# Patient Record
Sex: FEMALE | Race: WHITE | NOT HISPANIC OR LATINO | Employment: UNEMPLOYED | URBAN - METROPOLITAN AREA
[De-identification: names, ages, dates, MRNs, and addresses within clinical notes are randomized per-mention and may not be internally consistent; named-entity substitution may affect disease eponyms.]

---

## 2018-09-07 ENCOUNTER — OFFICE VISIT (OUTPATIENT)
Dept: INTERNAL MEDICINE | Facility: CLINIC | Age: 53
End: 2018-09-07

## 2018-09-07 VITALS
HEIGHT: 64 IN | BODY MASS INDEX: 29.19 KG/M2 | WEIGHT: 171 LBS | SYSTOLIC BLOOD PRESSURE: 122 MMHG | OXYGEN SATURATION: 98 % | DIASTOLIC BLOOD PRESSURE: 74 MMHG | HEART RATE: 82 BPM

## 2018-09-07 DIAGNOSIS — F33.1 MODERATE EPISODE OF RECURRENT MAJOR DEPRESSIVE DISORDER (HCC): Primary | ICD-10-CM

## 2018-09-07 DIAGNOSIS — M62.838 MUSCLE SPASM: ICD-10-CM

## 2018-09-07 DIAGNOSIS — G89.4 CHRONIC PAIN SYNDROME: ICD-10-CM

## 2018-09-07 DIAGNOSIS — R73.9 HYPERGLYCEMIA: ICD-10-CM

## 2018-09-07 PROCEDURE — 99203 OFFICE O/P NEW LOW 30 MIN: CPT | Performed by: NURSE PRACTITIONER

## 2018-09-07 RX ORDER — DULOXETIN HYDROCHLORIDE 60 MG/1
CAPSULE, DELAYED RELEASE ORAL
COMMUNITY
End: 2018-09-07 | Stop reason: SDUPTHER

## 2018-09-07 RX ORDER — CYCLOBENZAPRINE HCL 10 MG
10 TABLET ORAL NIGHTLY PRN
Qty: 30 TABLET | Refills: 1 | Status: SHIPPED | OUTPATIENT
Start: 2018-09-07

## 2018-09-07 RX ORDER — ELETRIPTAN HYDROBROMIDE 40 MG/1
TABLET, FILM COATED ORAL
Refills: 11 | COMMUNITY
Start: 2018-08-27

## 2018-09-07 RX ORDER — PRASTERONE (DHEA) 50 MG
1 CAPSULE ORAL DAILY
COMMUNITY
End: 2021-05-13

## 2018-09-07 RX ORDER — ONDANSETRON 4 MG/1
TABLET, FILM COATED ORAL
COMMUNITY
Start: 2016-10-13 | End: 2020-02-28

## 2018-09-07 RX ORDER — DULOXETIN HYDROCHLORIDE 30 MG/1
CAPSULE, DELAYED RELEASE ORAL
COMMUNITY
End: 2018-09-07 | Stop reason: SDUPTHER

## 2018-09-07 RX ORDER — NORTRIPTYLINE HYDROCHLORIDE 10 MG/1
CAPSULE ORAL
COMMUNITY

## 2018-09-07 RX ORDER — DULOXETIN HYDROCHLORIDE 30 MG/1
30 CAPSULE, DELAYED RELEASE ORAL DAILY
Qty: 30 CAPSULE | Refills: 1 | Status: SHIPPED | OUTPATIENT
Start: 2018-09-07 | End: 2018-11-07 | Stop reason: SDUPTHER

## 2018-09-07 RX ORDER — MAGNESIUM OXIDE 400 MG/1
400 TABLET ORAL DAILY
COMMUNITY
End: 2019-11-06 | Stop reason: SINTOL

## 2018-09-07 RX ORDER — NAPROXEN 500 MG/1
500 TABLET ORAL 2 TIMES DAILY WITH MEALS
Qty: 60 TABLET | Refills: 1 | Status: SHIPPED | OUTPATIENT
Start: 2018-09-07 | End: 2019-02-14

## 2018-09-07 RX ORDER — MULTIVIT-MIN/IRON/FOLIC ACID/K 18-600-40
2000 CAPSULE ORAL DAILY
COMMUNITY

## 2018-09-07 RX ORDER — OMEPRAZOLE 40 MG/1
CAPSULE, DELAYED RELEASE ORAL
COMMUNITY
End: 2020-02-28 | Stop reason: SDUPTHER

## 2018-09-07 RX ORDER — TOPIRAMATE 200 MG/1
1 CAPSULE, EXTENDED RELEASE ORAL DAILY
Refills: 1 | COMMUNITY
Start: 2018-07-05 | End: 2022-11-21

## 2018-09-07 RX ORDER — DULOXETIN HYDROCHLORIDE 60 MG/1
60 CAPSULE, DELAYED RELEASE ORAL DAILY
Qty: 30 CAPSULE | Refills: 1 | Status: SHIPPED | OUTPATIENT
Start: 2018-09-07 | End: 2018-11-07 | Stop reason: SDUPTHER

## 2018-09-07 RX ORDER — NEBIVOLOL 10 MG/1
TABLET ORAL
COMMUNITY
End: 2020-02-28 | Stop reason: SDUPTHER

## 2018-09-07 RX ORDER — CYCLOBENZAPRINE HCL 10 MG
TABLET ORAL
COMMUNITY
End: 2018-09-07 | Stop reason: SDUPTHER

## 2018-09-07 NOTE — PROGRESS NOTES
Shy Morris is a 52 y.o. female.   Chief Complaint   Patient presents with   • Establish Care     A1C was 6.5 at GYN.    • Referral     would like to be referred to Dr. Saxena pain managment.    • Hyperglycemia   • Pain      History of Present Illness patient is a former patient of Dr. Johnny Voss in [facility].  She is switching providers related to insurance.  Patient reports she has chronic depression.  She experienced suicidal ideations approximately 2 months ago that is now resolved.  She reports she does have a therapist.  She also reports Dr. Voss recently increased her Cymbalta and response to suicidal ideations.  She denied having a plan.  Her therapist is Dr. Ly Molina at [facility].  She reports she has chronic pain from fibromyalgia.  She also experiences pain in her back.  She takes the involves a and Tylenol with some relief.  She also does stretching.  Patient denies fever chills, headache (Has HO migranes and sees Dr Hillary Chong) , ear pain, sore throat, shortness of air, cough, chest pain, abdominal pain, nausea vomiting diarrhea, dysuria, blood in stool or urine. Mood is depressed.  Denies further suicidal ideations.  Eating and drinking as usual.  She was recently diagnosed with hyperglycemia as she had a hemoglobin A1c of 5.6.      Past medical history is significant for hiatal hernia/acid reflux.  Controlled with Prilosec.  Colon Polyps.  They were removed by Dr. Calle.  She will need repeat colonoscopy 5 years.  Fibromyalgia.  Hypertension.  Hyperlipidemia.  Migraine headaches.  Ovarian cyst.  Chronic back and neck pain.  Vitamin D.  Endometriosis.  Operations: Tonsil and adenoidectomy.  Discectomy and fusion L4-5, L5-S1.  Bladder tack and myomectomy.  Social: She is .  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  She does not use alcohol or tobacco.  She does not have a  living will.  Family history unknown she is adopted.  Specialist: Dr. Mcguire for GYN.  Dr. Armida Chong for neurology.  Dr. Dahiana Molina for mental health.      The following portions of the patient's history were reviewed and updated as appropriate: allergies, current medications, past family history, past medical history, past social history, past surgical history and problem list.    Current Outpatient Prescriptions:   •  Cholecalciferol (VITAMIN D) 2000 units capsule, Take  by mouth., Disp: , Rfl:   •  cyclobenzaprine (FLEXERIL) 10 MG tablet, Take 1 tablet by mouth At Night As Needed for Muscle Spasms., Disp: 30 tablet, Rfl: 1  •  DHEA 50 MG capsule, Take  by mouth., Disp: , Rfl:   •  DULoxetine (CYMBALTA) 30 MG capsule, Take 1 capsule by mouth Daily. Take with 1- 60mg capsule to = 90 mg, Disp: 30 capsule, Rfl: 1  •  DULoxetine (CYMBALTA) 60 MG capsule, Take 1 capsule by mouth Daily., Disp: 30 capsule, Rfl: 1  •  EC-RX TESTOSTERONE TD, Place  on the skin as directed by provider., Disp: , Rfl:   •  eletriptan (RELPAX) 40 MG tablet, TAKE 1 TABLET BY MOUTH AT THE ONSET OF MIGRAINE. MAY REPEAT IN 2 HRS.MAX 2 DOSE/24HR,3 DAYS/WEEK, Disp: , Rfl: 11  •  magnesium oxide (MAG-OX) 400 MG tablet, Take 400 mg by mouth Daily., Disp: , Rfl:   •  nebivolol (BYSTOLIC) 10 MG tablet, Bystolic 10 mg tablet  1 tab every days, Disp: , Rfl:   •  nortriptyline (PAMELOR) 10 MG capsule, nortriptyline 10 mg capsule  TAKE 3 CAPSULES AT BEDTIME, Disp: , Rfl:   •  omeprazole (priLOSEC) 40 MG capsule, omeprazole 40 mg capsule,delayed release  TAKE 1 CAPSULE AT BEDTIME, Disp: , Rfl:   •  OnabotulinumtoxinA (BOTOX IJ), Inject  as directed Every 3 (Three) Months., Disp: , Rfl:   •  ondansetron (ZOFRAN) 4 MG tablet, Zofran 4 mg tablet  Every four to six hours, Disp: , Rfl:   •  Probiotic Product (DIGESTIVE ADVANTAGE PO), Take  by mouth., Disp: , Rfl:   •  TROKENDI  MG capsule sustained-release 24 hr, Take 1 capsule by mouth Daily., Disp: ,  "Rfl: 1  •  naproxen (NAPROSYN) 500 MG tablet, Take 1 tablet by mouth 2 (Two) Times a Day With Meals., Disp: 60 tablet, Rfl: 1    Review of Systems Consitutional, HEENT, Respiratory, CV, GI, , Skin, Musculoskeletal, Neuro-mental, Endocrinological, Hematological were reviewed.  Positives were discussed in the HPI, otherwise ROS was negative   /74   Pulse 82   Ht 162.6 cm (64\")   Wt 77.6 kg (171 lb)   SpO2 98%   BMI 29.35 kg/m²     Objective   Allergies   Allergen Reactions   • Dhea [Nutritional Supplements] GI Intolerance   • Methylergonovine GI Intolerance     And sob     • Telithromycin GI Intolerance   • Amoxicillin Rash       Physical Exam   Constitutional: She is oriented to person, place, and time. She appears well-developed and well-nourished. No distress.   HENT:   Head: Normocephalic.   Right Ear: External ear normal.   Left Ear: External ear normal.   Nose: Nose normal.   Mouth/Throat: Oropharynx is clear and moist.   Eyes: Pupils are equal, round, and reactive to light. Right eye exhibits no discharge. Left eye exhibits no discharge. No scleral icterus.   Neck: Neck supple. No thyromegaly present.   No carotid bruit   Cardiovascular: Normal rate, regular rhythm, normal heart sounds and intact distal pulses.  Exam reveals no gallop and no friction rub.    No murmur heard.  Pulmonary/Chest: Effort normal and breath sounds normal. No respiratory distress.   Abdominal: Soft. Bowel sounds are normal. She exhibits no mass. There is no tenderness.   Musculoskeletal:   CAO well    Lymphadenopathy:     She has no cervical adenopathy.   Neurological: She is alert and oriented to person, place, and time.   Skin: Skin is warm and dry.   Is pink, no rash    Psychiatric: She has a normal mood and affect. Her behavior is normal. Judgment and thought content normal.   Nursing note and vitals reviewed.      Procedures    Assessment/Plan   Yelena was seen today for establish care, referral, hyperglycemia and " pain.    Diagnoses and all orders for this visit:    Moderate episode of recurrent major depressive disorder (CMS/HCC)  -     DULoxetine (CYMBALTA) 30 MG capsule; Take 1 capsule by mouth Daily. Take with 1- 60mg capsule to = 90 mg  -     DULoxetine (CYMBALTA) 60 MG capsule; Take 1 capsule by mouth Daily.    Muscle spasm  -     cyclobenzaprine (FLEXERIL) 10 MG tablet; Take 1 tablet by mouth At Night As Needed for Muscle Spasms.    Chronic pain syndrome  -     naproxen (NAPROSYN) 500 MG tablet; Take 1 tablet by mouth 2 (Two) Times a Day With Meals.  -     Ambulatory Referral to Pain Management    Hyperglycemia        Patient Instructions   We will get lab reports from Dr Mcguire.  Advised to adhere to a low-fat low-cholesterol low-carb diet.  We will repeat hemoglobin A1c in 3 months.  Continue same medications otherwise.  If you  feel suicidal go to the closest emergency department.  Patient is requesting a referral to pain management we will initiate.  Close follow-up with your mental health therapist in your neurologist.        ONESIMO Linn

## 2018-09-07 NOTE — PATIENT INSTRUCTIONS
We will get lab reports from Dr Mcguire.  Advised to adhere to a low-fat low-cholesterol low-carb diet.  We will repeat hemoglobin A1c in 3 months.  Continue same medications otherwise.  If you  feel suicidal go to the closest emergency department.  Patient is requesting a referral to pain management we will initiate.  Close follow-up with your mental health therapist in your neurologist.

## 2018-10-11 ENCOUNTER — OFFICE VISIT (OUTPATIENT)
Dept: INTERNAL MEDICINE | Facility: CLINIC | Age: 53
End: 2018-10-11

## 2018-10-11 VITALS
HEIGHT: 64 IN | BODY MASS INDEX: 29.74 KG/M2 | SYSTOLIC BLOOD PRESSURE: 130 MMHG | OXYGEN SATURATION: 98 % | HEART RATE: 81 BPM | DIASTOLIC BLOOD PRESSURE: 86 MMHG | WEIGHT: 174.2 LBS

## 2018-10-11 DIAGNOSIS — R73.9 HYPERGLYCEMIA: Primary | ICD-10-CM

## 2018-10-11 DIAGNOSIS — E66.3 OVERWEIGHT: ICD-10-CM

## 2018-10-11 DIAGNOSIS — H65.93 BILATERAL OTITIS MEDIA WITH EFFUSION: ICD-10-CM

## 2018-10-11 DIAGNOSIS — Z23 FLU VACCINE NEED: ICD-10-CM

## 2018-10-11 PROCEDURE — 99396 PREV VISIT EST AGE 40-64: CPT | Performed by: NURSE PRACTITIONER

## 2018-10-11 PROCEDURE — 90471 IMMUNIZATION ADMIN: CPT | Performed by: NURSE PRACTITIONER

## 2018-10-11 PROCEDURE — 90674 CCIIV4 VAC NO PRSV 0.5 ML IM: CPT | Performed by: NURSE PRACTITIONER

## 2018-10-11 PROCEDURE — 90715 TDAP VACCINE 7 YRS/> IM: CPT | Performed by: NURSE PRACTITIONER

## 2018-10-11 PROCEDURE — 90472 IMMUNIZATION ADMIN EACH ADD: CPT | Performed by: NURSE PRACTITIONER

## 2018-10-11 NOTE — PROGRESS NOTES
Shy Morris is a 53 y.o. female.   Chief Complaint   Patient presents with   • Depression     1 mo f/u   • Annual Exam      History of Present Illness Has not seen Dr Clemons but reports pain to back and neck (has chronic pain RT [facility])  is better.  She is seeing physical therapy with some relief.  Since last visit, she has fallen once while getting out of child's cave.  Patient denies fever chills, headache (Has HO chronic HA but are under control-sees Dr Hillary Chong).  Is having left ear pain.  Onset approx 1 week ago.  Labile pain.  Started using Flonase 2 days.  No sore throat, shortness of air, cough, chest pain, abdominal pain, nausea, vomiting, diarrhea, dysuria, blood in stool or urine.  Mood is better.  No thoughts of self harm.  Cont with counseling (Dr Ly Molina at [facility]).    Eating and drinking as usual.  Would like to see a dietitian to help plan a diet for weight loss and glycemic control  No unexplained weight loss or gain but reports she is overweight.  Reports she has dry mouth but believes it is side effect from her meds.       Past medical history is significant for hiatal hernia/acid reflux.  Controlled with Prilosec.  Colon Polyps.  They were removed by Dr. Calle.  She will need repeat colonoscopy 5 years.  Fibromyalgia.  Hypertension.  Hyperlipidemia.  Migraine headaches.  Ovarian cyst.  Chronic back and neck pain.  Vitamin D.  Endometriosis.  Operations: Tonsil and adenoidectomy.  Discectomy and fusion L4-5, L5-S1.  Bladder tack and myomectomy.  Social: She is .  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  She does not use alcohol or tobacco.  She does not have a living will.  Family history unknown she is adopted.  Specialist: Dr. Mcguire for GYN.  Dr. Armida Chong for neurology.  Dr. Dahiana Molina for mental health    The following portions of the patient's history were reviewed and updated as  appropriate: allergies, current medications, past family history, past medical history, past social history, past surgical history and problem list.    Current Outpatient Prescriptions:   •  Cholecalciferol (VITAMIN D) 2000 units capsule, Take  by mouth., Disp: , Rfl:   •  cyclobenzaprine (FLEXERIL) 10 MG tablet, Take 1 tablet by mouth At Night As Needed for Muscle Spasms., Disp: 30 tablet, Rfl: 1  •  DHEA 50 MG capsule, Take  by mouth., Disp: , Rfl:   •  DULoxetine (CYMBALTA) 30 MG capsule, Take 1 capsule by mouth Daily. Take with 1- 60mg capsule to = 90 mg, Disp: 30 capsule, Rfl: 1  •  DULoxetine (CYMBALTA) 60 MG capsule, Take 1 capsule by mouth Daily., Disp: 30 capsule, Rfl: 1  •  EC-RX TESTOSTERONE TD, Place  on the skin as directed by provider., Disp: , Rfl:   •  eletriptan (RELPAX) 40 MG tablet, TAKE 1 TABLET BY MOUTH AT THE ONSET OF MIGRAINE. MAY REPEAT IN 2 HRS.MAX 2 DOSE/24HR,3 DAYS/WEEK, Disp: , Rfl: 11  •  magnesium oxide (MAG-OX) 400 MG tablet, Take 400 mg by mouth Daily., Disp: , Rfl:   •  naproxen (NAPROSYN) 500 MG tablet, Take 1 tablet by mouth 2 (Two) Times a Day With Meals., Disp: 60 tablet, Rfl: 1  •  nebivolol (BYSTOLIC) 10 MG tablet, Bystolic 10 mg tablet  1 tab every days, Disp: , Rfl:   •  nortriptyline (PAMELOR) 10 MG capsule, nortriptyline 10 mg capsule  TAKE 4 CAPSULES AT BEDTIME, Disp: , Rfl:   •  omeprazole (priLOSEC) 40 MG capsule, omeprazole 40 mg capsule,delayed release  TAKE 1 CAPSULE AT BEDTIME, Disp: , Rfl:   •  OnabotulinumtoxinA (BOTOX IJ), Inject  as directed Every 3 (Three) Months., Disp: , Rfl:   •  ondansetron (ZOFRAN) 4 MG tablet, Zofran 4 mg tablet  Every four to six hours, Disp: , Rfl:   •  Probiotic Product (DIGESTIVE ADVANTAGE PO), Take  by mouth., Disp: , Rfl:   •  TROKENDI  MG capsule sustained-release 24 hr, Take 1 capsule by mouth Daily., Disp: , Rfl: 1    Review of Systems Consitutional, HEENT, Respiratory, CV, GI, , Skin, Musculoskeletal, Neuro-mental,  "Endocrinological, Hematological were reviewed.  Positives were discussed in the HPI, otherwise ROS was negative   /86   Pulse 81   Ht 162.6 cm (64.02\")   Wt 79 kg (174 lb 3.2 oz)   SpO2 98%   BMI 29.89 kg/m²     Objective   Allergies   Allergen Reactions   • Dhea [Nutritional Supplements] GI Intolerance   • Methylergonovine GI Intolerance     And sob     • Telithromycin GI Intolerance   • Amoxicillin Rash       Physical Exam   Constitutional: She is oriented to person, place, and time. She appears well-developed and well-nourished. No distress.   HENT:   Head: Normocephalic.   Right Ear: External ear normal.   Left Ear: External ear normal.   Nose: Nose normal.   Mouth/Throat: Oropharynx is clear and moist.   TM dull bilat   Eyes: Right eye exhibits no discharge. Left eye exhibits no discharge. No scleral icterus.   Neck: Neck supple.   Cardiovascular: Normal rate, regular rhythm, normal heart sounds and intact distal pulses.  Exam reveals no gallop and no friction rub.    No murmur heard.  Pulmonary/Chest: Effort normal and breath sounds normal. No respiratory distress. She has no wheezes. She has no rales.   Abdominal: Soft. There is no tenderness.   Lymphadenopathy:     She has no cervical adenopathy.   Neurological: She is alert and oriented to person, place, and time.   Skin: Skin is warm and dry. Capillary refill takes less than 2 seconds.   Psychiatric: She has a normal mood and affect. Her behavior is normal. Judgment and thought content normal.   Nursing note and vitals reviewed.      Procedures    LABS  No results found for this or any previous visit.    Assessment/Plan   Yelena was seen today for depression and annual exam.    Diagnoses and all orders for this visit:    Hyperglycemia  -     Ambulatory Referral to Nutrition Services    Flu vaccine need  -     Flucelvax Quad=>4Years (PFS)    Overweight  -     Ambulatory Referral to Nutrition Services    Bilateral otitis media with " effusion      Patient Instructions   I have reviewed records from  and Dr. Mcguire.  At her next visit she will need to have a hemoglobin A1c fasting CMP and lipid panel area referral to dietitian.  Continue close follow-up with your mental health specialist and also your neurologist.  Sudafed PE for ear pain.  Pt verbalizes understanding and agreement with plan of care.       EMR Dragon/transcription disclaimer:  Please note that portions of this note were completed with a voice recognition program.  Electronic transcription of the voice recognition program may permit erroneous words or phrases to be inadvertently transcribed.  Although I have reviewed the note for such errors, some may still exist in this documentation     Sunshine Calvin, APRN

## 2018-10-11 NOTE — PATIENT INSTRUCTIONS
I have reviewed records from  and Dr. Mcguire.  At her next visit she will need to have a hemoglobin A1c fasting CMP and lipid panel area referral to dietitian.  Continue close follow-up with your mental health specialist and also your neurologist.  Sudafed PE for ear pain.  Pt verbalizes understanding and agreement with plan of care.

## 2018-10-16 ENCOUNTER — TELEPHONE (OUTPATIENT)
Dept: INTERNAL MEDICINE | Facility: CLINIC | Age: 53
End: 2018-10-16

## 2018-10-16 ENCOUNTER — HOSPITAL ENCOUNTER (OUTPATIENT)
Dept: NUTRITION | Facility: HOSPITAL | Age: 53
Setting detail: RECURRING SERIES
Discharge: HOME | End: 2018-10-16

## 2018-10-16 VITALS — HEIGHT: 64 IN | WEIGHT: 172 LBS | BODY MASS INDEX: 29.37 KG/M2

## 2018-10-16 PROCEDURE — 97802 MEDICAL NUTRITION INDIV IN: CPT | Performed by: DIETITIAN, REGISTERED

## 2018-10-16 NOTE — TELEPHONE ENCOUNTER
Patient informed of instructions talked with her at check in informed her to follow Dr Mancia instructions for 24-48 hours and if SX are not better please give us a call and we will work her in. Patient voiced understanding and appreciation

## 2018-10-16 NOTE — TELEPHONE ENCOUNTER
Looks like its an injection site reaction.  He should ice it and keep it elevated , but if painful will need to be seen.    thanks

## 2018-10-16 NOTE — TELEPHONE ENCOUNTER
Pt got a tetanus shot last week, her arm is swollen, red, hard around the injection site, and warm to touch. She is calling to see if there is anything she should do or be concerned about. Her call back number is [phone]

## 2018-11-07 ENCOUNTER — OFFICE VISIT (OUTPATIENT)
Dept: INTERNAL MEDICINE | Facility: CLINIC | Age: 53
End: 2018-11-07

## 2018-11-07 VITALS
BODY MASS INDEX: 27.45 KG/M2 | TEMPERATURE: 98.4 F | HEIGHT: 64 IN | DIASTOLIC BLOOD PRESSURE: 74 MMHG | RESPIRATION RATE: 16 BRPM | SYSTOLIC BLOOD PRESSURE: 122 MMHG | WEIGHT: 160.8 LBS | HEART RATE: 101 BPM | OXYGEN SATURATION: 95 %

## 2018-11-07 DIAGNOSIS — R10.11 RUQ PAIN: Primary | ICD-10-CM

## 2018-11-07 DIAGNOSIS — F33.1 MODERATE EPISODE OF RECURRENT MAJOR DEPRESSIVE DISORDER (HCC): ICD-10-CM

## 2018-11-07 PROCEDURE — 99213 OFFICE O/P EST LOW 20 MIN: CPT | Performed by: NURSE PRACTITIONER

## 2018-11-07 RX ORDER — DULOXETIN HYDROCHLORIDE 60 MG/1
60 CAPSULE, DELAYED RELEASE ORAL DAILY
Qty: 30 CAPSULE | Refills: 1 | Status: SHIPPED | OUTPATIENT
Start: 2018-11-07 | End: 2019-04-05

## 2018-11-07 RX ORDER — DULOXETIN HYDROCHLORIDE 30 MG/1
30 CAPSULE, DELAYED RELEASE ORAL DAILY
Qty: 30 CAPSULE | Refills: 1 | Status: SHIPPED | OUTPATIENT
Start: 2018-11-07 | End: 2019-04-05 | Stop reason: SDUPTHER

## 2018-11-07 NOTE — PROGRESS NOTES
Subjective   Yelena Morris is a 53 y.o. female.   Chief Complaint   Patient presents with   • Abdominal Pain   • Diarrhea      History of Present Illness upper abd pain and loose stool.  Some nausea.  Gets better with eating.  Labile in nature.  Onset 2 weeks ago.  Takes Prilosec nightly.  No food pattering.  Stopped Naprosyn  (thought it could be causing her abdominal pain) about 2 days ago without relief.  She denies fever or chills.  No blood in stool.  She is using Zofran for the nausea with some relief.  She also reports she has been under a lot of stress.  She is having problems with her marriage, self worth, and self-esteem.  She is seeing a counselor.  She denies thoughts of self-harm.    The following portions of the patient's history were reviewed and updated as appropriate: allergies, current medications, past family history, past medical history, past social history, past surgical history and problem list.    Current Outpatient Prescriptions:   •  Cholecalciferol (VITAMIN D) 2000 units capsule, Take  by mouth., Disp: , Rfl:   •  cyclobenzaprine (FLEXERIL) 10 MG tablet, Take 1 tablet by mouth At Night As Needed for Muscle Spasms., Disp: 30 tablet, Rfl: 1  •  DHEA 50 MG capsule, Take  by mouth., Disp: , Rfl:   •  DULoxetine (CYMBALTA) 30 MG capsule, Take 1 capsule by mouth Daily. Take with 1- 60mg capsule to = 90 mg, Disp: 30 capsule, Rfl: 1  •  DULoxetine (CYMBALTA) 60 MG capsule, Take 1 capsule by mouth Daily., Disp: 30 capsule, Rfl: 1  •  EC-RX TESTOSTERONE TD, Place  on the skin as directed by provider., Disp: , Rfl:   •  eletriptan (RELPAX) 40 MG tablet, TAKE 1 TABLET BY MOUTH AT THE ONSET OF MIGRAINE. MAY REPEAT IN 2 HRS.MAX 2 DOSE/24HR,3 DAYS/WEEK, Disp: , Rfl: 11  •  magnesium oxide (MAG-OX) 400 MG tablet, Take 400 mg by mouth Daily., Disp: , Rfl:   •  naproxen (NAPROSYN) 500 MG tablet, Take 1 tablet by mouth 2 (Two) Times a Day With Meals., Disp: 60 tablet, Rfl: 1  •  nebivolol (BYSTOLIC) 10 MG  "tablet, Bystolic 10 mg tablet  1 tab every days, Disp: , Rfl:   •  nortriptyline (PAMELOR) 10 MG capsule, nortriptyline 10 mg capsule  TAKE 4 CAPSULES AT BEDTIME, Disp: , Rfl:   •  omeprazole (priLOSEC) 40 MG capsule, omeprazole 40 mg capsule,delayed release  TAKE 1 CAPSULE AT BEDTIME, Disp: , Rfl:   •  OnabotulinumtoxinA (BOTOX IJ), Inject  as directed Every 3 (Three) Months., Disp: , Rfl:   •  ondansetron (ZOFRAN) 4 MG tablet, Zofran 4 mg tablet  Every four to six hours, Disp: , Rfl:   •  Probiotic Product (DIGESTIVE ADVANTAGE PO), Take  by mouth., Disp: , Rfl:   •  TROKENDI  MG capsule sustained-release 24 hr, Take 1 capsule by mouth Daily., Disp: , Rfl: 1    Review of Systems  Consitutional, HEENT, Respiratory, CV, GI, , Skin, Musculoskeletal, Neuro-mental, Endocrinological, Hematological were reviewed.  Positives were discussed in the HPI, otherwise ROS was negative   /74   Pulse 101   Temp 98.4 °F (36.9 °C) (Oral)   Resp 16   Ht 162.6 cm (64\")   Wt 72.9 kg (160 lb 12.8 oz)   SpO2 95%   BMI 27.60 kg/m²     Objective   Allergies   Allergen Reactions   • Dhea [Nutritional Supplements] GI Intolerance   • Methylergonovine GI Intolerance     And sob     • Telithromycin GI Intolerance   • Amoxicillin Rash       Physical Exam   Constitutional: She is oriented to person, place, and time. She appears well-developed and well-nourished. No distress.   Eyes: Right eye exhibits no discharge. Left eye exhibits no discharge. No scleral icterus.   Neck: Neck supple.   Cardiovascular: Normal rate, regular rhythm, normal heart sounds and intact distal pulses.  Exam reveals no gallop and no friction rub.    No murmur heard.  Pulmonary/Chest: Effort normal and breath sounds normal. No respiratory distress.   Abdominal: Soft. Bowel sounds are normal. There is tenderness.   Upper quadrant, no mass.   Lymphadenopathy:     She has no cervical adenopathy.   Neurological: She is alert and oriented to person, place, " and time.   Skin: Skin is warm and dry.   Is pink, no rash    Psychiatric: Her behavior is normal. Judgment and thought content normal.   Became tearful while discussing the stressors in her life.   Nursing note and vitals reviewed.      Procedures    LABS  No results found for this or any previous visit.    Assessment/Plan   Yelena was seen today for abdominal pain and diarrhea.    Diagnoses and all orders for this visit:    RUQ pain  -     US abdomen limited; Future    Moderate episode of recurrent major depressive disorder (CMS/HCC)  -     DULoxetine (CYMBALTA) 30 MG capsule; Take 1 capsule by mouth Daily. Take with 1- 60mg capsule to = 90 mg  -     DULoxetine (CYMBALTA) 60 MG capsule; Take 1 capsule by mouth Daily.          Patient Instructions   Recommend continuation of counseling.  May need referral to psychiatry for medical management.  Continue the Pamelor and Cymbalta.  We will obtain right upper quadrant ultrasound.  Avoid fried greasy foods, dairy products and gluten products.  Continue the Prilosec.  If your symptoms worsen in anyway go to the emergency department.  Pt verbalizes understanding and agreement with plan of care.     EMR Dragon/transcription disclaimer:  Please note that portions of this note were completed with a voice recognition program.  Electronic transcription of the voice recognition program may permit erroneous words or phrases to be inadvertently transcribed.  Although I have reviewed the note for such errors, some may still exist in this documentation     ONESIMO Lnin

## 2018-11-08 ENCOUNTER — APPOINTMENT (OUTPATIENT)
Dept: NUTRITION | Facility: HOSPITAL | Age: 53
End: 2018-11-08

## 2018-11-08 ENCOUNTER — TELEPHONE (OUTPATIENT)
Dept: NUTRITION | Facility: HOSPITAL | Age: 53
End: 2018-11-08

## 2018-11-08 NOTE — PROGRESS NOTES
Adult Outpatient Nutrition  Assessment    Patient Name:  Yelena Morris  YOB: 1965  MRN: [id number]    Assessment Date:  11/8/2018    Comments:  Spoke with patient on the phone for follow up. Patient was seen initially on 10/16/18 and cancelled follow up visit scheduled for 11/8/18. Spoke with patient on the phone, patient reports current weight of 74 kg (163 lbs), weight loss of 4 kg. Patient has been watching food portions. She uses plate method to plan meals, and eating more non-starchy vegetables while reducing starch intake. She eats three meals a day on most days of the week. She's walking at least 5 minutes a day and also doing physical therapy exercises at home. Reschedule follow up visit for 11/26/18 at 2:30 pm.       Electronically signed by:  Liz Ramírez RD  11/08/18 1:26 PM

## 2018-11-08 NOTE — PATIENT INSTRUCTIONS
Recommend continuation of counseling.  May need referral to psychiatry for medical management.  Continue the Pamelor and Cymbalta.  We will obtain right upper quadrant ultrasound.  Avoid fried greasy foods, dairy products and gluten products.  Continue the Prilosec.  If your symptoms worsen in anyway go to the emergency department.  Pt verbalizes understanding and agreement with plan of care.

## 2018-11-12 ENCOUNTER — HOSPITAL ENCOUNTER (OUTPATIENT)
Dept: ULTRASOUND IMAGING | Facility: HOSPITAL | Age: 53
Discharge: HOME | End: 2018-11-12
Admitting: NURSE PRACTITIONER

## 2018-11-12 DIAGNOSIS — R10.11 RUQ PAIN: ICD-10-CM

## 2018-11-12 PROCEDURE — 76705 ECHO EXAM OF ABDOMEN: CPT

## 2018-11-15 ENCOUNTER — OFFICE VISIT (OUTPATIENT)
Dept: INTERNAL MEDICINE | Facility: CLINIC | Age: 53
End: 2018-11-15

## 2018-11-15 VITALS
DIASTOLIC BLOOD PRESSURE: 80 MMHG | BODY MASS INDEX: 28 KG/M2 | HEIGHT: 64 IN | WEIGHT: 164 LBS | OXYGEN SATURATION: 97 % | SYSTOLIC BLOOD PRESSURE: 118 MMHG | HEART RATE: 72 BPM

## 2018-11-15 DIAGNOSIS — R10.11 RIGHT UPPER QUADRANT ABDOMINAL PAIN: ICD-10-CM

## 2018-11-15 DIAGNOSIS — F32.0 CURRENT MILD EPISODE OF MAJOR DEPRESSIVE DISORDER, UNSPECIFIED WHETHER RECURRENT (HCC): ICD-10-CM

## 2018-11-15 DIAGNOSIS — E11.65 TYPE 2 DIABETES MELLITUS WITH HYPERGLYCEMIA, WITHOUT LONG-TERM CURRENT USE OF INSULIN (HCC): Primary | ICD-10-CM

## 2018-11-15 DIAGNOSIS — F41.9 ANXIETY: ICD-10-CM

## 2018-11-15 PROCEDURE — 99213 OFFICE O/P EST LOW 20 MIN: CPT | Performed by: NURSE PRACTITIONER

## 2018-11-15 RX ORDER — ONABOTULINUMTOXINA 200 [USP'U]/1
INJECTION, POWDER, LYOPHILIZED, FOR SOLUTION INTRADERMAL; INTRAMUSCULAR
Refills: 3 | COMMUNITY
Start: 2018-10-25

## 2018-11-15 RX ORDER — BACLOFEN 10 MG/1
TABLET ORAL
Refills: 11 | COMMUNITY
Start: 2018-11-06 | End: 2019-07-30

## 2018-11-15 NOTE — PROGRESS NOTES
Subjective   Yelena Morris is a 53 y.o. female.   Chief Complaint   Patient presents with   • Follow-up     stomach pain      History of Present Illness she states both her abdominal pain and mood are much better today.  She states after her last visit she started her period the next day.  She says her mood dramatically improved after that.  She is eating and drinking okay.  No dysuria, change in bowel or bladder habits, blood in stool or urine..  No nausea vomiting. Denies headache, shortness of breath, chest pain.  She does not monitor her glucoses.  She has seen a dietitian and has made dietary changes.  She denies sores or wounds that will not heal.    The following portions of the patient's history were reviewed and updated as appropriate: allergies, current medications, past family history, past medical history, past social history, past surgical history and problem list.    Current Outpatient Medications:   •  baclofen (LIORESAL) 10 MG tablet, TAKE 1 TABLET EVERY MORNING AND AT NOON FOR MUSCLE SPASMS, Disp: , Rfl: 11  •  BOTOX 200 units reconstituted solution, INJECT 200 UNITS INTO THE HEAD OR NECK AREA BY MD EVERY 90 DAYS, Disp: , Rfl: 3  •  Cholecalciferol (VITAMIN D) 2000 units capsule, Take  by mouth., Disp: , Rfl:   •  cyclobenzaprine (FLEXERIL) 10 MG tablet, Take 1 tablet by mouth At Night As Needed for Muscle Spasms., Disp: 30 tablet, Rfl: 1  •  DHEA 50 MG capsule, Take  by mouth., Disp: , Rfl:   •  DULoxetine (CYMBALTA) 30 MG capsule, Take 1 capsule by mouth Daily. Take with 1- 60mg capsule to = 90 mg, Disp: 30 capsule, Rfl: 1  •  DULoxetine (CYMBALTA) 60 MG capsule, Take 1 capsule by mouth Daily., Disp: 30 capsule, Rfl: 1  •  EC-RX TESTOSTERONE TD, Place  on the skin as directed by provider., Disp: , Rfl:   •  eletriptan (RELPAX) 40 MG tablet, TAKE 1 TABLET BY MOUTH AT THE ONSET OF MIGRAINE. MAY REPEAT IN 2 HRS.MAX 2 DOSE/24HR,3 DAYS/WEEK, Disp: , Rfl: 11  •  magnesium oxide (MAG-OX) 400 MG tablet,  "Take 400 mg by mouth Daily., Disp: , Rfl:   •  naproxen (NAPROSYN) 500 MG tablet, Take 1 tablet by mouth 2 (Two) Times a Day With Meals., Disp: 60 tablet, Rfl: 1  •  nebivolol (BYSTOLIC) 10 MG tablet, Bystolic 10 mg tablet  1 tab every days, Disp: , Rfl:   •  nortriptyline (PAMELOR) 10 MG capsule, nortriptyline 10 mg capsule  TAKE 4 CAPSULES AT BEDTIME, Disp: , Rfl:   •  omeprazole (priLOSEC) 40 MG capsule, omeprazole 40 mg capsule,delayed release  TAKE 1 CAPSULE AT BEDTIME, Disp: , Rfl:   •  OnabotulinumtoxinA (BOTOX IJ), Inject  as directed Every 3 (Three) Months., Disp: , Rfl:   •  ondansetron (ZOFRAN) 4 MG tablet, Zofran 4 mg tablet  Every four to six hours, Disp: , Rfl:   •  Probiotic Product (DIGESTIVE ADVANTAGE PO), Take  by mouth., Disp: , Rfl:   •  TROKENDI  MG capsule sustained-release 24 hr, Take 1 capsule by mouth Daily., Disp: , Rfl: 1    Review of Systems Consitutional, HEENT, Respiratory, CV, GI, , Skin, Musculoskeletal, Neuro-mental, Endocrinological, Hematological were reviewed.  Positives were discussed in the HPI, otherwise ROS was negative   /80   Pulse 72   Ht 162.6 cm (64\")   Wt 74.4 kg (164 lb)   SpO2 97%   BMI 28.15 kg/m²     Objective   Allergies   Allergen Reactions   • Dhea [Nutritional Supplements] GI Intolerance   • Methylergonovine GI Intolerance     And sob     • Telithromycin GI Intolerance   • Amoxicillin Rash       Physical Exam   Constitutional: She appears well-developed and well-nourished. No distress.   HENT:   Head: Normocephalic.   Cardiovascular: Normal rate, regular rhythm and normal heart sounds.   Pulmonary/Chest: Effort normal and breath sounds normal. No respiratory distress.   Abdominal: Soft. She exhibits no mass. There is no tenderness.   Musculoskeletal:   Feet with intact sensation.  No areas of breakdown noted toes pink and warm   Skin: Skin is warm and dry. Capillary refill takes less than 2 seconds.   Is pink, no rash    Psychiatric: She has a " normal mood and affect. Her behavior is normal. Judgment and thought content normal.   Nursing note and vitals reviewed.      Procedures    LABS  No results found for this or any previous visit.    Assessment/Plan   Yelena was seen today for follow-up.    Diagnoses and all orders for this visit:    Type 2 diabetes mellitus with hyperglycemia, without long-term current use of insulin (CMS/[facility])  -     Comprehensive Metabolic Panel  -     Hemoglobin A1c    Right upper quadrant abdominal pain    Anxiety    Current mild episode of major depressive disorder, unspecified whether recurrent (CMS/[facility])          Patient Instructions   Continue close follow-up with your mental health providers.  Monitor for new or worsening of abdominal pain if it occurs please let me know.  Avoid fried greasy foods stick with a low-fat diet.  Return to the clinic as needed  Pt verbalizes understanding and agreement with plan of care.       EMR Dragon/transcription disclaimer:  Please note that portions of this note were completed with a voice recognition program.  Electronic transcription of the voice recognition program may permit erroneous words or phrases to be inadvertently transcribed.  Although I have reviewed the note for such errors, some may still exist in this documentation   ONESIMO Linn

## 2018-11-21 ENCOUNTER — TELEPHONE (OUTPATIENT)
Dept: INTERNAL MEDICINE | Facility: CLINIC | Age: 53
End: 2018-11-21

## 2018-11-21 DIAGNOSIS — R73.9 HYPERGLYCEMIA: Primary | ICD-10-CM

## 2018-11-21 NOTE — TELEPHONE ENCOUNTER
Please call patient random blood sugar was 144.  Your kidney and liver function were normal.  Your hemoglobin A1c was 6.7.  I would like to start him on metformin.  Please let me know your thoughts

## 2018-11-21 NOTE — TELEPHONE ENCOUNTER
Please call patient the main side effect is GI distress.  I.e. loose stool.  If you have any problems please let me know

## 2018-11-26 ENCOUNTER — APPOINTMENT (OUTPATIENT)
Dept: NUTRITION | Facility: HOSPITAL | Age: 53
End: 2018-11-26

## 2018-11-27 ENCOUNTER — TELEPHONE (OUTPATIENT)
Dept: INTERNAL MEDICINE | Facility: CLINIC | Age: 53
End: 2018-11-27

## 2018-11-27 NOTE — TELEPHONE ENCOUNTER
PATIENT IS RETURNING YOU CALL TO LET YOU KNOW SHE HAS NOT RECEIVED A REFERRAL FOR PAIN MANAGEMENT.  PT CAN BE REACHED [phone]

## 2019-01-21 DIAGNOSIS — F33.1 MODERATE EPISODE OF RECURRENT MAJOR DEPRESSIVE DISORDER (HCC): ICD-10-CM

## 2019-01-22 RX ORDER — DULOXETIN HYDROCHLORIDE 30 MG/1
CAPSULE, DELAYED RELEASE ORAL
Qty: 30 CAPSULE | Refills: 0 | OUTPATIENT
Start: 2019-01-22

## 2019-01-29 DIAGNOSIS — F33.1 MODERATE EPISODE OF RECURRENT MAJOR DEPRESSIVE DISORDER (HCC): ICD-10-CM

## 2019-01-29 RX ORDER — DULOXETIN HYDROCHLORIDE 30 MG/1
CAPSULE, DELAYED RELEASE ORAL
Qty: 30 CAPSULE | Refills: 0 | OUTPATIENT
Start: 2019-01-29

## 2019-02-14 ENCOUNTER — OFFICE VISIT (OUTPATIENT)
Dept: INTERNAL MEDICINE | Facility: CLINIC | Age: 54
End: 2019-02-14

## 2019-02-14 VITALS
SYSTOLIC BLOOD PRESSURE: 118 MMHG | BODY MASS INDEX: 28.85 KG/M2 | HEART RATE: 75 BPM | WEIGHT: 169 LBS | DIASTOLIC BLOOD PRESSURE: 80 MMHG | OXYGEN SATURATION: 99 % | HEIGHT: 64 IN

## 2019-02-14 DIAGNOSIS — L60.0 INGROWN TOENAIL: Primary | ICD-10-CM

## 2019-02-14 PROBLEM — M79.7 FIBROMYALGIA: Status: ACTIVE | Noted: 2019-02-14

## 2019-02-14 PROBLEM — R79.89 LOW VITAMIN D LEVEL: Status: ACTIVE | Noted: 2019-02-14

## 2019-02-14 PROBLEM — G43.909 MIGRAINE WITHOUT STATUS MIGRAINOSUS, NOT INTRACTABLE: Status: ACTIVE | Noted: 2019-02-14

## 2019-02-14 PROBLEM — R73.03 PREDIABETES: Status: ACTIVE | Noted: 2019-02-14

## 2019-02-14 PROBLEM — E78.5 HYPERLIPIDEMIA: Status: ACTIVE | Noted: 2019-02-14

## 2019-02-14 PROBLEM — I10 ESSENTIAL HYPERTENSION: Status: ACTIVE | Noted: 2019-02-14

## 2019-02-14 PROBLEM — N80.9 ENDOMETRIOSIS: Status: ACTIVE | Noted: 2019-02-14

## 2019-02-14 PROBLEM — F33.9 RECURRENT MAJOR DEPRESSIVE DISORDER: Status: ACTIVE | Noted: 2019-02-14

## 2019-02-14 PROCEDURE — 99212 OFFICE O/P EST SF 10 MIN: CPT | Performed by: NURSE PRACTITIONER

## 2019-02-14 RX ORDER — DICLOFENAC POTASSIUM 50 MG/1
POWDER, FOR SOLUTION ORAL
Refills: 6 | COMMUNITY
Start: 2018-12-21 | End: 2019-11-06

## 2019-02-14 RX ORDER — PREGABALIN 50 MG/1
75 CAPSULE ORAL EVERY 8 HOURS SCHEDULED
COMMUNITY
End: 2020-02-28

## 2019-02-14 RX ORDER — HYDROCODONE BITARTRATE AND ACETAMINOPHEN 7.5; 325 MG/1; MG/1
TABLET ORAL EVERY 8 HOURS SCHEDULED
COMMUNITY
End: 2019-08-12

## 2019-02-14 NOTE — PROGRESS NOTES
Shy Morris is a 53 y.o. female.   Chief Complaint   Patient presents with   • Ingrown Toenail     Left big toe      History of Present Illness Has bilat ingrown toe nails with the left being worse.  Onset many years ago.   usually is able to keep the ingrown toenails under control however the lab to become painful and inflamed.  No discharge.  Patient is wanting to see a podiatrist.  Denies other complaints today    The following portions of the patient's history were reviewed and updated as appropriate: allergies, current medications, past family history, past medical history, past social history, past surgical history and problem list.    Current Outpatient Medications:   •  baclofen (LIORESAL) 10 MG tablet, TAKE 1 TABLET EVERY MORNING AND AT NOON FOR MUSCLE SPASMS, Disp: , Rfl: 11  •  BOTOX 200 units reconstituted solution, INJECT 200 UNITS INTO THE HEAD OR NECK AREA BY MD EVERY 90 DAYS, Disp: , Rfl: 3  •  CAMBIA 50 MG pack, TAKE 1 PACKET WITH 2TBSP OF COLD WATER WHEN MIGRAINE OCCURS, MAY REPEAT EVERY 6 HOURS IF NEEDED (X1), Disp: , Rfl: 6  •  Cholecalciferol (VITAMIN D) 2000 units capsule, Take  by mouth., Disp: , Rfl:   •  cyclobenzaprine (FLEXERIL) 10 MG tablet, Take 1 tablet by mouth At Night As Needed for Muscle Spasms., Disp: 30 tablet, Rfl: 1  •  DHEA 50 MG capsule, Take  by mouth., Disp: , Rfl:   •  DULoxetine (CYMBALTA) 30 MG capsule, Take 1 capsule by mouth Daily. Take with 1- 60mg capsule to = 90 mg, Disp: 30 capsule, Rfl: 1  •  DULoxetine (CYMBALTA) 60 MG capsule, Take 1 capsule by mouth Daily., Disp: 30 capsule, Rfl: 1  •  EC-RX TESTOSTERONE TD, Place  on the skin as directed by provider., Disp: , Rfl:   •  eletriptan (RELPAX) 40 MG tablet, TAKE 1 TABLET BY MOUTH AT THE ONSET OF MIGRAINE. MAY REPEAT IN 2 HRS.MAX 2 DOSE/24HR,3 DAYS/WEEK, Disp: , Rfl: 11  •  HYDROcodone-acetaminophen (NORCO) 7.5-325 MG per tablet, Every 8 (Eight) Hours., Disp: , Rfl:   •  magnesium oxide  "(MAG-OX) 400 MG tablet, Take 400 mg by mouth Daily., Disp: , Rfl:   •  metFORMIN (GLUCOPHAGE) 500 MG tablet, Take 1 tablet by mouth Daily With Breakfast., Disp: 30 tablet, Rfl: 2  •  nebivolol (BYSTOLIC) 10 MG tablet, Bystolic 10 mg tablet  1 tab every days, Disp: , Rfl:   •  nortriptyline (PAMELOR) 10 MG capsule, nortriptyline 10 mg capsule  TAKE 4 CAPSULES AT BEDTIME, Disp: , Rfl:   •  omeprazole (priLOSEC) 40 MG capsule, omeprazole 40 mg capsule,delayed release  TAKE 1 CAPSULE AT BEDTIME, Disp: , Rfl:   •  OnabotulinumtoxinA (BOTOX IJ), Inject  as directed Every 3 (Three) Months., Disp: , Rfl:   •  ondansetron (ZOFRAN) 4 MG tablet, Zofran 4 mg tablet  Every four to six hours, Disp: , Rfl:   •  pregabalin (LYRICA) 50 MG capsule, Every 8 (Eight) Hours., Disp: , Rfl:   •  Probiotic Product (DIGESTIVE ADVANTAGE PO), Take  by mouth., Disp: , Rfl:   •  TROKENDI  MG capsule sustained-release 24 hr, Take 1 capsule by mouth Daily., Disp: , Rfl: 1    Review of Systems Consitutional, HEENT, Respiratory, CV, GI, , Skin, Musculoskeletal, Neuro-mental, Endocrinological, Hematological were reviewed.  Positives were discussed in the HPI, otherwise ROS was negative   /80   Pulse 75   Ht 162.6 cm (64\")   Wt 76.7 kg (169 lb)   SpO2 99%   BMI 29.01 kg/m²     Objective   Allergies   Allergen Reactions   • Dhea [Nutritional Supplements] GI Intolerance   • Methylergonovine GI Intolerance     And sob     • Telithromycin GI Intolerance   • Amoxicillin Rash       Physical Exam   Constitutional: She is oriented to person, place, and time. She appears well-developed.   Musculoskeletal:   Lateral great toes have evidence of ingrown toenails however the left is reddened on the medial aspect.  There is no drainage.   Neurological: She is alert and oriented to person, place, and time.   Skin: Skin is warm and dry. Capillary refill takes less than 2 seconds.   Nursing note and vitals reviewed.      Procedures    LABS  No " results found for this or any previous visit.    Assessment/Plan   Yelena was seen today for ingrown toenail.    Diagnoses and all orders for this visit:    Ingrown toenail  -     Ambulatory Referral to Podiatry        Patient Instructions   Soak feet in warm soapy water 2 or 3 times daily.  Podiatry  Referral.  Pt verbalizes understanding and agreement with plan of care.     EMR Dragon/transcription disclaimer:  Please note that portions of this note were completed with a voice recognition program.  Electronic transcription of the voice recognition program may permit erroneous words or phrases to be inadvertently transcribed.  Although I have reviewed the note for such errors, some may still exist in this documentation     Sunshine Calvin, APRN

## 2019-02-14 NOTE — PATIENT INSTRUCTIONS
Soak feet in warm soapy water 2 or 3 times daily.  Podiatry  Referral.  Pt verbalizes understanding and agreement with plan of care.

## 2019-02-21 DIAGNOSIS — R73.9 HYPERGLYCEMIA: ICD-10-CM

## 2019-02-22 DIAGNOSIS — R73.9 HYPERGLYCEMIA: ICD-10-CM

## 2019-03-25 DIAGNOSIS — F33.1 MODERATE EPISODE OF RECURRENT MAJOR DEPRESSIVE DISORDER (HCC): ICD-10-CM

## 2019-03-26 RX ORDER — DULOXETIN HYDROCHLORIDE 60 MG/1
CAPSULE, DELAYED RELEASE ORAL
Qty: 30 CAPSULE | Refills: 0 | Status: SHIPPED | OUTPATIENT
Start: 2019-03-26 | End: 2019-04-05 | Stop reason: SDUPTHER

## 2019-04-05 ENCOUNTER — OFFICE VISIT (OUTPATIENT)
Dept: INTERNAL MEDICINE | Facility: CLINIC | Age: 54
End: 2019-04-05

## 2019-04-05 VITALS
SYSTOLIC BLOOD PRESSURE: 112 MMHG | RESPIRATION RATE: 20 BRPM | HEART RATE: 82 BPM | OXYGEN SATURATION: 98 % | BODY MASS INDEX: 28.84 KG/M2 | DIASTOLIC BLOOD PRESSURE: 66 MMHG | WEIGHT: 168 LBS | TEMPERATURE: 98 F

## 2019-04-05 DIAGNOSIS — F33.1 MODERATE EPISODE OF RECURRENT MAJOR DEPRESSIVE DISORDER (HCC): ICD-10-CM

## 2019-04-05 DIAGNOSIS — E11.9 TYPE 2 DIABETES MELLITUS WITHOUT COMPLICATION, UNSPECIFIED WHETHER LONG TERM INSULIN USE (HCC): Primary | ICD-10-CM

## 2019-04-05 LAB — HBA1C MFR BLD: 7.1 %

## 2019-04-05 PROCEDURE — 83036 HEMOGLOBIN GLYCOSYLATED A1C: CPT | Performed by: NURSE PRACTITIONER

## 2019-04-05 PROCEDURE — 99213 OFFICE O/P EST LOW 20 MIN: CPT | Performed by: NURSE PRACTITIONER

## 2019-04-05 RX ORDER — DULOXETIN HYDROCHLORIDE 30 MG/1
30 CAPSULE, DELAYED RELEASE ORAL DAILY
Qty: 30 CAPSULE | Refills: 2 | Status: SHIPPED | OUTPATIENT
Start: 2019-04-05 | End: 2019-07-09 | Stop reason: SDUPTHER

## 2019-04-05 RX ORDER — DULOXETIN HYDROCHLORIDE 60 MG/1
60 CAPSULE, DELAYED RELEASE ORAL DAILY
Qty: 30 CAPSULE | Refills: 2 | Status: SHIPPED | OUTPATIENT
Start: 2019-04-05 | End: 2019-08-05 | Stop reason: SDUPTHER

## 2019-04-05 NOTE — PROGRESS NOTES
Subjective   Yelena Morris is a 53 y.o. female.   Chief Complaint   Patient presents with   • Med Refill      History of Present Illness Wants to try Cymbalta 90mg  She is currently on the 60mg and feels down.   No thought of self harm.  Cont in couselling.  Stressors include her son and his wife having a miscarriage and another daughter moving in with her.  Regarding her diabetes, she is not checking her blood sugars regularly.  No sores or wounds not healing. No dietary modifications.   Continues to have headaches but sees her pain specialist for this.  No ear pain, sore throat, shortness of air, chest pain, abdominal pain, nausea vomiting diarrhea.  She said her blood pressure stays under good control at home.    The following portions of the patient's history were reviewed and updated as appropriate: allergies, current medications, past family history, past medical history, past social history, past surgical history and problem list.    Current Outpatient Medications:   •  baclofen (LIORESAL) 10 MG tablet, TAKE 1 TABLET EVERY MORNING AND AT NOON FOR MUSCLE SPASMS, Disp: , Rfl: 11  •  BOTOX 200 units reconstituted solution, INJECT 200 UNITS INTO THE HEAD OR NECK AREA BY MD EVERY 90 DAYS, Disp: , Rfl: 3  •  CAMBIA 50 MG pack, TAKE 1 PACKET WITH 2TBSP OF COLD WATER WHEN MIGRAINE OCCURS, MAY REPEAT EVERY 6 HOURS IF NEEDED (X1), Disp: , Rfl: 6  •  Cholecalciferol (VITAMIN D) 2000 units capsule, Take  by mouth., Disp: , Rfl:   •  cyclobenzaprine (FLEXERIL) 10 MG tablet, Take 1 tablet by mouth At Night As Needed for Muscle Spasms., Disp: 30 tablet, Rfl: 1  •  DHEA 50 MG capsule, Take  by mouth., Disp: , Rfl:   •  DULoxetine (CYMBALTA) 30 MG capsule, Take 1 capsule by mouth Daily. Take with 1- 60mg capsule to = 90 mg, Disp: 30 capsule, Rfl: 2  •  DULoxetine (CYMBALTA) 60 MG capsule, Take 1 capsule by mouth Daily., Disp: 30 capsule, Rfl: 2  •  EC-RX TESTOSTERONE TD, Place  on the skin as directed by provider., Disp: ,  Rfl:   •  eletriptan (RELPAX) 40 MG tablet, TAKE 1 TABLET BY MOUTH AT THE ONSET OF MIGRAINE. MAY REPEAT IN 2 HRS.MAX 2 DOSE/24HR,3 DAYS/WEEK, Disp: , Rfl: 11  •  HYDROcodone-acetaminophen (NORCO) 7.5-325 MG per tablet, Every 8 (Eight) Hours., Disp: , Rfl:   •  magnesium oxide (MAG-OX) 400 MG tablet, Take 400 mg by mouth Daily., Disp: , Rfl:   •  metFORMIN (GLUCOPHAGE) 500 MG tablet, Take 1 tablet by mouth 2 (Two) Times a Day With Meals., Disp: 60 tablet, Rfl: 2  •  nebivolol (BYSTOLIC) 10 MG tablet, Bystolic 10 mg tablet  1 tab every days, Disp: , Rfl:   •  nortriptyline (PAMELOR) 10 MG capsule, nortriptyline 10 mg capsule  TAKE 4 CAPSULES AT BEDTIME, Disp: , Rfl:   •  omeprazole (priLOSEC) 40 MG capsule, omeprazole 40 mg capsule,delayed release  TAKE 1 CAPSULE AT BEDTIME, Disp: , Rfl:   •  OnabotulinumtoxinA (BOTOX IJ), Inject  as directed Every 3 (Three) Months., Disp: , Rfl:   •  ondansetron (ZOFRAN) 4 MG tablet, Zofran 4 mg tablet  Every four to six hours, Disp: , Rfl:   •  pregabalin (LYRICA) 50 MG capsule, Every 8 (Eight) Hours., Disp: , Rfl:   •  Probiotic Product (DIGESTIVE ADVANTAGE PO), Take  by mouth., Disp: , Rfl:   •  TROKENDI  MG capsule sustained-release 24 hr, Take 1 capsule by mouth Daily., Disp: , Rfl: 1    Review of Systems Consitutional, HEENT, Respiratory, CV, GI, , Skin, Musculoskeletal, Neuro-mental, Endocrinological, Hematological were reviewed.  Positives were discussed in the HPI, otherwise ROS was negative   /66 (BP Location: Right arm, Patient Position: Sitting, Cuff Size: Adult)   Pulse 82   Temp 98 °F (36.7 °C) (Oral)   Resp 20   Wt 76.2 kg (168 lb)   SpO2 98%   BMI 28.84 kg/m²     Objective   Allergies   Allergen Reactions   • Dhea [Nutritional Supplements] GI Intolerance   • Methylergonovine GI Intolerance     And sob     • Telithromycin GI Intolerance   • Amoxicillin Rash       Physical Exam   Constitutional: She is oriented to person, place, and time. She appears  well-developed and well-nourished. No distress.   HENT:   Head: Normocephalic.   Eyes: Right eye exhibits no discharge. Left eye exhibits no discharge.   Neck: Neck supple.   Cardiovascular: Normal rate, regular rhythm, normal heart sounds and intact distal pulses. Exam reveals no gallop and no friction rub.   No murmur heard.  Pulmonary/Chest: Effort normal and breath sounds normal. No stridor. No respiratory distress. She has no wheezes. She has no rales.   Abdominal: Soft. There is no tenderness.   Neurological: She is alert and oriented to person, place, and time.   Skin: Skin is warm and dry. Capillary refill takes less than 2 seconds.   Is pink, no rash    Psychiatric: She has a normal mood and affect. Her behavior is normal. Judgment and thought content normal.   Nursing note and vitals reviewed.      Procedures    LABS  Results for orders placed or performed in visit on 04/05/19   POC Glycosylated Hemoglobin (Hb A1C)   Result Value Ref Range    Hemoglobin A1C 7.1 %       Assessment/Plan   Yelena was seen today for med refill.    Diagnoses and all orders for this visit:    Type 2 diabetes mellitus without complication, unspecified whether long term insulin use (CMS/[facility])  -     POC Glycosylated Hemoglobin (Hb A1C)  -     metFORMIN (GLUCOPHAGE) 500 MG tablet; Take 1 tablet by mouth 2 (Two) Times a Day With Meals.  -     Microalbumin / Creatinine Urine Ratio - Urine, Clean Catch; Future    Moderate episode of recurrent major depressive disorder (CMS/[facility])  -     DULoxetine (CYMBALTA) 60 MG capsule; Take 1 capsule by mouth Daily.  -     DULoxetine (CYMBALTA) 30 MG capsule; Take 1 capsule by mouth Daily. Take with 1- 60mg capsule to = 90 mg        Patient Instructions   Labs as discussed.  We are increasing metformin to twice daily RT HGA1C of 7.1 (was 6.5 in 09/18) .  Today's hemoglobin A1c indicates worsening of glycemic control.  Urine microalbumin.  She is not fasting today.  Reminded of need for diabetic eye  exam.  Low-carb low-fat low-cholesterol diet.  We are increasing Cymbalta to 90 mg daily.  Will have her return to the clinic in 1 month to assess response.  Recommend continuation of counseling.  Pt verbalizes understanding and agreement with plan of care.     EMR Dragon/transcription disclaimer:  Please note that portions of this note were completed with a voice recognition program.  Electronic transcription of the voice recognition program may permit erroneous words or phrases to be inadvertently transcribed.  Although I have reviewed the note for such errors, some may still exist in this documentation     Sunshine Calvin, APRN

## 2019-04-08 NOTE — PATIENT INSTRUCTIONS
Labs as discussed.  We are increasing metformin to twice daily RT HGA1C of 7.1 (was 6.5 in 09/18) .  Today's hemoglobin A1c indicates worsening of glycemic control.  Urine microalbumin.  She is not fasting today.  Reminded of need for diabetic eye exam.  Low-carb low-fat low-cholesterol diet.  We are increasing Cymbalta to 90 mg daily.  Will have her return to the clinic in 1 month to assess response.  Recommend continuation of counseling.  Pt verbalizes understanding and agreement with plan of care.

## 2019-04-10 ENCOUNTER — TELEPHONE (OUTPATIENT)
Dept: INTERNAL MEDICINE | Facility: CLINIC | Age: 54
End: 2019-04-10

## 2019-04-10 NOTE — TELEPHONE ENCOUNTER
----- Message from ONESIMO Mireles sent at 4/10/2019  1:40 PM EDT -----  Please call patient your urine test was normal

## 2019-04-12 PROBLEM — E11.9 TYPE 2 DIABETES MELLITUS: Status: ACTIVE | Noted: 2019-04-12

## 2019-05-06 ENCOUNTER — OFFICE VISIT (OUTPATIENT)
Dept: INTERNAL MEDICINE | Facility: CLINIC | Age: 54
End: 2019-05-06

## 2019-05-06 VITALS
WEIGHT: 171 LBS | OXYGEN SATURATION: 99 % | HEART RATE: 78 BPM | HEIGHT: 64 IN | TEMPERATURE: 98.1 F | BODY MASS INDEX: 29.19 KG/M2 | DIASTOLIC BLOOD PRESSURE: 62 MMHG | SYSTOLIC BLOOD PRESSURE: 118 MMHG

## 2019-05-06 DIAGNOSIS — E11.8 TYPE 2 DIABETES MELLITUS WITH COMPLICATION, WITHOUT LONG-TERM CURRENT USE OF INSULIN (HCC): Primary | ICD-10-CM

## 2019-05-06 PROCEDURE — 99213 OFFICE O/P EST LOW 20 MIN: CPT | Performed by: NURSE PRACTITIONER

## 2019-05-06 RX ORDER — LANCETS 23 GAUGE
1 EACH MISCELLANEOUS DAILY
Qty: 100 EACH | Refills: 2 | Status: SHIPPED | OUTPATIENT
Start: 2019-05-06 | End: 2021-06-24 | Stop reason: SDUPTHER

## 2019-05-06 RX ORDER — BLOOD-GLUCOSE METER
1 KIT MISCELLANEOUS DAILY
Qty: 1 EACH | Refills: 1 | Status: SHIPPED | OUTPATIENT
Start: 2019-05-06

## 2019-05-06 NOTE — PROGRESS NOTES
Shy Morris is a 53 y.o. female.   Chief Complaint   Patient presents with   • Diabetes      History of Present Illness Doesn't check BG.  Trying to remember to take Glucophage bid.  Doing well mood wise and body aches are better oi the Cymbalta 90 mg.   Patient denies fever chills, headache, ear pain, sore throat, shortness of air, cough, wheezing, chest pain, abdominal pain, nausea, vomiting, diarrhea, dysuria, blood in stool or urine.  Eating and drinking as usual.  No unexplained weight loss or gain.      The following portions of the patient's history were reviewed and updated as appropriate: allergies, current medications, past family history, past medical history, past social history, past surgical history and problem list.    Current Outpatient Medications:   •  baclofen (LIORESAL) 10 MG tablet, TAKE 1 TABLET EVERY MORNING AND AT NOON FOR MUSCLE SPASMS, Disp: , Rfl: 11  •  BOTOX 200 units reconstituted solution, INJECT 200 UNITS INTO THE HEAD OR NECK AREA BY MD EVERY 90 DAYS, Disp: , Rfl: 3  •  CAMBIA 50 MG pack, TAKE 1 PACKET WITH 2TBSP OF COLD WATER WHEN MIGRAINE OCCURS, MAY REPEAT EVERY 6 HOURS IF NEEDED (X1), Disp: , Rfl: 6  •  Cholecalciferol (VITAMIN D) 2000 units capsule, Take  by mouth., Disp: , Rfl:   •  cyclobenzaprine (FLEXERIL) 10 MG tablet, Take 1 tablet by mouth At Night As Needed for Muscle Spasms., Disp: 30 tablet, Rfl: 1  •  DHEA 50 MG capsule, Take  by mouth., Disp: , Rfl:   •  DULoxetine (CYMBALTA) 30 MG capsule, Take 1 capsule by mouth Daily. Take with 1- 60mg capsule to = 90 mg, Disp: 30 capsule, Rfl: 2  •  DULoxetine (CYMBALTA) 60 MG capsule, Take 1 capsule by mouth Daily., Disp: 30 capsule, Rfl: 2  •  EC-RX TESTOSTERONE TD, Place  on the skin as directed by provider., Disp: , Rfl:   •  eletriptan (RELPAX) 40 MG tablet, TAKE 1 TABLET BY MOUTH AT THE ONSET OF MIGRAINE. MAY REPEAT IN 2 HRS.MAX 2 DOSE/24HR,3 DAYS/WEEK, Disp: , Rfl: 11  •  HYDROcodone-acetaminophen (NORCO)  "7.5-325 MG per tablet, Every 8 (Eight) Hours., Disp: , Rfl:   •  magnesium oxide (MAG-OX) 400 MG tablet, Take 400 mg by mouth Daily., Disp: , Rfl:   •  metFORMIN (GLUCOPHAGE) 500 MG tablet, Take 1 tablet by mouth 2 (Two) Times a Day With Meals., Disp: 60 tablet, Rfl: 2  •  nebivolol (BYSTOLIC) 10 MG tablet, Bystolic 10 mg tablet  1 tab every days, Disp: , Rfl:   •  nortriptyline (PAMELOR) 10 MG capsule, nortriptyline 10 mg capsule  TAKE 4 CAPSULES AT BEDTIME, Disp: , Rfl:   •  omeprazole (priLOSEC) 40 MG capsule, omeprazole 40 mg capsule,delayed release  TAKE 1 CAPSULE AT BEDTIME, Disp: , Rfl:   •  OnabotulinumtoxinA (BOTOX IJ), Inject  as directed Every 3 (Three) Months., Disp: , Rfl:   •  ondansetron (ZOFRAN) 4 MG tablet, Zofran 4 mg tablet  Every four to six hours, Disp: , Rfl:   •  pregabalin (LYRICA) 50 MG capsule, Every 8 (Eight) Hours., Disp: , Rfl:   •  Probiotic Product (DIGESTIVE ADVANTAGE PO), Take  by mouth., Disp: , Rfl:   •  TROKENDI  MG capsule sustained-release 24 hr, Take 1 capsule by mouth Daily., Disp: , Rfl: 1  •  glucose blood test strip, 1 each by Other route Daily. Use as instructed, Disp: 100 each, Rfl: 2  •  glucose monitor monitoring kit, 1 each Daily., Disp: 1 each, Rfl: 1  •  Lancets 28G misc, 1 application Daily., Disp: 100 each, Rfl: 2    Review of Systems Consitutional, HEENT, Respiratory, CV, GI, , Skin, Musculoskeletal, Neuro-mental, Endocrinological, Hematological were reviewed.  Positives were discussed in the HPI, otherwise ROS was negative   /62   Pulse 78   Temp 98.1 °F (36.7 °C)   Ht 162.6 cm (64\")   Wt 77.6 kg (171 lb)   SpO2 99%   BMI 29.35 kg/m²     Objective   Allergies   Allergen Reactions   • Dhea [Nutritional Supplements] GI Intolerance   • Methylergonovine GI Intolerance     And sob     • Telithromycin GI Intolerance   • Amoxicillin Rash       Physical Exam   Constitutional: She is oriented to person, place, and time. She appears well-developed and " well-nourished. No distress.   HENT:   Head: Normocephalic.   Cardiovascular: Normal rate, regular rhythm, normal heart sounds and intact distal pulses. Exam reveals no gallop and no friction rub.   No murmur heard.  Pulmonary/Chest: Effort normal and breath sounds normal. No stridor. No respiratory distress. She has no wheezes. She has no rales.   Musculoskeletal:   CAO well.  Gait upright and steady    Neurological: She is alert and oriented to person, place, and time.   Skin: Skin is warm and dry. Capillary refill takes less than 2 seconds.   Is pink, no rash    Psychiatric: She has a normal mood and affect. Her behavior is normal. Judgment and thought content normal.   Nursing note and vitals reviewed.      Procedures    LABS  Results for orders placed or performed in visit on 04/05/19   POC Glycosylated Hemoglobin (Hb A1C)   Result Value Ref Range    Hemoglobin A1C 7.1 %       Assessment/Plan   Yelena was seen today for diabetes.    Diagnoses and all orders for this visit:    Type 2 diabetes mellitus with complication, without long-term current use of insulin (CMS/[facility])  -     glucose monitor monitoring kit; 1 each Daily.  -     glucose blood test strip; 1 each by Other route Daily. Use as instructed  -     Lancets 28G misc; 1 application Daily.      Patient Instructions   Check blood glucose twice weekly.  Heart healthy low-carb diet.  I am glad you are feeling better.  Return to the clinic in 3 months for recheck sooner if needed.  Pt verbalizes understanding and agreement with plan of care.             ONESIMO Linn

## 2019-05-08 NOTE — PATIENT INSTRUCTIONS
Check blood glucose twice weekly.  Heart healthy low-carb diet.  I am glad you are feeling better.  Return to the clinic in 3 months for recheck sooner if needed.  Pt verbalizes understanding and agreement with plan of care.

## 2019-06-27 ENCOUNTER — OFFICE VISIT (OUTPATIENT)
Dept: INTERNAL MEDICINE | Facility: CLINIC | Age: 54
End: 2019-06-27

## 2019-06-27 VITALS
HEART RATE: 82 BPM | HEIGHT: 64 IN | DIASTOLIC BLOOD PRESSURE: 82 MMHG | RESPIRATION RATE: 18 BRPM | SYSTOLIC BLOOD PRESSURE: 124 MMHG | OXYGEN SATURATION: 98 % | WEIGHT: 171 LBS | BODY MASS INDEX: 29.19 KG/M2

## 2019-06-27 DIAGNOSIS — R60.9 EDEMA, UNSPECIFIED TYPE: Primary | ICD-10-CM

## 2019-06-27 PROBLEM — M47.812 CERVICAL SPONDYLOSIS WITHOUT MYELOPATHY: Status: ACTIVE | Noted: 2019-02-08

## 2019-06-27 PROBLEM — G56.00 CARPAL TUNNEL SYNDROME: Status: ACTIVE | Noted: 2019-04-09

## 2019-06-27 PROBLEM — M96.1 LUMBAR POST-LAMINECTOMY SYNDROME: Status: ACTIVE | Noted: 2019-02-08

## 2019-06-27 PROCEDURE — 99213 OFFICE O/P EST LOW 20 MIN: CPT | Performed by: NURSE PRACTITIONER

## 2019-06-27 RX ORDER — TRIAMTERENE AND HYDROCHLOROTHIAZIDE 37.5; 25 MG/1; MG/1
1 CAPSULE ORAL EVERY MORNING
Qty: 30 CAPSULE | Refills: 2 | Status: SHIPPED | OUTPATIENT
Start: 2019-06-27 | End: 2019-07-30

## 2019-06-27 RX ORDER — HYDROCODONE BITARTRATE 30 MG/1
1 TABLET, EXTENDED RELEASE ORAL DAILY
COMMUNITY
End: 2020-04-03

## 2019-06-27 NOTE — PROGRESS NOTES
Shy Morris is a 53 y.o. female.   Chief Complaint   Patient presents with   • Edema     Feet and ankles x 4 days   • Hand Pain     In knuckles when fist is made      History of Present Illness As above.  She notes her feet and ankles have been swollen for at least 4 days.  It is worse as the day progresses.  She is not on a diuretic.  She does not use support hose.  Her legs are uncomfortable when the swelling occurs.  She also notes her hands have been swollen, also.  Sore in hands and knees.  Doing more this week around the house to include painting..  Denies headache, ear pain, sore throat, shortness of breath, chest pain, abdominal pain, nausea vomiting or diarrhea.  Since her last visit, she has seen her pain specialist who has changed her pain management for her back pain.    The following portions of the patient's history were reviewed and updated as appropriate: allergies, current medications, past family history, past medical history, past social history, past surgical history and problem list.    Current Outpatient Medications:   •  baclofen (LIORESAL) 10 MG tablet, TAKE 1 TABLET EVERY MORNING AND AT NOON FOR MUSCLE SPASMS, Disp: , Rfl: 11  •  BOTOX 200 units reconstituted solution, INJECT 200 UNITS INTO THE HEAD OR NECK AREA BY MD EVERY 90 DAYS, Disp: , Rfl: 3  •  CAMBIA 50 MG pack, TAKE 1 PACKET WITH 2TBSP OF COLD WATER WHEN MIGRAINE OCCURS, MAY REPEAT EVERY 6 HOURS IF NEEDED (X1), Disp: , Rfl: 6  •  Cholecalciferol (VITAMIN D) 2000 units capsule, Take  by mouth., Disp: , Rfl:   •  cyclobenzaprine (FLEXERIL) 10 MG tablet, Take 1 tablet by mouth At Night As Needed for Muscle Spasms., Disp: 30 tablet, Rfl: 1  •  DHEA 50 MG capsule, Take  by mouth., Disp: , Rfl:   •  DULoxetine (CYMBALTA) 30 MG capsule, Take 1 capsule by mouth Daily. Take with 1- 60mg capsule to = 90 mg, Disp: 30 capsule, Rfl: 2  •  DULoxetine (CYMBALTA) 60 MG capsule, Take 1 capsule by mouth Daily., Disp: 30 capsule, Rfl:  2  •  EC-RX TESTOSTERONE TD, Place  on the skin as directed by provider., Disp: , Rfl:   •  eletriptan (RELPAX) 40 MG tablet, TAKE 1 TABLET BY MOUTH AT THE ONSET OF MIGRAINE. MAY REPEAT IN 2 HRS.MAX 2 DOSE/24HR,3 DAYS/WEEK, Disp: , Rfl: 11  •  glucose blood test strip, 1 each by Other route Daily. Use as instructed, Disp: 100 each, Rfl: 2  •  glucose monitor monitoring kit, 1 each Daily., Disp: 1 each, Rfl: 1  •  HYDROcodone Bitartrate ER 30 MG tablet extended-release 24 hour , Take  by mouth., Disp: , Rfl:   •  HYDROcodone-acetaminophen (NORCO) 7.5-325 MG per tablet, Every 8 (Eight) Hours., Disp: , Rfl:   •  Lancets 28G misc, 1 application Daily., Disp: 100 each, Rfl: 2  •  magnesium oxide (MAG-OX) 400 MG tablet, Take 400 mg by mouth Daily., Disp: , Rfl:   •  metFORMIN (GLUCOPHAGE) 500 MG tablet, Take 1 tablet by mouth 2 (Two) Times a Day With Meals., Disp: 60 tablet, Rfl: 2  •  nebivolol (BYSTOLIC) 10 MG tablet, Bystolic 10 mg tablet  1 tab every days, Disp: , Rfl:   •  nortriptyline (PAMELOR) 10 MG capsule, nortriptyline 10 mg capsule  TAKE 4 CAPSULES AT BEDTIME, Disp: , Rfl:   •  omeprazole (priLOSEC) 40 MG capsule, omeprazole 40 mg capsule,delayed release  TAKE 1 CAPSULE AT BEDTIME, Disp: , Rfl:   •  OnabotulinumtoxinA (BOTOX IJ), Inject  as directed Every 3 (Three) Months., Disp: , Rfl:   •  ondansetron (ZOFRAN) 4 MG tablet, Zofran 4 mg tablet  Every four to six hours, Disp: , Rfl:   •  pregabalin (LYRICA) 50 MG capsule, 75 mg Every 8 (Eight) Hours., Disp: , Rfl:   •  Probiotic Product (DIGESTIVE ADVANTAGE PO), Take  by mouth., Disp: , Rfl:   •  TROKENDI  MG capsule sustained-release 24 hr, Take 1 capsule by mouth Daily., Disp: , Rfl: 1  •  triamterene-hydrochlorothiazide (DYAZIDE) 37.5-25 MG per capsule, Take 1 capsule by mouth Every Morning., Disp: 30 capsule, Rfl: 2    Review of Systems Consitutional, HEENT, Respiratory, CV, GI, , Skin, Musculoskeletal, Neuro-mental, Endocrinological, Hematological  "were reviewed.  Positives were discussed in the HPI, otherwise ROS was negative   /82   Pulse 82   Resp 18   Ht 162.6 cm (64\")   Wt 77.6 kg (171 lb)   SpO2 98%   Breastfeeding? No   BMI 29.35 kg/m²     Objective   Allergies   Allergen Reactions   • Amoxicillin Rash   • Dhea [Nutritional Supplements] GI Intolerance   • Methylergonovine GI Intolerance     And sob     • Telithromycin GI Intolerance       Physical Exam   Constitutional: She is oriented to person, place, and time. She appears well-developed.   HENT:   Head: Normocephalic.   Cardiovascular: Normal rate, regular rhythm, normal heart sounds and intact distal pulses. Exam reveals no gallop and no friction rub.   No murmur heard.  Trace pedal edema   Pulmonary/Chest: Effort normal and breath sounds normal. No respiratory distress. She has no wheezes. She has no rales. She exhibits no tenderness.   Abdominal: Soft.   Neurological: She is alert and oriented to person, place, and time.   Skin: Skin is warm and dry. Capillary refill takes less than 2 seconds.   Nursing note and vitals reviewed.      Procedures    LABS  Results for orders placed or performed in visit on 04/05/19   POC Glycosylated Hemoglobin (Hb A1C)   Result Value Ref Range    Hemoglobin A1C 7.1 %       Assessment/Plan   Yelena was seen today for edema and hand pain.    Diagnoses and all orders for this visit:    Edema, unspecified type  -     triamterene-hydrochlorothiazide (DYAZIDE) 37.5-25 MG per capsule; Take 1 capsule by mouth Every Morning.          Patient Instructions   Increase the potassium containing foods in your diet.  Triamterene hydrochlorothiazide as discussed.  If your swelling does not resolve let me know and we will try Lasix. Pt verbalizes understanding and agreement with plan of care.     EMR Dragon/transcription disclaimer:  Please note that portions of this note were completed with a voice recognition program.  Electronic transcription of the voice recognition " program may permit erroneous words or phrases to be inadvertently transcribed.  Although I have reviewed the note for such errors, some may still exist in this documentation   Sunshine Calvin, APRN

## 2019-06-28 NOTE — PATIENT INSTRUCTIONS
Increase the potassium containing foods in your diet.  Triamterene hydrochlorothiazide as discussed.  If your swelling does not resolve let me know and we will try Lasix. Pt verbalizes understanding and agreement with plan of care.

## 2019-07-09 DIAGNOSIS — F33.1 MODERATE EPISODE OF RECURRENT MAJOR DEPRESSIVE DISORDER (HCC): ICD-10-CM

## 2019-07-09 RX ORDER — DULOXETIN HYDROCHLORIDE 30 MG/1
CAPSULE, DELAYED RELEASE ORAL
Qty: 30 CAPSULE | Refills: 1 | Status: SHIPPED | OUTPATIENT
Start: 2019-07-09 | End: 2019-09-06 | Stop reason: SDUPTHER

## 2019-07-12 ENCOUNTER — TELEPHONE (OUTPATIENT)
Dept: INTERNAL MEDICINE | Facility: CLINIC | Age: 54
End: 2019-07-12

## 2019-07-12 RX ORDER — FUROSEMIDE 20 MG/1
TABLET ORAL
Qty: 30 TABLET | Refills: 1 | Status: SHIPPED | OUTPATIENT
Start: 2019-07-12 | End: 2019-09-09 | Stop reason: SDUPTHER

## 2019-07-12 NOTE — TELEPHONE ENCOUNTER
PT. STATED THAT HER DIURETIC ISN'T WORKING AND SHE WANTED TO SEE IF [facility] WOULD SEND IN A PRESCRIPTION FOR LASIK; PLEASE SEND TO [facility] [place]

## 2019-07-16 ENCOUNTER — OFFICE VISIT (OUTPATIENT)
Dept: NEUROSURGERY | Facility: CLINIC | Age: 54
End: 2019-07-16

## 2019-07-16 VITALS — BODY MASS INDEX: 28.51 KG/M2 | TEMPERATURE: 98.5 F | HEIGHT: 64 IN | WEIGHT: 167 LBS

## 2019-07-16 DIAGNOSIS — Z98.890 HISTORY OF LUMBAR DISCECTOMY: ICD-10-CM

## 2019-07-16 DIAGNOSIS — M54.9 MID BACK PAIN: Primary | ICD-10-CM

## 2019-07-16 DIAGNOSIS — M54.41 ACUTE MIDLINE LOW BACK PAIN WITH RIGHT-SIDED SCIATICA: ICD-10-CM

## 2019-07-16 PROCEDURE — 99243 OFF/OP CNSLTJ NEW/EST LOW 30: CPT | Performed by: NEUROLOGICAL SURGERY

## 2019-07-16 NOTE — PROGRESS NOTES
Patient: Yelena Morris  : 1965    Primary Care Provider: Sunshine Calvin APRN    Requesting Provider: As above        History    Chief Complaint: Low back and right leg pain.    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  This has been a decade or more.  Remotely she had lumbar surgery by an orthopedic surgeon and this helped for a while but her symptoms have gradually returned.  Pain extends from her back into the back of her right thigh.  She has no left lower extremity symptoms.  She has been treated with chiropractic, physical therapy, acupuncture, pain medicines, and injections.  Recently a trial of a Medtronic epidural spinal cord stimulator provided substantial improvement in her symptoms.  She has no bowel difficulties but does have some occasional urinary hesitancy.  Her symptoms are worse with protracted standing or walking.  Sometimes heat or ice are helpful.  She has some mid back pain.  She complains of neck pain and discomfort in her left shoulder.    Review of Systems   Constitutional: Negative for activity change, appetite change, chills, diaphoresis, fatigue, fever and unexpected weight change.   HENT: Positive for ear pain. Negative for congestion, dental problem, drooling, ear discharge, facial swelling, hearing loss, mouth sores, nosebleeds, postnasal drip, rhinorrhea, sinus pressure, sneezing, sore throat, tinnitus, trouble swallowing and voice change.    Eyes: Negative for photophobia, pain, discharge, redness, itching and visual disturbance.   Respiratory: Negative for apnea, cough, choking, chest tightness, shortness of breath, wheezing and stridor.    Cardiovascular: Negative for chest pain, palpitations and leg swelling.   Gastrointestinal: Negative for abdominal distention, abdominal pain, anal bleeding, blood in stool, constipation, diarrhea, nausea, rectal pain and vomiting.   Endocrine: Negative for cold intolerance,  "heat intolerance, polydipsia, polyphagia and polyuria.   Genitourinary: Negative for decreased urine volume, difficulty urinating, dysuria, enuresis, flank pain, frequency, genital sores, hematuria and urgency.   Musculoskeletal: Positive for arthralgias, back pain, myalgias, neck pain and neck stiffness. Negative for gait problem and joint swelling.   Skin: Negative for color change, pallor, rash and wound.   Allergic/Immunologic: Negative for environmental allergies, food allergies and immunocompromised state.   Neurological: Positive for headaches. Negative for dizziness, tremors, seizures, syncope, facial asymmetry, speech difficulty, weakness, light-headedness and numbness.   Hematological: Negative for adenopathy. Does not bruise/bleed easily.   Psychiatric/Behavioral: Positive for dysphoric mood and sleep disturbance. Negative for agitation, behavioral problems, confusion, decreased concentration, hallucinations, self-injury and suicidal ideas. The patient is not nervous/anxious and is not hyperactive.        The patient's past medical history, past surgical history, family history, and social history have been reviewed at length in the electronic medical record.    Physical Exam:   Temp 98.5 °F (36.9 °C) (Temporal)   Ht 162.6 cm (64\")   Wt 75.8 kg (167 lb)   BMI 28.67 kg/m²   CONSTITUTIONAL: Patient is well-nourished, pleasant and appears stated age.  CV: Heart regular rate and rhythm without murmur, rub, or gallop.  PULMONARY: Lungs are clear to ascultation.  MUSCULOSKELETAL:  Straight leg raising is negative.  Alexx's Sign is negative.  ROM in back slightly limited in all directions.  Tenderness in the back to palpation is not observed.  Bilateral small parasagittal lumbosacral incisions are noted.  Ranging the left shoulder induces left shoulder pain.  NEUROLOGICAL:  Orientation, memory, attention span, language function, and cognition have been examined and are intact.  Strength is intact in the " lower extremities to direct testing.  Muscle tone is normal throughout.  Station and gait are normal.  Sensation is intact to light touch testing throughout.  Deep tendon reflexes are 1+ and symmetrical.  Coordination is intact.      Medical Decision Making    Diagnosis:   Lumbar postlaminectomy syndrome.    Treatment Options:   For completeness I am going to check an MRI of the lumbar spine with and without gadolinium as well as a thoracic MRI.  She will follow-up thereafter.  Her pain doctor has requested that the lead be placed to the mid T7 level.       Diagnosis Plan   1. Mid back pain  MRI Thoracic Spine Without Contrast   2. Acute midline low back pain with right-sided sciatica  MRI Lumbar Spine With & Without Contrast   3. History of lumbar discectomy         Scribed for Ethan Peters MD by Kym Cherry CMA on 07/16/2019 at 1:17 PM      I, Dr. Peters, personally performed the services described in the documentation, as scribed in my presence, and it is both accurate and complete.

## 2019-07-30 ENCOUNTER — HOSPITAL ENCOUNTER (OUTPATIENT)
Dept: MRI IMAGING | Facility: HOSPITAL | Age: 54
Discharge: HOME | End: 2019-07-30

## 2019-07-30 ENCOUNTER — HOSPITAL ENCOUNTER (OUTPATIENT)
Dept: MRI IMAGING | Facility: HOSPITAL | Age: 54
Discharge: HOME | End: 2019-07-30
Admitting: NEUROLOGICAL SURGERY

## 2019-07-30 ENCOUNTER — OFFICE VISIT (OUTPATIENT)
Dept: NEUROSURGERY | Facility: CLINIC | Age: 54
End: 2019-07-30

## 2019-07-30 ENCOUNTER — PREP FOR SURGERY (OUTPATIENT)
Dept: OTHER | Facility: HOSPITAL | Age: 54
End: 2019-07-30

## 2019-07-30 VITALS — TEMPERATURE: 97.8 F | RESPIRATION RATE: 17 BRPM | BODY MASS INDEX: 28.65 KG/M2 | HEIGHT: 64 IN | WEIGHT: 167.8 LBS

## 2019-07-30 DIAGNOSIS — M96.1 LUMBAR POSTLAMINECTOMY SYNDROME: Primary | ICD-10-CM

## 2019-07-30 DIAGNOSIS — M54.9 MID BACK PAIN: ICD-10-CM

## 2019-07-30 DIAGNOSIS — M54.41 ACUTE MIDLINE LOW BACK PAIN WITH RIGHT-SIDED SCIATICA: ICD-10-CM

## 2019-07-30 DIAGNOSIS — M96.1 LUMBAR POST-LAMINECTOMY SYNDROME: Primary | ICD-10-CM

## 2019-07-30 LAB — CREAT BLDA-MCNC: 1 MG/DL (ref 0.6–1.3)

## 2019-07-30 PROCEDURE — 72158 MRI LUMBAR SPINE W/O & W/DYE: CPT

## 2019-07-30 PROCEDURE — 72146 MRI CHEST SPINE W/O DYE: CPT

## 2019-07-30 PROCEDURE — 0 GADOBENATE DIMEGLUMINE 529 MG/ML SOLUTION: Performed by: NEUROLOGICAL SURGERY

## 2019-07-30 PROCEDURE — 82565 ASSAY OF CREATININE: CPT

## 2019-07-30 PROCEDURE — A9577 INJ MULTIHANCE: HCPCS | Performed by: NEUROLOGICAL SURGERY

## 2019-07-30 PROCEDURE — 99213 OFFICE O/P EST LOW 20 MIN: CPT | Performed by: NEUROLOGICAL SURGERY

## 2019-07-30 RX ADMIN — GADOBENATE DIMEGLUMINE 15 ML: 529 INJECTION, SOLUTION INTRAVENOUS at 11:46

## 2019-07-30 NOTE — H&P
Patient: Yelena Morris  : 1965     Primary Care Provider: Sunshine Calvin APRN     Requesting Provider: As above           History     Chief Complaint: Back and bilateral lower extremity pain.     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  This has been a decade or more.  Remotely she had lumbar surgery by an orthopedic surgeon and this helped for a while but her symptoms have gradually returned.  Pain extends from her back into the back of her right thigh.  She now describes some modest left lower extremity symptoms.  She has been treated with chiropractic, physical therapy, acupuncture, pain medicines, and injections.  Recently a trial of a Medtronic epidural spinal cord stimulator provided substantial improvement in her symptoms.  She has no bowel difficulties but does have some occasional urinary hesitancy.  Her symptoms are worse with protracted standing or walking.  Sometimes heat or ice are helpful.  She has some mid back pain.  She complains of neck pain and discomfort in her left shoulder.     Review of Systems   Constitutional: Negative for activity change, appetite change, chills, diaphoresis, fatigue, fever and unexpected weight change.   HENT: Positive for ear pain. Negative for congestion, dental problem, drooling, ear discharge, facial swelling, hearing loss, mouth sores, nosebleeds, postnasal drip, rhinorrhea, sinus pressure, sneezing, sore throat, tinnitus, trouble swallowing and voice change.    Eyes: Negative for photophobia, pain, discharge, redness, itching and visual disturbance.   Respiratory: Negative for apnea, cough, choking, chest tightness, shortness of breath, wheezing and stridor.    Cardiovascular: Negative for chest pain, palpitations and leg swelling.   Gastrointestinal: Negative for abdominal distention, abdominal pain, anal bleeding, blood in stool, constipation, diarrhea, nausea, rectal pain and vomiting.    Endocrine: Negative for cold intolerance, heat intolerance, polydipsia, polyphagia and polyuria.   Genitourinary: Negative for decreased urine volume, difficulty urinating, dysuria, enuresis, flank pain, frequency, genital sores, hematuria and urgency.   Musculoskeletal: Positive for arthralgias, back pain, joint swelling, myalgias, neck pain and neck stiffness. Negative for gait problem.   Skin: Negative for color change, pallor, rash and wound.   Allergic/Immunologic: Negative for environmental allergies, food allergies and immunocompromised state.   Neurological: Positive for headaches. Negative for dizziness, tremors, seizures, syncope, facial asymmetry, speech difficulty, weakness, light-headedness and numbness.   Hematological: Negative for adenopathy. Does not bruise/bleed easily.   Psychiatric/Behavioral: Positive for dysphoric mood. Negative for agitation, behavioral problems, confusion, decreased concentration, hallucinations, self-injury, sleep disturbance and suicidal ideas. The patient is not nervous/anxious and is not hyperactive.    All other systems reviewed and are negative.        The patient's past medical history, past surgical history, family history, and social history have been reviewed at length in the electronic medical record.     Past Medical History:   Diagnosis Date   • Colon polyp    • Depression    • Endometriosis    • Fibromyalgia, primary    • GERD (gastroesophageal reflux disease)    • Headache    • Hyperlipidemia    • Hypertension    • Low vitamin D level    • Ovarian cyst      Past Surgical History:   Procedure Laterality Date   • BLADDER SURGERY      Bladder tuck   • LUMBAR DISCECTOMY  2010    L4-S1 Rt- discectomy Dr. Arellano   • TONSILLECTOMY AND ADENOIDECTOMY       Family History   Adopted: Yes     Social History     Socioeconomic History   • Marital status:      Spouse name: Not on file   • Number of children: Not on file   • Years of education: Not on file   •  "Highest education level: Not on file   Occupational History   • Occupation: Unemployed     Comment: former RN   Tobacco Use   • Smoking status: Never Smoker   • Smokeless tobacco: Never Used   Substance and Sexual Activity   • Alcohol use: No   • Drug use: No   • Sexual activity: Defer     Allergies   Allergen Reactions   • Amoxicillin Rash   • Methylergonovine GI Intolerance     And sob     • Telithromycin GI Intolerance       Physical Exam:   Temp 97.8 °F (36.6 °C) (Temporal)   Resp 17   Ht 162 cm (63.78\")   Wt 76.1 kg (167 lb 12.8 oz)   LMP 07/22/2019   BMI 29.00 kg/m²   MUSCULOSKELETAL:  Straight leg raising is negative.  Alexx's Sign is negative.  Tenderness in the back to palpation is not observed.  NEUROLOGICAL:  Strength is intact in the lower extremities to direct testing.  Muscle tone is normal throughout.  Station and gait are normal.  Sensation is intact to light touch testing throughout.        Medical Decision Making     Data Review:   MRI of the lumbar spine demonstrates changes related to her prior L4-S1 fusion.  There is some very mild canal narrowing at L3-4.  MRI of the thoracic spine demonstrates a capacious canal.     Diagnosis:   Lumbar postlaminectomy syndrome.     Treatment Options:   Her pain physician has recommended spinal cord stimulator placement with the lead projecting to the mid T7 level.  The nature of the procedure as well as the potential risks, complications, limitations, and alternatives to the procedure were discussed at length with the patient and the patient has agreed to proceed with surgery.          Diagnosis Plan   1. Lumbar post-laminectomy syndrome               "

## 2019-07-30 NOTE — PROGRESS NOTES
Patient: Yelena Morris  : 1965    Primary Care Provider: Sunshine Calvin APRN    Requesting Provider: As above        History    Chief Complaint: Back and bilateral lower extremity pain.    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  This has been a decade or more.  Remotely she had lumbar surgery by an orthopedic surgeon and this helped for a while but her symptoms have gradually returned.  Pain extends from her back into the back of her right thigh.  She now describes some modest left lower extremity symptoms.  She has been treated with chiropractic, physical therapy, acupuncture, pain medicines, and injections.  Recently a trial of a Medtronic epidural spinal cord stimulator provided substantial improvement in her symptoms.  She has no bowel difficulties but does have some occasional urinary hesitancy.  Her symptoms are worse with protracted standing or walking.  Sometimes heat or ice are helpful.  She has some mid back pain.  She complains of neck pain and discomfort in her left shoulder.    Review of Systems   Constitutional: Negative for activity change, appetite change, chills, diaphoresis, fatigue, fever and unexpected weight change.   HENT: Positive for ear pain. Negative for congestion, dental problem, drooling, ear discharge, facial swelling, hearing loss, mouth sores, nosebleeds, postnasal drip, rhinorrhea, sinus pressure, sneezing, sore throat, tinnitus, trouble swallowing and voice change.    Eyes: Negative for photophobia, pain, discharge, redness, itching and visual disturbance.   Respiratory: Negative for apnea, cough, choking, chest tightness, shortness of breath, wheezing and stridor.    Cardiovascular: Negative for chest pain, palpitations and leg swelling.   Gastrointestinal: Negative for abdominal distention, abdominal pain, anal bleeding, blood in stool, constipation, diarrhea, nausea, rectal pain and vomiting.   Endocrine:  "Negative for cold intolerance, heat intolerance, polydipsia, polyphagia and polyuria.   Genitourinary: Negative for decreased urine volume, difficulty urinating, dysuria, enuresis, flank pain, frequency, genital sores, hematuria and urgency.   Musculoskeletal: Positive for arthralgias, back pain, joint swelling, myalgias, neck pain and neck stiffness. Negative for gait problem.   Skin: Negative for color change, pallor, rash and wound.   Allergic/Immunologic: Negative for environmental allergies, food allergies and immunocompromised state.   Neurological: Positive for headaches. Negative for dizziness, tremors, seizures, syncope, facial asymmetry, speech difficulty, weakness, light-headedness and numbness.   Hematological: Negative for adenopathy. Does not bruise/bleed easily.   Psychiatric/Behavioral: Positive for dysphoric mood. Negative for agitation, behavioral problems, confusion, decreased concentration, hallucinations, self-injury, sleep disturbance and suicidal ideas. The patient is not nervous/anxious and is not hyperactive.    All other systems reviewed and are negative.      The patient's past medical history, past surgical history, family history, and social history have been reviewed at length in the electronic medical record.    Physical Exam:   Temp 97.8 °F (36.6 °C) (Temporal)   Resp 17   Ht 162 cm (63.78\")   Wt 76.1 kg (167 lb 12.8 oz)   LMP 07/22/2019   BMI 29.00 kg/m²   MUSCULOSKELETAL:  Straight leg raising is negative.  Alexx's Sign is negative.  Tenderness in the back to palpation is not observed.  NEUROLOGICAL:  Strength is intact in the lower extremities to direct testing.  Muscle tone is normal throughout.  Station and gait are normal.  Sensation is intact to light touch testing throughout.      Medical Decision Making    Data Review:   MRI of the lumbar spine demonstrates changes related to her prior L4-S1 fusion.  There is some very mild canal narrowing at L3-4.  MRI of the thoracic " spine demonstrates a capacious canal.    Diagnosis:   Lumbar postlaminectomy syndrome.    Treatment Options:   Her pain physician has recommended spinal cord stimulator placement with the lead projecting to the mid T7 level.  The nature of the procedure as well as the potential risks, complications, limitations, and alternatives to the procedure were discussed at length with the patient and the patient has agreed to proceed with surgery.       Diagnosis Plan   1. Lumbar post-laminectomy syndrome         Scribed for Ethan Peters MD by Carmelita Craig CMA on 7/30/2019 2:08 PM       I, Dr. Peters, personally performed the services described in the documentation, as scribed in my presence, and it is both accurate and complete.

## 2019-07-31 PROBLEM — M96.1 LUMBAR POSTLAMINECTOMY SYNDROME: Status: ACTIVE | Noted: 2019-07-31

## 2019-08-05 ENCOUNTER — OFFICE VISIT (OUTPATIENT)
Dept: INTERNAL MEDICINE | Facility: CLINIC | Age: 54
End: 2019-08-05

## 2019-08-05 VITALS
SYSTOLIC BLOOD PRESSURE: 126 MMHG | HEART RATE: 82 BPM | WEIGHT: 164 LBS | BODY MASS INDEX: 28 KG/M2 | RESPIRATION RATE: 16 BRPM | DIASTOLIC BLOOD PRESSURE: 82 MMHG | OXYGEN SATURATION: 97 % | HEIGHT: 64 IN

## 2019-08-05 DIAGNOSIS — F33.1 MODERATE EPISODE OF RECURRENT MAJOR DEPRESSIVE DISORDER (HCC): Primary | ICD-10-CM

## 2019-08-05 DIAGNOSIS — E11.9 TYPE 2 DIABETES MELLITUS WITHOUT COMPLICATION, UNSPECIFIED WHETHER LONG TERM INSULIN USE (HCC): ICD-10-CM

## 2019-08-05 LAB — HBA1C MFR BLD: 7 %

## 2019-08-05 PROCEDURE — 99213 OFFICE O/P EST LOW 20 MIN: CPT | Performed by: NURSE PRACTITIONER

## 2019-08-05 PROCEDURE — 83036 HEMOGLOBIN GLYCOSYLATED A1C: CPT | Performed by: NURSE PRACTITIONER

## 2019-08-05 RX ORDER — DULOXETIN HYDROCHLORIDE 60 MG/1
60 CAPSULE, DELAYED RELEASE ORAL DAILY
Qty: 30 CAPSULE | Refills: 2 | Status: SHIPPED | OUTPATIENT
Start: 2019-08-05 | End: 2019-11-12 | Stop reason: SDUPTHER

## 2019-08-05 NOTE — PROGRESS NOTES
Subjective   Yelena Morris is a 53 y.o. female.   Chief Complaint   Patient presents with   • Diabetes   • Depression      History of Present Illness  Depression is worse near period.  Has appt wit Dr Mcguire's office in AM for pap and plans to discuss.  No thoughts of self-harm.  Has appt for Spinal cord stimulator with Dr Peters.  Having left shoulder pain-pain management handling.  Rarely taking the Lasix for edema.  She denies headache, shortness of air, chest pain, abdominal pain, nausea vomiting or diarrhea    The following portions of the patient's history were reviewed and updated as appropriate: allergies, current medications, past family history, past medical history, past social history, past surgical history and problem list.    Current Outpatient Medications:   •  BOTOX 200 units reconstituted solution, INJECT 200 UNITS INTO THE HEAD OR NECK AREA BY MD EVERY 90 DAYS, Disp: , Rfl: 3  •  CAMBIA 50 MG pack, TAKE 1 PACKET WITH 2TBSP OF COLD WATER WHEN MIGRAINE OCCURS, MAY REPEAT EVERY 6 HOURS IF NEEDED (X1), Disp: , Rfl: 6  •  Cholecalciferol (VITAMIN D) 2000 units capsule, Take  by mouth., Disp: , Rfl:   •  cyclobenzaprine (FLEXERIL) 10 MG tablet, Take 1 tablet by mouth At Night As Needed for Muscle Spasms., Disp: 30 tablet, Rfl: 1  •  DHEA 50 MG capsule, Take  by mouth., Disp: , Rfl:   •  DULoxetine (CYMBALTA) 30 MG capsule, TAKE ONE CAPSULE BY MOUTH DAILY, TAKING WITH 60 MG CAPSULE TO EQUAL 90 MG TOTAL., Disp: 30 capsule, Rfl: 1  •  DULoxetine (CYMBALTA) 60 MG capsule, Take 1 capsule by mouth Daily., Disp: 30 capsule, Rfl: 2  •  EC-RX TESTOSTERONE TD, Place  on the skin as directed by provider., Disp: , Rfl:   •  eletriptan (RELPAX) 40 MG tablet, TAKE 1 TABLET BY MOUTH AT THE ONSET OF MIGRAINE. MAY REPEAT IN 2 HRS.MAX 2 DOSE/24HR,3 DAYS/WEEK, Disp: , Rfl: 11  •  furosemide (LASIX) 20 MG tablet, Take 1/2 to 1 tablet prn edema, Disp: 30 tablet, Rfl: 1  •  glucose blood test strip, 1 each by Other route  "Daily. Use as instructed, Disp: 100 each, Rfl: 2  •  glucose monitor monitoring kit, 1 each Daily., Disp: 1 each, Rfl: 1  •  HYDROcodone Bitartrate ER 30 MG tablet extended-release 24 hour , Take  by mouth., Disp: , Rfl:   •  HYDROcodone-acetaminophen (NORCO) 7.5-325 MG per tablet, Every 8 (Eight) Hours., Disp: , Rfl:   •  Lancets 28G misc, 1 application Daily., Disp: 100 each, Rfl: 2  •  magnesium oxide (MAG-OX) 400 MG tablet, Take 400 mg by mouth Daily., Disp: , Rfl:   •  metFORMIN (GLUCOPHAGE) 500 MG tablet, Take 2 tablets by mouth 2 (Two) Times a Day With Meals., Disp: 120 tablet, Rfl: 2  •  nebivolol (BYSTOLIC) 10 MG tablet, Bystolic 10 mg tablet  1 tab every days, Disp: , Rfl:   •  nortriptyline (PAMELOR) 10 MG capsule, nortriptyline 10 mg capsule  TAKE 4 CAPSULES AT BEDTIME, Disp: , Rfl:   •  omeprazole (priLOSEC) 40 MG capsule, omeprazole 40 mg capsule,delayed release  TAKE 1 CAPSULE AT BEDTIME, Disp: , Rfl:   •  OnabotulinumtoxinA (BOTOX IJ), Inject  as directed Every 3 (Three) Months., Disp: , Rfl:   •  ondansetron (ZOFRAN) 4 MG tablet, Zofran 4 mg tablet  Every four to six hours, Disp: , Rfl:   •  pregabalin (LYRICA) 50 MG capsule, 75 mg Every 8 (Eight) Hours., Disp: , Rfl:   •  Probiotic Product (DIGESTIVE ADVANTAGE PO), Take  by mouth., Disp: , Rfl:   •  TROKENDI  MG capsule sustained-release 24 hr, Take 1 capsule by mouth Daily., Disp: , Rfl: 1    Review of Systems  Consitutional, HEENT, Respiratory, CV, GI, , Skin, Musculoskeletal, Neuro-mental, Endocrinological, Hematological were reviewed.  Positives were discussed in the HPI, otherwise ROS was negative   /82   Pulse 82   Resp 16   Ht 162.6 cm (64\")   Wt 74.4 kg (164 lb)   LMP 07/22/2019   SpO2 97%   Breastfeeding? No   BMI 28.15 kg/m²     Objective   Allergies   Allergen Reactions   • Amoxicillin Rash   • Methylergonovine GI Intolerance     And sob     • Telithromycin GI Intolerance       Physical Exam   Constitutional: She " appears well-developed and well-nourished. No distress.   HENT:   Head: Normocephalic.   Neck: Neck supple.   Cardiovascular: Normal rate, regular rhythm, normal heart sounds and intact distal pulses. Exam reveals no gallop and no friction rub.   No murmur heard.  No pedal edema   Pulmonary/Chest: Effort normal and breath sounds normal. No stridor. No respiratory distress. She has no wheezes. She has no rales.   Skin: Skin is warm and dry. Capillary refill takes less than 2 seconds.   Psychiatric: She has a normal mood and affect. Her behavior is normal. Judgment and thought content normal.   Nursing note and vitals reviewed.      Procedures    LABS  Results for orders placed or performed in visit on 08/05/19   POC Glycosylated Hemoglobin (Hb A1C)   Result Value Ref Range    Hemoglobin A1C 7.0 %       Assessment/Plan   Yelena was seen today for diabetes and depression.    Diagnoses and all orders for this visit:    Moderate episode of recurrent major depressive disorder (CMS/[facility])  -     DULoxetine (CYMBALTA) 60 MG capsule; Take 1 capsule by mouth Daily.    Type 2 diabetes mellitus without complication, unspecified whether long term insulin use (CMS/[facility])  -     POC Glycosylated Hemoglobin (Hb A1C)  -     metFORMIN (GLUCOPHAGE) 500 MG tablet; Take 2 tablets by mouth 2 (Two) Times a Day With Meals.      Patient Instructions   We have increased metformin in response to hemoglobin A1c of 7.  Keep your follow-up appointment with  and also with your mental health counselor.Pt verbalizes understanding and agreement with plan of care.     EMR Dragon/transcription disclaimer:  Please note that portions of this note were completed with a voice recognition program.  Electronic transcription of the voice recognition program may permit erroneous words or phrases to be inadvertently transcribed.  Although I have reviewed the note for such errors, some may still exist in this documentation     Sunshine Calvin, ONESIMO

## 2019-08-08 NOTE — PATIENT INSTRUCTIONS
We have increased metformin in response to hemoglobin A1c of 7.  Keep your follow-up appointment with  and also with your mental health counselor.Pt verbalizes understanding and agreement with plan of care.

## 2019-08-12 ENCOUNTER — APPOINTMENT (OUTPATIENT)
Dept: PREADMISSION TESTING | Facility: HOSPITAL | Age: 54
End: 2019-08-12

## 2019-08-12 VITALS — BODY MASS INDEX: 28.34 KG/M2 | WEIGHT: 166 LBS | HEIGHT: 64 IN

## 2019-08-12 LAB
DEPRECATED RDW RBC AUTO: 42.2 FL (ref 37–54)
ERYTHROCYTE [DISTWIDTH] IN BLOOD BY AUTOMATED COUNT: 12.9 % (ref 12.3–15.4)
HCT VFR BLD AUTO: 42.5 % (ref 34–46.6)
HGB BLD-MCNC: 13.4 G/DL (ref 12–15.9)
MCH RBC QN AUTO: 28 PG (ref 26.6–33)
MCHC RBC AUTO-ENTMCNC: 31.5 G/DL (ref 31.5–35.7)
MCV RBC AUTO: 88.7 FL (ref 79–97)
PLATELET # BLD AUTO: 257 10*3/MM3 (ref 140–450)
PMV BLD AUTO: 9.7 FL (ref 6–12)
RBC # BLD AUTO: 4.79 10*6/MM3 (ref 3.77–5.28)
WBC NRBC COR # BLD: 7.33 10*3/MM3 (ref 3.4–10.8)

## 2019-08-12 PROCEDURE — 93005 ELECTROCARDIOGRAM TRACING: CPT

## 2019-08-12 PROCEDURE — 36415 COLL VENOUS BLD VENIPUNCTURE: CPT

## 2019-08-12 PROCEDURE — 87081 CULTURE SCREEN ONLY: CPT | Performed by: ANESTHESIOLOGY

## 2019-08-12 PROCEDURE — 85027 COMPLETE CBC AUTOMATED: CPT | Performed by: ANESTHESIOLOGY

## 2019-08-12 PROCEDURE — 93010 ELECTROCARDIOGRAM REPORT: CPT | Performed by: INTERNAL MEDICINE

## 2019-08-12 RX ORDER — HYDROCODONE BITARTRATE AND ACETAMINOPHEN 10; 325 MG/1; MG/1
1 TABLET ORAL EVERY 6 HOURS PRN
COMMUNITY
End: 2020-05-07

## 2019-08-12 RX ORDER — FLUOXETINE HYDROCHLORIDE 40 MG/1
40 CAPSULE ORAL DAILY
COMMUNITY
End: 2020-11-12 | Stop reason: SDUPTHER

## 2019-08-12 RX ORDER — CELECOXIB 200 MG/1
200 CAPSULE ORAL DAILY
COMMUNITY
End: 2020-12-16

## 2019-08-12 NOTE — PAT
Patient to apply Chlorhexadine wipes  to surgical area (as instructed) the night before procedure and the AM of procedure. Wipes provided.    It was noted during Pre Admission Testing that patient was wearing some form of fingernail polish (gel/regular) and/or acrylic/artificial nails.  Patient was told that polish and/or artificial nails must be removed for surgery.  If a patient had recent manicure, and would rather not remove polish or artificial nails. Then the minimum requirement is that the polish/artificial nails must be removed from the middle finger on each hand.  Patient verbalized understanding.    If patient was having surgery on an upper extremity, then the patient was instructed that fingernail polish/artificial fingernails must be removed for surgery.  NO EXCEPTIONS.  Patient verbalized understanding.    If patient was having surgery on a lower extremity, then the patient was instructed that toenail polish on both extremities must be removed for surgery.  NO EXCEPTIONS. Patient verbalized understanding.    Bactroban and Chlorhexidine Prescription given during PAT visit, as well as written and verbal instructions given to patient during PAT visit.  Patient/family also instructed to complete skin prep checklist and return the checklist on the day of surgery to preoperative staff.  Patient/family verbalized understanding.

## 2019-08-13 LAB — MRSA SPEC QL CULT: NORMAL

## 2019-08-18 ENCOUNTER — ANESTHESIA EVENT (OUTPATIENT)
Dept: PERIOP | Facility: HOSPITAL | Age: 54
End: 2019-08-18

## 2019-08-19 ENCOUNTER — HOSPITAL ENCOUNTER (OUTPATIENT)
Facility: HOSPITAL | Age: 54
Discharge: HOME | End: 2019-08-19
Attending: NEUROLOGICAL SURGERY | Admitting: NEUROLOGICAL SURGERY

## 2019-08-19 ENCOUNTER — ANESTHESIA (OUTPATIENT)
Dept: PERIOP | Facility: HOSPITAL | Age: 54
End: 2019-08-19

## 2019-08-19 ENCOUNTER — APPOINTMENT (OUTPATIENT)
Dept: GENERAL RADIOLOGY | Facility: HOSPITAL | Age: 54
End: 2019-08-19

## 2019-08-19 VITALS
OXYGEN SATURATION: 98 % | DIASTOLIC BLOOD PRESSURE: 79 MMHG | HEART RATE: 75 BPM | RESPIRATION RATE: 16 BRPM | SYSTOLIC BLOOD PRESSURE: 130 MMHG | TEMPERATURE: 97.8 F

## 2019-08-19 DIAGNOSIS — M96.1 LUMBAR POSTLAMINECTOMY SYNDROME: ICD-10-CM

## 2019-08-19 LAB
B-HCG UR QL: NEGATIVE
GLUCOSE BLDC GLUCOMTR-MCNC: 146 MG/DL (ref 70–130)
GLUCOSE BLDC GLUCOMTR-MCNC: 94 MG/DL (ref 70–130)
INTERNAL NEGATIVE CONTROL: NEGATIVE
INTERNAL POSITIVE CONTROL: POSITIVE
Lab: NORMAL
POTASSIUM BLD-SCNC: 3.8 MMOL/L (ref 3.5–5.2)

## 2019-08-19 PROCEDURE — 76000 FLUOROSCOPY <1 HR PHYS/QHP: CPT

## 2019-08-19 PROCEDURE — 81025 URINE PREGNANCY TEST: CPT | Performed by: NEUROLOGICAL SURGERY

## 2019-08-19 PROCEDURE — 25010000002 NEOSTIGMINE 10 MG/10ML SOLUTION: Performed by: NURSE ANESTHETIST, CERTIFIED REGISTERED

## 2019-08-19 PROCEDURE — L8689 EXTERNAL RECHARG SYS INTERN: HCPCS | Performed by: NEUROLOGICAL SURGERY

## 2019-08-19 PROCEDURE — 63685 INS/RPLC SPI NPG/RCVR POCKET: CPT | Performed by: NEUROLOGICAL SURGERY

## 2019-08-19 PROCEDURE — 25010000002 VANCOMYCIN 1 G RECONSTITUTED SOLUTION: Performed by: NEUROLOGICAL SURGERY

## 2019-08-19 PROCEDURE — 25010000002 FENTANYL CITRATE (PF) 100 MCG/2ML SOLUTION: Performed by: NURSE ANESTHETIST, CERTIFIED REGISTERED

## 2019-08-19 PROCEDURE — C1820 GENERATOR NEURO RECHG BAT SY: HCPCS | Performed by: NEUROLOGICAL SURGERY

## 2019-08-19 PROCEDURE — 25010000002 HYDROMORPHONE PER 4 MG: Performed by: NURSE ANESTHETIST, CERTIFIED REGISTERED

## 2019-08-19 PROCEDURE — 25010000002 PROPOFOL 10 MG/ML EMULSION: Performed by: NURSE ANESTHETIST, CERTIFIED REGISTERED

## 2019-08-19 PROCEDURE — 25010000002 VANCOMYCIN 10 G RECONSTITUTED SOLUTION: Performed by: NEUROLOGICAL SURGERY

## 2019-08-19 PROCEDURE — 63655 IMPLANT NEUROELECTRODES: CPT | Performed by: NEUROLOGICAL SURGERY

## 2019-08-19 PROCEDURE — 25010000002 DEXAMETHASONE PER 1 MG: Performed by: NURSE ANESTHETIST, CERTIFIED REGISTERED

## 2019-08-19 PROCEDURE — 84132 ASSAY OF SERUM POTASSIUM: CPT | Performed by: ANESTHESIOLOGY

## 2019-08-19 PROCEDURE — C1778 LEAD, NEUROSTIMULATOR: HCPCS | Performed by: NEUROLOGICAL SURGERY

## 2019-08-19 PROCEDURE — C1787 PATIENT PROGR, NEUROSTIM: HCPCS | Performed by: NEUROLOGICAL SURGERY

## 2019-08-19 PROCEDURE — 25010000002 ONDANSETRON PER 1 MG: Performed by: NURSE ANESTHETIST, CERTIFIED REGISTERED

## 2019-08-19 PROCEDURE — 82962 GLUCOSE BLOOD TEST: CPT

## 2019-08-19 DEVICE — LD STIM SURESCAN SPECIFY MRI 2X8 65CM: Type: IMPLANTABLE DEVICE | Site: SPINE THORACIC | Status: FUNCTIONAL

## 2019-08-19 DEVICE — NEUROSTM INTELLIS SURESCAN MRI W/ADAPTIVE STIM RECHG: Type: IMPLANTABLE DEVICE | Site: BACK | Status: FUNCTIONAL

## 2019-08-19 RX ORDER — ONDANSETRON 2 MG/ML
4 INJECTION INTRAMUSCULAR; INTRAVENOUS ONCE AS NEEDED
Status: DISCONTINUED | OUTPATIENT
Start: 2019-08-19 | End: 2019-08-19 | Stop reason: HOSPADM

## 2019-08-19 RX ORDER — FAMOTIDINE 20 MG/1
20 TABLET, FILM COATED ORAL ONCE
Status: COMPLETED | OUTPATIENT
Start: 2019-08-19 | End: 2019-08-19

## 2019-08-19 RX ORDER — HYDROCODONE BITARTRATE AND ACETAMINOPHEN 10; 325 MG/1; MG/1
1 TABLET ORAL EVERY 6 HOURS PRN
Status: DISCONTINUED | OUTPATIENT
Start: 2019-08-19 | End: 2019-08-19 | Stop reason: HOSPADM

## 2019-08-19 RX ORDER — LIDOCAINE HYDROCHLORIDE 10 MG/ML
0.5 INJECTION, SOLUTION EPIDURAL; INFILTRATION; INTRACAUDAL; PERINEURAL ONCE AS NEEDED
Status: COMPLETED | OUTPATIENT
Start: 2019-08-19 | End: 2019-08-19

## 2019-08-19 RX ORDER — FENTANYL CITRATE 50 UG/ML
INJECTION, SOLUTION INTRAMUSCULAR; INTRAVENOUS AS NEEDED
Status: DISCONTINUED | OUTPATIENT
Start: 2019-08-19 | End: 2019-08-19 | Stop reason: SURG

## 2019-08-19 RX ORDER — FAMOTIDINE 10 MG/ML
20 INJECTION, SOLUTION INTRAVENOUS ONCE
Status: DISCONTINUED | OUTPATIENT
Start: 2019-08-19 | End: 2019-08-19 | Stop reason: HOSPADM

## 2019-08-19 RX ORDER — VANCOMYCIN HYDROCHLORIDE 1 G/20ML
INJECTION, POWDER, LYOPHILIZED, FOR SOLUTION INTRAVENOUS AS NEEDED
Status: DISCONTINUED | OUTPATIENT
Start: 2019-08-19 | End: 2019-08-19 | Stop reason: HOSPADM

## 2019-08-19 RX ORDER — ROCURONIUM BROMIDE 10 MG/ML
INJECTION, SOLUTION INTRAVENOUS AS NEEDED
Status: DISCONTINUED | OUTPATIENT
Start: 2019-08-19 | End: 2019-08-19 | Stop reason: SURG

## 2019-08-19 RX ORDER — SODIUM CHLORIDE 9 MG/ML
INJECTION, SOLUTION INTRAVENOUS AS NEEDED
Status: DISCONTINUED | OUTPATIENT
Start: 2019-08-19 | End: 2019-08-19 | Stop reason: HOSPADM

## 2019-08-19 RX ORDER — DEXAMETHASONE SODIUM PHOSPHATE 4 MG/ML
INJECTION, SOLUTION INTRA-ARTICULAR; INTRALESIONAL; INTRAMUSCULAR; INTRAVENOUS; SOFT TISSUE AS NEEDED
Status: DISCONTINUED | OUTPATIENT
Start: 2019-08-19 | End: 2019-08-19 | Stop reason: SURG

## 2019-08-19 RX ORDER — LIDOCAINE HYDROCHLORIDE 10 MG/ML
INJECTION, SOLUTION EPIDURAL; INFILTRATION; INTRACAUDAL; PERINEURAL AS NEEDED
Status: DISCONTINUED | OUTPATIENT
Start: 2019-08-19 | End: 2019-08-19 | Stop reason: SURG

## 2019-08-19 RX ORDER — GLYCOPYRROLATE 0.2 MG/ML
INJECTION INTRAMUSCULAR; INTRAVENOUS AS NEEDED
Status: DISCONTINUED | OUTPATIENT
Start: 2019-08-19 | End: 2019-08-19 | Stop reason: SURG

## 2019-08-19 RX ORDER — FENTANYL CITRATE 50 UG/ML
50 INJECTION, SOLUTION INTRAMUSCULAR; INTRAVENOUS
Status: DISCONTINUED | OUTPATIENT
Start: 2019-08-19 | End: 2019-08-19 | Stop reason: HOSPADM

## 2019-08-19 RX ORDER — PROPOFOL 10 MG/ML
VIAL (ML) INTRAVENOUS AS NEEDED
Status: DISCONTINUED | OUTPATIENT
Start: 2019-08-19 | End: 2019-08-19 | Stop reason: SURG

## 2019-08-19 RX ORDER — ONDANSETRON 2 MG/ML
INJECTION INTRAMUSCULAR; INTRAVENOUS AS NEEDED
Status: DISCONTINUED | OUTPATIENT
Start: 2019-08-19 | End: 2019-08-19 | Stop reason: SURG

## 2019-08-19 RX ORDER — SODIUM CHLORIDE 0.9 % (FLUSH) 0.9 %
3 SYRINGE (ML) INJECTION EVERY 12 HOURS SCHEDULED
Status: DISCONTINUED | OUTPATIENT
Start: 2019-08-19 | End: 2019-08-19 | Stop reason: HOSPADM

## 2019-08-19 RX ORDER — HYDROMORPHONE HYDROCHLORIDE 1 MG/ML
0.5 INJECTION, SOLUTION INTRAMUSCULAR; INTRAVENOUS; SUBCUTANEOUS
Status: DISCONTINUED | OUTPATIENT
Start: 2019-08-19 | End: 2019-08-19 | Stop reason: HOSPADM

## 2019-08-19 RX ORDER — MAGNESIUM HYDROXIDE 1200 MG/15ML
LIQUID ORAL AS NEEDED
Status: DISCONTINUED | OUTPATIENT
Start: 2019-08-19 | End: 2019-08-19 | Stop reason: HOSPADM

## 2019-08-19 RX ORDER — NEOSTIGMINE METHYLSULFATE 1 MG/ML
INJECTION, SOLUTION INTRAVENOUS AS NEEDED
Status: DISCONTINUED | OUTPATIENT
Start: 2019-08-19 | End: 2019-08-19 | Stop reason: SURG

## 2019-08-19 RX ORDER — SODIUM CHLORIDE 0.9 % (FLUSH) 0.9 %
3-10 SYRINGE (ML) INJECTION AS NEEDED
Status: DISCONTINUED | OUTPATIENT
Start: 2019-08-19 | End: 2019-08-19 | Stop reason: HOSPADM

## 2019-08-19 RX ORDER — SODIUM CHLORIDE, SODIUM LACTATE, POTASSIUM CHLORIDE, CALCIUM CHLORIDE 600; 310; 30; 20 MG/100ML; MG/100ML; MG/100ML; MG/100ML
9 INJECTION, SOLUTION INTRAVENOUS CONTINUOUS
Status: DISCONTINUED | OUTPATIENT
Start: 2019-08-19 | End: 2019-08-19 | Stop reason: HOSPADM

## 2019-08-19 RX ADMIN — HYDROCODONE BITARTRATE AND ACETAMINOPHEN 1 TABLET: 10; 325 TABLET ORAL at 10:57

## 2019-08-19 RX ADMIN — FENTANYL CITRATE 50 MCG: 50 INJECTION, SOLUTION INTRAMUSCULAR; INTRAVENOUS at 07:22

## 2019-08-19 RX ADMIN — HYDROMORPHONE HYDROCHLORIDE 0.5 MG: 1 INJECTION, SOLUTION INTRAMUSCULAR; INTRAVENOUS; SUBCUTANEOUS at 09:31

## 2019-08-19 RX ADMIN — FAMOTIDINE 20 MG: 20 TABLET, FILM COATED ORAL at 06:15

## 2019-08-19 RX ADMIN — FENTANYL CITRATE 50 MCG: 0.05 INJECTION, SOLUTION INTRAMUSCULAR; INTRAVENOUS at 09:06

## 2019-08-19 RX ADMIN — HYDROMORPHONE HYDROCHLORIDE 0.5 MG: 1 INJECTION, SOLUTION INTRAMUSCULAR; INTRAVENOUS; SUBCUTANEOUS at 09:25

## 2019-08-19 RX ADMIN — LIDOCAINE HYDROCHLORIDE 50 MG: 10 INJECTION, SOLUTION EPIDURAL; INFILTRATION; INTRACAUDAL; PERINEURAL at 07:02

## 2019-08-19 RX ADMIN — PROPOFOL 200 MG: 10 INJECTION, EMULSION INTRAVENOUS at 07:02

## 2019-08-19 RX ADMIN — VANCOMYCIN HYDROCHLORIDE 1250 MG: 10 INJECTION, POWDER, LYOPHILIZED, FOR SOLUTION INTRAVENOUS at 06:28

## 2019-08-19 RX ADMIN — ROCURONIUM BROMIDE 30 MG: 10 INJECTION INTRAVENOUS at 07:02

## 2019-08-19 RX ADMIN — GLYCOPYRROLATE 0.4 MG: 0.2 INJECTION, SOLUTION INTRAMUSCULAR; INTRAVENOUS at 08:26

## 2019-08-19 RX ADMIN — GLYCOPYRROLATE 0.1 MG: 0.2 INJECTION, SOLUTION INTRAMUSCULAR; INTRAVENOUS at 07:17

## 2019-08-19 RX ADMIN — NEOSTIGMINE METHYLSULFATE 3.5 MG: 1 INJECTION, SOLUTION INTRAVENOUS at 08:26

## 2019-08-19 RX ADMIN — DEXAMETHASONE SODIUM PHOSPHATE 8 MG: 4 INJECTION, SOLUTION INTRAMUSCULAR; INTRAVENOUS at 07:06

## 2019-08-19 RX ADMIN — FENTANYL CITRATE 50 MCG: 0.05 INJECTION, SOLUTION INTRAMUSCULAR; INTRAVENOUS at 09:18

## 2019-08-19 RX ADMIN — ONDANSETRON 4 MG: 2 INJECTION INTRAMUSCULAR; INTRAVENOUS at 08:20

## 2019-08-19 RX ADMIN — FENTANYL CITRATE 50 MCG: 50 INJECTION, SOLUTION INTRAMUSCULAR; INTRAVENOUS at 07:02

## 2019-08-19 RX ADMIN — SODIUM CHLORIDE, POTASSIUM CHLORIDE, SODIUM LACTATE AND CALCIUM CHLORIDE 9 ML/HR: 600; 310; 30; 20 INJECTION, SOLUTION INTRAVENOUS at 06:15

## 2019-08-19 RX ADMIN — LIDOCAINE HYDROCHLORIDE 0.5 ML: 10 INJECTION, SOLUTION EPIDURAL; INFILTRATION; INTRACAUDAL; PERINEURAL at 06:16

## 2019-08-19 RX ADMIN — GLYCOPYRROLATE 0.1 MG: 0.2 INJECTION, SOLUTION INTRAMUSCULAR; INTRAVENOUS at 07:38

## 2019-08-19 NOTE — ANESTHESIA POSTPROCEDURE EVALUATION
Patient: Yelena Morris    Procedure Summary     Date:  08/19/19 Room / Location:   [facility]    Anesthesia Start:  0659 Anesthesia Stop:  0846    Procedure:  SPINAL CORD STIMULATOR INSERTION (N/A Back) Diagnosis:       Lumbar postlaminectomy syndrome      (Lumbar postlaminectomy syndrome [M96.1])    Surgeon:  Ethan Peters MD Provider:  Scott Bolden MD    Anesthesia Type:  general ASA Status:  2          Anesthesia Type: general  Last vitals  BP   99/67 (08/19/19 0846)   Temp   98 °F (36.7 °C) (08/19/19 0846)   Pulse   72 (08/19/19 0846)   Resp   16 (08/19/19 0846)     SpO2   100 % (08/19/19 0846)     Post Anesthesia Care and Evaluation    Patient location during evaluation: PACU  Patient participation: complete - patient participated  Level of consciousness: awake and alert  Pain score: 0  Pain management: adequate  Airway patency: patent  Anesthetic complications: No anesthetic complications  PONV Status: none  Cardiovascular status: hemodynamically stable and acceptable  Respiratory status: nonlabored ventilation, acceptable and nasal cannula  Hydration status: acceptable

## 2019-08-19 NOTE — ANESTHESIA PROCEDURE NOTES
Airway  Urgency: elective    Airway not difficult    General Information and Staff    Patient location during procedure: OR  CRNA: Rc Bethea CRNA    Indications and Patient Condition  Indications for airway management: airway protection    Preoxygenated: yes  MILS not maintained throughout  Mask difficulty assessment: 2 - vent by mask + OA or adjuvant +/- NMBA    Final Airway Details  Final airway type: endotracheal airway      Successful airway: ETT  Cuffed: yes   Successful intubation technique: direct laryngoscopy  Facilitating devices/methods: intubating stylet  Endotracheal tube insertion site: oral  Blade: Meera  Blade size: 3  ETT size (mm): 7.0  Cormack-Lehane Classification: grade I - full view of glottis  Placement verified by: chest auscultation and capnometry   Measured from: lips  ETT to lips (cm): 20  Number of attempts at approach: 1    Additional Comments  Negative epigastric sounds, Breath sound equal bilaterally with symmetric chest rise and fall            
No

## 2019-08-19 NOTE — INTERVAL H&P NOTE
Pre-Op H&P (See Recent Office Note Attached for Full H&P)    Chief complaint: Back and BLE pain, R>L    Review of Systems:  General ROS:  no fever, chills, rashes, No change since last office visit  Cardiovascular ROS: no chest pain or dyspnea on exertion  Respiratory ROS: no cough, shortness of breath, or wheezing    Meds:    No current facility-administered medications on file prior to encounter.      Current Outpatient Medications on File Prior to Encounter   Medication Sig Dispense Refill   • Cholecalciferol (VITAMIN D) 2000 units capsule Take 2,000 Units by mouth Daily.     • cyclobenzaprine (FLEXERIL) 10 MG tablet Take 1 tablet by mouth At Night As Needed for Muscle Spasms. 30 tablet 1   • DHEA 50 MG capsule Take 1 tablet by mouth Daily.     • DULoxetine (CYMBALTA) 30 MG capsule TAKE ONE CAPSULE BY MOUTH DAILY, TAKING WITH 60 MG CAPSULE TO EQUAL 90 MG TOTAL. 30 capsule 1   • EC-RX TESTOSTERONE TD Place 1 patch on the skin as directed by provider Daily.     • eletriptan (RELPAX) 40 MG tablet TAKE 1 TABLET BY MOUTH AT THE ONSET OF MIGRAINE. MAY REPEAT IN 2 HRS.MAX 2 DOSE/24HR,3 DAYS/WEEK  11   • furosemide (LASIX) 20 MG tablet Take 1/2 to 1 tablet prn edema (Patient taking differently: Take 20 mg by mouth Daily. Take 1/2 to 1 tablet prn edema) 30 tablet 1   • HYDROcodone Bitartrate ER 30 MG tablet extended-release 24 hour  Take 1 tablet by mouth Daily.     • magnesium oxide (MAG-OX) 400 MG tablet Take 400 mg by mouth Daily.     • nebivolol (BYSTOLIC) 10 MG tablet Bystolic 10 mg tablet   1 tab every days     • nortriptyline (PAMELOR) 10 MG capsule nortriptyline 10 mg capsule   TAKE 4 CAPSULES AT BEDTIME     • omeprazole (priLOSEC) 40 MG capsule omeprazole 40 mg capsule,delayed release   TAKE 1 CAPSULE AT BEDTIME     • pregabalin (LYRICA) 50 MG capsule 75 mg Every 8 (Eight) Hours.     • TROKENDI  MG capsule sustained-release 24 hr Take 1 capsule by mouth Daily.  1   • BOTOX 200 units reconstituted solution  INJECT 200 UNITS INTO THE HEAD OR NECK AREA BY MD EVERY 90 DAYS  3   • CAMBIA 50 MG pack TAKE 1 PACKET WITH 2TBSP OF COLD WATER WHEN MIGRAINE OCCURS, MAY REPEAT EVERY 6 HOURS IF NEEDED (X1)  6   • glucose blood test strip 1 each by Other route Daily. Use as instructed 100 each 2   • glucose monitor monitoring kit 1 each Daily. 1 each 1   • Lancets 28G misc 1 application Daily. 100 each 2   • OnabotulinumtoxinA (BOTOX IJ) Inject  as directed Every 3 (Three) Months.     • ondansetron (ZOFRAN) 4 MG tablet Zofran 4 mg tablet   Every four to six hours         Vital Signs:  Afebrile HR 75 O2 96% Bp 135/83  Physical Exam:    CV:  S1S2 regular rate and rhythm, no murmur               Resp:  Clear to auscultation; respirations regular, even and unlabored    Results Review:     Lab Results   Component Value Date    WBC 7.33 08/12/2019    HGB 13.4 08/12/2019    HCT 42.5 08/12/2019    MCV 88.7 08/12/2019     08/12/2019    CREATININE 1.00 07/30/2019        I reviewed the patient's new clinical results.    Cancer Staging (if applicable)  Cancer Patient: __ yes _x_no __unknown; If yes, clinical stage T:__ N:__M:__, stage group or __N/A    Assessment/Plan:    Diagnosis:   Lumbar postlaminectomy syndrome.     Treatment Options:   Her pain physician has recommended spinal cord stimulator placement with the lead projecting to the mid T7 level.  The nature of the procedure as well as the potential risks, complications, limitations, and alternatives to the procedure were discussed at length with the patient and the patient has agreed to proceed with surgery.    Funmi Montanez, APRN  8/19/2019   6:24 AM

## 2019-08-19 NOTE — ANESTHESIA PREPROCEDURE EVALUATION
Anesthesia Evaluation     Patient summary reviewed and Nursing notes reviewed   NPO Solid Status: > 8 hours  NPO Liquid Status: > 2 hours           Airway   Mallampati: II  TM distance: >3 FB  Neck ROM: full  Dental          Pulmonary     breath sounds clear to auscultation  Cardiovascular     ECG reviewed  Rate: normal    (+) hypertension, hyperlipidemia,       Neuro/Psych  GI/Hepatic/Renal/Endo    (+)  GERD,  diabetes mellitus type 2,     Musculoskeletal     (+) myalgias,   Abdominal    Substance History      OB/GYN          Other   (+) arthritis     ROS/Med Hx Other: RBBB on EKG                  Anesthesia Plan    ASA 2     general     intravenous induction     Plan discussed with CRNA.

## 2019-08-19 NOTE — OP NOTE
NEUROSURGICAL OPERATIVE NOTE        PREOPERATIVE DIAGNOSIS:    Lumbar postlaminectomy syndrome      POSTOPERATIVE DIAGNOSIS:  Same      PROCEDURE:  T9 laminotomy for placement of Medtronic epidural spinal cord stimulator      SURGEON:  Ethan Peters M.D.      ASSISTANT: Mariam Daigle PA-C      ANESTHESIA:  General      ESTIMATED BLOOD LOSS: Minimal      SPECIMEN: None      DRAINS: None      COMPLICATIONS:  None      CLINICAL NOTE:  The patient is a 53-year-old woman who has undergone previous lumbar surgery.  She has ongoing back and lower extremity pain predominantly on the right.  Nonoperative measures have been ineffective.  A recent trial of a spinal cord stimulator has provided significant improvement in her symptoms and as such she presents at this time for epidural spinal cord stimulator placement.  The nature of the procedure as well as the potential risks, complications, limitations, and alternatives to the procedure were discussed at length with the patient and the patient has agreed to proceed with surgery.      TECHNICAL NOTE:  The patient was brought to the operating room and while on her cart general endotracheal anesthesia was achieved.  She was then turned prone onto blanket rolls which were maintained longitudinally under her chest and abdomen. Special care was ensured to protect pressure points. Her mid and lower back as well as her buttocks were prepped and draped in usual fashion. A localizing radiograph was obtained using AP fluoroscopy and counting up from the 12th rib. Midline incision was fashioned overlying the T9-T10 level. Underlying tissues were divided with cautery. Another radiograph confirmed the operative level. Midline laminotomy was fashioned using Leksell rongeur, Midas Florencio drill and Kerrison punches. Bleeding points were controlled with bone wax and Floseal. The dorsal epidural space was defined in a cephalad fashion using a Penfield 3 dissector. In the midline, I  encountered some adhesion. I then extended the laminotomy upward a little bit. Penfield 3 and a malleable dissector were utilized to define the dorsal epidural space more cephalad. The Medtronic Specify 2.8 paddle lead was then advanced. I did this in several passes. The lead tended to veer to the right or the left and with extensive careful manipulation, I could not get the lead into the midline. Ultimately, I settled for the lead resting to the midline to just leftward. This extended to the upper T7 level. Lead tethers were utilized to secure the lead to soft tissue. A transverse incision was then made on the left upper buttock and a subcutaneous pocket was fashioned. Lead passer was utilized to pass the lead wires from the thoracic incision to the subcutaneous buttock pocket. The leads were affixed to the [employer] IPG. Impedences were appropriate. Vancomycin powder was sprinkled in the buttock pocket and the IPG was placed in the pocket and secured in place with 3-0 silk sutures. Subcutaneous tissues were closed in a single layer in interrupted fashion with 2-0 Vicryl. The thoracic incision was washed out. It was quite dry. Vancomycin powder was sprinkled in the depths and then more superficially as the wound was closed. Paraspinous muscle and fascia were reapproximated in interrupted fashion with 0 Vicryl suture. Subcutaneous tissues were closed in layers with 3-0 Vicryl suture. The skin at each site was closed in a running subcuticular fashion with 3-0 Vicryl suture. A skin sealant was applied as well as sterile dressings. Completion fluoroscopy confirmed good positioning of the lead, as noted above. There were no overt intraoperative complications. She did receive preoperative antibiotics.            Ethan Peters M.D.

## 2019-09-03 ENCOUNTER — OFFICE VISIT (OUTPATIENT)
Dept: NEUROSURGERY | Facility: CLINIC | Age: 54
End: 2019-09-03

## 2019-09-03 VITALS — WEIGHT: 167.4 LBS | HEIGHT: 64 IN | TEMPERATURE: 98.1 F | BODY MASS INDEX: 28.58 KG/M2 | RESPIRATION RATE: 16 BRPM

## 2019-09-03 DIAGNOSIS — M54.9 MID BACK PAIN: ICD-10-CM

## 2019-09-03 DIAGNOSIS — M96.1 LUMBAR POST-LAMINECTOMY SYNDROME: ICD-10-CM

## 2019-09-03 DIAGNOSIS — M54.41 ACUTE MIDLINE LOW BACK PAIN WITH RIGHT-SIDED SCIATICA: ICD-10-CM

## 2019-09-03 DIAGNOSIS — Z96.89 S/P INSERTION OF SPINAL CORD STIMULATOR: Primary | ICD-10-CM

## 2019-09-03 PROCEDURE — 99024 POSTOP FOLLOW-UP VISIT: CPT | Performed by: PHYSICIAN ASSISTANT

## 2019-09-03 NOTE — PROGRESS NOTES
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  Historically she had undergone a lumbar procedure by an orthopedic surgeon with minimal improvement.  Recently, she presented with increased low back and right thigh pain.  A trial of a spinal cord stimulator proved highly effective in mitigating symptom severity.  As such, patient presented for a T9 laminotomy for placement of a Medtronic epidural spinal cord stimulator on 2019.    Presently, Ms. Morris is 2 weeks postop.  She is very pleased with her current postoperative progress.  She denies incisional pain, or worsening abdominal discomfort.  She has successfully weaned off of her postoperative pain medication.  Patient is planning to meet with the Medtronic reps this afternoon.  She has no other complaints at this time.    Review of Systems   Constitutional: Negative for activity change, appetite change, chills, diaphoresis, fatigue, fever and unexpected weight change.   HENT: Positive for ear pain. Negative for congestion, dental problem, drooling, ear discharge, facial swelling, hearing loss, mouth sores, nosebleeds, postnasal drip, rhinorrhea, sinus pressure, sneezing, sore throat, tinnitus, trouble swallowing and voice change.    Eyes: Negative for photophobia, pain, discharge, redness, itching and visual disturbance.   Respiratory: Negative for apnea, cough, choking, chest tightness, shortness of breath, wheezing and stridor.    Cardiovascular: Negative for chest pain, palpitations and leg swelling.   Gastrointestinal: Positive for abdominal pain. Negative for abdominal distention, anal bleeding, blood in stool, constipation, diarrhea, nausea, rectal pain and vomiting.   Endocrine: Negative  "for cold intolerance, heat intolerance, polydipsia, polyphagia and polyuria.   Genitourinary: Negative for decreased urine volume, difficulty urinating, dysuria, enuresis, flank pain, frequency, genital sores, hematuria and urgency.   Musculoskeletal: Positive for back pain, myalgias, neck pain and neck stiffness. Negative for arthralgias, gait problem and joint swelling.   Skin: Negative for color change, pallor, rash and wound.   Allergic/Immunologic: Negative for environmental allergies, food allergies and immunocompromised state.   Neurological: Positive for numbness and headaches. Negative for dizziness, tremors, seizures, syncope, facial asymmetry, speech difficulty, weakness and light-headedness.   Hematological: Negative for adenopathy. Does not bruise/bleed easily.   Psychiatric/Behavioral: Negative for agitation, behavioral problems, confusion, decreased concentration, dysphoric mood, hallucinations, self-injury, sleep disturbance and suicidal ideas. The patient is not nervous/anxious and is not hyperactive.    All other systems reviewed and are negative.      The patient's past medical history, past surgical history, family history, and social history have been reviewed at length in the electronic medical record.    Physical Exam:   Temp 98.1 °F (36.7 °C) (Temporal)   Resp 16   Ht 162.6 cm (64\")   Wt 75.9 kg (167 lb 6.4 oz)   BMI 28.73 kg/m²   Consitutional: A&Ox3, pleasant, no acute distress  Skin:   - Well healed surgical incision   - No evidence of wound dehiscence  - NSOI   - Non-TTP    Medical Decision Making      Diagnosis/Treatment Options:  1. S/P insertion of spinal cord stimulator  2. Lumbar post-laminectomy syndrome  3. Mid back pain  4. Acute midline low back pain with right-sided sciatica       Follow up:  Ms. Morris is seen today in follow-up 2 weeks after undergoing an uncomplicated T9 laminotomy for placement of a Medtronic epidural spinal cord stimulator on 8/19/2019.  Patient is very " pleased with her current postoperative progress.  We went over some general do's/don'ts for the next 4 weeks.  Patient verbalized understanding.  She will continue to observe and will be seen back in our office on as-needed basis or when her battery reaches end-of-life.    Mariam Daigle PA-C   9/3/2019   12:18 PM

## 2019-09-06 DIAGNOSIS — F33.1 MODERATE EPISODE OF RECURRENT MAJOR DEPRESSIVE DISORDER (HCC): ICD-10-CM

## 2019-09-06 RX ORDER — DULOXETIN HYDROCHLORIDE 30 MG/1
CAPSULE, DELAYED RELEASE ORAL
Qty: 30 CAPSULE | Refills: 1 | Status: SHIPPED | OUTPATIENT
Start: 2019-09-06 | End: 2019-11-12 | Stop reason: SDUPTHER

## 2019-09-09 RX ORDER — FUROSEMIDE 20 MG/1
TABLET ORAL
Qty: 30 TABLET | Refills: 0 | Status: SHIPPED | OUTPATIENT
Start: 2019-09-09 | End: 2019-10-13 | Stop reason: SDUPTHER

## 2019-09-24 DIAGNOSIS — R60.9 EDEMA, UNSPECIFIED TYPE: ICD-10-CM

## 2019-09-24 RX ORDER — TRIAMTERENE AND HYDROCHLOROTHIAZIDE 37.5; 25 MG/1; MG/1
CAPSULE ORAL
Qty: 30 CAPSULE | Refills: 1 | OUTPATIENT
Start: 2019-09-24

## 2019-10-13 RX ORDER — FUROSEMIDE 20 MG/1
TABLET ORAL
Qty: 30 TABLET | Refills: 0 | Status: SHIPPED | OUTPATIENT
Start: 2019-10-13 | End: 2019-11-06

## 2019-11-04 DIAGNOSIS — F33.1 MODERATE EPISODE OF RECURRENT MAJOR DEPRESSIVE DISORDER (HCC): ICD-10-CM

## 2019-11-04 RX ORDER — DULOXETIN HYDROCHLORIDE 30 MG/1
CAPSULE, DELAYED RELEASE ORAL
Qty: 30 CAPSULE | Refills: 0 | OUTPATIENT
Start: 2019-11-04

## 2019-11-06 ENCOUNTER — OFFICE VISIT (OUTPATIENT)
Dept: INTERNAL MEDICINE | Facility: CLINIC | Age: 54
End: 2019-11-06

## 2019-11-06 VITALS
DIASTOLIC BLOOD PRESSURE: 84 MMHG | HEIGHT: 64 IN | OXYGEN SATURATION: 95 % | WEIGHT: 169.6 LBS | SYSTOLIC BLOOD PRESSURE: 142 MMHG | HEART RATE: 82 BPM | BODY MASS INDEX: 28.95 KG/M2 | RESPIRATION RATE: 16 BRPM

## 2019-11-06 DIAGNOSIS — E55.9 VITAMIN D DEFICIENCY: ICD-10-CM

## 2019-11-06 DIAGNOSIS — G43.109 MIGRAINE WITH AURA AND WITHOUT STATUS MIGRAINOSUS, NOT INTRACTABLE: ICD-10-CM

## 2019-11-06 DIAGNOSIS — E11.9 TYPE 2 DIABETES MELLITUS WITHOUT COMPLICATION, UNSPECIFIED WHETHER LONG TERM INSULIN USE (HCC): ICD-10-CM

## 2019-11-06 DIAGNOSIS — M96.1 LUMBAR POSTLAMINECTOMY SYNDROME: ICD-10-CM

## 2019-11-06 DIAGNOSIS — H91.92 HEARING LOSS OF LEFT EAR, UNSPECIFIED HEARING LOSS TYPE: ICD-10-CM

## 2019-11-06 DIAGNOSIS — Z13.29 SCREENING FOR THYROID DISORDER: ICD-10-CM

## 2019-11-06 DIAGNOSIS — F33.2 SEVERE EPISODE OF RECURRENT MAJOR DEPRESSIVE DISORDER, WITHOUT PSYCHOTIC FEATURES (HCC): ICD-10-CM

## 2019-11-06 DIAGNOSIS — R13.10 DYSPHAGIA, UNSPECIFIED TYPE: ICD-10-CM

## 2019-11-06 DIAGNOSIS — K21.9 GASTROESOPHAGEAL REFLUX DISEASE, ESOPHAGITIS PRESENCE NOT SPECIFIED: ICD-10-CM

## 2019-11-06 DIAGNOSIS — Z13.220 SCREENING, LIPID: ICD-10-CM

## 2019-11-06 DIAGNOSIS — Z23 NEED FOR VACCINATION: ICD-10-CM

## 2019-11-06 DIAGNOSIS — I10 ESSENTIAL HYPERTENSION: ICD-10-CM

## 2019-11-06 DIAGNOSIS — R07.89 CHEST PAIN, NON-CARDIAC: Primary | ICD-10-CM

## 2019-11-06 PROBLEM — R73.03 PREDIABETES: Status: RESOLVED | Noted: 2019-02-14 | Resolved: 2019-11-06

## 2019-11-06 LAB — HBA1C MFR BLD: 6.8 %

## 2019-11-06 PROCEDURE — 90732 PPSV23 VACC 2 YRS+ SUBQ/IM: CPT | Performed by: INTERNAL MEDICINE

## 2019-11-06 PROCEDURE — 90471 IMMUNIZATION ADMIN: CPT | Performed by: INTERNAL MEDICINE

## 2019-11-06 PROCEDURE — 99204 OFFICE O/P NEW MOD 45 MIN: CPT | Performed by: INTERNAL MEDICINE

## 2019-11-06 PROCEDURE — 83036 HEMOGLOBIN GLYCOSYLATED A1C: CPT | Performed by: INTERNAL MEDICINE

## 2019-11-06 RX ORDER — MAGNESIUM CHLORIDE 64 MG
2 TABLET, DELAYED RELEASE (ENTERIC COATED) ORAL DAILY
COMMUNITY
End: 2022-06-15

## 2019-11-06 RX ORDER — TRIAMTERENE AND HYDROCHLOROTHIAZIDE 37.5; 25 MG/1; MG/1
1 CAPSULE ORAL DAILY PRN
COMMUNITY
End: 2020-02-28

## 2019-11-06 NOTE — PROGRESS NOTES
"Internal Medicine New Patient  Yelena Morris is a 54 y.o. female who presents today to establish care and with concerns as outlined below.    Chief Complaint  Chief Complaint   Patient presents with   • Establish Care     [facility]   • Hearing Problem     LFt ear feeling fullness, occassional pain,         HPI  Ms. Morris is here today to establish care and discuss an ear issue.    She notes several years of left sided hearing loss, ear fullness, sensation of \"cricket sounds\" in that ear at night. No definitive tinnitus. Feels that the hearing loss lately has become worse. Has to use her phone in her right ear, has issues hearing the TV. Previously had tympanostomy tubes.    T2DM - She was instructed to increase the metformin to 1000mg BID due to A1c of 7%. She has not been taking it this way. She notes that she had been noncompliant with 500mg BID dosing and had planned to try dieting. She thought that increasing to 2 tabs would be too much with the dieting so she has not done this. She needs an eye exam and will schedule.    Depression - She notes more stress at home with  undergoing treatment for colon cancer. He has had surgery, has loop ileostomy, and is undergoing chemo and radiation currently. She is caring for him. She notes he is doing well and about to complete treatment. She also notes issues with her relationship. Contemplating divorce, may see a counselor which she has done before. Prior SI a few months ago, none currently. She takes cymbalta 90mg daily and has been started on fluoxetine for worsened mood, irritability, anger, hopelessness, anxiety, depression around the time of her period. If she feels this way she will take the medication for 10 days and it helps her significantly.    Migraine - She sees Dr. Chong. Has visual aura with flashing lights and loss of peripheral vision. May also get numbness on right side of her face. Worsened at age of 42. Takes nortriptyline 40mg qhs, " "trokendi, slow mag, and botox every 3 months. She has a migraine typically with her period. She takes eletriptan PRN and zofran PRN which she reports works really well. Reports that in general her migraines are much less painful and severe than previously.    HTN - She takes nebivolol 10mg daily. Does not check BP at home. Has cuff. Feels elevation is due to stress.    Chronic low back pain - Had previous discectomy and fusion for \"bone on bone\" DDD. It helped but she had residual pain that has progressed. She has been seeing pain management at [facility]. She takes lyrica 75mg TID, celebrex daily, flexeril qhs, hydrocodone ER 30mg daily and hydrocodone-APAP 10mg TID PRN. She had spinal cord stimulator placed 8/19/19 that has helped her tremendously. She denies issues with oversedation, falls. She does have opiate induced constipation. She has been started on movantik daily that she takes PRN.    She takes testosterone and DHEA prescribed by her GYN, Dr. Mcguire. Notes DHEA was low on blood work.    She notes previously having chest pain radiating to her back. Previous primary care ordered a stress test that she was told was fine. Also had gallbladder ultrasound and barium swallow all of which were fine. She still has the pain every once in a while and thinks this is esophageal spasm. Takes hyoscyamine SL PRN that helps at times but not always. She does take omeprazole for GERD. Had EGD years ago with hiatal hernia. Notes dysphagia to solids and liquids. Feels food getting stuck in her upper chest and has choked twice on liquid.    She takes vitamin D 2000 IU daily for previously low vitamin D.    She takes HCTZ-triamterene PRN for edema. Not taking regularly.         Review of Systems  Review of Systems   Constitutional: Negative.    HENT: Positive for ear pain (fullness), hearing loss, swollen glands (chronic, salivary gland stone) and trouble swallowing (solids and liquids). Negative for tinnitus.  "   Eyes: Positive for visual disturbance (with migraine).   Respiratory: Positive for choking. Negative for cough and shortness of breath.    Cardiovascular: Positive for chest pain and leg swelling (occasional).   Gastrointestinal: Positive for constipation and GERD. Negative for abdominal pain, blood in stool, diarrhea, nausea and vomiting.   Genitourinary: Negative for difficulty urinating, dysuria, frequency and hematuria.   Musculoskeletal: Positive for arthralgias and back pain.   Neurological: Positive for numbness (with migraine) and headache.   Psychiatric/Behavioral: Positive for depressed mood and stress. Negative for self-injury and suicidal ideas.        Past Medical History  Past Medical History:   Diagnosis Date   • Colon polyp    • Depression    • Diabetes mellitus (CMS/HCC)    • Endometriosis    • Fibromyalgia, primary    • GERD (gastroesophageal reflux disease)    • Headache    • Hyperlipidemia    • Hypertension    • Low vitamin D level    • Ovarian cyst    • Wears glasses         Surgical History  Past Surgical History:   Procedure Laterality Date   • BLADDER SURGERY      Bladder tuck   • COLONOSCOPY      every 5 years   • LUMBAR DISCECTOMY  2010    L4-S1 Rt- discectomy Dr. Arellano   • SPINAL CORD STIMULATOR IMPLANT N/A 8/19/2019    Procedure: SPINAL CORD STIMULATOR INSERTION;  Surgeon: Ethan Peters MD;  Location: [facility];  Service: Neurosurgery   • TONSILLECTOMY AND ADENOIDECTOMY          Family History  Family History   Adopted: Yes        Social History  Social History     Socioeconomic History   • Marital status:      Spouse name: Not on file   • Number of children: Not on file   • Years of education: Not on file   • Highest education level: Not on file   Occupational History   • Occupation: Unemployed     Comment: former RN   Tobacco Use   • Smoking status: Never Smoker   • Smokeless tobacco: Never Used   Substance and Sexual Activity   • Alcohol use: No   • Drug use: No   •  Sexual activity: Defer        Current Medications  Current Outpatient Medications on File Prior to Visit   Medication Sig Dispense Refill   • BOTOX 200 units reconstituted solution INJECT 200 UNITS INTO THE HEAD OR NECK AREA BY MD EVERY 90 DAYS  3   • celecoxib (CeleBREX) 200 MG capsule Take 200 mg by mouth Daily.     • Cholecalciferol (VITAMIN D) 2000 units capsule Take 2,000 Units by mouth Daily.     • cyclobenzaprine (FLEXERIL) 10 MG tablet Take 1 tablet by mouth At Night As Needed for Muscle Spasms. 30 tablet 1   • DHEA 50 MG capsule Take 1 tablet by mouth Daily.     • DULoxetine (CYMBALTA) 30 MG capsule TAKE ONE CAPSULE BY MOUTH DAILY **TAKEN WITH 60 MG TO MAKE 90 MG DOSE** 30 capsule 1   • DULoxetine (CYMBALTA) 60 MG capsule Take 1 capsule by mouth Daily. 30 capsule 2   • EC-RX TESTOSTERONE TD Place 1 patch on the skin as directed by provider Daily.     • eletriptan (RELPAX) 40 MG tablet TAKE 1 TABLET BY MOUTH AT THE ONSET OF MIGRAINE. MAY REPEAT IN 2 HRS.MAX 2 DOSE/24HR,3 DAYS/WEEK  11   • FLUoxetine (PROZAC) 40 MG capsule Take 40 mg by mouth Daily. Daily prn with PMS     • glucose blood test strip 1 each by Other route Daily. Use as instructed 100 each 2   • glucose monitor monitoring kit 1 each Daily. 1 each 1   • HYDROcodone Bitartrate ER 30 MG tablet extended-release 24 hour  Take 1 tablet by mouth Daily.     • HYDROcodone-acetaminophen (NORCO)  MG per tablet Take 1 tablet by mouth Every 6 (Six) Hours As Needed for Moderate Pain .     • Lancets 28G misc 1 application Daily. 100 each 2   • magnesium chloride ER 64 MG DR tablet Take 2 tablets by mouth Daily.     • metFORMIN (GLUCOPHAGE) 500 MG tablet Take 2 tablets by mouth 2 (Two) Times a Day With Meals. 120 tablet 2   • nebivolol (BYSTOLIC) 10 MG tablet Bystolic 10 mg tablet   1 tab every days     • nortriptyline (PAMELOR) 10 MG capsule nortriptyline 10 mg capsule   TAKE 4 CAPSULES AT BEDTIME     • omeprazole (priLOSEC) 40 MG capsule omeprazole 40  "mg capsule,delayed release   TAKE 1 CAPSULE AT BEDTIME     • ondansetron (ZOFRAN) 4 MG tablet Zofran 4 mg tablet   Every four to six hours     • pregabalin (LYRICA) 50 MG capsule 75 mg Every 8 (Eight) Hours.     • triamterene-hydrochlorothiazide (DYAZIDE) 37.5-25 MG per capsule Take 1 capsule by mouth Daily As Needed for Edema.     • TROKENDI  MG capsule sustained-release 24 hr Take 1 capsule by mouth Daily.  1   • [DISCONTINUED] furosemide (LASIX) 20 MG tablet TAKE ONE-HALF TO ONE TABLET BY MOUTH DAILY AS NEEDED FOR EDEMA **STOP DYAZIDE** 30 tablet 0   • [DISCONTINUED] magnesium oxide (MAG-OX) 400 MG tablet Take 400 mg by mouth Daily.     • [DISCONTINUED] CAMBIA 50 MG pack TAKE 1 PACKET WITH 2TBSP OF COLD WATER WHEN MIGRAINE OCCURS, MAY REPEAT EVERY 6 HOURS IF NEEDED (X1)  6     No current facility-administered medications on file prior to visit.        Allergies  Allergies   Allergen Reactions   • Methergine [Methylergonovine Maleate] Nausea And Vomiting   • Dihydroergotamine GI Intolerance     VOMITING   • Amoxicillin Rash   • Methylergonovine GI Intolerance     And sob          Objective  Visit Vitals  /84   Pulse 82   Resp 16   Ht 162.6 cm (64.02\")   Wt 76.9 kg (169 lb 9.6 oz)   SpO2 95%   BMI 29.10 kg/m²        Physical Exam  Physical Exam   Constitutional: She is oriented to person, place, and time. She appears well-developed and well-nourished. No distress.   HENT:   Head: Normocephalic and atraumatic.   Right Ear: External ear and ear canal normal. No swelling or tenderness. Tympanic membrane is scarred (peripherally). Tympanic membrane is not injected. No middle ear effusion. cerumen impaction is not present.  Left Ear: External ear and ear canal normal. No swelling or tenderness. Tympanic membrane is scarred (peripherally). Tympanic membrane is not injected.  No middle ear effusion. An impacted cerumen is not present.  Nose: Nose normal.   Mouth/Throat: Oropharynx is clear and moist. No " oropharyngeal exudate.   Eyes: Conjunctivae and EOM are normal. Pupils are equal, round, and reactive to light. No scleral icterus.   Neck: Neck supple. No thyroid mass and no thyromegaly present.   Cardiovascular: Normal rate, regular rhythm, normal heart sounds and intact distal pulses.   No murmur heard.  Pulmonary/Chest: Effort normal and breath sounds normal. No respiratory distress.   Abdominal: Soft. Bowel sounds are normal. She exhibits no distension. There is no tenderness.   Musculoskeletal: She exhibits no edema or deformity.   Lymphadenopathy:     She has no cervical adenopathy.   Neurological: She is alert and oriented to person, place, and time.   Skin: Skin is warm and dry. No rash noted. She is not diaphoretic.   Psychiatric: She has a normal mood and affect. Her behavior is normal. Judgment and thought content normal.   Nursing note and vitals reviewed.       Results  Results for orders placed or performed during the hospital encounter of 08/19/19   Potassium   Result Value Ref Range    Potassium 3.8 3.5 - 5.2 mmol/L   POC Pregnancy, Urine   Result Value Ref Range    HCG, Urine, QL Negative Negative    Lot Number [id number]  7/20     Internal Positive Control Positive     Internal Negative Control Negative    POC Glucose Once   Result Value Ref Range    Glucose 94 70 - 130 mg/dL   POC Glucose Once   Result Value Ref Range    Glucose 146 (H) 70 - 130 mg/dL        Assessment and Plan  Yelena was seen today for establish care and hearing problem.    Diagnoses and all orders for this visit:    Chest pain, non-cardiac and Dysphagia  - Endorses substernal sharp pain radiating to her back, lasting 15 minutes and not associated with activity or relieved with rest  - No clear relief with hyoscyamine SL  - Reports negative stress test 2y ago, negative gallbladder ultrasound and barium swallow  - Continues to have pain intermittently associated with dysphagia to solids and liquids  - Differential includes  stricture due to GERD, esophageal spasm  - Will send to GI for EGD, possible esophageal manometry    Gastroesophageal reflux disease, esophagitis presence not specified  - On omeprazole with chest pain and dysphagia as above  - Plan to send to GI    Screening, lipid  - Lipid profile ordered    Screening for thyroid disorder  - TSH ordered    Vitamin D deficiency  - On daily supplementation with 2000 units  - Will check vitamin D level to verify appropriate supplementation    Essential hypertension  - BP today not at goal on nebivolol 10mg daily  - Discussed goal of 130/80 or less today  - She reports stress and feels that this is what is raising her blood pressure  - She will check BP at home daily and bring a log to her next appt  - CMP today    Type 2 diabetes mellitus without complication, unspecified whether long term insulin use (CMS/[facility])  - Diabetes is controlled with A1c of 6.8, previously 7  - Current medications include metformin 500mg BID.  - she had previously been instructed to increase metformin to 1000mg BID but advised today to continue current dose and work toward weight loss and diet.  - Discussed that metformin in itself will not cause hypoglycemia unless she is not eating.  - urine microalbumin negative 4/2019. Discussed that she needs to schedule an eye exam. Declined referral today. Needs foot exam next visit.  - Continue with routine blood sugar monitoring and bring log to next visit  - Continue with good foot care and protection    Hearing loss of left ear, unspecified hearing loss type  - Ear exam today with scarring of TM, no infection or effusion  - Hearing in office adequate for conversation but needs formal hearing evaluation  - will refer to ENT    Lumbar postlaminectomy syndrome  - Follows with [facility]  - Has recently had spinal cord stimulator implanted with significant relief  - Also takes hydrocodone ER 30mg daily, hydrocodone-APAP 10mg TID PRN, flexeril qhs,  celebrex daily, cymbalta.  - Declined oversedation, falls but does have opiate induced constipation on movatik.     Migraine with aura and without status migrainosus, not intractable  - Follows with Dr. Chong  - On nortriptyline, botox injections, trokendia, magnesium, and triptan PRN.  - Reports overall good control of migraines on this regimen    Severe episode of recurrent major depressive disorder, without psychotic features (CMS/HCC)  - Currently experiencing worsened mood due to husbands cancer diagnosis as well as longstanding ongoing relationship issues. Also notes worsened mood around the time of menstruation.  - Denies current SI but has experienced this in the past  - She is currently on cymbalta 90mg daily and has been started on fluoxetine by Dr. Mcguire to be taken PRN around the time of menstruation.  - We discussed risk of serotonin syndrome with this combination and she is aware.  -  Will obtain records from Dr. Mcguire  - Discussed counseling as individuals or as a couple and provided handout of local practices today. Also provided crisis hotline should SI recur.    Health Maintenance   Topic Date Due   • ZOSTER VACCINE (1 of 2) 09/19/2017   • LIPID PANEL  09/07/2018   • DIABETIC EYE EXAM  09/07/2018   • ANNUAL PHYSICAL  10/12/2019   • PNEUMOCOCCAL VACCINE (19-64 MEDIUM RISK) (1 of 1 - PPSV23) 09/14/2020 (Originally 9/19/1984)   • DIABETIC FOOT EXAM  11/15/2019   • HEMOGLOBIN A1C  02/05/2020   • URINE MICROALBUMIN  04/19/2020   • MAMMOGRAM  08/28/2020   • PAP SMEAR  07/30/2021   • COLONOSCOPY  01/03/2028   • TDAP/TD VACCINES (2 - Td) 10/11/2028   • INFLUENZA VACCINE  Completed     Health Maintenance  - Pap smear: Recently with Dr. Mcguire, will obtain records.  - Mammogram: Discuss at next visit  - Colonoscopy: discuss at next visit.  - Immunizations: Flu and tdap UTD. Pneumovax today. Discussed shingrix and she will call her insurance to determine coverage.  - Depression screening: As above.    Return  in about 8 weeks (around 1/1/2020) for Follow up chest pain, depression, T2DM, HTN.

## 2019-11-12 DIAGNOSIS — F33.1 MODERATE EPISODE OF RECURRENT MAJOR DEPRESSIVE DISORDER (HCC): ICD-10-CM

## 2019-11-12 RX ORDER — DULOXETIN HYDROCHLORIDE 30 MG/1
CAPSULE, DELAYED RELEASE ORAL
Qty: 30 CAPSULE | Refills: 2 | Status: SHIPPED | OUTPATIENT
Start: 2019-11-12 | End: 2020-02-28 | Stop reason: SDUPTHER

## 2019-11-12 RX ORDER — DULOXETIN HYDROCHLORIDE 60 MG/1
CAPSULE, DELAYED RELEASE ORAL
Qty: 30 CAPSULE | Refills: 2 | Status: SHIPPED | OUTPATIENT
Start: 2019-11-12 | End: 2020-02-28

## 2019-11-12 NOTE — TELEPHONE ENCOUNTER
Patient called in regards to her referral to a gastroenterologist. Patient requests the one she saw previously not a new one if possible. Please return call and advise.     Would prefer to see Alva if possible.

## 2019-12-10 ENCOUNTER — LAB REQUISITION (OUTPATIENT)
Dept: LAB | Facility: HOSPITAL | Age: 54
End: 2019-12-10

## 2019-12-10 ENCOUNTER — OUTSIDE FACILITY SERVICE (OUTPATIENT)
Dept: GASTROENTEROLOGY | Facility: CLINIC | Age: 54
End: 2019-12-10

## 2019-12-10 DIAGNOSIS — R07.89 OTHER CHEST PAIN: ICD-10-CM

## 2019-12-10 DIAGNOSIS — K21.9 GASTRO-ESOPHAGEAL REFLUX DISEASE WITHOUT ESOPHAGITIS: ICD-10-CM

## 2019-12-10 PROCEDURE — 43239 EGD BIOPSY SINGLE/MULTIPLE: CPT | Performed by: INTERNAL MEDICINE

## 2019-12-10 PROCEDURE — 88305 TISSUE EXAM BY PATHOLOGIST: CPT | Performed by: INTERNAL MEDICINE

## 2019-12-11 LAB
CYTO UR: NORMAL
LAB AP CASE REPORT: NORMAL
LAB AP CLINICAL INFORMATION: NORMAL
PATH REPORT.FINAL DX SPEC: NORMAL
PATH REPORT.GROSS SPEC: NORMAL

## 2019-12-30 ENCOUNTER — OFFICE VISIT (OUTPATIENT)
Dept: GASTROENTEROLOGY | Facility: CLINIC | Age: 54
End: 2019-12-30

## 2019-12-30 VITALS
BODY MASS INDEX: 28.65 KG/M2 | DIASTOLIC BLOOD PRESSURE: 88 MMHG | WEIGHT: 167 LBS | SYSTOLIC BLOOD PRESSURE: 138 MMHG | HEART RATE: 83 BPM

## 2019-12-30 DIAGNOSIS — R07.9 CHEST PAIN, UNSPECIFIED TYPE: ICD-10-CM

## 2019-12-30 DIAGNOSIS — K21.00 GASTRO-ESOPHAGEAL REFLUX DISEASE WITH ESOPHAGITIS: Primary | ICD-10-CM

## 2019-12-30 PROCEDURE — 99213 OFFICE O/P EST LOW 20 MIN: CPT | Performed by: INTERNAL MEDICINE

## 2019-12-30 NOTE — PROGRESS NOTES
PCP:  Fiona Corona MD     No referring provider defined for this encounter.    Chief Complaint   Patient presents with   • Follow-up     follow up EGD        HPI   Patient is here for follow-up.  She is under a lot of stress as her  is in the hospital with acute renal failure following chemotherapy for his rectal cancer.  He seems to be improving.  He did have one lymph node that was positive.  She has had some episodic chest pain which is described as a stabbing pain in the center of her chest that can radiate to her back.  It does not appear to be related to eating or drinking.  It is not related to fatty meals.  It last about 30 minutes.  She can note no relieving factors.  She is on antiacid therapy.  She had an upper endoscopy which failed to reveal an etiology.  Biopsy showed no evidence of eosinophilic esophagitis.  She has had a gallbladder ultrasound which was unremarkable.  There was some fatty liver noted.  The pain is sharp and not related to exertion.  She has had a stress test which was unremarkable.    Allergies   Allergen Reactions   • Methergine [Methylergonovine Maleate] Nausea And Vomiting   • Dihydroergotamine GI Intolerance     VOMITING   • Amoxicillin Rash   • Methylergonovine GI Intolerance     And sob            Current Outpatient Medications:   •  BOTOX 200 units reconstituted solution, INJECT 200 UNITS INTO THE HEAD OR NECK AREA BY MD EVERY 90 DAYS, Disp: , Rfl: 3  •  celecoxib (CeleBREX) 200 MG capsule, Take 200 mg by mouth Daily., Disp: , Rfl:   •  Cholecalciferol (VITAMIN D) 2000 units capsule, Take 2,000 Units by mouth Daily., Disp: , Rfl:   •  cyclobenzaprine (FLEXERIL) 10 MG tablet, Take 1 tablet by mouth At Night As Needed for Muscle Spasms., Disp: 30 tablet, Rfl: 1  •  DHEA 50 MG capsule, Take 1 tablet by mouth Daily., Disp: , Rfl:   •  DULoxetine (CYMBALTA) 30 MG capsule, TAKE ONE CAPSULE BY MOUTH DAILY **TAKE WITH 60MG TO MAKE 90MG DOSE**, Disp: 30 capsule, Rfl:  2  •  DULoxetine (CYMBALTA) 60 MG capsule, Take one capsule by mouth daily**Take with 30mg capsule to make 90mg dose**, Disp: 30 capsule, Rfl: 2  •  EC-RX TESTOSTERONE TD, Place 1 patch on the skin as directed by provider Daily., Disp: , Rfl:   •  eletriptan (RELPAX) 40 MG tablet, TAKE 1 TABLET BY MOUTH AT THE ONSET OF MIGRAINE. MAY REPEAT IN 2 HRS.MAX 2 DOSE/24HR,3 DAYS/WEEK, Disp: , Rfl: 11  •  FLUoxetine (PROZAC) 40 MG capsule, Take 40 mg by mouth Daily. Daily prn with PMS, Disp: , Rfl:   •  glucose blood test strip, 1 each by Other route Daily. Use as instructed, Disp: 100 each, Rfl: 2  •  glucose monitor monitoring kit, 1 each Daily., Disp: 1 each, Rfl: 1  •  HYDROcodone Bitartrate ER 30 MG tablet extended-release 24 hour , Take 1 tablet by mouth Daily., Disp: , Rfl:   •  HYDROcodone-acetaminophen (NORCO)  MG per tablet, Take 1 tablet by mouth Every 6 (Six) Hours As Needed for Moderate Pain ., Disp: , Rfl:   •  Lancets 28G misc, 1 application Daily., Disp: 100 each, Rfl: 2  •  magnesium chloride ER 64 MG DR tablet, Take 2 tablets by mouth Daily., Disp: , Rfl:   •  metFORMIN (GLUCOPHAGE) 500 MG tablet, Take 1 tablet by mouth 2 (Two) Times a Day With Meals., Disp: 60 tablet, Rfl: 2  •  nebivolol (BYSTOLIC) 10 MG tablet, Bystolic 10 mg tablet  1 tab every days, Disp: , Rfl:   •  nortriptyline (PAMELOR) 10 MG capsule, nortriptyline 10 mg capsule  TAKE 4 CAPSULES AT BEDTIME, Disp: , Rfl:   •  omeprazole (priLOSEC) 40 MG capsule, omeprazole 40 mg capsule,delayed release  TAKE 1 CAPSULE AT BEDTIME, Disp: , Rfl:   •  ondansetron (ZOFRAN) 4 MG tablet, Zofran 4 mg tablet  Every four to six hours, Disp: , Rfl:   •  pregabalin (LYRICA) 50 MG capsule, 75 mg Every 8 (Eight) Hours., Disp: , Rfl:   •  triamterene-hydrochlorothiazide (DYAZIDE) 37.5-25 MG per capsule, Take 1 capsule by mouth Daily As Needed for Edema., Disp: , Rfl:   •  TROKENDI  MG capsule sustained-release 24 hr, Take 1 capsule by mouth Daily.,  Disp: , Rfl: 1     Past Medical History:   Diagnosis Date   • Colon polyp    • Depression    • Diabetes mellitus (CMS/HCC)    • Endometriosis    • Fibromyalgia, primary    • GERD (gastroesophageal reflux disease)    • Headache    • Hyperlipidemia    • Hypertension    • Low vitamin D level    • Ovarian cyst    • Wears glasses        Past Surgical History:   Procedure Laterality Date   • BLADDER SURGERY      Bladder tuck   • COLONOSCOPY      every 5 years   • LUMBAR DISCECTOMY  2010    L4-S1 Rt- discectomy Dr. Arellano   • SPINAL CORD STIMULATOR IMPLANT N/A 8/19/2019    Procedure: SPINAL CORD STIMULATOR INSERTION;  Surgeon: Ethan Peters MD;  Location: [facility];  Service: Neurosurgery   • TONSILLECTOMY AND ADENOIDECTOMY          Social History     Socioeconomic History   • Marital status:      Spouse name: Not on file   • Number of children: Not on file   • Years of education: Not on file   • Highest education level: Not on file   Occupational History   • Occupation: Unemployed     Comment: former RN   Tobacco Use   • Smoking status: Never Smoker   • Smokeless tobacco: Never Used   Substance and Sexual Activity   • Alcohol use: No   • Drug use: No   • Sexual activity: Defer        Family History   Adopted: Yes        Review of Systems   Constitutional: Negative.    HENT: Positive for voice change. Negative for trouble swallowing.    Gastrointestinal: Positive for GERD.        Vitals:    12/30/19 1550   BP: 138/88   Pulse: 83        Physical Exam   General Appearance: Alert, in no acute distress   Head: Normocephalic, without obvious abnormality, atraumatic   Eyes: Lids and lashes normal, conjunctivae and sclerae normal, no icterus, no pallor, corneas clear, PERRLA   Ears: Ears appear intact with no abnormalities noted   Extremities: Moves all extremities well, no edema, no cyanosis, no redness   Skin: No bleeding, bruising or rash   Neurologic: Cranial nerves 2 - 12 grossly intact, no focal deficits      Review of systems was reviewed and positives are noted. All of the remaining review of systems in that system are negative.    Yelena was seen today for follow-up.    Diagnoses and all orders for this visit:    Gastro-esophageal reflux disease with esophagitis    Chest pain, unspecified type    Impressions and plan #1 atypical chest pain: Does not appear to be eosinophilic esophagitis.  Certainly reflux could cause chest pain.  I am to have her use some Mylanta and if she needs to take a second Protonix.  I would like to see if this does not improve her situation.  She had an ultrasound which is unremarkable.  Gallbladder disease can cause chest pain.  It is typically not described as sharp.  It also does not historically seem to be related to fatty meals.  It is possible stress factors into this and we discussed that as well.  She certainly under a lot of stress at this point.  Her pain does not sound cardiac but that is out of my area of expertise.  If her pain changes or worsens we will evaluate further.    Darin Calle MD

## 2020-02-28 ENCOUNTER — OFFICE VISIT (OUTPATIENT)
Dept: INTERNAL MEDICINE | Facility: CLINIC | Age: 55
End: 2020-02-28

## 2020-02-28 VITALS
OXYGEN SATURATION: 97 % | HEART RATE: 76 BPM | TEMPERATURE: 98.4 F | DIASTOLIC BLOOD PRESSURE: 74 MMHG | BODY MASS INDEX: 28.79 KG/M2 | SYSTOLIC BLOOD PRESSURE: 120 MMHG | WEIGHT: 168.6 LBS | RESPIRATION RATE: 16 BRPM | HEIGHT: 64 IN

## 2020-02-28 DIAGNOSIS — Z12.31 ENCOUNTER FOR SCREENING MAMMOGRAM FOR MALIGNANT NEOPLASM OF BREAST: ICD-10-CM

## 2020-02-28 DIAGNOSIS — R07.89 OTHER CHEST PAIN: ICD-10-CM

## 2020-02-28 DIAGNOSIS — M25.512 CHRONIC LEFT SHOULDER PAIN: ICD-10-CM

## 2020-02-28 DIAGNOSIS — G89.29 CHRONIC LEFT SHOULDER PAIN: ICD-10-CM

## 2020-02-28 DIAGNOSIS — K21.00 GASTRO-ESOPHAGEAL REFLUX DISEASE WITH ESOPHAGITIS: Primary | ICD-10-CM

## 2020-02-28 DIAGNOSIS — I10 ESSENTIAL HYPERTENSION: ICD-10-CM

## 2020-02-28 DIAGNOSIS — E11.9 TYPE 2 DIABETES MELLITUS WITHOUT COMPLICATION, UNSPECIFIED WHETHER LONG TERM INSULIN USE (HCC): ICD-10-CM

## 2020-02-28 DIAGNOSIS — E78.5 HYPERLIPIDEMIA, UNSPECIFIED HYPERLIPIDEMIA TYPE: ICD-10-CM

## 2020-02-28 DIAGNOSIS — E55.9 VITAMIN D DEFICIENCY: ICD-10-CM

## 2020-02-28 DIAGNOSIS — F33.1 MODERATE EPISODE OF RECURRENT MAJOR DEPRESSIVE DISORDER (HCC): ICD-10-CM

## 2020-02-28 PROBLEM — R13.10 DYSPHAGIA: Status: RESOLVED | Noted: 2019-11-06 | Resolved: 2020-02-28

## 2020-02-28 LAB — HBA1C MFR BLD: 7.1 %

## 2020-02-28 PROCEDURE — 99214 OFFICE O/P EST MOD 30 MIN: CPT | Performed by: INTERNAL MEDICINE

## 2020-02-28 PROCEDURE — 83036 HEMOGLOBIN GLYCOSYLATED A1C: CPT | Performed by: INTERNAL MEDICINE

## 2020-02-28 RX ORDER — OMEPRAZOLE 40 MG/1
40 CAPSULE, DELAYED RELEASE ORAL DAILY
Qty: 30 CAPSULE | Refills: 5 | Status: SHIPPED | OUTPATIENT
Start: 2020-02-28 | End: 2020-08-25

## 2020-02-28 RX ORDER — NEBIVOLOL 10 MG/1
10 TABLET ORAL DAILY
Qty: 30 TABLET | Refills: 2 | Status: SHIPPED | OUTPATIENT
Start: 2020-02-28 | End: 2021-05-13 | Stop reason: SDUPTHER

## 2020-02-28 RX ORDER — HYDROCHLOROTHIAZIDE 12.5 MG/1
12.5 TABLET ORAL DAILY
Qty: 30 TABLET | Refills: 2 | Status: SHIPPED | OUTPATIENT
Start: 2020-02-28 | End: 2020-05-27

## 2020-02-28 RX ORDER — DULOXETIN HYDROCHLORIDE 30 MG/1
90 CAPSULE, DELAYED RELEASE ORAL DAILY
Qty: 90 CAPSULE | Refills: 2 | Status: SHIPPED | OUTPATIENT
Start: 2020-02-28 | End: 2020-06-01

## 2020-02-28 NOTE — PROGRESS NOTES
Internal Medicine Follow Up    Chief Complaint  Yelena Morris is a 54 y.o. female who presents today for follow up of chronic medical conditions outlined below.    Chief Complaint   Patient presents with   • Chest Pain     Follow up        HPI  Ms. Morris come in today for follow up. She notes a lot has been going on with her family since her last visit. Her  was admitted over [place] with acute renal failure requiring dialysis due to dehydration from high ostomy output and chemotherapy. He has recovered and is back home. Her uncle passed away during that time and she was unfortunately not able to attend his . She is still coping with this as they are a tight family. Her granddaughter will be born in March in [place]. She has a diaphragmatic hernia and was given 90% chance of survival but may need ECMO, intubation at birth. Her youngest son is going into the police academy in the near future. She feels that she is being pulled in all directions. Despite this she feels that her mood is okay. She cries a lot. She is happy with her current cymbalta 90mg daily and prozac 40mg with menstruation.    She went to see her pain management physician yesterday and has left shoulder pain. This has been an issue in the past and she previously was treated with nerve block which helped. He told her that she had hyperreflexia on the left and he was concerned she had MS so he wants an MRI of her brain and neck. She notes he is concerned with her issues with balance, blurred vision, left sided ear pain, hearing loss. She has not yet scheduled the MRI. She also had injection scheduled in April.    She notes significant brief head pain that comes on randomly while seated. She has discussed this with her neurologist, Dr. Chong, who felt this was part of her migraine syndrome. She continues to get botox q12 weeks, trokendi 200mg daily, nortriptyline 40mg daily however she has run out of this for about a month and  has been unable to get this refilled. She will see Dr. Chong for botox this coming week and request refill at that time.    She feels that she is forgetful. She describes this as feeling as if she would need to write down instructions if they were given to her because she would forget them shortly after. Her  feels that she forgets things he tells her. She does not get lost driving in familiar places but if she is not driving she does not remember how to get somewhere. She attributes this to her topamax.    She notes no further episodes of chest pain. Had EGD with reflux esophagitis. She remains on omeprazole and had to take OTC for awhile due to running out. Needs refill today.    She has been taking nebivolol 10mg daily for HTN. Brings in log of 5 readings from January showing 4/5 at goal for systolic but none at diastolic goal with average 120s/90.    Got her labs yesterday at [facility].       Review of Systems  Review of Systems   Constitutional: Positive for fatigue. Negative for fever, unexpected weight gain and unexpected weight loss.   HENT: Positive for ear pain (left, chronic) and hearing loss (left ear, chronic).    Eyes: Positive for blurred vision and visual disturbance.   Respiratory: Negative for shortness of breath.    Cardiovascular: Negative for chest pain.   Musculoskeletal: Positive for arthralgias, gait problem and neck pain. Negative for joint swelling.   Neurological: Positive for headache and memory problem.   Psychiatric/Behavioral: Positive for dysphoric mood and stress. Negative for self-injury, suicidal ideas and depressed mood.        Current Medications  Current Outpatient Medications on File Prior to Visit   Medication Sig Dispense Refill   • BOTOX 200 units reconstituted solution INJECT 200 UNITS INTO THE HEAD OR NECK AREA BY MD EVERY 90 DAYS  3   • celecoxib (CeleBREX) 200 MG capsule Take 200 mg by mouth Daily.     • cyclobenzaprine (FLEXERIL) 10 MG tablet Take 1 tablet by mouth  At Night As Needed for Muscle Spasms. 30 tablet 1   • DHEA 50 MG capsule Take 1 tablet by mouth Daily.     • DULoxetine (CYMBALTA) 30 MG capsule TAKE ONE CAPSULE BY MOUTH DAILY **TAKE WITH 60MG TO MAKE 90MG DOSE** 30 capsule 2   • DULoxetine (CYMBALTA) 60 MG capsule Take one capsule by mouth daily**Take with 30mg capsule to make 90mg dose** 30 capsule 2   • EC-RX TESTOSTERONE TD Place 1 patch on the skin as directed by provider Daily.     • eletriptan (RELPAX) 40 MG tablet TAKE 1 TABLET BY MOUTH AT THE ONSET OF MIGRAINE. MAY REPEAT IN 2 HRS.MAX 2 DOSE/24HR,3 DAYS/WEEK  11   • FLUoxetine (PROZAC) 40 MG capsule Take 40 mg by mouth Daily. Daily prn with PMS     • glucose blood test strip 1 each by Other route Daily. Use as instructed 100 each 2   • glucose monitor monitoring kit 1 each Daily. 1 each 1   • HYDROcodone Bitartrate ER 30 MG tablet extended-release 24 hour  Take 1 tablet by mouth Daily.     • HYDROcodone-acetaminophen (NORCO)  MG per tablet Take 1 tablet by mouth Every 6 (Six) Hours As Needed for Moderate Pain .     • Lancets 28G misc 1 application Daily. 100 each 2   • magnesium chloride ER 64 MG DR tablet Take 2 tablets by mouth Daily.     • metFORMIN (GLUCOPHAGE) 500 MG tablet Take 1 tablet by mouth 2 (Two) Times a Day With Meals. 60 tablet 2   • nebivolol (BYSTOLIC) 10 MG tablet Bystolic 10 mg tablet   1 tab every days     • omeprazole (priLOSEC) 40 MG capsule omeprazole 40 mg capsule,delayed release   TAKE 1 CAPSULE AT BEDTIME     • triamterene-hydrochlorothiazide (DYAZIDE) 37.5-25 MG per capsule Take 1 capsule by mouth Daily As Needed for Edema.     • TROKENDI  MG capsule sustained-release 24 hr Take 1 capsule by mouth Daily.  1   • Cholecalciferol (VITAMIN D) 2000 units capsule Take 2,000 Units by mouth Daily.     • nortriptyline (PAMELOR) 10 MG capsule nortriptyline 10 mg capsule   TAKE 4 CAPSULES AT BEDTIME     • [DISCONTINUED] ondansetron (ZOFRAN) 4 MG tablet Zofran 4 mg tablet   Every  "four to six hours     • [DISCONTINUED] pregabalin (LYRICA) 50 MG capsule 75 mg Every 8 (Eight) Hours.       No current facility-administered medications on file prior to visit.        Allergies  Allergies   Allergen Reactions   • Dihydroergotamine GI Intolerance     VOMITING   • Methergine [Methylergonovine Maleate] Nausea And Vomiting   • Amoxicillin Rash   • Methylergonovine GI Intolerance     And sob         Objective  Visit Vitals  /74   Pulse 76   Temp 98.4 °F (36.9 °C)   Resp 16   Ht 162.6 cm (64.02\")   Wt 76.5 kg (168 lb 9.6 oz)   SpO2 97%   BMI 28.93 kg/m²        Physical Exam  Physical Exam   Constitutional: She is oriented to person, place, and time. She appears well-developed and well-nourished. No distress.   HENT:   Head: Normocephalic and atraumatic.   Right Ear: External ear normal.   Left Ear: External ear normal.   Mouth/Throat: Oropharynx is clear and moist.   Normal hearing in conversation.   Eyes: Conjunctivae and EOM are normal. Right pupil is round. Left pupil is round. Pupils are equal.   Wears glasses   Neck: Neck supple.   Cardiovascular: Normal rate, regular rhythm and normal heart sounds.   Pulmonary/Chest: Effort normal and breath sounds normal. No respiratory distress.   Musculoskeletal: She exhibits tenderness (left shoulder tender anteriorly, worse with abduction above 90 degrees but ROM not reduced only limited by pain). She exhibits no edema or deformity.   Neurological: She is alert and oriented to person, place, and time. She displays no atrophy and no tremor. She exhibits normal muscle tone. Coordination and gait normal.   Strength grossly intact.   Skin: Skin is warm and dry.   Psychiatric: She has a normal mood and affect. Her behavior is normal. Judgment and thought content normal.   Nursing note and vitals reviewed.      Results  Results for orders placed or performed in visit on 12/10/19   Tissue Pathology Exam   Result Value Ref Range    Case Report       Surgical " Pathology Report                         Case: [id number]                                  Authorizing Provider:  Darin Calle MD   Collected:           12/10/2019 01:25 PM          Ordering Location:     [facility]   Received:            12/10/2019 01:25 PM                                 LABORATORY                                                                   Pathologist:           Alexx Seay MD                                                        Specimen:    Esophagus                                                                                  Clinical Information       The working history is solid and liquid dysphagia, small intermittent hiatal hernia, mild gastritis and some reaming of the esophagus biopsied.        Final Diagnosis        ESOPHAGUS BIOPSY:  Squamous mucosa with mild basal layer hyperplasia, vascular ectasia and slight increase in intraepithelial leukocytes without eosinophils.  Negative for intestinal metaplasia, dysplasia and glandular mucosa.    Histologic changes present are suggestive but not diagnostic of reflux esophagitis.    PCC/dlb      Gross Description       Received in formalin labeled as esophagus biopsy is a 1.0 x 0.3 x 0.1 cm aggregate of tan/red tissue fragments submitted in block 1A.  LED/dlb        Microscopic Description       The slides are reviewed and demonstrate histopathologic features supporting the above rendered diagnosis.              Assessment and Plan  Yelena was seen today for chest pain.    Diagnoses and all orders for this visit:    Gastro-esophageal reflux disease with esophagitis  - Had EGD in December due to chest pain with negative stress test, dysphagia. Pathology consistent with reflux esophagitis.  - Chest pain and dysphagia have since resolved  - Refilled omeprazole 40mg daily today    Type 2 diabetes mellitus without complication, unspecified whether long term insulin use (CMS/[facility])  - Diabetes is controlled  with stable A1c of 7.1%  - Current medications include metformin 500mg daily.  - urine microalbumin negative 4/2019.   - Previously recommended she schedule eye exam and need to confirm that this has been done at next visit.  - Continue routine blood glucose monitoring  - Continue with good foot care and protection. Needs foot exam next visit.    Moderate episode of recurrent major depressive disorder (CMS/HCC)  - Since last visit her stress has increased with decline in her 's health, passing of her uncle, granddaughter with health concerns, son entering police academy.  - Despite this she feels that she is coping well, mood is to be expected. No SI.  - Refilled cymbalta 90mg daily and she will also continue prozac 40mg daily with menstrual cycles as prescribed by Dr. Mcguire.     Essential hypertension  - BP at goal in office today but on home readings her diastolic tends to run higher. She reports that these are often taken later in the day and wonders if this has anything to do with the difference in readings.  - Will refill her nebivolol and add HCTZ 12.5mg daily to improve BP control. Advised her to discontinue as needed triamterene-HCTZ for edema.  - CMP obtained yesterday at [employer], results pending.  - Will see her back in 3 months.    Hyperlipidemia, unspecified hyperlipidemia type  - Not on statin, lipid panel obtained yesterday at [employer]. Results pending.    Vitamin D deficiency  - On daily supplementation with 2000 units  - Vitamin D level obtained at [employer] yesterday, results pending.    Other chest pain  - Prior negative stress test, negative gallbladder US and barium swallow. EGD 12/2019 with reflux esophagitis.  - Treating GERD with esophagitis with PPI as above. Pain has resolved.    Chronic left shoulder pain  - Has a history of fibromyalgia, prior left shoulder pain treated adequately with injection. Pain recently has returned and she has seen her pain management physician for repeat injection  which is scheduled for April however he is concerned for MS due to hyperreflexia on exam, left sided hearing loss, imbalance, blurred vision.  - Exam today with tenderness to anterior left shoulder and upper arm, pain on abduction above 90 degrees but full ROM. No gross neurologic deficits. Able to hold conversation.  - Had hearing evaluated by Dr. Perkins. Will request these records.  - She has MRI of the head and C spine to be scheduled. I have requested records faxed to me.    Encounter for screening mammogram for malignant neoplasm of breast  - Mammogram ordered    Health Maintenance  - Pap smear: Recently with Dr. Mcguire, unable to get records thus far.  - Mammogram: Mammogram ordered.  - Colonoscopy: 1/2018 sigmoid tubular adenoma. Repeat 5y.  - Immunizations: Flu UTD. Tdap 2019. Pneumovax UTD. Discussed shingrix and she will call her insurance to determine coverage.  - Depression screening: As above.       Return in about 3 months (around 5/28/2020) for Follow up hypertension.

## 2020-03-03 ENCOUNTER — APPOINTMENT (OUTPATIENT)
Dept: MAMMOGRAPHY | Facility: HOSPITAL | Age: 55
End: 2020-03-03

## 2020-03-09 DIAGNOSIS — E78.2 MIXED HYPERLIPIDEMIA: Primary | ICD-10-CM

## 2020-03-09 RX ORDER — ATORVASTATIN CALCIUM 40 MG/1
40 TABLET, FILM COATED ORAL NIGHTLY
Qty: 30 TABLET | Refills: 5 | Status: SHIPPED | OUTPATIENT
Start: 2020-03-09 | End: 2020-09-16

## 2020-04-03 ENCOUNTER — TELEMEDICINE (OUTPATIENT)
Dept: FAMILY MEDICINE CLINIC | Facility: TELEHEALTH | Age: 55
End: 2020-04-03

## 2020-04-03 DIAGNOSIS — J01.10 ACUTE FRONTAL SINUSITIS, RECURRENCE NOT SPECIFIED: Primary | ICD-10-CM

## 2020-04-03 PROCEDURE — 99213 OFFICE O/P EST LOW 20 MIN: CPT | Performed by: NURSE PRACTITIONER

## 2020-04-03 RX ORDER — DOXYCYCLINE HYCLATE 100 MG/1
100 CAPSULE ORAL 2 TIMES DAILY
Qty: 20 CAPSULE | Refills: 0 | Status: SHIPPED | OUTPATIENT
Start: 2020-04-03 | End: 2020-04-13

## 2020-04-03 RX ORDER — OXYCODONE HYDROCHLORIDE 15 MG/1
1 TABLET ORAL EVERY 6 HOURS PRN
COMMUNITY
End: 2021-01-19 | Stop reason: SDUPTHER

## 2020-04-03 RX ORDER — LORATADINE 10 MG/1
10 TABLET ORAL DAILY
Qty: 10 TABLET | Refills: 0 | Status: SHIPPED | OUTPATIENT
Start: 2020-04-03 | End: 2020-04-13

## 2020-04-03 NOTE — PROGRESS NOTES
Sinusitis      Subjective   Yelena Morris is a 54 y.o. female.     Sinusitis   This is a new problem. Episode onset: 2 weeks. The problem is unchanged. There has been no fever. The pain is moderate. Associated symptoms include congestion (nasal), headaches, sinus pressure and a sore throat. Pertinent negatives include no chills, coughing, diaphoresis, ear pain, hoarse voice, neck pain, shortness of breath, sneezing or swollen glands. Past treatments include nothing.        Patient Active Problem List   Diagnosis   • Recurrent major depressive disorder (CMS/HCC)   • Migraine without status migrainosus, not intractable   • Essential hypertension   • Fibromyalgia   • Hyperlipidemia   • Endometriosis   • Type 2 diabetes mellitus (CMS/HCC)   • Carpal tunnel syndrome   • Cervical spondylosis without myelopathy   • Gastro-esophageal reflux disease with esophagitis   • Lumbar post-laminectomy syndrome   • Lumbar postlaminectomy syndrome   • Vitamin D deficiency   • Gastroesophageal reflux disease with esophagitis   • Chest pain   • Hearing loss of left ear   • Chronic left shoulder pain       Allergies   Allergen Reactions   • Dihydroergotamine GI Intolerance     VOMITING   • Methergine [Methylergonovine Maleate] Nausea And Vomiting   • Amoxicillin Rash   • Methylergonovine GI Intolerance     And sob          Current Outpatient Medications on File Prior to Visit   Medication Sig Dispense Refill   • atorvastatin (LIPITOR) 40 MG tablet Take 1 tablet by mouth Every Night. 30 tablet 5   • BOTOX 200 units reconstituted solution INJECT 200 UNITS INTO THE HEAD OR NECK AREA BY MD EVERY 90 DAYS  3   • celecoxib (CeleBREX) 200 MG capsule Take 200 mg by mouth Daily.     • Cholecalciferol (VITAMIN D) 2000 units capsule Take 2,000 Units by mouth Daily.     • cyclobenzaprine (FLEXERIL) 10 MG tablet Take 1 tablet by mouth At Night As Needed for Muscle Spasms. 30 tablet 1   • DHEA 50 MG capsule Take 1 tablet by mouth Daily.     •  DULoxetine (CYMBALTA) 30 MG capsule Take 3 capsules by mouth Daily. 90 capsule 2   • EC-RX TESTOSTERONE TD Place 1 patch on the skin as directed by provider Daily.     • eletriptan (RELPAX) 40 MG tablet TAKE 1 TABLET BY MOUTH AT THE ONSET OF MIGRAINE. MAY REPEAT IN 2 HRS.MAX 2 DOSE/24HR,3 DAYS/WEEK  11   • FLUoxetine (PROZAC) 40 MG capsule Take 40 mg by mouth Daily. Daily prn with PMS     • hydroCHLOROthiazide (HYDRODIURIL) 12.5 MG tablet Take 1 tablet by mouth Daily. 30 tablet 2   • HYDROcodone-acetaminophen (NORCO)  MG per tablet Take 1 tablet by mouth Every 6 (Six) Hours As Needed for Moderate Pain .     • magnesium chloride ER 64 MG DR tablet Take 2 tablets by mouth Daily.     • metFORMIN (GLUCOPHAGE) 500 MG tablet Take 1 tablet by mouth Daily With Breakfast. 60 tablet 2   • nebivolol (BYSTOLIC) 10 MG tablet Take 1 tablet by mouth Daily. 30 tablet 2   • nortriptyline (PAMELOR) 10 MG capsule nortriptyline 10 mg capsule   TAKE 4 CAPSULES AT BEDTIME     • omeprazole (priLOSEC) 40 MG capsule Take 1 capsule by mouth Daily. 30 capsule 5   • oxyCODONE (ROXICODONE) 15 MG immediate release tablet Every 6 (Six) Hours.     • TROKENDI  MG capsule sustained-release 24 hr Take 1 capsule by mouth Daily.  1   • glucose blood test strip 1 each by Other route Daily. Use as instructed 100 each 2   • glucose monitor monitoring kit 1 each Daily. 1 each 1   • Lancets 28G misc 1 application Daily. 100 each 2   • [DISCONTINUED] HYDROcodone Bitartrate ER 30 MG tablet extended-release 24 hour  Take 1 tablet by mouth Daily.       No current facility-administered medications on file prior to visit.        Past Medical History:   Diagnosis Date   • Colon polyp    • Depression    • Diabetes mellitus (CMS/HCC)    • Dysphagia 11/6/2019   • Endometriosis    • Fibromyalgia, primary    • GERD (gastroesophageal reflux disease)    • Headache    • HL (hearing loss)    • Hyperlipidemia    • Hypertension    • Low vitamin D level    •  Ovarian cyst    • Wears glasses        Past Surgical History:   Procedure Laterality Date   • BLADDER SURGERY      Bladder tuck   • COLONOSCOPY      every 5 years   • LUMBAR DISCECTOMY      L4-S1 Rt- discectomy Dr. Arellano   • SPINAL CORD STIMULATOR IMPLANT N/A 2019    Procedure: SPINAL CORD STIMULATOR INSERTION;  Surgeon: Ethan Peters MD;  Location: [facility];  Service: Neurosurgery   • TONSILLECTOMY AND ADENOIDECTOMY         Family History   Adopted: Yes       Social History     Socioeconomic History   • Marital status:      Spouse name: Not on file   • Number of children: Not on file   • Years of education: Not on file   • Highest education level: Not on file   Occupational History   • Occupation: Unemployed     Comment: former RN   Tobacco Use   • Smoking status: Never Smoker   • Smokeless tobacco: Never Used   Substance and Sexual Activity   • Alcohol use: No   • Drug use: No   • Sexual activity: Defer       Travel:  No recent travel within the last 21 days outside the [place] Denies recent travel to one of the following [place]  Countries:  [place], [place], [place], or [place].  Denies contact with anyone who has traveled to one of the following [place]  Countries: [place], [place], [place], or [place] within the last 21 days and is known or suspected to have Ebola.  Denies having had any contact with the human remains, blood or any bodily fluids of someone who is known or suspected to have Ebola within the last 21 days.     OB History        4    Para   3    Term   3            AB   1    Living   3       SAB   1    TAB        Ectopic        Molar        Multiple        Live Births                    Review of Systems   Constitutional: Negative for chills, diaphoresis, fatigue and fever.   HENT: Positive for congestion (nasal), postnasal drip, sinus pressure, sinus pain and sore throat. Negative for ear discharge, ear pain, hoarse voice, sneezing, tinnitus, trouble  swallowing and voice change.    Respiratory: Negative for cough and shortness of breath.    Musculoskeletal: Negative for neck pain.   Neurological: Positive for headaches.   All other systems reviewed and are negative.      Breastfeeding No     Objective   Physical Exam  Physical Exam not performed secondary to Video Visit.    Assessment/Plan   Yelena was seen today for sinusitis.    Diagnoses and all orders for this visit:    Acute frontal sinusitis, recurrence not specified  -     doxycycline (VIBRAMYCIN) 100 MG capsule; Take 1 capsule by mouth 2 (Two) Times a Day for 10 days.  -     loratadine (CLARITIN) 10 MG tablet; Take 1 tablet by mouth Daily for 10 days.    Rest, increase water intake; alternate Tylenol and ibuprofen for pain; warm salt water gargles for throat discomfort; take antibiotics to completion; use Flonase daily for duration of antibiotic (patient has at home); follow up with your primary care provider for no improvement; go to ER/Urgent Care for new or worsening symptoms. PLEASE DO NOT TAKE DOXYCYCLINE WITH YOUR MAGNESIUM AS IT CAN DECREASE THE EFFECTIVENESS.    *Video Visit time spent on patient approximately 20 minutes.    Return if symptoms worsen or fail to improve.

## 2020-04-03 NOTE — PATIENT INSTRUCTIONS
Rest, increase water intake; alternate Tylenol and ibuprofen for pain; warm salt water gargles for throat discomfort; take antibiotics to completion; use Flonase daily for duration of antibiotic; follow up with your primary care provider for no improvement; go to ER/Urgent Care for new or worsening symptoms. PLEASE DO NOT TAKE DOXYCYCLINE WITH YOUR MAGNESIUM AS IT CAN DECREASE THE EFFECTIVENESS.    Sinusitis, Adult  Sinusitis is soreness and swelling (inflammation) of your sinuses. Sinuses are hollow spaces in the bones around your face. They are located:  · Around your eyes.  · In the middle of your forehead.  · Behind your nose.  · In your cheekbones.  Your sinuses and nasal passages are lined with a fluid called mucus. Mucus drains out of your sinuses. Swelling can trap mucus in your sinuses. This lets germs (bacteria, virus, or fungus) grow, which leads to infection. Most of the time, this condition is caused by a virus.  What are the causes?  This condition is caused by:  · Allergies.  · Asthma.  · Germs.  · Things that block your nose or sinuses.  · Growths in the nose (nasal polyps).  · Chemicals or irritants in the air.  · Fungus (rare).  What increases the risk?  You are more likely to develop this condition if:  · You have a weak body defense system (immune system).  · You do a lot of swimming or diving.  · You use nasal sprays too much.  · You smoke.  What are the signs or symptoms?  The main symptoms of this condition are pain and a feeling of pressure around the sinuses. Other symptoms include:  · Stuffy nose (congestion).  · Runny nose (drainage).  · Swelling and warmth in the sinuses.  · Headache.  · Toothache.  · A cough that may get worse at night.  · Mucus that collects in the throat or the back of the nose (postnasal drip).  · Being unable to smell and taste.  · Being very tired (fatigue).  · A fever.  · Sore throat.  · Bad breath.  How is this diagnosed?  This condition is diagnosed based  on:  · Your symptoms.  · Your medical history.  · A physical exam.  · Tests to find out if your condition is short-term (acute) or long-term (chronic). Your doctor may:  ? Check your nose for growths (polyps).  ? Check your sinuses using a tool that has a light (endoscope).  ? Check for allergies or germs.  ? Do imaging tests, such as an MRI or CT scan.  How is this treated?  Treatment for this condition depends on the cause and whether it is short-term or long-term.  · If caused by a virus, your symptoms should go away on their own within 10 days. You may be given medicines to relieve symptoms. They include:  ? Medicines that shrink swollen tissue in the nose.  ? Medicines that treat allergies (antihistamines).  ? A spray that treats swelling of the nostrils.   ? Rinses that help get rid of thick mucus in your nose (nasal saline washes).  · If caused by bacteria, your doctor may wait to see if you will get better without treatment. You may be given antibiotic medicine if you have:  ? A very bad infection.  ? A weak body defense system.  · If caused by growths in the nose, you may need to have surgery.  Follow these instructions at home:  Medicines  · Take, use, or apply over-the-counter and prescription medicines only as told by your doctor. These may include nasal sprays.  · If you were prescribed an antibiotic medicine, take it as told by your doctor. Do not stop taking the antibiotic even if you start to feel better.  Hydrate and humidify    · Drink enough water to keep your pee (urine) pale yellow.  · Use a cool mist humidifier to keep the humidity level in your home above 50%.  · Breathe in steam for 10-15 minutes, 3-4 times a day, or as told by your doctor. You can do this in the bathroom while a hot shower is running.  · Try not to spend time in cool or dry air.  Rest  · Rest as much as you can.  · Sleep with your head raised (elevated).  · Make sure you get enough sleep each night.  General  instructions    · Put a warm, moist washcloth on your face 3-4 times a day, or as often as told by your doctor. This will help with discomfort.  · Wash your hands often with soap and water. If there is no soap and water, use hand .  · Do not smoke. Avoid being around people who are smoking (secondhand smoke).  · Keep all follow-up visits as told by your doctor. This is important.  Contact a doctor if:  · You have a fever.  · Your symptoms get worse.  · Your symptoms do not get better within 10 days.  Get help right away if:  · You have a very bad headache.  · You cannot stop throwing up (vomiting).  · You have very bad pain or swelling around your face or eyes.  · You have trouble seeing.  · You feel confused.  · Your neck is stiff.  · You have trouble breathing.  Summary  · Sinusitis is swelling of your sinuses. Sinuses are hollow spaces in the bones around your face.  · This condition is caused by tissues in your nose that become inflamed or swollen. This traps germs. These can lead to infection.  · If you were prescribed an antibiotic medicine, take it as told by your doctor. Do not stop taking it even if you start to feel better.  · Keep all follow-up visits as told by your doctor. This is important.  This information is not intended to replace advice given to you by your health care provider. Make sure you discuss any questions you have with your health care provider.  Document Released: 06/05/2009 Document Revised: 05/20/2019 Document Reviewed: 05/20/2019  [employer] Patient Education © 2020 [employer]

## 2020-05-07 ENCOUNTER — TELEMEDICINE (OUTPATIENT)
Dept: INTERNAL MEDICINE | Facility: CLINIC | Age: 55
End: 2020-05-07

## 2020-05-07 DIAGNOSIS — E11.9 TYPE 2 DIABETES MELLITUS WITHOUT COMPLICATION, WITHOUT LONG-TERM CURRENT USE OF INSULIN (HCC): ICD-10-CM

## 2020-05-07 DIAGNOSIS — E04.1 THYROID NODULE: Primary | ICD-10-CM

## 2020-05-07 DIAGNOSIS — K21.00 GASTROESOPHAGEAL REFLUX DISEASE WITH ESOPHAGITIS: ICD-10-CM

## 2020-05-07 DIAGNOSIS — M25.512 CHRONIC LEFT SHOULDER PAIN: ICD-10-CM

## 2020-05-07 DIAGNOSIS — E55.9 VITAMIN D DEFICIENCY: ICD-10-CM

## 2020-05-07 DIAGNOSIS — R50.9 ELEVATED TEMPERATURE: ICD-10-CM

## 2020-05-07 DIAGNOSIS — G89.29 CHRONIC LEFT SHOULDER PAIN: ICD-10-CM

## 2020-05-07 DIAGNOSIS — M79.7 FIBROMYALGIA: ICD-10-CM

## 2020-05-07 DIAGNOSIS — E78.2 MIXED HYPERLIPIDEMIA: ICD-10-CM

## 2020-05-07 DIAGNOSIS — I10 ESSENTIAL HYPERTENSION: ICD-10-CM

## 2020-05-07 DIAGNOSIS — R90.89 ABNORMAL FINDING ON MRI OF BRAIN: ICD-10-CM

## 2020-05-07 DIAGNOSIS — F33.2 SEVERE EPISODE OF RECURRENT MAJOR DEPRESSIVE DISORDER, WITHOUT PSYCHOTIC FEATURES (HCC): ICD-10-CM

## 2020-05-07 DIAGNOSIS — G43.109 MIGRAINE WITH AURA AND WITHOUT STATUS MIGRAINOSUS, NOT INTRACTABLE: ICD-10-CM

## 2020-05-07 PROBLEM — R07.9 CHEST PAIN: Status: RESOLVED | Noted: 2019-11-06 | Resolved: 2020-05-07

## 2020-05-07 PROCEDURE — 99442 PR PHYS/QHP TELEPHONE EVALUATION 11-20 MIN: CPT | Performed by: INTERNAL MEDICINE

## 2020-05-07 NOTE — PROGRESS NOTES
"Internal Medicine Follow Up    Chief Complaint  Yelena Morris is a 54 y.o. female who presents today for follow up of chronic medical conditions outlined below.    Chief Complaint   Patient presents with   • Follow-up     MRI results, HTN, depression, diabetes, HLD        HPI  Ms. Morris was evaluated today via telehealth today for follow up. This was initially a video visit however due to technical difficulties was converted to an audio only encounter.     She notes ongoing stress due to life events. Her granddaughter was born and as expected had diaphragmatic hernia and required ECMO. This was repaired and she has come off of ECMO but is still on MV. She has had complications including pericardial effusion requiring pericardiocentesis, a stroke and seizures. She is on sedation for this and hooked to EEG. She has made positive strides as well and is unhooked from other tubes and wires. She is in [facility] and it is hard for her not to be able to visit. Her 's health is also not the best. His tumor markers are elevated and he will have a PET scan today. Her daughter is pregnant with her second child. Her significant other does not help with their first child and is now in drug rehab. Due to this stress she has been depressed but does not want to increase her cymbalta 90mg daily or prozac 40mg with menstruation. She feels it is situational and will hopefully improve. She has been to counseling and would like to look into this again. No SI.    She had her MRI of her head and neck ordered by her pain management provider, Dr. Godfrey. The MRI neck showed a quarter inch benign appearing thyroid nodule. She was told to follow up with PCP for this. She was told MRI head showed \"T2 blockages\" and advised to discuss with her neurologist, Dr. Chong. She has an appointment with her in June for botox injections to treat chronic migraines.    She is now taking nebivolol 10mg daily and HCTZ " 12.5mg daily which was added at last visit for BP. She notes BP is well controlled at approx 130/70.    She has not been checking blood sugar, has testing supplies. She continues to take metformin 500mg daily.    She was started on lipitor 40mg daily for HLD and has been taking it nightly without concerns.    She notes that she went to her daughter's house on Monday and woke that night with chills which she thought was due to the air conditioning being on. The next day she had temp of 100.2 and felt hot. She also has had diffuse arthralgias more so than usual. Temperature has since been around 99. No weight loss or night sweats. No URI or UTI symptoms. No diarrhea or abdominal pain. No joint swelling or redness.       Review of Systems  Review of Systems   Constitutional: Positive for chills and fever (elevated temp 100.2). Negative for activity change, appetite change, diaphoresis, fatigue and unexpected weight loss.   HENT: Negative.    Respiratory: Negative.    Cardiovascular: Negative.    Gastrointestinal: Negative.    Genitourinary: Negative for decreased urine volume, difficulty urinating, dysuria, flank pain, frequency, hematuria and urgency.   Musculoskeletal: Positive for arthralgias. Negative for joint swelling.   Neurological: Positive for headache.   Psychiatric/Behavioral: Positive for depressed mood and stress. Negative for self-injury and suicidal ideas.        Current Medications  Current Outpatient Medications on File Prior to Visit   Medication Sig Dispense Refill   • atorvastatin (LIPITOR) 40 MG tablet Take 1 tablet by mouth Every Night. 30 tablet 5   • BOTOX 200 units reconstituted solution INJECT 200 UNITS INTO THE HEAD OR NECK AREA BY MD EVERY 90 DAYS  3   • celecoxib (CeleBREX) 200 MG capsule Take 200 mg by mouth Daily.     • Cholecalciferol (VITAMIN D) 2000 units capsule Take 2,000 Units by mouth Daily.     • cyclobenzaprine (FLEXERIL) 10 MG tablet Take 1 tablet by mouth At Night As Needed  for Muscle Spasms. 30 tablet 1   • DHEA 50 MG capsule Take 1 tablet by mouth Daily.     • DULoxetine (CYMBALTA) 30 MG capsule Take 3 capsules by mouth Daily. 90 capsule 2   • EC-RX TESTOSTERONE TD Place 1 patch on the skin as directed by provider Daily.     • eletriptan (RELPAX) 40 MG tablet TAKE 1 TABLET BY MOUTH AT THE ONSET OF MIGRAINE. MAY REPEAT IN 2 HRS.MAX 2 DOSE/24HR,3 DAYS/WEEK  11   • FLUoxetine (PROZAC) 40 MG capsule Take 40 mg by mouth Daily. Daily prn with PMS     • glucose blood test strip 1 each by Other route Daily. Use as instructed 100 each 2   • glucose monitor monitoring kit 1 each Daily. 1 each 1   • hydroCHLOROthiazide (HYDRODIURIL) 12.5 MG tablet Take 1 tablet by mouth Daily. 30 tablet 2   • Lancets 28G misc 1 application Daily. 100 each 2   • magnesium chloride ER 64 MG DR tablet Take 2 tablets by mouth Daily.     • metFORMIN (GLUCOPHAGE) 500 MG tablet Take 1 tablet by mouth Daily With Breakfast. 60 tablet 2   • nebivolol (BYSTOLIC) 10 MG tablet Take 1 tablet by mouth Daily. 30 tablet 2   • nortriptyline (PAMELOR) 10 MG capsule nortriptyline 10 mg capsule   TAKE 4 CAPSULES AT BEDTIME     • omeprazole (priLOSEC) 40 MG capsule Take 1 capsule by mouth Daily. 30 capsule 5   • oxyCODONE (ROXICODONE) 15 MG immediate release tablet Take 1 tablet by mouth Every 6 (Six) Hours As Needed.     • TROKENDI  MG capsule sustained-release 24 hr Take 1 capsule by mouth Daily.  1   • [DISCONTINUED] HYDROcodone-acetaminophen (NORCO)  MG per tablet Take 1 tablet by mouth Every 6 (Six) Hours As Needed for Moderate Pain .       No current facility-administered medications on file prior to visit.        Allergies  Allergies   Allergen Reactions   • Dihydroergotamine GI Intolerance     VOMITING   • Methergine [Methylergonovine Maleate] Nausea And Vomiting   • Amoxicillin Rash   • Methylergonovine GI Intolerance     And sob       Objective  There were no vitals taken for this visit.     Physical  Exam  Physical Exam   Constitutional: She is oriented to person, place, and time. She appears well-developed and well-nourished. No distress.   HENT:   Head: Normocephalic and atraumatic.   Right Ear: External ear normal.   Left Ear: External ear normal.   Nose: Nose normal.   Eyes: Conjunctivae are normal.   Wearing glasses   Pulmonary/Chest: Effort normal. No respiratory distress.   Neurological: She is alert and oriented to person, place, and time.   Psychiatric: She has a normal mood and affect. Her behavior is normal.       Results  Results for orders placed or performed in visit on 02/28/20   POC Glycosylated Hemoglobin (Hb A1C)   Result Value Ref Range    Hemoglobin A1C 7.1 %        Assessment and Plan  Yelena was seen today for follow-up.    Diagnoses and all orders for this visit:    Thyroid nodule  - Reports small benign appearing thyroid nodule seen on recent MRI c spine obtained by pain management provider, images and report unavailable for personal review.  - No symptoms of thyroid dysfunction. TSH normal in February.  - Will obtain formal thyroid ultrasound.    Abnormal finding on MRI of brain  - Reports T2 changes on MRI brain obtained by pain management provider with concern for MS on her exam. She was instructed to have report or images sent to her neurologist for review and to set up a visit with her to discuss.   - Findings could represent changes seen in chronic migraine however report is unavailable for my personal review.    Elevated temperature  - Reports isolated temperature of 100.2 with arthralgias, no weight loss or localized signs of infection. Advised her to continue to monitor and notify me if fever develops and is ongoing.    Migraine with aura and without status migrainosus, not intractable  - Follows with Dr. Chong  - On nortriptyline, botox injections, trokendia, magnesium, and triptan PRN.  - Reports overall good control of migraines on this regimen    Essential hypertension  -  BP well controlled based upon home readings on nebivolol 10mg daily and HCTZ 12.5mg daily  - Repeat metabolic panel next visit as thiazide added at last visit    Mixed hyperlipidemia  - Lipids 2/2020 with elevated triglycerides, LDL so lipitor 40mg daily initiated. Tolerating well.    Vitamin D deficiency  - Level low in February, on daily supplement  - Repeat vitamin D level at next visit    Gastroesophageal reflux disease with esophagitis  - EGD 2/2020 with reflux esophagitis, symptoms have improved on omeprazole    Type 2 diabetes mellitus without complication, without long-term current use of insulin (CMS/LTAC, located within [facility])  - Diabetes is controlled with stable A1c of 7.1% 3 months ago, unable to repeat today. Does not routinely monitor FSBG.  - Current medications include metformin 500mg daily.  - urine microalbumin negative 4/2019.   - Needs repeat urine microalbumin, foot exam, and discuss eye exam next visit.    Fibromyalgia and Chronic left shoulder pain  - Has a history of fibromyalgia, left shoulder pain treated previously with injection.   - Currently following with pain management and on celebrex 200mg daily, oxycodone 15mg q6h PRN, flexeril 10mg qhs PRN.    Severe episode of recurrent major depressive disorder, without psychotic features (CMS/LTAC, located within [facility])  - Stress has been worse lately due to concern for recurrence of 's CRC, granddaughter's health, COVID19 restrictions, daughter's pregnancy, and uncertain MRI findings. She has been more depressed due to this but does not want to make changes to her medications.  - She takes cymbalta 90mg daily and fluoxetine 40mg daily PRN menstruation started by Dr. Mcguire.  - Advised her to return to see her counselor. She was agreeable.    Health Maintenance  - Pap smear: Recently with Dr. Mcguire, unable to get records thus far.  - Mammogram: Mammogram ordered, needs scheduled.  - Colonoscopy: 1/2018 sigmoid tubular adenoma. Repeat 5y.  - HCV: Screen with next labs.  -  Immunizations: Flu UTD. Tdap 2019. Pneumovax UTD. Discussed shingrix and she will call her insurance to determine coverage.  - Depression screening: As above.     Return in about 4 months (around 9/7/2020) for Follow up.     This visit was scheduled as a video visit but due to technical difficulties has been rescheduled as a phone visit to comply with patient safety concerns in accordance with CDC recommendations. Total time of discussion was 21 minutes.

## 2020-05-19 ENCOUNTER — HOSPITAL ENCOUNTER (OUTPATIENT)
Dept: ULTRASOUND IMAGING | Facility: HOSPITAL | Age: 55
Discharge: HOME | End: 2020-05-19
Admitting: INTERNAL MEDICINE

## 2020-05-19 DIAGNOSIS — E04.1 THYROID NODULE: ICD-10-CM

## 2020-05-19 PROCEDURE — 76536 US EXAM OF HEAD AND NECK: CPT

## 2020-05-20 PROBLEM — E04.2 MULTINODULAR GOITER: Status: ACTIVE | Noted: 2020-05-20

## 2020-05-27 DIAGNOSIS — I10 ESSENTIAL HYPERTENSION: ICD-10-CM

## 2020-05-27 RX ORDER — HYDROCHLOROTHIAZIDE 12.5 MG/1
TABLET ORAL
Qty: 90 TABLET | Refills: 1 | Status: SHIPPED | OUTPATIENT
Start: 2020-05-27 | End: 2020-11-19

## 2020-05-29 ENCOUNTER — TELEPHONE (OUTPATIENT)
Dept: INTERNAL MEDICINE | Facility: CLINIC | Age: 55
End: 2020-05-29

## 2020-05-31 DIAGNOSIS — F33.1 MODERATE EPISODE OF RECURRENT MAJOR DEPRESSIVE DISORDER (HCC): ICD-10-CM

## 2020-06-01 RX ORDER — DULOXETIN HYDROCHLORIDE 30 MG/1
CAPSULE, DELAYED RELEASE ORAL
Qty: 270 CAPSULE | Refills: 1 | Status: SHIPPED | OUTPATIENT
Start: 2020-06-01 | End: 2020-11-19

## 2020-08-04 ENCOUNTER — OFFICE VISIT (OUTPATIENT)
Dept: NEUROSURGERY | Facility: CLINIC | Age: 55
End: 2020-08-04

## 2020-08-04 VITALS — HEIGHT: 64 IN | TEMPERATURE: 97.7 F | BODY MASS INDEX: 27.83 KG/M2 | RESPIRATION RATE: 14 BRPM | WEIGHT: 163 LBS

## 2020-08-04 DIAGNOSIS — M47.22 CERVICAL SPONDYLOSIS WITH RADICULOPATHY: Primary | ICD-10-CM

## 2020-08-04 DIAGNOSIS — M54.2 NECK PAIN: ICD-10-CM

## 2020-08-04 DIAGNOSIS — M50.30 DDD (DEGENERATIVE DISC DISEASE), CERVICAL: ICD-10-CM

## 2020-08-04 DIAGNOSIS — M25.512 PAIN IN JOINT OF LEFT SHOULDER: ICD-10-CM

## 2020-08-04 PROBLEM — R51.9 GENERALIZED HEADACHE: Status: ACTIVE | Noted: 2020-02-27

## 2020-08-04 PROBLEM — Z79.891 LONG-TERM CURRENT USE OF OPIATE ANALGESIC: Status: ACTIVE | Noted: 2019-09-30

## 2020-08-04 PROCEDURE — 99213 OFFICE O/P EST LOW 20 MIN: CPT | Performed by: NEUROLOGICAL SURGERY

## 2020-08-04 NOTE — PROGRESS NOTES
Patient: Yelena Morris  : 1965    Primary Care Provider: Fiona Corona MD    Requesting Provider: As above        History    Chief Complaint: Neck and left shoulder pain.    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  On 2019 I placed a [employer] epidural spinal cord stimulator to treat her back and bilateral lower extremity pain.  She now describes a several month history of neck pain that involves the left shoulder.  Occasionally it will extend to the elbow or even into the thumb.  She has some numbness in her right hand.  She had an epidural injection which did not help very much.  An injection in her shoulder was quite helpful.  She is due to undergo a left shoulder MRI next week.  She has been referred to physical therapy.    Review of Systems   Constitutional: Negative for activity change, appetite change, chills, diaphoresis, fatigue, fever and unexpected weight change.   HENT: Negative for congestion, dental problem, drooling, ear discharge, ear pain, facial swelling, hearing loss, mouth sores, nosebleeds, postnasal drip, rhinorrhea, sinus pressure, sneezing, sore throat, tinnitus, trouble swallowing and voice change.    Eyes: Negative for photophobia, pain, discharge, redness, itching and visual disturbance.   Respiratory: Negative for apnea, cough, choking, chest tightness, shortness of breath, wheezing and stridor.    Cardiovascular: Negative for chest pain, palpitations and leg swelling.   Gastrointestinal: Negative for abdominal distention, abdominal pain, anal bleeding, blood in stool, constipation, diarrhea, nausea, rectal pain and vomiting.   Endocrine: Negative for cold intolerance, heat intolerance, polydipsia, polyphagia and polyuria.   Genitourinary: Negative for decreased urine volume, difficulty urinating, dysuria, enuresis, flank pain, frequency, genital sores, hematuria and urgency.   Musculoskeletal: Positive for neck  "pain. Negative for arthralgias, back pain, gait problem, joint swelling, myalgias and neck stiffness.   Skin: Negative for color change, pallor, rash and wound.   Allergic/Immunologic: Negative for environmental allergies, food allergies and immunocompromised state.   Neurological: Negative for dizziness, tremors, seizures, syncope, facial asymmetry, speech difficulty, weakness, light-headedness, numbness and headaches.   Hematological: Negative for adenopathy. Does not bruise/bleed easily.   Psychiatric/Behavioral: Negative for agitation, behavioral problems, confusion, decreased concentration, dysphoric mood, hallucinations, self-injury, sleep disturbance and suicidal ideas. The patient is not nervous/anxious and is not hyperactive.    All other systems reviewed and are negative.      The patient's past medical history, past surgical history, family history, and social history have been reviewed at length in the electronic medical record.    Physical Exam:   Temp 97.7 °F (36.5 °C) (Infrared)   Resp 14   Ht 162.6 cm (64.02\")   Wt 73.9 kg (163 lb)   BMI 27.96 kg/m²   MUSCULOSKELETAL:  Neck tenderness to palpation is not observed.   ROM in neck is normal.  Rotating the left shoulder posteriorly accentuates her left shoulder pain.  There is no tenderness to palpation of the left shoulder joint.  NEUROLOGICAL:  Strength is intact in the upper and lower extremities to direct testing.  Muscle tone is normal throughout.  Station and gait are normal.  Sensation is intact to light touch testing throughout.  Deep tendon reflexes are 2+ and symmetrical.  Tal's Sign is positive bilaterally.         Medical Decision Making    Data Review:   MRI the cervical spine dated 3/17/2020 demonstrates diffuse spondylosis with some broad-based spurring at C4-5, C5-6, and C6-7.  While there is some canal narrowing I do not see actual cord compromise.  There is no signal change within the cord.    Diagnosis:   1.  Cervical " spondylosis with potential radiculopathy.  2.  Left shoulder dysfunction.    Treatment Options:   The patient will pursue PT on her neck and shoulder.  She will follow-up in several weeks and bring the report for her shoulder MRI.  If there are significant findings in her shoulder then I would refer her on to orthopedics to address those issues.       Diagnosis Plan   1. Cervical spondylosis with radiculopathy  Ambulatory Referral to Physical Therapy Evaluate and treat; Stretching, ROM, Strengthening   2. DDD (degenerative disc disease), cervical     3. Mechanical neck pain     4. Pain in joint of left shoulder         Scribed for Ethan Peters MD by Carmelita Craig CMA on 8/4/2020 16:34       I, Dr. Peters, personally performed the services described in the documentation, as scribed in my presence, and it is both accurate and complete.

## 2020-08-25 DIAGNOSIS — K21.00 GASTRO-ESOPHAGEAL REFLUX DISEASE WITH ESOPHAGITIS: ICD-10-CM

## 2020-08-25 RX ORDER — OMEPRAZOLE 40 MG/1
CAPSULE, DELAYED RELEASE ORAL
Qty: 90 CAPSULE | Refills: 0 | Status: SHIPPED | OUTPATIENT
Start: 2020-08-25 | End: 2020-12-08

## 2020-09-09 ENCOUNTER — TELEPHONE (OUTPATIENT)
Dept: INTERNAL MEDICINE | Facility: CLINIC | Age: 55
End: 2020-09-09

## 2020-09-09 ENCOUNTER — OFFICE VISIT (OUTPATIENT)
Dept: INTERNAL MEDICINE | Facility: CLINIC | Age: 55
End: 2020-09-09

## 2020-09-09 VITALS
RESPIRATION RATE: 16 BRPM | OXYGEN SATURATION: 96 % | DIASTOLIC BLOOD PRESSURE: 72 MMHG | WEIGHT: 162 LBS | HEIGHT: 64 IN | HEART RATE: 78 BPM | SYSTOLIC BLOOD PRESSURE: 120 MMHG | BODY MASS INDEX: 27.66 KG/M2 | TEMPERATURE: 97.6 F

## 2020-09-09 DIAGNOSIS — E55.9 VITAMIN D DEFICIENCY: ICD-10-CM

## 2020-09-09 DIAGNOSIS — Z11.59 ENCOUNTER FOR HEPATITIS C SCREENING TEST FOR LOW RISK PATIENT: ICD-10-CM

## 2020-09-09 DIAGNOSIS — Z23 NEED FOR VACCINATION: ICD-10-CM

## 2020-09-09 DIAGNOSIS — G89.29 CHRONIC LEFT SHOULDER PAIN: ICD-10-CM

## 2020-09-09 DIAGNOSIS — E11.9 TYPE 2 DIABETES MELLITUS WITHOUT COMPLICATION, WITHOUT LONG-TERM CURRENT USE OF INSULIN (HCC): Primary | ICD-10-CM

## 2020-09-09 DIAGNOSIS — M25.512 CHRONIC LEFT SHOULDER PAIN: ICD-10-CM

## 2020-09-09 DIAGNOSIS — F33.41 RECURRENT MAJOR DEPRESSIVE DISORDER, IN PARTIAL REMISSION (HCC): ICD-10-CM

## 2020-09-09 LAB — HBA1C MFR BLD: 8.2 %

## 2020-09-09 PROCEDURE — 83036 HEMOGLOBIN GLYCOSYLATED A1C: CPT | Performed by: INTERNAL MEDICINE

## 2020-09-09 PROCEDURE — 90686 IIV4 VACC NO PRSV 0.5 ML IM: CPT | Performed by: INTERNAL MEDICINE

## 2020-09-09 PROCEDURE — 99214 OFFICE O/P EST MOD 30 MIN: CPT | Performed by: INTERNAL MEDICINE

## 2020-09-09 PROCEDURE — 90471 IMMUNIZATION ADMIN: CPT | Performed by: INTERNAL MEDICINE

## 2020-09-09 NOTE — TELEPHONE ENCOUNTER
Please call the pharmacy and let them know that she is going to start out on 1 tab twice daily and work up to 2 tabs twice daily over the next few weeks.

## 2020-09-09 NOTE — PROGRESS NOTES
Internal Medicine Follow Up    Chief Complaint  Yelena Morris is a 54 y.o. female who presents today for follow up of chronic medical conditions outlined below.    Chief Complaint   Patient presents with   • Heartburn     4 month follow up        HPI  Ms. Morris comes in today for follow up. She saw Dr. Peters for her shoulder and neck pain. She started physical therapy and has had 2 sessions which seem to be helping her shoulder pain. She will have MRI tomorrow with possible orthopedics referral pending results. She recently went to [place] to visit her granddaughter who is now out of the hospital and enjoyed her visit. Her  is unfortunately not doing well on chemotherapy with gum pain, coccyx pain, foot pain. Cancer had spread to his liver. Has PET scan upcoming to see response. Her son decided to return home from the [employer] but will not talk to her about why which has her worried. Overall mood has been better. Still taking cymbalta and prozac with menstruation. She did not see a counselor and worked things out with her own coping mechanisms. She had her MRI reviewed by her neurologist and was reassured that nothing was wrong.        Review of Systems  Review of Systems   Constitutional: Negative.    Respiratory: Negative.    Cardiovascular: Negative.    Gastrointestinal: Negative.    Musculoskeletal: Positive for arthralgias (shoulder, improving).   Psychiatric/Behavioral: Positive for stress (better). Negative for sleep disturbance and depressed mood.        Current Medications  Current Outpatient Medications on File Prior to Visit   Medication Sig Dispense Refill   • atorvastatin (LIPITOR) 40 MG tablet Take 1 tablet by mouth Every Night. 30 tablet 5   • BOTOX 200 units reconstituted solution INJECT 200 UNITS INTO THE HEAD OR NECK AREA BY MD EVERY 90 DAYS  3   • celecoxib (CeleBREX) 200 MG capsule Take 200 mg by mouth Daily.     • Cholecalciferol (VITAMIN D) 2000 units capsule Take 2,000  "Units by mouth Daily.     • cyclobenzaprine (FLEXERIL) 10 MG tablet Take 1 tablet by mouth At Night As Needed for Muscle Spasms. 30 tablet 1   • DHEA 50 MG capsule Take 1 tablet by mouth Daily.     • DULoxetine (CYMBALTA) 30 MG capsule TAKE THREE CAPSULES BY MOUTH DAILY 270 capsule 1   • EC-RX TESTOSTERONE TD Place 1 patch on the skin as directed by provider Daily.     • eletriptan (RELPAX) 40 MG tablet TAKE 1 TABLET BY MOUTH AT THE ONSET OF MIGRAINE. MAY REPEAT IN 2 HRS.MAX 2 DOSE/24HR,3 DAYS/WEEK  11   • FLUoxetine (PROZAC) 40 MG capsule Take 40 mg by mouth Daily. Daily prn with PMS     • glucose blood test strip 1 each by Other route Daily. Use as instructed 100 each 2   • glucose monitor monitoring kit 1 each Daily. 1 each 1   • hydroCHLOROthiazide (HYDRODIURIL) 12.5 MG tablet TAKE ONE TABLET BY MOUTH DAILY 90 tablet 1   • Lancets 28G misc 1 application Daily. 100 each 2   • magnesium chloride ER 64 MG DR tablet Take 2 tablets by mouth Daily.     • metFORMIN (GLUCOPHAGE) 500 MG tablet Take 1 tablet by mouth Daily With Breakfast. 60 tablet 2   • nebivolol (BYSTOLIC) 10 MG tablet Take 1 tablet by mouth Daily. 30 tablet 2   • nortriptyline (PAMELOR) 10 MG capsule nortriptyline 10 mg capsule   TAKE 4 CAPSULES AT BEDTIME     • omeprazole (priLOSEC) 40 MG capsule TAKE ONE CAPSULE BY MOUTH DAILY 90 capsule 0   • oxyCODONE (ROXICODONE) 15 MG immediate release tablet Take 1 tablet by mouth Every 6 (Six) Hours As Needed.     • TROKENDI  MG capsule sustained-release 24 hr Take 1 capsule by mouth Daily.  1     No current facility-administered medications on file prior to visit.        Allergies  Allergies   Allergen Reactions   • Dihydroergotamine GI Intolerance     VOMITING   • Methergine [Methylergonovine Maleate] Nausea And Vomiting   • Amoxicillin Rash   • Methylergonovine GI Intolerance     And sob         Objective  Visit Vitals  /72   Pulse 78   Temp 97.6 °F (36.4 °C)   Resp 16   Ht 162.6 cm (64.02\")   Wt " 73.5 kg (162 lb)   SpO2 96%   BMI 27.79 kg/m²        Physical Exam  Physical Exam   Constitutional: She is oriented to person, place, and time. She appears well-developed and well-nourished. No distress.   HENT:   Head: Normocephalic and atraumatic.   Right Ear: External ear normal.   Left Ear: External ear normal.   Eyes: Conjunctivae are normal.   Cardiovascular: Normal rate, regular rhythm and normal heart sounds.   Pulmonary/Chest: Effort normal and breath sounds normal. No respiratory distress.   Musculoskeletal: She exhibits no edema.   Neurological: She is alert and oriented to person, place, and time.   Skin: Skin is warm and dry.   Psychiatric: She has a normal mood and affect. Her behavior is normal. Judgment and thought content normal.   Nursing note and vitals reviewed.      Results  Results for orders placed or performed in visit on 09/09/20   POC Glycosylated Hemoglobin (Hb A1C)   Result Value Ref Range    Hemoglobin A1C 8.2 %        Assessment and Plan  Yelena was seen today for heartburn.    Diagnoses and all orders for this visit:    Type 2 diabetes mellitus without complication, without long-term current use of insulin (CMS/HCC)  - A1c up to 8.2%. Will increase metformin over the next several weeks to max of 2 tabs twice daily.  - Urine microalbumin ordered  - Needs eye and foot exam    Vitamin D deficiency  - On vitamin D supplementation daily, will check level today    Chronic left shoulder pain  -  Improving with PT, has MRI tomorrow and pending results may be referred to orthopedics by Dr. Peters.    Recurrent major depressive disorder, in partial remission (CMS/HCC)  - Coping well with current stress and mood stable on cymbalta 90mg daily and prozac 40mg daily PRN menstruation. Continue current regimen.    Encounter for hepatitis C screening test for low risk patient  - HCV ab ordered    Need for vaccination  - Flu shot today    Health Maintenance  - Pap smear: GYN Dr. Mcguire, unable to get  records despite repeated attempts.  - Mammogram: Mammogram needs scheduled, discussed today..  - Colonoscopy: 1/2018 sigmoid tubular adenoma. Repeat 5y.  - HCV: Ordered  - Immunizations:Flu today. Tdap 2019. Pneumovax UTD. Discussed shingrix.  - Depression screening:negative 2/2020.      Return in about 4 months (around 1/9/2021) for Follow up diabetes.

## 2020-09-09 NOTE — TELEPHONE ENCOUNTER
Pharmacy Name:  [facility]    Pharmacy representative name:  Russ     Pharmacy representative phone number: [phone]    What medication are you calling in regards to: metFORMIN (GLUCOPHAGE) 500 MG tablet    What question does the pharmacy have: What amount should the patient be taking?    Who is the provider that prescribed the medication: Fiona Corona     Additional notes: Russ sates the prescription sent over for the patients metFORMIN (GLUCOPHAGE) 500 MG tablet states to take 2 tablets twice a day and increase to 4 tablets a day the next week. Russ states that if the patient is taking 2 tablets twice a day the patient will be already taking 4 tablets a day. The pharmacy is requesting clarification on the medication.

## 2020-09-10 LAB
25(OH)D3+25(OH)D2 SERPL-MCNC: 25.2 NG/ML (ref 30–100)
ALBUMIN/CREAT UR: 4 MG/G CREAT (ref 0–29)
CREAT UR-MCNC: 124.6 MG/DL
HCV AB S/CO SERPL IA: <0.1 S/CO RATIO (ref 0–0.9)
LABORATORY COMMENT REPORT: NORMAL
MICROALBUMIN UR-MCNC: 4.8 UG/ML

## 2020-09-11 ENCOUNTER — TELEPHONE (OUTPATIENT)
Dept: NEUROSURGERY | Facility: CLINIC | Age: 55
End: 2020-09-11

## 2020-09-11 NOTE — TELEPHONE ENCOUNTER
PT CALLED AND CANCELLED APPT FOR TODAY 9.11.20 DUE TO ISSUES WITH MRI. PT WILL CALL BACK TO RESCHEDULE APPT.    THANK YOU

## 2020-09-16 DIAGNOSIS — E78.2 MIXED HYPERLIPIDEMIA: ICD-10-CM

## 2020-09-16 RX ORDER — ATORVASTATIN CALCIUM 40 MG/1
TABLET, FILM COATED ORAL
Qty: 30 TABLET | Refills: 4 | Status: SHIPPED | OUTPATIENT
Start: 2020-09-16 | End: 2021-02-17

## 2020-09-23 ENCOUNTER — OFFICE VISIT (OUTPATIENT)
Dept: NEUROSURGERY | Facility: CLINIC | Age: 55
End: 2020-09-23

## 2020-09-23 VITALS — WEIGHT: 159 LBS | BODY MASS INDEX: 27.14 KG/M2 | HEIGHT: 64 IN | TEMPERATURE: 97.3 F

## 2020-09-23 DIAGNOSIS — M50.30 DDD (DEGENERATIVE DISC DISEASE), CERVICAL: ICD-10-CM

## 2020-09-23 DIAGNOSIS — M47.22 CERVICAL SPONDYLOSIS WITH RADICULOPATHY: ICD-10-CM

## 2020-09-23 DIAGNOSIS — M25.9 SHOULDER JOINT DYSFUNCTION: Primary | ICD-10-CM

## 2020-09-23 PROCEDURE — 99213 OFFICE O/P EST LOW 20 MIN: CPT | Performed by: NEUROLOGICAL SURGERY

## 2020-09-23 NOTE — PROGRESS NOTES
Patient: Yelena Morris  : 1965    Primary Care Provider: Fiona Corona MD    Requesting Provider: As above        History    Chief Complaint: Neck and left shoulder pain.    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  On 2019 I placed a [employer] epidural spinal cord stimulator to treat her back and bilateral lower extremity pain.  She now describes a several month history of neck pain that involves the left shoulder.  Occasionally it will extend to the elbow or even into the thumb.  She has some numbness in her right hand.  She had an epidural injection which did not help very much.  An injection in her shoulder was quite helpful.  She has done some therapy which very transiently helped with her shoulder difficulties.  The pain really extends to the very proximal upper arm on the left.  She is not having any symptoms at this point that extend down the arm or into the thumb.    Review of Systems   Constitutional: Negative for activity change, appetite change, chills, diaphoresis, fatigue, fever and unexpected weight change.   HENT: Negative for congestion, dental problem, drooling, ear discharge, ear pain, facial swelling, hearing loss, mouth sores, nosebleeds, postnasal drip, rhinorrhea, sinus pressure, sneezing, sore throat, tinnitus, trouble swallowing and voice change.    Eyes: Negative for photophobia, pain, discharge, redness, itching and visual disturbance.   Respiratory: Negative for apnea, cough, choking, chest tightness, shortness of breath, wheezing and stridor.    Cardiovascular: Negative for chest pain, palpitations and leg swelling.   Gastrointestinal: Negative for abdominal distention, abdominal pain, anal bleeding, blood in stool, constipation, diarrhea, nausea, rectal pain and vomiting.   Endocrine: Negative for cold intolerance, heat intolerance, polydipsia, polyphagia and polyuria.   Genitourinary: Negative for decreased urine  "volume, difficulty urinating, dysuria, enuresis, flank pain, frequency, genital sores, hematuria and urgency.   Musculoskeletal: Positive for neck pain. Negative for arthralgias, back pain, gait problem, joint swelling, myalgias and neck stiffness.   Skin: Negative for color change, pallor, rash and wound.   Allergic/Immunologic: Negative for environmental allergies, food allergies and immunocompromised state.   Neurological: Negative for dizziness, tremors, seizures, syncope, facial asymmetry, speech difficulty, weakness, light-headedness, numbness and headaches.   Hematological: Negative for adenopathy. Does not bruise/bleed easily.   Psychiatric/Behavioral: Negative for agitation, behavioral problems, confusion, decreased concentration, dysphoric mood, hallucinations, self-injury, sleep disturbance and suicidal ideas. The patient is not nervous/anxious and is not hyperactive.    All other systems reviewed and are negative.      The patient's past medical history, past surgical history, family history, and social history have been reviewed at length in the electronic medical record.    Physical Exam:   Temp 97.3 °F (36.3 °C)   Ht 162.6 cm (64\")   Wt 72.1 kg (159 lb)   BMI 27.29 kg/m²   MUSCULOSKELETAL:  Neck tenderness to palpation is not observed.   ROM in neck is normal.  Ranging the left shoulder is fairly well-tolerated.  NEUROLOGICAL:  Strength is intact in the upper and lower extremities to direct testing.  Muscle tone is normal throughout.  Station and gait are normal.  Sensation is intact to light touch testing throughout.    Medical Decision Making    Data Review:   MRI the cervical spine dated 3/17/2020 demonstrates diffuse spondylosis with some broad-based spurring at C4-5, C5-6, and C6-7.  While there is some canal narrowing I do not see actual cord compromise.  There is no signal change within the cord.    The MRI left shoulder report suggests downsloping of the acromium with small fibrocystic bone " lesions in the humeral head at the insertion of the infraspinatus tendon.  There is chronic tendinosis in the supraspinatus and infraspinatus tendons.  A 9 mm interstitial tear in the distal posterior supraspinatus tendon was noted.  Capsulolsynovial thickening was present in the glenohumeral joint.    Diagnosis:   1.  Primary left shoulder dysfunction.  2.  Cervical spondylosis without overt radiculopathy.    Treatment Options:   I am going to refer the patient to orthopedics for shoulder evaluation.  If difficulties persist I will be happy to reassess her neck.       Diagnosis Plan   1. Left shoulder joint dysfunction  Ambulatory Referral to Orthopedic Surgery   2. Cervical spondylosis with radiculopathy     3. DDD (degenerative disc disease), cervical         Scribed for Ethan Peters MD by Carmelita Craig CMA on 9/23/2020 17:01 EDT       I, Dr. Peters, personally performed the services described in the documentation, as scribed in my presence, and it is both accurate and complete.

## 2020-11-12 ENCOUNTER — OFFICE VISIT (OUTPATIENT)
Dept: INTERNAL MEDICINE | Facility: CLINIC | Age: 55
End: 2020-11-12

## 2020-11-12 ENCOUNTER — LAB (OUTPATIENT)
Dept: LAB | Facility: HOSPITAL | Age: 55
End: 2020-11-12

## 2020-11-12 VITALS
BODY MASS INDEX: 28.1 KG/M2 | TEMPERATURE: 96.8 F | OXYGEN SATURATION: 99 % | DIASTOLIC BLOOD PRESSURE: 80 MMHG | WEIGHT: 164.6 LBS | RESPIRATION RATE: 16 BRPM | HEIGHT: 64 IN | SYSTOLIC BLOOD PRESSURE: 120 MMHG | HEART RATE: 83 BPM

## 2020-11-12 DIAGNOSIS — F33.41 RECURRENT MAJOR DEPRESSIVE DISORDER, IN PARTIAL REMISSION (HCC): ICD-10-CM

## 2020-11-12 DIAGNOSIS — M79.89 PAIN AND SWELLING OF TOE, RIGHT: Primary | ICD-10-CM

## 2020-11-12 DIAGNOSIS — M79.674 PAIN AND SWELLING OF TOE, RIGHT: Primary | ICD-10-CM

## 2020-11-12 DIAGNOSIS — Z11.59 ENCOUNTER FOR HEPATITIS C SCREENING TEST FOR LOW RISK PATIENT: ICD-10-CM

## 2020-11-12 DIAGNOSIS — M96.1 LUMBAR POSTLAMINECTOMY SYNDROME: ICD-10-CM

## 2020-11-12 DIAGNOSIS — M79.89 PAIN AND SWELLING OF TOE, RIGHT: ICD-10-CM

## 2020-11-12 DIAGNOSIS — M79.674 PAIN AND SWELLING OF TOE, RIGHT: ICD-10-CM

## 2020-11-12 DIAGNOSIS — M79.7 FIBROMYALGIA: ICD-10-CM

## 2020-11-12 PROCEDURE — 99214 OFFICE O/P EST MOD 30 MIN: CPT | Performed by: INTERNAL MEDICINE

## 2020-11-12 PROCEDURE — 84550 ASSAY OF BLOOD/URIC ACID: CPT

## 2020-11-12 PROCEDURE — 86803 HEPATITIS C AB TEST: CPT

## 2020-11-12 RX ORDER — PREDNISONE 20 MG/1
40 TABLET ORAL DAILY
Qty: 10 TABLET | Refills: 0 | Status: SHIPPED | OUTPATIENT
Start: 2020-11-12 | End: 2020-11-17

## 2020-11-12 RX ORDER — FLUOXETINE HYDROCHLORIDE 40 MG/1
40 CAPSULE ORAL DAILY PRN
Qty: 30 CAPSULE | Refills: 0 | Status: SHIPPED | OUTPATIENT
Start: 2020-11-12 | End: 2020-12-17 | Stop reason: SDUPTHER

## 2020-11-12 RX ORDER — DICLOFENAC POTASSIUM 50 MG/1
50 POWDER, FOR SOLUTION ORAL AS NEEDED
COMMUNITY
End: 2021-04-03

## 2020-11-12 NOTE — PROGRESS NOTES
Internal Medicine Acute Visit    Chief Complaint   Patient presents with   • Lower Extremity Issue     x2 months,rt great toe        HPI  Ms. Morris comes in with intermittent R great toe swelling, redness, pain. She notes this has been chronic and she has forgotten to mention it. She notes that it spontaneously resolves in a week or so. Currently no swelling or redness but has residual soreness. No diagnosis of gout. Questionably menopausal with no menstruation x10y following ablation but had resumed light bleeding. Notes increase in depressed mood due to husbands health, recent ostomy reversal with difficulty controlling his bowels, repeat cancer surgery. She also notes that her daughter had baby early and baby remains in the hospital. She remains on duloxetine 90mg daily and prozac with menstruation. She requests prozac refill. She also needs referral to new pain management. Her current provider is leaving the practice.       Review of Systems  Review of Systems   Constitutional: Negative.    Respiratory: Negative.    Cardiovascular: Negative.    Musculoskeletal: Positive for arthralgias.   Skin: Positive for color change.   Psychiatric/Behavioral: Positive for depressed mood and stress.        Medications  Current Outpatient Medications on File Prior to Visit   Medication Sig Dispense Refill   • atorvastatin (LIPITOR) 40 MG tablet TAKE ONE TABLET BY MOUTH EVERY NIGHT 30 tablet 4   • BOTOX 200 units reconstituted solution INJECT 200 UNITS INTO THE HEAD OR NECK AREA BY MD EVERY 90 DAYS  3   • celecoxib (CeleBREX) 200 MG capsule Take 200 mg by mouth Daily.     • Cholecalciferol (VITAMIN D) 2000 units capsule Take 2,000 Units by mouth Daily.     • cyclobenzaprine (FLEXERIL) 10 MG tablet Take 1 tablet by mouth At Night As Needed for Muscle Spasms. 30 tablet 1   • DHEA 50 MG capsule Take 1 tablet by mouth Daily.     • DULoxetine (CYMBALTA) 30 MG capsule TAKE THREE CAPSULES BY MOUTH DAILY 270 capsule 1   • EC-RX  TESTOSTERONE TD Place 1 patch on the skin as directed by provider Daily.     • eletriptan (RELPAX) 40 MG tablet TAKE 1 TABLET BY MOUTH AT THE ONSET OF MIGRAINE. MAY REPEAT IN 2 HRS.MAX 2 DOSE/24HR,3 DAYS/WEEK  11   • FLUoxetine (PROZAC) 40 MG capsule Take 40 mg by mouth Daily. Daily prn with PMS     • glucose blood test strip 1 each by Other route Daily. Use as instructed 100 each 2   • glucose monitor monitoring kit 1 each Daily. 1 each 1   • hydroCHLOROthiazide (HYDRODIURIL) 12.5 MG tablet TAKE ONE TABLET BY MOUTH DAILY 90 tablet 1   • Lancets 28G misc 1 application Daily. 100 each 2   • magnesium chloride ER 64 MG DR tablet Take 2 tablets by mouth Daily.     • metFORMIN (GLUCOPHAGE) 500 MG tablet Take 2 tablets by mouth 2 (Two) Times a Day With Meals. Increase by 1 tablet each week to max of 4 tabs daily as tolerated. 120 tablet 2   • nebivolol (BYSTOLIC) 10 MG tablet Take 1 tablet by mouth Daily. 30 tablet 2   • nortriptyline (PAMELOR) 10 MG capsule nortriptyline 10 mg capsule   TAKE 4 CAPSULES AT BEDTIME     • omeprazole (priLOSEC) 40 MG capsule TAKE ONE CAPSULE BY MOUTH DAILY 90 capsule 0   • oxyCODONE (ROXICODONE) 15 MG immediate release tablet Take 1 tablet by mouth Every 6 (Six) Hours As Needed.     • TROKENDI  MG capsule sustained-release 24 hr Take 1 capsule by mouth Daily.  1   • Diclofenac Potassium,Migraine, ([place]) 50 MG pack Take 50 mg by mouth As Needed.       No current facility-administered medications on file prior to visit.         Allergies  Allergies   Allergen Reactions   • Dihydroergotamine GI Intolerance     VOMITING   • Methergine [Methylergonovine Maleate] Nausea And Vomiting   • Amoxicillin Rash   • Methylergonovine GI Intolerance     And sob         PMH  Past Medical History:   Diagnosis Date   • Chest pain 11/6/2019   • Colon polyp    • Depression    • Diabetes mellitus (CMS/HCC)    • Dysphagia 11/6/2019   • Endometriosis    • Fibromyalgia, primary    • GERD (gastroesophageal  "reflux disease)    • Headache    • HL (hearing loss)    • Hyperlipidemia    • Hypertension    • Low vitamin D level    • Ovarian cyst    • Wears glasses        Objective  Visit Vitals  /80   Pulse 83   Temp 96.8 °F (36 °C)   Resp 16   Ht 162.6 cm (64.02\")   Wt 74.7 kg (164 lb 9.6 oz)   SpO2 99%   BMI 28.24 kg/m²        Physical Exam  Physical Exam  Vitals signs and nursing note reviewed.   Constitutional:       General: She is not in acute distress.     Appearance: Normal appearance. She is well-developed.   HENT:      Head: Normocephalic and atraumatic.      Right Ear: External ear normal.      Left Ear: External ear normal.   Eyes:      Conjunctiva/sclera: Conjunctivae normal.   Pulmonary:      Effort: Pulmonary effort is normal. No respiratory distress.   Feet:      Comments: R great toe tender to touch at base and with flexion, extension, no swelling or erythema present.  Skin:     General: Skin is warm and dry.   Neurological:      General: No focal deficit present.      Mental Status: She is alert and oriented to person, place, and time. Mental status is at baseline.   Psychiatric:         Mood and Affect: Mood normal.         Behavior: Behavior normal.         Results  Results for orders placed or performed in visit on 09/09/20   Microalbumin / Creatinine Urine Ratio -   Result Value Ref Range    Creatinine, Urine 124.6 Not Estab. mg/dL    Microalbumin, Urine 4.8 Not Estab. ug/mL    Microalbumin/Creatinine Ratio 4 0 - 29 mg/g creat   Vitamin D 25 Hydroxy   Result Value Ref Range    25 Hydroxy, Vitamin D 25.2 (L) 30.0 - 100.0 ng/mL   HCV Antibody With / Rflx To Verification   Result Value Ref Range    Hepatitis C Ab <0.1 0.0 - 0.9 s/co ratio   Comment:   Result Value Ref Range    Comment Comment         Assessment and Plan  Diagnoses and all orders for this visit:    Pain and swelling of toe, right  - Intermittent pain, swelling, redness or R great toe. Could be gout or CPPD. Will obtain uric acid level " and xray.  - No erythema or swelling but is sore, will give 5 days of prednisone 40mg daily.    Fibromyalgia and Lumbar postlaminectomy syndrome  - Currently following with pain management and on celebrex 200mg daily, oxycodone 15mg q6h PRN, flexeril 10mg qhs PRN.  - Needs referral to new pain management provider, requests Dr. Claudio Humphreys. Referral placed.    Recurrent major depressive disorder, in partial remission (CMS/HCC)  - Increased stress with husbands health and premature grandson in the hospital resulting in depressed mood.  - On duloxetine 90mg daily and prozac 40mg PRN menstruation which has been prescribed by Dr. Mcguire. Ran out of prozac and requests refill.  - Will refill prozac today however uncomfortable making any other medication changes today. Discussed that she is on several medications that work on serotonin levels increasing risk for serotonin syndrome. Recommend psychiatry referral for medication management given ongoing uncontrolled depression.    Health Maintenance  - Pap smear: GYN Dr. Mcguire, unable to get records thus far.  - Mammogram: Mammogram ordered, needs scheduled.  - Colonoscopy: 1/2018 sigmoid tubular adenoma. Repeat 5y.  - HCV: Screen with next labs.  - Immunizations: Flu UTD. Tdap 2019. Pneumovax UTD. Discussed shingrix and she will call her insurance to determine coverage.  - Depression screening: negative 2/2020     Return in about 2 months (around 1/11/2021) for Follow up as scheduled, Labs today.

## 2020-11-13 LAB
HCV AB SER DONR QL: NORMAL
URATE SERPL-MCNC: 3.8 MG/DL (ref 2.4–5.7)

## 2020-11-19 ENCOUNTER — TELEMEDICINE (OUTPATIENT)
Dept: PSYCHIATRY | Facility: CLINIC | Age: 55
End: 2020-11-19

## 2020-11-19 DIAGNOSIS — F32.81 PMDD (PREMENSTRUAL DYSPHORIC DISORDER): ICD-10-CM

## 2020-11-19 DIAGNOSIS — F33.1 MAJOR DEPRESSIVE DISORDER, RECURRENT EPISODE, MODERATE (HCC): Primary | ICD-10-CM

## 2020-11-19 DIAGNOSIS — I10 ESSENTIAL HYPERTENSION: ICD-10-CM

## 2020-11-19 DIAGNOSIS — F43.22 ADJUSTMENT DISORDER WITH ANXIOUS MOOD: ICD-10-CM

## 2020-11-19 PROCEDURE — 90792 PSYCH DIAG EVAL W/MED SRVCS: CPT | Performed by: NURSE PRACTITIONER

## 2020-11-19 RX ORDER — HYDROCHLOROTHIAZIDE 12.5 MG/1
TABLET ORAL
Qty: 30 TABLET | Refills: 11 | Status: SHIPPED | OUTPATIENT
Start: 2020-11-19 | End: 2021-05-13 | Stop reason: SDUPTHER

## 2020-11-19 RX ORDER — BUPROPION HYDROCHLORIDE 150 MG/1
150 TABLET, EXTENDED RELEASE ORAL 2 TIMES DAILY
Qty: 60 TABLET | Refills: 0 | Status: SHIPPED | OUTPATIENT
Start: 2020-11-19 | End: 2020-12-17

## 2020-11-19 NOTE — TELEPHONE ENCOUNTER
Last Office Visit: 11/12/20  Next Office Visit:1/11/21    Labs completed in past 6 months? yes  Labs completed in past year? yes    Last Refill Date:5/27/20  Quantity:90  Refills:1    Pharmacy:

## 2020-11-19 NOTE — PROGRESS NOTES
Subjective   Yelena Morris is a 55 y.o. female who is here today for initial appointment.     This provider is located at [facility], [address] [place] [address], [place] [address].The Patient is seen remotely at home, [address] [place] [address] [place] [address] [facility]. Patient is being seen via telehealth and confirm that they are in a secure environment for this session. The patient's condition being diagnosed/treated is appropriate for telemedicine. The provider identified himself/herself: herself as well as her credentials.   The patient gave consent to be seen remotely, and when consent is given they understand that the consent allows for patient identifiable information to be sent to a third party as needed.   They may refuse to be seen remotely at any time. The electronic data is encrypted and password protected, and the patient has been advised of the potential risks to privacy not withstanding such measures.    You have chosen to receive care through a telehealth visit.  Do you consent to use a video/audio connection for your medical care today? Yes      Chief Complaint:  Depression and anxiety     HPI:  History of Present Illness  Patient presents today after being referred by her PCP for medication management as she did not feel comfortable prescribing multiple medications were serotonin was involved due to her depression and anxiety worsening as well as her history of migraines.  Patient states that she has dealt with depression and anxiety since 37 and has been well under controlled until the past few months things have been getting more difficult.  She states that her  has been battling colon cancer for the last 2 months and he is currently getting ready to receive his last chemo but he has been more irritable and agitated towards her.  She also notes that she has a grandson daughter that is currently facing multiple heart issues and another states that she has not got to  "see her as often as she would like.  She also notes that she currently has a grandson that is in NICU at the [facility] but hopefully coming home before Thanksgiving.  Patient states as well as her chronic pain things have been more difficult for her lately and with COVID she has not been able to get out and see her family which is made it difficult as well.  She also reports that she has had occasional suicidal ideation of thoughts of not wanting to live but states she would not do that to her family but that she had those roughly a month ago but denies any current SI or plan or intent. The patient reports depressive symptoms including depressed mood, crying spells, feelings of guilt, feelings of hopelessness, feelings of helplessness, low energy, difficulty concentrating and suicidal ideation, and have caused impairment in important areas of functioning.  Depression rated 5/10 with 10 being the worst.  Patient states that she has been worried about her family and seeing them.  Patient reports that she is sleeping well as it is based on her pain.  Patient states her appetite has been good.  Patient denies any hypomanic or manic episodes.  Patient denies any OCD symptoms.  Patient reports however though that she has been extremely forgetful and trouble remembering things and has had to start making several list as she believes this is related to the Topamax and wants to discuss this with her neurologist.  Patient states that she had no issues with focus and attention or remembering things at work but since she has been home the past 2 years things have been getting worse.  Patient states that it is if \"I cannot get my thoughts together\".  Patient denies any current SI/HI/AH/VH.      Past Psych History: Patient reports that she started noticing her depression and anxiety roughly around the age of 37 when she worked in critical care as a nurse.  Patient reports that her PCP placed her on fluoxetine which " was helpful and then eventually citalopram which was helpful but wore off.  Patient states that she is currently now only taking Prozac for PMDD when symptoms become severe which is helpful.  Patient states that she has been on Cymbalta 90 mg for depression and anxiety for a couple of years now.  Patient also reports that her neurologist is giving her nortriptyline 40 mg at night as well as Topamax to help with her migraines.  Patient denies any hospitalizations.  She reports that a couple years ago she did have suicidal ideation with thought of a plan but no intent and 1 month ago she had suicidal ideation as well but no plan or intent.  Denies any self-harm or past suicidal attempts.    Substance Abuse: Denies.  Ash reviewed which does show use of opioids which is prescribed for chronic pain.    Past Social History: Patient was born and raised and [place] [facility] with her mother and father.  She reports that she was adopted as an infant and does not know much family history.  Patient states that she had a great childhood and good grades and went to nursing school at [employer].  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  Patient states that she has been  to her  for 32 years.  She reports that they currently have 3 children ages 30, 28 and 26 and 3 grandchildren ages 7, 7 months and a few weeks old.  Patient states that she enjoys spending time with her family but it has been difficult due to COVID.  Patient also states that her chronic pain makes it difficult for her but she is currently not working and has not in the past 2 years but has not got disability as she states her 's income has been enough at this time.  Patient denies any  history.  Patient denies any abuse.    Family History:  family history is not on file. She was adopted.    Medical/Surgical  History:  Past Medical History:   Diagnosis Date   • Anxiety    • Chest pain 11/6/2019   • Colon polyp    • Depression    • Diabetes mellitus (CMS/HCC)    • Dysphagia 11/6/2019   • Endometriosis    • Fibromyalgia, primary    • GERD (gastroesophageal reflux disease)    • Headache    • HL (hearing loss)    • Hyperlipidemia    • Hypertension    • Low vitamin D level    • Migraines    • Ovarian cyst    • PMDD (premenstrual dysphoric disorder)    • Wears glasses      Past Surgical History:   Procedure Laterality Date   • BLADDER SURGERY      Bladder tuck   • COLONOSCOPY      every 5 years   • LUMBAR DISCECTOMY  2010    L4-S1 Rt- discectomy Dr. Arellano   • SPINAL CORD STIMULATOR IMPLANT N/A 8/19/2019    Procedure: SPINAL CORD STIMULATOR INSERTION;  Surgeon: Ethan Peters MD;  Location: [facility];  Service: Neurosurgery   • TONSILLECTOMY AND ADENOIDECTOMY         Allergies   Allergen Reactions   • Dihydroergotamine GI Intolerance     VOMITING   • Methergine [Methylergonovine Maleate] Nausea And Vomiting   • Amoxicillin Rash   • Methylergonovine GI Intolerance     And sob         Current Medications:   Current Outpatient Medications   Medication Sig Dispense Refill   • atorvastatin (LIPITOR) 40 MG tablet TAKE ONE TABLET BY MOUTH EVERY NIGHT 30 tablet 4   • BOTOX 200 units reconstituted solution INJECT 200 UNITS INTO THE HEAD OR NECK AREA BY MD EVERY 90 DAYS  3   • celecoxib (CeleBREX) 200 MG capsule Take 200 mg by mouth Daily.     • Cholecalciferol (VITAMIN D) 2000 units capsule Take 2,000 Units by mouth Daily.     • cyclobenzaprine (FLEXERIL) 10 MG tablet Take 1 tablet by mouth At Night As Needed for Muscle Spasms. 30 tablet 1   • DHEA 50 MG capsule Take 1 tablet by mouth Daily.     • Diclofenac Potassium,Migraine, (Cambia) 50 MG pack Take 50 mg by mouth As Needed.     • EC-RX TESTOSTERONE TD Place 1 patch on the skin as directed by provider Daily.     • eletriptan (RELPAX) 40 MG tablet TAKE 1 TABLET BY MOUTH AT THE  ONSET OF MIGRAINE. MAY REPEAT IN 2 HRS.MAX 2 DOSE/24HR,3 DAYS/WEEK  11   • FLUoxetine (PROzac) 40 MG capsule Take 1 capsule by mouth Daily As Needed (menstruation). Daily prn with PMS 30 capsule 0   • glucose blood test strip 1 each by Other route Daily. Use as instructed 100 each 2   • glucose monitor monitoring kit 1 each Daily. 1 each 1   • hydroCHLOROthiazide (HYDRODIURIL) 12.5 MG tablet TAKE ONE TABLET BY MOUTH DAILY 90 tablet 1   • Lancets 28G misc 1 application Daily. 100 each 2   • magnesium chloride ER 64 MG DR tablet Take 2 tablets by mouth Daily.     • metFORMIN (GLUCOPHAGE) 500 MG tablet Take 2 tablets by mouth 2 (Two) Times a Day With Meals. Increase by 1 tablet each week to max of 4 tabs daily as tolerated. 120 tablet 2   • nebivolol (BYSTOLIC) 10 MG tablet Take 1 tablet by mouth Daily. 30 tablet 2   • nortriptyline (PAMELOR) 10 MG capsule nortriptyline 10 mg capsule   TAKE 4 CAPSULES AT BEDTIME     • omeprazole (priLOSEC) 40 MG capsule TAKE ONE CAPSULE BY MOUTH DAILY 90 capsule 0   • oxyCODONE (ROXICODONE) 15 MG immediate release tablet Take 1 tablet by mouth Every 6 (Six) Hours As Needed.     • TROKENDI  MG capsule sustained-release 24 hr Take 1 capsule by mouth Daily.  1   • buPROPion SR (Wellbutrin SR) 150 MG 12 hr tablet Take 1 tablet by mouth 2 (Two) Times a Day. 60 tablet 0     No current facility-administered medications for this visit.        Review of Systems   Constitutional: Positive for fatigue.   Psychiatric/Behavioral: Positive for decreased concentration and dysphoric mood. The patient is nervous/anxious.    All other systems reviewed and are negative.      Review of Systems - General ROS: negative for - chills, fever or malaise  Ophthalmic ROS: negative for - loss of vision  ENT ROS: negative for - hearing change  Allergy and Immunology ROS: negative for - hives  Hematological and Lymphatic ROS: negative for - bleeding problems  Endocrine ROS: negative for - skin  changes  Respiratory ROS: no cough, shortness of breath, or wheezing  Cardiovascular ROS: no chest pain or dyspnea on exertion  Gastrointestinal ROS: no abdominal pain, change in bowel habits, or black or bloody stools  Genito-Urinary ROS: no dysuria, trouble voiding, or hematuria  Musculoskeletal ROS: negative for - gait disturbance  Neurological ROS: no TIA or stroke symptoms  Dermatological ROS: negative for rash    Objective   Physical Exam  Nursing note reviewed.   Constitutional:       Appearance: Normal appearance.   Neurological:      Mental Status: She is alert.   Psychiatric:         Attention and Perception: Attention and perception normal.         Mood and Affect: Mood is anxious and depressed.         Speech: Speech normal.         Behavior: Behavior normal. Behavior is cooperative.         Thought Content: Thought content normal.         Cognition and Memory: Cognition and memory normal.       not currently breastfeeding. Due to extenuating circumstances and possible current health risks associated with the patient being present in a clinical setting (with current health restrictions in place in regards to possible COVID 19 transmission/exposure), the patient was seen remotely today via a MyChart Video Visit through [facility].  Unable to obtain vital signs due to nature of remote visit.  Height stated at 64 inches.  Weight stated at 164 pounds.      Mental Status Exam:   Hygiene:   good  Cooperation:  Cooperative  Eye Contact:  Good  Psychomotor Behavior:  Appropriate  Affect:  Appropriate  Hopelessness: 3  Speech:  Normal  Thought Process:  Goal directed and Linear  Thought Content:  Normal  Suicidal:  None  Homicidal:  None  Hallucinations:  None  Delusion:  None  Memory:  Intact  Orientation:  Person, Place, Time and Situation  Reliability:  good  Insight:  Fair  Judgement:  Fair  Impulse Control:  Fair  Physical/Medical Issues:  Yes chronic back pain, fibromyalgia, DM, HTN, and migraines    See  questionnaires for geetha 7 and PHQ 9 and PROMIS scale    Assessment/Plan  I spent a total of  45 minutes in direct patient care, greater than 25 minutes (greater than 50%) were spent in coordination of care, and counseling the patient regarding depression and anxiety as well as life stressors. Answered any questions patient had with medication and plan.     -Continue Prozac 40 mg daily during her menstrual cycle for PMDD as she was prescribed previously.  -Taper duloxetine to 60 mg for 7 to 10 days depending on symptoms then 30 mg for 7 to 10 days then discontinue.  -Begin bupropion  mg twice daily for depression once on 30 mg of duloxetine.  -Nortriptyline is per her neurologist at [facility] which is at 40 mg at night.  Patient is also on Topamax for migraines but believes it is not helpful encouraged her to discuss this with her neurologist.    Discussed the side effects as well as risk and benefits in great detail with the patient as she verbalized understanding.  Patient denied any side effects or symptoms to the current medications.  Highly encouraged patient that if she did have any worsening symptoms of depression or any SI/HI to immediately contact the behavioral [facility] care clinic and go to the ER for evaluation patient verbalized understanding.  Encouraged the patient to discuss her medications with her neurologist as well.    Diagnoses and all orders for this visit:    1. Major depressive disorder, recurrent episode, moderate (CMS/HCC) (Primary)  -     buPROPion SR (Wellbutrin SR) 150 MG 12 hr tablet; Take 1 tablet by mouth 2 (Two) Times a Day.  Dispense: 60 tablet; Refill: 0    2. Adjustment disorder with anxious mood  -     buPROPion SR (Wellbutrin SR) 150 MG 12 hr tablet; Take 1 tablet by mouth 2 (Two) Times a Day.  Dispense: 60 tablet; Refill: 0    3. PMDD (premenstrual dysphoric disorder)  -     buPROPion SR (Wellbutrin SR) 150 MG 12 hr tablet; Take 1 tablet by mouth 2 (Two) Times a Day.   Dispense: 60 tablet; Refill: 0          Prognosis: Guarded dependent on medication/follow up and treatment plan compliance.  Functionality: pt having significant impairment in important areas of daily functioning.  We discussed risks, benefits, and side effects of the above medication and the patient was agreeable with the plan.     Return in about 4 weeks (around 12/17/2020), or if symptoms worsen or fail to improve, for Recheck.       Problem List: depression, motivation, and worry     Short Term Goals: Patient will be compliant with medication regimen with improvement in symptoms over the next 4 weeks.    Long Term Goals: Patient will continue medication management without impairment in daily functioning over the next 6 months.    Errors in dictation may reflect use of voice recognition software and not all errors in transcription may have been detected prior to signing.

## 2020-12-08 DIAGNOSIS — K21.00 GASTRO-ESOPHAGEAL REFLUX DISEASE WITH ESOPHAGITIS: ICD-10-CM

## 2020-12-08 RX ORDER — OMEPRAZOLE 40 MG/1
CAPSULE, DELAYED RELEASE ORAL
Qty: 90 CAPSULE | Refills: 3 | Status: SHIPPED | OUTPATIENT
Start: 2020-12-08 | End: 2021-05-13 | Stop reason: SDUPTHER

## 2020-12-12 DIAGNOSIS — F33.1 MODERATE EPISODE OF RECURRENT MAJOR DEPRESSIVE DISORDER (HCC): ICD-10-CM

## 2020-12-12 DIAGNOSIS — F33.41 RECURRENT MAJOR DEPRESSIVE DISORDER, IN PARTIAL REMISSION (HCC): ICD-10-CM

## 2020-12-12 RX ORDER — DULOXETIN HYDROCHLORIDE 30 MG/1
CAPSULE, DELAYED RELEASE ORAL
Qty: 90 CAPSULE | Refills: 0 | OUTPATIENT
Start: 2020-12-12

## 2020-12-12 NOTE — TELEPHONE ENCOUNTER
Last Office Visit: 11/12/20  Next Office Visit:01/22/21    Labs completed in past 6 months? yes  Labs completed in past year? yes    fluoxetine  Last Refill Date:11/12/20  Quantity:30  Refills:0    Duloxetine DC on 06/01/20      Pharmacy:

## 2020-12-14 ENCOUNTER — HOSPITAL ENCOUNTER (OUTPATIENT)
Dept: GENERAL RADIOLOGY | Facility: HOSPITAL | Age: 55
Discharge: HOME | End: 2020-12-14
Admitting: INTERNAL MEDICINE

## 2020-12-14 DIAGNOSIS — M79.674 PAIN AND SWELLING OF TOE, RIGHT: ICD-10-CM

## 2020-12-14 DIAGNOSIS — M79.89 PAIN AND SWELLING OF TOE, RIGHT: ICD-10-CM

## 2020-12-14 PROCEDURE — 73630 X-RAY EXAM OF FOOT: CPT

## 2020-12-14 RX ORDER — FLUOXETINE HYDROCHLORIDE 40 MG/1
CAPSULE ORAL
Qty: 90 CAPSULE | Refills: 0 | OUTPATIENT
Start: 2020-12-14

## 2020-12-16 ENCOUNTER — TELEMEDICINE (OUTPATIENT)
Dept: INTERNAL MEDICINE | Facility: CLINIC | Age: 55
End: 2020-12-16

## 2020-12-16 DIAGNOSIS — M79.674 GREAT TOE PAIN, RIGHT: ICD-10-CM

## 2020-12-16 DIAGNOSIS — J02.9 SORE THROAT: ICD-10-CM

## 2020-12-16 DIAGNOSIS — M79.10 MYALGIA: Primary | ICD-10-CM

## 2020-12-16 DIAGNOSIS — H92.02 LEFT EAR PAIN: ICD-10-CM

## 2020-12-16 PROBLEM — M19.071 PRIMARY OSTEOARTHRITIS OF RIGHT FOOT: Status: ACTIVE | Noted: 2020-12-16

## 2020-12-16 LAB
EXPIRATION DATE: NORMAL
FLUAV AG NPH QL: NEGATIVE
FLUBV AG NPH QL: NEGATIVE
INTERNAL CONTROL: NORMAL
Lab: NORMAL

## 2020-12-16 PROCEDURE — 87804 INFLUENZA ASSAY W/OPTIC: CPT | Performed by: INTERNAL MEDICINE

## 2020-12-16 PROCEDURE — 99213 OFFICE O/P EST LOW 20 MIN: CPT | Performed by: INTERNAL MEDICINE

## 2020-12-16 PROCEDURE — U0004 COV-19 TEST NON-CDC HGH THRU: HCPCS | Performed by: INTERNAL MEDICINE

## 2020-12-16 NOTE — PROGRESS NOTES
Internal Medicine Acute Visit    Chief Complaint   Patient presents with   • Earache   • Foot Pain        HPI  Ms. Morris was seen via video visit for left ear pressure, sore throat, mylagia, and headache. No fever, cough, SOA, or sick contacts. She has had these symptoms for the last week with no change in symptoms. She had an episode of nausea with vomiting last night. She has been using mucinex D. She has not tried any antihistamines or flonase. She did use afrin a couple times with some relief of congestion. Foot pain persists, pain seems to be worsening. Pain is present even at rest. Using chronic oxycodone for pain as well as ice.    Earache   There is pain in the left ear. This is a recurrent problem. The current episode started in the past 7 days. There has been no fever. The pain is at a severity of 5/10. Associated symptoms include abdominal pain, headaches, hearing loss, a sore throat and vomiting. Pertinent negatives include no diarrhea.        Review of Systems  Review of Systems   Constitutional: Positive for fatigue. Negative for fever.   HENT: Positive for ear pain, hearing loss and sore throat.    Respiratory: Negative.    Gastrointestinal: Positive for abdominal pain, nausea and vomiting. Negative for anal bleeding, blood in stool, constipation and diarrhea.   Musculoskeletal: Positive for arthralgias and myalgias. Negative for joint swelling.        Medications  Current Outpatient Medications on File Prior to Visit   Medication Sig Dispense Refill   • atorvastatin (LIPITOR) 40 MG tablet TAKE ONE TABLET BY MOUTH EVERY NIGHT 30 tablet 4   • BOTOX 200 units reconstituted solution INJECT 200 UNITS INTO THE HEAD OR NECK AREA BY MD EVERY 90 DAYS  3   • buPROPion SR (Wellbutrin SR) 150 MG 12 hr tablet Take 1 tablet by mouth 2 (Two) Times a Day. 60 tablet 0   • celecoxib (CeleBREX) 200 MG capsule Take 200 mg by mouth Daily.     • Cholecalciferol (VITAMIN D) 2000 units capsule Take 2,000 Units by mouth  Daily.     • cyclobenzaprine (FLEXERIL) 10 MG tablet Take 1 tablet by mouth At Night As Needed for Muscle Spasms. 30 tablet 1   • DHEA 50 MG capsule Take 1 tablet by mouth Daily.     • Diclofenac Potassium,Migraine, (Cambia) 50 MG pack Take 50 mg by mouth As Needed.     • EC-RX TESTOSTERONE TD Place 1 patch on the skin as directed by provider Daily.     • eletriptan (RELPAX) 40 MG tablet TAKE 1 TABLET BY MOUTH AT THE ONSET OF MIGRAINE. MAY REPEAT IN 2 HRS.MAX 2 DOSE/24HR,3 DAYS/WEEK  11   • FLUoxetine (PROzac) 40 MG capsule Take 1 capsule by mouth Daily As Needed (menstruation). Daily prn with PMS 30 capsule 0   • glucose blood test strip 1 each by Other route Daily. Use as instructed 100 each 2   • glucose monitor monitoring kit 1 each Daily. 1 each 1   • hydroCHLOROthiazide (HYDRODIURIL) 12.5 MG tablet TAKE ONE TABLET BY MOUTH DAILY 30 tablet 11   • Lancets 28G misc 1 application Daily. 100 each 2   • magnesium chloride ER 64 MG DR tablet Take 2 tablets by mouth Daily.     • metFORMIN (GLUCOPHAGE) 500 MG tablet Take 2 tablets by mouth 2 (Two) Times a Day With Meals. Increase by 1 tablet each week to max of 4 tabs daily as tolerated. 120 tablet 2   • nebivolol (BYSTOLIC) 10 MG tablet Take 1 tablet by mouth Daily. 30 tablet 2   • nortriptyline (PAMELOR) 10 MG capsule nortriptyline 10 mg capsule   TAKE 4 CAPSULES AT BEDTIME     • omeprazole (priLOSEC) 40 MG capsule TAKE ONE CAPSULE BY MOUTH DAILY 90 capsule 3   • oxyCODONE (ROXICODONE) 15 MG immediate release tablet Take 1 tablet by mouth Every 6 (Six) Hours As Needed.     • TROKENDI  MG capsule sustained-release 24 hr Take 1 capsule by mouth Daily.  1     No current facility-administered medications on file prior to visit.         Allergies  Allergies   Allergen Reactions   • Dihydroergotamine GI Intolerance     VOMITING   • Methergine [Methylergonovine Maleate] Nausea And Vomiting   • Amoxicillin Rash   • Methylergonovine GI Intolerance     And sob          [facility]  Past Medical History:   Diagnosis Date   • Anxiety    • Chest pain 11/6/2019   • Colon polyp    • Depression    • Diabetes mellitus (CMS/HCC)    • Dysphagia 11/6/2019   • Endometriosis    • Fibromyalgia, primary    • GERD (gastroesophageal reflux disease)    • Headache    • HL (hearing loss)    • Hyperlipidemia    • Hypertension    • Low vitamin D level    • Migraines    • Ovarian cyst    • PMDD (premenstrual dysphoric disorder)    • Wears glasses        Objective  There were no vitals taken for this visit.     Physical Exam  Physical Exam  Constitutional:       Appearance: Normal appearance. She is not toxic-appearing.   HENT:      Head: Normocephalic and atraumatic.   Eyes:      Conjunctiva/sclera: Conjunctivae normal.   Pulmonary:      Effort: Pulmonary effort is normal. No respiratory distress.   Neurological:      Mental Status: She is alert and oriented to person, place, and time.      Comments: Speech clear and fluent   Psychiatric:         Mood and Affect: Mood normal.         Behavior: Behavior normal.         Results  Results for orders placed or performed in visit on 12/16/20   POC Influenza A / B    Specimen: Swab   Result Value Ref Range    Rapid Influenza A Ag Negative Negative    Rapid Influenza B Ag Negative Negative    Internal Control Passed Passed    Lot Number [id number]     Expiration Date 5/7/22         Assessment and Plan  Diagnoses and all orders for this visit:    Myalgia, sore throat, and left ear pain  - Suspect viral illness, will swab for COVID19. Flu negative.  - Recommend supportive care in interim with flonase and zyrtec. If testing negative and ear pain persists will send antibiotic for otitis media.    Great toe pain, right  - Awaiting xray results. Avoid NSAIDs for now given nausea, stomach pain.      Return in about 26 days (around 1/11/2021) for as scheduled.     This patient has consented to a telehealth visit via video. The visit was scheduled to comply with patient  safety concerns in accordance with CDC recommendations.  I spent 13 minutes in total including but not limited to the 13 minutes spent in direct conversation with this patient.

## 2020-12-17 ENCOUNTER — TELEMEDICINE (OUTPATIENT)
Dept: PSYCHIATRY | Facility: CLINIC | Age: 55
End: 2020-12-17

## 2020-12-17 DIAGNOSIS — F33.1 MAJOR DEPRESSIVE DISORDER, RECURRENT EPISODE, MODERATE (HCC): Primary | ICD-10-CM

## 2020-12-17 DIAGNOSIS — F32.81 PMDD (PREMENSTRUAL DYSPHORIC DISORDER): ICD-10-CM

## 2020-12-17 DIAGNOSIS — F43.22 ADJUSTMENT DISORDER WITH ANXIOUS MOOD: ICD-10-CM

## 2020-12-17 LAB — SARS-COV-2 RNA RESP QL NAA+PROBE: NOT DETECTED

## 2020-12-17 PROCEDURE — 99214 OFFICE O/P EST MOD 30 MIN: CPT | Performed by: NURSE PRACTITIONER

## 2020-12-17 RX ORDER — FLUOXETINE HYDROCHLORIDE 40 MG/1
CAPSULE ORAL
Qty: 30 CAPSULE | Refills: 1 | Status: SHIPPED | OUTPATIENT
Start: 2020-12-17 | End: 2021-02-01 | Stop reason: SDUPTHER

## 2020-12-17 RX ORDER — ARIPIPRAZOLE 2 MG/1
2 TABLET ORAL DAILY
Qty: 30 TABLET | Refills: 0 | Status: SHIPPED | OUTPATIENT
Start: 2020-12-17 | End: 2021-01-18

## 2020-12-17 NOTE — PROGRESS NOTES
[facility] provider is located at [facility], [address] [place] [address].The Patient is seen remotely at home, [address] [facility] [place] [address] via [email or web address].  Patient is being seen via telehealth and confirm that they are in a secure environment for this session. The patient's condition being diagnosed/treated is appropriate for telemedicine. The provider identified himself/herself: herself as well as her credentials.   The patient gave consent to be seen remotely, and when consent is given they understand that the consent allows for patient identifiable information to be sent to a third party as needed.   They may refuse to be seen remotely at any time. The electronic data is encrypted and password protected, and the patient has been advised of the potential risks to privacy not withstanding such measures.    You have chosen to receive care through a telehealth visit.  Do you consent to use a video/audio connection for your medical care today? Yes      Subjective   Yelena Morris is a 55 y.o. female who is here today for medication management follow up.    Chief Complaint:  Worsening depression     History of Present Illness  Patient is seen late today via MyChart.  She states that since coming off the Cymbalta onto the Wellbutrin she has been crying all the time.  Patient states that she is sad and anxious.  She also notes that with being off the Cymbalta and the Wellbutrin that she has had that goal hopeless helpless feeling.  Patient states that she has not had any suicidal ideation but reports that it is close to her.  She also notes stomach pain and vomiting even after 4 weeks of roughly being on the Wellbutrin.  Patient states that she also did not realize how much it was helpful for her fibromyalgia as her pain has been more intense overall. The patient reports depressive symptoms including depressed mood, crying spells, decreased appetite, anhedonia, feelings of  hopelessness, feelings of helplessness, feelings of worthlessness, low energy and difficulty concentrating, and have caused impairment in important areas of functioning.  Depression rated 7-8/10 with 10 being the worst. The patient reports the following symptoms of anxiety: constant anxiety/worry, restlessness/on edge, difficulty concentrating and irritability and have caused impairment in important areas of functioning.  Patient states that her sleep is been fine but her appetite has been decreased significantly due to the Wellbutrin that she does not want to eat.  Patient states that she is also been having migraines and goes for Botox tomorrow so that has contributed to her mood.  Patient states that mostly she has had a gloom and doom feeling since being on the Wellbutrin.  Patient states that the Prozac is still helpful for her PMDD as she takes 10 days during her period and once after.      The following portions of the patient's history were reviewed and updated as appropriate: allergies, current medications, past family history, past medical history, past social history, past surgical history and problem list.    Review of Systems   Neurological: Positive for headaches.   Psychiatric/Behavioral: Positive for dysphoric mood. The patient is nervous/anxious.        Objective   Physical Exam  Nursing note reviewed.   Constitutional:       Appearance: Normal appearance.   Neurological:      Mental Status: She is alert.   Psychiatric:         Attention and Perception: Attention and perception normal.         Mood and Affect: Mood is anxious and depressed.         Speech: Speech normal.         Behavior: Behavior normal. Behavior is cooperative.         Cognition and Memory: Cognition and memory normal.       not currently breastfeeding. Due to extenuating circumstances and possible current health risks associated with the patient being present in a clinical setting (with current health restrictions in place in  regards to possible COVID 19 transmission/exposure), the patient was seen remotely today via a MyChart Video Visit through [facility].  Unable to obtain vital signs due to nature of remote visit.  Height stated at 64 inches.  Weight stated at 164 pounds.      Allergies   Allergen Reactions   • Dihydroergotamine GI Intolerance     VOMITING   • Methergine [Methylergonovine Maleate] Nausea And Vomiting   • Amoxicillin Rash   • Methylergonovine GI Intolerance     And sob         Recent Results (from the past 2016 hour(s))   Hepatitis C Antibody    Collection Time: 11/12/20  2:22 PM    Specimen: Blood   Result Value Ref Range    Hepatitis C Ab Non-Reactive Non-Reactive   Uric acid    Collection Time: 11/12/20  2:22 PM    Specimen: Blood   Result Value Ref Range    Uric Acid 3.8 2.4 - 5.7 mg/dL   POC Influenza A / B    Collection Time: 12/16/20  5:44 PM    Specimen: Swab   Result Value Ref Range    Rapid Influenza A Ag Negative Negative    Rapid Influenza B Ag Negative Negative    Internal Control Passed Passed    Lot Number [id number]     Expiration Date 5/7/22        Current Medications:   Current Outpatient Medications   Medication Sig Dispense Refill   • ARIPiprazole (Abilify) 2 MG tablet Take 1 tablet by mouth Daily. 30 tablet 0   • atorvastatin (LIPITOR) 40 MG tablet TAKE ONE TABLET BY MOUTH EVERY NIGHT 30 tablet 4   • BOTOX 200 units reconstituted solution INJECT 200 UNITS INTO THE HEAD OR NECK AREA BY MD EVERY 90 DAYS  3   • Cholecalciferol (VITAMIN D) 2000 units capsule Take 2,000 Units by mouth Daily.     • cyclobenzaprine (FLEXERIL) 10 MG tablet Take 1 tablet by mouth At Night As Needed for Muscle Spasms. 30 tablet 1   • DHEA 50 MG capsule Take 1 tablet by mouth Daily.     • Diclofenac Potassium,Migraine, (Cambia) 50 MG pack Take 50 mg by mouth As Needed.     • EC-RX TESTOSTERONE TD Place 1 patch on the skin as directed by provider Daily.     • eletriptan (RELPAX) 40 MG tablet TAKE 1 TABLET BY MOUTH AT THE ONSET OF  MIGRAINE. MAY REPEAT IN 2 HRS.MAX 2 DOSE/24HR,3 DAYS/WEEK  11   • FLUoxetine (PROzac) 40 MG capsule Daily prn with PMDD for 10 days for cycle. 30 capsule 1   • glucose blood test strip 1 each by Other route Daily. Use as instructed 100 each 2   • glucose monitor monitoring kit 1 each Daily. 1 each 1   • hydroCHLOROthiazide (HYDRODIURIL) 12.5 MG tablet TAKE ONE TABLET BY MOUTH DAILY 30 tablet 11   • Lancets 28G misc 1 application Daily. 100 each 2   • magnesium chloride ER 64 MG DR tablet Take 2 tablets by mouth Daily.     • metFORMIN (GLUCOPHAGE) 500 MG tablet Take 2 tablets by mouth 2 (Two) Times a Day With Meals. Increase by 1 tablet each week to max of 4 tabs daily as tolerated. 120 tablet 2   • nebivolol (BYSTOLIC) 10 MG tablet Take 1 tablet by mouth Daily. 30 tablet 2   • nortriptyline (PAMELOR) 10 MG capsule nortriptyline 10 mg capsule   TAKE 4 CAPSULES AT BEDTIME     • omeprazole (priLOSEC) 40 MG capsule TAKE ONE CAPSULE BY MOUTH DAILY 90 capsule 3   • oxyCODONE (ROXICODONE) 15 MG immediate release tablet Take 1 tablet by mouth Every 6 (Six) Hours As Needed.     • TROKENDI  MG capsule sustained-release 24 hr Take 1 capsule by mouth Daily.  1     No current facility-administered medications for this visit.        Mental Status Exam:   Hygiene:   good  Cooperation:  Cooperative  Eye Contact:  Good  Psychomotor Behavior:  Appropriate  Affect:  Appropriate  Hopelessness: 6  Speech:  Normal  Thought Process:  Goal directed and Linear  Thought Content:  Normal  Suicidal:  None  Homicidal:  None  Hallucinations:  None  Delusion:  None  Memory:  Intact  Orientation:  Person, Place, Time and Situation  Reliability:  good  Insight:  Good  Judgement:  Fair  Impulse Control:  Fair  Physical/Medical Issues:  Yes DM, fibromaialgia, HTN, thyroid nodule    See questionnaire for PHQ 9 and geetha 7 which does not match what the patient is reporting.      Assessment/Plan   Diagnoses and all orders for this visit:    1. Major  depressive disorder, recurrent episode, moderate (CMS/HCC) (Primary)  -     ARIPiprazole (Abilify) 2 MG tablet; Take 1 tablet by mouth Daily.  Dispense: 30 tablet; Refill: 0    2. Adjustment disorder with anxious mood    3. PMDD (premenstrual dysphoric disorder)  -     FLUoxetine (PROzac) 40 MG capsule; Daily prn with PMDD for 10 days for cycle.  Dispense: 30 capsule; Refill: 1        I spent a total of 25 minutes in direct patient care, greater than 15 minutes (greater than 50%) were spent in coordination of care, and counseling the patient regarding switching off Cymbalta to Wellbutrin and her worsening symptoms as well as side effects to the medications. Answered any questions patient had with medication and plan.     -Discontinue Wellbutrin since the patient has had worsening symptoms and side effects.  -Restart Cymbalta since effective for patient's fibromyalgia as well as her mood, encouraged patient to start 30 mg for 1 week and then back to 60 mg daily.  -Continue Prozac 40 mg as needed for PMDD.  -Begin aripiprazole 2 mg as adjunct for depression with hopelessness and helplessness as patient has failed and tried fluoxetine on a regular basis as well as citalopram and bupropion and is currently on duloxetine and to avoid risk of serotonin syndrome patient is on nortriptyline per her neurologist as well as Topamax for migraines.    Discussed the risks, benefits, and side effects of the medications; patient ackowledged and verbally consented.  Patient is aware to call the [facility] with any worsening of symptoms.  Patient is agreeable to call 911 or go to the nearest ER should he/she begin having SI/HI.    Prognosis: Guarded dependent on medication/follow up and treatment plan compliance.  Functionality: pt having significant impairment in important areas of daily functioning.  Short-Term Goals: Patient will be compliant with medication management and note improvement in symptoms over  the next 4 weeks.  Long-Term Goals: Patient will continue psychotherapy as well as medication regimen without impairment in daily functioning over the next 6 months.      Patient will follow-up in 2 to 3 weeks, highly encouraged patient that if she did have any worsening symptoms or concerns to contact the clinic for sooner appointment patient verbalized understanding.    Errors in dictation may reflect use of voice recognition software and not all errors in transcription may have been detected prior to signing.

## 2020-12-19 DIAGNOSIS — F33.1 MAJOR DEPRESSIVE DISORDER, RECURRENT EPISODE, MODERATE (HCC): ICD-10-CM

## 2020-12-19 DIAGNOSIS — F43.22 ADJUSTMENT DISORDER WITH ANXIOUS MOOD: ICD-10-CM

## 2020-12-19 DIAGNOSIS — F32.81 PMDD (PREMENSTRUAL DYSPHORIC DISORDER): ICD-10-CM

## 2020-12-19 RX ORDER — BUPROPION HYDROCHLORIDE 150 MG/1
TABLET, EXTENDED RELEASE ORAL
Qty: 60 TABLET | Refills: 0 | OUTPATIENT
Start: 2020-12-19

## 2020-12-21 DIAGNOSIS — E11.9 TYPE 2 DIABETES MELLITUS WITHOUT COMPLICATION, WITHOUT LONG-TERM CURRENT USE OF INSULIN (HCC): ICD-10-CM

## 2020-12-21 NOTE — TELEPHONE ENCOUNTER
Last Office Visit: 11/12/20  Next Office Visit: 1/11/21    Labs completed in past 6 months? yes  Labs completed in past year? yes    Last Refill Date: 9/9/20  Quantity: 120  Refills: 2    Pharmacy: on file

## 2020-12-30 ENCOUNTER — TELEMEDICINE (OUTPATIENT)
Dept: FAMILY MEDICINE CLINIC | Facility: TELEHEALTH | Age: 55
End: 2020-12-30

## 2020-12-30 DIAGNOSIS — R39.89 SUSPECTED UTI: Primary | ICD-10-CM

## 2020-12-30 PROCEDURE — 99213 OFFICE O/P EST LOW 20 MIN: CPT | Performed by: NURSE PRACTITIONER

## 2020-12-30 RX ORDER — PHENAZOPYRIDINE HYDROCHLORIDE 200 MG/1
200 TABLET, FILM COATED ORAL 3 TIMES DAILY PRN
Qty: 6 TABLET | Refills: 0 | Status: SHIPPED | OUTPATIENT
Start: 2020-12-30 | End: 2021-01-01

## 2020-12-30 RX ORDER — SULFAMETHOXAZOLE AND TRIMETHOPRIM 800; 160 MG/1; MG/1
1 TABLET ORAL 2 TIMES DAILY
Qty: 10 TABLET | Refills: 0 | Status: SHIPPED | OUTPATIENT
Start: 2020-12-30 | End: 2021-01-04

## 2020-12-31 NOTE — PATIENT INSTRUCTIONS
Wipe front to back, stay hydrated with water to flush the kidneys, avoid caffeinated beverages.  If symptoms worsen or do not improve follow up with your PCP or visit your nearest Urgent Care Center or ER.      Urinary Tract Infection, Adult  A urinary tract infection (UTI) is an infection of any part of the urinary tract. The urinary tract includes:  · The kidneys.  · The ureters.  · The bladder.  · The urethra.  These organs make, store, and get rid of pee (urine) in the body.  What are the causes?  This is caused by germs (bacteria) in your genital area. These germs grow and cause swelling (inflammation) of your urinary tract.  What increases the risk?  You are more likely to develop this condition if:  · You have a small, thin tube (catheter) to drain pee.  · You cannot control when you pee or poop (incontinence).  · You are female, and:  ? You use these methods to prevent pregnancy:  § A medicine that kills sperm (spermicide).  § A device that blocks sperm (diaphragm).  ? You have low levels of a female hormone (estrogen).  ? You are pregnant.  · You have genes that add to your risk.  · You are sexually active.  · You take antibiotic medicines.  · You have trouble peeing because of:  ? A prostate that is bigger than normal, if you are male.  ? A blockage in the part of your body that drains pee from the bladder (urethra).  ? A kidney stone.  ? A nerve condition that affects your bladder (neurogenic bladder).  ? Not getting enough to drink.  ? Not peeing often enough.  · You have other conditions, such as:  ? Diabetes.  ? A weak disease-fighting system (immune system).  ? Sickle cell disease.  ? Gout.  ? Injury of the spine.  What are the signs or symptoms?  Symptoms of this condition include:  · Needing to pee right away (urgently).  · Peeing often.  · Peeing small amounts often.  · Pain or burning when peeing.  · Blood in the pee.  · Pee that smells bad or not like normal.  · Trouble peeing.  · Pee that is  cloudy.  · Fluid coming from the vagina, if you are female.  · Pain in the belly or lower back.  Other symptoms include:  · Throwing up (vomiting).  · No urge to eat.  · Feeling mixed up (confused).  · Being tired and grouchy (irritable).  · A fever.  · Watery poop (diarrhea).  How is this treated?  This condition may be treated with:  · Antibiotic medicine.  · Other medicines.  · Drinking enough water.  Follow these instructions at home:    Medicines  · Take over-the-counter and prescription medicines only as told by your doctor.  · If you were prescribed an antibiotic medicine, take it as told by your doctor. Do not stop taking it even if you start to feel better.  General instructions  · Make sure you:  ? Pee until your bladder is empty.  ? Do not hold pee for a long time.  ? Empty your bladder after sex.  ? Wipe from front to back after pooping if you are a female. Use each tissue one time when you wipe.  · Drink enough fluid to keep your pee pale yellow.  · Keep all follow-up visits as told by your doctor. This is important.  Contact a doctor if:  · You do not get better after 1-2 days.  · Your symptoms go away and then come back.  Get help right away if:  · You have very bad back pain.  · You have very bad pain in your lower belly.  · You have a fever.  · You are sick to your stomach (nauseous).  · You are throwing up.  Summary  · A urinary tract infection (UTI) is an infection of any part of the urinary tract.  · This condition is caused by germs in your genital area.  · There are many risk factors for a UTI. These include having a small, thin tube to drain pee and not being able to control when you pee or poop.  · Treatment includes antibiotic medicines for germs.  · Drink enough fluid to keep your pee pale yellow.  This information is not intended to replace advice given to you by your health care provider. Make sure you discuss any questions you have with your health care provider.  Document Revised:  12/05/2019 Document Reviewed: 06/27/2019  Elsevier Patient Education © 2020 [employer]

## 2020-12-31 NOTE — PROGRESS NOTES
Subjective   Chief Complaint   Patient presents with   • Urinary Tract Infection     This was a video visit, I spent a total of 20 minutes reviewing this chart.     Yelena Morris is a 55 y.o. female.     Pt reports dysuria, urgency, frequency, feeling of incomplete bladder emptying, cloudy urine x 3 days. She has had UTI's in the past but they are infrequent.     Urinary Tract Infection   This is a new problem. Episode onset: 3 days. The problem has been gradually worsening. The quality of the pain is described as burning. The pain is mild. There has been no fever. There is no history of pyelonephritis. Associated symptoms include frequency and urgency. Pertinent negatives include no chills, discharge, flank pain, hematuria, hesitancy, nausea, possible pregnancy, sweats or vomiting. She has tried nothing for the symptoms. There is no history of kidney stones or recurrent UTIs.        Allergies   Allergen Reactions   • Dihydroergotamine GI Intolerance     VOMITING   • Methergine [Methylergonovine Maleate] Nausea And Vomiting   • Amoxicillin Rash   • Methylergonovine GI Intolerance     And sob         Past Medical History:   Diagnosis Date   • Anxiety    • Chest pain 11/6/2019   • Colon polyp    • Depression    • Diabetes mellitus (CMS/HCC)    • Dysphagia 11/6/2019   • Endometriosis    • Fibromyalgia, primary    • GERD (gastroesophageal reflux disease)    • Headache    • HL (hearing loss)    • Hyperlipidemia    • Hypertension    • Low vitamin D level    • Migraines    • Ovarian cyst    • PMDD (premenstrual dysphoric disorder)    • Wears glasses        Past Surgical History:   Procedure Laterality Date   • BLADDER SURGERY      Bladder tuck   • COLONOSCOPY      every 5 years   • LUMBAR DISCECTOMY  2010    L4-S1 Rt- discectomy Dr. Arellano   • SPINAL CORD STIMULATOR IMPLANT N/A 8/19/2019    Procedure: SPINAL CORD STIMULATOR INSERTION;  Surgeon: Ethan Peters MD;  Location: [facility];  Service: Neurosurgery   •  TONSILLECTOMY AND ADENOIDECTOMY         Social History     Socioeconomic History   • Marital status:      Spouse name: Not on file   • Number of children: Not on file   • Years of education: Not on file   • Highest education level: Not on file   Occupational History   • Occupation: Unemployed     Comment: former RN   Tobacco Use   • Smoking status: Never Smoker   • Smokeless tobacco: Never Used   Substance and Sexual Activity   • Alcohol use: No   • Drug use: Defer   • Sexual activity: Defer       Family History   Adopted: Yes         Current Outpatient Medications:   •  ARIPiprazole (Abilify) 2 MG tablet, Take 1 tablet by mouth Daily., Disp: 30 tablet, Rfl: 0  •  atorvastatin (LIPITOR) 40 MG tablet, TAKE ONE TABLET BY MOUTH EVERY NIGHT, Disp: 30 tablet, Rfl: 4  •  BOTOX 200 units reconstituted solution, INJECT 200 UNITS INTO THE HEAD OR NECK AREA BY MD EVERY 90 DAYS, Disp: , Rfl: 3  •  Cholecalciferol (VITAMIN D) 2000 units capsule, Take 2,000 Units by mouth Daily., Disp: , Rfl:   •  cyclobenzaprine (FLEXERIL) 10 MG tablet, Take 1 tablet by mouth At Night As Needed for Muscle Spasms., Disp: 30 tablet, Rfl: 1  •  DHEA 50 MG capsule, Take 1 tablet by mouth Daily., Disp: , Rfl:   •  Diclofenac Potassium,Migraine, (Cambia) 50 MG pack, Take 50 mg by mouth As Needed., Disp: , Rfl:   •  EC-RX TESTOSTERONE TD, Place 1 patch on the skin as directed by provider Daily., Disp: , Rfl:   •  eletriptan (RELPAX) 40 MG tablet, TAKE 1 TABLET BY MOUTH AT THE ONSET OF MIGRAINE. MAY REPEAT IN 2 HRS.MAX 2 DOSE/24HR,3 DAYS/WEEK, Disp: , Rfl: 11  •  FLUoxetine (PROzac) 40 MG capsule, Daily prn with PMDD for 10 days for cycle., Disp: 30 capsule, Rfl: 1  •  glucose blood test strip, 1 each by Other route Daily. Use as instructed, Disp: 100 each, Rfl: 2  •  glucose monitor monitoring kit, 1 each Daily., Disp: 1 each, Rfl: 1  •  hydroCHLOROthiazide (HYDRODIURIL) 12.5 MG tablet, TAKE ONE TABLET BY MOUTH DAILY, Disp: 30 tablet, Rfl:  11  •  Lancets 28G misc, 1 application Daily., Disp: 100 each, Rfl: 2  •  magnesium chloride ER 64 MG DR tablet, Take 2 tablets by mouth Daily., Disp: , Rfl:   •  metFORMIN (GLUCOPHAGE) 1000 MG tablet, Take 1 tablet by mouth 2 (Two) Times a Day With Meals., Disp: 60 tablet, Rfl: 11  •  nebivolol (BYSTOLIC) 10 MG tablet, Take 1 tablet by mouth Daily., Disp: 30 tablet, Rfl: 2  •  nortriptyline (PAMELOR) 10 MG capsule, nortriptyline 10 mg capsule  TAKE 4 CAPSULES AT BEDTIME, Disp: , Rfl:   •  omeprazole (priLOSEC) 40 MG capsule, TAKE ONE CAPSULE BY MOUTH DAILY, Disp: 90 capsule, Rfl: 3  •  oxyCODONE (ROXICODONE) 15 MG immediate release tablet, Take 1 tablet by mouth Every 6 (Six) Hours As Needed., Disp: , Rfl:   •  TROKENDI  MG capsule sustained-release 24 hr, Take 1 capsule by mouth Daily., Disp: , Rfl: 1  •  phenazopyridine (Pyridium) 200 MG tablet, Take 1 tablet by mouth 3 (Three) Times a Day As Needed for Bladder Spasms for up to 2 days., Disp: 6 tablet, Rfl: 0  •  sulfamethoxazole-trimethoprim (Bactrim DS) 800-160 MG per tablet, Take 1 tablet by mouth 2 (Two) Times a Day for 5 days., Disp: 10 tablet, Rfl: 0      Review of Systems   Constitutional: Negative for chills, diaphoresis, fatigue and fever.   Gastrointestinal: Negative for abdominal distention, abdominal pain, nausea and vomiting.   Genitourinary: Positive for dysuria, frequency and urgency. Negative for decreased urine volume, flank pain, genital sores, hematuria, hesitancy, menstrual problem, pelvic pain, pelvic pressure, urinary incontinence, vaginal bleeding, vaginal discharge and vaginal pain.   Musculoskeletal: Positive for back pain (low back).        There were no vitals filed for this visit.    Objective   Physical Exam  Constitutional:       General: She is not in acute distress.     Appearance: Normal appearance. She is not ill-appearing, toxic-appearing or diaphoretic.   Pulmonary:      Effort: Pulmonary effort is normal.   Abdominal:       General: Bowel sounds are normal.      Palpations: Abdomen is soft.      Tenderness: There is no abdominal tenderness.      Comments: Per pt    Neurological:      Mental Status: She is alert and oriented to person, place, and time.   Psychiatric:         Mood and Affect: Mood normal.         Speech: Speech normal.         Behavior: Behavior normal.          Procedures     Assessment/Plan   Diagnoses and all orders for this visit:    1. Suspected UTI (Primary)  -     sulfamethoxazole-trimethoprim (Bactrim DS) 800-160 MG per tablet; Take 1 tablet by mouth 2 (Two) Times a Day for 5 days.  Dispense: 10 tablet; Refill: 0  -     phenazopyridine (Pyridium) 200 MG tablet; Take 1 tablet by mouth 3 (Three) Times a Day As Needed for Bladder Spasms for up to 2 days.  Dispense: 6 tablet; Refill: 0      Wipe front to back, stay hydrated with water to flush the kidneys, avoid caffeinated beverages.  If symptoms worsen or do not improve follow up with your PCP or visit your nearest Urgent Care Center or ER.        PLAN: Discussed dosing, side effects, recommended other symptomatic care.  Patient should follow up with primary care provider if symptoms worsen, fail to resolve or other symptoms need attention. Patient/family agree to the above.     Nori Jacobson, APRN

## 2021-01-04 ENCOUNTER — OFFICE VISIT (OUTPATIENT)
Dept: INTERNAL MEDICINE | Facility: CLINIC | Age: 56
End: 2021-01-04

## 2021-01-04 ENCOUNTER — PATIENT MESSAGE (OUTPATIENT)
Dept: INTERNAL MEDICINE | Facility: CLINIC | Age: 56
End: 2021-01-04

## 2021-01-04 ENCOUNTER — TELEMEDICINE (OUTPATIENT)
Dept: PSYCHIATRY | Facility: CLINIC | Age: 56
End: 2021-01-04

## 2021-01-04 VITALS
OXYGEN SATURATION: 96 % | BODY MASS INDEX: 28.1 KG/M2 | WEIGHT: 164.6 LBS | SYSTOLIC BLOOD PRESSURE: 120 MMHG | DIASTOLIC BLOOD PRESSURE: 72 MMHG | HEART RATE: 89 BPM | RESPIRATION RATE: 16 BRPM | TEMPERATURE: 96.9 F | HEIGHT: 64 IN

## 2021-01-04 DIAGNOSIS — F33.1 MAJOR DEPRESSIVE DISORDER, RECURRENT EPISODE, MODERATE (HCC): Primary | ICD-10-CM

## 2021-01-04 DIAGNOSIS — M79.7 FIBROMYALGIA: Primary | ICD-10-CM

## 2021-01-04 DIAGNOSIS — M96.1 LUMBAR POST-LAMINECTOMY SYNDROME: ICD-10-CM

## 2021-01-04 DIAGNOSIS — M19.071 ARTHRITIS OF FIRST METATARSOPHALANGEAL (MTP) JOINT OF RIGHT FOOT: Primary | ICD-10-CM

## 2021-01-04 DIAGNOSIS — F43.22 ADJUSTMENT DISORDER WITH ANXIOUS MOOD: ICD-10-CM

## 2021-01-04 DIAGNOSIS — J02.9 SORE THROAT: ICD-10-CM

## 2021-01-04 DIAGNOSIS — F32.81 PMDD (PREMENSTRUAL DYSPHORIC DISORDER): ICD-10-CM

## 2021-01-04 DIAGNOSIS — M79.7 FIBROMYALGIA: ICD-10-CM

## 2021-01-04 PROCEDURE — 99213 OFFICE O/P EST LOW 20 MIN: CPT | Performed by: NURSE PRACTITIONER

## 2021-01-04 PROCEDURE — 99213 OFFICE O/P EST LOW 20 MIN: CPT | Performed by: INTERNAL MEDICINE

## 2021-01-04 RX ORDER — DULOXETIN HYDROCHLORIDE 60 MG/1
60 CAPSULE, DELAYED RELEASE ORAL DAILY
Qty: 30 CAPSULE | Refills: 0 | Status: SHIPPED | OUTPATIENT
Start: 2021-01-04 | End: 2021-02-01 | Stop reason: SDUPTHER

## 2021-01-04 RX ORDER — DULOXETIN HYDROCHLORIDE 60 MG/1
60 CAPSULE, DELAYED RELEASE ORAL DAILY
COMMUNITY
End: 2021-01-04 | Stop reason: SDUPTHER

## 2021-01-04 NOTE — PROGRESS NOTES
Internal Medicine Follow Up    Chief Complaint  Yelena Morris is a 55 y.o. female who presents today for follow up of chronic medical conditions outlined below.    Chief Complaint   Patient presents with   • Pain     Pain management referral        HPI  Ms. Morris comes in today to get a new pain management referral. She was given referral to [facility] in November but prefers another location. She forgot the list of preferred locations today. She will send me a message following her visit. She additionally notes slight sore throat starting last night and improved but still present today. She has not had fever, cough, other URI symptoms. She is currently on bactrim for UTI. She also fell getting into her bathtub last night, denies any injury or hitting her head. She had a video appointment with her psychiatrist immediately following this visit.       Review of Systems  Review of Systems   Constitutional: Negative.    HENT: Positive for sore throat. Negative for congestion, ear pain, mouth sores, nosebleeds, postnasal drip, rhinorrhea, sneezing and trouble swallowing.    Respiratory: Negative.    Cardiovascular: Negative.    Musculoskeletal: Positive for arthralgias and back pain.        Current Medications  Current Outpatient Medications on File Prior to Visit   Medication Sig Dispense Refill   • ARIPiprazole (Abilify) 2 MG tablet Take 1 tablet by mouth Daily. 30 tablet 0   • atorvastatin (LIPITOR) 40 MG tablet TAKE ONE TABLET BY MOUTH EVERY NIGHT 30 tablet 4   • BOTOX 200 units reconstituted solution INJECT 200 UNITS INTO THE HEAD OR NECK AREA BY MD EVERY 90 DAYS  3   • Cholecalciferol (VITAMIN D) 2000 units capsule Take 2,000 Units by mouth Daily.     • cyclobenzaprine (FLEXERIL) 10 MG tablet Take 1 tablet by mouth At Night As Needed for Muscle Spasms. 30 tablet 1   • Diclofenac Potassium,Migraine, (Cambia) 50 MG pack Take 50 mg by mouth As Needed.     • DULoxetine (CYMBALTA) 60 MG  "capsule Take 60 mg by mouth Daily.     • EC-RX TESTOSTERONE TD Place 1 patch on the skin as directed by provider Daily.     • eletriptan (RELPAX) 40 MG tablet TAKE 1 TABLET BY MOUTH AT THE ONSET OF MIGRAINE. MAY REPEAT IN 2 HRS.MAX 2 DOSE/24HR,3 DAYS/WEEK  11   • FLUoxetine (PROzac) 40 MG capsule Daily prn with PMDD for 10 days for cycle. 30 capsule 1   • glucose blood test strip 1 each by Other route Daily. Use as instructed 100 each 2   • glucose monitor monitoring kit 1 each Daily. 1 each 1   • hydroCHLOROthiazide (HYDRODIURIL) 12.5 MG tablet TAKE ONE TABLET BY MOUTH DAILY 30 tablet 11   • Lancets 28G misc 1 application Daily. 100 each 2   • magnesium chloride ER 64 MG DR tablet Take 2 tablets by mouth Daily.     • metFORMIN (GLUCOPHAGE) 1000 MG tablet Take 1 tablet by mouth 2 (Two) Times a Day With Meals. 60 tablet 11   • nebivolol (BYSTOLIC) 10 MG tablet Take 1 tablet by mouth Daily. 30 tablet 2   • nortriptyline (PAMELOR) 10 MG capsule nortriptyline 10 mg capsule   TAKE 4 CAPSULES AT BEDTIME     • omeprazole (priLOSEC) 40 MG capsule TAKE ONE CAPSULE BY MOUTH DAILY 90 capsule 3   • oxyCODONE (ROXICODONE) 15 MG immediate release tablet Take 1 tablet by mouth Every 6 (Six) Hours As Needed.     • sulfamethoxazole-trimethoprim (Bactrim DS) 800-160 MG per tablet Take 1 tablet by mouth 2 (Two) Times a Day for 5 days. 10 tablet 0   • TROKENDI  MG capsule sustained-release 24 hr Take 1 capsule by mouth Daily.  1   • DHEA 50 MG capsule Take 1 tablet by mouth Daily.       No current facility-administered medications on file prior to visit.        Allergies  Allergies   Allergen Reactions   • Dihydroergotamine GI Intolerance     VOMITING   • Methergine [Methylergonovine Maleate] Nausea And Vomiting   • Amoxicillin Rash   • Methylergonovine GI Intolerance     And sob         Objective  Visit Vitals  /72   Pulse 89   Temp 96.9 °F (36.1 °C)   Resp 16   Ht 162.6 cm (64.02\")   Wt 74.7 kg (164 lb 9.6 oz)   SpO2 96% "   BMI 28.24 kg/m²        Physical Exam  Physical Exam  Vitals signs and nursing note reviewed.   Constitutional:       General: She is not in acute distress.     Appearance: Normal appearance. She is well-developed. She is obese. She is not ill-appearing or toxic-appearing.   HENT:      Head: Normocephalic and atraumatic.      Right Ear: External ear normal.      Left Ear: External ear normal.      Nose: Nose normal.      Mouth/Throat:      Mouth: Mucous membranes are moist.      Pharynx: Posterior oropharyngeal erythema (oropharynx, palate with small papules) present. No oropharyngeal exudate.   Eyes:      Conjunctiva/sclera: Conjunctivae normal.   Pulmonary:      Effort: Pulmonary effort is normal. No respiratory distress.   Musculoskeletal:         General: No deformity or signs of injury.   Skin:     General: Skin is warm and dry.      Findings: No rash.   Neurological:      General: No focal deficit present.      Mental Status: She is alert and oriented to person, place, and time. Mental status is at baseline.      Gait: Gait normal.   Psychiatric:         Mood and Affect: Mood normal.         Results  Results for orders placed or performed in visit on 12/16/20   COVID-19 PCR, [facility], NP SWAB IN [facility] VIRAL TRANSPORT MEDIA 24-30 HR TAT - Swab, Nasopharynx    Specimen: Nasopharynx; Swab   Result Value Ref Range    SARS-CoV-2 GARTH Not Detected Not Detected   POC Influenza A / B    Specimen: Swab   Result Value Ref Range    Rapid Influenza A Ag Negative Negative    Rapid Influenza B Ag Negative Negative    Internal Control Passed Passed    Lot Number [id number]     Expiration Date 5/7/22         Assessment and Plan  Diagnoses and all orders for this visit:    Fibromyalgia and Lumbar post-laminectomy syndrome  - Currently following with pain management and on celebrex 200mg daily, oxycodone 15mg q6h PRN, flexeril 10mg qhs PRN.  - Needs referral to new pain management provider as current provider is leaving practice.  In November she had requested Dr. Claudio Humphreys and referral was placed however she is now requesting alternate provider and will notify me of her preference via patient message.    Sore throat  - Likely viral in etiology given appearance with erythema and small papules. Does not appear consistent with strep throat. Clinically improving and she declines testing including COVID and strep today. Advised to continue supportive care and return for telephone visit and swab if symptoms worsen.     Health Maintenance  - Pap smear: GYN Dr. Mcguire, unable to get records thus far.  - Mammogram: Mammogram ordered, needs scheduled.  - Colonoscopy: 1/2018 sigmoid tubular adenoma. Repeat 5y.  - HCV: negative  - Immunizations: Flu UTD. Tdap 2019. Pneumovax UTD. Discussed shingrix and she will call her insurance to determine coverage.  - Depression screening: negative 2/2020     Return in about 1 week (around 1/11/2021).     Answers for HPI/ROS submitted by the patient on 12/29/2020   What is the primary reason for your visit?: Other  Please describe your symptoms.: Back pain in lower back, right hip pain. I need a pain management referral.  Have you had these symptoms before?: Yes  How long have you been having these symptoms?: Greater than 2 weeks

## 2021-01-04 NOTE — PROGRESS NOTES
"This provider is located at [facility], [address] [place] [address].The Patient is seen remotely at home, [address] [place] [address] [place] [address] via [employer].  Patient is being seen via telehealth and confirm that they are in a secure environment for this session. The patient's condition being diagnosed/treated is appropriate for telemedicine. The provider identified himself/herself: herself as well as her credentials.   The patient gave consent to be seen remotely, and when consent is given they understand that the consent allows for patient identifiable information to be sent to a third party as needed.   They may refuse to be seen remotely at any time. The electronic data is encrypted and password protected, and the patient has been advised of the potential risks to privacy not withstanding such measures.    You have chosen to receive care through a telehealth visit.  Do you consent to use a video/audio connection for your medical care today? Yes      Subjective   Yelena Morris is a 55 y.o. female who is here today for medication management follow up.    Chief Complaint:  Follow-up for depression     History of Present Illness   Patient is seen today via [employer] reporting that she is doing so much better.  Patient states that she is currently on the Cymbalta 60 mg as it helps much better with her fibromyalgia as well as with her depression and anxiety.  Patient states that with the addition of the aripiprazole she has not had any depressive or anxiety symptoms and states that \"I'm so much better\".  Patient states that she is \"sleeping great\".  She reports her appetite is improved.  Patient states that she is having some dry mouth but denies any other side effects to the current medications which is common with the combination of medication she is on for her migraines as well as depression and anxiety.  Patient mentioned that she does notice memory issues and feeling a fog " which started when she was on Topamax but her regimen helped so much with her migraines that she does not want to come off of it encouraged her to talk with her neurologist.  Patient denies any other side effects or concerns.  Patient denies any SI/HI/AH/VH.      The following portions of the patient's history were reviewed and updated as appropriate: allergies, current medications, past family history, past medical history, past social history, past surgical history and problem list.    Review of Systems   Neurological: Positive for headaches.   Psychiatric/Behavioral: Negative.  Negative for dysphoric mood. The patient is not nervous/anxious.        Objective   Physical Exam  Nursing note reviewed.   Constitutional:       Appearance: Normal appearance.   Neurological:      Mental Status: She is alert.   Psychiatric:         Attention and Perception: Attention and perception normal.         Mood and Affect: Mood and affect normal. Mood is not anxious or depressed.         Speech: Speech normal.         Behavior: Behavior normal. Behavior is cooperative.         Thought Content: Thought content normal.         Cognition and Memory: Cognition and memory normal.         Judgment: Judgment normal.       not currently breastfeeding. Due to extenuating circumstances and possible current health risks associated with the patient being present in a clinical setting (with current health restrictions in place in regards to possible COVID 19 transmission/exposure), the patient was seen remotely today via a MyChart Video Visit through [employer].  Unable to obtain vital signs due to nature of remote visit.  Height stated at 64 inches.  Weight stated at 164 pounds.      Allergies   Allergen Reactions   • Dihydroergotamine GI Intolerance     VOMITING   • Methergine [Methylergonovine Maleate] Nausea And Vomiting   • Amoxicillin Rash   • Methylergonovine GI Intolerance     And sob         Recent Results (from the past 2016 hour(s))    Hepatitis C Antibody    Collection Time: 11/12/20  2:22 PM    Specimen: Blood   Result Value Ref Range    Hepatitis C Ab Non-Reactive Non-Reactive   Uric acid    Collection Time: 11/12/20  2:22 PM    Specimen: Blood   Result Value Ref Range    Uric Acid 3.8 2.4 - 5.7 mg/dL   COVID-19 PCR, [facility], NP SWAB IN [facility] VIRAL TRANSPORT MEDIA 24-30 HR TAT - Swab, Nasopharynx    Collection Time: 12/16/20  5:41 PM    Specimen: Nasopharynx; Swab   Result Value Ref Range    SARS-CoV-2 GARTH Not Detected Not Detected   POC Influenza A / B    Collection Time: 12/16/20  5:44 PM    Specimen: Swab   Result Value Ref Range    Rapid Influenza A Ag Negative Negative    Rapid Influenza B Ag Negative Negative    Internal Control Passed Passed    Lot Number [id number]     Expiration Date 5/7/22        Current Medications:   Current Outpatient Medications   Medication Sig Dispense Refill   • ARIPiprazole (Abilify) 2 MG tablet Take 1 tablet by mouth Daily. 30 tablet 0   • atorvastatin (LIPITOR) 40 MG tablet TAKE ONE TABLET BY MOUTH EVERY NIGHT 30 tablet 4   • BOTOX 200 units reconstituted solution INJECT 200 UNITS INTO THE HEAD OR NECK AREA BY MD EVERY 90 DAYS  3   • Cholecalciferol (VITAMIN D) 2000 units capsule Take 2,000 Units by mouth Daily.     • cyclobenzaprine (FLEXERIL) 10 MG tablet Take 1 tablet by mouth At Night As Needed for Muscle Spasms. 30 tablet 1   • DHEA 50 MG capsule Take 1 tablet by mouth Daily.     • Diclofenac Potassium,Migraine, (Cambia) 50 MG pack Take 50 mg by mouth As Needed.     • DULoxetine (CYMBALTA) 60 MG capsule Take 1 capsule by mouth Daily. 30 capsule 0   • EC-RX TESTOSTERONE TD Place 1 patch on the skin as directed by provider Daily.     • eletriptan (RELPAX) 40 MG tablet TAKE 1 TABLET BY MOUTH AT THE ONSET OF MIGRAINE. MAY REPEAT IN 2 HRS.MAX 2 DOSE/24HR,3 DAYS/WEEK  11   • FLUoxetine (PROzac) 40 MG capsule Daily prn with PMDD for 10 days for cycle. 30 capsule 1   • glucose blood test strip 1 each by Other  route Daily. Use as instructed 100 each 2   • glucose monitor monitoring kit 1 each Daily. 1 each 1   • hydroCHLOROthiazide (HYDRODIURIL) 12.5 MG tablet TAKE ONE TABLET BY MOUTH DAILY 30 tablet 11   • Lancets 28G misc 1 application Daily. 100 each 2   • magnesium chloride ER 64 MG DR tablet Take 2 tablets by mouth Daily.     • metFORMIN (GLUCOPHAGE) 1000 MG tablet Take 1 tablet by mouth 2 (Two) Times a Day With Meals. 60 tablet 11   • nebivolol (BYSTOLIC) 10 MG tablet Take 1 tablet by mouth Daily. 30 tablet 2   • nortriptyline (PAMELOR) 10 MG capsule nortriptyline 10 mg capsule   TAKE 4 CAPSULES AT BEDTIME     • omeprazole (priLOSEC) 40 MG capsule TAKE ONE CAPSULE BY MOUTH DAILY 90 capsule 3   • oxyCODONE (ROXICODONE) 15 MG immediate release tablet Take 1 tablet by mouth Every 6 (Six) Hours As Needed.     • sulfamethoxazole-trimethoprim (Bactrim DS) 800-160 MG per tablet Take 1 tablet by mouth 2 (Two) Times a Day for 5 days. 10 tablet 0   • TROKENDI  MG capsule sustained-release 24 hr Take 1 capsule by mouth Daily.  1     No current facility-administered medications for this visit.        Mental Status Exam:   Hygiene:   good  Cooperation:  Cooperative  Eye Contact:  Good  Psychomotor Behavior:  Appropriate  Affect:  Appropriate  Hopelessness: Denies  Speech:  Normal  Thought Process:  Goal directed and Linear  Thought Content:  Normal  Suicidal:  None  Homicidal:  None  Hallucinations:  None  Delusion:  None  Memory:  Intact  Orientation:  Person, Place, Time and Situation  Reliability:  good  Insight:  Good  Judgement:  Fair  Impulse Control:  Fair  Physical/Medical Issues:  Yes DM, fibromaialgia, HTN, thyroid nodule    See questionnaire for PHQ 9 and geetha 7       Assessment/Plan   Diagnoses and all orders for this visit:    1. Major depressive disorder, recurrent episode, moderate (CMS/HCC) (Primary)  -     DULoxetine (CYMBALTA) 60 MG capsule; Take 1 capsule by mouth Daily.  Dispense: 30 capsule; Refill:  0    2. Adjustment disorder with anxious mood  -     DULoxetine (CYMBALTA) 60 MG capsule; Take 1 capsule by mouth Daily.  Dispense: 30 capsule; Refill: 0    3. PMDD (premenstrual dysphoric disorder)        Discussed patient's current symptoms as well as any side effects or concerns or changes in medications.  Answered any questions patient had with medication and plan.     -Continue Cymbalta 60 mg daily for depression and anxiety.  -Continue Prozac 40 mg as needed for PMDD.  -Continue aripiprazole 2 mg as adjunct for depression as it has helped the patient significantly with hopelessness and helplessness.    Discussed the risks, benefits, and side effects of the medications; patient ackowledged and verbally consented.  Patient is aware to call the [facility] with any worsening of symptoms.  Patient is agreeable to call 911 or go to the nearest ER should he/she begin having SI/HI.    Prognosis: Guarded dependent on medication/follow up and treatment plan compliance.  Functionality: pt showing improvements in important areas of daily functioning.  Short-Term Goals: Patient will be compliant with medication management and note improvement in symptoms over the next 4 weeks.  Long-Term Goals: Patient will continue psychotherapy as well as medication regimen without impairment in daily functioning over the next 6 months.      Patient will follow-up in 4 weeks, highly encouraged patient that if she did have any worsening symptoms or concerns to contact the clinic for sooner appointment patient verbalized understanding.    Errors in dictation may reflect use of voice recognition software and not all errors in transcription may have been detected prior to signing.

## 2021-01-05 NOTE — TELEPHONE ENCOUNTER
From: Yelena Morris  To: Fiona Corona MD  Sent: 1/4/2021 11:18 AM EST  Subject: Referral Request    Will you please refer me to someone in orthopedics for my right foot arthritis?    Thank you

## 2021-01-11 ENCOUNTER — OFFICE VISIT (OUTPATIENT)
Dept: INTERNAL MEDICINE | Facility: CLINIC | Age: 56
End: 2021-01-11

## 2021-01-11 VITALS
SYSTOLIC BLOOD PRESSURE: 118 MMHG | OXYGEN SATURATION: 98 % | BODY MASS INDEX: 28.45 KG/M2 | TEMPERATURE: 97.1 F | WEIGHT: 165.8 LBS | DIASTOLIC BLOOD PRESSURE: 72 MMHG | HEART RATE: 82 BPM

## 2021-01-11 DIAGNOSIS — F33.41 RECURRENT MAJOR DEPRESSIVE DISORDER, IN PARTIAL REMISSION (HCC): ICD-10-CM

## 2021-01-11 DIAGNOSIS — M96.1 LUMBAR POST-LAMINECTOMY SYNDROME: ICD-10-CM

## 2021-01-11 DIAGNOSIS — M19.071 PRIMARY OSTEOARTHRITIS OF RIGHT FOOT: ICD-10-CM

## 2021-01-11 DIAGNOSIS — M79.7 FIBROMYALGIA: ICD-10-CM

## 2021-01-11 DIAGNOSIS — E11.9 TYPE 2 DIABETES MELLITUS WITHOUT COMPLICATION, WITHOUT LONG-TERM CURRENT USE OF INSULIN (HCC): Primary | ICD-10-CM

## 2021-01-11 LAB — HBA1C MFR BLD: 7.2 %

## 2021-01-11 PROCEDURE — 99214 OFFICE O/P EST MOD 30 MIN: CPT | Performed by: INTERNAL MEDICINE

## 2021-01-11 PROCEDURE — 83036 HEMOGLOBIN GLYCOSYLATED A1C: CPT | Performed by: INTERNAL MEDICINE

## 2021-01-11 NOTE — PROGRESS NOTES
Internal Medicine Follow Up    Chief Complaint  Yelena Morris is a 55 y.o. female who presents today for follow up of chronic medical conditions outlined below.    Chief Complaint   Patient presents with   • Follow-up     Fibromyalgia   • Diabetes        HPI  Ms. Morris comes in today for follow up. She is on metformin 1000mg BID now, had transient stomach upset which has resolved. She has had trouble with vision when watching TV, no eye pain. Needs eye exam. Notes diet could have been better over the holidays and also that exercise has been limited by joint pains and foot pain. Will see Dr. Hills Wednesday. Also has new pain management appointment on 1/29 but notes that her oxycodone will run out a week before the appointment. Her prior pain management provider is no longer able to write for her pain medication as he has left the office. She reports mood is great after addition of abilify 2mg daily.    Diabetes  She has type 2 diabetes mellitus. No MedicAlert identification noted. The initial diagnosis of diabetes was made 2 years ago. Hypoglycemia symptoms include headaches. Pertinent negatives for hypoglycemia include no confusion, dizziness, hunger, mood changes, nervousness/anxiousness, pallor, seizures, sleepiness, speech difficulty, sweats or tremors. Associated symptoms include blurred vision, fatigue and visual change. Pertinent negatives for diabetes include no chest pain, no foot paresthesias, no foot ulcerations, no polydipsia, no polyphagia, no polyuria, no weakness and no weight loss. Pertinent negatives for hypoglycemia complications include no blackouts, no hospitalization, no nocturnal hypoglycemia, no required assistance and no required glucagon injection. Symptoms are stable. Pertinent negatives for diabetic complications include no CVA, heart disease, impotence, nephropathy, peripheral neuropathy, PVD or retinopathy. Risk factors for coronary artery disease include hypertension, sedentary  lifestyle and stress. Current diabetic treatment includes oral agent (monotherapy). She is compliant with treatment most of the time. Her weight is stable. She is following a generally healthy diet. Meal planning includes avoidance of concentrated sweets. She has had a previous visit with a dietitian. She rarely participates in exercise. She monitors blood glucose at home 1-2 x per week. She monitors urine at home <1 x per month. Blood glucose monitoring compliance is fair. Her home blood glucose trend is decreasing steadily. Her breakfast blood glucose is taken between 7-8 am. Her breakfast blood glucose range is generally  mg/dl. She does not see a podiatrist.Eye exam is not current.        Review of Systems  Review of Systems   Constitutional: Positive for fatigue. Negative for unexpected weight loss.   Eyes: Positive for blurred vision. Negative for pain.   Respiratory: Negative.    Cardiovascular: Negative for chest pain.   Endocrine: Negative for polydipsia, polyphagia and polyuria.   Genitourinary: Negative for impotence.   Musculoskeletal: Positive for arthralgias.   Skin: Negative for pallor.   Neurological: Negative for dizziness, tremors, seizures, speech difficulty, weakness and confusion.   Psychiatric/Behavioral: Negative for depressed mood. The patient is not nervous/anxious.         Current Medications  Current Outpatient Medications on File Prior to Visit   Medication Sig Dispense Refill   • ARIPiprazole (Abilify) 2 MG tablet Take 1 tablet by mouth Daily. 30 tablet 0   • atorvastatin (LIPITOR) 40 MG tablet TAKE ONE TABLET BY MOUTH EVERY NIGHT 30 tablet 4   • BOTOX 200 units reconstituted solution INJECT 200 UNITS INTO THE HEAD OR NECK AREA BY MD EVERY 90 DAYS  3   • Cholecalciferol (VITAMIN D) 2000 units capsule Take 2,000 Units by mouth Daily.     • cyclobenzaprine (FLEXERIL) 10 MG tablet Take 1 tablet by mouth At Night As Needed for Muscle Spasms. 30 tablet 1   • DHEA 50 MG capsule Take 1  tablet by mouth Daily.     • Diclofenac Potassium,Migraine, (Cambia) 50 MG pack Take 50 mg by mouth As Needed.     • DULoxetine (CYMBALTA) 60 MG capsule Take 1 capsule by mouth Daily. 30 capsule 0   • EC-RX TESTOSTERONE TD Place 1 patch on the skin as directed by provider Daily.     • eletriptan (RELPAX) 40 MG tablet TAKE 1 TABLET BY MOUTH AT THE ONSET OF MIGRAINE. MAY REPEAT IN 2 HRS.MAX 2 DOSE/24HR,3 DAYS/WEEK  11   • FLUoxetine (PROzac) 40 MG capsule Daily prn with PMDD for 10 days for cycle. 30 capsule 1   • glucose blood test strip 1 each by Other route Daily. Use as instructed 100 each 2   • glucose monitor monitoring kit 1 each Daily. 1 each 1   • hydroCHLOROthiazide (HYDRODIURIL) 12.5 MG tablet TAKE ONE TABLET BY MOUTH DAILY 30 tablet 11   • Lancets 28G misc 1 application Daily. 100 each 2   • magnesium chloride ER 64 MG DR tablet Take 2 tablets by mouth Daily.     • metFORMIN (GLUCOPHAGE) 1000 MG tablet Take 1 tablet by mouth 2 (Two) Times a Day With Meals. 60 tablet 11   • nebivolol (BYSTOLIC) 10 MG tablet Take 1 tablet by mouth Daily. 30 tablet 2   • nortriptyline (PAMELOR) 10 MG capsule nortriptyline 10 mg capsule   TAKE 4 CAPSULES AT BEDTIME     • omeprazole (priLOSEC) 40 MG capsule TAKE ONE CAPSULE BY MOUTH DAILY 90 capsule 3   • oxyCODONE (ROXICODONE) 15 MG immediate release tablet Take 1 tablet by mouth Every 6 (Six) Hours As Needed.     • TROKENDI  MG capsule sustained-release 24 hr Take 1 capsule by mouth Daily.  1     No current facility-administered medications on file prior to visit.        Allergies  Allergies   Allergen Reactions   • Dihydroergotamine GI Intolerance     VOMITING   • Methergine [Methylergonovine Maleate] Nausea And Vomiting   • Amoxicillin Rash   • Methylergonovine GI Intolerance     And sob         Objective  Visit Vitals  /72   Pulse 82   Temp 97.1 °F (36.2 °C)   Wt 75.2 kg (165 lb 12.8 oz)   SpO2 98%   Breastfeeding No   BMI 28.45 kg/m²        Physical  Exam  Physical Exam  Vitals signs and nursing note reviewed.   Constitutional:       General: She is not in acute distress.     Appearance: Normal appearance. She is well-developed.   HENT:      Head: Normocephalic and atraumatic.   Eyes:      Extraocular Movements: Extraocular movements intact.      Conjunctiva/sclera: Conjunctivae normal.   Pulmonary:      Effort: Pulmonary effort is normal. No respiratory distress.   Skin:     General: Skin is warm and dry.   Neurological:      Mental Status: She is alert and oriented to person, place, and time. Mental status is at baseline.      Gait: Gait normal.   Psychiatric:         Mood and Affect: Mood normal.         Behavior: Behavior normal.         Results  Results for orders placed or performed in visit on 01/11/21   POC Glycosylated Hemoglobin (Hb A1C)    Specimen: Blood   Result Value Ref Range    Hemoglobin A1C 7.2 %        Assessment and Plan  Diagnoses and all orders for this visit:    Type 2 diabetes mellitus without complication, without long-term current use of insulin (CMS/[facility])  - A1c has improved to 7.2 on metformin 1000mg BID. Tolerating this well.  - Urine microalbumin nl 9/2020  - She will schedule eye exam    Fibromyalgia and Lumbar post-laminectomy syndrome  - Currently following with pain management and on celebrex 200mg daily, oxycodone 15mg q6h PRN, flexeril 10mg qhs PRN.  - Will establish with new pain management provider 1/29. Prior provider has left. DANNY reviewed and verified that she will need refill of oxycodone IR 15mg q6h PRN 1/22. Advised that I could provide 1 week supply to bridge her to her appointment. She will call the office to request refill closer to 1/20.    Primary osteoarthritis of right foot  - R foot pain at first MTP joint with intermittent redness and swelling. Uric acid level normal. Xray shows degenerative changes.  - Pain persists despite use of OTC pain relievers, topical analgesics. Referred to orthopedics.    Recurrent  major depressive disorder, in partial remission (CMS/HCC)  - mood improving after addition of abilify 2mg daily to cymbalta 60mg daily and prozac 40mg PRN PMDD. Following with [facility].     Health Maintenance  - Pap smear: GYN Dr. Mcguire, unable to get records thus far.  - Mammogram: Mammogram ordered, needs scheduled.  - Colonoscopy: 1/2018 sigmoid tubular adenoma. Repeat 5y.  - HCV: negative  - Immunizations: Flu UTD. Tdap 2019. Pneumovax UTD. Discussed shingrix and she will call her insurance to determine coverage.  - Depression screening: negative 2/2020     Return in about 4 months (around 5/11/2021) for Annual 45 minutes.  Answers for HPI/ROS submitted by the patient on 1/11/2021   Diabetes problem  What is the primary reason for your visit?: Diabetes

## 2021-01-15 ENCOUNTER — OFFICE VISIT (OUTPATIENT)
Dept: ORTHOPEDIC SURGERY | Facility: CLINIC | Age: 56
End: 2021-01-15

## 2021-01-15 VITALS — OXYGEN SATURATION: 97 % | HEIGHT: 64 IN | WEIGHT: 165.79 LBS | HEART RATE: 102 BPM | BODY MASS INDEX: 28.3 KG/M2

## 2021-01-15 DIAGNOSIS — M19.071 ARTHRITIS OF FIRST METATARSOPHALANGEAL (MTP) JOINT OF RIGHT FOOT: Primary | ICD-10-CM

## 2021-01-15 PROCEDURE — 99203 OFFICE O/P NEW LOW 30 MIN: CPT | Performed by: ORTHOPAEDIC SURGERY

## 2021-01-15 NOTE — PROGRESS NOTES
[facility] Note    Subjective     Chief Complaint   Patient presents with   • Right Foot - Pain        HPI      Yelena Morris is a 55 y.o. female who presents with new problem of: right foot pain.  Onset: atraumatic and gradual in nature. The issue has been ongoing for 2 year(s). Pain is a 3/10 on the pain scale. Pain is described as burning and throbbing. Associated symptoms include pain and swelling. The pain is worse with walking, sitting, climbing stairs, working and leisure; ice improve the pain. Previous treatments have included: voltaren gel.    I have reviewed the following portions of the patient's history:History of Present Illness and review of systems.          Past Medical History:   Diagnosis Date   • Anxiety    • Chest pain 11/6/2019   • Colon polyp    • Depression    • Diabetes mellitus (CMS/HCC)    • Dysphagia 11/6/2019   • Endometriosis    • Fibromyalgia, primary    • GERD (gastroesophageal reflux disease)    • Headache    • HL (hearing loss)    • Hyperlipidemia    • Hypertension    • Low vitamin D level    • Migraines    • Ovarian cyst    • PMDD (premenstrual dysphoric disorder)    • Wears glasses       Past Surgical History:   Procedure Laterality Date   • BLADDER SURGERY      Bladder tuck   • COLONOSCOPY      every 5 years   • LUMBAR DISCECTOMY  2010    L4-S1 Rt- discectomy Dr. Arellano   • SPINAL CORD STIMULATOR IMPLANT N/A 8/19/2019    Procedure: SPINAL CORD STIMULATOR INSERTION;  Surgeon: Ethan Peters MD;  Location: [facility];  Service: Neurosurgery   • TONSILLECTOMY AND ADENOIDECTOMY        Family History   Adopted: Yes     Social History     Socioeconomic History   • Marital status:      Spouse name: Not on file   • Number of children: Not on file   • Years of education: Not on file   • Highest education level: Not on file   Occupational History   • Occupation: Unemployed     Comment: former RN   Tobacco Use   • Smoking status: Never Smoker   •  Smokeless tobacco: Never Used   Substance and Sexual Activity   • Alcohol use: No   • Drug use: Defer   • Sexual activity: Defer      Current Outpatient Medications on File Prior to Visit   Medication Sig Dispense Refill   • ARIPiprazole (Abilify) 2 MG tablet Take 1 tablet by mouth Daily. 30 tablet 0   • atorvastatin (LIPITOR) 40 MG tablet TAKE ONE TABLET BY MOUTH EVERY NIGHT 30 tablet 4   • BOTOX 200 units reconstituted solution INJECT 200 UNITS INTO THE HEAD OR NECK AREA BY MD EVERY 90 DAYS  3   • Cholecalciferol (VITAMIN D) 2000 units capsule Take 2,000 Units by mouth Daily.     • cyclobenzaprine (FLEXERIL) 10 MG tablet Take 1 tablet by mouth At Night As Needed for Muscle Spasms. 30 tablet 1   • DHEA 50 MG capsule Take 1 tablet by mouth Daily.     • Diclofenac Potassium,Migraine, (Cambia) 50 MG pack Take 50 mg by mouth As Needed.     • DULoxetine (CYMBALTA) 60 MG capsule Take 1 capsule by mouth Daily. 30 capsule 0   • EC-RX TESTOSTERONE TD Place 1 patch on the skin as directed by provider Daily.     • eletriptan (RELPAX) 40 MG tablet TAKE 1 TABLET BY MOUTH AT THE ONSET OF MIGRAINE. MAY REPEAT IN 2 HRS.MAX 2 DOSE/24HR,3 DAYS/WEEK  11   • FLUoxetine (PROzac) 40 MG capsule Daily prn with PMDD for 10 days for cycle. 30 capsule 1   • glucose blood test strip 1 each by Other route Daily. Use as instructed 100 each 2   • glucose monitor monitoring kit 1 each Daily. 1 each 1   • hydroCHLOROthiazide (HYDRODIURIL) 12.5 MG tablet TAKE ONE TABLET BY MOUTH DAILY 30 tablet 11   • Lancets 28G misc 1 application Daily. 100 each 2   • magnesium chloride ER 64 MG DR tablet Take 2 tablets by mouth Daily.     • metFORMIN (GLUCOPHAGE) 1000 MG tablet Take 1 tablet by mouth 2 (Two) Times a Day With Meals. 60 tablet 11   • nebivolol (BYSTOLIC) 10 MG tablet Take 1 tablet by mouth Daily. 30 tablet 2   • nortriptyline (PAMELOR) 10 MG capsule nortriptyline 10 mg capsule   TAKE 4 CAPSULES AT BEDTIME     • omeprazole (priLOSEC) 40 MG capsule  "TAKE ONE CAPSULE BY MOUTH DAILY 90 capsule 3   • oxyCODONE (ROXICODONE) 15 MG immediate release tablet Take 1 tablet by mouth Every 6 (Six) Hours As Needed.     • TROKENDI  MG capsule sustained-release 24 hr Take 1 capsule by mouth Daily.  1     No current facility-administered medications on file prior to visit.       Allergies   Allergen Reactions   • Dihydroergotamine GI Intolerance     VOMITING   • Methergine [Methylergonovine Maleate] Nausea And Vomiting   • Amoxicillin Rash   • Methylergonovine GI Intolerance     And sob          The following portions of the patient's history were reviewed and updated as appropriate: allergies, current medications, past family history, past medical history, past social history, past surgical history and problem list.    Review of Systems   Constitutional: Negative.         Night sweats   HENT: Positive for hearing loss and tinnitus.    Eyes: Negative.    Respiratory: Negative.    Cardiovascular: Negative.    Gastrointestinal: Negative.    Endocrine: Negative.    Genitourinary: Negative.    Musculoskeletal: Positive for arthralgias, back pain, joint swelling, myalgias, neck pain and neck stiffness.        Fibromyalgia   Skin: Negative.    Allergic/Immunologic: Positive for environmental allergies.   Neurological: Negative.    Hematological: Negative.    Psychiatric/Behavioral: Positive for dysphoric mood (PDD).        Objective      Physical Exam  Pulse 102   Ht 162.6 cm (64.02\")   Wt 75.2 kg (165 lb 12.6 oz)   SpO2 97%   BMI 28.44 kg/m²     Body mass index is 28.44 kg/m².    GENERAL APPEARANCE: awake, alert & oriented x 3, in no acute distress and well developed, well nourished  PSYCH: normal mood and affect  Tenderness to the first MTP joint.  No redness.  Motor sensor intact.  Imaging/Studies  Imaging Results (Last 7 Days)     ** No results found for the last 168 hours. **        I Viewed x-rays from December 14 which show first MTP joint arthritis with joint " space narrowing and osteophytes  Assessment/Plan        ICD-10-CM ICD-9-CM   1. Arthritis of first metatarsophalangeal (MTP) joint of right foot  M19.071 716.97     I recommend Voltaren gel.  Stiff soled shoe.  She will go to [employer] to see if they have a shoe recommendation.  She will follow-up as needed.  Medical Decision Making  Management Options : over-the-counter medicine  Data/Risk: radiology tests and independent visualization of imaging, lab tests, or EMG/NCV    Conrad Hills MD  01/15/21  09:45 EST         EMR Dragon/Transcription disclaimer:  Much of this encounter note is an electronic transcription of spoken language to printed text. Electronic transcription of spoken language may permit erroneous, or at times, nonsensical words or phrases to be inadvertently transcribed. Although I have reviewed the note for such errors, some may still exist.

## 2021-01-17 DIAGNOSIS — F33.1 MAJOR DEPRESSIVE DISORDER, RECURRENT EPISODE, MODERATE (HCC): ICD-10-CM

## 2021-01-18 RX ORDER — ARIPIPRAZOLE 2 MG/1
TABLET ORAL
Qty: 30 TABLET | Refills: 0 | Status: SHIPPED | OUTPATIENT
Start: 2021-01-18 | End: 2021-02-01 | Stop reason: SDUPTHER

## 2021-01-19 DIAGNOSIS — M96.1 LUMBAR POST-LAMINECTOMY SYNDROME: ICD-10-CM

## 2021-01-19 DIAGNOSIS — M79.7 FIBROMYALGIA: Primary | ICD-10-CM

## 2021-01-19 NOTE — TELEPHONE ENCOUNTER
Caller: Yelena Morris    Relationship: Self    Best call back number: [phone]    Medication needed:   Requested Prescriptions     Pending Prescriptions Disp Refills   • oxyCODONE (ROXICODONE) 15 MG immediate release tablet        Sig: Take 1 tablet by mouth Every 6 (Six) Hours As Needed.       When do you need the refill by: 3 DAYS    What details did the patient provide when requesting the medication: PT STATED THAT SHE WILL SHE HER PAIN MANAGEMENT DR CUTLER ON 01-29-21 AND WILL NEED HER REFILL ON 01-21-21 AND STATED THAT DR WEBB STATED THAT [facility] SEND IN A PRESCRIPTION TO [facility] THE DATES    Does the patient have less than a 3 day supply:  [] Yes  [x] No    What is the patient's [place] [facility]  [facility] [place] [address] [facility] [phone] [facility] [phone]   [phone]

## 2021-01-19 NOTE — TELEPHONE ENCOUNTER
Last appointment:  1/11/21  Next appointment:  5/13/21  Ash: 1/19/21  UDS: None  CSA: None    Last Refill: 12/22/20  Quantity:  120  Refills: 0

## 2021-01-20 RX ORDER — OXYCODONE HYDROCHLORIDE 15 MG/1
15 TABLET ORAL EVERY 6 HOURS PRN
Qty: 28 TABLET | Refills: 0 | Status: SHIPPED | OUTPATIENT
Start: 2021-01-20 | End: 2021-01-29 | Stop reason: SDUPTHER

## 2021-01-29 DIAGNOSIS — M79.7 FIBROMYALGIA: ICD-10-CM

## 2021-01-29 DIAGNOSIS — M96.1 LUMBAR POST-LAMINECTOMY SYNDROME: ICD-10-CM

## 2021-01-29 RX ORDER — OXYCODONE HYDROCHLORIDE 15 MG/1
15 TABLET ORAL EVERY 6 HOURS PRN
Qty: 56 TABLET | Refills: 0 | Status: SHIPPED | OUTPATIENT
Start: 2021-01-29 | End: 2022-04-28

## 2021-01-29 NOTE — TELEPHONE ENCOUNTER
Called PT and informed of refill. Repeated that this is last refill until pain management takes over prescribing. PT voiced understanding.

## 2021-01-29 NOTE — TELEPHONE ENCOUNTER
PATIENT IS CALLING FOR A REFILL ON HER OXYCODONE, SHE SAYS SHE HAS ALREADY TAKEN THE ONES YOU PRESCRIBED ON 1/20/21. PATIENT IS AT THE PAIN MGMT OFFICE RIGHT NOW, AND THE DOCTOR IS SAYING THAT HE DOES NOT PRESCRIBE MEDICATIONS ON THE FIRST VISIT, AND THAT SHE NEEDS TO CALL HER PRIMARY FOR IT UNTIL HER NEXT VISIT, WHICH IS IN 2 WEEKS. PATIENT IS REQUESTING A RETURN CALL. [phone]. [facility] PHARMACY IS [facility] [place].

## 2021-02-01 ENCOUNTER — TELEMEDICINE (OUTPATIENT)
Dept: PSYCHIATRY | Facility: CLINIC | Age: 56
End: 2021-02-01

## 2021-02-01 DIAGNOSIS — F43.22 ADJUSTMENT DISORDER WITH ANXIOUS MOOD: ICD-10-CM

## 2021-02-01 DIAGNOSIS — F33.1 MAJOR DEPRESSIVE DISORDER, RECURRENT EPISODE, MODERATE (HCC): ICD-10-CM

## 2021-02-01 DIAGNOSIS — F32.81 PMDD (PREMENSTRUAL DYSPHORIC DISORDER): ICD-10-CM

## 2021-02-01 PROCEDURE — 99214 OFFICE O/P EST MOD 30 MIN: CPT | Performed by: NURSE PRACTITIONER

## 2021-02-01 RX ORDER — FLUOXETINE HYDROCHLORIDE 40 MG/1
CAPSULE ORAL
Qty: 30 CAPSULE | Refills: 1 | Status: SHIPPED | OUTPATIENT
Start: 2021-02-01 | End: 2021-07-15

## 2021-02-01 RX ORDER — DULOXETIN HYDROCHLORIDE 60 MG/1
60 CAPSULE, DELAYED RELEASE ORAL DAILY
Qty: 90 CAPSULE | Refills: 0 | Status: SHIPPED | OUTPATIENT
Start: 2021-02-01 | End: 2021-04-12 | Stop reason: SDUPTHER

## 2021-02-01 RX ORDER — ARIPIPRAZOLE 2 MG/1
2 TABLET ORAL DAILY
Qty: 90 TABLET | Refills: 0 | Status: SHIPPED | OUTPATIENT
Start: 2021-02-01 | End: 2021-04-12 | Stop reason: SDUPTHER

## 2021-02-01 NOTE — PROGRESS NOTES
This provider is located at [facility], [address] [place] [address].The Patient is seen remotely at home, [address] [facility] [place] [address] via [employer]. Patient is being seen via telehealth and confirm that they are in a secure environment for this session. The patient's condition being diagnosed/treated is appropriate for telemedicine. The provider identified himself/herself: herself as well as her credentials.   The patient gave consent to be seen remotely, and when consent is given they understand that the consent allows for patient identifiable information to be sent to a third party as needed.   They may refuse to be seen remotely at any time. The electronic data is encrypted and password protected, and the patient has been advised of the potential risks to privacy not withstanding such measures.    You have chosen to receive care through a telehealth visit.  Do you consent to use a video/audio connection for your medical care today? Yes      Chief Complaint  Follow-up for depression and anxiety     Subjective          Yelena Radha Morris presents to [facility] for medication management.   History of Present Illness  Patient presents today via [employer] reporting that she is babysitting her grandchildren at this time and has been doing extremely well.  Patient denies any depressive or anxiety symptoms.  Patient denies any side effects from the medications except for dry mouth which she is drinking a lot of fluids but otherwise doing well.  Patient states that the fluoxetine for 10 days during her menstrual cycle was extremely helpful as she stated her symptoms were barely noticeable during that time.  Patient reports that she has a new pain management provider and they are doing new x-rays and going to explore slowly tapering off pain medications which the patient wants to do but states she is nervous about as she knows she is dependent  and is worried about withdrawals.  Patient reports that she believes this is causing issues with her sleep as she states when she gets to sleep she sleeps well but has trouble falling asleep.  Patient denies any new medicine or medicine cold changes.  Patient denies any SI/HI/AH/VH.      Objective   Vital Signs:   There were no vitals taken for this visit.  Due to the remote nature of this encounter (virtual encounter), vitals were unable to be obtained.  Height stated at 64 inches.  Weight stated at 165 pounds.    PHQ-9 Depression Screening  Little interest or pleasure in doing things? 0   Feeling down, depressed, or hopeless? 0   Trouble falling or staying asleep, or sleeping too much? 1   Feeling tired or having little energy? 0   Poor appetite or overeating? 0   Feeling bad about yourself - or that you are a failure or have let yourself or your family down? 0   Trouble concentrating on things, such as reading the newspaper or watching television? 0   Moving or speaking so slowly that other people could have noticed? Or the opposite - being so fidgety or restless that you have been moving around a lot more than usual? 0   Thoughts that you would be better off dead, or of hurting yourself in some way? 0   PHQ-9 Total Score 1   If you checked off any problems, how difficult have these problems made it for you to do your work, take care of things at home, or get along with other people? Not difficult at all     PHQ-9 Total Score: 1        Feeling nervous, anxious or on edge: Not at all  Not being able to stop or control worrying: Not at all  Worrying too much about different things: Not at all  Trouble Relaxing: Not at all  Being so restless that it is hard to sit still: Not at all  Feeling afraid as if something awful might happen: Not at all  Becoming easily annoyed or irritable: Not at all  LUCERO 7 Total Score: 0  If you checked any problems, how difficult have these problems made it for you to do your work, take  care of things at home, or get along with other people: Not difficult at all      PROMIS scale screening tool that patient filled out virtually reviewed by this APRN at today's encounter.      PHQ-9 Score:   PHQ-9 Total Score: 1     Mental Status Exam:   Hygiene:   good  Cooperation:  Cooperative  Eye Contact:  Good  Psychomotor Behavior:  Appropriate  Affect:  Appropriate  Mood: normal  Speech:  Normal  Thought Process:  Goal directed and Linear  Thought Content:  Normal  Suicidal:  None  Homicidal:  None  Hallucinations:  None  Delusion:  None  Memory:  Intact  Orientation:  Person, Place, Time and Situation  Reliability:  good  Insight:  Good  Judgement:  Good  Impulse Control:  Good  Physical/Medical Issues:  Yes HTN, DM, fibromyalgia, carpal tunnel syndrome     Current Medications:   Current Outpatient Medications   Medication Sig Dispense Refill   • ARIPiprazole (ABILIFY) 2 MG tablet Take 1 tablet by mouth Daily. 90 tablet 0   • atorvastatin (LIPITOR) 40 MG tablet TAKE ONE TABLET BY MOUTH EVERY NIGHT 30 tablet 4   • BOTOX 200 units reconstituted solution INJECT 200 UNITS INTO THE HEAD OR NECK AREA BY MD EVERY 90 DAYS  3   • Cholecalciferol (VITAMIN D) 2000 units capsule Take 2,000 Units by mouth Daily.     • cyclobenzaprine (FLEXERIL) 10 MG tablet Take 1 tablet by mouth At Night As Needed for Muscle Spasms. 30 tablet 1   • DHEA 50 MG capsule Take 1 tablet by mouth Daily.     • DULoxetine (CYMBALTA) 60 MG capsule Take 1 capsule by mouth Daily. 90 capsule 0   • EC-RX TESTOSTERONE TD Place 1 patch on the skin as directed by provider Daily.     • eletriptan (RELPAX) 40 MG tablet TAKE 1 TABLET BY MOUTH AT THE ONSET OF MIGRAINE. MAY REPEAT IN 2 HRS.MAX 2 DOSE/24HR,3 DAYS/WEEK  11   • FLUoxetine (PROzac) 40 MG capsule Daily prn with PMDD for 10 days for cycle. 30 capsule 1   • glucose blood test strip 1 each by Other route Daily. Use as instructed 100 each 2   • glucose monitor monitoring kit 1 each Daily. 1 each 1    • hydroCHLOROthiazide (HYDRODIURIL) 12.5 MG tablet TAKE ONE TABLET BY MOUTH DAILY 30 tablet 11   • Lancets 28G misc 1 application Daily. 100 each 2   • magnesium chloride ER 64 MG DR tablet Take 2 tablets by mouth Daily.     • metFORMIN (GLUCOPHAGE) 1000 MG tablet Take 1 tablet by mouth 2 (Two) Times a Day With Meals. 60 tablet 11   • nebivolol (BYSTOLIC) 10 MG tablet Take 1 tablet by mouth Daily. 30 tablet 2   • nortriptyline (PAMELOR) 10 MG capsule nortriptyline 10 mg capsule   TAKE 4 CAPSULES AT BEDTIME     • omeprazole (priLOSEC) 40 MG capsule TAKE ONE CAPSULE BY MOUTH DAILY 90 capsule 3   • oxyCODONE (ROXICODONE) 15 MG immediate release tablet Take 1 tablet by mouth Every 6 (Six) Hours As Needed for Moderate Pain . 56 tablet 0   • TROKENDI  MG capsule sustained-release 24 hr Take 1 capsule by mouth Daily.  1   • Diclofenac Potassium,Migraine, (Cambia) 50 MG pack Take 50 mg by mouth As Needed.       No current facility-administered medications for this visit.        Physical Exam  Nursing note reviewed.   Constitutional:       Appearance: Normal appearance.   Neurological:      Mental Status: She is alert.   Psychiatric:         Attention and Perception: Attention and perception normal.         Mood and Affect: Mood and affect normal.         Speech: Speech normal.         Behavior: Behavior normal. Behavior is cooperative.         Thought Content: Thought content normal.         Cognition and Memory: Cognition and memory normal.         Judgment: Judgment normal.        Result Review :     The following data was reviewed by: ONESIMO Alexander on 02/01/2021:  Common labs    Common Labsle 9/9/20 9/9/20 11/12/20 1/11/21    1049 1214     Hemoglobin A1C 8.2   7.2   Microalbumin, Urine  4.8     Uric Acid   3.8            Data reviewed: PCP notes        Assessment and Plan    Problem List Items Addressed This Visit     None      Visit Diagnoses     Major depressive disorder, recurrent episode, moderate  (CMS/[facility])        Relevant Medications    ARIPiprazole (ABILIFY) 2 MG tablet    FLUoxetine (PROzac) 40 MG capsule    DULoxetine (CYMBALTA) 60 MG capsule    PMDD (premenstrual dysphoric disorder)        Relevant Medications    ARIPiprazole (ABILIFY) 2 MG tablet    FLUoxetine (PROzac) 40 MG capsule    DULoxetine (CYMBALTA) 60 MG capsule    Adjustment disorder with anxious mood        Relevant Medications    ARIPiprazole (ABILIFY) 2 MG tablet    FLUoxetine (PROzac) 40 MG capsule    DULoxetine (CYMBALTA) 60 MG capsule            TREATMENT PLAN/GOALS: Continue supportive psychotherapy efforts and medications as indicated. Treatment and medication options discussed during today's visit. Patient ackowledged and verbally consented to continue with current treatment plan and was educated on the importance of compliance with treatment and follow-up appointments.    MEDICATION ISSUES:  We discussed risks, benefits, and side effects of the above medications and the patient was agreeable with the plan. Patient was educated on the importance of compliance with treatment and follow-up appointments.  Patient is agreeable to call the office with any worsening of symptoms or onset of side effects. Patient is agreeable to call 911 or go to the nearest ER should he/she begin having SI/HI.  Advised patient that she could try CBT to help reframing as she is coming off of opioids as well as doing yoga and water aerobics.  Also encouraged a low-dose of melatonin as well as adult coloring to help sleep at night patient was receptive of these recommendations and felt hopeful.      -Continue Cymbalta 60 mg daily for depression and anxiety as patient has benefited greatly as well for her pain.  -Continue fluoxetine 40 mg daily for PMDD for 10 days during the luteal phase.  -Continue aripiprazole 2 mg as adjunct for depression as patient has failed numerous SSRIs and SNRIs and has benefited the patient tremendously and is medically necessary for  her depression to avoid hospitalization.     Counseled patient regarding multimodal approach with healthy nutrition, healthy sleep, regular physical activity, social activities, counseling, and medications.      Coping skills reviewed and encouraged positive framing of thoughts     Assisted patient in processing above session content; acknowledged and normalized patient’s thoughts, feelings, and concerns.  Applied  positive coping skills and behavior management in session.  Allowed patient to freely discuss issues without interruption or judgment. Provided safe, confidential environment to facilitate the development of positive therapeutic relationship and encourage open, honest communication. Assisted patient in identifying risk factors which would indicate the need for higher level of care including thoughts to harm self or others and/or self-harming behavior and encouraged patient to contact this office, call 911, or present to the nearest emergency room should any of these events occur. Discussed crisis intervention services and means to access.     MEDS ORDERED DURING VISIT:  New Medications Ordered This Visit   Medications   • ARIPiprazole (ABILIFY) 2 MG tablet     Sig: Take 1 tablet by mouth Daily.     Dispense:  90 tablet     Refill:  0   • FLUoxetine (PROzac) 40 MG capsule     Sig: Daily prn with PMDD for 10 days for cycle.     Dispense:  30 capsule     Refill:  1   • DULoxetine (CYMBALTA) 60 MG capsule     Sig: Take 1 capsule by mouth Daily.     Dispense:  90 capsule     Refill:  0           Follow Up   Return in about 4 weeks (around 3/1/2021), or if symptoms worsen or fail to improve.    Patient was given instructions and counseling regarding her condition or for health maintenance advice. Please see specific information pulled into the AVS if appropriate.     This document has been electronically signed by ONESIMO Alexander  February 1, 2021 11:59 EST    Part of this note may be an electronic  transcription/translation of spoken language to printed text using the Dragon Dictation System.

## 2021-02-16 DIAGNOSIS — E78.2 MIXED HYPERLIPIDEMIA: ICD-10-CM

## 2021-02-17 RX ORDER — ATORVASTATIN CALCIUM 40 MG/1
TABLET, FILM COATED ORAL
Qty: 30 TABLET | Refills: 3 | Status: SHIPPED | OUTPATIENT
Start: 2021-02-17 | End: 2021-05-13 | Stop reason: SDUPTHER

## 2021-03-01 ENCOUNTER — TELEMEDICINE (OUTPATIENT)
Dept: PSYCHIATRY | Facility: CLINIC | Age: 56
End: 2021-03-01

## 2021-03-01 DIAGNOSIS — F32.81 PMDD (PREMENSTRUAL DYSPHORIC DISORDER): Chronic | ICD-10-CM

## 2021-03-01 DIAGNOSIS — F43.22 ADJUSTMENT DISORDER WITH ANXIOUS MOOD: ICD-10-CM

## 2021-03-01 DIAGNOSIS — F33.1 MAJOR DEPRESSIVE DISORDER, RECURRENT EPISODE, MODERATE (HCC): Chronic | ICD-10-CM

## 2021-03-01 PROCEDURE — 99214 OFFICE O/P EST MOD 30 MIN: CPT | Performed by: NURSE PRACTITIONER

## 2021-03-01 NOTE — PROGRESS NOTES
This provider is located at [facility], [address] [place] [address].The Patient is seen remotely at home, [address] [facility] [place] [address] via [employer]. Patient is being seen via telehealth and confirm that they are in a secure environment for this session. The patient's condition being diagnosed/treated is appropriate for telemedicine. The provider identified himself/herself: herself as well as her credentials.   The patient gave consent to be seen remotely, and when consent is given they understand that the consent allows for patient identifiable information to be sent to a third party as needed.   They may refuse to be seen remotely at any time. The electronic data is encrypted and password protected, and the patient has been advised of the potential risks to privacy not withstanding such measures.    You have chosen to receive care through a telehealth visit.  Do you consent to use a video/audio connection for your medical care today? Yes      Chief Complaint  Follow-up for depression and anxiety     Subjective    Yelena Radha Morris presents to [facility] for medication management.       History of Present Illness   Patient presents today via [employer] reporting that things have been going well.  She states she has not had any trouble with her medications except for dry mouth.  Patient states that she has felt the medication has helped her tremendously with her depression and anxiety as she is not crying all the time and she has been active with her grandchildren as well as painting and being active around the home.  Patient reports that she is sleeping well.  Patient reports dry mouth but states it is manageable.  Patient states her pain management provider has decreased her pain medication and she starts injections on her neck on Friday and has noticed a slight tremor when she picks something up but it goes away immediately.   Encourage patient this could be from tapering off the medication.  Patient states that she is afraid regarding tapering that she does not want to be addicted but she knows when she is pain-free she is able to help with her family's needs so she has concerns.  Encouraged the patient to talk with her pain management doctor and it may take time for her brain to catch up with her overall physical symptoms since she has been on pain medication for several years.  Patient states overall though she is doing well as she recently traveled to [place] and seeing her grandchild which was a great visit.  Patient denied any other major issues or concerns.  Patient denies any SI/HI/AH/VH.      Objective   Vital Signs:   There were no vitals taken for this visit.  Due to the remote nature of this encounter (virtual encounter), vitals were unable to be obtained.  Height stated at 64 inches.  Weight stated at 165 pounds.      PHQ-9 Score:   PHQ-9 Total Score:   0    Mental Status Exam:   Hygiene:   good  Cooperation:  Cooperative  Eye Contact:  Good  Psychomotor Behavior:  Appropriate  Affect:  Appropriate  Mood: normal  Speech:  Normal  Thought Process:  Goal directed and Linear  Thought Content:  Normal  Suicidal:  None  Homicidal:  None  Hallucinations:  None  Delusion:  None  Memory:  Intact  Orientation:  Person, Place, Time and Situation  Reliability:  good  Insight:  Good  Judgement:  Good  Impulse Control:  Good  Physical/Medical Issues:  Yes HTN, DM, fibromyalgia, carpal tunnel syndrome     Current Medications:   Current Outpatient Medications   Medication Sig Dispense Refill   • ARIPiprazole (ABILIFY) 2 MG tablet Take 1 tablet by mouth Daily. 90 tablet 0   • atorvastatin (LIPITOR) 40 MG tablet TAKE ONE TABLET BY MOUTH ONCE NIGHTLY 30 tablet 3   • BOTOX 200 units reconstituted solution INJECT 200 UNITS INTO THE HEAD OR NECK AREA BY MD EVERY 90 DAYS  3   • Cholecalciferol (VITAMIN D) 2000 units capsule Take 2,000 Units by  mouth Daily.     • cyclobenzaprine (FLEXERIL) 10 MG tablet Take 1 tablet by mouth At Night As Needed for Muscle Spasms. 30 tablet 1   • DHEA 50 MG capsule Take 1 tablet by mouth Daily.     • Diclofenac Potassium,Migraine, (Cambia) 50 MG pack Take 50 mg by mouth As Needed.     • DULoxetine (CYMBALTA) 60 MG capsule Take 1 capsule by mouth Daily. 90 capsule 0   • EC-RX TESTOSTERONE TD Place 1 patch on the skin as directed by provider Daily.     • eletriptan (RELPAX) 40 MG tablet TAKE 1 TABLET BY MOUTH AT THE ONSET OF MIGRAINE. MAY REPEAT IN 2 HRS.MAX 2 DOSE/24HR,3 DAYS/WEEK  11   • FLUoxetine (PROzac) 40 MG capsule Daily prn with PMDD for 10 days for cycle. 30 capsule 1   • glucose blood test strip 1 each by Other route Daily. Use as instructed 100 each 2   • glucose monitor monitoring kit 1 each Daily. 1 each 1   • hydroCHLOROthiazide (HYDRODIURIL) 12.5 MG tablet TAKE ONE TABLET BY MOUTH DAILY 30 tablet 11   • Lancets 28G misc 1 application Daily. 100 each 2   • magnesium chloride ER 64 MG DR tablet Take 2 tablets by mouth Daily.     • metFORMIN (GLUCOPHAGE) 1000 MG tablet Take 1 tablet by mouth 2 (Two) Times a Day With Meals. 60 tablet 11   • nebivolol (BYSTOLIC) 10 MG tablet Take 1 tablet by mouth Daily. 30 tablet 2   • nortriptyline (PAMELOR) 10 MG capsule nortriptyline 10 mg capsule   TAKE 4 CAPSULES AT BEDTIME     • omeprazole (priLOSEC) 40 MG capsule TAKE ONE CAPSULE BY MOUTH DAILY 90 capsule 3   • oxyCODONE (ROXICODONE) 15 MG immediate release tablet Take 1 tablet by mouth Every 6 (Six) Hours As Needed for Moderate Pain . 56 tablet 0   • TROKENDI  MG capsule sustained-release 24 hr Take 1 capsule by mouth Daily.  1     No current facility-administered medications for this visit.        Physical Exam  Nursing note reviewed.   Constitutional:       Appearance: Normal appearance.   Neurological:      Mental Status: She is alert.   Psychiatric:         Attention and Perception: Attention and perception  normal.         Mood and Affect: Mood and affect normal.         Speech: Speech normal.         Behavior: Behavior normal. Behavior is cooperative.         Thought Content: Thought content normal.         Cognition and Memory: Cognition and memory normal.         Judgment: Judgment normal.        Result Review :     The following data was reviewed by: ONESIMO Alexander on 02/01/2021:  Common labs    Common Labsle 9/9/20 9/9/20 11/12/20 1/11/21    1049 1214     Hemoglobin A1C 8.2   7.2   Microalbumin, Urine  4.8     Uric Acid   3.8            Data reviewed: PCP notes        Assessment and Plan    Problem List Items Addressed This Visit     None      Visit Diagnoses     Major depressive disorder, recurrent episode, moderate (CMS/HCC)  (Chronic)       PMDD (premenstrual dysphoric disorder)  (Chronic)       Adjustment disorder with anxious mood                TREATMENT PLAN/GOALS: Continue supportive psychotherapy efforts and medications as indicated. Treatment and medication options discussed during today's visit. Patient ackowledged and verbally consented to continue with current treatment plan and was educated on the importance of compliance with treatment and follow-up appointments.    MEDICATION ISSUES:  We discussed risks, benefits, and side effects of the above medications and the patient was agreeable with the plan. Patient was educated on the importance of compliance with treatment and follow-up appointments.  Patient is agreeable to call the office with any worsening of symptoms or onset of side effects. Patient is agreeable to call 911 or go to the nearest ER should he/she begin having SI/HI.  Advised patient that she could try CBT to help reframing as she is coming off of opioids as well as doing yoga and water aerobics.  Also encouraged a low-dose of melatonin as well as adult coloring to help sleep at night patient was receptive of these recommendations and felt hopeful.      -Continue Cymbalta 60 mg  daily for depression and anxiety as patient has benefited greatly as well for her pain.  -Continue fluoxetine 40 mg daily for PMDD for 10 days during the luteal phase.  -Continue aripiprazole 2 mg as adjunct for depression as patient has failed numerous SSRIs and SNRIs and has benefited the patient tremendously and is medically necessary for her depression to avoid hospitalization.  (Patient has 90-day supply does not need refills at this time)     Counseled patient regarding multimodal approach with healthy nutrition, healthy sleep, regular physical activity, social activities, counseling, and medications.      Coping skills reviewed and encouraged positive framing of thoughts     Assisted patient in processing above session content; acknowledged and normalized patient’s thoughts, feelings, and concerns.  Applied  positive coping skills and behavior management in session.  Allowed patient to freely discuss issues without interruption or judgment. Provided safe, confidential environment to facilitate the development of positive therapeutic relationship and encourage open, honest communication. Assisted patient in identifying risk factors which would indicate the need for higher level of care including thoughts to harm self or others and/or self-harming behavior and encouraged patient to contact this office, call 911, or present to the nearest emergency room should any of these events occur. Discussed crisis intervention services and means to access.     MEDS ORDERED DURING VISIT:  No orders of the defined types were placed in this encounter.          Follow Up   Return in about 6 weeks (around 4/12/2021), or if symptoms worsen or fail to improve, for Recheck.    Patient was given instructions and counseling regarding her condition or for health maintenance advice. Please see specific information pulled into the AVS if appropriate.     This document has been electronically signed by ONESIMO Alexander  March 1,  2021 10:22 EST    Part of this note may be an electronic transcription/translation of spoken language to printed text using the Dragon Dictation System.

## 2021-03-07 DIAGNOSIS — E78.2 MIXED HYPERLIPIDEMIA: ICD-10-CM

## 2021-03-08 RX ORDER — ATORVASTATIN CALCIUM 40 MG/1
40 TABLET, FILM COATED ORAL NIGHTLY
Qty: 30 TABLET | Refills: 3 | OUTPATIENT
Start: 2021-03-08

## 2021-03-08 NOTE — TELEPHONE ENCOUNTER
Last Office Visit: 1/11/21  Next Office Visit: 5/13/21    Labs completed in past 6 months? yes  Labs completed in past year? yes    Last Refill Date: 2/17/21  Quantity: 30  Refills: 3    Pharmacy: [id number]  [place] [address] [facility] [phone]  - [phone] FX

## 2021-04-03 ENCOUNTER — TELEMEDICINE (OUTPATIENT)
Dept: FAMILY MEDICINE CLINIC | Facility: TELEHEALTH | Age: 56
End: 2021-04-03

## 2021-04-03 DIAGNOSIS — R39.89 SUSPECTED UTI: Primary | ICD-10-CM

## 2021-04-03 PROCEDURE — 99213 OFFICE O/P EST LOW 20 MIN: CPT | Performed by: NURSE PRACTITIONER

## 2021-04-03 RX ORDER — NITROFURANTOIN 25; 75 MG/1; MG/1
100 CAPSULE ORAL 2 TIMES DAILY
Qty: 10 CAPSULE | Refills: 0 | Status: SHIPPED | OUTPATIENT
Start: 2021-04-03 | End: 2021-04-08

## 2021-04-03 NOTE — PROGRESS NOTES
Subjective   Yelena Morris is a 55 y.o. female.     Her symptoms started yesterday with pain with urination. She is also having low back pain. Frequency, incomplete bladder emptying. Denies change in urine color or odor, fever, belly pain. She has had UTIs in the past and symptoms feel similar. Denies hx of kidney or bladder problems. She has a history of bladder tuck surgery a couple years, but has not had any issues since then.       The following portions of the patient's history were reviewed and updated as appropriate: allergies, current medications, past family history, past medical history, past social history, past surgical history and problem list.    Review of Systems   Constitutional: Negative for fatigue and fever.   Gastrointestinal: Negative for nausea and vomiting.   Genitourinary: Positive for dysuria, frequency, pelvic pain and urgency. Negative for flank pain, hematuria and vaginal discharge.   Musculoskeletal: Positive for back pain.       Objective   Physical Exam  Constitutional:       General: She is not in acute distress.     Appearance: She is well-developed. She is not diaphoretic.   Pulmonary:      Effort: Pulmonary effort is normal.   Neurological:      Mental Status: She is alert and oriented to person, place, and time.   Psychiatric:         Behavior: Behavior normal.           Assessment/Plan   Diagnoses and all orders for this visit:    1. Suspected UTI (Primary)  -     nitrofurantoin, macrocrystal-monohydrate, (Macrobid) 100 MG capsule; Take 1 capsule by mouth 2 (Two) Times a Day for 5 days.  Dispense: 10 capsule; Refill: 0      I spent 10 minutes in the patient's chart for this video visit.

## 2021-04-03 NOTE — PATIENT INSTRUCTIONS
Increase fluid intake  Avoid caffeine and carbonation  If you develop fever or mid-back pain, go to ER  If symptoms worsen or do not improve in 48 hours, go to urgent care       Urinary Tract Infection, Adult  A urinary tract infection (UTI) is an infection of any part of the urinary tract. The urinary tract includes:  · The kidneys.  · The ureters.  · The bladder.  · The urethra.  These organs make, store, and get rid of pee (urine) in the body.  What are the causes?  This is caused by germs (bacteria) in your genital area. These germs grow and cause swelling (inflammation) of your urinary tract.  What increases the risk?  You are more likely to develop this condition if:  · You have a small, thin tube (catheter) to drain pee.  · You cannot control when you pee or poop (incontinence).  · You are female, and:  ? You use these methods to prevent pregnancy:  § A medicine that kills sperm (spermicide).  § A device that blocks sperm (diaphragm).  ? You have low levels of a female hormone (estrogen).  ? You are pregnant.  · You have genes that add to your risk.  · You are sexually active.  · You take antibiotic medicines.  · You have trouble peeing because of:  ? A prostate that is bigger than normal, if you are male.  ? A blockage in the part of your body that drains pee from the bladder (urethra).  ? A kidney stone.  ? A nerve condition that affects your bladder (neurogenic bladder).  ? Not getting enough to drink.  ? Not peeing often enough.  · You have other conditions, such as:  ? Diabetes.  ? A weak disease-fighting system (immune system).  ? Sickle cell disease.  ? Gout.  ? Injury of the spine.  What are the signs or symptoms?  Symptoms of this condition include:  · Needing to pee right away (urgently).  · Peeing often.  · Peeing small amounts often.  · Pain or burning when peeing.  · Blood in the pee.  · Pee that smells bad or not like normal.  · Trouble peeing.  · Pee that is cloudy.  · Fluid coming from the  vagina, if you are female.  · Pain in the belly or lower back.  Other symptoms include:  · Throwing up (vomiting).  · No urge to eat.  · Feeling mixed up (confused).  · Being tired and grouchy (irritable).  · A fever.  · Watery poop (diarrhea).  How is this treated?  This condition may be treated with:  · Antibiotic medicine.  · Other medicines.  · Drinking enough water.  Follow these instructions at home:    Medicines  · Take over-the-counter and prescription medicines only as told by your doctor.  · If you were prescribed an antibiotic medicine, take it as told by your doctor. Do not stop taking it even if you start to feel better.  General instructions  · Make sure you:  ? Pee until your bladder is empty.  ? Do not hold pee for a long time.  ? Empty your bladder after sex.  ? Wipe from front to back after pooping if you are a female. Use each tissue one time when you wipe.  · Drink enough fluid to keep your pee pale yellow.  · Keep all follow-up visits as told by your doctor. This is important.  Contact a doctor if:  · You do not get better after 1-2 days.  · Your symptoms go away and then come back.  Get help right away if:  · You have very bad back pain.  · You have very bad pain in your lower belly.  · You have a fever.  · You are sick to your stomach (nauseous).  · You are throwing up.  Summary  · A urinary tract infection (UTI) is an infection of any part of the urinary tract.  · This condition is caused by germs in your genital area.  · There are many risk factors for a UTI. These include having a small, thin tube to drain pee and not being able to control when you pee or poop.  · Treatment includes antibiotic medicines for germs.  · Drink enough fluid to keep your pee pale yellow.  This information is not intended to replace advice given to you by your health care provider. Make sure you discuss any questions you have with your health care provider.  Document Revised: 12/05/2019 Document Reviewed:  06/27/2019  [employer] Patient Education © 2021 [employer]  Nitrofurantoin tablets or capsules  What is this medicine?  NITROFURANTOIN (carolann dougherty) is an antibiotic. It is used to treat urinary tract infections.  This medicine may be used for other purposes; ask your health care provider or pharmacist if you have questions.  COMMON BRAND NAME(S): Macrobid, Macrodantin, Urotoin  What should I tell my health care provider before I take this medicine?  They need to know if you have any of these conditions:  · anemia  · diabetes  · glucose-6-phosphate dehydrogenase deficiency  · kidney disease  · liver disease  · lung disease  · other chronic illness  · an unusual or allergic reaction to nitrofurantoin, other antibiotics, other medicines, foods, dyes or preservatives  · pregnant or trying to get pregnant  · breast-feeding  How should I use this medicine?  Take this medicine by mouth with a glass of water. Follow the directions on the prescription label. Take this medicine with food or milk. Take your doses at regular intervals. Do not take your medicine more often than directed. Do not stop taking except on your doctor's advice.  Talk to your pediatrician regarding the use of this medicine in children. While this drug may be prescribed for selected conditions, precautions do apply.  Overdosage: If you think you have taken too much of this medicine contact a poison control center or emergency room at once.  NOTE: This medicine is only for you. Do not share this medicine with others.  What if I miss a dose?  If you miss a dose, take it as soon as you can. If it is almost time for your next dose, take only that dose. Do not take double or extra doses.  What may interact with this medicine?  · antacids containing magnesium trisilicate  · probenecid  · quinolone antibiotics like ciprofloxacin, lomefloxacin, norfloxacin and ofloxacin  · sulfinpyrazone  This list may not describe all possible interactions. Give  your health care provider a list of all the medicines, herbs, non-prescription drugs, or dietary supplements you use. Also tell them if you smoke, drink alcohol, or use illegal drugs. Some items may interact with your medicine.  What should I watch for while using this medicine?  Tell your doctor or health care professional if your symptoms do not improve or if you get new symptoms. Drink several glasses of water a day. If you are taking this medicine for a long time, visit your doctor for regular checks on your progress.  If you are diabetic, you may get a false positive result for sugar in your urine with certain brands of urine tests. Check with your doctor.  What side effects may I notice from receiving this medicine?  Side effects that you should report to your doctor or health care professional as soon as possible:  · allergic reactions like skin rash or hives, swelling of the face, lips, or tongue  · chest pain  · cough  · difficulty breathing  · dizziness, drowsiness  · fever or infection  · joint aches or pains  · pale or blue-tinted skin  · redness, blistering, peeling or loosening of the skin, including inside the mouth  · tingling, burning, pain, or numbness in hands or feet  · unusual bleeding or bruising  · unusually weak or tired  · yellowing of eyes or skin  Side effects that usually do not require medical attention (report to your doctor or health care professional if they continue or are bothersome):  · dark urine  · diarrhea  · headache  · loss of appetite  · nausea or vomiting  · temporary hair loss  This list may not describe all possible side effects. Call your doctor for medical advice about side effects. You may report side effects to FDA at 6-357-LYP-4165.  Where should I keep my medicine?  Keep out of the reach of children.  Store at room temperature between 15 and 30 degrees C (59 and 86 degrees F). Protect from light. Throw away any unused medicine after the expiration date.  NOTE: This  sheet is a summary. It may not cover all possible information. If you have questions about this medicine, talk to your doctor, pharmacist, or health care provider.  © 2021 [employer] (2009-07-08 15:56:47)

## 2021-04-12 ENCOUNTER — TELEMEDICINE (OUTPATIENT)
Dept: PSYCHIATRY | Facility: CLINIC | Age: 56
End: 2021-04-12

## 2021-04-12 DIAGNOSIS — F43.22 ADJUSTMENT DISORDER WITH ANXIOUS MOOD: ICD-10-CM

## 2021-04-12 DIAGNOSIS — F32.81 PMDD (PREMENSTRUAL DYSPHORIC DISORDER): Chronic | ICD-10-CM

## 2021-04-12 DIAGNOSIS — F33.1 MAJOR DEPRESSIVE DISORDER, RECURRENT EPISODE, MODERATE (HCC): Primary | Chronic | ICD-10-CM

## 2021-04-12 PROCEDURE — 99214 OFFICE O/P EST MOD 30 MIN: CPT | Performed by: NURSE PRACTITIONER

## 2021-04-12 RX ORDER — ARIPIPRAZOLE 2 MG/1
2 TABLET ORAL DAILY
Qty: 90 TABLET | Refills: 0 | Status: SHIPPED | OUTPATIENT
Start: 2021-04-12 | End: 2021-07-21 | Stop reason: SDUPTHER

## 2021-04-12 RX ORDER — DULOXETIN HYDROCHLORIDE 60 MG/1
60 CAPSULE, DELAYED RELEASE ORAL DAILY
Qty: 90 CAPSULE | Refills: 0 | Status: SHIPPED | OUTPATIENT
Start: 2021-04-12 | End: 2021-07-21 | Stop reason: SDUPTHER

## 2021-04-12 NOTE — PROGRESS NOTES
This provider is located [facility], [address] [place] [address].The Patient is seen remotely at home, [address] [place] [address] via [employer]. Patient is being seen via telehealth and confirm that they are in a secure environment for this session. The patient's condition being diagnosed/treated is appropriate for telemedicine. The provider identified himself/herself: herself as well as her credentials.   The patient gave consent to be seen remotely, and when consent is given they understand that the consent allows for patient identifiable information to be sent to a third party as needed.   They may refuse to be seen remotely at any time. The electronic data is encrypted and password protected, and the patient has been advised of the potential risks to privacy not withstanding such measures.    You have chosen to receive care through a telehealth visit.  Do you consent to use a video/audio connection for your medical care today? Yes      Chief Complaint  Follow-up for depression and anxiety     Subjective    Yelena Radha Morris presents to [facility] for medication management.       History of Present Illness   Patient presents today reporting that things have been going well.  She states that she has started the Prozac at this time as she is on her period and reports it has been helpful if she is doing well and notices her mood has been improved.  Patient states that she has had a headache during this time of month but overall she is tolerating well.  Patient denied any major depressive symptoms that she denies any hopelessness or helplessness states she has been painting chairs and feels motivated.  Patient reports that her anxiety has been well managed.  Patient states that her sleep and appetite have been good.  She states she is weaning off pain medicines now that she is getting the injections which is difficult but she is  managing.  Patient denies any side effects except for dry mouth and reports she is tolerating.  Patient denies any SI/HI/AH/VH.          Objective   Vital Signs:   There were no vitals taken for this visit.  Due to the remote nature of this encounter (virtual encounter), vitals were unable to be obtained.  Height stated at 64 inches.  Weight stated at 165 pounds.      PHQ-9 Score:   PHQ-9 Total Score:   0    Mental Status Exam:   Hygiene:   good  Cooperation:  Cooperative  Eye Contact:  Good  Psychomotor Behavior:  Appropriate  Affect:  Appropriate  Mood: normal  Speech:  Normal  Thought Process:  Goal directed and Linear  Thought Content:  Normal  Suicidal:  None  Homicidal:  None  Hallucinations:  None  Delusion:  None  Memory:  Intact  Orientation:  Person, Place, Time and Situation  Reliability:  good  Insight:  Good  Judgement:  Good  Impulse Control:  Good  Physical/Medical Issues:  Yes HTN, DM, fibromyalgia, carpal tunnel syndrome     Current Medications:   Current Outpatient Medications   Medication Sig Dispense Refill   • ARIPiprazole (ABILIFY) 2 MG tablet Take 1 tablet by mouth Daily. 90 tablet 0   • atorvastatin (LIPITOR) 40 MG tablet TAKE ONE TABLET BY MOUTH ONCE NIGHTLY 30 tablet 3   • BOTOX 200 units reconstituted solution INJECT 200 UNITS INTO THE HEAD OR NECK AREA BY MD EVERY 90 DAYS  3   • Cholecalciferol (VITAMIN D) 2000 units capsule Take 2,000 Units by mouth Daily.     • cyclobenzaprine (FLEXERIL) 10 MG tablet Take 1 tablet by mouth At Night As Needed for Muscle Spasms. 30 tablet 1   • DHEA 50 MG capsule Take 1 tablet by mouth Daily.     • DULoxetine (CYMBALTA) 60 MG capsule Take 1 capsule by mouth Daily. 90 capsule 0   • EC-RX TESTOSTERONE TD Place 1 patch on the skin as directed by provider Daily.     • eletriptan (RELPAX) 40 MG tablet TAKE 1 TABLET BY MOUTH AT THE ONSET OF MIGRAINE. MAY REPEAT IN 2 HRS.MAX 2 DOSE/24HR,3 DAYS/WEEK  11   • FLUoxetine (PROzac) 40 MG capsule Daily prn with PMDD  for 10 days for cycle. 30 capsule 1   • glucose blood test strip 1 each by Other route Daily. Use as instructed 100 each 2   • glucose monitor monitoring kit 1 each Daily. 1 each 1   • hydroCHLOROthiazide (HYDRODIURIL) 12.5 MG tablet TAKE ONE TABLET BY MOUTH DAILY 30 tablet 11   • Lancets 28G misc 1 application Daily. 100 each 2   • magnesium chloride ER 64 MG DR tablet Take 2 tablets by mouth Daily.     • metFORMIN (GLUCOPHAGE) 1000 MG tablet Take 1 tablet by mouth 2 (Two) Times a Day With Meals. 60 tablet 11   • nebivolol (BYSTOLIC) 10 MG tablet Take 1 tablet by mouth Daily. 30 tablet 2   • nortriptyline (PAMELOR) 10 MG capsule nortriptyline 10 mg capsule   TAKE 4 CAPSULES AT BEDTIME     • omeprazole (priLOSEC) 40 MG capsule TAKE ONE CAPSULE BY MOUTH DAILY 90 capsule 3   • oxyCODONE (ROXICODONE) 15 MG immediate release tablet Take 1 tablet by mouth Every 6 (Six) Hours As Needed for Moderate Pain . 56 tablet 0   • TROKENDI  MG capsule sustained-release 24 hr Take 1 capsule by mouth Daily.  1     No current facility-administered medications for this visit.       Physical Exam  Nursing note reviewed.   Constitutional:       Appearance: Normal appearance.   Neurological:      Mental Status: She is alert.   Psychiatric:         Attention and Perception: Attention and perception normal.         Mood and Affect: Mood and affect normal.         Speech: Speech normal.         Behavior: Behavior normal. Behavior is cooperative.         Thought Content: Thought content normal.         Cognition and Memory: Cognition and memory normal.         Judgment: Judgment normal.        Result Review :     The following data was reviewed by: ONESIMO Alexander on 02/01/2021:  Common labs    Common Labsle 9/9/20 9/9/20 11/12/20 1/11/21    [id number]     Hemoglobin A1C 8.2   7.2   Microalbumin, Urine  4.8     Uric Acid   3.8            Data reviewed: PCP notes        Assessment and Plan    Problem List Items Addressed This Visit      None      Visit Diagnoses     Major depressive disorder, recurrent episode, moderate (CMS/HCC)    -  Primary    Relevant Medications    ARIPiprazole (ABILIFY) 2 MG tablet    DULoxetine (CYMBALTA) 60 MG capsule    PMDD (premenstrual dysphoric disorder)        Relevant Medications    ARIPiprazole (ABILIFY) 2 MG tablet    DULoxetine (CYMBALTA) 60 MG capsule    Adjustment disorder with anxious mood        Relevant Medications    ARIPiprazole (ABILIFY) 2 MG tablet    DULoxetine (CYMBALTA) 60 MG capsule            TREATMENT PLAN/GOALS: Continue supportive psychotherapy efforts and medications as indicated. Treatment and medication options discussed during today's visit. Patient ackowledged and verbally consented to continue with current treatment plan and was educated on the importance of compliance with treatment and follow-up appointments.    MEDICATION ISSUES:  We discussed risks, benefits, and side effects of the above medications and the patient was agreeable with the plan. Patient was educated on the importance of compliance with treatment and follow-up appointments.  Patient is agreeable to call the office with any worsening of symptoms or onset of side effects. Patient is agreeable to call 911 or go to the nearest ER should he/she begin having SI/HI.  Advised patient that she could try CBT to help reframing as she is coming off of opioids as well as doing yoga and water aerobics.  Also encouraged a low-dose of melatonin as well as adult coloring to help sleep at night patient was receptive of these recommendations and felt hopeful.      -Continue Cymbalta 60 mg daily for depression and anxiety as patient has benefited greatly as well for her pain.  -Continue fluoxetine 40 mg daily for PMDD for 10 days during the luteal phase.  -Continue aripiprazole 2 mg as adjunct for depression as patient has failed numerous SSRIs and SNRIs and has benefited the patient tremendously and is medically necessary for her  depression to avoid hospitalization.      Counseled patient regarding multimodal approach with healthy nutrition, healthy sleep, regular physical activity, social activities, counseling, and medications.      Coping skills reviewed and encouraged positive framing of thoughts     Assisted patient in processing above session content; acknowledged and normalized patient’s thoughts, feelings, and concerns.  Applied  positive coping skills and behavior management in session.  Allowed patient to freely discuss issues without interruption or judgment. Provided safe, confidential environment to facilitate the development of positive therapeutic relationship and encourage open, honest communication. Assisted patient in identifying risk factors which would indicate the need for higher level of care including thoughts to harm self or others and/or self-harming behavior and encouraged patient to contact this office, call 911, or present to the nearest emergency room should any of these events occur. Discussed crisis intervention services and means to access.     MEDS ORDERED DURING VISIT:  New Medications Ordered This Visit   Medications   • ARIPiprazole (ABILIFY) 2 MG tablet     Sig: Take 1 tablet by mouth Daily.     Dispense:  90 tablet     Refill:  0   • DULoxetine (CYMBALTA) 60 MG capsule     Sig: Take 1 capsule by mouth Daily.     Dispense:  90 capsule     Refill:  0           Follow Up   Return in about 6 weeks (around 5/24/2021), or if symptoms worsen or fail to improve, for Recheck.    Patient was given instructions and counseling regarding her condition or for health maintenance advice. Please see specific information pulled into the AVS if appropriate.     This document has been electronically signed by ONESIMO Alexander  April 12, 2021 09:47 EDT    Part of this note may be an electronic transcription/translation of spoken language to printed text using the Dragon Dictation System.

## 2021-05-13 ENCOUNTER — OFFICE VISIT (OUTPATIENT)
Dept: INTERNAL MEDICINE | Facility: CLINIC | Age: 56
End: 2021-05-13

## 2021-05-13 VITALS
SYSTOLIC BLOOD PRESSURE: 122 MMHG | OXYGEN SATURATION: 96 % | WEIGHT: 161.8 LBS | HEART RATE: 88 BPM | HEIGHT: 64 IN | TEMPERATURE: 97.5 F | DIASTOLIC BLOOD PRESSURE: 70 MMHG | RESPIRATION RATE: 16 BRPM | BODY MASS INDEX: 27.62 KG/M2

## 2021-05-13 DIAGNOSIS — M19.071 PRIMARY OSTEOARTHRITIS OF RIGHT FOOT: ICD-10-CM

## 2021-05-13 DIAGNOSIS — F33.41 RECURRENT MAJOR DEPRESSIVE DISORDER, IN PARTIAL REMISSION (HCC): ICD-10-CM

## 2021-05-13 DIAGNOSIS — M79.7 FIBROMYALGIA: ICD-10-CM

## 2021-05-13 DIAGNOSIS — Z00.00 ANNUAL PHYSICAL EXAM: Primary | ICD-10-CM

## 2021-05-13 DIAGNOSIS — Z12.31 ENCOUNTER FOR SCREENING MAMMOGRAM FOR MALIGNANT NEOPLASM OF BREAST: ICD-10-CM

## 2021-05-13 DIAGNOSIS — E11.9 TYPE 2 DIABETES MELLITUS WITHOUT COMPLICATION, WITHOUT LONG-TERM CURRENT USE OF INSULIN (HCC): ICD-10-CM

## 2021-05-13 DIAGNOSIS — M96.1 LUMBAR POST-LAMINECTOMY SYNDROME: ICD-10-CM

## 2021-05-13 DIAGNOSIS — K21.00 GASTRO-ESOPHAGEAL REFLUX DISEASE WITH ESOPHAGITIS: ICD-10-CM

## 2021-05-13 DIAGNOSIS — E04.2 MULTINODULAR THYROID: ICD-10-CM

## 2021-05-13 DIAGNOSIS — I10 ESSENTIAL HYPERTENSION: ICD-10-CM

## 2021-05-13 DIAGNOSIS — K21.00 GASTROESOPHAGEAL REFLUX DISEASE WITH ESOPHAGITIS WITHOUT HEMORRHAGE: ICD-10-CM

## 2021-05-13 DIAGNOSIS — G43.109 MIGRAINE WITH AURA AND WITHOUT STATUS MIGRAINOSUS, NOT INTRACTABLE: ICD-10-CM

## 2021-05-13 DIAGNOSIS — E78.2 MIXED HYPERLIPIDEMIA: ICD-10-CM

## 2021-05-13 PROCEDURE — 99396 PREV VISIT EST AGE 40-64: CPT | Performed by: INTERNAL MEDICINE

## 2021-05-13 RX ORDER — HYDROCHLOROTHIAZIDE 12.5 MG/1
12.5 TABLET ORAL DAILY
Qty: 90 TABLET | Refills: 3 | Status: SHIPPED | OUTPATIENT
Start: 2021-05-13 | End: 2022-05-16

## 2021-05-13 RX ORDER — NEBIVOLOL 10 MG/1
10 TABLET ORAL DAILY
Qty: 90 TABLET | Refills: 3 | Status: SHIPPED | OUTPATIENT
Start: 2021-05-13

## 2021-05-13 RX ORDER — ATORVASTATIN CALCIUM 40 MG/1
40 TABLET, FILM COATED ORAL NIGHTLY
Qty: 90 TABLET | Refills: 3 | Status: SHIPPED | OUTPATIENT
Start: 2021-05-13 | End: 2022-06-01

## 2021-05-13 RX ORDER — OMEPRAZOLE 40 MG/1
40 CAPSULE, DELAYED RELEASE ORAL DAILY
Qty: 90 CAPSULE | Refills: 3 | Status: SHIPPED | OUTPATIENT
Start: 2021-05-13 | End: 2022-07-11

## 2021-05-13 RX ORDER — METFORMIN HYDROCHLORIDE 500 MG/1
2000 TABLET, EXTENDED RELEASE ORAL
Qty: 360 TABLET | Refills: 3 | Status: SHIPPED | OUTPATIENT
Start: 2021-05-13 | End: 2022-05-16

## 2021-05-13 NOTE — PROGRESS NOTES
Internal Medicine Annual Exam  Yelena Morris is a 55 y.o. female who presents today for an annual exam and with concerns as outlined below.    Chief Complaint  Chief Complaint   Patient presents with   • Annual Exam        HPI  Ms. Morris comes in today for her annual physical. She is doing well overall but notes that her  has some remaining liver lesions after completing chemotherapy. He has started an infusion that is causing diffuse acne like rash so his next infusion has been delayed. He is still working. Her mood has been more depressed but she thinks her mood is improving as she has time to adjust. She remains on abilify 2mg daily, cymbalta 60mg daily, and prozac 40mg daily. She is now seeing her new pain management provider and had an interligament spinous injection which has given her relief. She could not have epidural injection due to SCS leads being in the way. She has had cervical facet injections. She is tapering her oxycodone from 15mg TID to now 10mg TID. She did have a flare of her fibromyalgia. She still has pain in her R foot. She saw orthopedics who recommend voltaren gel and stiff soled shoes. She has tried both without relief. She will see her pain management provider next Friday and hopes to get the foot injected. She notes that she forgets to take her metformin and did not take it for a few weeks before resuming a couple days ago. She does not forget her BP medications or lipitor. She remains on trokendi, nortriptyline, eletriptan PRN, and botox injections. She reports headaches are generally well controlled. She is UTD with her vision exam and dental exam. She does need her mammogram and may be due for her annual visit with Dr. Mcguire. She has had both COVID shots.       Review of Systems  Review of Systems   Constitutional: Negative.    HENT: Positive for ear pain (pressure) and trouble swallowing (occasional).    Eyes: Negative.    Respiratory: Negative.    Cardiovascular:  Negative.    Gastrointestinal: Negative.    Genitourinary: Negative.    Musculoskeletal: Positive for arthralgias, back pain and myalgias.   Skin: Negative.    Neurological: Negative.    Psychiatric/Behavioral: Positive for depressed mood and stress.        Past Medical History  Past Medical History:   Diagnosis Date   • Anxiety    • Chest pain 11/6/2019   • Colon polyp    • Depression    • Diabetes mellitus (CMS/HCC)    • Dysphagia 11/6/2019   • Endometriosis    • Fibromyalgia, primary    • GERD (gastroesophageal reflux disease)    • Headache    • HL (hearing loss)    • Hyperlipidemia    • Hypertension    • Low vitamin D level    • Migraines    • Ovarian cyst    • PMDD (premenstrual dysphoric disorder)    • Wears glasses         Surgical History  Past Surgical History:   Procedure Laterality Date   • BLADDER SURGERY      Bladder tuck   • COLONOSCOPY      every 5 years   • LUMBAR DISCECTOMY  2010    L4-S1 Rt- discectomy Dr. Arellano   • SPINAL CORD STIMULATOR IMPLANT N/A 8/19/2019    Procedure: SPINAL CORD STIMULATOR INSERTION;  Surgeon: Ethan Peters MD;  Location: [facility];  Service: Neurosurgery   • TONSILLECTOMY AND ADENOIDECTOMY          Family History  Family History   Adopted: Yes        Social History  Social History     Socioeconomic History   • Marital status:      Spouse name: Not on file   • Number of children: Not on file   • Years of education: Not on file   • Highest education level: Not on file   Tobacco Use   • Smoking status: Never Smoker   • Smokeless tobacco: Never Used   Vaping Use   • Vaping Use: Never used   Substance and Sexual Activity   • Alcohol use: No   • Drug use: Defer   • Sexual activity: Defer        Current Medications  Current Outpatient Medications on File Prior to Visit   Medication Sig Dispense Refill   • ARIPiprazole (ABILIFY) 2 MG tablet Take 1 tablet by mouth Daily. 90 tablet 0   • atorvastatin (LIPITOR) 40 MG tablet TAKE ONE TABLET BY MOUTH ONCE NIGHTLY 30  tablet 3   • BOTOX 200 units reconstituted solution INJECT 200 UNITS INTO THE HEAD OR NECK AREA BY MD EVERY 90 DAYS  3   • Cholecalciferol (VITAMIN D) 2000 units capsule Take 2,000 Units by mouth Daily.     • cyclobenzaprine (FLEXERIL) 10 MG tablet Take 1 tablet by mouth At Night As Needed for Muscle Spasms. 30 tablet 1   • DULoxetine (CYMBALTA) 60 MG capsule Take 1 capsule by mouth Daily. 90 capsule 0   • eletriptan (RELPAX) 40 MG tablet TAKE 1 TABLET BY MOUTH AT THE ONSET OF MIGRAINE. MAY REPEAT IN 2 HRS.MAX 2 DOSE/24HR,3 DAYS/WEEK  11   • FLUoxetine (PROzac) 40 MG capsule Daily prn with PMDD for 10 days for cycle. 30 capsule 1   • glucose blood test strip 1 each by Other route Daily. Use as instructed 100 each 2   • glucose monitor monitoring kit 1 each Daily. 1 each 1   • hydroCHLOROthiazide (HYDRODIURIL) 12.5 MG tablet TAKE ONE TABLET BY MOUTH DAILY 30 tablet 11   • Lancets 28G misc 1 application Daily. 100 each 2   • magnesium chloride ER 64 MG DR tablet Take 2 tablets by mouth Daily.     • metFORMIN (GLUCOPHAGE) 1000 MG tablet Take 1 tablet by mouth 2 (Two) Times a Day With Meals. 60 tablet 11   • nebivolol (BYSTOLIC) 10 MG tablet Take 1 tablet by mouth Daily. 30 tablet 2   • nortriptyline (PAMELOR) 10 MG capsule nortriptyline 10 mg capsule   TAKE 4 CAPSULES AT BEDTIME     • omeprazole (priLOSEC) 40 MG capsule TAKE ONE CAPSULE BY MOUTH DAILY 90 capsule 3   • oxyCODONE (ROXICODONE) 15 MG immediate release tablet Take 1 tablet by mouth Every 6 (Six) Hours As Needed for Moderate Pain . 56 tablet 0   • TROKENDI  MG capsule sustained-release 24 hr Take 1 capsule by mouth Daily.  1   • DHEA 50 MG capsule Take 1 tablet by mouth Daily.     • EC-RX TESTOSTERONE TD Place 1 patch on the skin as directed by provider Daily.       No current facility-administered medications on file prior to visit.       Allergies  Allergies   Allergen Reactions   • Dihydroergotamine GI Intolerance     VOMITING   • Methergine  "[Methylergonovine Maleate] Nausea And Vomiting   • Amoxicillin Rash   • Methylergonovine GI Intolerance     And sob          Objective  Visit Vitals  /70   Pulse 88   Temp 97.5 °F (36.4 °C)   Resp 16   Ht 162.6 cm (64.02\")   Wt 73.4 kg (161 lb 12.8 oz)   SpO2 96%   BMI 27.76 kg/m²        Physical Exam  Physical Exam  Vitals and nursing note reviewed.   Constitutional:       General: She is not in acute distress.     Appearance: She is well-developed. She is not ill-appearing or diaphoretic.   HENT:      Head: Normocephalic and atraumatic.      Right Ear: Tympanic membrane, ear canal and external ear normal.      Left Ear: Ear canal and external ear normal. Tympanic membrane is retracted.      Nose: Nose normal.      Mouth/Throat:      Pharynx: No oropharyngeal exudate.   Eyes:      General: No scleral icterus.     Conjunctiva/sclera: Conjunctivae normal.      Pupils: Pupils are equal, round, and reactive to light.   Cardiovascular:      Rate and Rhythm: Normal rate and regular rhythm.      Heart sounds: Normal heart sounds. No murmur heard.     Pulmonary:      Effort: Pulmonary effort is normal. No respiratory distress.      Breath sounds: Normal breath sounds.   Abdominal:      General: Bowel sounds are normal. There is no distension.      Palpations: Abdomen is soft. There is no mass.      Tenderness: There is no abdominal tenderness.   Musculoskeletal:         General: No deformity.      Cervical back: Neck supple.      Right lower leg: No edema.      Left lower leg: No edema.   Lymphadenopathy:      Cervical: No cervical adenopathy.   Skin:     General: Skin is warm and dry.      Findings: No rash.   Neurological:      General: No focal deficit present.      Mental Status: She is alert and oriented to person, place, and time. Mental status is at baseline.      Gait: Gait normal.   Psychiatric:         Mood and Affect: Mood normal.         Behavior: Behavior normal.         Thought Content: Thought content " normal.         Judgment: Judgment normal.          Results  Results for orders placed or performed in visit on 01/11/21   POC Glycosylated Hemoglobin (Hb A1C)    Specimen: Blood   Result Value Ref Range    Hemoglobin A1C 7.2 %        Assessment and Plan  Diagnoses and all orders for this visit:    Annual physical exam  - Counseling was given to patient for the following topics:  appropriate exercise, healthy eating habits, disease prevention and importance of self breast exam and breast health. Also discussed the importance of regular dental and vision care, as well recommendation for a yearly screening skin exam after age 40.  Written information provided to patient on these topics and other health maintenance issues.    Type 2 diabetes mellitus without complication, without long-term current use of insulin (CMS/[facility])  - A1c 7.2 1/2021 on metformin 1000mg BID.   - Has difficulty remembering her medication and was off of metformin for several weeks, just recently resumed. Defers A1c due to this.  - Urine microalbumin nl 9/2020  - Eye exam 4/2021 no retinopathy  - Change to metformin XR 2000mg daily to increase compliance    Fibromyalgia and Lumbar post-laminectomy syndrome  - Currently following with pain management and is working on weaning chronic opiates. Now on oxycodone 10mg TID. Also uses OTC pain relievers PRN and flexeril 10mg qhs PRN.    Recurrent major depressive disorder, in partial remission (CMS/[facility])  - On abilify 2mg daily to cymbalta 60mg daily and prozac 40mg PRN PMDD. Following with [facility].  - Did have short period of increased depression after finding out that her 's cancer did not fully respond to chemo but she reports this is improving.    Primary osteoarthritis of right foot  - R foot pain at first MTP joint with intermittent redness and swelling. Uric acid level normal. Xray shows degenerative changes.  - Saw orthopedics who recommended stiff soled shoes and voltaren gel  which she tried with no relief. She will be getting a corticosteroid injection by pain management.    Mixed hyperlipidemia  - On lipitor 40mg daily  - Lipid panel ordered    Essential hypertension  - BP well controlled on nebivolol 10mg daily and HCTZ 12.5mg daily, refilled  - Labs today    Gastroesophageal reflux disease with esophagitis without hemorrhage    Migraine with aura and without status migrainosus, not intractable  - Follows with Dr. Chong  - On nortriptyline, botox injections, trokendi, magnesium, and triptan PRN.  - Reports overall good control of migraines on this regimen    Multinodular thyroid  - Thyroid US 5/2020 with multinodular thyroid. Thyroid studies historically normal.  - Will repeat US and thyroid studies today to monitor for stability    Gastro-esophageal reflux disease with esophagitis  - Controlled on omeprazole 40mg daily, refilled.    Encounter for screening mammogram for malignant neoplasm of breast  - Mammogram ordered    Health Maintenance  - Pap smear: GYN Dr. Mcguire, unable to get records thus far.  - Mammogram: Mammogram ordered, needs scheduled.  - Colonoscopy: 1/2018 sigmoid tubular adenoma. Repeat 5y.  - HCV: negative  - Immunizations: Flu UTD. Tdap 2019. Pneumovax UTD. Discussed shingrix and she will call her insurance to determine coverage.  - Depression screening: negative 2/2020        Health Maintenance   Topic Date Due   • Hepatitis B (1 of 3 - Risk 3-dose series) Never done   • ZOSTER VACCINE (1 of 2) Never done   • LIPID PANEL  Never done   • ANNUAL PHYSICAL  10/12/2019   • DIABETIC FOOT EXAM  11/15/2019   • MAMMOGRAM  08/28/2020   • COVID-19 Vaccine (2 - Pfizer 2-dose series) 03/30/2021   • HEMOGLOBIN A1C  07/11/2021   • PAP SMEAR  07/30/2021   • INFLUENZA VACCINE  08/01/2021   • URINE MICROALBUMIN  09/09/2021   • DIABETIC EYE EXAM  04/14/2022   • COLORECTAL CANCER SCREENING  01/03/2028   • TDAP/TD VACCINES (3 - Td) 10/30/2029   • HEPATITIS C SCREENING  Completed   •  Pneumococcal Vaccine 0-64  Completed       Return in about 4 months (around 9/13/2021) for Follow up diabetes with A1c, 1 year for annual, Labs today.           Answers for HPI/ROS submitted by the patient on 5/13/2021  What is the primary reason for your visit?: Physical

## 2021-05-24 ENCOUNTER — TELEMEDICINE (OUTPATIENT)
Dept: PSYCHIATRY | Facility: CLINIC | Age: 56
End: 2021-05-24

## 2021-05-24 DIAGNOSIS — F43.22 ADJUSTMENT DISORDER WITH ANXIOUS MOOD: ICD-10-CM

## 2021-05-24 DIAGNOSIS — F33.1 MAJOR DEPRESSIVE DISORDER, RECURRENT EPISODE, MODERATE (HCC): Primary | ICD-10-CM

## 2021-05-24 DIAGNOSIS — F32.81 PMDD (PREMENSTRUAL DYSPHORIC DISORDER): ICD-10-CM

## 2021-05-24 PROCEDURE — 99214 OFFICE O/P EST MOD 30 MIN: CPT | Performed by: NURSE PRACTITIONER

## 2021-05-24 NOTE — PROGRESS NOTES
"This provider is located at [facility], [address] [place] [address].The Patient is seen remotely at home, [address] [place] [address] via [employer]. Patient is being seen via telehealth and confirm that they are in a secure environment for this session. The patient's condition being diagnosed/treated is appropriate for telemedicine. The provider identified himself/herself: herself as well as her credentials.   The patient gave consent to be seen remotely, and when consent is given they understand that the consent allows for patient identifiable information to be sent to a third party as needed.   They may refuse to be seen remotely at any time. The electronic data is encrypted and password protected, and the patient has been advised of the potential risks to privacy not withstanding such measures.    You have chosen to receive care through a telehealth visit.  Do you consent to use a video/audio connection for your medical care today? Yes      Chief Complaint  Follow-up for depression and anxiety     Subjective    Yelena Radha Morris presents to [facility] for medication management.       History of Present Illness   Patient presents today reporting that things have been going well and she is doing better. She reports having an episode of feeling down a couple of weeks ago when she found out that her 's colon cancer is still in his liver and he is needing more treatment at this time. She also had a recent flare up of her fibromyalgia, which \"got her down\". Patient states that she believes her depressive symptoms have improved since those incidences and she is feeling better now. She denies having feelings of hopelessness or helplessness. Her anxiety is well managed. Her sleep has \"been fair\" with around 6-7 hrs on average a night. Her appetite has been good. Patient is continuing to take her Prozac before her menstrual cycles " for treatment of her PMDD and is tolerating this well. She denies any side effects from her Prozac or Abilify, except for some dry mouth. Patient denies SI/HI/AH/VH.        Objective   Vital Signs:   There were no vitals taken for this visit.  Due to the remote nature of this encounter (virtual encounter), vitals were unable to be obtained.  Height stated at 64 inches.  Weight stated at 165 pounds.      PHQ-9 Score:   PHQ-9 Total Score:   0    Mental Status Exam:   Hygiene:   good  Cooperation:  Cooperative  Eye Contact:  Good  Psychomotor Behavior:  Appropriate  Affect:  Appropriate  Mood: normal  Speech:  Normal  Thought Process:  Goal directed and Linear  Thought Content:  Normal  Suicidal:  None  Homicidal:  None  Hallucinations:  None  Delusion:  None  Memory:  Intact  Orientation:  Person, Place, Time and Situation  Reliability:  good  Insight:  Good  Judgement:  Good  Impulse Control:  Good  Physical/Medical Issues:  Yes HTN, DM, fibromyalgia, carpal tunnel syndrome     Current Medications:   Current Outpatient Medications   Medication Sig Dispense Refill   • ARIPiprazole (ABILIFY) 2 MG tablet Take 1 tablet by mouth Daily. 90 tablet 0   • atorvastatin (LIPITOR) 40 MG tablet Take 1 tablet by mouth Every Night. 90 tablet 3   • BOTOX 200 units reconstituted solution INJECT 200 UNITS INTO THE HEAD OR NECK AREA BY MD EVERY 90 DAYS  3   • Cholecalciferol (VITAMIN D) 2000 units capsule Take 2,000 Units by mouth Daily.     • cyclobenzaprine (FLEXERIL) 10 MG tablet Take 1 tablet by mouth At Night As Needed for Muscle Spasms. 30 tablet 1   • DULoxetine (CYMBALTA) 60 MG capsule Take 1 capsule by mouth Daily. 90 capsule 0   • eletriptan (RELPAX) 40 MG tablet TAKE 1 TABLET BY MOUTH AT THE ONSET OF MIGRAINE. MAY REPEAT IN 2 HRS.MAX 2 DOSE/24HR,3 DAYS/WEEK  11   • FLUoxetine (PROzac) 40 MG capsule Daily prn with PMDD for 10 days for cycle. 30 capsule 1   • glucose blood test strip 1 each by Other route Daily. Use as  instructed 100 each 2   • glucose monitor monitoring kit 1 each Daily. 1 each 1   • hydroCHLOROthiazide (HYDRODIURIL) 12.5 MG tablet Take 1 tablet by mouth Daily. 90 tablet 3   • Lancets 28G misc 1 application Daily. 100 each 2   • magnesium chloride ER 64 MG DR tablet Take 2 tablets by mouth Daily.     • metFORMIN ER (GLUCOPHAGE-XR) 500 MG 24 hr tablet Take 4 tablets by mouth Daily With Dinner. 360 tablet 3   • nebivolol (Bystolic) 10 MG tablet Take 1 tablet by mouth Daily. 90 tablet 3   • nortriptyline (PAMELOR) 10 MG capsule nortriptyline 10 mg capsule   TAKE 4 CAPSULES AT BEDTIME     • omeprazole (priLOSEC) 40 MG capsule Take 1 capsule by mouth Daily. 90 capsule 3   • oxyCODONE (ROXICODONE) 15 MG immediate release tablet Take 1 tablet by mouth Every 6 (Six) Hours As Needed for Moderate Pain . 56 tablet 0   • TROKENDI  MG capsule sustained-release 24 hr Take 1 capsule by mouth Daily.  1     No current facility-administered medications for this visit.       Physical Exam  Nursing note reviewed.   Constitutional:       Appearance: Normal appearance.   Neurological:      Mental Status: She is alert.   Psychiatric:         Attention and Perception: Attention and perception normal.         Mood and Affect: Mood and affect normal.         Speech: Speech normal.         Behavior: Behavior normal. Behavior is cooperative.         Thought Content: Thought content normal.         Cognition and Memory: Cognition and memory normal.         Judgment: Judgment normal.        Result Review :     The following data was reviewed by: ONESIMO Alexander on 02/01/2021:  Common labs    Common Labsle 9/9/20 9/9/20 11/12/20 1/11/21    [id number]     Hemoglobin A1C 8.2   7.2   Microalbumin, Urine  4.8     Uric Acid   3.8            Data reviewed: PCP notes        Assessment and Plan    Problem List Items Addressed This Visit     None      Visit Diagnoses     Major depressive disorder, recurrent episode, moderate (CMS/HCC)    -   Primary    PMDD (premenstrual dysphoric disorder)        Adjustment disorder with anxious mood                TREATMENT PLAN/GOALS: Continue supportive psychotherapy efforts and medications as indicated. Treatment and medication options discussed during today's visit. Patient ackowledged and verbally consented to continue with current treatment plan and was educated on the importance of compliance with treatment and follow-up appointments.    MEDICATION ISSUES:  We discussed risks, benefits, and side effects of the above medications and the patient was agreeable with the plan. Patient was educated on the importance of compliance with treatment and follow-up appointments.  Patient is agreeable to call the office with any worsening of symptoms or onset of side effects. Patient is agreeable to call 911 or go to the nearest ER should he/she begin having SI/HI.  Advised patient that she could try CBT to help reframing as she is coming off of opioids as well as doing yoga and water aerobics.  Also encouraged a low-dose of melatonin as well as adult coloring to help sleep at night patient was receptive of these recommendations and felt hopeful.      -Continue Cymbalta 60 mg daily for depression and anxiety as patient has benefited greatly as well for her pain.  -Continue fluoxetine 40 mg daily for PMDD for 10 days during the luteal phase.  -Continue aripiprazole 2 mg as adjunct for depression as patient has failed numerous SSRIs and SNRIs and has benefited the patient tremendously and is medically necessary for her depression to avoid hospitalization.  (No refills needed at this time)      Counseled patient regarding multimodal approach with healthy nutrition, healthy sleep, regular physical activity, social activities, counseling, and medications.      Coping skills reviewed and encouraged positive framing of thoughts     Assisted patient in processing above session content; acknowledged and normalized patient’s thoughts,  feelings, and concerns.  Applied  positive coping skills and behavior management in session.  Allowed patient to freely discuss issues without interruption or judgment. Provided safe, confidential environment to facilitate the development of positive therapeutic relationship and encourage open, honest communication. Assisted patient in identifying risk factors which would indicate the need for higher level of care including thoughts to harm self or others and/or self-harming behavior and encouraged patient to contact this office, call 911, or present to the nearest emergency room should any of these events occur. Discussed crisis intervention services and means to access.     MEDS ORDERED DURING VISIT:  No orders of the defined types were placed in this encounter.          Follow Up   Return in about 8 weeks (around 7/19/2021), or if symptoms worsen or fail to improve, for Recheck.    Patient was given instructions and counseling regarding her condition or for health maintenance advice. Please see specific information pulled into the AVS if appropriate.     This document has been electronically signed by ONESIMO Alexander  May 24, 2021 09:49 EDT    Part of this note may be an electronic transcription/translation of spoken language to printed text using the Dragon Dictation System.

## 2021-06-02 ENCOUNTER — HOSPITAL ENCOUNTER (OUTPATIENT)
Dept: ULTRASOUND IMAGING | Facility: HOSPITAL | Age: 56
End: 2021-06-02

## 2021-06-09 ENCOUNTER — OFFICE VISIT (OUTPATIENT)
Dept: INTERNAL MEDICINE | Facility: CLINIC | Age: 56
End: 2021-06-09

## 2021-06-09 VITALS
RESPIRATION RATE: 16 BRPM | DIASTOLIC BLOOD PRESSURE: 72 MMHG | HEART RATE: 80 BPM | BODY MASS INDEX: 27.52 KG/M2 | SYSTOLIC BLOOD PRESSURE: 120 MMHG | HEIGHT: 64 IN | TEMPERATURE: 97.1 F | WEIGHT: 161.2 LBS | OXYGEN SATURATION: 96 %

## 2021-06-09 DIAGNOSIS — E11.9 TYPE 2 DIABETES MELLITUS WITHOUT COMPLICATION, WITHOUT LONG-TERM CURRENT USE OF INSULIN (HCC): Primary | ICD-10-CM

## 2021-06-09 DIAGNOSIS — B37.31 CANDIDAL VAGINITIS: ICD-10-CM

## 2021-06-09 DIAGNOSIS — R81 GLUCOSURIA: ICD-10-CM

## 2021-06-09 LAB
BILIRUB BLD-MCNC: NEGATIVE MG/DL
CLARITY, POC: ABNORMAL
COLOR UR: ABNORMAL
GLUCOSE BLDC GLUCOMTR-MCNC: 318 MG/DL (ref 70–130)
GLUCOSE UR STRIP-MCNC: ABNORMAL MG/DL
HBA1C MFR BLD: 9.3 %
KETONES UR QL: NEGATIVE
LEUKOCYTE EST, POC: NEGATIVE
NITRITE UR-MCNC: NEGATIVE MG/ML
PH UR: 6.5 [PH] (ref 5–8)
PROT UR STRIP-MCNC: NEGATIVE MG/DL
RBC # UR STRIP: NEGATIVE /UL
SP GR UR: 1.02 (ref 1–1.03)
UROBILINOGEN UR QL: NORMAL

## 2021-06-09 PROCEDURE — 82962 GLUCOSE BLOOD TEST: CPT | Performed by: INTERNAL MEDICINE

## 2021-06-09 PROCEDURE — 99214 OFFICE O/P EST MOD 30 MIN: CPT | Performed by: INTERNAL MEDICINE

## 2021-06-09 PROCEDURE — 81003 URINALYSIS AUTO W/O SCOPE: CPT | Performed by: INTERNAL MEDICINE

## 2021-06-09 PROCEDURE — 83036 HEMOGLOBIN GLYCOSYLATED A1C: CPT | Performed by: INTERNAL MEDICINE

## 2021-06-09 RX ORDER — FLUCONAZOLE 150 MG/1
150 TABLET ORAL ONCE
Qty: 2 TABLET | Refills: 0 | Status: SHIPPED | OUTPATIENT
Start: 2021-06-09 | End: 2021-06-09

## 2021-06-09 RX ORDER — EMPAGLIFLOZIN 10 MG/1
10 TABLET, FILM COATED ORAL DAILY
Qty: 30 TABLET | Refills: 0 | Status: SHIPPED | OUTPATIENT
Start: 2021-06-09 | End: 2021-07-06 | Stop reason: DRUGHIGH

## 2021-06-09 NOTE — PROGRESS NOTES
"Internal Medicine Acute Visit    Chief Complaint   Patient presents with   • Urinary Tract Infection     x1 week,burning and frequencey,flank pain        HPI  Ms. Morris notes about 1 week of dysuria and vaginal irritation. She thought she may have a UTI and yeast infection. She had made an appointment last week but car broke down. She has not had any treatment for UTI but did use OTC monistat for yeast infection and generally feels that this is improving. She did have return of dysuria today so made appointment. She has not had fever. She does feel fatigued, \"draggy,\" and mood has been more down/depressed a few days. She just finished menstruating and had initially felt well so did not take fluoxetine as usual. She notes compliance with other medications including metformin.       Review of Systems  Review of Systems   Constitutional: Positive for activity change and fatigue.   Genitourinary: Positive for dysuria, frequency and vaginal pain (irritation).   Psychiatric/Behavioral: Positive for depressed mood. Negative for self-injury, sleep disturbance and suicidal ideas.        Medications  Current Outpatient Medications on File Prior to Visit   Medication Sig Dispense Refill   • ARIPiprazole (ABILIFY) 2 MG tablet Take 1 tablet by mouth Daily. 90 tablet 0   • atorvastatin (LIPITOR) 40 MG tablet Take 1 tablet by mouth Every Night. 90 tablet 3   • BOTOX 200 units reconstituted solution INJECT 200 UNITS INTO THE HEAD OR NECK AREA BY MD EVERY 90 DAYS  3   • Cholecalciferol (VITAMIN D) 2000 units capsule Take 2,000 Units by mouth Daily.     • cyclobenzaprine (FLEXERIL) 10 MG tablet Take 1 tablet by mouth At Night As Needed for Muscle Spasms. 30 tablet 1   • DULoxetine (CYMBALTA) 60 MG capsule Take 1 capsule by mouth Daily. 90 capsule 0   • eletriptan (RELPAX) 40 MG tablet TAKE 1 TABLET BY MOUTH AT THE ONSET OF MIGRAINE. MAY REPEAT IN 2 HRS.MAX 2 DOSE/24HR,3 DAYS/WEEK  11   • FLUoxetine (PROzac) 40 MG capsule Daily prn " "with PMDD for 10 days for cycle. 30 capsule 1   • glucose blood test strip 1 each by Other route Daily. Use as instructed 100 each 2   • glucose monitor monitoring kit 1 each Daily. 1 each 1   • hydroCHLOROthiazide (HYDRODIURIL) 12.5 MG tablet Take 1 tablet by mouth Daily. 90 tablet 3   • Lancets 28G misc 1 application Daily. 100 each 2   • magnesium chloride ER 64 MG DR tablet Take 2 tablets by mouth Daily.     • metFORMIN ER (GLUCOPHAGE-XR) 500 MG 24 hr tablet Take 4 tablets by mouth Daily With Dinner. 360 tablet 3   • nebivolol (Bystolic) 10 MG tablet Take 1 tablet by mouth Daily. 90 tablet 3   • nortriptyline (PAMELOR) 10 MG capsule nortriptyline 10 mg capsule   TAKE 4 CAPSULES AT BEDTIME     • omeprazole (priLOSEC) 40 MG capsule Take 1 capsule by mouth Daily. 90 capsule 3   • oxyCODONE (ROXICODONE) 15 MG immediate release tablet Take 1 tablet by mouth Every 6 (Six) Hours As Needed for Moderate Pain . 56 tablet 0   • TROKENDI  MG capsule sustained-release 24 hr Take 1 capsule by mouth Daily.  1     No current facility-administered medications on file prior to visit.        Allergies  Allergies   Allergen Reactions   • Dihydroergotamine GI Intolerance     VOMITING   • Methergine [Methylergonovine Maleate] Nausea And Vomiting   • Amoxicillin Rash   • Methylergonovine GI Intolerance     And sob         PMH  Past Medical History:   Diagnosis Date   • Anxiety    • Chest pain 11/6/2019   • Colon polyp    • Depression    • Diabetes mellitus (CMS/HCC)    • Dysphagia 11/6/2019   • Endometriosis    • Fibromyalgia, primary    • GERD (gastroesophageal reflux disease)    • Headache    • HL (hearing loss)    • Hyperlipidemia    • Hypertension    • Low vitamin D level    • Migraines    • Ovarian cyst    • PMDD (premenstrual dysphoric disorder)    • Wears glasses        Objective  Visit Vitals  /72   Pulse 80   Temp 97.1 °F (36.2 °C)   Resp 16   Ht 162.6 cm (64.02\")   Wt 73.1 kg (161 lb 3.2 oz)   SpO2 96%   BMI " 27.66 kg/m²        Physical Exam  Physical Exam  Vitals and nursing note reviewed.   Constitutional:       General: She is not in acute distress.     Appearance: She is well-developed. She is not ill-appearing.   HENT:      Head: Normocephalic and atraumatic.   Eyes:      Conjunctiva/sclera: Conjunctivae normal.   Pulmonary:      Effort: Pulmonary effort is normal. No respiratory distress.   Skin:     General: Skin is warm and dry.   Neurological:      Mental Status: She is alert and oriented to person, place, and time. Mental status is at baseline.   Psychiatric:         Mood and Affect: Mood is depressed.         Results  Results for orders placed or performed in visit on 06/09/21   POC Glycosylated Hemoglobin (Hb A1C)    Specimen: Blood   Result Value Ref Range    Hemoglobin A1C 9.3 %   POC Glucose    Specimen: Blood   Result Value Ref Range    Glucose 318 (A) 70 - 130 mg/dL   POCT urinalysis dipstick, automated    Specimen: Urine   Result Value Ref Range    Color Dark Yellow Yellow, Straw, Dark Yellow, Jazz    Clarity, UA Cloudy (A) Clear    Specific Gravity  1.020 1.005 - 1.030    pH, Urine 6.5 5.0 - 8.0    Leukocytes Negative Negative    Nitrite, UA Negative Negative    Protein, POC Negative Negative mg/dL    Glucose, UA 3+ (A) Negative, 1000 mg/dL (3+) mg/dL    Ketones, UA Negative Negative    Urobilinogen, UA Normal Normal    Bilirubin Negative Negative    Blood, UA Negative Negative        Assessment and Plan  Diagnoses and all orders for this visit:    Type 2 diabetes mellitus without complication, without long-term current use of insulin (CMS/[facility])  - A1c up to 9.3 from 7.2 1/2021 on metformin XR 2000mg daily. No issues with compliance   - Urine microalbumin nl 9/2020  - Eye exam 4/2021 no retinopathy  - Continue metformin XR and add SGLT2, jardiance 10mg sent to pharmacy. After 1 month plan to increase to 25mg daily.    Glucosuria  - UA with 3+ glucose, otherwise normal. Checked A1c and glucose which  were both elevated. Management as above.    Candidal vaginitis  - No evidence of UTI. Likely has candidal vaginitis due to hyperglycemia. Partially treated with OTC monistat.   - Will treat with diflucan. Advised that SGLT2 can increase frequency of UTI, vaginitis. Plan to wait until diflucan complete before starting jardiance.    Health Maintenance  - Pap smear: GYN Dr. Mcguire, unable to get records thus far.  - Mammogram: scheduled 7/14  - Colonoscopy: 1/2018 sigmoid tubular adenoma. Repeat 5y.  - HCV: negative  - Immunizations: COVID complete. Tdap 2019. Pneumovax UTD. Discussed shingrix and she will call her insurance to determine coverage.  - Depression screening: negative 2/2020          Return in about 14 weeks (around 9/15/2021) for as scheduled.

## 2021-06-24 ENCOUNTER — TELEPHONE (OUTPATIENT)
Dept: INTERNAL MEDICINE | Facility: CLINIC | Age: 56
End: 2021-06-24

## 2021-06-24 DIAGNOSIS — E11.8 TYPE 2 DIABETES MELLITUS WITH COMPLICATION, WITHOUT LONG-TERM CURRENT USE OF INSULIN (HCC): ICD-10-CM

## 2021-06-24 RX ORDER — BLOOD-GLUCOSE METER
1 KIT MISCELLANEOUS ONCE
Qty: 1 EACH | Refills: 0 | Status: SHIPPED | OUTPATIENT
Start: 2021-06-24 | End: 2021-06-24

## 2021-06-24 RX ORDER — LANCETS 23 GAUGE
1 EACH MISCELLANEOUS DAILY
Qty: 100 EACH | Refills: 3 | Status: SHIPPED | OUTPATIENT
Start: 2021-06-24 | End: 2022-06-15 | Stop reason: SDUPTHER

## 2021-06-24 NOTE — TELEPHONE ENCOUNTER
Caller: Yelena Morris    Relationship: Self    Best call back number: [phone]      PATIENT IS REQUEST A MONITOR TO CHECK HER BLOOD SUGAR   THE INSURANCE SAID IF THE DOCTOR  PUTS  AN ORDER IN FOR THIS  THEY WILL PAY FOR IT        Additional notes:    KEO RODNEY

## 2021-07-06 DIAGNOSIS — E11.9 TYPE 2 DIABETES MELLITUS WITHOUT COMPLICATION, WITHOUT LONG-TERM CURRENT USE OF INSULIN (HCC): ICD-10-CM

## 2021-07-06 RX ORDER — EMPAGLIFLOZIN 10 MG/1
TABLET, FILM COATED ORAL
Qty: 30 TABLET | Refills: 0 | OUTPATIENT
Start: 2021-07-06

## 2021-07-06 NOTE — TELEPHONE ENCOUNTER
Please call patient to see how she is doing on jardiance 10mg daily. What is her average blood sugar? If she is doing well without side effects of hypoglycemia I had planned to increase dose to 25mg daily after a month if needed.

## 2021-07-06 NOTE — TELEPHONE ENCOUNTER
Last Office Visit: 6/9/2021   Next Office Visit: 9/15/2021    Labs completed in past 6 months? Yes   Labs completed in past year? Yes     Last Refill Date: 6/9/2021  Quantity: 30  Refills: 0     Pharmacy:    Please review pended refill request for any changes needed on refills or quantities. Thank you!

## 2021-07-09 ENCOUNTER — TELEPHONE (OUTPATIENT)
Dept: INTERNAL MEDICINE | Facility: CLINIC | Age: 56
End: 2021-07-09

## 2021-07-09 DIAGNOSIS — B37.31 VAGINAL CANDIDIASIS: Primary | ICD-10-CM

## 2021-07-09 NOTE — TELEPHONE ENCOUNTER
Caller: Yelena Morris    Relationship: Self    Best call back number: [phone]    What medication are you requesting: DIFLUCAN    What are your current symptoms: YEAST INFECTION    Have you had these symptoms before:    [x] Yes  [] No    Have you been treated for these symptoms before:   [x] Yes  [] No    If a prescription is needed, what is your preferred pharmacy and phone number:  [phone]  Additional notes:  PATIENT HAS A YEAST INFECTION AND NOT SURE IF IT IS BECAUSE OF THE INCREASED DOSAGE OF JARDIANCE.

## 2021-07-11 RX ORDER — FLUCONAZOLE 150 MG/1
150 TABLET ORAL ONCE
Qty: 2 TABLET | Refills: 0 | Status: SHIPPED | OUTPATIENT
Start: 2021-07-11 | End: 2021-07-11

## 2021-07-11 NOTE — TELEPHONE ENCOUNTER
Please let patient know that [facility] was [facility]. If yeast infection recurs will need to consider stopping jardiance.

## 2021-07-12 NOTE — TELEPHONE ENCOUNTER
Spoke to Pt and informed her that diflucan was sent to pharm and if yeast infection recurs that Jardiance may need to be stopped. Pt voiced understanding.

## 2021-07-14 ENCOUNTER — APPOINTMENT (OUTPATIENT)
Dept: OTHER | Facility: HOSPITAL | Age: 56
End: 2021-07-14

## 2021-07-14 ENCOUNTER — HOSPITAL ENCOUNTER (OUTPATIENT)
Dept: MAMMOGRAPHY | Facility: HOSPITAL | Age: 56
Discharge: HOME | End: 2021-07-14
Admitting: INTERNAL MEDICINE

## 2021-07-14 DIAGNOSIS — Z12.31 ENCOUNTER FOR SCREENING MAMMOGRAM FOR MALIGNANT NEOPLASM OF BREAST: ICD-10-CM

## 2021-07-14 PROCEDURE — 77063 BREAST TOMOSYNTHESIS BI: CPT

## 2021-07-14 PROCEDURE — 77067 SCR MAMMO BI INCL CAD: CPT

## 2021-07-14 PROCEDURE — 77063 BREAST TOMOSYNTHESIS BI: CPT | Performed by: RADIOLOGY

## 2021-07-14 PROCEDURE — 77067 SCR MAMMO BI INCL CAD: CPT | Performed by: RADIOLOGY

## 2021-07-15 DIAGNOSIS — F32.81 PMDD (PREMENSTRUAL DYSPHORIC DISORDER): ICD-10-CM

## 2021-07-15 RX ORDER — FLUOXETINE HYDROCHLORIDE 40 MG/1
CAPSULE ORAL
Qty: 30 CAPSULE | Refills: 0 | Status: SHIPPED | OUTPATIENT
Start: 2021-07-15 | End: 2021-09-22 | Stop reason: SDUPTHER

## 2021-07-21 ENCOUNTER — TELEMEDICINE (OUTPATIENT)
Dept: PSYCHIATRY | Facility: CLINIC | Age: 56
End: 2021-07-21

## 2021-07-21 DIAGNOSIS — F32.81 PMDD (PREMENSTRUAL DYSPHORIC DISORDER): ICD-10-CM

## 2021-07-21 DIAGNOSIS — F43.22 ADJUSTMENT DISORDER WITH ANXIOUS MOOD: ICD-10-CM

## 2021-07-21 DIAGNOSIS — F33.1 MAJOR DEPRESSIVE DISORDER, RECURRENT EPISODE, MODERATE (HCC): Primary | Chronic | ICD-10-CM

## 2021-07-21 PROCEDURE — 99214 OFFICE O/P EST MOD 30 MIN: CPT | Performed by: NURSE PRACTITIONER

## 2021-07-21 RX ORDER — ARIPIPRAZOLE 5 MG/1
5 TABLET ORAL DAILY
Qty: 90 TABLET | Refills: 0 | Status: SHIPPED | OUTPATIENT
Start: 2021-07-21 | End: 2021-10-20 | Stop reason: SDUPTHER

## 2021-07-21 RX ORDER — DULOXETIN HYDROCHLORIDE 60 MG/1
60 CAPSULE, DELAYED RELEASE ORAL DAILY
Qty: 90 CAPSULE | Refills: 0 | Status: SHIPPED | OUTPATIENT
Start: 2021-07-21 | End: 2021-10-20 | Stop reason: SDUPTHER

## 2021-07-21 NOTE — PROGRESS NOTES
This provider is located at [facility], [address] [place] [address].The Patient is seen remotely at home, [address] [place] [address] via [employer]. Patient is being seen via telehealth and confirm that they are in a secure environment for this session. The patient's condition being diagnosed/treated is appropriate for telemedicine. The provider identified himself/herself: herself as well as her credentials.   The patient gave consent to be seen remotely, and when consent is given they understand that the consent allows for patient identifiable information to be sent to a third party as needed.   They may refuse to be seen remotely at any time. The electronic data is encrypted and password protected, and the patient has been advised of the potential risks to privacy not withstanding such measures.    You have chosen to receive care through a telehealth visit.  Do you consent to use a video/audio connection for your medical care today? Yes      Chief Complaint  Follow-up for depression and anxiety     Subjective    Yelena Radha Morris presents to [facility] for medication management.       History of Present Illness   Patient presents today reporting she is doing okay.  Patient states that roughly 1 to 2 days out of the week week she states she gets down and depressed feelings but can recover pretty quickly but it still occurring.  Patient states that her 's cancer is still present and he may require surgery so that has increased her anxiety.  Patient states that she has to make decisions regarding her pain and plan of care.  Patient notes that the fluoxetine is been helpful around her menstrual cycle as though symptoms have subsided.  Patient states the family is going on vacation tomorrow so she is anxious.  Patient reports she is sleeping okay and appetite is been okay.  Patient denies any side effects to the medications.   Denies any SI/HI/AH/VH.        Objective   Vital Signs:   There were no vitals taken for this visit.  Due to the remote nature of this encounter (virtual encounter), vitals were unable to be obtained.  Height stated at 64 inches.  Weight stated at 165 pounds.      PHQ-9 Score:   PHQ-9 Total Score:   0    Mental Status Exam:   Hygiene:   good  Cooperation:  Cooperative  Eye Contact:  Good  Psychomotor Behavior:  Appropriate  Affect:  Appropriate  Mood: normal, depressed and anxious  Speech:  Normal  Thought Process:  Goal directed and Linear  Thought Content:  Normal  Suicidal:  None  Homicidal:  None  Hallucinations:  None  Delusion:  None  Memory:  Intact  Orientation:  Person, Place, Time and Situation  Reliability:  good  Insight:  Good  Judgement:  Good  Impulse Control:  Good  Physical/Medical Issues:  Yes HTN, DM, fibromyalgia, carpal tunnel syndrome     Current Medications:   Current Outpatient Medications   Medication Sig Dispense Refill   • ARIPiprazole (ABILIFY) 5 MG tablet Take 1 tablet by mouth Daily. 90 tablet 0   • atorvastatin (LIPITOR) 40 MG tablet Take 1 tablet by mouth Every Night. 90 tablet 3   • BOTOX 200 units reconstituted solution INJECT 200 UNITS INTO THE HEAD OR NECK AREA BY MD EVERY 90 DAYS  3   • Cholecalciferol (VITAMIN D) 2000 units capsule Take 2,000 Units by mouth Daily.     • cyclobenzaprine (FLEXERIL) 10 MG tablet Take 1 tablet by mouth At Night As Needed for Muscle Spasms. 30 tablet 1   • DULoxetine (CYMBALTA) 60 MG capsule Take 1 capsule by mouth Daily. 90 capsule 0   • eletriptan (RELPAX) 40 MG tablet TAKE 1 TABLET BY MOUTH AT THE ONSET OF MIGRAINE. MAY REPEAT IN 2 HRS.MAX 2 DOSE/24HR,3 DAYS/WEEK  11   • empagliflozin (Jardiance) 25 MG tablet tablet Take 1 tablet by mouth Daily. 90 tablet 1   • FLUoxetine (PROzac) 40 MG capsule TAKE ONE CAPSULE BY MOUTH DAILY AS NEEDED WITH PMDD FOR 10 DAYS FOR CYCLE 30 capsule 0   • glucose blood test strip 1 each by Other route Daily. Use as  instructed 100 each 2   • glucose monitor monitoring kit 1 each Daily. 1 each 1   • hydroCHLOROthiazide (HYDRODIURIL) 12.5 MG tablet Take 1 tablet by mouth Daily. 90 tablet 3   • Lancets 28G misc 1 application Daily. 100 each 3   • magnesium chloride ER 64 MG DR tablet Take 2 tablets by mouth Daily.     • metFORMIN ER (GLUCOPHAGE-XR) 500 MG 24 hr tablet Take 4 tablets by mouth Daily With Dinner. 360 tablet 3   • nebivolol (Bystolic) 10 MG tablet Take 1 tablet by mouth Daily. 90 tablet 3   • nortriptyline (PAMELOR) 10 MG capsule nortriptyline 10 mg capsule   TAKE 4 CAPSULES AT BEDTIME     • omeprazole (priLOSEC) 40 MG capsule Take 1 capsule by mouth Daily. 90 capsule 3   • oxyCODONE (ROXICODONE) 15 MG immediate release tablet Take 1 tablet by mouth Every 6 (Six) Hours As Needed for Moderate Pain . 56 tablet 0   • TROKENDI  MG capsule sustained-release 24 hr Take 1 capsule by mouth Daily.  1     No current facility-administered medications for this visit.       Physical Exam  Nursing note reviewed.   Constitutional:       Appearance: Normal appearance.   Neurological:      Mental Status: She is alert.   Psychiatric:         Attention and Perception: Attention and perception normal.         Mood and Affect: Affect normal. Mood is anxious and depressed.         Speech: Speech normal.         Behavior: Behavior normal. Behavior is cooperative.         Thought Content: Thought content normal.         Cognition and Memory: Cognition and memory normal.         Judgment: Judgment normal.        Result Review :     The following data was reviewed by: ONESIMO Alexander on 02/01/2021:  Common labs    Common Labsle 11/12/20 1/11/21 6/9/21   Hemoglobin A1C  7.2 9.3   Uric Acid 3.8             Data reviewed: PCP notes        Assessment and Plan    Problem List Items Addressed This Visit     None      Visit Diagnoses     Major depressive disorder, recurrent episode, moderate (CMS/HCC)  (Chronic)   -  Primary    Relevant  Medications    ARIPiprazole (ABILIFY) 5 MG tablet    DULoxetine (CYMBALTA) 60 MG capsule    Adjustment disorder with anxious mood        Relevant Medications    ARIPiprazole (ABILIFY) 5 MG tablet    DULoxetine (CYMBALTA) 60 MG capsule    PMDD (premenstrual dysphoric disorder)        Relevant Medications    ARIPiprazole (ABILIFY) 5 MG tablet    DULoxetine (CYMBALTA) 60 MG capsule            TREATMENT PLAN/GOALS: Continue supportive psychotherapy efforts and medications as indicated. Treatment and medication options discussed during today's visit. Patient ackowledged and verbally consented to continue with current treatment plan and was educated on the importance of compliance with treatment and follow-up appointments.    MEDICATION ISSUES:  We discussed risks, benefits, and side effects of the above medications and the patient was agreeable with the plan. Patient was educated on the importance of compliance with treatment and follow-up appointments.  Patient is agreeable to call the office with any worsening of symptoms or onset of side effects. Patient is agreeable to call 911 or go to the nearest ER should he/she begin having SI/HI.  Advised patient that she could try CBT to help reframing as she is coming off of opioids as well as doing yoga and water aerobics.  Also encouraged a low-dose of melatonin as well as adult coloring to help sleep at night patient was receptive of these recommendations and felt hopeful.      -Continue Cymbalta 60 mg daily for depression and anxiety as patient has benefited greatly as well for her pain.  -Continue fluoxetine 40 mg daily for PMDD for 10 days during the luteal phase.  -Increase aripiprazole to 5 mg as adjunct for depression as patient has failed numerous SSRIs and SNRIs and has benefited the patient tremendously and is medically necessary for her depression to avoid hospitalization.  Highly encourage patient that if she had any worsening symptoms with the increase in  aripiprazole to contact the clinic.  Also encourage patient to make a list so she can be less anxious about her trip and try to enjoy with her family.      Counseled patient regarding multimodal approach with healthy nutrition, healthy sleep, regular physical activity, social activities, counseling, and medications.      Coping skills reviewed and encouraged positive framing of thoughts     Assisted patient in processing above session content; acknowledged and normalized patient’s thoughts, feelings, and concerns.  Applied  positive coping skills and behavior management in session.  Allowed patient to freely discuss issues without interruption or judgment. Provided safe, confidential environment to facilitate the development of positive therapeutic relationship and encourage open, honest communication. Assisted patient in identifying risk factors which would indicate the need for higher level of care including thoughts to harm self or others and/or self-harming behavior and encouraged patient to contact this office, call 911, or present to the nearest emergency room should any of these events occur. Discussed crisis intervention services and means to access.     MEDS ORDERED DURING VISIT:  New Medications Ordered This Visit   Medications   • ARIPiprazole (ABILIFY) 5 MG tablet     Sig: Take 1 tablet by mouth Daily.     Dispense:  90 tablet     Refill:  0   • DULoxetine (CYMBALTA) 60 MG capsule     Sig: Take 1 capsule by mouth Daily.     Dispense:  90 capsule     Refill:  0           Follow Up   Return in about 4 weeks (around 8/18/2021), or if symptoms worsen or fail to improve, for Recheck.    Patient was given instructions and counseling regarding her condition or for health maintenance advice. Please see specific information pulled into the AVS if appropriate.     Some of the data in this electronic note has been brought forward from a previous encounter, any necessary changes have been made, it has been reviewed  by this APRN, and it is accurate.      This document has been electronically signed by ONESIMO Alexander  July 21, 2021 08:18 EDT    Part of this note may be an electronic transcription/translation of spoken language to printed text using the Dragon Dictation System.

## 2021-08-16 ENCOUNTER — TELEPHONE (OUTPATIENT)
Dept: INTERNAL MEDICINE | Facility: CLINIC | Age: 56
End: 2021-08-16

## 2021-08-16 DIAGNOSIS — E11.65 TYPE 2 DIABETES MELLITUS WITH HYPERGLYCEMIA, WITHOUT LONG-TERM CURRENT USE OF INSULIN (HCC): Primary | ICD-10-CM

## 2021-08-16 DIAGNOSIS — B37.31 VAGINAL CANDIDIASIS: ICD-10-CM

## 2021-08-16 RX ORDER — FLUCONAZOLE 150 MG/1
150 TABLET ORAL ONCE
Qty: 2 TABLET | Refills: 0 | Status: SHIPPED | OUTPATIENT
Start: 2021-08-16 | End: 2021-08-16

## 2021-08-16 NOTE — TELEPHONE ENCOUNTER
Spoke to Pt and let her know that Diflucan and Januvia has been sent to her pharmacy. Pt voiced understanding

## 2021-08-16 NOTE — TELEPHONE ENCOUNTER
Diflucan sent to pharmacy for yeast infection. I am glad she has stopped the jardiance as it is likely causative. I have sent januvia as an alternative. It does not work in the same fashion and should not contribute to yeast infections but will hopefully work well with her metformin to get A1c under control.

## 2021-08-16 NOTE — TELEPHONE ENCOUNTER
Caller: Yelena Morris    Relationship: Self    Best call back number: [phone]    What medication are you requesting: SOMETHING FOR YEAST INFECTION     What are your current symptoms: VAGINAL ITCHING, WHITE DISCHARGE, REDNESS IN VAGINAL AREA    How long have you been experiencing symptoms: 2 DAYS     Have you had these symptoms before:    [x] Yes  [] No    Have you been treated for these symptoms before:   [x] Yes  [] No    If a prescription is needed, what is your preferred pharmacy and phone number: [facility] [address] [place] [address] [facility] [phone] [facility] [phone]      Additional notes: PATIENT STOPPED JUARDIANCE 8/15/21

## 2021-08-18 ENCOUNTER — TELEMEDICINE (OUTPATIENT)
Dept: PSYCHIATRY | Facility: CLINIC | Age: 56
End: 2021-08-18

## 2021-08-18 DIAGNOSIS — F32.81 PMDD (PREMENSTRUAL DYSPHORIC DISORDER): ICD-10-CM

## 2021-08-18 DIAGNOSIS — F43.22 ADJUSTMENT DISORDER WITH ANXIOUS MOOD: ICD-10-CM

## 2021-08-18 DIAGNOSIS — F33.1 MAJOR DEPRESSIVE DISORDER, RECURRENT EPISODE, MODERATE (HCC): Primary | ICD-10-CM

## 2021-08-18 PROCEDURE — 99214 OFFICE O/P EST MOD 30 MIN: CPT | Performed by: NURSE PRACTITIONER

## 2021-08-18 NOTE — PROGRESS NOTES
This provider is located at [facility], [address] [place] [address].The Patient is seen remotely at home, [address] [facility] [place] [address] via blur [employer]. Patient is being seen via telehealth and confirm that they are in a secure environment for this session. The patient's condition being diagnosed/treated is appropriate for telemedicine. The provider identified himself/herself: herself as well as her credentials.   The patient gave consent to be seen remotely, and when consent is given they understand that the consent allows for patient identifiable information to be sent to a third party as needed.   They may refuse to be seen remotely at any time. The electronic data is encrypted and password protected, and the patient has been advised of the potential risks to privacy not withstanding such measures.    You have chosen to receive care through a telehealth visit.  Do you consent to use a video/audio connection for your medical care today? Yes      Chief Complaint  Follow-up for depression and anxiety     Subjective    Yelena Radha Morris presents to [facility] for medication management.       History of Present Illness   Patient presents today reporting she has been doing well.  Patient states that she woke up a couple days feeling down but it only lasted maybe 2 days and it has been less frequent since the increase in Abilify.  Patient states that her anxiety has eased up slightly as her  had an ablation on his liver and they are hopeful but they got of the spots that they suspected were cancerous.  Patient states that she is sleeping okay when she does wake up early she is able to take a nap during the day and doing okay.  Patient reports her appetite is been fine.  She denies any side effects to the medications.  She reports that the fluoxetine is still helpful around her menstrual cycle.  Denies any SI/HI/AH/VH.        Objective    Vital Signs:   There were no vitals taken for this visit.  Due to the remote nature of this encounter (virtual encounter), vitals were unable to be obtained.  Height stated at 64 inches.  Weight stated at 165 pounds.      PHQ-9 Score:   PHQ-9 Total Score:   0    Mental Status Exam:   Hygiene:   good  Cooperation:  Cooperative  Eye Contact:  Good  Psychomotor Behavior:  Appropriate  Affect:  Appropriate  Mood: normal  Speech:  Normal  Thought Process:  Goal directed and Linear  Thought Content:  Normal  Suicidal:  None  Homicidal:  None  Hallucinations:  None  Delusion:  None  Memory:  Intact  Orientation:  Person, Place, Time and Situation  Reliability:  good  Insight:  Good  Judgement:  Good  Impulse Control:  Good  Physical/Medical Issues:  Yes HTN, DM, fibromyalgia, carpal tunnel syndrome     Current Medications:   Current Outpatient Medications   Medication Sig Dispense Refill   • ARIPiprazole (ABILIFY) 5 MG tablet Take 1 tablet by mouth Daily. 90 tablet 0   • atorvastatin (LIPITOR) 40 MG tablet Take 1 tablet by mouth Every Night. 90 tablet 3   • BOTOX 200 units reconstituted solution INJECT 200 UNITS INTO THE HEAD OR NECK AREA BY MD EVERY 90 DAYS  3   • Cholecalciferol (VITAMIN D) 2000 units capsule Take 2,000 Units by mouth Daily.     • cyclobenzaprine (FLEXERIL) 10 MG tablet Take 1 tablet by mouth At Night As Needed for Muscle Spasms. 30 tablet 1   • DULoxetine (CYMBALTA) 60 MG capsule Take 1 capsule by mouth Daily. 90 capsule 0   • eletriptan (RELPAX) 40 MG tablet TAKE 1 TABLET BY MOUTH AT THE ONSET OF MIGRAINE. MAY REPEAT IN 2 HRS.MAX 2 DOSE/24HR,3 DAYS/WEEK  11   • FLUoxetine (PROzac) 40 MG capsule TAKE ONE CAPSULE BY MOUTH DAILY AS NEEDED WITH PMDD FOR 10 DAYS FOR CYCLE 30 capsule 0   • glucose blood test strip 1 each by Other route Daily. Use as instructed 100 each 2   • glucose monitor monitoring kit 1 each Daily. 1 each 1   • hydroCHLOROthiazide (HYDRODIURIL) 12.5 MG tablet Take 1 tablet by mouth  Daily. 90 tablet 3   • magnesium chloride ER 64 MG DR tablet Take 2 tablets by mouth Daily.     • metFORMIN ER (GLUCOPHAGE-XR) 500 MG 24 hr tablet Take 4 tablets by mouth Daily With Dinner. 360 tablet 3   • nebivolol (Bystolic) 10 MG tablet Take 1 tablet by mouth Daily. 90 tablet 3   • nortriptyline (PAMELOR) 10 MG capsule nortriptyline 10 mg capsule   TAKE 4 CAPSULES AT BEDTIME     • omeprazole (priLOSEC) 40 MG capsule Take 1 capsule by mouth Daily. 90 capsule 3   • oxyCODONE (ROXICODONE) 15 MG immediate release tablet Take 1 tablet by mouth Every 6 (Six) Hours As Needed for Moderate Pain . 56 tablet 0   • SITagliptin (Januvia) 50 MG tablet Take 1 tablet by mouth Daily. 90 tablet 0   • TROKENDI  MG capsule sustained-release 24 hr Take 1 capsule by mouth Daily.  1   • Lancets 28G misc 1 application Daily. 100 each 3     No current facility-administered medications for this visit.       Physical Exam  Nursing note reviewed.   Constitutional:       Appearance: Normal appearance.   Neurological:      Mental Status: She is alert.   Psychiatric:         Attention and Perception: Attention and perception normal.         Mood and Affect: Affect normal. Mood is anxious and depressed.         Speech: Speech normal.         Behavior: Behavior normal. Behavior is cooperative.         Thought Content: Thought content normal.         Cognition and Memory: Cognition and memory normal.         Judgment: Judgment normal.        Result Review :     The following data was reviewed by: ONESIMO Alexander on 02/01/2021:  Common labs    Common Labsle 11/12/20 1/11/21 6/9/21   Hemoglobin A1C  7.2 9.3   Uric Acid 3.8             Data reviewed: PCP notes        Assessment and Plan    Problem List Items Addressed This Visit     None      Visit Diagnoses     Major depressive disorder, recurrent episode, moderate (CMS/HCC)    -  Primary    Adjustment disorder with anxious mood        PMDD (premenstrual dysphoric disorder)                 TREATMENT PLAN/GOALS: Continue supportive psychotherapy efforts and medications as indicated. Treatment and medication options discussed during today's visit. Patient ackowledged and verbally consented to continue with current treatment plan and was educated on the importance of compliance with treatment and follow-up appointments.    MEDICATION ISSUES:  We discussed risks, benefits, and side effects of the above medications and the patient was agreeable with the plan. Patient was educated on the importance of compliance with treatment and follow-up appointments.  Patient is agreeable to call the office with any worsening of symptoms or onset of side effects. Patient is agreeable to call 911 or go to the nearest ER should he/she begin having SI/HI.  Advised patient that she could try CBT to help reframing as she is coming off of opioids as well as doing yoga and water aerobics.  Also encouraged a low-dose of melatonin as well as adult coloring to help sleep at night patient was receptive of these recommendations and felt hopeful.      -Continue Cymbalta 60 mg daily for depression and anxiety as patient has benefited greatly as well for her pain.  -Continue fluoxetine 40 mg daily for PMDD for 10 days during the luteal phase.  -Continue aripiprazole 5 mg as adjunct for depression as patient has failed numerous SSRIs and SNRIs and has benefited the patient tremendously and is medically necessary for her depression to avoid hospitalization.      Counseled patient regarding multimodal approach with healthy nutrition, healthy sleep, regular physical activity, social activities, counseling, and medications.      Coping skills reviewed and encouraged positive framing of thoughts     Assisted patient in processing above session content; acknowledged and normalized patient’s thoughts, feelings, and concerns.  Applied  positive coping skills and behavior management in session.  Allowed patient to freely discuss issues  without interruption or judgment. Provided safe, confidential environment to facilitate the development of positive therapeutic relationship and encourage open, honest communication. Assisted patient in identifying risk factors which would indicate the need for higher level of care including thoughts to harm self or others and/or self-harming behavior and encouraged patient to contact this office, call 911, or present to the nearest emergency room should any of these events occur. Discussed crisis intervention services and means to access.     MEDS ORDERED DURING VISIT:  No orders of the defined types were placed in this encounter.          Follow Up   Return in about 6 weeks (around 9/29/2021), or if symptoms worsen or fail to improve, for Recheck.    Patient was given instructions and counseling regarding her condition or for health maintenance advice. Please see specific information pulled into the AVS if appropriate.     Some of the data in this electronic note has been brought forward from a previous encounter, any necessary changes have been made, it has been reviewed by this APRN, and it is accurate.      This document has been electronically signed by ONESIMO Alexander  August 18, 2021 09:51 EDT    Part of this note may be an electronic transcription/translation of spoken language to printed text using the Dragon Dictation System.

## 2021-09-02 ENCOUNTER — OFFICE VISIT (OUTPATIENT)
Dept: INTERNAL MEDICINE | Facility: CLINIC | Age: 56
End: 2021-09-02

## 2021-09-02 ENCOUNTER — TELEPHONE (OUTPATIENT)
Dept: INTERNAL MEDICINE | Facility: CLINIC | Age: 56
End: 2021-09-02

## 2021-09-02 VITALS
HEIGHT: 64 IN | WEIGHT: 163 LBS | BODY MASS INDEX: 27.83 KG/M2 | TEMPERATURE: 97.5 F | DIASTOLIC BLOOD PRESSURE: 64 MMHG | SYSTOLIC BLOOD PRESSURE: 122 MMHG | HEART RATE: 87 BPM | RESPIRATION RATE: 16 BRPM | OXYGEN SATURATION: 99 %

## 2021-09-02 DIAGNOSIS — E11.9 TYPE 2 DIABETES MELLITUS WITHOUT COMPLICATION, WITHOUT LONG-TERM CURRENT USE OF INSULIN (HCC): Primary | ICD-10-CM

## 2021-09-02 DIAGNOSIS — E11.9 TYPE 2 DIABETES MELLITUS WITHOUT COMPLICATION, WITHOUT LONG-TERM CURRENT USE OF INSULIN (HCC): ICD-10-CM

## 2021-09-02 DIAGNOSIS — M96.1 LUMBAR POST-LAMINECTOMY SYNDROME: Primary | ICD-10-CM

## 2021-09-02 PROCEDURE — 96372 THER/PROPH/DIAG INJ SC/IM: CPT | Performed by: INTERNAL MEDICINE

## 2021-09-02 PROCEDURE — 99214 OFFICE O/P EST MOD 30 MIN: CPT | Performed by: INTERNAL MEDICINE

## 2021-09-02 RX ORDER — GLIMEPIRIDE 2 MG/1
2 TABLET ORAL
Qty: 90 TABLET | Refills: 1 | Status: SHIPPED | OUTPATIENT
Start: 2021-09-02 | End: 2021-12-14 | Stop reason: SDUPTHER

## 2021-09-02 RX ORDER — DEXAMETHASONE SODIUM PHOSPHATE 4 MG/ML
4 INJECTION, SOLUTION INTRA-ARTICULAR; INTRALESIONAL; INTRAMUSCULAR; INTRAVENOUS; SOFT TISSUE ONCE
Status: COMPLETED | OUTPATIENT
Start: 2021-09-02 | End: 2021-09-02

## 2021-09-02 RX ORDER — METHYLPREDNISOLONE 4 MG/1
TABLET ORAL
Qty: 21 TABLET | Refills: 0 | Status: SHIPPED | OUTPATIENT
Start: 2021-09-02 | End: 2021-09-07

## 2021-09-02 RX ADMIN — DEXAMETHASONE SODIUM PHOSPHATE 4 MG: 4 INJECTION, SOLUTION INTRA-ARTICULAR; INTRALESIONAL; INTRAMUSCULAR; INTRAVENOUS; SOFT TISSUE at 10:06

## 2021-09-02 NOTE — TELEPHONE ENCOUNTER
Onglyza not covered by insurance    Alternatives:  Glipizide-Metformin  Metformin HCI  Glimepiride    Please advise

## 2021-09-02 NOTE — PROGRESS NOTES
Internal Medicine Acute Visit    Chief Complaint   Patient presents with   • Back Pain     LLQ , pain shooting down left leg,x4 days        HPI  Ms. Morris comes in today with increased L lower back pain that is radiating down the L thigh. She has been lifting her 23lb grandson and thinks this caused her pain. She usually sees pain management and has been taking oxycodone as prescribed with minimal relief. She has taken flexeril with some improvement. She also has a spinal cord stimulator that she reports targets her R side. She cannot change the settings at home.    She additionally notes that she cannot tolerate januvia due to bloating, gas so discontinued a couple days ago.       Review of Systems  Review of Systems   Constitutional: Negative.    Gastrointestinal: Negative for constipation.        Gas   Genitourinary: Negative for urinary incontinence.   Musculoskeletal: Positive for back pain, myalgias and neck pain. Negative for gait problem.   Neurological: Negative for weakness and numbness.        Medications  Current Outpatient Medications on File Prior to Visit   Medication Sig Dispense Refill   • ARIPiprazole (ABILIFY) 5 MG tablet Take 1 tablet by mouth Daily. 90 tablet 0   • atorvastatin (LIPITOR) 40 MG tablet Take 1 tablet by mouth Every Night. 90 tablet 3   • BOTOX 200 units reconstituted solution INJECT 200 UNITS INTO THE HEAD OR NECK AREA BY MD EVERY 90 DAYS  3   • Cholecalciferol (VITAMIN D) 2000 units capsule Take 2,000 Units by mouth Daily.     • cyclobenzaprine (FLEXERIL) 10 MG tablet Take 1 tablet by mouth At Night As Needed for Muscle Spasms. 30 tablet 1   • DULoxetine (CYMBALTA) 60 MG capsule Take 1 capsule by mouth Daily. 90 capsule 0   • eletriptan (RELPAX) 40 MG tablet TAKE 1 TABLET BY MOUTH AT THE ONSET OF MIGRAINE. MAY REPEAT IN 2 HRS.MAX 2 DOSE/24HR,3 DAYS/WEEK  11   • FLUoxetine (PROzac) 40 MG capsule TAKE ONE CAPSULE BY MOUTH DAILY AS NEEDED WITH PMDD FOR 10 DAYS FOR CYCLE 30 capsule 0  "  • glucose blood test strip 1 each by Other route Daily. Use as instructed 100 each 2   • glucose monitor monitoring kit 1 each Daily. 1 each 1   • hydroCHLOROthiazide (HYDRODIURIL) 12.5 MG tablet Take 1 tablet by mouth Daily. 90 tablet 3   • Lancets 28G misc 1 application Daily. 100 each 3   • magnesium chloride ER 64 MG DR tablet Take 2 tablets by mouth Daily.     • metFORMIN ER (GLUCOPHAGE-XR) 500 MG 24 hr tablet Take 4 tablets by mouth Daily With Dinner. 360 tablet 3   • nebivolol (Bystolic) 10 MG tablet Take 1 tablet by mouth Daily. 90 tablet 3   • nortriptyline (PAMELOR) 10 MG capsule nortriptyline 10 mg capsule   TAKE 4 CAPSULES AT BEDTIME     • omeprazole (priLOSEC) 40 MG capsule Take 1 capsule by mouth Daily. 90 capsule 3   • oxyCODONE (ROXICODONE) 15 MG immediate release tablet Take 1 tablet by mouth Every 6 (Six) Hours As Needed for Moderate Pain . 56 tablet 0   • TROKENDI  MG capsule sustained-release 24 hr Take 1 capsule by mouth Daily.  1   • SITagliptin (Januvia) 50 MG tablet Take 1 tablet by mouth Daily. 90 tablet 0     No current facility-administered medications on file prior to visit.        Allergies  Allergies   Allergen Reactions   • Dihydroergotamine GI Intolerance     VOMITING   • Methergine [Methylergonovine Maleate] Nausea And Vomiting   • Amoxicillin Rash   • Methylergonovine GI Intolerance     And sob         PMH  Past Medical History:   Diagnosis Date   • Anxiety    • Chest pain 11/6/2019   • Colon polyp    • Depression    • Diabetes mellitus (CMS/HCC)    • Dysphagia 11/6/2019   • Endometriosis    • Fibromyalgia, primary    • GERD (gastroesophageal reflux disease)    • Headache    • HL (hearing loss)    • Hyperlipidemia    • Hypertension    • Low vitamin D level    • Migraines    • Ovarian cyst    • PMDD (premenstrual dysphoric disorder)    • Wears glasses        Objective  Visit Vitals  /64   Pulse 87   Temp 97.5 °F (36.4 °C)   Resp 16   Ht 162.6 cm (64.02\")   Wt 73.9 kg " (163 lb)   SpO2 99%   BMI 27.96 kg/m²        Physical Exam  Physical Exam  Vitals and nursing note reviewed.   Constitutional:       General: She is not in acute distress.     Appearance: She is well-developed. She is not ill-appearing or toxic-appearing.   HENT:      Head: Normocephalic and atraumatic.   Eyes:      Conjunctiva/sclera: Conjunctivae normal.   Pulmonary:      Effort: Pulmonary effort is normal. No respiratory distress.   Musculoskeletal:         General: Tenderness (L SI and muscle tenderness) present. No deformity.      Comments: Straight leg raise positive on L   Skin:     General: Skin is warm and dry.   Neurological:      General: No focal deficit present.      Mental Status: She is alert and oriented to person, place, and time. Mental status is at baseline.      Gait: Gait normal.         Results  Results for orders placed or performed in visit on 06/09/21   POC Glycosylated Hemoglobin (Hb A1C)    Specimen: Blood   Result Value Ref Range    Hemoglobin A1C 9.3 %   POC Glucose    Specimen: Blood   Result Value Ref Range    Glucose 318 (A) 70 - 130 mg/dL   POCT urinalysis dipstick, automated    Specimen: Urine   Result Value Ref Range    Color Dark Yellow Yellow, Straw, Dark Yellow, Jazz    Clarity, UA Cloudy (A) Clear    Specific Gravity  1.020 1.005 - 1.030    pH, Urine 6.5 5.0 - 8.0    Leukocytes Negative Negative    Nitrite, UA Negative Negative    Protein, POC Negative Negative mg/dL    Glucose, UA 3+ (A) Negative, 1000 mg/dL (3+) mg/dL    Ketones, UA Negative Negative    Urobilinogen, UA Normal Normal    Bilirubin Negative Negative    Blood, UA Negative Negative        Assessment and Plan  Diagnoses and all orders for this visit:    Lumbar post-laminectomy syndrome  - Currently following with pain management and is working on weaning chronic opiates. Currently on oxycodone 10mg TID. Also uses OTC pain relievers PRN and flexeril 10mg qhs PRN.  - Recent flare in low back pain with L sided  sciatica  - Will give decadron 4mg IM today and she can start medrol dosepak tomorrow.  - Continue current medications prescribed by pain management and keep appt 9/10. May need adjustment to SCS.    Type 2 diabetes mellitus without complication, without long-term current use of insulin (CMS/[facility])  - A1c up to 9.3 6/2021 from 7.2 1/2021 on metformin XR 2000mg daily. No issues with compliance. Added jardiance but discontinued due to recurrent yeast infections. Januvia sent as alternative however had GI intolerance.  - Will try onglyza 2.5mg daily as alternative. If intolerant will changes to glipizide.  - Urine microalbumin nl 9/2020  - Eye exam 4/2021 no retinopathy  - Too soon for A1c, plan for A1c next visit    Health Maintenance  - Pap smear: GYN Dr. Mcguire, unable to get records thus far.  - Mammogram: 7/2021 BIRADS 2  - Colonoscopy: 1/2018 sigmoid tubular adenoma. Repeat 5y.  - HCV: negative  - Immunizations: COVID complete. Tdap 2019. Pneumovax UTD. Discussed shingrix and she will call her insurance to determine coverage.  - Depression screening: negative 2/2021    Return if symptoms worsen or fail to improve.

## 2021-09-02 NOTE — PROGRESS NOTES
Immunization  Immunization performed in Right Dorsal Glute by Shawnee Herring MA. Pt tolerated injection without complications  09/02/21   Shawnee Herring MA

## 2021-09-03 NOTE — TELEPHONE ENCOUNTER
Patient advised that onglyza was not covered by her insurance and Glimepiride was sent in as an alternative, needs to monitor for low blood sugars when starting the medication

## 2021-09-03 NOTE — TELEPHONE ENCOUNTER
Please update patient that onglyza was not covered by insurance. Glimepiride 2mg daily with breakfast sent as alternative. She needs to monitor for low blood sugars when starting this medication.

## 2021-09-15 ENCOUNTER — LAB (OUTPATIENT)
Dept: LAB | Facility: HOSPITAL | Age: 56
End: 2021-09-15

## 2021-09-15 ENCOUNTER — OFFICE VISIT (OUTPATIENT)
Dept: INTERNAL MEDICINE | Facility: CLINIC | Age: 56
End: 2021-09-15

## 2021-09-15 VITALS
HEART RATE: 92 BPM | OXYGEN SATURATION: 96 % | DIASTOLIC BLOOD PRESSURE: 72 MMHG | TEMPERATURE: 96.9 F | RESPIRATION RATE: 16 BRPM | BODY MASS INDEX: 28.41 KG/M2 | WEIGHT: 166.4 LBS | HEIGHT: 64 IN | SYSTOLIC BLOOD PRESSURE: 122 MMHG

## 2021-09-15 DIAGNOSIS — E78.2 MIXED HYPERLIPIDEMIA: ICD-10-CM

## 2021-09-15 DIAGNOSIS — Z23 NEED FOR INFLUENZA VACCINATION: ICD-10-CM

## 2021-09-15 DIAGNOSIS — E11.9 TYPE 2 DIABETES MELLITUS WITHOUT COMPLICATION, WITHOUT LONG-TERM CURRENT USE OF INSULIN (HCC): Primary | ICD-10-CM

## 2021-09-15 DIAGNOSIS — M96.1 LUMBAR POST-LAMINECTOMY SYNDROME: ICD-10-CM

## 2021-09-15 DIAGNOSIS — E04.2 MULTINODULAR THYROID: ICD-10-CM

## 2021-09-15 DIAGNOSIS — I10 ESSENTIAL HYPERTENSION: ICD-10-CM

## 2021-09-15 LAB
ALBUMIN SERPL-MCNC: 4 G/DL (ref 3.5–5.2)
ALBUMIN/GLOB SERPL: 1.5 G/DL
ALP SERPL-CCNC: 72 U/L (ref 39–117)
ALT SERPL W P-5'-P-CCNC: 44 U/L (ref 1–33)
ANION GAP SERPL CALCULATED.3IONS-SCNC: 10.3 MMOL/L (ref 5–15)
AST SERPL-CCNC: 23 U/L (ref 1–32)
BILIRUB SERPL-MCNC: 0.2 MG/DL (ref 0–1.2)
BUN SERPL-MCNC: 12 MG/DL (ref 6–20)
BUN/CREAT SERPL: 17.1 (ref 7–25)
CALCIUM SPEC-SCNC: 9.3 MG/DL (ref 8.6–10.5)
CHLORIDE SERPL-SCNC: 104 MMOL/L (ref 98–107)
CHOLEST SERPL-MCNC: 157 MG/DL (ref 0–200)
CO2 SERPL-SCNC: 23.7 MMOL/L (ref 22–29)
CREAT SERPL-MCNC: 0.7 MG/DL (ref 0.57–1)
DEPRECATED RDW RBC AUTO: 40 FL (ref 37–54)
ERYTHROCYTE [DISTWIDTH] IN BLOOD BY AUTOMATED COUNT: 13 % (ref 12.3–15.4)
GFR SERPL CREATININE-BSD FRML MDRD: 88 ML/MIN/1.73
GLOBULIN UR ELPH-MCNC: 2.6 GM/DL
GLUCOSE SERPL-MCNC: 127 MG/DL (ref 65–99)
HBA1C MFR BLD: 7.4 %
HCT VFR BLD AUTO: 39.1 % (ref 34–46.6)
HDLC SERPL-MCNC: 34 MG/DL (ref 40–60)
HGB BLD-MCNC: 13.2 G/DL (ref 12–15.9)
LDLC SERPL CALC-MCNC: 82 MG/DL (ref 0–100)
LDLC/HDLC SERPL: 2.17 {RATIO}
MCH RBC QN AUTO: 29 PG (ref 26.6–33)
MCHC RBC AUTO-ENTMCNC: 33.8 G/DL (ref 31.5–35.7)
MCV RBC AUTO: 85.9 FL (ref 79–97)
PLATELET # BLD AUTO: 294 10*3/MM3 (ref 140–450)
PMV BLD AUTO: 10 FL (ref 6–12)
POTASSIUM SERPL-SCNC: 3.9 MMOL/L (ref 3.5–5.2)
PROT SERPL-MCNC: 6.6 G/DL (ref 6–8.5)
RBC # BLD AUTO: 4.55 10*6/MM3 (ref 3.77–5.28)
SODIUM SERPL-SCNC: 138 MMOL/L (ref 136–145)
TRIGL SERPL-MCNC: 246 MG/DL (ref 0–150)
TSH SERPL DL<=0.05 MIU/L-ACNC: 2.12 UIU/ML (ref 0.27–4.2)
VLDLC SERPL-MCNC: 41 MG/DL (ref 5–40)
WBC # BLD AUTO: 7.68 10*3/MM3 (ref 3.4–10.8)

## 2021-09-15 PROCEDURE — 80061 LIPID PANEL: CPT

## 2021-09-15 PROCEDURE — 90471 IMMUNIZATION ADMIN: CPT | Performed by: INTERNAL MEDICINE

## 2021-09-15 PROCEDURE — 80053 COMPREHEN METABOLIC PANEL: CPT

## 2021-09-15 PROCEDURE — 85027 COMPLETE CBC AUTOMATED: CPT

## 2021-09-15 PROCEDURE — 90686 IIV4 VACC NO PRSV 0.5 ML IM: CPT | Performed by: INTERNAL MEDICINE

## 2021-09-15 PROCEDURE — 99214 OFFICE O/P EST MOD 30 MIN: CPT | Performed by: INTERNAL MEDICINE

## 2021-09-15 PROCEDURE — 84443 ASSAY THYROID STIM HORMONE: CPT

## 2021-09-15 NOTE — PROGRESS NOTES
Immunization  Immunization performed in Left Deltoid by Shawnee Herring MA. PT tolerated flu vaccine with no adverse effects  09/15/21   Shawnee Herring MA

## 2021-09-15 NOTE — PROGRESS NOTES
Internal Medicine Follow Up    Chief Complaint  Yelena Morris is a 55 y.o. female who presents today for follow up of chronic medical conditions outlined below.    Chief Complaint   Patient presents with   • Diabetes     4 month follow up        HPI  Ms. Morris comes in today for follow up. Onglyza not covered by insurance so now on glimepiride 2mg daily as well as metformin XR 2000mg daily. Back pain has improved after decadron injection. She is leaving town 9/23 to see [facility] in [place]. She would like flu shot today.       Review of Systems  Review of Systems   Constitutional: Negative.    Respiratory: Negative.    Cardiovascular: Negative.    Musculoskeletal: Positive for back pain (improved). Negative for gait problem.        Current Medications  Current Outpatient Medications on File Prior to Visit   Medication Sig Dispense Refill   • ARIPiprazole (ABILIFY) 5 MG tablet Take 1 tablet by mouth Daily. 90 tablet 0   • atorvastatin (LIPITOR) 40 MG tablet Take 1 tablet by mouth Every Night. 90 tablet 3   • BOTOX 200 units reconstituted solution INJECT 200 UNITS INTO THE HEAD OR NECK AREA BY MD EVERY 90 DAYS  3   • Cholecalciferol (VITAMIN D) 2000 units capsule Take 2,000 Units by mouth Daily.     • cyclobenzaprine (FLEXERIL) 10 MG tablet Take 1 tablet by mouth At Night As Needed for Muscle Spasms. 30 tablet 1   • DULoxetine (CYMBALTA) 60 MG capsule Take 1 capsule by mouth Daily. 90 capsule 0   • eletriptan (RELPAX) 40 MG tablet TAKE 1 TABLET BY MOUTH AT THE ONSET OF MIGRAINE. MAY REPEAT IN 2 HRS.MAX 2 DOSE/24HR,3 DAYS/WEEK  11   • FLUoxetine (PROzac) 40 MG capsule TAKE ONE CAPSULE BY MOUTH DAILY AS NEEDED WITH PMDD FOR 10 DAYS FOR CYCLE 30 capsule 0   • glimepiride (Amaryl) 2 MG tablet Take 1 tablet by mouth Every Morning Before Breakfast. 90 tablet 1   • glucose blood test strip 1 each by Other route Daily. Use as instructed 100 each 2   • glucose monitor monitoring kit 1 each Daily. 1 each 1   •  "hydroCHLOROthiazide (HYDRODIURIL) 12.5 MG tablet Take 1 tablet by mouth Daily. 90 tablet 3   • Lancets 28G misc 1 application Daily. 100 each 3   • magnesium chloride ER 64 MG DR tablet Take 2 tablets by mouth Daily.     • metFORMIN ER (GLUCOPHAGE-XR) 500 MG 24 hr tablet Take 4 tablets by mouth Daily With Dinner. 360 tablet 3   • nebivolol (Bystolic) 10 MG tablet Take 1 tablet by mouth Daily. 90 tablet 3   • nortriptyline (PAMELOR) 10 MG capsule nortriptyline 10 mg capsule   TAKE 4 CAPSULES AT BEDTIME     • omeprazole (priLOSEC) 40 MG capsule Take 1 capsule by mouth Daily. 90 capsule 3   • oxyCODONE (ROXICODONE) 15 MG immediate release tablet Take 1 tablet by mouth Every 6 (Six) Hours As Needed for Moderate Pain . (Patient taking differently: Take 15 mg by mouth Every 6 (Six) Hours As Needed for Moderate Pain . Pt taking 10 mg) 56 tablet 0   • TROKENDI  MG capsule sustained-release 24 hr Take 1 capsule by mouth Daily.  1     No current facility-administered medications on file prior to visit.       Allergies  Allergies   Allergen Reactions   • Dihydroergotamine GI Intolerance     VOMITING   • Methergine [Methylergonovine Maleate] Nausea And Vomiting   • Amoxicillin Rash       Objective  Visit Vitals  /72   Pulse 92   Temp 96.9 °F (36.1 °C)   Resp 16   Ht 162.6 cm (64.02\")   Wt 75.5 kg (166 lb 6.4 oz)   SpO2 96%   BMI 28.55 kg/m²        Physical Exam  Physical Exam  Vitals and nursing note reviewed.   Constitutional:       General: She is not in acute distress.     Appearance: She is well-developed. She is not ill-appearing or toxic-appearing.   HENT:      Head: Normocephalic and atraumatic.   Eyes:      Conjunctiva/sclera: Conjunctivae normal.   Cardiovascular:      Rate and Rhythm: Normal rate and regular rhythm.      Heart sounds: Normal heart sounds.   Pulmonary:      Effort: Pulmonary effort is normal. No respiratory distress.      Breath sounds: Normal breath sounds.   Skin:     General: Skin is " warm and dry.   Neurological:      Mental Status: She is alert and oriented to person, place, and time. Mental status is at baseline.      Gait: Gait normal.   Psychiatric:         Mood and Affect: Mood normal.         Behavior: Behavior normal.         Results  Results for orders placed or performed in visit on 09/15/21   POC Glycosylated Hemoglobin (Hb A1C)    Specimen: Blood   Result Value Ref Range    Hemoglobin A1C 7.4 %        Assessment and Plan  Diagnoses and all orders for this visit:    Type 2 diabetes mellitus without complication, without long-term current use of insulin (CMS/[facility])  - A1c back down to 7.4 from 9.3 on metformin XR 2000mg daily and glimepiride 2mg daily.   - Had also been briefly on jardiance but discontinued due to candidal infections and januvia which was discontinued due to GI intolerance.  - Urine microalbumin nl 9/2020  - Eye exam 4/2021 no retinopathy  - Given instability in medication regimen over the last 3 months, will need to repeat A1c in 3 months    Essential hypertension  - BP well controlled.  - Continue nebivolol 10mg daily and HCTZ 12.5mg daily    Lumbar post-laminectomy syndrome  - Follows with pain management. Currently on oxycodone 10mg TID. Also uses OTC pain relievers PRN and flexeril 10mg qhs PRN.  - Has SCS  - Pain improved since decadron injection 9/2    Multinodular thyroid  - Thyroid US 5/2020 with multinodular thyroid. Thyroid studies historically normal.  - Had difficulty getting US ordered in May due to scheduling issues. Will reorder.  - She will also get previously ordered labs done today including TSH.    Health Maintenance  - Pap smear: GYN Dr. Mcguire, unable to get records thus far.  - Mammogram: 7/2021 BIRADS 2  - Colonoscopy: 1/2018 sigmoid tubular adenoma. Repeat 5y.  - HCV: negative  - Immunizations: COVID complete, discussed booster. Flu today. Tdap 2019. Pneumovax UTD. Discussed shingrix and she will call her insurance to determine coverage.  -  Depression screening: negative 2/2021     Return in about 3 months (around 12/15/2021) for Follow up diabetes with A1c, Labs today.

## 2021-09-22 ENCOUNTER — TELEMEDICINE (OUTPATIENT)
Dept: PSYCHIATRY | Facility: CLINIC | Age: 56
End: 2021-09-22

## 2021-09-22 DIAGNOSIS — F33.1 MAJOR DEPRESSIVE DISORDER, RECURRENT EPISODE, MODERATE (HCC): Primary | ICD-10-CM

## 2021-09-22 DIAGNOSIS — F32.81 PMDD (PREMENSTRUAL DYSPHORIC DISORDER): ICD-10-CM

## 2021-09-22 DIAGNOSIS — F43.22 ADJUSTMENT DISORDER WITH ANXIOUS MOOD: ICD-10-CM

## 2021-09-22 PROCEDURE — 99214 OFFICE O/P EST MOD 30 MIN: CPT | Performed by: NURSE PRACTITIONER

## 2021-09-22 RX ORDER — FLUOXETINE HYDROCHLORIDE 20 MG/1
20 CAPSULE ORAL DAILY
Qty: 30 CAPSULE | Refills: 0 | Status: SHIPPED | OUTPATIENT
Start: 2021-09-22 | End: 2021-10-20 | Stop reason: SDUPTHER

## 2021-09-22 NOTE — PROGRESS NOTES
This provider is located at [facility], [address] [place] [address].The Patient is seen remotely at home, [address] [facility] [place] [address] via [email or web address]. Patient is being seen via telehealth and confirm that they are in a secure environment for this session. The patient's condition being diagnosed/treated is appropriate for telemedicine. The provider identified himself/herself: herself as well as her credentials.   The patient gave consent to be seen remotely, and when consent is given they understand that the consent allows for patient identifiable information to be sent to a third party as needed.   They may refuse to be seen remotely at any time. The electronic data is encrypted and password protected, and the patient has been advised of the potential risks to privacy not withstanding such measures.    You have chosen to receive care through a telehealth visit.  Do you consent to use a video/audio connection for your medical care today? Yes      Chief Complaint  Follow-up for depression and anxiety     Subjective    Yelena Radha Morris presents to [facility] for medication management.       History of Present Illness   Patient presents today reporting that things have been going okay.  Patient states that she believes she is in questionable early menopause and so she has not had a normal.  And she has not been taking the Prozac regularly so she has noticed her mood has just been more depressed lately and feeling tired.  Patient states her anxiety has been fine as it has not been much of a problem.  She reports she is still sleeping and eating well.  She states that mostly is just feeling the depressed down feeling but denies any irritability or agitation.  Patient states that she is flying out tomorrow to be with her son as her grandchild will be born Saturday and is going to help them out for a few weeks.  Patient denies any side  effects to the current medications.  Denies any SI/HI/AH/VH.        Objective   Vital Signs:   There were no vitals taken for this visit.  Due to the remote nature of this encounter (virtual encounter), vitals were unable to be obtained.  Height stated at 64 inches.  Weight stated at 165 pounds.      PHQ-9 Score:   PHQ-9 Total Score:   0    Mental Status Exam:   Hygiene:   good  Cooperation:  Cooperative  Eye Contact:  Good  Psychomotor Behavior:  Appropriate  Affect:  Appropriate  Mood: normal, sad and depressed  Speech:  Normal  Thought Process:  Goal directed and Linear  Thought Content:  Normal  Suicidal:  None  Homicidal:  None  Hallucinations:  None  Delusion:  None  Memory:  Intact  Orientation:  Person, Place, Time and Situation  Reliability:  good  Insight:  Good  Judgement:  Good  Impulse Control:  Good  Physical/Medical Issues:  Yes HTN, DM, fibromyalgia, carpal tunnel syndrome     Current Medications:   Current Outpatient Medications   Medication Sig Dispense Refill   • ARIPiprazole (ABILIFY) 5 MG tablet Take 1 tablet by mouth Daily. 90 tablet 0   • atorvastatin (LIPITOR) 40 MG tablet Take 1 tablet by mouth Every Night. 90 tablet 3   • BOTOX 200 units reconstituted solution INJECT 200 UNITS INTO THE HEAD OR NECK AREA BY MD EVERY 90 DAYS  3   • Cholecalciferol (VITAMIN D) 2000 units capsule Take 2,000 Units by mouth Daily.     • cyclobenzaprine (FLEXERIL) 10 MG tablet Take 1 tablet by mouth At Night As Needed for Muscle Spasms. 30 tablet 1   • DULoxetine (CYMBALTA) 60 MG capsule Take 1 capsule by mouth Daily. 90 capsule 0   • eletriptan (RELPAX) 40 MG tablet TAKE 1 TABLET BY MOUTH AT THE ONSET OF MIGRAINE. MAY REPEAT IN 2 HRS.MAX 2 DOSE/24HR,3 DAYS/WEEK  11   • FLUoxetine (PROzac) 20 MG capsule Take 1 capsule by mouth Daily. 30 capsule 0   • glimepiride (Amaryl) 2 MG tablet Take 1 tablet by mouth Every Morning Before Breakfast. 90 tablet 1   • glucose blood test strip 1 each by Other route Daily. Use as  instructed 100 each 2   • glucose monitor monitoring kit 1 each Daily. 1 each 1   • hydroCHLOROthiazide (HYDRODIURIL) 12.5 MG tablet Take 1 tablet by mouth Daily. 90 tablet 3   • Lancets 28G misc 1 application Daily. 100 each 3   • magnesium chloride ER 64 MG DR tablet Take 2 tablets by mouth Daily.     • metFORMIN ER (GLUCOPHAGE-XR) 500 MG 24 hr tablet Take 4 tablets by mouth Daily With Dinner. 360 tablet 3   • nebivolol (Bystolic) 10 MG tablet Take 1 tablet by mouth Daily. 90 tablet 3   • nortriptyline (PAMELOR) 10 MG capsule nortriptyline 10 mg capsule   TAKE 4 CAPSULES AT BEDTIME     • omeprazole (priLOSEC) 40 MG capsule Take 1 capsule by mouth Daily. 90 capsule 3   • oxyCODONE (ROXICODONE) 15 MG immediate release tablet Take 1 tablet by mouth Every 6 (Six) Hours As Needed for Moderate Pain . (Patient taking differently: Take 15 mg by mouth Every 6 (Six) Hours As Needed for Moderate Pain . Pt taking 10 mg) 56 tablet 0   • TROKENDI  MG capsule sustained-release 24 hr Take 1 capsule by mouth Daily.  1     No current facility-administered medications for this visit.       Physical Exam  Nursing note reviewed.   Constitutional:       Appearance: Normal appearance.   Neurological:      Mental Status: She is alert.   Psychiatric:         Attention and Perception: Attention and perception normal.         Mood and Affect: Affect normal. Mood is anxious and depressed.         Speech: Speech normal.         Behavior: Behavior normal. Behavior is cooperative.         Thought Content: Thought content normal.         Cognition and Memory: Cognition and memory normal.         Judgment: Judgment normal.        Result Review :     The following data was reviewed by: ONESIMO Alexander on 02/01/2021:  Common labs    Common Labsle 1/11/21 6/9/21 9/15/21 9/15/21 9/15/21 9/15/21      1005 1008 1008 1008   Glucose      127 (A)   BUN      12   Creatinine      0.70   eGFR Non African Am      88   Sodium      138   Potassium       3.9   Chloride      104   Calcium      9.3   Albumin      4.00   Total Bilirubin      0.2   Alkaline Phosphatase      72   AST (SGOT)      23   ALT (SGPT)      44 (A)   WBC    7.68     Hemoglobin    13.2     Hematocrit    39.1     Platelets    294     Total Cholesterol     157    Triglycerides     246 (A)    HDL Cholesterol     34 (A)    LDL Cholesterol      82    Hemoglobin A1C 7.2 9.3 7.4      (A) Abnormal value            Data reviewed: PCP notes        Assessment and Plan    Problem List Items Addressed This Visit     None      Visit Diagnoses     Major depressive disorder, recurrent episode, moderate (HCC)    -  Primary    Relevant Medications    FLUoxetine (PROzac) 20 MG capsule    PMDD (premenstrual dysphoric disorder)        Relevant Medications    FLUoxetine (PROzac) 20 MG capsule    Adjustment disorder with anxious mood        Relevant Medications    FLUoxetine (PROzac) 20 MG capsule            TREATMENT PLAN/GOALS: Continue supportive psychotherapy efforts and medications as indicated. Treatment and medication options discussed during today's visit. Patient ackowledged and verbally consented to continue with current treatment plan and was educated on the importance of compliance with treatment and follow-up appointments.    MEDICATION ISSUES:  We discussed risks, benefits, and side effects of the above medications and the patient was agreeable with the plan. Patient was educated on the importance of compliance with treatment and follow-up appointments.  Patient is agreeable to call the office with any worsening of symptoms or onset of side effects. Patient is agreeable to call 911 or go to the nearest ER should he/she begin having SI/HI.  Advised patient that she could try CBT to help reframing as she is coming off of opioids as well as doing yoga and water aerobics.  Also encouraged a low-dose of melatonin as well as adult coloring to help sleep at night patient was receptive of these recommendations  and felt hopeful.      -Continue Cymbalta 60 mg daily for depression and anxiety as patient has benefited greatly as well for her pain.  -Continue fluoxetine 20 mg daily for depression symptoms as patient has noticed a significant difference with not taking the medication due to hormonal changes.  -Recommended OB/GYN visit to evaluate hormone levels and premenopausal status.  -Patient educated extensively regarding the risk of serotonin syndrome as she is currently on duloxetine due to her pain as we have previously tried to taper and it is not effective so need to begin fluoxetine 20 mg daily to help with her overall depression and mood.  Patient verbalized understanding was agreeable.  -Continue aripiprazole 5 mg as adjunct for depression as patient has failed numerous SSRIs and SNRIs and has benefited the patient tremendously and is medically necessary for her depression to avoid hospitalization.      Counseled patient regarding multimodal approach with healthy nutrition, healthy sleep, regular physical activity, social activities, counseling, and medications.      Coping skills reviewed and encouraged positive framing of thoughts     Assisted patient in processing above session content; acknowledged and normalized patient’s thoughts, feelings, and concerns.  Applied  positive coping skills and behavior management in session.  Allowed patient to freely discuss issues without interruption or judgment. Provided safe, confidential environment to facilitate the development of positive therapeutic relationship and encourage open, honest communication. Assisted patient in identifying risk factors which would indicate the need for higher level of care including thoughts to harm self or others and/or self-harming behavior and encouraged patient to contact this office, call 911, or present to the nearest emergency room should any of these events occur. Discussed crisis intervention services and means to access.     MEDS  ORDERED DURING VISIT:  New Medications Ordered This Visit   Medications   • FLUoxetine (PROzac) 20 MG capsule     Sig: Take 1 capsule by mouth Daily.     Dispense:  30 capsule     Refill:  0           Follow Up   Return in about 4 weeks (around 10/20/2021), or if symptoms worsen or fail to improve, for Recheck.    Patient was given instructions and counseling regarding her condition or for health maintenance advice. Please see specific information pulled into the AVS if appropriate.     Some of the data in this electronic note has been brought forward from a previous encounter, any necessary changes have been made, it has been reviewed by this APRN, and it is accurate.      This document has been electronically signed by ONESIMO Alexander  September 22, 2021 09:47 EDT    Part of this note may be an electronic transcription/translation of spoken language to printed text using the Dragon Dictation System.

## 2021-10-14 ENCOUNTER — HOSPITAL ENCOUNTER (OUTPATIENT)
Dept: ULTRASOUND IMAGING | Facility: HOSPITAL | Age: 56
Discharge: HOME | End: 2021-10-14
Admitting: INTERNAL MEDICINE

## 2021-10-14 DIAGNOSIS — E04.2 MULTINODULAR THYROID: ICD-10-CM

## 2021-10-14 PROCEDURE — 76536 US EXAM OF HEAD AND NECK: CPT

## 2021-10-20 ENCOUNTER — PRIOR AUTHORIZATION (OUTPATIENT)
Dept: PSYCHIATRY | Facility: CLINIC | Age: 56
End: 2021-10-20

## 2021-10-20 ENCOUNTER — TELEMEDICINE (OUTPATIENT)
Dept: PSYCHIATRY | Facility: CLINIC | Age: 56
End: 2021-10-20

## 2021-10-20 DIAGNOSIS — F33.1 MAJOR DEPRESSIVE DISORDER, RECURRENT EPISODE, MODERATE (HCC): Chronic | ICD-10-CM

## 2021-10-20 DIAGNOSIS — F43.22 ADJUSTMENT DISORDER WITH ANXIOUS MOOD: ICD-10-CM

## 2021-10-20 DIAGNOSIS — F32.81 PMDD (PREMENSTRUAL DYSPHORIC DISORDER): ICD-10-CM

## 2021-10-20 PROCEDURE — 99214 OFFICE O/P EST MOD 30 MIN: CPT | Performed by: NURSE PRACTITIONER

## 2021-10-20 RX ORDER — DULOXETIN HYDROCHLORIDE 60 MG/1
60 CAPSULE, DELAYED RELEASE ORAL DAILY
Qty: 90 CAPSULE | Refills: 0 | Status: SHIPPED | OUTPATIENT
Start: 2021-10-20 | End: 2021-12-08 | Stop reason: SDUPTHER

## 2021-10-20 RX ORDER — ARIPIPRAZOLE 5 MG/1
5 TABLET ORAL DAILY
Qty: 90 TABLET | Refills: 0 | Status: SHIPPED | OUTPATIENT
Start: 2021-10-20 | End: 2021-12-08 | Stop reason: SDUPTHER

## 2021-10-20 RX ORDER — FLUOXETINE HYDROCHLORIDE 20 MG/1
20 CAPSULE ORAL DAILY
Qty: 90 CAPSULE | Refills: 0 | Status: SHIPPED | OUTPATIENT
Start: 2021-10-20 | End: 2021-11-18 | Stop reason: SDUPTHER

## 2021-10-20 NOTE — PROGRESS NOTES
This provider is located at [facility], [address] [place] [address].The Patient is seen remotely at home, [address] [facility] [place] [address] via [employer]. Patient is being seen via telehealth and confirm that they are in a secure environment for this session. The patient's condition being diagnosed/treated is appropriate for telemedicine. The provider identified himself/herself: herself as well as her credentials.   The patient gave consent to be seen remotely, and when consent is given they understand that the consent allows for patient identifiable information to be sent to a third party as needed.   They may refuse to be seen remotely at any time. The electronic data is encrypted and password protected, and the patient has been advised of the potential risks to privacy not withstanding such measures.    You have chosen to receive care through a telehealth visit.  Do you consent to use a video/audio connection for your medical care today? Yes      Chief Complaint  Follow-up for depression and anxiety     Subjective    Yelena Radha Morris presents to [facility] for medication management.       History of Present Illness   Patient presents today noting that she has been doing better.  She denies any hopelessness or helplessness but states she has been feeling tired lately but notes that she does need to make an appointment follow-up with her OB/GYN.  Patient states that she is not as anxious and having those feelings as often.  She rates her depression and anxiety a 4 on a scale of 0-10 with 10 being the worst.  Patient reports that she is sleeping and eating good and denies any side effects to the medications.  Patient notes however that she is feeling disorganized and does not know where to start at times.  Discussed with the patient now being retired and not having a set schedule that can be difficult encourage the patient to find a  routine as well as volunteer which may help with overall with her mood and getting out patient was agreeable.  Denies any SI/HI/AH/VH.        Objective   Vital Signs:   There were no vitals taken for this visit.  Due to the remote nature of this encounter (virtual encounter), vitals were unable to be obtained.  Height stated at 64 inches.  Weight stated at 165 pounds.      PHQ-9 Score:   PHQ-9 Total Score:   0    Mental Status Exam:   Hygiene:   good  Cooperation:  Cooperative  Eye Contact:  Good  Psychomotor Behavior:  Appropriate  Affect:  Appropriate  Mood: normal  Speech:  Normal  Thought Process:  Goal directed and Linear  Thought Content:  Normal  Suicidal:  None  Homicidal:  None  Hallucinations:  None  Delusion:  None  Memory:  Intact  Orientation:  Person, Place, Time and Situation  Reliability:  good  Insight:  Good  Judgement:  Good  Impulse Control:  Good  Physical/Medical Issues:  Yes HTN, DM, fibromyalgia, carpal tunnel syndrome     Current Medications:   Current Outpatient Medications   Medication Sig Dispense Refill   • ARIPiprazole (ABILIFY) 5 MG tablet Take 1 tablet by mouth Daily. 90 tablet 0   • atorvastatin (LIPITOR) 40 MG tablet Take 1 tablet by mouth Every Night. 90 tablet 3   • BOTOX 200 units reconstituted solution INJECT 200 UNITS INTO THE HEAD OR NECK AREA BY MD EVERY 90 DAYS  3   • Cholecalciferol (VITAMIN D) 2000 units capsule Take 2,000 Units by mouth Daily.     • cyclobenzaprine (FLEXERIL) 10 MG tablet Take 1 tablet by mouth At Night As Needed for Muscle Spasms. 30 tablet 1   • DULoxetine (CYMBALTA) 60 MG capsule Take 1 capsule by mouth Daily. 90 capsule 0   • eletriptan (RELPAX) 40 MG tablet TAKE 1 TABLET BY MOUTH AT THE ONSET OF MIGRAINE. MAY REPEAT IN 2 HRS.MAX 2 DOSE/24HR,3 DAYS/WEEK  11   • FLUoxetine (PROzac) 20 MG capsule Take 1 capsule by mouth Daily. 90 capsule 0   • glimepiride (Amaryl) 2 MG tablet Take 1 tablet by mouth Every Morning Before Breakfast. 90 tablet 1   • glucose  blood test strip 1 each by Other route Daily. Use as instructed 100 each 2   • glucose monitor monitoring kit 1 each Daily. 1 each 1   • hydroCHLOROthiazide (HYDRODIURIL) 12.5 MG tablet Take 1 tablet by mouth Daily. 90 tablet 3   • Lancets 28G misc 1 application Daily. 100 each 3   • magnesium chloride ER 64 MG DR tablet Take 2 tablets by mouth Daily.     • metFORMIN ER (GLUCOPHAGE-XR) 500 MG 24 hr tablet Take 4 tablets by mouth Daily With Dinner. 360 tablet 3   • nebivolol (Bystolic) 10 MG tablet Take 1 tablet by mouth Daily. 90 tablet 3   • nortriptyline (PAMELOR) 10 MG capsule nortriptyline 10 mg capsule   TAKE 4 CAPSULES AT BEDTIME     • omeprazole (priLOSEC) 40 MG capsule Take 1 capsule by mouth Daily. 90 capsule 3   • oxyCODONE (ROXICODONE) 15 MG immediate release tablet Take 1 tablet by mouth Every 6 (Six) Hours As Needed for Moderate Pain . (Patient taking differently: Take 15 mg by mouth Every 6 (Six) Hours As Needed for Moderate Pain . Pt taking 10 mg) 56 tablet 0   • TROKENDI  MG capsule sustained-release 24 hr Take 1 capsule by mouth Daily.  1     No current facility-administered medications for this visit.       Physical Exam  Nursing note reviewed.   Constitutional:       Appearance: Normal appearance.   Neurological:      Mental Status: She is alert.   Psychiatric:         Attention and Perception: Attention and perception normal.         Mood and Affect: Affect normal. Mood is anxious and depressed.         Speech: Speech normal.         Behavior: Behavior normal. Behavior is cooperative.         Thought Content: Thought content normal.         Cognition and Memory: Cognition and memory normal.         Judgment: Judgment normal.        Result Review :     The following data was reviewed by: ONESIMO Alexander on 02/01/2021:  Common labs    Common Labsle 1/11/21 6/9/21 9/15/21 9/15/21 9/15/21 9/15/21      1005 1008 1008 1008   Glucose      127 (A)   BUN      12   Creatinine      0.70   eGFR  Non African Am      88   Sodium      138   Potassium      3.9   Chloride      104   Calcium      9.3   Albumin      4.00   Total Bilirubin      0.2   Alkaline Phosphatase      72   AST (SGOT)      23   ALT (SGPT)      44 (A)   WBC    7.68     Hemoglobin    13.2     Hematocrit    39.1     Platelets    294     Total Cholesterol     157    Triglycerides     246 (A)    HDL Cholesterol     34 (A)    LDL Cholesterol      82    Hemoglobin A1C 7.2 9.3 7.4      (A) Abnormal value            Data reviewed: PCP notes        Assessment and Plan    Problem List Items Addressed This Visit     None      Visit Diagnoses     Major depressive disorder, recurrent episode, moderate (HCC)  (Chronic)       Relevant Medications    ARIPiprazole (ABILIFY) 5 MG tablet    DULoxetine (CYMBALTA) 60 MG capsule    FLUoxetine (PROzac) 20 MG capsule    Adjustment disorder with anxious mood        Relevant Medications    ARIPiprazole (ABILIFY) 5 MG tablet    DULoxetine (CYMBALTA) 60 MG capsule    FLUoxetine (PROzac) 20 MG capsule    PMDD (premenstrual dysphoric disorder)        Relevant Medications    ARIPiprazole (ABILIFY) 5 MG tablet    DULoxetine (CYMBALTA) 60 MG capsule    FLUoxetine (PROzac) 20 MG capsule            TREATMENT PLAN/GOALS: Continue supportive psychotherapy efforts and medications as indicated. Treatment and medication options discussed during today's visit. Patient ackowledged and verbally consented to continue with current treatment plan and was educated on the importance of compliance with treatment and follow-up appointments.    MEDICATION ISSUES:  We discussed risks, benefits, and side effects of the above medications and the patient was agreeable with the plan. Patient was educated on the importance of compliance with treatment and follow-up appointments.  Patient is agreeable to call the office with any worsening of symptoms or onset of side effects. Patient is agreeable to call 911 or go to the nearest ER should he/she  begin having SI/HI.  Advised patient that she could try CBT to help reframing as she is coming off of opioids as well as doing yoga and water aerobics.  Also encouraged a low-dose of melatonin as well as adult coloring to help sleep at night patient was receptive of these recommendations and felt hopeful.      -Continue Cymbalta 60 mg daily for depression and anxiety as patient has benefited greatly as well for her pain.  -Continue fluoxetine 20 mg daily for depression symptoms as patient has noticed a significant difference with not taking the medication due to hormonal changes.    -Recommended OB/GYN visit to evaluate hormone levels and premenopausal status.  -Patient educated extensively regarding the risk of serotonin syndrome as she is currently on duloxetine due to her pain as we have previously tried to taper and it is not effective so need to begin fluoxetine 20 mg daily to help with her overall depression and mood.  Patient verbalized understanding was agreeable.  -Continue aripiprazole 5 mg as adjunct for depression as patient has failed numerous SSRIs and SNRIs and has benefited the patient tremendously and is medically necessary for her depression to avoid hospitalization.      Counseled patient regarding multimodal approach with healthy nutrition, healthy sleep, regular physical activity, social activities, counseling, and medications.      Coping skills reviewed and encouraged positive framing of thoughts     Assisted patient in processing above session content; acknowledged and normalized patient’s thoughts, feelings, and concerns.  Applied  positive coping skills and behavior management in session.  Allowed patient to freely discuss issues without interruption or judgment. Provided safe, confidential environment to facilitate the development of positive therapeutic relationship and encourage open, honest communication. Assisted patient in identifying risk factors which would indicate the need for  higher level of care including thoughts to harm self or others and/or self-harming behavior and encouraged patient to contact this office, call 911, or present to the nearest emergency room should any of these events occur. Discussed crisis intervention services and means to access.     MEDS ORDERED DURING VISIT:  New Medications Ordered This Visit   Medications   • ARIPiprazole (ABILIFY) 5 MG tablet     Sig: Take 1 tablet by mouth Daily.     Dispense:  90 tablet     Refill:  0   • DULoxetine (CYMBALTA) 60 MG capsule     Sig: Take 1 capsule by mouth Daily.     Dispense:  90 capsule     Refill:  0   • FLUoxetine (PROzac) 20 MG capsule     Sig: Take 1 capsule by mouth Daily.     Dispense:  90 capsule     Refill:  0           Follow Up   Return in about 6 weeks (around 12/1/2021), or if symptoms worsen or fail to improve, for Recheck.    Patient was given instructions and counseling regarding her condition or for health maintenance advice. Please see specific information pulled into the AVS if appropriate.     Some of the data in this electronic note has been brought forward from a previous encounter, any necessary changes have been made, it has been reviewed by this APRN, and it is accurate.      This document has been electronically signed by ONESIMO Alexander  October 20, 2021 10:39 EDT    Part of this note may be an electronic transcription/translation of spoken language to printed text using the Dragon Dictation System.

## 2021-11-10 DIAGNOSIS — E11.65 TYPE 2 DIABETES MELLITUS WITH HYPERGLYCEMIA, WITHOUT LONG-TERM CURRENT USE OF INSULIN (HCC): ICD-10-CM

## 2021-11-10 RX ORDER — SITAGLIPTIN 50 MG/1
TABLET, FILM COATED ORAL
Qty: 90 TABLET | Refills: 0 | OUTPATIENT
Start: 2021-11-10

## 2021-11-11 DIAGNOSIS — B37.31 VAGINAL CANDIDIASIS: ICD-10-CM

## 2021-11-11 RX ORDER — FLUCONAZOLE 150 MG/1
TABLET ORAL
Qty: 2 TABLET | Refills: 0 | OUTPATIENT
Start: 2021-11-11

## 2021-11-11 NOTE — TELEPHONE ENCOUNTER
Last Office Visit: 9/15/21  Next Office Visit: 12/16/21    Labs completed in past 6 months? yes  Labs completed in past year? yes    Last Refill Date: 8/16/21  Quantity: 2 tabs  Refills: 0    Pharmacy: [facility] [address] [place] [address] [facility] [phone] [facility] [phone] FX    Please review pended refill request for any changes needed on refills or quantities. Thank you!

## 2021-11-18 DIAGNOSIS — F32.81 PMDD (PREMENSTRUAL DYSPHORIC DISORDER): ICD-10-CM

## 2021-11-18 RX ORDER — FLUOXETINE HYDROCHLORIDE 20 MG/1
20 CAPSULE ORAL DAILY
Qty: 90 CAPSULE | Refills: 0 | Status: SHIPPED | OUTPATIENT
Start: 2021-11-18 | End: 2022-02-15 | Stop reason: SDUPTHER

## 2021-12-08 ENCOUNTER — TELEMEDICINE (OUTPATIENT)
Dept: PSYCHIATRY | Facility: CLINIC | Age: 56
End: 2021-12-08

## 2021-12-08 DIAGNOSIS — F32.81 PMDD (PREMENSTRUAL DYSPHORIC DISORDER): ICD-10-CM

## 2021-12-08 DIAGNOSIS — F43.22 ADJUSTMENT DISORDER WITH ANXIOUS MOOD: ICD-10-CM

## 2021-12-08 DIAGNOSIS — F33.1 MAJOR DEPRESSIVE DISORDER, RECURRENT EPISODE, MODERATE (HCC): Primary | Chronic | ICD-10-CM

## 2021-12-08 PROCEDURE — 99214 OFFICE O/P EST MOD 30 MIN: CPT | Performed by: NURSE PRACTITIONER

## 2021-12-08 RX ORDER — ARIPIPRAZOLE 5 MG/1
5 TABLET ORAL DAILY
Qty: 90 TABLET | Refills: 0 | Status: SHIPPED | OUTPATIENT
Start: 2021-12-08 | End: 2022-02-15 | Stop reason: SDUPTHER

## 2021-12-08 RX ORDER — DULOXETIN HYDROCHLORIDE 60 MG/1
60 CAPSULE, DELAYED RELEASE ORAL DAILY
Qty: 90 CAPSULE | Refills: 0 | Status: SHIPPED | OUTPATIENT
Start: 2021-12-08 | End: 2022-02-15 | Stop reason: SDUPTHER

## 2021-12-08 NOTE — PROGRESS NOTES
"This provider is located at [facility], [address] [place] [address].The Patient is seen remotely at home, [address] [place] [address] via [employer]. Patient is being seen via telehealth and confirm that they are in a secure environment for this session. The patient's condition being diagnosed/treated is appropriate for telemedicine. The provider identified himself/herself: herself as well as her credentials.   The patient gave consent to be seen remotely, and when consent is given they understand that the consent allows for patient identifiable information to be sent to a third party as needed.   They may refuse to be seen remotely at any time. The electronic data is encrypted and password protected, and the patient has been advised of the potential risks to privacy not withstanding such measures.    You have chosen to receive care through a telehealth visit.  Do you consent to use a video/audio connection for your medical care today? Yes      Chief Complaint  Follow-up for depression and anxiety     Subjective    Yelena Radha Morris presents to [facility] for medication management.       History of Present Illness   Patient presents today reporting that she has been doing good.  She states that she made an appointment with OB/GYN and follows up on January 12.  Patient states that her depression has been good and she has not felt as down and feels \"meds are working\".  Patient states her anxiety has been manageable and she has not had any panic attacks or felt overwhelmed due to the holidays.  Patient reports her appetite is good.  Denies any side effects to the medications.  Notes that she is traveling to [place] for the holidays which she is excited for.  She reports that her sleep is fair as it varies based on her pain.  Patient denies any SI/HI/AH/VH.      Objective   Vital Signs:   There were no vitals taken for this visit.  Due " to the remote nature of this encounter (virtual encounter), vitals were unable to be obtained.  Height stated at 64 inches.  Weight stated at 165 pounds.      PHQ-9 Score:   PHQ-9 Total Score:   0    Mental Status Exam:   Hygiene:   good  Cooperation:  Cooperative  Eye Contact:  Good  Psychomotor Behavior:  Appropriate  Affect:  Appropriate  Mood: normal  Speech:  Normal  Thought Process:  Goal directed and Linear  Thought Content:  Normal  Suicidal:  None  Homicidal:  None  Hallucinations:  None  Delusion:  None  Memory:  Intact  Orientation:  Person, Place, Time and Situation  Reliability:  good  Insight:  Good  Judgement:  Good  Impulse Control:  Good  Physical/Medical Issues:  Yes HTN, DM, fibromyalgia, carpal tunnel syndrome     Current Medications:   Current Outpatient Medications   Medication Sig Dispense Refill   • ARIPiprazole (ABILIFY) 5 MG tablet Take 1 tablet by mouth Daily. 90 tablet 0   • atorvastatin (LIPITOR) 40 MG tablet Take 1 tablet by mouth Every Night. 90 tablet 3   • BOTOX 200 units reconstituted solution INJECT 200 UNITS INTO THE HEAD OR NECK AREA BY MD EVERY 90 DAYS  3   • Cholecalciferol (VITAMIN D) 2000 units capsule Take 2,000 Units by mouth Daily.     • cyclobenzaprine (FLEXERIL) 10 MG tablet Take 1 tablet by mouth At Night As Needed for Muscle Spasms. 30 tablet 1   • DULoxetine (CYMBALTA) 60 MG capsule Take 1 capsule by mouth Daily. 90 capsule 0   • eletriptan (RELPAX) 40 MG tablet TAKE 1 TABLET BY MOUTH AT THE ONSET OF MIGRAINE. MAY REPEAT IN 2 HRS.MAX 2 DOSE/24HR,3 DAYS/WEEK  11   • FLUoxetine (PROzac) 20 MG capsule Take 1 capsule by mouth Daily. 90 capsule 0   • glimepiride (Amaryl) 2 MG tablet Take 1 tablet by mouth Every Morning Before Breakfast. 90 tablet 1   • glucose blood test strip 1 each by Other route Daily. Use as instructed 100 each 2   • glucose monitor monitoring kit 1 each Daily. 1 each 1   • hydroCHLOROthiazide (HYDRODIURIL) 12.5 MG tablet Take 1 tablet by mouth Daily.  90 tablet 3   • Lancets 28G misc 1 application Daily. 100 each 3   • magnesium chloride ER 64 MG DR tablet Take 2 tablets by mouth Daily.     • metFORMIN ER (GLUCOPHAGE-XR) 500 MG 24 hr tablet Take 4 tablets by mouth Daily With Dinner. 360 tablet 3   • nebivolol (Bystolic) 10 MG tablet Take 1 tablet by mouth Daily. 90 tablet 3   • nortriptyline (PAMELOR) 10 MG capsule nortriptyline 10 mg capsule   TAKE 4 CAPSULES AT BEDTIME     • omeprazole (priLOSEC) 40 MG capsule Take 1 capsule by mouth Daily. 90 capsule 3   • oxyCODONE (ROXICODONE) 15 MG immediate release tablet Take 1 tablet by mouth Every 6 (Six) Hours As Needed for Moderate Pain . (Patient taking differently: Take 15 mg by mouth Every 6 (Six) Hours As Needed for Moderate Pain . Pt taking 10 mg) 56 tablet 0   • TROKENDI  MG capsule sustained-release 24 hr Take 1 capsule by mouth Daily.  1     No current facility-administered medications for this visit.       Physical Exam  Nursing note reviewed.   Constitutional:       Appearance: Normal appearance.   Neurological:      Mental Status: She is alert.   Psychiatric:         Attention and Perception: Attention and perception normal.         Mood and Affect: Mood and affect normal. Mood is not anxious or depressed.         Speech: Speech normal.         Behavior: Behavior normal. Behavior is cooperative.         Thought Content: Thought content normal.         Cognition and Memory: Cognition and memory normal.         Judgment: Judgment normal.        Result Review :     The following data was reviewed by: ONESIMO Alexander on 02/01/2021:  Common labs    Common Labsle 1/11/21 6/9/21 9/15/21 9/15/21 9/15/21 9/15/21      1005 1008 1008 1008   Glucose      127 (A)   BUN      12   Creatinine      0.70   eGFR Non African Am      88   Sodium      138   Potassium      3.9   Chloride      104   Calcium      9.3   Albumin      4.00   Total Bilirubin      0.2   Alkaline Phosphatase      72   AST (SGOT)      23    ALT (SGPT)      44 (A)   WBC    7.68     Hemoglobin    13.2     Hematocrit    39.1     Platelets    294     Total Cholesterol     157    Triglycerides     246 (A)    HDL Cholesterol     34 (A)    LDL Cholesterol      82    Hemoglobin A1C 7.2 9.3 7.4      (A) Abnormal value            Data reviewed: PCP notes        Assessment and Plan    Problem List Items Addressed This Visit     None      Visit Diagnoses     Major depressive disorder, recurrent episode, moderate (HCC)  (Chronic)   -  Primary    Relevant Medications    DULoxetine (CYMBALTA) 60 MG capsule    ARIPiprazole (ABILIFY) 5 MG tablet    Adjustment disorder with anxious mood        Relevant Medications    DULoxetine (CYMBALTA) 60 MG capsule    ARIPiprazole (ABILIFY) 5 MG tablet    PMDD (premenstrual dysphoric disorder)        Relevant Medications    DULoxetine (CYMBALTA) 60 MG capsule    ARIPiprazole (ABILIFY) 5 MG tablet            TREATMENT PLAN/GOALS: Continue supportive psychotherapy efforts and medications as indicated. Treatment and medication options discussed during today's visit. Patient ackowledged and verbally consented to continue with current treatment plan and was educated on the importance of compliance with treatment and follow-up appointments.    MEDICATION ISSUES:  We discussed risks, benefits, and side effects of the above medications and the patient was agreeable with the plan. Patient was educated on the importance of compliance with treatment and follow-up appointments.  Patient is agreeable to call the office with any worsening of symptoms or onset of side effects. Patient is agreeable to call 911 or go to the nearest ER should he/she begin having SI/HI.  Advised patient that she could try CBT to help reframing as she is coming off of opioids as well as doing yoga and water aerobics.  Also encouraged a low-dose of melatonin as well as adult coloring to help sleep at night patient was receptive of these recommendations and felt  hopeful.      -Continue Cymbalta 60 mg daily for depression and anxiety as patient has benefited greatly as well for her pain.  -Continue fluoxetine 20 mg daily for depression symptoms as patient has noticed a significant difference with not taking the medication due to hormonal changes.    -Patient educated extensively regarding the risk of serotonin syndrome as she is currently on duloxetine due to her pain as we have previously tried to taper and it is not effective so need to begin fluoxetine 20 mg daily to help with her overall depression and mood.  Patient verbalized understanding was agreeable.  -Continue aripiprazole 5 mg as adjunct for depression as patient has failed numerous SSRIs and SNRIs and has benefited the patient tremendously and is medically necessary for her depression to avoid hospitalization.      Counseled patient regarding multimodal approach with healthy nutrition, healthy sleep, regular physical activity, social activities, counseling, and medications.      Coping skills reviewed and encouraged positive framing of thoughts     Assisted patient in processing above session content; acknowledged and normalized patient’s thoughts, feelings, and concerns.  Applied  positive coping skills and behavior management in session.  Allowed patient to freely discuss issues without interruption or judgment. Provided safe, confidential environment to facilitate the development of positive therapeutic relationship and encourage open, honest communication. Assisted patient in identifying risk factors which would indicate the need for higher level of care including thoughts to harm self or others and/or self-harming behavior and encouraged patient to contact this office, call 911, or present to the nearest emergency room should any of these events occur. Discussed crisis intervention services and means to access.     MEDS ORDERED DURING VISIT:  New Medications Ordered This Visit   Medications   • DULoxetine  (CYMBALTA) 60 MG capsule     Sig: Take 1 capsule by mouth Daily.     Dispense:  90 capsule     Refill:  0   • ARIPiprazole (ABILIFY) 5 MG tablet     Sig: Take 1 tablet by mouth Daily.     Dispense:  90 tablet     Refill:  0           Follow Up   Return in about 6 weeks (around 1/19/2022), or if symptoms worsen or fail to improve, for Recheck.    Patient was given instructions and counseling regarding her condition or for health maintenance advice. Please see specific information pulled into the AVS if appropriate.     Some of the data in this electronic note has been brought forward from a previous encounter, any necessary changes have been made, it has been reviewed by this APRN, and it is accurate.      This document has been electronically signed by ONESIMO Alexander  December 8, 2021 10:12 EST    Part of this note may be an electronic transcription/translation of spoken language to printed text using the Dragon Dictation System.

## 2021-12-14 ENCOUNTER — LAB (OUTPATIENT)
Dept: LAB | Facility: HOSPITAL | Age: 56
End: 2021-12-14

## 2021-12-14 ENCOUNTER — OFFICE VISIT (OUTPATIENT)
Dept: INTERNAL MEDICINE | Facility: CLINIC | Age: 56
End: 2021-12-14

## 2021-12-14 VITALS
HEART RATE: 88 BPM | RESPIRATION RATE: 16 BRPM | TEMPERATURE: 96.9 F | SYSTOLIC BLOOD PRESSURE: 122 MMHG | OXYGEN SATURATION: 98 % | HEIGHT: 64 IN | BODY MASS INDEX: 28.79 KG/M2 | DIASTOLIC BLOOD PRESSURE: 72 MMHG | WEIGHT: 168.6 LBS

## 2021-12-14 DIAGNOSIS — L30.9 DERMATITIS: ICD-10-CM

## 2021-12-14 DIAGNOSIS — E11.9 TYPE 2 DIABETES MELLITUS WITHOUT COMPLICATION, WITHOUT LONG-TERM CURRENT USE OF INSULIN (HCC): Primary | ICD-10-CM

## 2021-12-14 DIAGNOSIS — E11.9 TYPE 2 DIABETES MELLITUS WITHOUT COMPLICATION, WITHOUT LONG-TERM CURRENT USE OF INSULIN (HCC): ICD-10-CM

## 2021-12-14 DIAGNOSIS — Z23 NEED FOR SHINGLES VACCINE: ICD-10-CM

## 2021-12-14 DIAGNOSIS — I10 ESSENTIAL HYPERTENSION: ICD-10-CM

## 2021-12-14 LAB
EXPIRATION DATE: NORMAL
HBA1C MFR BLD: 7.4 %
Lab: NORMAL

## 2021-12-14 PROCEDURE — 82043 UR ALBUMIN QUANTITATIVE: CPT

## 2021-12-14 PROCEDURE — 90750 HZV VACC RECOMBINANT IM: CPT | Performed by: INTERNAL MEDICINE

## 2021-12-14 PROCEDURE — 99214 OFFICE O/P EST MOD 30 MIN: CPT | Performed by: INTERNAL MEDICINE

## 2021-12-14 PROCEDURE — 90471 IMMUNIZATION ADMIN: CPT | Performed by: INTERNAL MEDICINE

## 2021-12-14 PROCEDURE — 83036 HEMOGLOBIN GLYCOSYLATED A1C: CPT | Performed by: INTERNAL MEDICINE

## 2021-12-14 PROCEDURE — 82570 ASSAY OF URINE CREATININE: CPT

## 2021-12-14 RX ORDER — CLOTRIMAZOLE AND BETAMETHASONE DIPROPIONATE 10; .64 MG/G; MG/G
1 CREAM TOPICAL 2 TIMES DAILY
Qty: 45 G | Refills: 0 | Status: SHIPPED | OUTPATIENT
Start: 2021-12-14 | End: 2022-06-15

## 2021-12-14 RX ORDER — GLIMEPIRIDE 2 MG/1
2 TABLET ORAL 2 TIMES DAILY WITH MEALS
Qty: 180 TABLET | Refills: 1 | Status: SHIPPED | OUTPATIENT
Start: 2021-12-14 | End: 2022-03-03

## 2021-12-14 NOTE — PROGRESS NOTES
Immunization  Immunization performed in Left deltoid by Shawnee Herring MA. Pt tolerated shingles vaccine with no adverse effects  12/14/21   Shawnee Herring MA

## 2021-12-14 NOTE — PROGRESS NOTES
Internal Medicine Follow Up    Chief Complaint  Yelena Morris is a 56 y.o. female who presents today for follow up of chronic medical conditions outlined below.    Chief Complaint   Patient presents with   • Follow-up     3 month, diabetes        HPI  Ms. Morris comes in today for follow up of diabetes. She notes compliance with metformin XR and glimepiride. She does not check FSBG. She has been doing well. She had a good trip to [place] and is returning this Thursday to spend [place] with her son. She notes a rash on her R thigh that itches. It has been present for a few weeks and did not improve with a few days of clotrimazole.        Review of Systems  Review of Systems   Constitutional: Negative.    Respiratory: Negative.    Cardiovascular: Negative.    Skin: Positive for rash.   Psychiatric/Behavioral: Negative.         Current Medications  Current Outpatient Medications on File Prior to Visit   Medication Sig Dispense Refill   • ARIPiprazole (ABILIFY) 5 MG tablet Take 1 tablet by mouth Daily. 90 tablet 0   • atorvastatin (LIPITOR) 40 MG tablet Take 1 tablet by mouth Every Night. 90 tablet 3   • BOTOX 200 units reconstituted solution INJECT 200 UNITS INTO THE HEAD OR NECK AREA BY MD EVERY 90 DAYS  3   • Cholecalciferol (VITAMIN D) 2000 units capsule Take 2,000 Units by mouth Daily.     • cyclobenzaprine (FLEXERIL) 10 MG tablet Take 1 tablet by mouth At Night As Needed for Muscle Spasms. 30 tablet 1   • DULoxetine (CYMBALTA) 60 MG capsule Take 1 capsule by mouth Daily. 90 capsule 0   • eletriptan (RELPAX) 40 MG tablet TAKE 1 TABLET BY MOUTH AT THE ONSET OF MIGRAINE. MAY REPEAT IN 2 HRS.MAX 2 DOSE/24HR,3 DAYS/WEEK  11   • FLUoxetine (PROzac) 20 MG capsule Take 1 capsule by mouth Daily. 90 capsule 0   • glucose blood test strip 1 each by Other route Daily. Use as instructed 100 each 2   • glucose monitor monitoring kit 1 each Daily. 1 each 1   • hydroCHLOROthiazide (HYDRODIURIL) 12.5 MG tablet Take 1  "tablet by mouth Daily. 90 tablet 3   • Lancets 28G misc 1 application Daily. 100 each 3   • magnesium chloride ER 64 MG DR tablet Take 2 tablets by mouth Daily.     • metFORMIN ER (GLUCOPHAGE-XR) 500 MG 24 hr tablet Take 4 tablets by mouth Daily With Dinner. 360 tablet 3   • nebivolol (Bystolic) 10 MG tablet Take 1 tablet by mouth Daily. 90 tablet 3   • nortriptyline (PAMELOR) 10 MG capsule nortriptyline 10 mg capsule   TAKE 4 CAPSULES AT BEDTIME     • omeprazole (priLOSEC) 40 MG capsule Take 1 capsule by mouth Daily. 90 capsule 3   • oxyCODONE (ROXICODONE) 15 MG immediate release tablet Take 1 tablet by mouth Every 6 (Six) Hours As Needed for Moderate Pain . (Patient taking differently: Take 15 mg by mouth Every 6 (Six) Hours As Needed for Moderate Pain . Pt taking 10 mg) 56 tablet 0   • TROKENDI  MG capsule sustained-release 24 hr Take 1 capsule by mouth Daily.  1   • [DISCONTINUED] glimepiride (Amaryl) 2 MG tablet Take 1 tablet by mouth Every Morning Before Breakfast. 90 tablet 1     No current facility-administered medications on file prior to visit.       Allergies  Allergies   Allergen Reactions   • Dihydroergotamine GI Intolerance     VOMITING   • Methergine [Methylergonovine Maleate] Nausea And Vomiting   • Phenergan [Promethazine] Other (See Comments)     agitated   • Amoxicillin Rash   • Methylergonovine GI Intolerance     And sob     • Nutritional Supplements GI Intolerance     DHEA       Objective  Visit Vitals  /72   Pulse 88   Temp 96.9 °F (36.1 °C)   Resp 16   Ht 162.6 cm (64.02\")   Wt 76.5 kg (168 lb 9.6 oz)   SpO2 98%   BMI 28.93 kg/m²        Physical Exam  Physical Exam  Vitals and nursing note reviewed.   Constitutional:       General: She is not in acute distress.     Appearance: Normal appearance. She is well-developed. She is not ill-appearing or toxic-appearing.   HENT:      Head: Normocephalic and atraumatic.   Eyes:      Conjunctiva/sclera: Conjunctivae normal.   Pulmonary:      " Effort: Pulmonary effort is normal. No respiratory distress.   Skin:     General: Skin is warm and dry.      Findings: Rash (patch of pink skin with fine scale on inner R thigh. There is a healing papule within the patch.) present.   Neurological:      Mental Status: She is alert and oriented to person, place, and time. Mental status is at baseline.      Gait: Gait normal.   Psychiatric:         Mood and Affect: Mood normal.         Behavior: Behavior normal.         Results  Results for orders placed or performed in visit on 12/14/21   POC Glycosylated Hemoglobin (Hb A1C)    Specimen: Blood   Result Value Ref Range    Hemoglobin A1C 7.4 %    Lot Number [id number]     Expiration Date 7,061,595         Assessment and Plan  Diagnoses and all orders for this visit:    Type 2 diabetes mellitus without complication, without long-term current use of insulin (HCC)  - A1c stable at 7.4  - Continue metformin XR 2000mg daily and increase glimepiride to 2mg BID.   - Had also been briefly on jardiance but discontinued due to candidal infections and januvia which was discontinued due to GI intolerance.  - Urine microalbumin nl 9/2020. Repeat ordered.  - Eye exam 4/2021 no retinopathy  - Follow up with A1c in 3 months    Essential hypertension  - BP well controlled.  - Continue nebivolol 10mg daily and HCTZ 12.5mg daily    Dermatitis  - Will treat with lotrisone BID for up to 14 days. Discussed that it may take 14 days to see resolution.    Health Maintenance  - Pap smear: GYN Dr. Mcguire, unable to get records thus far.  - Mammogram: 7/2021 BIRADS 2  - Colonoscopy: 1/2018 sigmoid tubular adenoma. Repeat 5y.  - HCV: negative  - Immunizations: COVID complete, including booster. Flu UTD. Tdap 2019. Pneumovax UTD. Discussed shingrix.  - Depression screening: negative 2/2021      Return in about 3 months (around 3/14/2022) for Follow up diabetes with A1c, Labs today.

## 2021-12-15 LAB
ALBUMIN UR-MCNC: <1.2 MG/DL
CREAT UR-MCNC: 80.9 MG/DL
MICROALBUMIN/CREAT UR: NORMAL MG/G{CREAT}

## 2021-12-16 ENCOUNTER — PRIOR AUTHORIZATION (OUTPATIENT)
Dept: INTERNAL MEDICINE | Facility: CLINIC | Age: 56
End: 2021-12-16

## 2022-01-17 ENCOUNTER — TELEPHONE (OUTPATIENT)
Dept: INTERNAL MEDICINE | Facility: CLINIC | Age: 57
End: 2022-01-17

## 2022-01-17 NOTE — TELEPHONE ENCOUNTER
KEVIN WOULD LIKE TO KNOW IF SHE STAY ON THE SAME MEDICATION DOSAGE AND MONITOR HER BLOOD SUGAR?  SHE IS ON A LOW CARB/HIGH PROTEIN DIET.

## 2022-01-18 ENCOUNTER — TELEMEDICINE (OUTPATIENT)
Dept: PSYCHIATRY | Facility: CLINIC | Age: 57
End: 2022-01-18

## 2022-01-18 DIAGNOSIS — F43.22 ADJUSTMENT DISORDER WITH ANXIOUS MOOD: ICD-10-CM

## 2022-01-18 DIAGNOSIS — F33.1 MAJOR DEPRESSIVE DISORDER, RECURRENT EPISODE, MODERATE: Primary | ICD-10-CM

## 2022-01-18 DIAGNOSIS — F32.81 PMDD (PREMENSTRUAL DYSPHORIC DISORDER): ICD-10-CM

## 2022-01-18 PROCEDURE — 99214 OFFICE O/P EST MOD 30 MIN: CPT | Performed by: NURSE PRACTITIONER

## 2022-01-18 NOTE — PROGRESS NOTES
This provider is located at [facility], [address] [place] [address].The Patient is seen remotely at home, [address] [facility] [facility] [place] [address] via [email or web address]. Patient is being seen via telehealth and confirm that they are in a secure environment for this session. The patient's condition being diagnosed/treated is appropriate for telemedicine. The provider identified himself/herself: herself as well as her credentials.   The patient gave consent to be seen remotely, and when consent is given they understand that the consent allows for patient identifiable information to be sent to a third party as needed.   They may refuse to be seen remotely at any time. The electronic data is encrypted and password protected, and the patient has been advised of the potential risks to privacy not withstanding such measures.    You have chosen to receive care through a telehealth visit.  Do you consent to use a video/audio connection for your medical care today? Yes      Chief Complaint  Follow-up for depression and anxiety     Subjective    Yelena Radha Morris presents to [facility] for medication management.       History of Present Illness   Patient notes she was feeling down after Hiram but has slowly started to improve. Denies any hopeless or helpless symptoms. Just reports lack of motivation. Patient states she usually feels that way during winter months. Patient denies any anxiety symptoms. Reports had blood work with OBGYN and will follow-up this week. Reports started a new diet which she is looking forward too. Denies any SE to the medications. Denies any SI/HI/AH/VH.         Objective   Vital Signs:   There were no vitals taken for this visit.  Due to the remote nature of this encounter (virtual encounter), vitals were unable to be obtained.  Height stated at 64 inches.  Weight stated at 165 pounds.      PHQ-9 Score:   PHQ-9 Total Score: 6     PHQ-9  Depression Screening  Little interest or pleasure in doing things? 2   Feeling down, depressed, or hopeless? 0   Trouble falling or staying asleep, or sleeping too much? 2   Feeling tired or having little energy? 2   Poor appetite or overeating? 0   Feeling bad about yourself - or that you are a failure or have let yourself or your family down? 0   Trouble concentrating on things, such as reading the newspaper or watching television? 0   Moving or speaking so slowly that other people could have noticed? Or the opposite - being so fidgety or restless that you have been moving around a lot more than usual? 0   Thoughts that you would be better off dead, or of hurting yourself in some way? 0   PHQ-9 Total Score 6   If you checked off any problems, how difficult have these problems made it for you to do your work, take care of things at home, or get along with other people? Somewhat difficult         Mental Status Exam:   Hygiene:   good  Cooperation:  Cooperative  Eye Contact:  Good  Psychomotor Behavior:  Appropriate  Affect:  Appropriate  Mood: normal  Speech:  Normal  Thought Process:  Goal directed and Linear  Thought Content:  Normal  Suicidal:  None  Homicidal:  None  Hallucinations:  None  Delusion:  None  Memory:  Intact  Orientation:  Person, Place, Time and Situation  Reliability:  good  Insight:  Good  Judgement:  Good  Impulse Control:  Good  Physical/Medical Issues:  Yes HTN, DM, fibromyalgia, carpal tunnel syndrome     Current Medications:   Current Outpatient Medications   Medication Sig Dispense Refill   • ARIPiprazole (ABILIFY) 5 MG tablet Take 1 tablet by mouth Daily. 90 tablet 0   • atorvastatin (LIPITOR) 40 MG tablet Take 1 tablet by mouth Every Night. 90 tablet 3   • BOTOX 200 units reconstituted solution INJECT 200 UNITS INTO THE HEAD OR NECK AREA BY MD EVERY 90 DAYS  3   • Cholecalciferol (VITAMIN D) 2000 units capsule Take 2,000 Units by mouth Daily.     • clotrimazole-betamethasone (Lotrisone)  1-0.05 % cream Apply 1 application topically to the appropriate area as directed 2 (Two) Times a Day. 45 g 0   • cyclobenzaprine (FLEXERIL) 10 MG tablet Take 1 tablet by mouth At Night As Needed for Muscle Spasms. 30 tablet 1   • DULoxetine (CYMBALTA) 60 MG capsule Take 1 capsule by mouth Daily. 90 capsule 0   • eletriptan (RELPAX) 40 MG tablet TAKE 1 TABLET BY MOUTH AT THE ONSET OF MIGRAINE. MAY REPEAT IN 2 HRS.MAX 2 DOSE/24HR,3 DAYS/WEEK  11   • FLUoxetine (PROzac) 20 MG capsule Take 1 capsule by mouth Daily. 90 capsule 0   • glimepiride (Amaryl) 2 MG tablet Take 1 tablet by mouth 2 (Two) Times a Day With Meals. 180 tablet 1   • glucose blood test strip 1 each by Other route Daily. Use as instructed 100 each 2   • glucose monitor monitoring kit 1 each Daily. 1 each 1   • hydroCHLOROthiazide (HYDRODIURIL) 12.5 MG tablet Take 1 tablet by mouth Daily. 90 tablet 3   • Lancets 28G misc 1 application Daily. 100 each 3   • magnesium chloride ER 64 MG DR tablet Take 2 tablets by mouth Daily.     • metFORMIN ER (GLUCOPHAGE-XR) 500 MG 24 hr tablet Take 4 tablets by mouth Daily With Dinner. 360 tablet 3   • nebivolol (Bystolic) 10 MG tablet Take 1 tablet by mouth Daily. 90 tablet 3   • nortriptyline (PAMELOR) 10 MG capsule nortriptyline 10 mg capsule   TAKE 4 CAPSULES AT BEDTIME     • omeprazole (priLOSEC) 40 MG capsule Take 1 capsule by mouth Daily. 90 capsule 3   • oxyCODONE (ROXICODONE) 15 MG immediate release tablet Take 1 tablet by mouth Every 6 (Six) Hours As Needed for Moderate Pain . (Patient taking differently: Take 15 mg by mouth Every 6 (Six) Hours As Needed for Moderate Pain . Pt taking 10 mg) 56 tablet 0   • TROKENDI  MG capsule sustained-release 24 hr Take 1 capsule by mouth Daily.  1     No current facility-administered medications for this visit.       Physical Exam  Nursing note reviewed.   Constitutional:       Appearance: Normal appearance.   Neurological:      Mental Status: She is alert.    Psychiatric:         Attention and Perception: Attention and perception normal.         Mood and Affect: Affect normal. Mood is depressed. Mood is not anxious.         Speech: Speech normal.         Behavior: Behavior normal. Behavior is cooperative.         Thought Content: Thought content normal.         Cognition and Memory: Cognition and memory normal.         Judgment: Judgment normal.        Result Review :     The following data was reviewed by: ONESIMO Alexander on 02/01/2021:  Common labs    Common Labsle 6/9/21 9/15/21 9/15/21 9/15/21 9/15/21 12/14/21 12/14/21     1005 1008 1008 1008 1039 1040   Glucose     127 (A)     BUN     12     Creatinine     0.70     eGFR Non African Am     88     Sodium     138     Potassium     3.9     Chloride     104     Calcium     9.3     Albumin     4.00     Total Bilirubin     0.2     Alkaline Phosphatase     72     AST (SGOT)     23     ALT (SGPT)     44 (A)     WBC   7.68       Hemoglobin   13.2       Hematocrit   39.1       Platelets   294       Total Cholesterol    157      Triglycerides    246 (A)      HDL Cholesterol    34 (A)      LDL Cholesterol     82      Hemoglobin A1C 9.3 7.4    7.4    Microalbumin, Urine       <1.2   (A) Abnormal value            Data reviewed: PCP notes        Assessment and Plan    Problem List Items Addressed This Visit     None      Visit Diagnoses     Major depressive disorder, recurrent episode, moderate (HCC)    -  Primary    Adjustment disorder with anxious mood        PMDD (premenstrual dysphoric disorder)                TREATMENT PLAN/GOALS: Continue supportive psychotherapy efforts and medications as indicated. Treatment and medication options discussed during today's visit. Patient ackowledged and verbally consented to continue with current treatment plan and was educated on the importance of compliance with treatment and follow-up appointments.    MEDICATION ISSUES:  We discussed risks, benefits, and side effects of the above  medications and the patient was agreeable with the plan. Patient was educated on the importance of compliance with treatment and follow-up appointments.  Patient is agreeable to call the office with any worsening of symptoms or onset of side effects. Patient is agreeable to call 911 or go to the nearest ER should he/she begin having SI/HI.  Advised patient that she could try CBT to help reframing as she is coming off of opioids as well as doing yoga and water aerobics.  Also encouraged a low-dose of melatonin as well as adult coloring to help sleep at night patient was receptive of these recommendations and felt hopeful.      -Continue Cymbalta 60 mg daily for depression and anxiety as patient has benefited greatly as well for her pain.  -Continue fluoxetine 20 mg daily for depression symptoms as patient has noticed a significant difference with not taking the medication due to hormonal changes.    -Patient educated extensively regarding the risk of serotonin syndrome as she is currently on duloxetine due to her pain as we have previously tried to taper and it is not effective so need to begin fluoxetine 20 mg daily to help with her overall depression and mood.  Patient verbalized understanding was agreeable.  -Continue aripiprazole 5 mg as adjunct for depression as patient has failed numerous SSRIs and SNRIs and has benefited the patient tremendously and is medically necessary for her depression to avoid hospitalization. Patient felt she was ok at this time and didn't want any medication changes.       Counseled patient regarding multimodal approach with healthy nutrition, healthy sleep, regular physical activity, social activities, counseling, and medications.      Coping skills reviewed and encouraged positive framing of thoughts     Assisted patient in processing above session content; acknowledged and normalized patient’s thoughts, feelings, and concerns.  Applied  positive coping skills and behavior management  in session.  Allowed patient to freely discuss issues without interruption or judgment. Provided safe, confidential environment to facilitate the development of positive therapeutic relationship and encourage open, honest communication. Assisted patient in identifying risk factors which would indicate the need for higher level of care including thoughts to harm self or others and/or self-harming behavior and encouraged patient to contact this office, call 911, or present to the nearest emergency room should any of these events occur. Discussed crisis intervention services and means to access.     MEDS ORDERED DURING VISIT:  No orders of the defined types were placed in this encounter.      No refills needed.     Follow Up   Return in about 4 weeks (around 2/15/2022), or if symptoms worsen or fail to improve, for Recheck.    Patient was given instructions and counseling regarding her condition or for health maintenance advice. Please see specific information pulled into the AVS if appropriate.     Some of the data in this electronic note has been brought forward from a previous encounter, any necessary changes have been made, it has been reviewed by this APRN, and it is accurate.      This document has been electronically signed by ONESIMO Alexander  January 18, 2022 10:32 EST    Part of this note may be an electronic transcription/translation of spoken language to printed text using the Dragon Dictation System.

## 2022-01-18 NOTE — TELEPHONE ENCOUNTER
She can continue current medications and let me know if blood sugar is lower than 70 for med adjustment.

## 2022-02-15 ENCOUNTER — TELEMEDICINE (OUTPATIENT)
Dept: PSYCHIATRY | Facility: CLINIC | Age: 57
End: 2022-02-15

## 2022-02-15 DIAGNOSIS — F32.81 PMDD (PREMENSTRUAL DYSPHORIC DISORDER): ICD-10-CM

## 2022-02-15 DIAGNOSIS — F33.1 MAJOR DEPRESSIVE DISORDER, RECURRENT EPISODE, MODERATE: Chronic | ICD-10-CM

## 2022-02-15 DIAGNOSIS — F43.22 ADJUSTMENT DISORDER WITH ANXIOUS MOOD: ICD-10-CM

## 2022-02-15 PROCEDURE — 99214 OFFICE O/P EST MOD 30 MIN: CPT | Performed by: NURSE PRACTITIONER

## 2022-02-15 RX ORDER — FLUOXETINE HYDROCHLORIDE 20 MG/1
20 CAPSULE ORAL DAILY
Qty: 90 CAPSULE | Refills: 0 | Status: SHIPPED | OUTPATIENT
Start: 2022-02-15 | End: 2022-04-28 | Stop reason: SDUPTHER

## 2022-02-15 RX ORDER — ARIPIPRAZOLE 5 MG/1
5 TABLET ORAL DAILY
Qty: 90 TABLET | Refills: 0 | Status: SHIPPED | OUTPATIENT
Start: 2022-02-15 | End: 2022-04-28 | Stop reason: SDUPTHER

## 2022-02-15 RX ORDER — DULOXETIN HYDROCHLORIDE 60 MG/1
60 CAPSULE, DELAYED RELEASE ORAL DAILY
Qty: 90 CAPSULE | Refills: 0 | Status: SHIPPED | OUTPATIENT
Start: 2022-02-15 | End: 2022-04-28 | Stop reason: SDUPTHER

## 2022-02-15 NOTE — PROGRESS NOTES
"This provider is located at [facility], [address] [place] [address].The Patient is seen remotely at home, [address] [facility] [place] [address] via [employer]. Patient is being seen via telehealth and confirm that they are in a secure environment for this session. The patient's condition being diagnosed/treated is appropriate for telemedicine. The provider identified himself/herself: herself as well as her credentials.   The patient gave consent to be seen remotely, and when consent is given they understand that the consent allows for patient identifiable information to be sent to a third party as needed.   They may refuse to be seen remotely at any time. The electronic data is encrypted and password protected, and the patient has been advised of the potential risks to privacy not withstanding such measures.    You have chosen to receive care through a telehealth visit.  Do you consent to use a video/audio connection for your medical care today? Yes      Chief Complaint  Follow-up for depression and anxiety     Subjective    Yelena Radha Morris presents to [facility] for medication management.       History of Present Illness   Patient presents today reporting that she has been doing \"really well\".  She states she has not felt down and notes her depression has been good.  Patient states her anxiety has been manageable and she has had a few family issues that have been overwhelming and anxious but she is doing well for the most part.  Patient reports her appetite is good as she is trying to find other weight loss methods.  Patient reports dry mouth that she has chronically but denies any other side effects.  She states she followed up with OB/GYN and they are adjusting hormones and giving creams for the patient.  She reports that her sleep is been okay as she is still waking up in the middle of the night and getting in the recliner and then going " back to sleep after a couple of hours.  Patient feels that this is more related to pain and being comfortable that it is sleep habits.  Patient denies any SI/HI/AH/VH.        Objective   Vital Signs:   There were no vitals taken for this visit.  Due to the remote nature of this encounter (virtual encounter), vitals were unable to be obtained.  Height stated at 64 inches.  Weight stated at 165 pounds.      PHQ-9 Score:   PHQ-9 Total Score:       PHQ-9 Depression Screening  Little interest or pleasure in doing things?     Feeling down, depressed, or hopeless?     Trouble falling or staying asleep, or sleeping too much?     Feeling tired or having little energy?     Poor appetite or overeating?     Feeling bad about yourself - or that you are a failure or have let yourself or your family down?     Trouble concentrating on things, such as reading the newspaper or watching television?     Moving or speaking so slowly that other people could have noticed? Or the opposite - being so fidgety or restless that you have been moving around a lot more than usual?     Thoughts that you would be better off dead, or of hurting yourself in some way?     PHQ-9 Total Score     If you checked off any problems, how difficult have these problems made it for you to do your work, take care of things at home, or get along with other people?           Mental Status Exam:   Hygiene:   good  Cooperation:  Cooperative  Eye Contact:  Good  Psychomotor Behavior:  Appropriate  Affect:  Appropriate  Mood: normal  Speech:  Normal  Thought Process:  Goal directed and Linear  Thought Content:  Normal  Suicidal:  None  Homicidal:  None  Hallucinations:  None  Delusion:  None  Memory:  Intact  Orientation:  Person, Place, Time and Situation  Reliability:  good  Insight:  Good  Judgement:  Good  Impulse Control:  Good  Physical/Medical Issues:  Yes HTN, DM, fibromyalgia, carpal tunnel syndrome     Current Medications:   Current Outpatient Medications    Medication Sig Dispense Refill   • ARIPiprazole (ABILIFY) 5 MG tablet Take 1 tablet by mouth Daily. 90 tablet 0   • atorvastatin (LIPITOR) 40 MG tablet Take 1 tablet by mouth Every Night. 90 tablet 3   • BOTOX 200 units reconstituted solution INJECT 200 UNITS INTO THE HEAD OR NECK AREA BY MD EVERY 90 DAYS  3   • Cholecalciferol (VITAMIN D) 2000 units capsule Take 2,000 Units by mouth Daily.     • clotrimazole-betamethasone (Lotrisone) 1-0.05 % cream Apply 1 application topically to the appropriate area as directed 2 (Two) Times a Day. 45 g 0   • cyclobenzaprine (FLEXERIL) 10 MG tablet Take 1 tablet by mouth At Night As Needed for Muscle Spasms. 30 tablet 1   • DULoxetine (CYMBALTA) 60 MG capsule Take 1 capsule by mouth Daily. 90 capsule 0   • eletriptan (RELPAX) 40 MG tablet TAKE 1 TABLET BY MOUTH AT THE ONSET OF MIGRAINE. MAY REPEAT IN 2 HRS.MAX 2 DOSE/24HR,3 DAYS/WEEK  11   • FLUoxetine (PROzac) 20 MG capsule Take 1 capsule by mouth Daily. 90 capsule 0   • glimepiride (Amaryl) 2 MG tablet Take 1 tablet by mouth 2 (Two) Times a Day With Meals. 180 tablet 1   • glucose blood test strip 1 each by Other route Daily. Use as instructed 100 each 2   • glucose monitor monitoring kit 1 each Daily. 1 each 1   • hydroCHLOROthiazide (HYDRODIURIL) 12.5 MG tablet Take 1 tablet by mouth Daily. 90 tablet 3   • Lancets 28G misc 1 application Daily. 100 each 3   • magnesium chloride ER 64 MG DR tablet Take 2 tablets by mouth Daily.     • metFORMIN ER (GLUCOPHAGE-XR) 500 MG 24 hr tablet Take 4 tablets by mouth Daily With Dinner. 360 tablet 3   • nebivolol (Bystolic) 10 MG tablet Take 1 tablet by mouth Daily. 90 tablet 3   • nortriptyline (PAMELOR) 10 MG capsule nortriptyline 10 mg capsule   TAKE 4 CAPSULES AT BEDTIME     • omeprazole (priLOSEC) 40 MG capsule Take 1 capsule by mouth Daily. 90 capsule 3   • oxyCODONE (ROXICODONE) 15 MG immediate release tablet Take 1 tablet by mouth Every 6 (Six) Hours As Needed for Moderate Pain .  (Patient taking differently: Take 15 mg by mouth Every 6 (Six) Hours As Needed for Moderate Pain . Pt taking 10 mg) 56 tablet 0   • TROKENDI  MG capsule sustained-release 24 hr Take 1 capsule by mouth Daily.  1     No current facility-administered medications for this visit.       Physical Exam  Nursing note reviewed.   Constitutional:       Appearance: Normal appearance.   Neurological:      Mental Status: She is alert.   Psychiatric:         Attention and Perception: Attention and perception normal.         Mood and Affect: Affect normal. Mood is anxious. Mood is not depressed.         Speech: Speech normal.         Behavior: Behavior normal. Behavior is cooperative.         Thought Content: Thought content normal.         Cognition and Memory: Cognition and memory normal.         Judgment: Judgment normal.        Result Review :     The following data was reviewed by: ONESIMO Alexander on 02/01/2021:  Common labs    Common Labsle 6/9/21 9/15/21 9/15/21 9/15/21 9/15/21 12/14/21 12/14/21     1005 1008 1008 1008 1039 1040   Glucose     127 (A)     BUN     12     Creatinine     0.70     eGFR Non African Am     88     Sodium     138     Potassium     3.9     Chloride     104     Calcium     9.3     Albumin     4.00     Total Bilirubin     0.2     Alkaline Phosphatase     72     AST (SGOT)     23     ALT (SGPT)     44 (A)     WBC   7.68       Hemoglobin   13.2       Hematocrit   39.1       Platelets   294       Total Cholesterol    157      Triglycerides    246 (A)      HDL Cholesterol    34 (A)      LDL Cholesterol     82      Hemoglobin A1C 9.3 7.4    7.4    Microalbumin, Urine       <1.2   (A) Abnormal value            Data reviewed: PCP notes        Assessment and Plan    Problem List Items Addressed This Visit     None      Visit Diagnoses     Major depressive disorder, recurrent episode, moderate (HCC)  (Chronic)       Relevant Medications    ARIPiprazole (ABILIFY) 5 MG tablet    DULoxetine (CYMBALTA)  60 MG capsule    FLUoxetine (PROzac) 20 MG capsule    Adjustment disorder with anxious mood        Relevant Medications    ARIPiprazole (ABILIFY) 5 MG tablet    DULoxetine (CYMBALTA) 60 MG capsule    FLUoxetine (PROzac) 20 MG capsule    PMDD (premenstrual dysphoric disorder)        Relevant Medications    ARIPiprazole (ABILIFY) 5 MG tablet    DULoxetine (CYMBALTA) 60 MG capsule    FLUoxetine (PROzac) 20 MG capsule            TREATMENT PLAN/GOALS: Continue supportive psychotherapy efforts and medications as indicated. Treatment and medication options discussed during today's visit. Patient ackowledged and verbally consented to continue with current treatment plan and was educated on the importance of compliance with treatment and follow-up appointments.    MEDICATION ISSUES:  We discussed risks, benefits, and side effects of the above medications and the patient was agreeable with the plan. Patient was educated on the importance of compliance with treatment and follow-up appointments.  Patient is agreeable to call the office with any worsening of symptoms or onset of side effects. Patient is agreeable to call 911 or go to the nearest ER should he/she begin having SI/HI.  Advised patient that she could try CBT to help reframing as she is coming off of opioids as well as doing yoga and water aerobics.  Also encouraged a low-dose of melatonin as well as adult coloring to help sleep at night patient was receptive of these recommendations and felt hopeful.      -Continue Cymbalta 60 mg daily for depression and anxiety as patient has benefited greatly as well for her pain.  -Continue fluoxetine 20 mg daily for depression symptoms as patient has noticed a significant difference with not taking the medication due to hormonal changes.    -Patient educated extensively regarding the risk of serotonin syndrome as she is currently on duloxetine due to her pain as we have previously tried to taper and it is not effective so she is  on fluoxetine 20 mg daily to help with her overall depression and mood.  Patient verbalized understanding was agreeable.  -Continue aripiprazole 5 mg as adjunct for depression as patient has failed numerous SSRIs and SNRIs and has benefited the patient tremendously and is medically necessary for her depression to avoid hospitalization. Patient felt she was ok at this time and didn't want any medication changes.       Counseled patient regarding multimodal approach with healthy nutrition, healthy sleep, regular physical activity, social activities, counseling, and medications.      Coping skills reviewed and encouraged positive framing of thoughts     Assisted patient in processing above session content; acknowledged and normalized patient’s thoughts, feelings, and concerns.  Applied  positive coping skills and behavior management in session.  Allowed patient to freely discuss issues without interruption or judgment. Provided safe, confidential environment to facilitate the development of positive therapeutic relationship and encourage open, honest communication. Assisted patient in identifying risk factors which would indicate the need for higher level of care including thoughts to harm self or others and/or self-harming behavior and encouraged patient to contact this office, call 911, or present to the nearest emergency room should any of these events occur. Discussed crisis intervention services and means to access.     MEDS ORDERED DURING VISIT:  New Medications Ordered This Visit   Medications   • ARIPiprazole (ABILIFY) 5 MG tablet     Sig: Take 1 tablet by mouth Daily.     Dispense:  90 tablet     Refill:  0   • DULoxetine (CYMBALTA) 60 MG capsule     Sig: Take 1 capsule by mouth Daily.     Dispense:  90 capsule     Refill:  0   • FLUoxetine (PROzac) 20 MG capsule     Sig: Take 1 capsule by mouth Daily.     Dispense:  90 capsule     Refill:  0         Follow Up   Return in about 8 weeks (around 4/12/2022), or  if symptoms worsen or fail to improve, for Recheck.    Patient was given instructions and counseling regarding her condition or for health maintenance advice. Please see specific information pulled into the AVS if appropriate.     Some of the data in this electronic note has been brought forward from a previous encounter, any necessary changes have been made, it has been reviewed by this APRN, and it is accurate.      This document has been electronically signed by ONESIMO Alexander  February 15, 2022 11:11 EST    Part of this note may be an electronic transcription/translation of spoken language to printed text using the Dragon Dictation System.

## 2022-03-03 DIAGNOSIS — E11.9 TYPE 2 DIABETES MELLITUS WITHOUT COMPLICATION, WITHOUT LONG-TERM CURRENT USE OF INSULIN: ICD-10-CM

## 2022-03-03 RX ORDER — GLIMEPIRIDE 2 MG/1
TABLET ORAL
Qty: 90 TABLET | Refills: 0 | Status: SHIPPED | OUTPATIENT
Start: 2022-03-03 | End: 2022-06-01

## 2022-04-28 ENCOUNTER — TELEMEDICINE (OUTPATIENT)
Dept: PSYCHIATRY | Facility: CLINIC | Age: 57
End: 2022-04-28

## 2022-04-28 DIAGNOSIS — F43.22 ADJUSTMENT DISORDER WITH ANXIOUS MOOD: ICD-10-CM

## 2022-04-28 DIAGNOSIS — F32.81 PMDD (PREMENSTRUAL DYSPHORIC DISORDER): ICD-10-CM

## 2022-04-28 DIAGNOSIS — F33.1 MAJOR DEPRESSIVE DISORDER, RECURRENT EPISODE, MODERATE: Primary | ICD-10-CM

## 2022-04-28 PROCEDURE — 99214 OFFICE O/P EST MOD 30 MIN: CPT | Performed by: NURSE PRACTITIONER

## 2022-04-28 RX ORDER — FLUOXETINE HYDROCHLORIDE 20 MG/1
20 CAPSULE ORAL DAILY
Qty: 90 CAPSULE | Refills: 0 | Status: SHIPPED | OUTPATIENT
Start: 2022-04-28 | End: 2022-06-23 | Stop reason: SDUPTHER

## 2022-04-28 RX ORDER — DULOXETIN HYDROCHLORIDE 60 MG/1
60 CAPSULE, DELAYED RELEASE ORAL DAILY
Qty: 90 CAPSULE | Refills: 0 | Status: SHIPPED | OUTPATIENT
Start: 2022-04-28 | End: 2022-06-23 | Stop reason: SDUPTHER

## 2022-04-28 RX ORDER — ARIPIPRAZOLE 5 MG/1
5 TABLET ORAL DAILY
Qty: 90 TABLET | Refills: 0 | Status: SHIPPED | OUTPATIENT
Start: 2022-04-28 | End: 2022-06-23 | Stop reason: SDUPTHER

## 2022-04-28 NOTE — PROGRESS NOTES
"This provider is located at [facility], [address] [place] [address].The Patient is seen remotely at home, [address] [place] [address] via [employer]. Patient is being seen via telehealth and confirm that they are in a secure environment for this session. The patient's condition being diagnosed/treated is appropriate for telemedicine. The provider identified himself/herself: herself as well as her credentials.   The patient gave consent to be seen remotely, and when consent is given they understand that the consent allows for patient identifiable information to be sent to a third party as needed.   They may refuse to be seen remotely at any time. The electronic data is encrypted and password protected, and the patient has been advised of the potential risks to privacy not withstanding such measures.    You have chosen to receive care through a telehealth visit.  Do you consent to use a video/audio connection for your medical care today? Yes      Chief Complaint  Follow-up for depression and anxiety     Subjective    Yelena Radha Morris presents to [facility] for medication management.       History of Present Illness   Patient presents today reporting that things have been going well.  She states her anxiety has been good.  She reports her depression has been \"fine\".  She states there have been no changes or nothing that she cannot handle.  Patient notes that she has been sleeping well.  She states she recently weaned off of opioids and is using Tylenol arthritis.  She states that it has been difficult but she has been doing well with that.  She states overall her appetite has been increased that she has gained 10 pounds in the last couple of months and is unsure if it is medication related or menopause related.  Patient denies any side effects to the medications.  She denied any appetite increase or weight loss increase when starting " the medication several months ago.  Patient denies any SI/HI/AH/VH.      Objective   Vital Signs:   There were no vitals taken for this visit.  Due to the remote nature of this encounter (virtual encounter), vitals were unable to be obtained.  Height stated at 64 inches.  Weight stated at 168 pounds.      PHQ-9 Score:   PHQ-9 Total Score:       PHQ-9 Depression Screening  Little interest or pleasure in doing things?     Feeling down, depressed, or hopeless?     Trouble falling or staying asleep, or sleeping too much?     Feeling tired or having little energy?     Poor appetite or overeating?     Feeling bad about yourself - or that you are a failure or have let yourself or your family down?     Trouble concentrating on things, such as reading the newspaper or watching television?     Moving or speaking so slowly that other people could have noticed? Or the opposite - being so fidgety or restless that you have been moving around a lot more than usual?     Thoughts that you would be better off dead, or of hurting yourself in some way?     PHQ-9 Total Score     If you checked off any problems, how difficult have these problems made it for you to do your work, take care of things at home, or get along with other people?           Mental Status Exam:   Hygiene:   good  Cooperation:  Cooperative  Eye Contact:  Good  Psychomotor Behavior:  Appropriate  Affect:  Appropriate  Mood: normal  Speech:  Normal  Thought Process:  Goal directed and Linear  Thought Content:  Normal  Suicidal:  None  Homicidal:  None  Hallucinations:  None  Delusion:  None  Memory:  Intact  Orientation:  Person, Place, Time and Situation  Reliability:  good  Insight:  Good  Judgement:  Good  Impulse Control:  Good  Physical/Medical Issues:  Yes HTN, DM, fibromyalgia, carpal tunnel syndrome     Current Medications:   Current Outpatient Medications   Medication Sig Dispense Refill   • ARIPiprazole (ABILIFY) 5 MG tablet Take 1 tablet by mouth Daily. 90  tablet 0   • DULoxetine (CYMBALTA) 60 MG capsule Take 1 capsule by mouth Daily. 90 capsule 0   • FLUoxetine (PROzac) 20 MG capsule Take 1 capsule by mouth Daily. 90 capsule 0   • atorvastatin (LIPITOR) 40 MG tablet Take 1 tablet by mouth Every Night. 90 tablet 3   • BOTOX 200 units reconstituted solution INJECT 200 UNITS INTO THE HEAD OR NECK AREA BY MD EVERY 90 DAYS  3   • Cholecalciferol (VITAMIN D) 2000 units capsule Take 2,000 Units by mouth Daily.     • clotrimazole-betamethasone (Lotrisone) 1-0.05 % cream Apply 1 application topically to the appropriate area as directed 2 (Two) Times a Day. 45 g 0   • cyclobenzaprine (FLEXERIL) 10 MG tablet Take 1 tablet by mouth At Night As Needed for Muscle Spasms. 30 tablet 1   • eletriptan (RELPAX) 40 MG tablet TAKE 1 TABLET BY MOUTH AT THE ONSET OF MIGRAINE. MAY REPEAT IN 2 HRS.MAX 2 DOSE/24HR,3 DAYS/WEEK  11   • glimepiride (AMARYL) 2 MG tablet TAKE ONE TABLET BY MOUTH EVERY MORNING BEFORE BREAKFAST 90 tablet 0   • glucose blood test strip 1 each by Other route Daily. Use as instructed 100 each 2   • glucose monitor monitoring kit 1 each Daily. 1 each 1   • hydroCHLOROthiazide (HYDRODIURIL) 12.5 MG tablet Take 1 tablet by mouth Daily. 90 tablet 3   • Lancets 28G misc 1 application Daily. 100 each 3   • magnesium chloride ER 64 MG DR tablet Take 2 tablets by mouth Daily.     • metFORMIN ER (GLUCOPHAGE-XR) 500 MG 24 hr tablet Take 4 tablets by mouth Daily With Dinner. 360 tablet 3   • nebivolol (Bystolic) 10 MG tablet Take 1 tablet by mouth Daily. 90 tablet 3   • nortriptyline (PAMELOR) 10 MG capsule nortriptyline 10 mg capsule   TAKE 4 CAPSULES AT BEDTIME     • omeprazole (priLOSEC) 40 MG capsule Take 1 capsule by mouth Daily. 90 capsule 3   • TROKENDI  MG capsule sustained-release 24 hr Take 1 capsule by mouth Daily.  1     No current facility-administered medications for this visit.       Physical Exam  Nursing note reviewed.   Constitutional:       Appearance:  Normal appearance.   Neurological:      Mental Status: She is alert.   Psychiatric:         Attention and Perception: Attention and perception normal.         Mood and Affect: Mood and affect normal. Mood is not anxious or depressed.         Speech: Speech normal.         Behavior: Behavior normal. Behavior is cooperative.         Thought Content: Thought content normal.         Cognition and Memory: Cognition and memory normal.         Judgment: Judgment normal.        Result Review :     The following data was reviewed by: ONESIMO Alexander on 02/01/2021:  Common labs    Common Labsle 6/9/21 9/15/21 9/15/21 9/15/21 9/15/21 12/14/21 12/14/21     1005 1008 1008 1008 1039 1040   Glucose     127 (A)     BUN     12     Creatinine     0.70     eGFR Non African Am     88     Sodium     138     Potassium     3.9     Chloride     104     Calcium     9.3     Albumin     4.00     Total Bilirubin     0.2     Alkaline Phosphatase     72     AST (SGOT)     23     ALT (SGPT)     44 (A)     WBC   7.68       Hemoglobin   13.2       Hematocrit   39.1       Platelets   294       Total Cholesterol    157      Triglycerides    246 (A)      HDL Cholesterol    34 (A)      LDL Cholesterol     82      Hemoglobin A1C 9.3 7.4    7.4    Microalbumin, Urine       <1.2   (A) Abnormal value            Data reviewed: PCP notes        Assessment and Plan    Problem List Items Addressed This Visit    None     Visit Diagnoses     Major depressive disorder, recurrent episode, moderate (HCC)    -  Primary    Relevant Medications    FLUoxetine (PROzac) 20 MG capsule    DULoxetine (CYMBALTA) 60 MG capsule    ARIPiprazole (ABILIFY) 5 MG tablet    Adjustment disorder with anxious mood        Relevant Medications    FLUoxetine (PROzac) 20 MG capsule    DULoxetine (CYMBALTA) 60 MG capsule    ARIPiprazole (ABILIFY) 5 MG tablet    PMDD (premenstrual dysphoric disorder)        Relevant Medications    FLUoxetine (PROzac) 20 MG capsule    DULoxetine  (CYMBALTA) 60 MG capsule    ARIPiprazole (ABILIFY) 5 MG tablet            TREATMENT PLAN/GOALS: Continue supportive psychotherapy efforts and medications as indicated. Treatment and medication options discussed during today's visit. Patient ackowledged and verbally consented to continue with current treatment plan and was educated on the importance of compliance with treatment and follow-up appointments.    MEDICATION ISSUES:  We discussed risks, benefits, and side effects of the above medications and the patient was agreeable with the plan. Patient was educated on the importance of compliance with treatment and follow-up appointments.  Patient is agreeable to call the office with any worsening of symptoms or onset of side effects. Patient is agreeable to call 911 or go to the nearest ER should he/she begin having SI/HI.  Advised patient that she could try CBT to help reframing as she is coming off of opioids as well as doing yoga and water aerobics.  Also encouraged a low-dose of melatonin as well as adult coloring to help sleep at night patient was receptive of these recommendations and felt hopeful.      -Continue Cymbalta 60 mg daily for depression and anxiety as patient has benefited greatly as well for her pain.  -Continue fluoxetine 20 mg daily for depression symptoms as patient has noticed a significant difference with not taking the medication due to hormonal changes.    -Patient educated extensively regarding the risk of serotonin syndrome as she is currently on duloxetine due to her pain as we have previously tried to taper and it is not effective so she is on fluoxetine 20 mg daily to help with her overall depression and mood.  Patient verbalized understanding was agreeable.  -Continue aripiprazole 5 mg as adjunct for depression as patient has failed numerous SSRIs and SNRIs and has benefited the patient tremendously and is medically necessary for her depression to avoid hospitalization. Patient felt she  was ok at this time and didn't want any medication changes.   -Highly encouraged patient that if she had any side effects or worsening symptoms to contact the clinic for sooner appointment she verbalized understanding.    Counseled patient regarding multimodal approach with healthy nutrition, healthy sleep, regular physical activity, social activities, counseling, and medications.      Coping skills reviewed and encouraged positive framing of thoughts     Assisted patient in processing above session content; acknowledged and normalized patient’s thoughts, feelings, and concerns.  Applied  positive coping skills and behavior management in session.  Allowed patient to freely discuss issues without interruption or judgment. Provided safe, confidential environment to facilitate the development of positive therapeutic relationship and encourage open, honest communication. Assisted patient in identifying risk factors which would indicate the need for higher level of care including thoughts to harm self or others and/or self-harming behavior and encouraged patient to contact this office, call 911, or present to the nearest emergency room should any of these events occur. Discussed crisis intervention services and means to access.     MEDS ORDERED DURING VISIT:  New Medications Ordered This Visit   Medications   • FLUoxetine (PROzac) 20 MG capsule     Sig: Take 1 capsule by mouth Daily.     Dispense:  90 capsule     Refill:  0   • DULoxetine (CYMBALTA) 60 MG capsule     Sig: Take 1 capsule by mouth Daily.     Dispense:  90 capsule     Refill:  0   • ARIPiprazole (ABILIFY) 5 MG tablet     Sig: Take 1 tablet by mouth Daily.     Dispense:  90 tablet     Refill:  0         Follow Up   Return in about 8 weeks (around 6/23/2022), or if symptoms worsen or fail to improve, for Recheck.    Patient was given instructions and counseling regarding her condition or for health maintenance advice. Please see specific information pulled  into the AVS if appropriate.     Some of the data in this electronic note has been brought forward from a previous encounter, any necessary changes have been made, it has been reviewed by this APRN, and it is accurate.      This document has been electronically signed by ONESIMO Alexander  April 28, 2022 14:53 EDT    Part of this note may be an electronic transcription/translation of spoken language to printed text using the Dragon Dictation System.

## 2022-05-15 DIAGNOSIS — I10 ESSENTIAL HYPERTENSION: ICD-10-CM

## 2022-05-15 DIAGNOSIS — E11.9 TYPE 2 DIABETES MELLITUS WITHOUT COMPLICATION, WITHOUT LONG-TERM CURRENT USE OF INSULIN: ICD-10-CM

## 2022-05-16 RX ORDER — HYDROCHLOROTHIAZIDE 12.5 MG/1
TABLET ORAL
Qty: 90 TABLET | Refills: 3 | Status: SHIPPED | OUTPATIENT
Start: 2022-05-16

## 2022-05-16 RX ORDER — METFORMIN HYDROCHLORIDE 500 MG/1
TABLET, EXTENDED RELEASE ORAL
Qty: 360 TABLET | Refills: 3 | Status: SHIPPED | OUTPATIENT
Start: 2022-05-16

## 2022-05-16 NOTE — TELEPHONE ENCOUNTER
LOV 12/14/21 order to return in 3 months (no appt made)  Last A1c 12/14/21 result 7.4  Next office visit 6/15/22  HCTZ last filled 5/13/21 #90 and 3 refills  Metformin last filled 5/13/21 #360 and 3 refills

## 2022-06-01 DIAGNOSIS — E78.2 MIXED HYPERLIPIDEMIA: ICD-10-CM

## 2022-06-01 DIAGNOSIS — E11.9 TYPE 2 DIABETES MELLITUS WITHOUT COMPLICATION, WITHOUT LONG-TERM CURRENT USE OF INSULIN: ICD-10-CM

## 2022-06-01 RX ORDER — ATORVASTATIN CALCIUM 40 MG/1
TABLET, FILM COATED ORAL
Qty: 90 TABLET | Refills: 0 | Status: SHIPPED | OUTPATIENT
Start: 2022-06-01 | End: 2022-06-15 | Stop reason: SDUPTHER

## 2022-06-01 RX ORDER — GLIMEPIRIDE 2 MG/1
TABLET ORAL
Qty: 90 TABLET | Refills: 0 | Status: SHIPPED | OUTPATIENT
Start: 2022-06-01 | End: 2022-06-15

## 2022-06-10 ENCOUNTER — TRANSCRIBE ORDERS (OUTPATIENT)
Dept: ADMINISTRATIVE | Facility: HOSPITAL | Age: 57
End: 2022-06-10

## 2022-06-10 DIAGNOSIS — Z12.31 VISIT FOR SCREENING MAMMOGRAM: Primary | ICD-10-CM

## 2022-06-15 ENCOUNTER — OFFICE VISIT (OUTPATIENT)
Dept: INTERNAL MEDICINE | Facility: CLINIC | Age: 57
End: 2022-06-15

## 2022-06-15 VITALS
HEIGHT: 64 IN | WEIGHT: 171.2 LBS | DIASTOLIC BLOOD PRESSURE: 80 MMHG | OXYGEN SATURATION: 97 % | HEART RATE: 87 BPM | BODY MASS INDEX: 29.23 KG/M2 | SYSTOLIC BLOOD PRESSURE: 138 MMHG

## 2022-06-15 DIAGNOSIS — Z23 NEED FOR SHINGLES VACCINE: ICD-10-CM

## 2022-06-15 DIAGNOSIS — M96.1 LUMBAR POST-LAMINECTOMY SYNDROME: ICD-10-CM

## 2022-06-15 DIAGNOSIS — G43.109 MIGRAINE WITH AURA AND WITHOUT STATUS MIGRAINOSUS, NOT INTRACTABLE: ICD-10-CM

## 2022-06-15 DIAGNOSIS — N80.9 ENDOMETRIOSIS: ICD-10-CM

## 2022-06-15 DIAGNOSIS — M47.812 CERVICAL SPONDYLOSIS WITHOUT MYELOPATHY: ICD-10-CM

## 2022-06-15 DIAGNOSIS — K21.00 GASTROESOPHAGEAL REFLUX DISEASE WITH ESOPHAGITIS WITHOUT HEMORRHAGE: ICD-10-CM

## 2022-06-15 DIAGNOSIS — Z00.00 ANNUAL PHYSICAL EXAM: Primary | ICD-10-CM

## 2022-06-15 DIAGNOSIS — M79.7 FIBROMYALGIA: ICD-10-CM

## 2022-06-15 DIAGNOSIS — E11.8 TYPE 2 DIABETES MELLITUS WITH COMPLICATION, WITHOUT LONG-TERM CURRENT USE OF INSULIN: ICD-10-CM

## 2022-06-15 DIAGNOSIS — E78.2 MIXED HYPERLIPIDEMIA: ICD-10-CM

## 2022-06-15 DIAGNOSIS — Z23 NEED FOR COVID-19 VACCINE: ICD-10-CM

## 2022-06-15 DIAGNOSIS — F33.41 RECURRENT MAJOR DEPRESSIVE DISORDER, IN PARTIAL REMISSION: ICD-10-CM

## 2022-06-15 DIAGNOSIS — H91.93 BILATERAL HEARING LOSS, UNSPECIFIED HEARING LOSS TYPE: ICD-10-CM

## 2022-06-15 DIAGNOSIS — I10 PRIMARY HYPERTENSION: ICD-10-CM

## 2022-06-15 DIAGNOSIS — E04.2 MULTINODULAR THYROID: ICD-10-CM

## 2022-06-15 DIAGNOSIS — E66.3 OVERWEIGHT: ICD-10-CM

## 2022-06-15 DIAGNOSIS — E11.9 TYPE 2 DIABETES MELLITUS WITHOUT COMPLICATION, WITHOUT LONG-TERM CURRENT USE OF INSULIN: ICD-10-CM

## 2022-06-15 DIAGNOSIS — E55.9 VITAMIN D DEFICIENCY: ICD-10-CM

## 2022-06-15 PROBLEM — Z79.891 LONG-TERM CURRENT USE OF OPIATE ANALGESIC: Status: RESOLVED | Noted: 2019-09-30 | Resolved: 2022-06-15

## 2022-06-15 LAB
EXPIRATION DATE: ABNORMAL
HBA1C MFR BLD: 8.4 %
Lab: ABNORMAL

## 2022-06-15 PROCEDURE — 90750 HZV VACC RECOMBINANT IM: CPT | Performed by: INTERNAL MEDICINE

## 2022-06-15 PROCEDURE — 90471 IMMUNIZATION ADMIN: CPT | Performed by: INTERNAL MEDICINE

## 2022-06-15 PROCEDURE — 99396 PREV VISIT EST AGE 40-64: CPT | Performed by: INTERNAL MEDICINE

## 2022-06-15 PROCEDURE — 99214 OFFICE O/P EST MOD 30 MIN: CPT | Performed by: INTERNAL MEDICINE

## 2022-06-15 PROCEDURE — 0051A COVID-19 (PFIZER) 12+ YRS: CPT | Performed by: INTERNAL MEDICINE

## 2022-06-15 PROCEDURE — 91305 COVID-19 (PFIZER) 12+ YRS: CPT | Performed by: INTERNAL MEDICINE

## 2022-06-15 PROCEDURE — 83036 HEMOGLOBIN GLYCOSYLATED A1C: CPT | Performed by: INTERNAL MEDICINE

## 2022-06-15 RX ORDER — GLIMEPIRIDE 2 MG/1
2 TABLET ORAL 2 TIMES DAILY
Qty: 180 TABLET | Refills: 3 | Status: SHIPPED | OUTPATIENT
Start: 2022-06-15

## 2022-06-15 RX ORDER — DULAGLUTIDE 0.75 MG/.5ML
0.75 INJECTION, SOLUTION SUBCUTANEOUS WEEKLY
Qty: 4 PEN | Refills: 0 | Status: SHIPPED | OUTPATIENT
Start: 2022-06-15 | End: 2022-06-21 | Stop reason: SDUPTHER

## 2022-06-15 RX ORDER — ATORVASTATIN CALCIUM 40 MG/1
40 TABLET, FILM COATED ORAL NIGHTLY
Qty: 90 TABLET | Refills: 3 | Status: SHIPPED | OUTPATIENT
Start: 2022-06-15

## 2022-06-15 RX ORDER — FLUTICASONE PROPIONATE 50 MCG
2 SPRAY, SUSPENSION (ML) NASAL DAILY
COMMUNITY
End: 2022-11-21

## 2022-06-15 RX ORDER — LANCETS 23 GAUGE
1 EACH MISCELLANEOUS DAILY
Qty: 100 EACH | Refills: 3 | Status: SHIPPED | OUTPATIENT
Start: 2022-06-15

## 2022-06-15 RX ORDER — GLIMEPIRIDE 2 MG/1
2 TABLET ORAL
Qty: 90 TABLET | Refills: 3 | Status: CANCELLED | OUTPATIENT
Start: 2022-06-15

## 2022-06-15 NOTE — PROGRESS NOTES
Internal Medicine Annual Exam  Yelena Morris is a 56 y.o. female who presents today for an annual exam and with concerns as outlined below.    Chief Complaint  Chief Complaint   Patient presents with   • Annual Exam   • Diabetes        HPI  Ms. Morris comes in today for physical and recheck of diabetes. Has been doing well overall. Diet is well balanced, does not follow a strict low carb diet. She is off opiates. Doing PT. Has pain but can typically remain active. Would like help losing weight. She has upcoming mammogram. Colonoscopy is up to date. Vision exam is due but dental exam up to date. She has been following with her GYN and on topical testosterone and DHEA. Not sure when last pap was done. She has upcoming telehealth appt and will ask. She is still following with her psychiatrist. She notes increased hearing loss, L ear with tinnitus occasionally. Has been told she needs hearing aids when hearing last checked. Cannot use telephone without putting it on speaker. She is due for PCV20, shingrix, and her COVID booster. Denies use of tobacco, ecigarettes, illicit drugs, and alcohol.         Review of Systems  Review of Systems   Constitutional: Negative.    HENT: Positive for hearing loss and tinnitus. Negative for dental problem.    Eyes: Negative.    Respiratory: Negative.    Cardiovascular: Negative.    Gastrointestinal: Negative.    Genitourinary: Negative.    Musculoskeletal: Positive for arthralgias, gait problem (due to foot pain) and myalgias.   Skin: Negative.    Psychiatric/Behavioral: Negative.         Past Medical History  Past Medical History:   Diagnosis Date   • Anxiety    • Chest pain 11/6/2019   • Colon polyp    • Depression    • Diabetes mellitus (HCC)    • Dysphagia 11/6/2019   • Endometriosis    • Fibromyalgia, primary    • GERD (gastroesophageal reflux disease)    • Headache    • HL (hearing loss)    • Hyperlipidemia    • Hypertension    • Low vitamin D level    • Migraines    • Ovarian  cyst    • PMDD (premenstrual dysphoric disorder)    • Wears glasses         Surgical History  Past Surgical History:   Procedure Laterality Date   • BLADDER SURGERY      Bladder tuck   • COLONOSCOPY      every 5 years   • LUMBAR DISCECTOMY  2010    L4-S1 Rt- discectomy Dr. Arellano   • SPINAL CORD STIMULATOR IMPLANT N/A 08/19/2019    Procedure: SPINAL CORD STIMULATOR INSERTION;  Surgeon: Ethan Peters MD;  Location: [facility];  Service: Neurosurgery   • SPINE SURGERY  2010   • TONSILLECTOMY AND ADENOIDECTOMY          Family History  Family History   Adopted: Yes        Social History  Social History     Socioeconomic History   • Marital status:    Tobacco Use   • Smoking status: Never Smoker   • Smokeless tobacco: Never Used   Vaping Use   • Vaping Use: Never used   Substance and Sexual Activity   • Alcohol use: No   • Drug use: Never   • Sexual activity: Yes     Partners: Male     Birth control/protection: Surgical        Current Medications  Current Outpatient Medications on File Prior to Visit   Medication Sig Dispense Refill   • ARIPiprazole (ABILIFY) 5 MG tablet Take 1 tablet by mouth Daily. 90 tablet 0   • atorvastatin (LIPITOR) 40 MG tablet TAKE ONE TABLET BY MOUTH ONCE NIGHTLY 90 tablet 0   • BOTOX 200 units reconstituted solution INJECT 200 UNITS INTO THE HEAD OR NECK AREA BY MD EVERY 90 DAYS  3   • Cholecalciferol (VITAMIN D) 2000 units capsule Take 2,000 Units by mouth Daily.     • cyclobenzaprine (FLEXERIL) 10 MG tablet Take 1 tablet by mouth At Night As Needed for Muscle Spasms. 30 tablet 1   • DULoxetine (CYMBALTA) 60 MG capsule Take 1 capsule by mouth Daily. 90 capsule 0   • eletriptan (RELPAX) 40 MG tablet TAKE 1 TABLET BY MOUTH AT THE ONSET OF MIGRAINE. MAY REPEAT IN 2 HRS.MAX 2 DOSE/24HR,3 DAYS/WEEK  11   • FLUoxetine (PROzac) 20 MG capsule Take 1 capsule by mouth Daily. 90 capsule 0   • glimepiride (AMARYL) 2 MG tablet TAKE ONE TABLET BY MOUTH EVERY MORNING BEFORE BREAKFAST 90 tablet 0  "  • glucose blood test strip 1 each by Other route Daily. Use as instructed 100 each 2   • glucose monitor monitoring kit 1 each Daily. 1 each 1   • hydroCHLOROthiazide (HYDRODIURIL) 12.5 MG tablet TAKE ONE TABLET BY MOUTH DAILY 90 tablet 3   • Lancets 28G misc 1 application Daily. 100 each 3   • metFORMIN ER (GLUCOPHAGE-XR) 500 MG 24 hr tablet TAKE FOUR TABLETS BY MOUTH DAILY WITH DINNER 360 tablet 3   • nebivolol (Bystolic) 10 MG tablet Take 1 tablet by mouth Daily. 90 tablet 3   • nortriptyline (PAMELOR) 10 MG capsule nortriptyline 10 mg capsule   TAKE 4 CAPSULES AT BEDTIME     • omeprazole (priLOSEC) 40 MG capsule Take 1 capsule by mouth Daily. 90 capsule 3   • TROKENDI  MG capsule sustained-release 24 hr Take 1 capsule by mouth Daily.  1   • [DISCONTINUED] clotrimazole-betamethasone (Lotrisone) 1-0.05 % cream Apply 1 application topically to the appropriate area as directed 2 (Two) Times a Day. 45 g 0   • [DISCONTINUED] magnesium chloride ER 64 MG DR tablet Take 2 tablets by mouth Daily.       No current facility-administered medications on file prior to visit.       Allergies  Allergies   Allergen Reactions   • Dihydroergotamine GI Intolerance     VOMITING   • Methergine [Methylergonovine Maleate] Nausea And Vomiting   • Phenergan [Promethazine] Other (See Comments)     agitated   • Amoxicillin Rash   • Methylergonovine GI Intolerance     And sob     • Nutritional Supplements GI Intolerance     DHEA        Objective  Visit Vitals  /80   Pulse 87   Ht 162.6 cm (64.02\")   Wt 77.7 kg (171 lb 3.2 oz)   SpO2 97% Comment: ra   BMI 29.37 kg/m²        Physical Exam  Physical Exam  Vitals and nursing note reviewed.   Constitutional:       General: She is not in acute distress.     Appearance: She is well-developed. She is not ill-appearing, toxic-appearing or diaphoretic.   HENT:      Head: Normocephalic and atraumatic.      Right Ear: Tympanic membrane, ear canal and external ear normal.      Left Ear: " Tympanic membrane, ear canal and external ear normal.      Ears:      Comments: Possible small serous effusions bilaterally     Nose: Nose normal.   Eyes:      General: No scleral icterus.     Conjunctiva/sclera: Conjunctivae normal.   Cardiovascular:      Rate and Rhythm: Normal rate and regular rhythm.      Heart sounds: Normal heart sounds. No murmur heard.  Pulmonary:      Effort: Pulmonary effort is normal. No respiratory distress.      Breath sounds: Normal breath sounds.   Abdominal:      General: Bowel sounds are normal. There is no distension.      Palpations: Abdomen is soft. There is no mass.      Tenderness: There is no abdominal tenderness.   Musculoskeletal:         General: No deformity.      Cervical back: Neck supple.      Right lower leg: No edema.      Left lower leg: No edema.   Lymphadenopathy:      Cervical: No cervical adenopathy.   Skin:     General: Skin is warm and dry.      Findings: No rash.   Neurological:      Mental Status: She is alert and oriented to person, place, and time. Mental status is at baseline.      Gait: Gait normal.   Psychiatric:         Mood and Affect: Mood normal.         Behavior: Behavior normal.         Thought Content: Thought content normal.         Judgment: Judgment normal.          Results  Results for orders placed or performed in visit on 06/15/22   POC Glycosylated Hemoglobin (Hb A1C)    Specimen: Blood   Result Value Ref Range    Hemoglobin A1C 8.4 %    Lot Number [id number]     Expiration Date 3/1/24         Assessment and Plan  Diagnoses and all orders for this visit:    Annual physical exam    Type 2 diabetes mellitus without complication, without long-term current use of insulin ([facility])  - A1c up to 8.4  - Continue metformin XR 2000mg daily and glimepiride 2mg BID. Start trulicity 0.75mg weekly.  - Had also been briefly on jardiance but discontinued due to candidal infections and januvia which was discontinued due to GI intolerance.  - Urine microalbumin  nl 12/2021.  - Eye exam 4/2021 no retinopathy. Discussed need to update eye exam.    Mixed hyperlipidemia  - On lipitor 40mg daily  - Adequately controlled, lipid panel 9/2021 with LDL 82, triglycerides 246.    Recurrent major depressive disorder, in partial remission (HCC)  - On abilify 5mg daily to cymbalta 60mg daily and prozac 20mg PRN PMDD. Following with [facility].    Migraine with aura and without status migrainosus, not intractable  - Follows with Dr. Chong  - On nortriptyline, botox injections, trokendi, magnesium, and triptan PRN.  - Reports overall good control of migraines on this regimen    Primary hypertension  - BP acceptable today  - Continue nebivolol 10mg daily and HCTZ 12.5mg daily    Vitamin D deficiency  - on vitamin D supplement    Gastroesophageal reflux disease with esophagitis without hemorrhage  - Controlled on omeprazole 40mg daily    Multinodular thyroid  - Thyroid US 10/2021 with stable multinodular thyroid. Thyroid studies historically normal.  - Repeat US 10/2022    Fibromyalgia, Lumbar post-laminectomy syndrome, Cervical spondylosis without myelopathy  - No longer on opiates. Uses OTC pain relievers PRN and flexeril 10mg qhs PRN.  - In PT  - Has SCS  - Discussed CBD oil    Bilateral hearing loss, unspecified hearing loss type  - Hearing worsening, now has to use speaker phone on telephone and has intermittent tinnitus.  - Recommend returning to Dr. Perkins for repeat hearing eval and hearing aids    Overweight  - BMI is >= 25 and <30. (Overweight) The following options were offered after discussion;: pharmacological intervention options  - Starting on trulicity as above.    Health Maintenance   Topic Date Due   • Hepatitis B (1 of 3 - Risk 3-dose series) Never done   • DIABETIC FOOT EXAM  11/15/2019   • Pneumococcal Vaccine 0-64 (2 - PCV) 11/06/2020   • PAP SMEAR  07/30/2021   • ZOSTER VACCINE (2 of 2) 02/08/2022   • COVID-19 Vaccine (4 - Booster for Pfizer series)  03/29/2022   • DIABETIC EYE EXAM  04/14/2022   • ANNUAL PHYSICAL  05/14/2022   • HEMOGLOBIN A1C  06/14/2022   • LIPID PANEL  09/15/2022   • INFLUENZA VACCINE  10/01/2022   • URINE MICROALBUMIN  12/14/2022   • MAMMOGRAM  07/14/2023   • COLORECTAL CANCER SCREENING  01/03/2028   • TDAP/TD VACCINES (3 - Td or Tdap) 10/30/2029   • HEPATITIS C SCREENING  Completed     Health Maintenance  - Pap smear: GYN Dr. Mcguire, unable to get records  - Mammogram: 7/2021 BIRADS 2, has scheduled 7/2022  - Colonoscopy: 1/2018 sigmoid tubular adenoma. Repeat 5y.  - HCV: negative  - Immunizations: COVID booster today. Shingrix #2 today. PCV20 at next visit. Tdap 10/2019.  - Depression screening: phq9 = 6 1/2022      Return in about 3 months (around 9/15/2022) for Follow up diabetes, thyroid. 1 year for annual..

## 2022-06-21 DIAGNOSIS — E11.9 TYPE 2 DIABETES MELLITUS WITHOUT COMPLICATION, WITHOUT LONG-TERM CURRENT USE OF INSULIN: ICD-10-CM

## 2022-06-21 RX ORDER — DULAGLUTIDE 0.75 MG/.5ML
0.75 INJECTION, SOLUTION SUBCUTANEOUS WEEKLY
Qty: 4 PEN | Refills: 0 | Status: SHIPPED | OUTPATIENT
Start: 2022-06-21 | End: 2022-08-19

## 2022-06-21 NOTE — TELEPHONE ENCOUNTER
Caller: Yelena Morris    Relationship: Self    Best call back number: [phone]    Requested Prescriptions:   Requested Prescriptions     Pending Prescriptions Disp Refills   • Dulaglutide (Trulicity) 0.75 MG/0.5ML solution pen-injector 4 pen 0     Sig: Inject 0.75 mg under the skin into the appropriate area as directed 1 (One) Time Per Week.        Pharmacy where request should be sent[facility] [place] [address] [facility] [phone] [facility] [phone]      Additional details provided by patient: PATIENT STATES THAT PHARMACY  SAID IT WAS CANCELLED.     Does the patient have less than a 3 day supply:  [x] Yes  [] No    Aby Rodriguez   06/21/22 17:15 EDT

## 2022-06-23 ENCOUNTER — TELEMEDICINE (OUTPATIENT)
Dept: PSYCHIATRY | Facility: CLINIC | Age: 57
End: 2022-06-23

## 2022-06-23 ENCOUNTER — PRIOR AUTHORIZATION (OUTPATIENT)
Dept: INTERNAL MEDICINE | Facility: CLINIC | Age: 57
End: 2022-06-23

## 2022-06-23 DIAGNOSIS — F33.1 MAJOR DEPRESSIVE DISORDER, RECURRENT EPISODE, MODERATE: ICD-10-CM

## 2022-06-23 DIAGNOSIS — F32.81 PMDD (PREMENSTRUAL DYSPHORIC DISORDER): ICD-10-CM

## 2022-06-23 DIAGNOSIS — F43.22 ADJUSTMENT DISORDER WITH ANXIOUS MOOD: ICD-10-CM

## 2022-06-23 PROCEDURE — 99214 OFFICE O/P EST MOD 30 MIN: CPT | Performed by: NURSE PRACTITIONER

## 2022-06-23 RX ORDER — ARIPIPRAZOLE 5 MG/1
5 TABLET ORAL DAILY
Qty: 90 TABLET | Refills: 0 | Status: SHIPPED | OUTPATIENT
Start: 2022-06-23 | End: 2022-09-15 | Stop reason: SDUPTHER

## 2022-06-23 RX ORDER — FLUOXETINE HYDROCHLORIDE 20 MG/1
20 CAPSULE ORAL DAILY
Qty: 90 CAPSULE | Refills: 0 | Status: SHIPPED | OUTPATIENT
Start: 2022-06-23 | End: 2022-09-15 | Stop reason: SDUPTHER

## 2022-06-23 RX ORDER — DULOXETIN HYDROCHLORIDE 60 MG/1
60 CAPSULE, DELAYED RELEASE ORAL DAILY
Qty: 90 CAPSULE | Refills: 0 | Status: SHIPPED | OUTPATIENT
Start: 2022-06-23 | End: 2022-09-15 | Stop reason: SDUPTHER

## 2022-06-23 NOTE — PROGRESS NOTES
This provider is located at [facility], [address] [place] [address].The Patient is seen remotely at home, [address] [place] [address] via [employer]. Patient is being seen via telehealth and confirm that they are in a secure environment for this session. The patient's condition being diagnosed/treated is appropriate for telemedicine. The provider identified himself/herself: herself as well as her credentials.   The patient gave consent to be seen remotely, and when consent is given they understand that the consent allows for patient identifiable information to be sent to a third party as needed.   They may refuse to be seen remotely at any time. The electronic data is encrypted and password protected, and the patient has been advised of the potential risks to privacy not withstanding such measures.    You have chosen to receive care through a telehealth visit.  Do you consent to use a video/audio connection for your medical care today? Yes      Chief Complaint  Follow-up for depression and anxiety     Subjective    Yelena Radha Morris presents to [facility] for medication management.       History of Present Illness   Patient presents today reporting things have been going well.  She states she is doing Weight Watchers and already lost 5 pounds and is hoping to get Trulicity approved.  Patient states that she has been feeling good as depression and anxiety have been the same with no changes.  She reports she is sleeping and eating well.  She states she is doing good with her pain and she is going to physical therapy which has been helpful.  She also notes that her  had a good cancer report as his markers were down and CT was good so they are thankful for that.  Patient states that she is planning a wedding with her son but otherwise things have been going well.  Denies any side effects to the medications.  Denies any  SI/HI/AH/VH.        Objective   Vital Signs:   There were no vitals taken for this visit.  Due to the remote nature of this encounter (virtual encounter), vitals were unable to be obtained.  Height stated at 64 inches.  Weight stated at 168 pounds.      PHQ-9 Score:   PHQ-9 Total Score:       PHQ-9 Depression Screening  Little interest or pleasure in doing things?     Feeling down, depressed, or hopeless?     Trouble falling or staying asleep, or sleeping too much?     Feeling tired or having little energy?     Poor appetite or overeating?     Feeling bad about yourself - or that you are a failure or have let yourself or your family down?     Trouble concentrating on things, such as reading the newspaper or watching television?     Moving or speaking so slowly that other people could have noticed? Or the opposite - being so fidgety or restless that you have been moving around a lot more than usual?     Thoughts that you would be better off dead, or of hurting yourself in some way?     PHQ-9 Total Score     If you checked off any problems, how difficult have these problems made it for you to do your work, take care of things at home, or get along with other people?           Mental Status Exam:   Hygiene:   good  Cooperation:  Cooperative  Eye Contact:  Good  Psychomotor Behavior:  Appropriate  Affect:  Appropriate  Mood: normal  Speech:  Normal  Thought Process:  Goal directed and Linear  Thought Content:  Normal  Suicidal:  None  Homicidal:  None  Hallucinations:  None  Delusion:  None  Memory:  Intact  Orientation:  Person, Place, Time and Situation  Reliability:  good  Insight:  Good  Judgement:  Good  Impulse Control:  Good  Physical/Medical Issues:  Yes HTN, DM, fibromyalgia, carpal tunnel syndrome     Current Medications:   Current Outpatient Medications   Medication Sig Dispense Refill   • ARIPiprazole (ABILIFY) 5 MG tablet Take 1 tablet by mouth Daily. 90 tablet 0   • DULoxetine (CYMBALTA) 60 MG capsule Take  1 capsule by mouth Daily. 90 capsule 0   • FLUoxetine (PROzac) 20 MG capsule Take 1 capsule by mouth Daily. 90 capsule 0   • atorvastatin (LIPITOR) 40 MG tablet Take 1 tablet by mouth Every Night. 90 tablet 3   • BOTOX 200 units reconstituted solution INJECT 200 UNITS INTO THE HEAD OR NECK AREA BY MD EVERY 90 DAYS  3   • Cholecalciferol (VITAMIN D) 2000 units capsule Take 2,000 Units by mouth Daily.     • cyclobenzaprine (FLEXERIL) 10 MG tablet Take 1 tablet by mouth At Night As Needed for Muscle Spasms. 30 tablet 1   • Dulaglutide (Trulicity) 0.75 MG/0.5ML solution pen-injector Inject 0.75 mg under the skin into the appropriate area as directed 1 (One) Time Per Week. 4 pen 0   • eletriptan (RELPAX) 40 MG tablet TAKE 1 TABLET BY MOUTH AT THE ONSET OF MIGRAINE. MAY REPEAT IN 2 HRS.MAX 2 DOSE/24HR,3 DAYS/WEEK  11   • fluticasone (FLONASE) 50 MCG/ACT nasal spray 2 sprays into the nostril(s) as directed by provider Daily.     • glimepiride (AMARYL) 2 MG tablet Take 1 tablet by mouth 2 (Two) Times a Day. 180 tablet 3   • glucose blood test strip 1 each by Other route Daily. Use as instructed 100 each 2   • glucose monitor monitoring kit 1 each Daily. 1 each 1   • hydroCHLOROthiazide (HYDRODIURIL) 12.5 MG tablet TAKE ONE TABLET BY MOUTH DAILY 90 tablet 3   • Lancets 28G misc 1 application Daily. 100 each 3   • metFORMIN ER (GLUCOPHAGE-XR) 500 MG 24 hr tablet TAKE FOUR TABLETS BY MOUTH DAILY WITH DINNER 360 tablet 3   • nebivolol (Bystolic) 10 MG tablet Take 1 tablet by mouth Daily. 90 tablet 3   • NON FORMULARY Compounded cream with testosterone and dhea     • nortriptyline (PAMELOR) 10 MG capsule nortriptyline 10 mg capsule   TAKE 4 CAPSULES AT BEDTIME     • Nutritional Supplements (DHEA PO) Take  by mouth.     • omeprazole (priLOSEC) 40 MG capsule Take 1 capsule by mouth Daily. 90 capsule 3   • TROKENDI  MG capsule sustained-release 24 hr Take 1 capsule by mouth Daily.  1     No current facility-administered  medications for this visit.       Physical Exam  Nursing note reviewed.   Constitutional:       Appearance: Normal appearance.   Neurological:      Mental Status: She is alert.   Psychiatric:         Attention and Perception: Attention and perception normal.         Mood and Affect: Mood and affect normal. Mood is not anxious or depressed.         Speech: Speech normal.         Behavior: Behavior normal. Behavior is cooperative.         Thought Content: Thought content normal.         Cognition and Memory: Cognition and memory normal.         Judgment: Judgment normal.        Result Review :     The following data was reviewed by: ONESIMO Alexander on 02/01/2021:  Common labs    Common Labsle 9/15/21 9/15/21 9/15/21 9/15/21 12/14/21 12/14/21 6/15/22    1005 1008 1008 1008 1039 1040    Glucose    127 (A)      BUN    12      Creatinine    0.70      eGFR Non African Am    88      Sodium    138      Potassium    3.9      Chloride    104      Calcium    9.3      Albumin    4.00      Total Bilirubin    0.2      Alkaline Phosphatase    72      AST (SGOT)    23      ALT (SGPT)    44 (A)      WBC  7.68        Hemoglobin  13.2        Hematocrit  39.1        Platelets  294        Total Cholesterol   157       Triglycerides   246 (A)       HDL Cholesterol   34 (A)       LDL Cholesterol    82       Hemoglobin A1C 7.4    7.4  8.4   Microalbumin, Urine      <1.2    (A) Abnormal value            Data reviewed: PCP notes        Assessment and Plan    Problem List Items Addressed This Visit    None     Visit Diagnoses     PMDD (premenstrual dysphoric disorder)        Relevant Medications    FLUoxetine (PROzac) 20 MG capsule    DULoxetine (CYMBALTA) 60 MG capsule    ARIPiprazole (ABILIFY) 5 MG tablet    Major depressive disorder, recurrent episode, moderate (HCC)        Relevant Medications    FLUoxetine (PROzac) 20 MG capsule    DULoxetine (CYMBALTA) 60 MG capsule    ARIPiprazole (ABILIFY) 5 MG tablet    Adjustment disorder with  anxious mood        Relevant Medications    FLUoxetine (PROzac) 20 MG capsule    DULoxetine (CYMBALTA) 60 MG capsule    ARIPiprazole (ABILIFY) 5 MG tablet            TREATMENT PLAN/GOALS: Continue supportive psychotherapy efforts and medications as indicated. Treatment and medication options discussed during today's visit. Patient ackowledged and verbally consented to continue with current treatment plan and was educated on the importance of compliance with treatment and follow-up appointments.    MEDICATION ISSUES:  We discussed risks, benefits, and side effects of the above medications and the patient was agreeable with the plan. Patient was educated on the importance of compliance with treatment and follow-up appointments.  Patient is agreeable to call the office with any worsening of symptoms or onset of side effects. Patient is agreeable to call 911 or go to the nearest ER should he/she begin having SI/HI.  Advised patient that she could try CBT to help reframing as she is coming off of opioids as well as doing yoga and water aerobics.  Also encouraged a low-dose of melatonin as well as adult coloring to help sleep at night patient was receptive of these recommendations and felt hopeful.      -Continue Cymbalta 60 mg daily for depression and anxiety as patient has benefited greatly as well for her pain.  -Continue fluoxetine 20 mg daily for depression and PMDD.   -Patient educated extensively regarding the risk of serotonin syndrome as she is currently on duloxetine due to her pain as we have previously tried to taper and it is not effective so she is on fluoxetine 20 mg daily to help with her overall depression and mood.  Patient verbalized understanding was agreeable.  -Continue aripiprazole 5 mg as adjunct for depression as patient has failed numerous SSRIs and SNRIs and has benefited the patient tremendously and is medically necessary for her depression to avoid hospitalization. Patient felt she was ok at  this time and didn't want any medication changes.   -Highly encouraged patient that if she had any side effects or worsening symptoms to contact the clinic for sooner appointment she verbalized understanding.    Counseled patient regarding multimodal approach with healthy nutrition, healthy sleep, regular physical activity, social activities, counseling, and medications.      Coping skills reviewed and encouraged positive framing of thoughts     Assisted patient in processing above session content; acknowledged and normalized patient’s thoughts, feelings, and concerns.  Applied  positive coping skills and behavior management in session.  Allowed patient to freely discuss issues without interruption or judgment. Provided safe, confidential environment to facilitate the development of positive therapeutic relationship and encourage open, honest communication. Assisted patient in identifying risk factors which would indicate the need for higher level of care including thoughts to harm self or others and/or self-harming behavior and encouraged patient to contact this office, call 911, or present to the nearest emergency room should any of these events occur. Discussed crisis intervention services and means to access.     MEDS ORDERED DURING VISIT:  New Medications Ordered This Visit   Medications   • FLUoxetine (PROzac) 20 MG capsule     Sig: Take 1 capsule by mouth Daily.     Dispense:  90 capsule     Refill:  0   • DULoxetine (CYMBALTA) 60 MG capsule     Sig: Take 1 capsule by mouth Daily.     Dispense:  90 capsule     Refill:  0   • ARIPiprazole (ABILIFY) 5 MG tablet     Sig: Take 1 tablet by mouth Daily.     Dispense:  90 tablet     Refill:  0         Follow Up   Return in about 3 months (around 9/23/2022), or if symptoms worsen or fail to improve, for Recheck.    Patient was given instructions and counseling regarding her condition or for health maintenance advice. Please see specific information pulled into the  AVS if appropriate.     Some of the data in this electronic note has been brought forward from a previous encounter, any necessary changes have been made, it has been reviewed by this APRN, and it is accurate.      This document has been electronically signed by ONESIMO Alexander  June 23, 2022 10:37 EDT    Part of this note may be an electronic transcription/translation of spoken language to printed text using the Dragon Dictation System.

## 2022-07-11 DIAGNOSIS — K21.00 GASTRO-ESOPHAGEAL REFLUX DISEASE WITH ESOPHAGITIS: ICD-10-CM

## 2022-07-11 RX ORDER — OMEPRAZOLE 40 MG/1
CAPSULE, DELAYED RELEASE ORAL
Qty: 90 CAPSULE | Refills: 3 | Status: SHIPPED | OUTPATIENT
Start: 2022-07-11

## 2022-07-15 ENCOUNTER — HOSPITAL ENCOUNTER (OUTPATIENT)
Dept: MAMMOGRAPHY | Facility: HOSPITAL | Age: 57
Discharge: HOME | End: 2022-07-15
Admitting: INTERNAL MEDICINE

## 2022-07-15 DIAGNOSIS — Z12.31 VISIT FOR SCREENING MAMMOGRAM: ICD-10-CM

## 2022-07-15 PROCEDURE — 77063 BREAST TOMOSYNTHESIS BI: CPT

## 2022-07-15 PROCEDURE — 77067 SCR MAMMO BI INCL CAD: CPT | Performed by: RADIOLOGY

## 2022-07-15 PROCEDURE — 77067 SCR MAMMO BI INCL CAD: CPT

## 2022-07-15 PROCEDURE — 77063 BREAST TOMOSYNTHESIS BI: CPT | Performed by: RADIOLOGY

## 2022-08-19 DIAGNOSIS — E11.9 TYPE 2 DIABETES MELLITUS WITHOUT COMPLICATION, WITHOUT LONG-TERM CURRENT USE OF INSULIN: ICD-10-CM

## 2022-08-19 RX ORDER — DULAGLUTIDE 0.75 MG/.5ML
INJECTION, SOLUTION SUBCUTANEOUS
Qty: 4 PEN | Refills: 0 | Status: SHIPPED | OUTPATIENT
Start: 2022-08-19 | End: 2022-09-16

## 2022-09-15 ENCOUNTER — TELEMEDICINE (OUTPATIENT)
Dept: PSYCHIATRY | Facility: CLINIC | Age: 57
End: 2022-09-15

## 2022-09-15 DIAGNOSIS — F43.22 ADJUSTMENT DISORDER WITH ANXIOUS MOOD: ICD-10-CM

## 2022-09-15 DIAGNOSIS — F32.81 PMDD (PREMENSTRUAL DYSPHORIC DISORDER): Primary | ICD-10-CM

## 2022-09-15 DIAGNOSIS — F33.41 RECURRENT MAJOR DEPRESSIVE DISORDER, IN PARTIAL REMISSION: ICD-10-CM

## 2022-09-15 PROCEDURE — 99214 OFFICE O/P EST MOD 30 MIN: CPT | Performed by: NURSE PRACTITIONER

## 2022-09-15 RX ORDER — FLUOXETINE HYDROCHLORIDE 20 MG/1
20 CAPSULE ORAL DAILY
Qty: 90 CAPSULE | Refills: 0 | Status: SHIPPED | OUTPATIENT
Start: 2022-09-15 | End: 2023-01-12 | Stop reason: SDUPTHER

## 2022-09-15 RX ORDER — DULOXETIN HYDROCHLORIDE 60 MG/1
60 CAPSULE, DELAYED RELEASE ORAL DAILY
Qty: 90 CAPSULE | Refills: 0 | Status: SHIPPED | OUTPATIENT
Start: 2022-09-15 | End: 2023-01-12 | Stop reason: SDUPTHER

## 2022-09-15 RX ORDER — ARIPIPRAZOLE 5 MG/1
5 TABLET ORAL DAILY
Qty: 90 TABLET | Refills: 0 | Status: SHIPPED | OUTPATIENT
Start: 2022-09-15 | End: 2023-01-12 | Stop reason: SDUPTHER

## 2022-09-15 NOTE — PROGRESS NOTES
"This provider is located at [facility], [address] [place] [address].The Patient is seen remotely at home, [address] [facility] [address] [place] [address] via [employer]. Patient is being seen via telehealth and confirm that they are in a secure environment for this session. The patient's condition being diagnosed/treated is appropriate for telemedicine. The provider identified himself/herself: herself as well as her credentials.   The patient gave consent to be seen remotely, and when consent is given they understand that the consent allows for patient identifiable information to be sent to a third party as needed.   They may refuse to be seen remotely at any time. The electronic data is encrypted and password protected, and the patient has been advised of the potential risks to privacy not withstanding such measures.    You have chosen to receive care through a telehealth visit.  Do you consent to use a video/audio connection for your medical care today? Yes      Chief Complaint  Follow-up for depression and anxiety     Subjective    Yelena Radha Morris presents to [facility] for medication management.       History of Present Illness   Patient presents today reporting that she has been doing \"great\".  She states that she has not felt depressed or had any depressive symptoms.  She notes that she has not had any overwhelming anxiety at this time.  Patient states that she is in a good place currently and has been doing very well.  She states she just got back from [place] with her aunt.  Patient reports she still has dry mouth from the medication but otherwise denies any side effects.  Patient states that her appetite is good.  She notes that she is sleeping okay for the most part but still wakes up a couple times throughout the night and hard to fall back asleep at times.  Patient states that she is looking possibly for a part-time job at home that is " flexible so she can still care for her family.  Denies any medical or medicine changes.  Denies any SI/HI/AH/VH.      Objective   Vital Signs:   There were no vitals taken for this visit.  Due to the remote nature of this encounter (virtual encounter), vitals were unable to be obtained.  Height stated at 64 inches.  Weight stated at 171 pounds.      PHQ-9 Score:   PHQ-9 Total Score:       PHQ-9 Depression Screening  Little interest or pleasure in doing things?     Feeling down, depressed, or hopeless?     Trouble falling or staying asleep, or sleeping too much?     Feeling tired or having little energy?     Poor appetite or overeating?     Feeling bad about yourself - or that you are a failure or have let yourself or your family down?     Trouble concentrating on things, such as reading the newspaper or watching television?     Moving or speaking so slowly that other people could have noticed? Or the opposite - being so fidgety or restless that you have been moving around a lot more than usual?     Thoughts that you would be better off dead, or of hurting yourself in some way?     PHQ-9 Total Score     If you checked off any problems, how difficult have these problems made it for you to do your work, take care of things at home, or get along with other people?           Mental Status Exam:   Hygiene:   good  Cooperation:  Cooperative  Eye Contact:  Good  Psychomotor Behavior:  Appropriate  Affect:  Appropriate  Mood: normal  Speech:  Normal  Thought Process:  Goal directed and Linear  Thought Content:  Normal  Suicidal:  None  Homicidal:  None  Hallucinations:  None  Delusion:  None  Memory:  Intact  Orientation:  Person, Place, Time and Situation  Reliability:  good  Insight:  Good  Judgement:  Good  Impulse Control:  Good  Physical/Medical Issues:  Yes HTN, DM, fibromyalgia, carpal tunnel syndrome     Current Medications:   Current Outpatient Medications   Medication Sig Dispense Refill   • ARIPiprazole (ABILIFY)  5 MG tablet Take 1 tablet by mouth Daily. 90 tablet 0   • DULoxetine (CYMBALTA) 60 MG capsule Take 1 capsule by mouth Daily. 90 capsule 0   • FLUoxetine (PROzac) 20 MG capsule Take 1 capsule by mouth Daily. 90 capsule 0   • atorvastatin (LIPITOR) 40 MG tablet Take 1 tablet by mouth Every Night. 90 tablet 3   • BOTOX 200 units reconstituted solution INJECT 200 UNITS INTO THE HEAD OR NECK AREA BY MD EVERY 90 DAYS  3   • Cholecalciferol (VITAMIN D) 2000 units capsule Take 2,000 Units by mouth Daily.     • cyclobenzaprine (FLEXERIL) 10 MG tablet Take 1 tablet by mouth At Night As Needed for Muscle Spasms. 30 tablet 1   • eletriptan (RELPAX) 40 MG tablet TAKE 1 TABLET BY MOUTH AT THE ONSET OF MIGRAINE. MAY REPEAT IN 2 HRS.MAX 2 DOSE/24HR,3 DAYS/WEEK  11   • fluticasone (FLONASE) 50 MCG/ACT nasal spray 2 sprays into the nostril(s) as directed by provider Daily.     • glimepiride (AMARYL) 2 MG tablet Take 1 tablet by mouth 2 (Two) Times a Day. 180 tablet 3   • glucose blood test strip 1 each by Other route Daily. Use as instructed 100 each 2   • glucose monitor monitoring kit 1 each Daily. 1 each 1   • hydroCHLOROthiazide (HYDRODIURIL) 12.5 MG tablet TAKE ONE TABLET BY MOUTH DAILY 90 tablet 3   • Lancets 28G misc 1 application Daily. 100 each 3   • metFORMIN ER (GLUCOPHAGE-XR) 500 MG 24 hr tablet TAKE FOUR TABLETS BY MOUTH DAILY WITH DINNER 360 tablet 3   • nebivolol (Bystolic) 10 MG tablet Take 1 tablet by mouth Daily. 90 tablet 3   • NON FORMULARY Compounded cream with testosterone and dhea     • nortriptyline (PAMELOR) 10 MG capsule nortriptyline 10 mg capsule   TAKE 4 CAPSULES AT BEDTIME     • Nutritional Supplements (DHEA PO) Take  by mouth.     • omeprazole (priLOSEC) 40 MG capsule TAKE ONE CAPSULE BY MOUTH DAILY 90 capsule 3   • TROKENDI  MG capsule sustained-release 24 hr Take 1 capsule by mouth Daily.  1   • Trulicity 0.75 MG/0.5ML solution pen-injector INJECT 0.75 MG UNDER THE SKIN INTO THE APPROPRIATE AREA  AS DIRECTED ONE TIME PER WEEK 4 pen 0     No current facility-administered medications for this visit.       Physical Exam  Nursing note reviewed.   Constitutional:       Appearance: Normal appearance.   Neurological:      Mental Status: She is alert.   Psychiatric:         Attention and Perception: Attention and perception normal.         Mood and Affect: Mood and affect normal. Mood is not anxious or depressed.         Speech: Speech normal.         Behavior: Behavior normal. Behavior is cooperative.         Thought Content: Thought content normal.         Cognition and Memory: Cognition and memory normal.         Judgment: Judgment normal.        Result Review :     The following data was reviewed by: ONESIMO Alexander on 02/01/2021:  Common labs    Common Labsle 12/14/21 12/14/21 6/15/22    [id number]    Hemoglobin A1C 7.4  8.4   Microalbumin, Urine  <1.2            Data reviewed: PCP notes        Assessment and Plan    Problem List Items Addressed This Visit        Mental Health    Recurrent major depressive disorder (HCC)    Relevant Medications    FLUoxetine (PROzac) 20 MG capsule    ARIPiprazole (ABILIFY) 5 MG tablet    DULoxetine (CYMBALTA) 60 MG capsule      Other Visit Diagnoses     PMDD (premenstrual dysphoric disorder)    -  Primary    Relevant Medications    FLUoxetine (PROzac) 20 MG capsule    ARIPiprazole (ABILIFY) 5 MG tablet    DULoxetine (CYMBALTA) 60 MG capsule    Adjustment disorder with anxious mood        Relevant Medications    FLUoxetine (PROzac) 20 MG capsule    ARIPiprazole (ABILIFY) 5 MG tablet    DULoxetine (CYMBALTA) 60 MG capsule            TREATMENT PLAN/GOALS: Continue supportive psychotherapy efforts and medications as indicated. Treatment and medication options discussed during today's visit. Patient ackowledged and verbally consented to continue with current treatment plan and was educated on the importance of compliance with treatment and follow-up appointments.    MEDICATION  ISSUES:  We discussed risks, benefits, and side effects of the above medications and the patient was agreeable with the plan. Patient was educated on the importance of compliance with treatment and follow-up appointments.  Patient is agreeable to call the office with any worsening of symptoms or onset of side effects. Patient is agreeable to call 911 or go to the nearest ER should he/she begin having SI/HI.  Advised patient that she could try CBT to help reframing as she is coming off of opioids as well as doing yoga and water aerobics.  Also encouraged a low-dose of melatonin as well as adult coloring to help sleep at night patient was receptive of these recommendations and felt hopeful.      -Continue Cymbalta 60 mg daily for depression and anxiety as patient has benefited greatly as well for her pain.  -Continue fluoxetine 20 mg daily for depression and PMDD.   -Patient educated extensively regarding the risk of serotonin syndrome as she is currently on duloxetine due to her pain as we have previously tried to taper and it is not effective so she is on fluoxetine 20 mg daily to help with her overall depression and mood.  Patient verbalized understanding was agreeable.  -Continue aripiprazole 5 mg as adjunct for depression as patient has failed numerous SSRIs and SNRIs and has benefited the patient tremendously and is medically necessary for her depression to avoid hospitalization. Patient felt she was ok at this time and didn't want any medication changes.   -Highly encouraged patient that if she had any side effects or worsening symptoms to contact the clinic for sooner appointment she verbalized understanding.  -Suggested to the patient that she start 5 mg of melatonin as that may help with her sleep cycle but if things worsen to contact the clinic she verbalized understanding.    Counseled patient regarding multimodal approach with healthy nutrition, healthy sleep, regular physical activity, social activities,  counseling, and medications.      Coping skills reviewed and encouraged positive framing of thoughts     Assisted patient in processing above session content; acknowledged and normalized patient’s thoughts, feelings, and concerns.  Applied  positive coping skills and behavior management in session.  Allowed patient to freely discuss issues without interruption or judgment. Provided safe, confidential environment to facilitate the development of positive therapeutic relationship and encourage open, honest communication. Assisted patient in identifying risk factors which would indicate the need for higher level of care including thoughts to harm self or others and/or self-harming behavior and encouraged patient to contact this office, call 911, or present to the nearest emergency room should any of these events occur. Discussed crisis intervention services and means to access.     MEDS ORDERED DURING VISIT:  New Medications Ordered This Visit   Medications   • FLUoxetine (PROzac) 20 MG capsule     Sig: Take 1 capsule by mouth Daily.     Dispense:  90 capsule     Refill:  0   • ARIPiprazole (ABILIFY) 5 MG tablet     Sig: Take 1 tablet by mouth Daily.     Dispense:  90 tablet     Refill:  0   • DULoxetine (CYMBALTA) 60 MG capsule     Sig: Take 1 capsule by mouth Daily.     Dispense:  90 capsule     Refill:  0         Follow Up   Return in about 3 months (around 12/15/2022), or if symptoms worsen or fail to improve, for Recheck.    Patient was given instructions and counseling regarding her condition or for health maintenance advice. Please see specific information pulled into the AVS if appropriate.     Some of the data in this electronic note has been brought forward from a previous encounter, any necessary changes have been made, it has been reviewed by this APRN, and it is accurate.      This document has been electronically signed by ONESIMO Alexander  September 15, 2022 10:28 EDT    Part of this note may be an  electronic transcription/translation of spoken language to printed text using the Dragon Dictation System.

## 2022-09-16 DIAGNOSIS — E11.9 TYPE 2 DIABETES MELLITUS WITHOUT COMPLICATION, WITHOUT LONG-TERM CURRENT USE OF INSULIN: ICD-10-CM

## 2022-09-16 RX ORDER — DULAGLUTIDE 0.75 MG/.5ML
INJECTION, SOLUTION SUBCUTANEOUS
Qty: 2 ML | Refills: 0 | Status: SHIPPED | OUTPATIENT
Start: 2022-09-16 | End: 2022-10-17

## 2022-10-03 ENCOUNTER — LAB (OUTPATIENT)
Dept: LAB | Facility: HOSPITAL | Age: 57
End: 2022-10-03

## 2022-10-03 ENCOUNTER — OFFICE VISIT (OUTPATIENT)
Dept: INTERNAL MEDICINE | Facility: CLINIC | Age: 57
End: 2022-10-03

## 2022-10-03 VITALS
BODY MASS INDEX: 29.88 KG/M2 | TEMPERATURE: 98.2 F | SYSTOLIC BLOOD PRESSURE: 108 MMHG | OXYGEN SATURATION: 97 % | HEART RATE: 91 BPM | WEIGHT: 175 LBS | HEIGHT: 64 IN | DIASTOLIC BLOOD PRESSURE: 82 MMHG

## 2022-10-03 DIAGNOSIS — I10 PRIMARY HYPERTENSION: ICD-10-CM

## 2022-10-03 DIAGNOSIS — E78.2 MIXED HYPERLIPIDEMIA: ICD-10-CM

## 2022-10-03 DIAGNOSIS — M19.071 PRIMARY OSTEOARTHRITIS OF RIGHT FOOT: ICD-10-CM

## 2022-10-03 DIAGNOSIS — Z23 NEED FOR INFLUENZA VACCINATION: ICD-10-CM

## 2022-10-03 DIAGNOSIS — E04.2 MULTINODULAR THYROID: ICD-10-CM

## 2022-10-03 DIAGNOSIS — Z23 NEED FOR VACCINATION: ICD-10-CM

## 2022-10-03 DIAGNOSIS — M96.1 LUMBAR POST-LAMINECTOMY SYNDROME: ICD-10-CM

## 2022-10-03 DIAGNOSIS — E11.9 TYPE 2 DIABETES MELLITUS WITHOUT COMPLICATION, WITHOUT LONG-TERM CURRENT USE OF INSULIN: Primary | ICD-10-CM

## 2022-10-03 LAB
DEPRECATED RDW RBC AUTO: 40.4 FL (ref 37–54)
ERYTHROCYTE [DISTWIDTH] IN BLOOD BY AUTOMATED COUNT: 12.7 % (ref 12.3–15.4)
EXPIRATION DATE: NORMAL
HBA1C MFR BLD: 6.4 %
HCT VFR BLD AUTO: 42.3 % (ref 34–46.6)
HGB BLD-MCNC: 14.5 G/DL (ref 12–15.9)
Lab: NORMAL
MCH RBC QN AUTO: 29.8 PG (ref 26.6–33)
MCHC RBC AUTO-ENTMCNC: 34.3 G/DL (ref 31.5–35.7)
MCV RBC AUTO: 86.9 FL (ref 79–97)
PLATELET # BLD AUTO: 318 10*3/MM3 (ref 140–450)
PMV BLD AUTO: 9.4 FL (ref 6–12)
RBC # BLD AUTO: 4.87 10*6/MM3 (ref 3.77–5.28)
WBC NRBC COR # BLD: 10.64 10*3/MM3 (ref 3.4–10.8)

## 2022-10-03 PROCEDURE — 90471 IMMUNIZATION ADMIN: CPT | Performed by: INTERNAL MEDICINE

## 2022-10-03 PROCEDURE — 0124A COVID-19 (PFIZER) BIVALENT BOOSTER 12+YRS: CPT | Performed by: INTERNAL MEDICINE

## 2022-10-03 PROCEDURE — 80061 LIPID PANEL: CPT

## 2022-10-03 PROCEDURE — 80050 GENERAL HEALTH PANEL: CPT

## 2022-10-03 PROCEDURE — 91312 COVID-19 (PFIZER) BIVALENT BOOSTER 12+YRS: CPT | Performed by: INTERNAL MEDICINE

## 2022-10-03 PROCEDURE — 83036 HEMOGLOBIN GLYCOSYLATED A1C: CPT | Performed by: INTERNAL MEDICINE

## 2022-10-03 PROCEDURE — 90686 IIV4 VACC NO PRSV 0.5 ML IM: CPT | Performed by: INTERNAL MEDICINE

## 2022-10-03 PROCEDURE — 99214 OFFICE O/P EST MOD 30 MIN: CPT | Performed by: INTERNAL MEDICINE

## 2022-10-03 NOTE — PROGRESS NOTES
Internal Medicine Follow Up    Chief Complaint  Yelena Morris is a 57 y.o. female who presents today for follow up of chronic medical conditions outlined below.    Chief Complaint   Patient presents with   • Diabetes   • Hypothyroidism        HPI  Ms. Morris comes in today for follow up. Tolerating trulicity well. No side effects noted. Does note recent cold, COVID negative. Symptoms resolving but did have diarrhea at that time which she attributes potentially to cold medication she took. She feels well today and would like her COVID-19 booster and flu shot. She recently had injection to her R SI joint by pain management and R first metatarsophalangeal joint by podiatry. She has gotten relief of pain in both locations and is pleased with outcome. BP is well controlled. She is due for thyroid US. Denies compressive sxs, enlarging goiter or pain.    Diabetes  She has type 2 diabetes mellitus. No MedicAlert identification noted. The initial diagnosis of diabetes was made 2 years ago. Pertinent negatives for hypoglycemia include no confusion, dizziness, headaches, hunger, mood changes, nervousness/anxiousness, pallor, seizures, sleepiness, speech difficulty, sweats or tremors. Pertinent negatives for diabetes include no blurred vision, no chest pain, no fatigue, no foot paresthesias, no foot ulcerations, no polydipsia, no polyphagia, no polyuria, no visual change, no weakness and no weight loss. Pertinent negatives for hypoglycemia complications include no blackouts, no hospitalization, no nocturnal hypoglycemia, no required assistance and no required glucagon injection. Symptoms are stable. Pertinent negatives for diabetic complications include no CVA, heart disease, impotence, nephropathy, peripheral neuropathy, PVD or retinopathy. Risk factors for coronary artery disease include dyslipidemia, hypertension and stress. Current diabetic treatment includes diet and oral agent (triple therapy). She is compliant with  treatment most of the time. Her weight is decreasing steadily. She is following a generally healthy diet. Meal planning includes avoidance of concentrated sweets and calorie counting. She has not had a previous visit with a dietitian. She participates in exercise intermittently. There is no compliance with monitoring of blood glucose. Her home blood glucose trend is decreasing steadily. Her breakfast blood glucose is taken between 7-8 am. Her breakfast blood glucose range is generally  mg/dl. She sees a podiatrist.Eye exam is current.        Review of Systems  Review of Systems   Constitutional: Negative for fatigue and unexpected weight loss.   HENT: Negative for trouble swallowing.    Eyes: Negative for blurred vision.   Respiratory: Negative.    Cardiovascular: Negative for chest pain.   Gastrointestinal: Positive for diarrhea (now resolved). Negative for abdominal distention, abdominal pain, anal bleeding, blood in stool, constipation, nausea, vomiting, GERD and indigestion.   Endocrine: Negative for polydipsia, polyphagia and polyuria.   Genitourinary: Negative for impotence.   Musculoskeletal: Positive for arthralgias and back pain. Negative for gait problem.   Skin: Negative for pallor.   Neurological: Negative for dizziness, tremors, seizures, speech difficulty, weakness and confusion.   Psychiatric/Behavioral: The patient is not nervous/anxious.         Current Medications  Current Outpatient Medications on File Prior to Visit   Medication Sig Dispense Refill   • ARIPiprazole (ABILIFY) 5 MG tablet Take 1 tablet by mouth Daily. 90 tablet 0   • atorvastatin (LIPITOR) 40 MG tablet Take 1 tablet by mouth Every Night. 90 tablet 3   • BOTOX 200 units reconstituted solution INJECT 200 UNITS INTO THE HEAD OR NECK AREA BY MD EVERY 90 DAYS  3   • Cholecalciferol (VITAMIN D) 2000 units capsule Take 2,000 Units by mouth Daily.     • cyclobenzaprine (FLEXERIL) 10 MG tablet Take 1 tablet by mouth At Night As Needed  "for Muscle Spasms. 30 tablet 1   • DULoxetine (CYMBALTA) 60 MG capsule Take 1 capsule by mouth Daily. 90 capsule 0   • eletriptan (RELPAX) 40 MG tablet TAKE 1 TABLET BY MOUTH AT THE ONSET OF MIGRAINE. MAY REPEAT IN 2 HRS.MAX 2 DOSE/24HR,3 DAYS/WEEK  11   • FLUoxetine (PROzac) 20 MG capsule Take 1 capsule by mouth Daily. 90 capsule 0   • fluticasone (FLONASE) 50 MCG/ACT nasal spray 2 sprays into the nostril(s) as directed by provider Daily.     • glimepiride (AMARYL) 2 MG tablet Take 1 tablet by mouth 2 (Two) Times a Day. 180 tablet 3   • glucose blood test strip 1 each by Other route Daily. Use as instructed 100 each 2   • glucose monitor monitoring kit 1 each Daily. 1 each 1   • hydroCHLOROthiazide (HYDRODIURIL) 12.5 MG tablet TAKE ONE TABLET BY MOUTH DAILY 90 tablet 3   • Lancets 28G misc 1 application Daily. 100 each 3   • metFORMIN ER (GLUCOPHAGE-XR) 500 MG 24 hr tablet TAKE FOUR TABLETS BY MOUTH DAILY WITH DINNER 360 tablet 3   • nebivolol (Bystolic) 10 MG tablet Take 1 tablet by mouth Daily. 90 tablet 3   • NON FORMULARY Compounded cream with testosterone and dhea     • nortriptyline (PAMELOR) 10 MG capsule nortriptyline 10 mg capsule   TAKE 4 CAPSULES AT BEDTIME     • Nutritional Supplements (DHEA PO) Take  by mouth.     • omeprazole (priLOSEC) 40 MG capsule TAKE ONE CAPSULE BY MOUTH DAILY 90 capsule 3   • TROKENDI  MG capsule sustained-release 24 hr Take 1 capsule by mouth Daily.  1   • Trulicity 0.75 MG/0.5ML solution pen-injector INJECT 0.75 MG UNDER THE SKIN ONCE WEEKLY 2 mL 0     No current facility-administered medications on file prior to visit.       Allergies  Allergies   Allergen Reactions   • Dihydroergotamine GI Intolerance     VOMITING   • Methergine [Methylergonovine Maleate] Nausea And Vomiting   • Phenergan [Promethazine] Other (See Comments)     agitated   • Amoxicillin Rash       Objective  Visit Vitals  /82   Pulse 91   Temp 98.2 °F (36.8 °C)   Ht 162.6 cm (64\")   Wt 79.4 kg " (175 lb)   SpO2 97%   BMI 30.04 kg/m²        Physical Exam  Physical Exam  Vitals and nursing note reviewed.   Constitutional:       General: She is not in acute distress.     Appearance: Normal appearance. She is well-developed. She is not ill-appearing or toxic-appearing.   HENT:      Head: Normocephalic and atraumatic.   Eyes:      Conjunctiva/sclera: Conjunctivae normal.   Cardiovascular:      Rate and Rhythm: Normal rate and regular rhythm.      Heart sounds: Normal heart sounds. No murmur heard.  Pulmonary:      Effort: Pulmonary effort is normal. No respiratory distress.      Breath sounds: Normal breath sounds.   Musculoskeletal:      Right lower leg: No edema.      Left lower leg: No edema.   Skin:     General: Skin is warm and dry.   Neurological:      Mental Status: She is alert and oriented to person, place, and time. Mental status is at baseline.      Gait: Gait normal.   Psychiatric:         Mood and Affect: Mood normal.         Behavior: Behavior normal.         Thought Content: Thought content normal.         Judgment: Judgment normal.         Results  Results for orders placed or performed in visit on 10/03/22   POC Glycosylated Hemoglobin (Hb A1C)    Specimen: Blood   Result Value Ref Range    Hemoglobin A1C 6.4 %    Lot Number [id number]     Expiration Date 5/2/2024         Assessment and Plan   Diagnoses and all orders for this visit:    Type 2 diabetes mellitus without complication, without long-term current use of insulin ([facility])  - A1c improved to 6.4  - Continue metformin XR 2000mg daily, glimepiride 2mg BID, trulicity 0.75mg weekly.  - Had also been briefly on jardiance but discontinued due to candidal infections and januvia which was discontinued due to GI intolerance.  - Urine microalbumin nl 12/2021.  - Eye exam 4/2021 no retinopathy. Discussed need to update eye exam.    Mixed hyperlipidemia  - On lipitor 40mg daily  - check lipid panel    Multinodular thyroid  - Thyroid US 10/2021 with  stable multinodular thyroid. Thyroid studies historically normal.  - Repeat US 10/2022, ordered    Primary hypertension  - BP controlled   - Continue nebivolol 10mg daily and HCTZ 12.5mg daily  - labs today    Primary osteoarthritis of right foot  - xray 12/2020 with degenerative changes most pronounced at R first metatarsophalangeal joint.  - s/p corticosteroid injection to joint with pain relief  - follows with podiatry    Lumbar post-laminectomy syndrome  - Following with pain management. Has SCS, uses flexeril 10mg qhs PRN, and recently had R SI joint injection with pain relief.    Health Maintenance  - Pap smear: GYN Dr. Mcguire, unable to get records  - Mammogram: 7/2022 BIRADS 1  - Colonoscopy: 1/2018 sigmoid tubular adenoma. Repeat 5y.  - HCV: negative  - Immunizations: COVID booster and flu shot today. Shingrix complete. PCV20 at next visit. Tdap 10/2019.  - Depression screening: phq9 = 6 1/2022     Return in about 5 months (around 3/3/2023) for Follow up, Labs today.

## 2022-10-04 LAB
ALBUMIN SERPL-MCNC: 4.2 G/DL (ref 3.5–5.2)
ALBUMIN/GLOB SERPL: 1.6 G/DL
ALP SERPL-CCNC: 83 U/L (ref 39–117)
ALT SERPL W P-5'-P-CCNC: 26 U/L (ref 1–33)
ANION GAP SERPL CALCULATED.3IONS-SCNC: 11.2 MMOL/L (ref 5–15)
AST SERPL-CCNC: 14 U/L (ref 1–32)
BILIRUB SERPL-MCNC: 0.4 MG/DL (ref 0–1.2)
BUN SERPL-MCNC: 14 MG/DL (ref 6–20)
BUN/CREAT SERPL: 17.7 (ref 7–25)
CALCIUM SPEC-SCNC: 9.4 MG/DL (ref 8.6–10.5)
CHLORIDE SERPL-SCNC: 100 MMOL/L (ref 98–107)
CHOLEST SERPL-MCNC: 194 MG/DL (ref 0–200)
CO2 SERPL-SCNC: 25.8 MMOL/L (ref 22–29)
CREAT SERPL-MCNC: 0.79 MG/DL (ref 0.57–1)
EGFRCR SERPLBLD CKD-EPI 2021: 88.5 ML/MIN/1.73
GLOBULIN UR ELPH-MCNC: 2.7 GM/DL
GLUCOSE SERPL-MCNC: 96 MG/DL (ref 65–99)
HDLC SERPL-MCNC: 35 MG/DL (ref 40–60)
LDLC SERPL CALC-MCNC: 108 MG/DL (ref 0–100)
LDLC/HDLC SERPL: 2.84 {RATIO}
POTASSIUM SERPL-SCNC: 4.1 MMOL/L (ref 3.5–5.2)
PROT SERPL-MCNC: 6.9 G/DL (ref 6–8.5)
SODIUM SERPL-SCNC: 137 MMOL/L (ref 136–145)
TRIGL SERPL-MCNC: 298 MG/DL (ref 0–150)
TSH SERPL DL<=0.05 MIU/L-ACNC: 1.37 UIU/ML (ref 0.27–4.2)
VLDLC SERPL-MCNC: 51 MG/DL (ref 5–40)

## 2022-10-13 ENCOUNTER — HOSPITAL ENCOUNTER (OUTPATIENT)
Dept: ULTRASOUND IMAGING | Facility: HOSPITAL | Age: 57
Discharge: HOME | End: 2022-10-13
Admitting: INTERNAL MEDICINE

## 2022-10-13 DIAGNOSIS — E04.2 MULTINODULAR THYROID: ICD-10-CM

## 2022-10-13 PROCEDURE — 76536 US EXAM OF HEAD AND NECK: CPT

## 2022-10-17 DIAGNOSIS — E11.9 TYPE 2 DIABETES MELLITUS WITHOUT COMPLICATION, WITHOUT LONG-TERM CURRENT USE OF INSULIN: ICD-10-CM

## 2022-10-17 RX ORDER — DULAGLUTIDE 0.75 MG/.5ML
INJECTION, SOLUTION SUBCUTANEOUS
Qty: 2 ML | Refills: 0 | Status: SHIPPED | OUTPATIENT
Start: 2022-10-17 | End: 2022-11-12

## 2022-11-12 DIAGNOSIS — E11.9 TYPE 2 DIABETES MELLITUS WITHOUT COMPLICATION, WITHOUT LONG-TERM CURRENT USE OF INSULIN: ICD-10-CM

## 2022-11-12 RX ORDER — DULAGLUTIDE 0.75 MG/.5ML
INJECTION, SOLUTION SUBCUTANEOUS
Qty: 0.5 ML | Refills: 0 | Status: SHIPPED | OUTPATIENT
Start: 2022-11-12 | End: 2022-12-27 | Stop reason: SDUPTHER

## 2022-11-12 NOTE — TELEPHONE ENCOUNTER
[facility]ONDER[facility] IF DR WEBB COULD APPROVE A TRULICITY PEN FOR KEVIN.  SHE LOST THE CAP ON ONE AND HAD TO USE THE NEXT ONE SO NOW SHE IS SHORT ONE. PLEASE CALL THE PHARMACY BACK [phone].    CALLED Saturday 11-12-22 AT 11:05AM

## 2022-11-21 ENCOUNTER — OFFICE VISIT (OUTPATIENT)
Dept: INTERNAL MEDICINE | Facility: CLINIC | Age: 57
End: 2022-11-21

## 2022-11-21 VITALS
SYSTOLIC BLOOD PRESSURE: 110 MMHG | OXYGEN SATURATION: 98 % | HEIGHT: 64 IN | BODY MASS INDEX: 29.02 KG/M2 | TEMPERATURE: 96.9 F | DIASTOLIC BLOOD PRESSURE: 60 MMHG | WEIGHT: 170 LBS | HEART RATE: 83 BPM

## 2022-11-21 DIAGNOSIS — G89.29 CHRONIC RIGHT-SIDED LOW BACK PAIN WITH BILATERAL SCIATICA: Primary | ICD-10-CM

## 2022-11-21 DIAGNOSIS — M96.1 LUMBAR POST-LAMINECTOMY SYNDROME: ICD-10-CM

## 2022-11-21 DIAGNOSIS — M54.42 CHRONIC RIGHT-SIDED LOW BACK PAIN WITH BILATERAL SCIATICA: Primary | ICD-10-CM

## 2022-11-21 DIAGNOSIS — M54.41 CHRONIC RIGHT-SIDED LOW BACK PAIN WITH BILATERAL SCIATICA: Primary | ICD-10-CM

## 2022-11-21 PROCEDURE — 96372 THER/PROPH/DIAG INJ SC/IM: CPT | Performed by: NURSE PRACTITIONER

## 2022-11-21 PROCEDURE — 99213 OFFICE O/P EST LOW 20 MIN: CPT | Performed by: NURSE PRACTITIONER

## 2022-11-21 RX ORDER — NORTRIPTYLINE HYDROCHLORIDE 10 MG/5ML
SOLUTION ORAL
COMMUNITY
End: 2022-11-21

## 2022-11-21 RX ORDER — TOPIRAMATE 200 MG/1
CAPSULE, EXTENDED RELEASE ORAL EVERY 24 HOURS
COMMUNITY

## 2022-11-21 RX ORDER — NEBIVOLOL 10 MG/1
TABLET ORAL EVERY 24 HOURS
COMMUNITY
End: 2022-11-21

## 2022-11-21 RX ORDER — DEXAMETHASONE SODIUM PHOSPHATE 10 MG/ML
10 INJECTION INTRAMUSCULAR; INTRAVENOUS ONCE
Status: COMPLETED | OUTPATIENT
Start: 2022-11-21 | End: 2022-11-21

## 2022-11-21 RX ORDER — FLUOXETINE HYDROCHLORIDE 20 MG/5ML
LIQUID ORAL EVERY 24 HOURS
COMMUNITY
End: 2022-11-21

## 2022-11-21 RX ORDER — DULOXETIN HYDROCHLORIDE 60 MG/1
CAPSULE, DELAYED RELEASE ORAL EVERY 24 HOURS
COMMUNITY
End: 2022-11-21

## 2022-11-21 RX ORDER — ELETRIPTAN HYDROBROMIDE 40 MG/1
TABLET, FILM COATED ORAL
COMMUNITY
End: 2022-11-21

## 2022-11-21 RX ORDER — METHYLPREDNISOLONE 4 MG/1
TABLET ORAL
Qty: 21 TABLET | Refills: 0 | Status: SHIPPED | OUTPATIENT
Start: 2022-11-21 | End: 2023-02-22

## 2022-11-21 RX ORDER — ARIPIPRAZOLE 5 MG/1
TABLET ORAL EVERY 24 HOURS
COMMUNITY
End: 2022-11-21

## 2022-11-21 RX ADMIN — DEXAMETHASONE SODIUM PHOSPHATE 10 MG: 10 INJECTION INTRAMUSCULAR; INTRAVENOUS at 12:08

## 2022-12-27 DIAGNOSIS — E11.9 TYPE 2 DIABETES MELLITUS WITHOUT COMPLICATION, WITHOUT LONG-TERM CURRENT USE OF INSULIN: ICD-10-CM

## 2022-12-27 RX ORDER — DULAGLUTIDE 0.75 MG/.5ML
0.75 INJECTION, SOLUTION SUBCUTANEOUS
Qty: 2 ML | Refills: 1 | Status: SHIPPED | OUTPATIENT
Start: 2022-12-27 | End: 2023-02-09 | Stop reason: SDUPTHER

## 2023-01-12 ENCOUNTER — TELEMEDICINE (OUTPATIENT)
Dept: PSYCHIATRY | Facility: CLINIC | Age: 58
End: 2023-01-12
Payer: COMMERCIAL

## 2023-01-12 DIAGNOSIS — F33.41 RECURRENT MAJOR DEPRESSIVE DISORDER, IN PARTIAL REMISSION: ICD-10-CM

## 2023-01-12 DIAGNOSIS — F43.22 ADJUSTMENT DISORDER WITH ANXIOUS MOOD: ICD-10-CM

## 2023-01-12 DIAGNOSIS — F32.81 PMDD (PREMENSTRUAL DYSPHORIC DISORDER): ICD-10-CM

## 2023-01-12 PROCEDURE — 99214 OFFICE O/P EST MOD 30 MIN: CPT | Performed by: NURSE PRACTITIONER

## 2023-01-12 RX ORDER — DULOXETIN HYDROCHLORIDE 60 MG/1
60 CAPSULE, DELAYED RELEASE ORAL DAILY
Qty: 90 CAPSULE | Refills: 0 | Status: SHIPPED | OUTPATIENT
Start: 2023-01-12 | End: 2023-03-29 | Stop reason: SDUPTHER

## 2023-01-12 RX ORDER — FLUOXETINE HYDROCHLORIDE 20 MG/1
20 CAPSULE ORAL DAILY
Qty: 90 CAPSULE | Refills: 0 | Status: SHIPPED | OUTPATIENT
Start: 2023-01-12 | End: 2023-03-29 | Stop reason: SDUPTHER

## 2023-01-12 RX ORDER — ARIPIPRAZOLE 5 MG/1
5 TABLET ORAL DAILY
Qty: 90 TABLET | Refills: 0 | Status: SHIPPED | OUTPATIENT
Start: 2023-01-12 | End: 2023-03-29 | Stop reason: SDUPTHER

## 2023-01-12 NOTE — PROGRESS NOTES
This provider is located at [facility], [address] [place] [address].The Patient is seen remotely at home, [address] [facility] [place] [address] via [employer]. Patient is being seen via telehealth and confirm that they are in a secure environment for this session. The patient's condition being diagnosed/treated is appropriate for telemedicine. The provider identified himself/herself: herself as well as her credentials.   The patient gave consent to be seen remotely, and when consent is given they understand that the consent allows for patient identifiable information to be sent to a third party as needed.   They may refuse to be seen remotely at any time. The electronic data is encrypted and password protected, and the patient has been advised of the potential risks to privacy not withstanding such measures.    You have chosen to receive care through a telehealth visit.  Do you consent to use a video/audio connection for your medical care today? Yes      Chief Complaint  Follow-up for depression and anxiety     Subjective    Yelena Radha Morris presents to [facility] for medication management.       History of Present Illness   Patient presents today reporting that she is doing well.  She notes that her anxiety has been manageable without any concerns or issues.  Denies any depressive symptoms or feeling hopeless or helpless.  She states she is taking the fluoxetine daily and it has helped overall with her mood and PMDD symptoms.  Patient reports that she is sleeping good occasionally she will wake up but she is sometimes able to go back to sleep.  Reports appetite is unchanged.  Denies any side effects to the medications.  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  Patient denies any medical or medicine  changes.  Denies any SI/HI/AH/VH.      Objective   Vital Signs:   There were no vitals taken for this visit.  Due to the remote nature of this encounter (virtual encounter), vitals were unable to be obtained.  Height stated at 64 inches.  Weight stated at 171 pounds.      PHQ-9 Score:   PHQ-9 Total Score:       PHQ-9 Depression Screening  Little interest or pleasure in doing things?     Feeling down, depressed, or hopeless?     Trouble falling or staying asleep, or sleeping too much?     Feeling tired or having little energy?     Poor appetite or overeating?     Feeling bad about yourself - or that you are a failure or have let yourself or your family down?     Trouble concentrating on things, such as reading the newspaper or watching television?     Moving or speaking so slowly that other people could have noticed? Or the opposite - being so fidgety or restless that you have been moving around a lot more than usual?     Thoughts that you would be better off dead, or of hurting yourself in some way?     PHQ-9 Total Score     If you checked off any problems, how difficult have these problems made it for you to do your work, take care of things at home, or get along with other people?           Mental Status Exam:   Hygiene:   good  Cooperation:  Cooperative  Eye Contact:  Good  Psychomotor Behavior:  Appropriate  Affect:  Appropriate  Mood: normal  Speech:  Normal  Thought Process:  Goal directed and Linear  Thought Content:  Normal  Suicidal:  None  Homicidal:  None  Hallucinations:  None  Delusion:  None  Memory:  Intact  Orientation:  Person, Place, Time and Situation  Reliability:  good  Insight:  Good  Judgement:  Good  Impulse Control:  Good  Physical/Medical Issues:  Yes HTN, DM, fibromyalgia, carpal tunnel syndrome     Current Medications:   Current Outpatient Medications   Medication Sig Dispense Refill   • ARIPiprazole (ABILIFY) 5 MG tablet Take 1 tablet by mouth Daily. 90 tablet 0   • DULoxetine (CYMBALTA)  60 MG capsule Take 1 capsule by mouth Daily. 90 capsule 0   • FLUoxetine (PROzac) 20 MG capsule Take 1 capsule by mouth Daily. 90 capsule 0   • atorvastatin (LIPITOR) 40 MG tablet Take 1 tablet by mouth Every Night. 90 tablet 3   • BOTOX 200 units reconstituted solution INJECT 200 UNITS INTO THE HEAD OR NECK AREA BY MD EVERY 90 DAYS  3   • Cholecalciferol (VITAMIN D) 2000 units capsule Take 2,000 Units by mouth Daily.     • cyclobenzaprine (FLEXERIL) 10 MG tablet Take 1 tablet by mouth At Night As Needed for Muscle Spasms. 30 tablet 1   • Dulaglutide (Trulicity) 0.75 MG/0.5ML solution pen-injector Inject 0.75 mg under the skin into the appropriate area as directed Every 7 (Seven) Days. 2 mL 1   • eletriptan (RELPAX) 40 MG tablet TAKE 1 TABLET BY MOUTH AT THE ONSET OF MIGRAINE. MAY REPEAT IN 2 HRS.MAX 2 DOSE/24HR,3 DAYS/WEEK  11   • glimepiride (AMARYL) 2 MG tablet Take 1 tablet by mouth 2 (Two) Times a Day. 180 tablet 3   • glucose blood test strip 1 each by Other route Daily. Use as instructed 100 each 2   • glucose monitor monitoring kit 1 each Daily. 1 each 1   • hydroCHLOROthiazide (HYDRODIURIL) 12.5 MG tablet TAKE ONE TABLET BY MOUTH DAILY 90 tablet 3   • Lancets 28G misc 1 application Daily. 100 each 3   • metFORMIN ER (GLUCOPHAGE-XR) 500 MG 24 hr tablet TAKE FOUR TABLETS BY MOUTH DAILY WITH DINNER 360 tablet 3   • methylPREDNISolone (MEDROL) 4 MG dose pack Take as directed on package instructions. 21 tablet 0   • nebivolol (Bystolic) 10 MG tablet Take 1 tablet by mouth Daily. 90 tablet 3   • NON FORMULARY Compounded cream with testosterone and dhea     • nortriptyline (PAMELOR) 10 MG capsule nortriptyline 10 mg capsule   TAKE 4 CAPSULES AT BEDTIME     • Nutritional Supplements (DHEA PO) Take  by mouth.     • omeprazole (priLOSEC) 40 MG capsule TAKE ONE CAPSULE BY MOUTH DAILY 90 capsule 3   • Topiramate ER (Trokendi XR) 200 MG capsule sustained-release 24 hr Daily.       No current facility-administered  medications for this visit.       Physical Exam  Nursing note reviewed.   Constitutional:       Appearance: Normal appearance.   Neurological:      Mental Status: She is alert.   Psychiatric:         Attention and Perception: Attention and perception normal.         Mood and Affect: Mood and affect normal. Mood is not anxious or depressed.         Speech: Speech normal.         Behavior: Behavior normal. Behavior is cooperative.         Thought Content: Thought content normal.         Cognition and Memory: Cognition and memory normal.         Judgment: Judgment normal.        Result Review :     The following data was reviewed by: ONESIMO Alexander on 02/01/2021:  Common labs    Common Labs 6/15/22 10/3/22 10/3/22 10/3/22 10/3/22     1025 1104 1104 1104   Glucose    96    BUN    14    Creatinine    0.79    Sodium    137    Potassium    4.1    Chloride    100    Calcium    9.4    Albumin    4.20    Total Bilirubin    0.4    Alkaline Phosphatase    83    AST (SGOT)    14    ALT (SGPT)    26    WBC   10.64     Hemoglobin   14.5     Hematocrit   42.3     Platelets   318     Total Cholesterol     194   Triglycerides     298 (A)   HDL Cholesterol     35 (A)   LDL Cholesterol      108 (A)   Hemoglobin A1C 8.4 6.4      (A) Abnormal value            Data reviewed: PCP notes        Assessment and Plan    Problem List Items Addressed This Visit        Mental Health    Recurrent major depressive disorder (HCC)    Relevant Medications    ARIPiprazole (ABILIFY) 5 MG tablet    DULoxetine (CYMBALTA) 60 MG capsule    FLUoxetine (PROzac) 20 MG capsule   Other Visit Diagnoses     Adjustment disorder with anxious mood        Relevant Medications    ARIPiprazole (ABILIFY) 5 MG tablet    DULoxetine (CYMBALTA) 60 MG capsule    FLUoxetine (PROzac) 20 MG capsule    PMDD (premenstrual dysphoric disorder)        Relevant Medications    ARIPiprazole (ABILIFY) 5 MG tablet    DULoxetine (CYMBALTA) 60 MG capsule    FLUoxetine (PROzac) 20 MG  capsule            TREATMENT PLAN/GOALS: Continue supportive psychotherapy efforts and medications as indicated. Treatment and medication options discussed during today's visit. Patient ackowledged and verbally consented to continue with current treatment plan and was educated on the importance of compliance with treatment and follow-up appointments.    MEDICATION ISSUES:  We discussed risks, benefits, and side effects of the above medications and the patient was agreeable with the plan. Patient was educated on the importance of compliance with treatment and follow-up appointments.  Patient is agreeable to call the office with any worsening of symptoms or onset of side effects. Patient is agreeable to call 911 or go to the nearest ER should he/she begin having SI/HI.        -Continue Cymbalta 60 mg daily for depression and anxiety as patient has benefited greatly as well for her pain.  -Continue fluoxetine 20 mg daily for depression and PMDD.   -Patient educated extensively regarding the risk of serotonin syndrome as she is currently on duloxetine due to her pain as we have previously tried to taper and it is not effective so she is on fluoxetine 20 mg daily to help with her overall depression and mood.  Patient verbalized understanding was agreeable.  -Continue aripiprazole 5 mg as adjunct for depression as patient has failed numerous SSRIs and SNRIs and has benefited the patient tremendously and is medically necessary for her depression to avoid hospitalization. Patient felt she was ok at this time and didn't want any medication changes.   -Highly encouraged patient that if she had any side effects or worsening symptoms to contact the clinic for sooner appointment she verbalized understanding.      Counseled patient regarding multimodal approach with healthy nutrition, healthy sleep, regular physical activity, social activities, counseling, and medications.      Coping skills reviewed and encouraged positive  framing of thoughts     Assisted patient in processing above session content; acknowledged and normalized patient’s thoughts, feelings, and concerns.  Applied  positive coping skills and behavior management in session.  Allowed patient to freely discuss issues without interruption or judgment. Provided safe, confidential environment to facilitate the development of positive therapeutic relationship and encourage open, honest communication. Assisted patient in identifying risk factors which would indicate the need for higher level of care including thoughts to harm self or others and/or self-harming behavior and encouraged patient to contact this office, call 911, or present to the nearest emergency room should any of these events occur. Discussed crisis intervention services and means to access.     MEDS ORDERED DURING VISIT:  New Medications Ordered This Visit   Medications   • ARIPiprazole (ABILIFY) 5 MG tablet     Sig: Take 1 tablet by mouth Daily.     Dispense:  90 tablet     Refill:  0   • DULoxetine (CYMBALTA) 60 MG capsule     Sig: Take 1 capsule by mouth Daily.     Dispense:  90 capsule     Refill:  0   • FLUoxetine (PROzac) 20 MG capsule     Sig: Take 1 capsule by mouth Daily.     Dispense:  90 capsule     Refill:  0         Follow Up   Return in about 8 weeks (around 3/9/2023), or if symptoms worsen or fail to improve, for Recheck.    Patient was given instructions and counseling regarding her condition or for health maintenance advice. Please see specific information pulled into the AVS if appropriate.     Some of the data in this electronic note has been brought forward from a previous encounter, any necessary changes have been made, it has been reviewed by this APRN, and it is accurate.      This document has been electronically signed by ONESIMO Alexander  January 12, 2023 09:21 EST    Part of this note may be an electronic transcription/translation of spoken language to printed text using the  [facility] Dictation System.

## 2023-02-09 DIAGNOSIS — E11.9 TYPE 2 DIABETES MELLITUS WITHOUT COMPLICATION, WITHOUT LONG-TERM CURRENT USE OF INSULIN: ICD-10-CM

## 2023-02-10 RX ORDER — DULAGLUTIDE 0.75 MG/.5ML
0.75 INJECTION, SOLUTION SUBCUTANEOUS
Qty: 2 ML | Refills: 0 | Status: SHIPPED | OUTPATIENT
Start: 2023-02-10 | End: 2023-02-20

## 2023-02-20 DIAGNOSIS — E11.9 TYPE 2 DIABETES MELLITUS WITHOUT COMPLICATION, WITHOUT LONG-TERM CURRENT USE OF INSULIN: ICD-10-CM

## 2023-02-20 RX ORDER — DULAGLUTIDE 0.75 MG/.5ML
INJECTION, SOLUTION SUBCUTANEOUS
Qty: 2 ML | Refills: 0 | Status: SHIPPED | OUTPATIENT
Start: 2023-02-20

## 2023-02-22 ENCOUNTER — OFFICE VISIT (OUTPATIENT)
Dept: INTERNAL MEDICINE | Facility: CLINIC | Age: 58
End: 2023-02-22
Payer: COMMERCIAL

## 2023-02-22 VITALS
BODY MASS INDEX: 30.39 KG/M2 | OXYGEN SATURATION: 100 % | SYSTOLIC BLOOD PRESSURE: 112 MMHG | HEIGHT: 64 IN | WEIGHT: 178 LBS | TEMPERATURE: 97.1 F | HEART RATE: 87 BPM | DIASTOLIC BLOOD PRESSURE: 76 MMHG

## 2023-02-22 DIAGNOSIS — N30.01 ACUTE CYSTITIS WITH HEMATURIA: Primary | ICD-10-CM

## 2023-02-22 LAB
BILIRUB BLD-MCNC: NEGATIVE MG/DL
CLARITY, POC: ABNORMAL
COLOR UR: YELLOW
EXPIRATION DATE: ABNORMAL
GLUCOSE UR STRIP-MCNC: NEGATIVE MG/DL
KETONES UR QL: NEGATIVE
LEUKOCYTE EST, POC: ABNORMAL
Lab: ABNORMAL
NITRITE UR-MCNC: NEGATIVE MG/ML
PH UR: 6 [PH] (ref 5–8)
PROT UR STRIP-MCNC: ABNORMAL MG/DL
RBC # UR STRIP: ABNORMAL /UL
SP GR UR: 1.02 (ref 1–1.03)
UROBILINOGEN UR QL: NORMAL

## 2023-02-22 PROCEDURE — 81003 URINALYSIS AUTO W/O SCOPE: CPT | Performed by: INTERNAL MEDICINE

## 2023-02-22 PROCEDURE — 99213 OFFICE O/P EST LOW 20 MIN: CPT | Performed by: INTERNAL MEDICINE

## 2023-02-22 PROCEDURE — 87088 URINE BACTERIA CULTURE: CPT | Performed by: INTERNAL MEDICINE

## 2023-02-22 PROCEDURE — 87186 SC STD MICRODIL/AGAR DIL: CPT | Performed by: INTERNAL MEDICINE

## 2023-02-22 PROCEDURE — 87086 URINE CULTURE/COLONY COUNT: CPT | Performed by: INTERNAL MEDICINE

## 2023-02-22 RX ORDER — NITROFURANTOIN 25; 75 MG/1; MG/1
100 CAPSULE ORAL EVERY 12 HOURS SCHEDULED
Qty: 10 CAPSULE | Refills: 0 | Status: SHIPPED | OUTPATIENT
Start: 2023-02-22 | End: 2023-02-27

## 2023-02-22 NOTE — PROGRESS NOTES
Internal Medicine Acute Visit    Chief Complaint   Patient presents with   • Urinary Tract Infection        HPI  Ms. Morris comes in today with 24 hours of urinary frequency and dysuria. Notes that she had sex with her  the day prior to developing symptoms. She does not have fever or CVA tenderness. She does note bilateral low back pain. She last had UTI 2 months ago which was treated with bactrim by .     Urinary Tract Infection   Associated symptoms include frequency. Pertinent negatives include no flank pain, hematuria, nausea or vomiting.        Review of Systems  Review of Systems   Constitutional: Negative for fever.   Gastrointestinal: Negative for abdominal pain, nausea and vomiting.   Genitourinary: Positive for dysuria and frequency. Negative for decreased urine volume, difficulty urinating, flank pain and hematuria.   Musculoskeletal: Positive for back pain.        Medications  Current Outpatient Medications on File Prior to Visit   Medication Sig Dispense Refill   • ARIPiprazole (ABILIFY) 5 MG tablet Take 1 tablet by mouth Daily. 90 tablet 0   • atorvastatin (LIPITOR) 40 MG tablet Take 1 tablet by mouth Every Night. 90 tablet 3   • BOTOX 200 units reconstituted solution INJECT 200 UNITS INTO THE HEAD OR NECK AREA BY MD EVERY 90 DAYS  3   • Cholecalciferol (VITAMIN D) 2000 units capsule Take 2,000 Units by mouth Daily.     • cyclobenzaprine (FLEXERIL) 10 MG tablet Take 1 tablet by mouth At Night As Needed for Muscle Spasms. 30 tablet 1   • DULoxetine (CYMBALTA) 60 MG capsule Take 1 capsule by mouth Daily. 90 capsule 0   • eletriptan (RELPAX) 40 MG tablet TAKE 1 TABLET BY MOUTH AT THE ONSET OF MIGRAINE. MAY REPEAT IN 2 HRS.MAX 2 DOSE/24HR,3 DAYS/WEEK  11   • FLUoxetine (PROzac) 20 MG capsule Take 1 capsule by mouth Daily. 90 capsule 0   • glimepiride (AMARYL) 2 MG tablet Take 1 tablet by mouth 2 (Two) Times a Day. 180 tablet 3   • glucose blood test strip 1 each by Other route Daily. Use as  instructed 100 each 2   • glucose monitor monitoring kit 1 each Daily. 1 each 1   • hydroCHLOROthiazide (HYDRODIURIL) 12.5 MG tablet TAKE ONE TABLET BY MOUTH DAILY 90 tablet 3   • Lancets 28G misc 1 application Daily. 100 each 3   • metFORMIN ER (GLUCOPHAGE-XR) 500 MG 24 hr tablet TAKE FOUR TABLETS BY MOUTH DAILY WITH DINNER 360 tablet 3   • nebivolol (Bystolic) 10 MG tablet Take 1 tablet by mouth Daily. 90 tablet 3   • NON FORMULARY Compounded cream with testosterone and dhea     • nortriptyline (PAMELOR) 10 MG capsule nortriptyline 10 mg capsule   TAKE 4 CAPSULES AT BEDTIME     • Nutritional Supplements (DHEA PO) Take  by mouth.     • omeprazole (priLOSEC) 40 MG capsule TAKE ONE CAPSULE BY MOUTH DAILY 90 capsule 3   • Topiramate ER (Trokendi XR) 200 MG capsule sustained-release 24 hr Daily.     • Trulicity 0.75 MG/0.5ML solution pen-injector INJECT 0.75 MG UNDER THE SKIN ONCE WEEKLY 2 mL 0   • [DISCONTINUED] methylPREDNISolone (MEDROL) 4 MG dose pack Take as directed on package instructions. 21 tablet 0     No current facility-administered medications on file prior to visit.        Allergies  Allergies   Allergen Reactions   • Dihydroergotamine GI Intolerance     VOMITING   • Methergine [Methylergonovine Maleate] Nausea And Vomiting   • Phenergan [Promethazine] Other (See Comments)     agitated   • Amoxicillin Rash       PMH  Past Medical History:   Diagnosis Date   • Anxiety    • Arthritis     Cant recall the date of onset   • Chest pain 11/06/2019   • Colon polyp    • Depression    • Diabetes mellitus (HCC)    • Dysphagia 11/06/2019   • Endometriosis    • Fibromyalgia, primary    • GERD (gastroesophageal reflux disease)    • Headache    • HL (hearing loss)    • Hyperlipidemia    • Hypertension    • Low vitamin D level    • Migraines    • Ovarian cyst    • PMDD (premenstrual dysphoric disorder)    • Scoliosis    • Wears glasses        Objective  Visit Vitals  /76   Pulse 87   Temp 97.1 °F (36.2 °C)   Ht  "162.6 cm (64\")   Wt 80.7 kg (178 lb)   SpO2 100%   BMI 30.55 kg/m²        Physical Exam  Physical Exam  Vitals and nursing note reviewed.   Constitutional:       General: She is not in acute distress.     Appearance: Normal appearance. She is well-developed. She is not ill-appearing or toxic-appearing.   HENT:      Head: Normocephalic and atraumatic.   Eyes:      Conjunctiva/sclera: Conjunctivae normal.   Pulmonary:      Effort: Pulmonary effort is normal. No respiratory distress.   Abdominal:      General: There is no distension.      Palpations: Abdomen is soft.      Tenderness: There is no abdominal tenderness. There is no right CVA tenderness or left CVA tenderness.   Skin:     General: Skin is warm and dry.   Neurological:      Mental Status: She is alert and oriented to person, place, and time. Mental status is at baseline.         Results  Results for orders placed or performed in visit on 02/22/23   POCT urinalysis dipstick, automated    Specimen: Urine   Result Value Ref Range    Color Yellow Yellow, Straw, Dark Yellow, Jazz    Clarity, UA Cloudy (A) Clear    Specific Gravity  1.025 1.005 - 1.030    pH, Urine 6.0 5.0 - 8.0    Leukocytes Small (1+) (A) Negative    Nitrite, UA Negative Negative    Protein, POC Trace (A) Negative mg/dL    Glucose, UA Negative Negative mg/dL    Ketones, UA Negative Negative    Urobilinogen, UA Normal Normal, 0.2 E.U./dL    Bilirubin Negative Negative    Blood, UA 3+ (A) Negative    Lot Number [id number]     Expiration Date 3/25/2024         Assessment and Plan  Diagnoses and all orders for this visit:    Acute cystitis with hematuria  - will treat with macrobid BID x5d  - urine cx ordered  - discussed that if she has recurrent UTI associated with sex we could consider ppx macrobid.    Health Maintenance  - Pap smear: GYN Dr. Mcguire, unable to get records  - Mammogram: 7/2022 BIRADS 1  - Colonoscopy: 1/2018 sigmoid tubular adenoma. Repeat 5y. Discuss next visit.  - " HCV: negative  - Immunizations: COVID booster and flu UTD. Shingrix complete. PCV20 at next visit. Tdap 10/2019.  - Depression screening: phq9 = 6 1/2022     Return for Next scheduled follow up.

## 2023-02-23 NOTE — TELEPHONE ENCOUNTER
Called and informed pt, pt would like to proceed with starting metformin but would like to know the side effects of it, pls advise.   No

## 2023-02-24 LAB — BACTERIA SPEC AEROBE CULT: ABNORMAL

## 2023-03-03 ENCOUNTER — TELEPHONE (OUTPATIENT)
Dept: INTERNAL MEDICINE | Facility: CLINIC | Age: 58
End: 2023-03-03

## 2023-03-03 NOTE — TELEPHONE ENCOUNTER
----- Message from Fiona Corona MD sent at 3/3/2023 10:00 AM EST -----  Patient has not viewed [employer] message regarding their lab results, please mail them a letter with results and prior result note.

## 2023-03-08 ENCOUNTER — OFFICE VISIT (OUTPATIENT)
Dept: INTERNAL MEDICINE | Facility: CLINIC | Age: 58
End: 2023-03-08
Payer: COMMERCIAL

## 2023-03-08 VITALS
DIASTOLIC BLOOD PRESSURE: 70 MMHG | HEART RATE: 91 BPM | OXYGEN SATURATION: 97 % | BODY MASS INDEX: 29.7 KG/M2 | TEMPERATURE: 96.1 F | WEIGHT: 173 LBS | SYSTOLIC BLOOD PRESSURE: 116 MMHG

## 2023-03-08 DIAGNOSIS — E11.9 TYPE 2 DIABETES MELLITUS WITHOUT COMPLICATION, WITHOUT LONG-TERM CURRENT USE OF INSULIN: ICD-10-CM

## 2023-03-08 DIAGNOSIS — I10 PRIMARY HYPERTENSION: ICD-10-CM

## 2023-03-08 DIAGNOSIS — Z23 NEED FOR VACCINATION: ICD-10-CM

## 2023-03-08 DIAGNOSIS — E78.2 MIXED HYPERLIPIDEMIA: Primary | ICD-10-CM

## 2023-03-08 LAB
EXPIRATION DATE: NORMAL
HBA1C MFR BLD: 8 %
Lab: NORMAL
POC CREATININE URINE: 300
POC MICROALBUMIN URINE: 30

## 2023-03-08 PROCEDURE — 82044 UR ALBUMIN SEMIQUANTITATIVE: CPT | Performed by: INTERNAL MEDICINE

## 2023-03-08 PROCEDURE — 90677 PCV20 VACCINE IM: CPT | Performed by: INTERNAL MEDICINE

## 2023-03-08 PROCEDURE — 90471 IMMUNIZATION ADMIN: CPT | Performed by: INTERNAL MEDICINE

## 2023-03-08 PROCEDURE — 99214 OFFICE O/P EST MOD 30 MIN: CPT | Performed by: INTERNAL MEDICINE

## 2023-03-08 PROCEDURE — 83036 HEMOGLOBIN GLYCOSYLATED A1C: CPT | Performed by: INTERNAL MEDICINE

## 2023-03-08 RX ORDER — TOPIRAMATE 200 MG/1
CAPSULE, EXTENDED RELEASE ORAL EVERY 24 HOURS
COMMUNITY
Start: 2023-03-02 | End: 2023-03-08 | Stop reason: SDUPTHER

## 2023-03-08 RX ORDER — FLUOXETINE HYDROCHLORIDE 20 MG/1
CAPSULE ORAL EVERY 24 HOURS
COMMUNITY
End: 2023-03-08 | Stop reason: SDUPTHER

## 2023-03-08 RX ORDER — ELETRIPTAN HYDROBROMIDE 40 MG/1
TABLET, FILM COATED ORAL
COMMUNITY
Start: 2023-03-02 | End: 2023-03-08 | Stop reason: SDUPTHER

## 2023-03-08 NOTE — PROGRESS NOTES
Immunization  Immunization performed in Left Deltoid by Jes George MA. Patient tolerated the procedure well without complications.  03/08/23   Jes George MA

## 2023-03-08 NOTE — PROGRESS NOTES
Internal Medicine Follow Up    Chief Complaint  Yelena Morris is a 57 y.o. female who presents today for follow up of chronic medical conditions outlined below.    Chief Complaint   Patient presents with   • Diabetes   • Hyperlipidemia   • Hypertension        HPI  Ms. Morris comes in today for follow up. She is doing well. UTI sxs resolved. BP controlled. 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 Notes back pain midday but she is able to push through with tylenol. She is due for colonoscopy and has received letter to schedule with Dr. Calle. She notes that due to diet inconsistencies she has not been taking her diabetic medications consistently.        Review of Systems  Review of Systems   Constitutional: Negative.    Respiratory: Negative.    Cardiovascular: Negative.    Genitourinary: Negative.    Musculoskeletal: Positive for back pain.        Current Medications  Current Outpatient Medications on File Prior to Visit   Medication Sig Dispense Refill   • ARIPiprazole (ABILIFY) 5 MG tablet Take 1 tablet by mouth Daily. 90 tablet 0   • atorvastatin (LIPITOR) 40 MG tablet Take 1 tablet by mouth Every Night. 90 tablet 3   • BOTOX 200 units reconstituted solution INJECT 200 UNITS INTO THE HEAD OR NECK AREA BY MD EVERY 90 DAYS  3   • Cholecalciferol (VITAMIN D) 2000 units capsule Take 1 capsule by mouth Daily.     • cyclobenzaprine (FLEXERIL) 10 MG tablet Take 1 tablet by mouth At Night As Needed for Muscle Spasms. 30 tablet 1   • DULoxetine (CYMBALTA) 60 MG capsule Take 1 capsule by mouth Daily. 90 capsule 0   • eletriptan (RELPAX) 40 MG tablet TAKE 1 TABLET BY MOUTH AT THE ONSET OF MIGRAINE. MAY REPEAT IN 2 HRS.MAX 2 DOSE/24HR,3 DAYS/WEEK  11   • FLUoxetine (PROzac) 20 MG capsule Take 1 capsule by mouth Daily. 90 capsule 0   • glimepiride (AMARYL) 2 MG tablet Take 1 tablet by mouth 2 (Two) Times a Day. 180 tablet 3   • glucose blood test strip 1 each by Other route Daily. Use as instructed 100 each 2   •  glucose monitor monitoring kit 1 each Daily. 1 each 1   • hydroCHLOROthiazide (HYDRODIURIL) 12.5 MG tablet TAKE ONE TABLET BY MOUTH DAILY 90 tablet 3   • Lancets 28G misc 1 application Daily. 100 each 3   • metFORMIN ER (GLUCOPHAGE-XR) 500 MG 24 hr tablet TAKE FOUR TABLETS BY MOUTH DAILY WITH DINNER 360 tablet 3   • nebivolol (Bystolic) 10 MG tablet Take 1 tablet by mouth Daily. 90 tablet 3   • NON FORMULARY Compounded cream with testosterone and dhea     • nortriptyline (PAMELOR) 10 MG capsule nortriptyline 10 mg capsule   TAKE 4 CAPSULES AT BEDTIME     • Nutritional Supplements (DHEA PO) Take  by mouth.     • omeprazole (priLOSEC) 40 MG capsule TAKE ONE CAPSULE BY MOUTH DAILY 90 capsule 3   • Topiramate ER (Trokendi XR) 200 MG capsule sustained-release 24 hr Daily.     • Trulicity 0.75 MG/0.5ML solution pen-injector INJECT 0.75 MG UNDER THE SKIN ONCE WEEKLY 2 mL 0   • [DISCONTINUED] eletriptan (RELPAX) 40 MG tablet 1 tab(s) orally as needed     • [DISCONTINUED] FLUoxetine (PROzac) 20 MG capsule Daily.     • [DISCONTINUED] Topiramate ER (Trokendi XR) 200 MG capsule sustained-release 24 hr Daily.       No current facility-administered medications on file prior to visit.       Allergies  Allergies   Allergen Reactions   • Dihydroergotamine GI Intolerance     VOMITING   • Methergine [Methylergonovine Maleate] Nausea And Vomiting   • Amoxicillin Rash       Objective  Visit Vitals  /70   Pulse 91   Temp 96.1 °F (35.6 °C)   Wt 78.5 kg (173 lb)   SpO2 97%   BMI 29.70 kg/m²        Physical Exam  Physical Exam  Vitals and nursing note reviewed.   Constitutional:       General: She is not in acute distress.     Appearance: Normal appearance. She is well-developed. She is not ill-appearing or toxic-appearing.   HENT:      Head: Normocephalic and atraumatic.   Eyes:      Conjunctiva/sclera: Conjunctivae normal.   Pulmonary:      Effort: Pulmonary effort is normal. No respiratory distress.   Skin:     General: Skin is  warm and dry.   Neurological:      Mental Status: She is alert and oriented to person, place, and time. Mental status is at baseline.   Psychiatric:         Mood and Affect: Mood normal.         Behavior: Behavior normal.         Results  Results for orders placed or performed in visit on 02/22/23   Urine Culture - Urine, Urine, Clean Catch    Specimen: Urine, Clean Catch   Result Value Ref Range    Urine Culture 50,000 CFU/mL Escherichia coli (A)        Susceptibility    Escherichia coli - ZACH     Ampicillin  Resistant ug/ml     Ampicillin + Sulbactam  Resistant ug/ml     Cefazolin  Susceptible ug/ml     Cefepime  Susceptible ug/ml     Ceftazidime  Susceptible ug/ml     Ceftriaxone  Susceptible ug/ml     Gentamicin  Susceptible ug/ml     Levofloxacin  Resistant ug/ml     Nitrofurantoin  Susceptible ug/ml     Piperacillin + Tazobactam  Susceptible ug/ml     Trimethoprim + Sulfamethoxazole  Susceptible ug/ml   POCT urinalysis dipstick, automated    Specimen: Urine   Result Value Ref Range    Color Yellow Yellow, Straw, Dark Yellow, Jazz    Clarity, UA Cloudy (A) Clear    Specific Gravity  1.025 1.005 - 1.030    pH, Urine 6.0 5.0 - 8.0    Leukocytes Small (1+) (A) Negative    Nitrite, UA Negative Negative    Protein, POC Trace (A) Negative mg/dL    Glucose, UA Negative Negative mg/dL    Ketones, UA Negative Negative    Urobilinogen, UA Normal Normal, 0.2 E.U./dL    Bilirubin Negative Negative    Blood, UA 3+ (A) Negative    Lot Number [id number]     Expiration Date 3/25/2024         Assessment and Plan  Diagnoses and all orders for this visit:    Mixed hyperlipidemia  - Last labs with LDL up to 108  - on atorvastatin 40mg daily  - ideally LDL <100, plan to recheck and adjust med if needed    Primary hypertension  - BP controlled   - Continue nebivolol 10mg daily and HCTZ 12.5mg daily  - BMP ordered    Type 2 diabetes mellitus without complication, without long-term current use of insulin (HCC)  - A1c up to 8, has  not been taking medications regularly  - Continue metformin XR 2000mg daily, glimepiride 2mg BID (or 4mg daily if this will boost compliance), trulicity 0.75mg weekly. She will increase compliance.  - Had also been briefly on jardiance but discontinued due to candidal infections and januvia which was discontinued due to GI intolerance.  - Urine microalbumin nl 3/2023  - Eye exam 4/2021 no retinopathy. Discussed need to update eye exam.     Lumbar post-laminectomy syndrome  - Following with pain management. Has SCS, uses flexeril 10mg qhs PRN, and has had R SI joint injections in the past.  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 Mcguire, unable to get records  - Mammogram: 7/2022 BIRADS 1  - Colonoscopy: 1/2018 sigmoid tubular adenoma. Repeat 5y.  - HCV: negative  - Immunizations: COVID booster and flu shot UTD. Shingrix complete. PCV20 today. Tdap 10/2019.  - Depression screening: phq9 = 6 1/2022    Return for Next scheduled follow up.

## 2023-03-19 DIAGNOSIS — I10 PRIMARY HYPERTENSION: Primary | ICD-10-CM

## 2023-03-21 ENCOUNTER — TELEPHONE (OUTPATIENT)
Dept: INTERNAL MEDICINE | Facility: CLINIC | Age: 58
End: 2023-03-21
Payer: COMMERCIAL

## 2023-03-21 NOTE — TELEPHONE ENCOUNTER
----- Message from Fiona Corona MD sent at 3/19/2023 11:38 PM EDT -----  Please call patient and let her know that cholesterol is at goal. Her kidney function was slightly diminished and I would like for her to have this rechecked in a couple weeks. Prior to having it rechecked I recommend she increase hydration and avoid use of NSAIDs.

## 2023-03-29 ENCOUNTER — TELEMEDICINE (OUTPATIENT)
Dept: PSYCHIATRY | Facility: CLINIC | Age: 58
End: 2023-03-29
Payer: COMMERCIAL

## 2023-03-29 DIAGNOSIS — F43.22 ADJUSTMENT DISORDER WITH ANXIOUS MOOD: ICD-10-CM

## 2023-03-29 DIAGNOSIS — F32.81 PMDD (PREMENSTRUAL DYSPHORIC DISORDER): ICD-10-CM

## 2023-03-29 DIAGNOSIS — F33.41 RECURRENT MAJOR DEPRESSIVE DISORDER, IN PARTIAL REMISSION: Primary | ICD-10-CM

## 2023-03-29 PROCEDURE — 99214 OFFICE O/P EST MOD 30 MIN: CPT | Performed by: NURSE PRACTITIONER

## 2023-03-29 RX ORDER — ARIPIPRAZOLE 5 MG/1
5 TABLET ORAL DAILY
Qty: 90 TABLET | Refills: 0 | Status: SHIPPED | OUTPATIENT
Start: 2023-03-29

## 2023-03-29 RX ORDER — FLUOXETINE HYDROCHLORIDE 20 MG/1
20 CAPSULE ORAL DAILY
Qty: 90 CAPSULE | Refills: 0 | Status: SHIPPED | OUTPATIENT
Start: 2023-03-29

## 2023-03-29 RX ORDER — DULOXETIN HYDROCHLORIDE 60 MG/1
60 CAPSULE, DELAYED RELEASE ORAL DAILY
Qty: 90 CAPSULE | Refills: 0 | Status: SHIPPED | OUTPATIENT
Start: 2023-03-29

## 2023-03-29 NOTE — PROGRESS NOTES
This provider is located at [facility], [address] [place] [address].The Patient is seen remotely at home, [address] [place] [address] via [employer]. Patient is being seen via telehealth and confirm that they are in a secure environment for this session. The patient's condition being diagnosed/treated is appropriate for telemedicine. The provider identified himself/herself: herself as well as her credentials.   The patient gave consent to be seen remotely, and when consent is given they understand that the consent allows for patient identifiable information to be sent to a third party as needed.   They may refuse to be seen remotely at any time. The electronic data is encrypted and password protected, and the patient has been advised of the potential risks to privacy not withstanding such measures.    You have chosen to receive care through a telehealth visit.  Do you consent to use a video/audio connection for your medical care today? Yes      Chief Complaint  Follow-up for depression and anxiety     Subjective    Yelena Radha Morris presents to [facility] for medication management.       History of Present Illness   Patient presents today reporting that she is doing okay.  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  Patient just states that she has been very overwhelmed which is why she missed previous appointments and keeping up with the pace.  She states that she has talked with her boss and is thinking about doing something else that she does not think she is up to the task.  She states that she has been anxious and tearful due to the job as well as the wedding coming up in 3 weeks and out of town gas coming the end.  Patient notes that it is just too much for her at this time and is looking at Case management or other things in the hospital which they have offered her as well.   Patient states her sleep and appetite has been good.  She notes that she does not feel she needs a medication change just possibly a new position.  Denies any side effects with the medications.  Denies any SI/HI/AH/VH.      Objective   Vital Signs:   There were no vitals taken for this visit.  Due to the remote nature of this encounter (virtual encounter), vitals were unable to be obtained.  Height stated at 64 inches.  Weight stated at 171 pounds.      PHQ-9 Score:   PHQ-9 Total Score:       PHQ-9 Depression Screening  Little interest or pleasure in doing things?     Feeling down, depressed, or hopeless?     Trouble falling or staying asleep, or sleeping too much?     Feeling tired or having little energy?     Poor appetite or overeating?     Feeling bad about yourself - or that you are a failure or have let yourself or your family down?     Trouble concentrating on things, such as reading the newspaper or watching television?     Moving or speaking so slowly that other people could have noticed? Or the opposite - being so fidgety or restless that you have been moving around a lot more than usual?     Thoughts that you would be better off dead, or of hurting yourself in some way?     PHQ-9 Total Score     If you checked off any problems, how difficult have these problems made it for you to do your work, take care of things at home, or get along with other people?           Mental Status Exam:   Hygiene:   good  Cooperation:  Cooperative  Eye Contact:  Good  Psychomotor Behavior:  Appropriate  Affect:  Appropriate  Mood: normal and anxious  Speech:  Normal  Thought Process:  Goal directed and Linear  Thought Content:  Normal  Suicidal:  None  Homicidal:  None  Hallucinations:  None  Delusion:  None  Memory:  Intact  Orientation:  Person, Place, Time and Situation  Reliability:  good  Insight:  Good  Judgement:  Good  Impulse Control:  Good  Physical/Medical Issues:  Yes HTN, DM, fibromyalgia, carpal tunnel syndrome      Current Medications:   Current Outpatient Medications   Medication Sig Dispense Refill   • ARIPiprazole (ABILIFY) 5 MG tablet Take 1 tablet by mouth Daily. 90 tablet 0   • DULoxetine (CYMBALTA) 60 MG capsule Take 1 capsule by mouth Daily. 90 capsule 0   • FLUoxetine (PROzac) 20 MG capsule Take 1 capsule by mouth Daily. 90 capsule 0   • atorvastatin (LIPITOR) 40 MG tablet Take 1 tablet by mouth Every Night. 90 tablet 3   • BOTOX 200 units reconstituted solution INJECT 200 UNITS INTO THE HEAD OR NECK AREA BY MD EVERY 90 DAYS  3   • Cholecalciferol (VITAMIN D) 2000 units capsule Take 1 capsule by mouth Daily.     • cyclobenzaprine (FLEXERIL) 10 MG tablet Take 1 tablet by mouth At Night As Needed for Muscle Spasms. 30 tablet 1   • eletriptan (RELPAX) 40 MG tablet TAKE 1 TABLET BY MOUTH AT THE ONSET OF MIGRAINE. MAY REPEAT IN 2 HRS.MAX 2 DOSE/24HR,3 DAYS/WEEK  11   • glimepiride (AMARYL) 2 MG tablet Take 1 tablet by mouth 2 (Two) Times a Day. 180 tablet 3   • glucose blood test strip 1 each by Other route Daily. Use as instructed 100 each 2   • glucose monitor monitoring kit 1 each Daily. 1 each 1   • hydroCHLOROthiazide (HYDRODIURIL) 12.5 MG tablet TAKE ONE TABLET BY MOUTH DAILY 90 tablet 3   • Lancets 28G misc 1 application Daily. 100 each 3   • metFORMIN ER (GLUCOPHAGE-XR) 500 MG 24 hr tablet TAKE FOUR TABLETS BY MOUTH DAILY WITH DINNER 360 tablet 3   • nebivolol (Bystolic) 10 MG tablet Take 1 tablet by mouth Daily. 90 tablet 3   • NON FORMULARY Compounded cream with testosterone and dhea     • nortriptyline (PAMELOR) 10 MG capsule nortriptyline 10 mg capsule   TAKE 4 CAPSULES AT BEDTIME     • Nutritional Supplements (DHEA PO) Take  by mouth.     • omeprazole (priLOSEC) 40 MG capsule TAKE ONE CAPSULE BY MOUTH DAILY 90 capsule 3   • Topiramate ER (Trokendi XR) 200 MG capsule sustained-release 24 hr Daily.     • Trulicity 0.75 MG/0.5ML solution pen-injector INJECT 0.75 MG UNDER THE SKIN ONCE WEEKLY 2 mL 0     No  current facility-administered medications for this visit.       Physical Exam  Nursing note reviewed.   Constitutional:       Appearance: Normal appearance.   Neurological:      Mental Status: She is alert.   Psychiatric:         Attention and Perception: Attention and perception normal.         Mood and Affect: Affect normal. Mood is anxious. Mood is not depressed.         Speech: Speech normal.         Behavior: Behavior normal. Behavior is cooperative.         Thought Content: Thought content normal.         Cognition and Memory: Cognition and memory normal.         Judgment: Judgment normal.        Result Review :     The following data was reviewed by: ONESIMO Alexander on 02/01/2021:  Common labs    Common Labs 6/15/22 10/3/22 10/3/22 10/3/22 10/3/22 3/8/23     1025 1104 1104 1104    Glucose    96     BUN    14     Creatinine    0.79     Sodium    137     Potassium    4.1     Chloride    100     Calcium    9.4     Albumin    4.20     Total Bilirubin    0.4     Alkaline Phosphatase    83     AST (SGOT)    14     ALT (SGPT)    26     WBC   10.64      Hemoglobin   14.5      Hematocrit   42.3      Platelets   318      Total Cholesterol     194    Triglycerides     298 (A)    HDL Cholesterol     35 (A)    LDL Cholesterol      108 (A)    Hemoglobin A1C 8.4 6.4    8.0   (A) Abnormal value            Data reviewed: PCP notes        Assessment and Plan    Problem List Items Addressed This Visit        Mental Health    Recurrent major depressive disorder (HCC) - Primary    Relevant Medications    ARIPiprazole (ABILIFY) 5 MG tablet    DULoxetine (CYMBALTA) 60 MG capsule    FLUoxetine (PROzac) 20 MG capsule   Other Visit Diagnoses     Adjustment disorder with anxious mood        Relevant Medications    ARIPiprazole (ABILIFY) 5 MG tablet    DULoxetine (CYMBALTA) 60 MG capsule    FLUoxetine (PROzac) 20 MG capsule    PMDD (premenstrual dysphoric disorder)        Relevant Medications    ARIPiprazole (ABILIFY) 5 MG tablet     DULoxetine (CYMBALTA) 60 MG capsule    FLUoxetine (PROzac) 20 MG capsule            TREATMENT PLAN/GOALS: Continue supportive psychotherapy efforts and medications as indicated. Treatment and medication options discussed during today's visit. Patient ackowledged and verbally consented to continue with current treatment plan and was educated on the importance of compliance with treatment and follow-up appointments.    MEDICATION ISSUES:  We discussed risks, benefits, and side effects of the above medications and the patient was agreeable with the plan. Patient was educated on the importance of compliance with treatment and follow-up appointments.  Patient is agreeable to call the office with any worsening of symptoms or onset of side effects. Patient is agreeable to call 911 or go to the nearest ER should he/she begin having SI/HI.        -Continue Cymbalta 60 mg daily for depression and anxiety as patient has benefited greatly as well for her pain.  -Continue fluoxetine 20 mg daily for depression and PMDD.   -Patient educated extensively regarding the risk of serotonin syndrome as she is currently on duloxetine due to her pain as we have previously tried to taper and it is not effective so she is on fluoxetine 20 mg daily to help with her overall depression and mood.  Patient verbalized understanding was agreeable.  -Continue aripiprazole 5 mg as adjunct for depression as patient has failed numerous SSRIs and SNRIs and has benefited the patient tremendously and is medically necessary for her depression to avoid hospitalization. Patient felt she was ok at this time and didn't want any medication changes.   -Highly encouraged patient that if she had any side effects or worsening symptoms to contact the clinic for sooner appointment she verbalized understanding.  Do-updated PHQ-9 at next visit.     Counseled patient regarding multimodal approach with healthy nutrition, healthy sleep, regular physical activity,  social activities, counseling, and medications.      Coping skills reviewed and encouraged positive framing of thoughts     Assisted patient in processing above session content; acknowledged and normalized patient’s thoughts, feelings, and concerns.  Applied  positive coping skills and behavior management in session.  Allowed patient to freely discuss issues without interruption or judgment. Provided safe, confidential environment to facilitate the development of positive therapeutic relationship and encourage open, honest communication. Assisted patient in identifying risk factors which would indicate the need for higher level of care including thoughts to harm self or others and/or self-harming behavior and encouraged patient to contact this office, call 911, or present to the nearest emergency room should any of these events occur. Discussed crisis intervention services and means to access.     MEDS ORDERED DURING VISIT:  New Medications Ordered This Visit   Medications   • ARIPiprazole (ABILIFY) 5 MG tablet     Sig: Take 1 tablet by mouth Daily.     Dispense:  90 tablet     Refill:  0   • DULoxetine (CYMBALTA) 60 MG capsule     Sig: Take 1 capsule by mouth Daily.     Dispense:  90 capsule     Refill:  0   • FLUoxetine (PROzac) 20 MG capsule     Sig: Take 1 capsule by mouth Daily.     Dispense:  90 capsule     Refill:  0         Follow Up   Return in about 8 weeks (around 5/24/2023), or if symptoms worsen or fail to improve, for Recheck.    Patient was given instructions and counseling regarding her condition or for health maintenance advice. Please see specific information pulled into the AVS if appropriate.     Some of the data in this electronic note has been brought forward from a previous encounter, any necessary changes have been made, it has been reviewed by this APRN, and it is accurate.      This document has been electronically signed by ONESIMO Alexander  March 29, 2023 08:20 EDT    Part of this  note may be an electronic transcription/translation of spoken language to printed text using the Dragon Dictation System.

## 2023-04-13 DIAGNOSIS — E11.9 TYPE 2 DIABETES MELLITUS WITHOUT COMPLICATION, WITHOUT LONG-TERM CURRENT USE OF INSULIN: ICD-10-CM

## 2023-04-13 RX ORDER — DULAGLUTIDE 0.75 MG/.5ML
INJECTION, SOLUTION SUBCUTANEOUS
Qty: 2 ML | Refills: 2 | Status: SHIPPED | OUTPATIENT
Start: 2023-04-13

## 2023-04-18 ENCOUNTER — PRIOR AUTHORIZATION (OUTPATIENT)
Dept: INTERNAL MEDICINE | Facility: CLINIC | Age: 58
End: 2023-04-18
Payer: COMMERCIAL

## 2023-05-14 DIAGNOSIS — I10 ESSENTIAL HYPERTENSION: ICD-10-CM

## 2023-05-15 RX ORDER — HYDROCHLOROTHIAZIDE 12.5 MG/1
TABLET ORAL
Qty: 90 TABLET | Refills: 3 | Status: SHIPPED | OUTPATIENT
Start: 2023-05-15

## 2023-05-19 DIAGNOSIS — K21.00 GASTRO-ESOPHAGEAL REFLUX DISEASE WITH ESOPHAGITIS: ICD-10-CM

## 2023-05-19 RX ORDER — OMEPRAZOLE 40 MG/1
40 CAPSULE, DELAYED RELEASE ORAL DAILY
Qty: 90 CAPSULE | Refills: 3 | Status: SHIPPED | OUTPATIENT
Start: 2023-05-19

## 2023-05-19 RX ORDER — OMEPRAZOLE 40 MG/1
CAPSULE, DELAYED RELEASE ORAL
Qty: 90 CAPSULE | Refills: 3 | OUTPATIENT
Start: 2023-05-19

## 2023-05-24 ENCOUNTER — TELEMEDICINE (OUTPATIENT)
Dept: PSYCHIATRY | Facility: CLINIC | Age: 58
End: 2023-05-24
Payer: COMMERCIAL

## 2023-05-24 DIAGNOSIS — F43.22 ADJUSTMENT DISORDER WITH ANXIOUS MOOD: ICD-10-CM

## 2023-05-24 DIAGNOSIS — F32.81 PMDD (PREMENSTRUAL DYSPHORIC DISORDER): ICD-10-CM

## 2023-05-24 DIAGNOSIS — F33.41 RECURRENT MAJOR DEPRESSIVE DISORDER, IN PARTIAL REMISSION: ICD-10-CM

## 2023-05-24 PROCEDURE — 99214 OFFICE O/P EST MOD 30 MIN: CPT | Performed by: NURSE PRACTITIONER

## 2023-05-24 RX ORDER — FLUOXETINE HYDROCHLORIDE 20 MG/1
20 CAPSULE ORAL DAILY
Qty: 90 CAPSULE | Refills: 0 | Status: SHIPPED | OUTPATIENT
Start: 2023-05-24

## 2023-05-24 RX ORDER — ARIPIPRAZOLE 5 MG/1
5 TABLET ORAL DAILY
Qty: 90 TABLET | Refills: 0 | Status: SHIPPED | OUTPATIENT
Start: 2023-05-24

## 2023-05-24 RX ORDER — DULOXETIN HYDROCHLORIDE 60 MG/1
60 CAPSULE, DELAYED RELEASE ORAL DAILY
Qty: 90 CAPSULE | Refills: 0 | Status: SHIPPED | OUTPATIENT
Start: 2023-05-24

## 2023-05-24 NOTE — PROGRESS NOTES
This provider is located at [facility], [address] [place] [address].The Patient is seen remotely at home, [address] [place] [address] via [email or web address]. Patient is being seen via telehealth and confirm that they are in a secure environment for this session. The patient's condition being diagnosed/treated is appropriate for telemedicine. The provider identified himself/herself: herself as well as her credentials.   The patient gave consent to be seen remotely, and when consent is given they understand that the consent allows for patient identifiable information to be sent to a third party as needed.   They may refuse to be seen remotely at any time. The electronic data is encrypted and password protected, and the patient has been advised of the potential risks to privacy not withstanding such measures.    You have chosen to receive care through a telehealth visit.  Do you consent to use a video/audio connection for your medical care today? Yes      Chief Complaint  Follow-up for depression and anxiety     Subjective    Yelena Radha Morris presents to [facility] for medication management.       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  Patient believes that she will like this as she states she did a ride along yesterday and enjoyed it.  Patient states that she is having some issues with her memory as it takes her 2-3 times to recall something but believes that is from the Topamax informed her that that could be a side effect with memory difficulty and Topamax she verbalized understanding.  Patient states for the most part though she is doing well.  Patient states anxiety has been fine and she has not felt overwhelmed.  She states that her son's wedding went very well with no concerns.  She reports that her  had his permanent ostomy placed  which went well and he starts chemo in the next 2 weeks for 2 spots.  Patient states that he has been in good spirits and working so she has felt good about that.  Denies any depressive symptoms.  See PHQ-9.  Patient states that she has had some anxiousness with the new job but otherwise feels she is adjusting well.  Notes she is only sleeping 6 hours at night as she occasionally wakes up but feels when she is on a good schedule with her job she may sleep better.  Denies any side effects to the medications.  Denies any SI/HI/AH/VH.      Objective   Vital Signs:   There were no vitals taken for this visit.  Due to the remote nature of this encounter (virtual encounter), vitals were unable to be obtained.  Height stated at 64 inches.  Weight stated at 171 pounds.      PHQ-9 Depression Screening  Little interest or pleasure in doing things? 0-->not at all   Feeling down, depressed, or hopeless? 0-->not at all   Trouble falling or staying asleep, or sleeping too much? 1-->several days   Feeling tired or having little energy? 1-->several days   Poor appetite or overeating? 0-->not at all   Feeling bad about yourself - or that you are a failure or have let yourself or your family down? 0-->not at all   Trouble concentrating on things, such as reading the newspaper or watching television? 0-->not at all   Moving or speaking so slowly that other people could have noticed? Or the opposite - being so fidgety or restless that you have been moving around a lot more than usual? 0-->not at all   Thoughts that you would be better off dead, or of hurting yourself in some way? 0-->not at all   PHQ-9 Total Score 2   If you checked off any problems, how difficult have these problems made it for you to do your work, take care of things at home, or get along with other people? not difficult at all       PHQ-9 Total Score: 2          Mental Status Exam:   Hygiene:   good  Cooperation:  Cooperative  Eye Contact:  Good  Psychomotor  Behavior:  Appropriate  Affect:  Appropriate  Mood: normal  Speech:  Normal  Thought Process:  Goal directed and Linear  Thought Content:  Normal  Suicidal:  None  Homicidal:  None  Hallucinations:  None  Delusion:  None  Memory:  Intact  Orientation:  Person, Place, Time and Situation  Reliability:  good  Insight:  Good  Judgement:  Good  Impulse Control:  Good  Physical/Medical Issues:  Yes HTN, DM, fibromyalgia, carpal tunnel syndrome     Current Medications:   Current Outpatient Medications   Medication Sig Dispense Refill   • ARIPiprazole (ABILIFY) 5 MG tablet Take 1 tablet by mouth Daily. 90 tablet 0   • DULoxetine (CYMBALTA) 60 MG capsule Take 1 capsule by mouth Daily. 90 capsule 0   • FLUoxetine (PROzac) 20 MG capsule Take 1 capsule by mouth Daily. 90 capsule 0   • atorvastatin (LIPITOR) 40 MG tablet Take 1 tablet by mouth Every Night. 90 tablet 3   • BOTOX 200 units reconstituted solution INJECT 200 UNITS INTO THE HEAD OR NECK AREA BY MD EVERY 90 DAYS  3   • Cholecalciferol (VITAMIN D) 2000 units capsule Take 1 capsule by mouth Daily.     • cyclobenzaprine (FLEXERIL) 10 MG tablet Take 1 tablet by mouth At Night As Needed for Muscle Spasms. 30 tablet 1   • eletriptan (RELPAX) 40 MG tablet TAKE 1 TABLET BY MOUTH AT THE ONSET OF MIGRAINE. MAY REPEAT IN 2 HRS.MAX 2 DOSE/24HR,3 DAYS/WEEK  11   • glimepiride (AMARYL) 2 MG tablet Take 1 tablet by mouth 2 (Two) Times a Day. 180 tablet 3   • glucose blood test strip 1 each by Other route Daily. Use as instructed 100 each 2   • glucose monitor monitoring kit 1 each Daily. 1 each 1   • hydroCHLOROthiazide (HYDRODIURIL) 12.5 MG tablet TAKE ONE TABLET BY MOUTH DAILY 90 tablet 3   • Lancets 28G misc 1 application Daily. 100 each 3   • metFORMIN ER (GLUCOPHAGE-XR) 500 MG 24 hr tablet TAKE FOUR TABLETS BY MOUTH DAILY WITH DINNER 360 tablet 3   • nebivolol (Bystolic) 10 MG tablet Take 1 tablet by mouth Daily. 90 tablet 3   • NON FORMULARY Compounded cream with testosterone  and dhea     • nortriptyline (PAMELOR) 10 MG capsule nortriptyline 10 mg capsule   TAKE 4 CAPSULES AT BEDTIME     • Nutritional Supplements (DHEA PO) Take  by mouth.     • omeprazole (priLOSEC) 40 MG capsule Take 1 capsule by mouth Daily. 90 capsule 3   • Topiramate ER (Trokendi XR) 200 MG capsule sustained-release 24 hr Daily.     • Trulicity 0.75 MG/0.5ML solution pen-injector INJECT 0.75 MG UNDER THE SKIN ONCE WEEKLY 2 mL 2     No current facility-administered medications for this visit.       Physical Exam  Nursing note reviewed.   Constitutional:       Appearance: Normal appearance.   Neurological:      Mental Status: She is alert.   Psychiatric:         Attention and Perception: Attention and perception normal.         Mood and Affect: Mood and affect normal. Mood is not anxious or depressed.         Speech: Speech normal.         Behavior: Behavior normal. Behavior is cooperative.         Thought Content: Thought content normal.         Cognition and Memory: Cognition and memory normal.         Judgment: Judgment normal.        Result Review :     The following data was reviewed by: ONESIMO Alexander on 02/01/2021:  Common labs        6/15/2022    16:04 10/3/2022    10:25 10/3/2022    11:04 3/8/2023    17:23   Common Labs   Glucose   96      BUN   14      Creatinine   0.79      Sodium   137      Potassium   4.1      Chloride   100      Calcium   9.4      Albumin   4.20      Total Bilirubin   0.4      Alkaline Phosphatase   83      AST (SGOT)   14      ALT (SGPT)   26      WBC   10.64      Hemoglobin   14.5      Hematocrit   42.3      Platelets   318      Total Cholesterol   194      Triglycerides   298      HDL Cholesterol   35      LDL Cholesterol    108      Hemoglobin A1C 8.4   6.4    8.0       Data reviewed: PCP notes        Assessment and Plan    Problem List Items Addressed This Visit        Mental Health    Recurrent major depressive disorder    Relevant Medications    ARIPiprazole (ABILIFY) 5 MG  tablet    DULoxetine (CYMBALTA) 60 MG capsule    FLUoxetine (PROzac) 20 MG capsule   Other Visit Diagnoses     Adjustment disorder with anxious mood        Relevant Medications    ARIPiprazole (ABILIFY) 5 MG tablet    DULoxetine (CYMBALTA) 60 MG capsule    FLUoxetine (PROzac) 20 MG capsule    PMDD (premenstrual dysphoric disorder)        Relevant Medications    ARIPiprazole (ABILIFY) 5 MG tablet    DULoxetine (CYMBALTA) 60 MG capsule    FLUoxetine (PROzac) 20 MG capsule            TREATMENT PLAN/GOALS: Continue supportive psychotherapy efforts and medications as indicated. Treatment and medication options discussed during today's visit. Patient ackowledged and verbally consented to continue with current treatment plan and was educated on the importance of compliance with treatment and follow-up appointments.    MEDICATION ISSUES:  We discussed risks, benefits, and side effects of the above medications and the patient was agreeable with the plan. Patient was educated on the importance of compliance with treatment and follow-up appointments.  Patient is agreeable to call the office with any worsening of symptoms or onset of side effects. Patient is agreeable to call 911 or go to the nearest ER should he/she begin having SI/HI.        -Continue Cymbalta 60 mg daily for depression and anxiety as patient has benefited greatly as well for her pain.  -Continue fluoxetine 20 mg daily for depression and PMDD.   -Patient educated extensively regarding the risk of serotonin syndrome as she is currently on duloxetine due to her pain as we have previously tried to taper and it is not effective so she is on fluoxetine 20 mg daily to help with her overall depression and mood.  Patient verbalized understanding was agreeable.  -Continue aripiprazole 5 mg as adjunct for depression as patient has failed numerous SSRIs and SNRIs and has benefited the patient tremendously and is medically necessary for her depression to avoid  hospitalization.   -Highly encouraged patient that if she had any side effects or worsening symptoms to contact the clinic for sooner appointment she verbalized understanding.  -Encouraged patient if she felt her sleep was an issue to use over-the-counter melatonin and if that was ineffective or not helpful and still feels as if she needs more sleep to contact the clinic she verbalized understanding.  Patient stated overall though that she felt she was doing well.      Counseled patient regarding multimodal approach with healthy nutrition, healthy sleep, regular physical activity, social activities, counseling, and medications.      Coping skills reviewed and encouraged positive framing of thoughts     Assisted patient in processing above session content; acknowledged and normalized patient’s thoughts, feelings, and concerns.  Applied  positive coping skills and behavior management in session.  Allowed patient to freely discuss issues without interruption or judgment. Provided safe, confidential environment to facilitate the development of positive therapeutic relationship and encourage open, honest communication. Assisted patient in identifying risk factors which would indicate the need for higher level of care including thoughts to harm self or others and/or self-harming behavior and encouraged patient to contact this office, call 911, or present to the nearest emergency room should any of these events occur. Discussed crisis intervention services and means to access.     MEDS ORDERED DURING VISIT:  New Medications Ordered This Visit   Medications   • ARIPiprazole (ABILIFY) 5 MG tablet     Sig: Take 1 tablet by mouth Daily.     Dispense:  90 tablet     Refill:  0   • DULoxetine (CYMBALTA) 60 MG capsule     Sig: Take 1 capsule by mouth Daily.     Dispense:  90 capsule     Refill:  0   • FLUoxetine (PROzac) 20 MG capsule     Sig: Take 1 capsule by mouth Daily.     Dispense:  90 capsule     Refill:  0         Follow  Up   Return in about 8 weeks (around 7/19/2023), or if symptoms worsen or fail to improve, for Recheck.    Patient was given instructions and counseling regarding her condition or for health maintenance advice. Please see specific information pulled into the AVS if appropriate.     Some of the data in this electronic note has been brought forward from a previous encounter, any necessary changes have been made, it has been reviewed by this APRN, and it is accurate.      This document has been electronically signed by ONESIMO Alexandre  May 24, 2023 08:42 EDT    Part of this note may be an electronic transcription/translation of spoken language to printed text using the Dragon Dictation System.

## 2023-05-26 DIAGNOSIS — E11.9 TYPE 2 DIABETES MELLITUS WITHOUT COMPLICATION, WITHOUT LONG-TERM CURRENT USE OF INSULIN: ICD-10-CM

## 2023-05-30 RX ORDER — DULAGLUTIDE 0.75 MG/.5ML
0.75 INJECTION, SOLUTION SUBCUTANEOUS
Qty: 2 ML | Refills: 2 | Status: SHIPPED | OUTPATIENT
Start: 2023-05-30

## 2023-06-10 DIAGNOSIS — E11.9 TYPE 2 DIABETES MELLITUS WITHOUT COMPLICATION, WITHOUT LONG-TERM CURRENT USE OF INSULIN: ICD-10-CM

## 2023-06-10 RX ORDER — METFORMIN HYDROCHLORIDE 500 MG/1
TABLET, EXTENDED RELEASE ORAL
Qty: 360 TABLET | Refills: 3 | Status: SHIPPED | OUTPATIENT
Start: 2023-06-10

## 2023-07-24 ENCOUNTER — HOSPITAL ENCOUNTER (OUTPATIENT)
Dept: MAMMOGRAPHY | Facility: HOSPITAL | Age: 58
Discharge: HOME | End: 2023-07-24
Admitting: INTERNAL MEDICINE
Payer: COMMERCIAL

## 2023-07-24 DIAGNOSIS — Z12.31 ENCOUNTER FOR SCREENING MAMMOGRAM FOR MALIGNANT NEOPLASM OF BREAST: ICD-10-CM

## 2023-07-24 PROCEDURE — 77067 SCR MAMMO BI INCL CAD: CPT

## 2023-07-24 PROCEDURE — 77063 BREAST TOMOSYNTHESIS BI: CPT

## 2023-07-27 ENCOUNTER — TELEMEDICINE (OUTPATIENT)
Dept: PSYCHIATRY | Facility: CLINIC | Age: 58
End: 2023-07-27
Payer: COMMERCIAL

## 2023-07-27 DIAGNOSIS — F41.1 GENERALIZED ANXIETY DISORDER: Primary | ICD-10-CM

## 2023-07-27 DIAGNOSIS — F33.41 RECURRENT MAJOR DEPRESSIVE DISORDER, IN PARTIAL REMISSION: ICD-10-CM

## 2023-07-27 DIAGNOSIS — F32.81 PMDD (PREMENSTRUAL DYSPHORIC DISORDER): ICD-10-CM

## 2023-07-27 PROCEDURE — 99214 OFFICE O/P EST MOD 30 MIN: CPT | Performed by: NURSE PRACTITIONER

## 2023-07-27 RX ORDER — PROPRANOLOL HYDROCHLORIDE 10 MG/1
10 TABLET ORAL 2 TIMES DAILY PRN
Qty: 60 TABLET | Refills: 2 | Status: SHIPPED | OUTPATIENT
Start: 2023-07-27

## 2023-07-27 RX ORDER — FLUOXETINE HYDROCHLORIDE 20 MG/1
20 CAPSULE ORAL DAILY
Qty: 90 CAPSULE | Refills: 0 | Status: SHIPPED | OUTPATIENT
Start: 2023-07-27 | End: 2023-10-25

## 2023-07-27 NOTE — PROGRESS NOTES
This provider is located at [facility], [address] [place] [address].The Patient is seen remotely at home, [address] [facility] [place] [address] via [employer]. Patient is being seen via telehealth and confirm that they are in a secure environment for this session. The patient's condition being diagnosed/treated is appropriate for telemedicine. The provider identified himself/herself: herself as well as her credentials.   The patient gave consent to be seen remotely, and when consent is given they understand that the consent allows for patient identifiable information to be sent to a third party as needed.   They may refuse to be seen remotely at any time. The electronic data is encrypted and password protected, and the patient has been advised of the potential risks to privacy not withstanding such measures.    You have chosen to receive care through a telehealth visit.  Do you consent to use a video/audio connection for your medical care today? Yes      Chief Complaint  Follow-up for depression and anxiety     Subjective    Yelena Radha Morris presents to [facility] for medication management.       History of Present Illness   Patient presents today reporting that her anxiety levels have went up.  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  Patient states that it is slowly getting better as she is feeling anxious about doing her job correctly.  Patient states also her  is back taking cancer treatment once again.  Patient feels as if they get it controlled in 1 area and then another area pops up.  Patient states that she has started to feel worry and dread and extremely anxious over her 's condition now.  She notes that she has been having some chest and stomach pain and feeling anxious and dread and uneasy.  She states that she feels this  roughly 3-4 times a week.  Denies any depression symptoms or feeling hopeless or helpless.  Reports she is sleeping 6 hours at night.  Appetite is good.  Denies any medicines.  Reports she has had a recent issue with a tooth but otherwise denies any medical changes.  Denies any SI/HI/AH/VH.      Objective   Vital Signs:   There were no vitals taken for this visit.  Due to the remote nature of this encounter (virtual encounter), vitals were unable to be obtained.  Height stated at 64 inches.  Weight stated at 171 pounds.      PHQ-9 Depression Screening  Little interest or pleasure in doing things?     Feeling down, depressed, or hopeless?     Trouble falling or staying asleep, or sleeping too much?     Feeling tired or having little energy?     Poor appetite or overeating?     Feeling bad about yourself - or that you are a failure or have let yourself or your family down?     Trouble concentrating on things, such as reading the newspaper or watching television?     Moving or speaking so slowly that other people could have noticed? Or the opposite - being so fidgety or restless that you have been moving around a lot more than usual?     Thoughts that you would be better off dead, or of hurting yourself in some way?     PHQ-9 Total Score     If you checked off any problems, how difficult have these problems made it for you to do your work, take care of things at home, or get along with other people?         PHQ-9 Total Score:            Mental Status Exam:   Hygiene:   good  Cooperation:  Cooperative  Eye Contact:  Good  Psychomotor Behavior:  Appropriate  Affect:  Appropriate  Mood: normal and anxious  Speech:  Normal  Thought Process:  Goal directed and Linear  Thought Content:  Normal  Suicidal:  None  Homicidal:  None  Hallucinations:  None  Delusion:  None  Memory:  Intact  Orientation:  Person, Place, Time and Situation  Reliability:  good  Insight:  Good  Judgement:  Good  Impulse Control:   Good  Physical/Medical Issues:  Yes HTN, DM, fibromyalgia, carpal tunnel syndrome      Current Medications:   Current Outpatient Medications   Medication Sig Dispense Refill    FLUoxetine (PROzac) 20 MG capsule Take 1 capsule by mouth Daily for 90 days. 90 capsule 0    ARIPiprazole (ABILIFY) 5 MG tablet TAKE ONE TABLET BY MOUTH DAILY 90 tablet 0    atorvastatin (LIPITOR) 40 MG tablet TAKE 1 TABLET BY MOUTH EVERY EVENING 90 tablet 3    BOTOX 200 units reconstituted solution INJECT 200 UNITS INTO THE HEAD OR NECK AREA BY MD EVERY 90 DAYS  3    Cholecalciferol (VITAMIN D) 2000 units capsule Take 1 capsule by mouth Daily.      cyclobenzaprine (FLEXERIL) 10 MG tablet Take 1 tablet by mouth At Night As Needed for Muscle Spasms. 30 tablet 1    Dulaglutide (Trulicity) 1.5 MG/0.5ML solution pen-injector Inject 1.5 mg under the skin into the appropriate area as directed 1 (One) Time Per Week. 6 mL 1    DULoxetine (CYMBALTA) 60 MG capsule Take 1 capsule by mouth Daily. 90 capsule 0    eletriptan (RELPAX) 40 MG tablet TAKE 1 TABLET BY MOUTH AT THE ONSET OF MIGRAINE. MAY REPEAT IN 2 HRS.MAX 2 DOSE/24HR,3 DAYS/WEEK  11    glimepiride (AMARYL) 2 MG tablet Take 1 tablet by mouth 2 (Two) Times a Day. 180 tablet 3    glucose blood test strip 1 each by Other route Daily. Use as instructed 100 each 2    glucose monitor monitoring kit 1 each Daily. 1 each 1    hydroCHLOROthiazide (HYDRODIURIL) 12.5 MG tablet TAKE ONE TABLET BY MOUTH DAILY 90 tablet 3    Lancets 28G misc 1 application Daily. 100 each 3    metFORMIN ER (GLUCOPHAGE-XR) 500 MG 24 hr tablet TAKE 4 TABLETS BY MOUTH EVERY DAY WITH DINNER 360 tablet 3    nebivolol (Bystolic) 10 MG tablet Take 1 tablet by mouth Daily. 90 tablet 3    NON FORMULARY Compounded cream with testosterone and dhea      nortriptyline (PAMELOR) 10 MG capsule nortriptyline 10 mg capsule   TAKE 4 CAPSULES AT BEDTIME      Nutritional Supplements (DHEA PO) Take  by mouth.      omeprazole (priLOSEC) 40 MG  capsule Take 1 capsule by mouth Daily. 90 capsule 3    propranolol (INDERAL) 10 MG tablet Take 1 tablet by mouth 2 (Two) Times a Day As Needed (panic/anxiety). 60 tablet 2    Topiramate ER (Trokendi XR) 200 MG capsule sustained-release 24 hr Daily.       No current facility-administered medications for this visit.       Physical Exam  Nursing note reviewed.   Constitutional:       Appearance: Normal appearance.   Neurological:      Mental Status: She is alert.   Psychiatric:         Attention and Perception: Attention and perception normal.         Mood and Affect: Affect normal. Mood is anxious. Mood is not depressed.         Speech: Speech normal.         Behavior: Behavior normal. Behavior is cooperative.         Thought Content: Thought content normal.         Cognition and Memory: Cognition and memory normal.         Judgment: Judgment normal.      Result Review :     The following data was reviewed by: ONESIMO Alexander on 02/01/2021:  Common labs          10/3/2022    10:25 10/3/2022    11:04 3/8/2023    17:23 6/21/2023    16:24   Common Labs   Glucose  96      BUN  14      Creatinine  0.79      Sodium  137      Potassium  4.1      Chloride  100      Calcium  9.4      Albumin  4.20      Total Bilirubin  0.4      Alkaline Phosphatase  83      AST (SGOT)  14      ALT (SGPT)  26      WBC  10.64      Hemoglobin  14.5      Hematocrit  42.3      Platelets  318      Total Cholesterol  194      Triglycerides  298      HDL Cholesterol  35      LDL Cholesterol   108      Hemoglobin A1C 6.4   8.0  7.7      Data reviewed: PCP notes         Assessment and Plan    Problem List Items Addressed This Visit          Mental Health    Recurrent major depressive disorder    Relevant Medications    FLUoxetine (PROzac) 20 MG capsule     Other Visit Diagnoses       Generalized anxiety disorder    -  Primary    Relevant Medications    propranolol (INDERAL) 10 MG tablet    FLUoxetine (PROzac) 20 MG capsule    PMDD (premenstrual  dysphoric disorder)        Relevant Medications    FLUoxetine (PROzac) 20 MG capsule            TREATMENT PLAN/GOALS: Continue supportive psychotherapy efforts and medications as indicated. Treatment and medication options discussed during today's visit. Patient ackowledged and verbally consented to continue with current treatment plan and was educated on the importance of compliance with treatment and follow-up appointments.    MEDICATION ISSUES:  We discussed risks, benefits, and side effects of the above medications and the patient was agreeable with the plan. Patient was educated on the importance of compliance with treatment and follow-up appointments.  Patient is agreeable to call the office with any worsening of symptoms or onset of side effects. Patient is agreeable to call 911 or go to the nearest ER should he/she begin having SI/HI.        -Continue Cymbalta 60 mg daily for depression and anxiety as patient has benefited greatly as well for her pain.  -Continue fluoxetine 20 mg daily for depression and PMDD.   -Patient educated extensively regarding the risk of serotonin syndrome as she is currently on duloxetine due to her pain as we have previously tried to taper and it is not effective so she is on fluoxetine 20 mg daily to help with her overall depression and mood.  Patient verbalized understanding was agreeable.  -Continue aripiprazole 5 mg as adjunct for depression as patient has failed numerous SSRIs and SNRIs and has benefited the patient tremendously and is medically necessary for her depression to avoid hospitalization.   -Began propranolol 10 mg twice daily as needed for anxiety and panic.  Encouraged patient if it decreased her blood pressure or heart rate to contact the clinic and discontinue as we would look at alternatives such as clonidine or hydroxyzine.  Also encouraged the patient if the anxiety was present every day and not improving then we may look at adding BuSpar to help overall  patient verbalized understanding.  Also offered counseling if patient needed it.      Counseled patient regarding multimodal approach with healthy nutrition, healthy sleep, regular physical activity, social activities, counseling, and medications.      Coping skills reviewed and encouraged positive framing of thoughts     Assisted patient in processing above session content; acknowledged and normalized patient’s thoughts, feelings, and concerns.  Applied  positive coping skills and behavior management in session.  Allowed patient to freely discuss issues without interruption or judgment. Provided safe, confidential environment to facilitate the development of positive therapeutic relationship and encourage open, honest communication. Assisted patient in identifying risk factors which would indicate the need for higher level of care including thoughts to harm self or others and/or self-harming behavior and encouraged patient to contact this office, call 911, or present to the nearest emergency room should any of these events occur. Discussed crisis intervention services and means to access.     MEDS ORDERED DURING VISIT:  New Medications Ordered This Visit   Medications    propranolol (INDERAL) 10 MG tablet     Sig: Take 1 tablet by mouth 2 (Two) Times a Day As Needed (panic/anxiety).     Dispense:  60 tablet     Refill:  2    FLUoxetine (PROzac) 20 MG capsule     Sig: Take 1 capsule by mouth Daily for 90 days.     Dispense:  90 capsule     Refill:  0         Follow Up   Return in about 4 weeks (around 8/24/2023), or if symptoms worsen or fail to improve, for Recheck.    Patient was given instructions and counseling regarding her condition or for health maintenance advice. Please see specific information pulled into the AVS if appropriate.     Some of the data in this electronic note has been brought forward from a previous encounter, any necessary changes have been made, it has been reviewed by this APRN, and it is  accurate.      This document has been electronically signed by ONESIMO Alexander  July 27, 2023 09:32 EDT    Part of this note may be an electronic transcription/translation of spoken language to printed text using the Dragon Dictation System.

## 2023-08-09 RX ORDER — SODIUM, POTASSIUM,MAG SULFATES 17.5-3.13G
SOLUTION, RECONSTITUTED, ORAL ORAL
Qty: 354 ML | Refills: 0 | Status: SHIPPED | OUTPATIENT
Start: 2023-08-09

## 2023-08-18 ENCOUNTER — OUTSIDE FACILITY SERVICE (OUTPATIENT)
Dept: GASTROENTEROLOGY | Facility: CLINIC | Age: 58
End: 2023-08-18
Payer: COMMERCIAL

## 2023-08-18 PROCEDURE — 45385 COLONOSCOPY W/LESION REMOVAL: CPT | Performed by: INTERNAL MEDICINE

## 2023-08-18 PROCEDURE — 88341 IMHCHEM/IMCYTCHM EA ADD ANTB: CPT

## 2023-08-18 PROCEDURE — 88342 IMHCHEM/IMCYTCHM 1ST ANTB: CPT

## 2023-08-18 PROCEDURE — 45381 COLONOSCOPY SUBMUCOUS NJX: CPT | Performed by: INTERNAL MEDICINE

## 2023-08-18 PROCEDURE — 45380 COLONOSCOPY AND BIOPSY: CPT | Performed by: INTERNAL MEDICINE

## 2023-08-18 PROCEDURE — 88305 TISSUE EXAM BY PATHOLOGIST: CPT

## 2023-08-21 ENCOUNTER — TELEPHONE (OUTPATIENT)
Dept: GASTROENTEROLOGY | Facility: CLINIC | Age: 58
End: 2023-08-21
Payer: COMMERCIAL

## 2023-08-21 ENCOUNTER — LAB REQUISITION (OUTPATIENT)
Dept: LAB | Facility: HOSPITAL | Age: 58
End: 2023-08-21
Payer: COMMERCIAL

## 2023-08-21 DIAGNOSIS — D49.0 NEOPLASM OF UNSPECIFIED BEHAVIOR OF DIGESTIVE SYSTEM: ICD-10-CM

## 2023-08-21 DIAGNOSIS — K57.30 DIVERTICULOSIS OF LARGE INTESTINE WITHOUT PERFORATION OR ABSCESS WITHOUT BLEEDING: ICD-10-CM

## 2023-08-21 DIAGNOSIS — Z12.11 ENCOUNTER FOR SCREENING FOR MALIGNANT NEOPLASM OF COLON: ICD-10-CM

## 2023-08-21 DIAGNOSIS — D12.8 BENIGN NEOPLASM OF RECTUM: ICD-10-CM

## 2023-08-21 DIAGNOSIS — Z86.010 PERSONAL HISTORY OF COLONIC POLYPS: ICD-10-CM

## 2023-08-21 NOTE — TELEPHONE ENCOUNTER
HUB TRANSFERRED CALL PATIENT WANTS TO SCHEDULE PROCEDURE TRANSFERRED TO [facility].

## 2023-08-23 LAB — REF LAB TEST METHOD: NORMAL

## 2023-08-24 ENCOUNTER — TELEMEDICINE (OUTPATIENT)
Dept: PSYCHIATRY | Facility: CLINIC | Age: 58
End: 2023-08-24
Payer: COMMERCIAL

## 2023-08-24 ENCOUNTER — LAB (OUTPATIENT)
Dept: LAB | Facility: HOSPITAL | Age: 58
End: 2023-08-24
Payer: COMMERCIAL

## 2023-08-24 ENCOUNTER — OFFICE VISIT (OUTPATIENT)
Dept: GASTROENTEROLOGY | Facility: CLINIC | Age: 58
End: 2023-08-24
Payer: COMMERCIAL

## 2023-08-24 VITALS
WEIGHT: 168.4 LBS | RESPIRATION RATE: 20 BRPM | TEMPERATURE: 96.9 F | SYSTOLIC BLOOD PRESSURE: 119 MMHG | BODY MASS INDEX: 28.91 KG/M2 | OXYGEN SATURATION: 95 % | HEART RATE: 86 BPM | DIASTOLIC BLOOD PRESSURE: 76 MMHG

## 2023-08-24 DIAGNOSIS — D12.5 ADENOMATOUS POLYP OF SIGMOID COLON: ICD-10-CM

## 2023-08-24 DIAGNOSIS — C18.7 MALIGNANT NEOPLASM OF SIGMOID COLON: Primary | ICD-10-CM

## 2023-08-24 DIAGNOSIS — C18.7 MALIGNANT NEOPLASM OF SIGMOID COLON: ICD-10-CM

## 2023-08-24 DIAGNOSIS — F33.41 RECURRENT MAJOR DEPRESSIVE DISORDER, IN PARTIAL REMISSION: ICD-10-CM

## 2023-08-24 DIAGNOSIS — F32.81 PMDD (PREMENSTRUAL DYSPHORIC DISORDER): ICD-10-CM

## 2023-08-24 DIAGNOSIS — G47.9 SLEEP DIFFICULTIES: ICD-10-CM

## 2023-08-24 DIAGNOSIS — F41.1 GENERALIZED ANXIETY DISORDER: Primary | ICD-10-CM

## 2023-08-24 LAB
ALBUMIN SERPL-MCNC: 4.6 G/DL (ref 3.5–5.2)
ALBUMIN/GLOB SERPL: 1.8 G/DL
ALP SERPL-CCNC: 82 U/L (ref 39–117)
ALT SERPL W P-5'-P-CCNC: 34 U/L (ref 1–33)
ANION GAP SERPL CALCULATED.3IONS-SCNC: 10.8 MMOL/L (ref 5–15)
AST SERPL-CCNC: 13 U/L (ref 1–32)
BASOPHILS # BLD AUTO: 0.04 10*3/MM3 (ref 0–0.2)
BASOPHILS NFR BLD AUTO: 0.5 % (ref 0–1.5)
BILIRUB SERPL-MCNC: 0.4 MG/DL (ref 0–1.2)
BUN SERPL-MCNC: 13 MG/DL (ref 6–20)
BUN/CREAT SERPL: 15.7 (ref 7–25)
CALCIUM SPEC-SCNC: 9.5 MG/DL (ref 8.6–10.5)
CEA SERPL-MCNC: 1.26 NG/ML
CHLORIDE SERPL-SCNC: 106 MMOL/L (ref 98–107)
CO2 SERPL-SCNC: 20.2 MMOL/L (ref 22–29)
CREAT SERPL-MCNC: 0.83 MG/DL (ref 0.57–1)
DEPRECATED RDW RBC AUTO: 39.2 FL (ref 37–54)
EGFRCR SERPLBLD CKD-EPI 2021: 83.4 ML/MIN/1.73
EOSINOPHIL # BLD AUTO: 0.15 10*3/MM3 (ref 0–0.4)
EOSINOPHIL NFR BLD AUTO: 1.8 % (ref 0.3–6.2)
ERYTHROCYTE [DISTWIDTH] IN BLOOD BY AUTOMATED COUNT: 12.7 % (ref 12.3–15.4)
GLOBULIN UR ELPH-MCNC: 2.6 GM/DL
GLUCOSE SERPL-MCNC: 95 MG/DL (ref 65–99)
HCT VFR BLD AUTO: 42 % (ref 34–46.6)
HGB BLD-MCNC: 14.4 G/DL (ref 12–15.9)
IMM GRANULOCYTES # BLD AUTO: 0.03 10*3/MM3 (ref 0–0.05)
IMM GRANULOCYTES NFR BLD AUTO: 0.4 % (ref 0–0.5)
LYMPHOCYTES # BLD AUTO: 2.46 10*3/MM3 (ref 0.7–3.1)
LYMPHOCYTES NFR BLD AUTO: 29.6 % (ref 19.6–45.3)
MCH RBC QN AUTO: 29.6 PG (ref 26.6–33)
MCHC RBC AUTO-ENTMCNC: 34.3 G/DL (ref 31.5–35.7)
MCV RBC AUTO: 86.2 FL (ref 79–97)
MONOCYTES # BLD AUTO: 0.59 10*3/MM3 (ref 0.1–0.9)
MONOCYTES NFR BLD AUTO: 7.1 % (ref 5–12)
NEUTROPHILS NFR BLD AUTO: 5.03 10*3/MM3 (ref 1.7–7)
NEUTROPHILS NFR BLD AUTO: 60.6 % (ref 42.7–76)
NRBC BLD AUTO-RTO: 0 /100 WBC (ref 0–0.2)
PLATELET # BLD AUTO: 327 10*3/MM3 (ref 140–450)
PMV BLD AUTO: 9.7 FL (ref 6–12)
POTASSIUM SERPL-SCNC: 4.1 MMOL/L (ref 3.5–5.2)
PROT SERPL-MCNC: 7.2 G/DL (ref 6–8.5)
RBC # BLD AUTO: 4.87 10*6/MM3 (ref 3.77–5.28)
SODIUM SERPL-SCNC: 137 MMOL/L (ref 136–145)
WBC NRBC COR # BLD: 8.3 10*3/MM3 (ref 3.4–10.8)

## 2023-08-24 PROCEDURE — 36415 COLL VENOUS BLD VENIPUNCTURE: CPT | Performed by: INTERNAL MEDICINE

## 2023-08-24 PROCEDURE — 82378 CARCINOEMBRYONIC ANTIGEN: CPT | Performed by: INTERNAL MEDICINE

## 2023-08-24 PROCEDURE — 80053 COMPREHEN METABOLIC PANEL: CPT

## 2023-08-24 PROCEDURE — 85025 COMPLETE CBC W/AUTO DIFF WBC: CPT | Performed by: INTERNAL MEDICINE

## 2023-08-24 PROCEDURE — 99214 OFFICE O/P EST MOD 30 MIN: CPT | Performed by: NURSE PRACTITIONER

## 2023-08-24 PROCEDURE — 99214 OFFICE O/P EST MOD 30 MIN: CPT | Performed by: INTERNAL MEDICINE

## 2023-08-24 RX ORDER — ARIPIPRAZOLE 5 MG/1
5 TABLET ORAL DAILY
Qty: 90 TABLET | Refills: 0 | Status: SHIPPED | OUTPATIENT
Start: 2023-08-24

## 2023-08-24 RX ORDER — DULOXETIN HYDROCHLORIDE 60 MG/1
60 CAPSULE, DELAYED RELEASE ORAL DAILY
Qty: 90 CAPSULE | Refills: 0 | Status: SHIPPED | OUTPATIENT
Start: 2023-08-24

## 2023-08-24 RX ORDER — HYDROXYZINE HYDROCHLORIDE 25 MG/1
25-50 TABLET, FILM COATED ORAL NIGHTLY PRN
Qty: 60 TABLET | Refills: 1 | Status: SHIPPED | OUTPATIENT
Start: 2023-08-24

## 2023-08-24 NOTE — PROGRESS NOTES
This provider is located at [facility], [address] [place] [address].The Patient is seen remotely at home, [address] [place] [address] via [email or web address]. Patient is being seen via telehealth and confirm that they are in a secure environment for this session. The patient's condition being diagnosed/treated is appropriate for telemedicine. The provider identified himself/herself: herself as well as her credentials.   The patient gave consent to be seen remotely, and when consent is given they understand that the consent allows for patient identifiable information to be sent to a third party as needed.   They may refuse to be seen remotely at any time. The electronic data is encrypted and password protected, and the patient has been advised of the potential risks to privacy not withstanding such measures.    You have chosen to receive care through a telehealth visit.  Do you consent to use a video/audio connection for your medical care today? Yes      Chief Complaint  Follow-up for depression and anxiety     Subjective    Yelena Radha Morris presents to [facility] for medication management.       History of Present Illness   Patient presents today reporting that propranolol has helped a when she anxious and denies any side effects.  Patient denies any depressive symptoms or feeling hopeless or helpless.  Patient states however she had her colonoscopy and they did find cancerous cells that they biopsied and she gets the results today.  The doctor said he was 99% sure that it was cancer but could not be for certain without the biopsy.  Patient states that she has done well with this however at night she gets to feeling anxious with racing thoughts and has a hard time sleeping.  Patient reports that she has a fracture to right so she had those fixed with implants.  Patient reports that she is trying not to worry and waiting to see what the  results were today and the plan of action to go from there.  Patient feels her sleep when she does get anxious is the main issue at this time.  Patient states that she has 2 grandchildren to be born in Jan and March and her daughter is getting  next month which she is excited about.  Denies any side effects to medications.  Denies any SI/HI/AH/VH.      Objective   Vital Signs:   There were no vitals taken for this visit.  Due to the remote nature of this encounter (virtual encounter), vitals were unable to be obtained.  Height stated at 64 inches.  Weight stated at 171 pounds.      PHQ-9 Depression Screening  Little interest or pleasure in doing things?     Feeling down, depressed, or hopeless?     Trouble falling or staying asleep, or sleeping too much?     Feeling tired or having little energy?     Poor appetite or overeating?     Feeling bad about yourself - or that you are a failure or have let yourself or your family down?     Trouble concentrating on things, such as reading the newspaper or watching television?     Moving or speaking so slowly that other people could have noticed? Or the opposite - being so fidgety or restless that you have been moving around a lot more than usual?     Thoughts that you would be better off dead, or of hurting yourself in some way?     PHQ-9 Total Score     If you checked off any problems, how difficult have these problems made it for you to do your work, take care of things at home, or get along with other people?         PHQ-9 Total Score:            Mental Status Exam:   Hygiene:   good  Cooperation:  Cooperative  Eye Contact:  Good  Psychomotor Behavior:  Appropriate  Affect:  Appropriate  Mood: normal and anxious  Speech:  Normal  Thought Process:  Goal directed and Linear  Thought Content:  Normal  Suicidal:  None  Homicidal:  None  Hallucinations:  None  Delusion:  None  Memory:  Intact  Orientation:  Person, Place, Time and Situation  Reliability:  good  Insight:   Good  Judgement:  Good  Impulse Control:  Good  Physical/Medical Issues:  Yes HTN, DM, fibromyalgia, carpal tunnel syndrome      Current Medications:   Current Outpatient Medications   Medication Sig Dispense Refill    ARIPiprazole (ABILIFY) 5 MG tablet Take 1 tablet by mouth Daily. 90 tablet 0    DULoxetine (CYMBALTA) 60 MG capsule Take 1 capsule by mouth Daily. 90 capsule 0    atorvastatin (LIPITOR) 40 MG tablet TAKE 1 TABLET BY MOUTH EVERY EVENING 90 tablet 3    BOTOX 200 units reconstituted solution INJECT 200 UNITS INTO THE HEAD OR NECK AREA BY MD EVERY 90 DAYS  3    Cholecalciferol (VITAMIN D) 2000 units capsule Take 1 capsule by mouth Daily.      cyclobenzaprine (FLEXERIL) 10 MG tablet Take 1 tablet by mouth At Night As Needed for Muscle Spasms. 30 tablet 1    Dulaglutide (Trulicity) 1.5 MG/0.5ML solution pen-injector Inject 1.5 mg under the skin into the appropriate area as directed 1 (One) Time Per Week. 6 mL 1    eletriptan (RELPAX) 40 MG tablet TAKE 1 TABLET BY MOUTH AT THE ONSET OF MIGRAINE. MAY REPEAT IN 2 HRS.MAX 2 DOSE/24HR,3 DAYS/WEEK  11    FLUoxetine (PROzac) 20 MG capsule Take 1 capsule by mouth Daily for 90 days. 90 capsule 0    glimepiride (AMARYL) 2 MG tablet Take 1 tablet by mouth 2 (Two) Times a Day. 180 tablet 3    glucose blood test strip 1 each by Other route Daily. Use as instructed 100 each 2    glucose monitor monitoring kit 1 each Daily. 1 each 1    hydroCHLOROthiazide (HYDRODIURIL) 12.5 MG tablet TAKE ONE TABLET BY MOUTH DAILY 90 tablet 3    hydrOXYzine (ATARAX) 25 MG tablet Take 1-2 tablets by mouth At Night As Needed for Anxiety (sleep). 60 tablet 1    Lancets 28G misc 1 application Daily. 100 each 3    metFORMIN ER (GLUCOPHAGE-XR) 500 MG 24 hr tablet TAKE 4 TABLETS BY MOUTH EVERY DAY WITH DINNER 360 tablet 3    nebivolol (Bystolic) 10 MG tablet Take 1 tablet by mouth Daily. 90 tablet 3    NON FORMULARY Compounded cream with testosterone and dhea      nortriptyline (PAMELOR) 10 MG  capsule nortriptyline 10 mg capsule   TAKE 4 CAPSULES AT BEDTIME      Nutritional Supplements (DHEA PO) Take  by mouth.      omeprazole (priLOSEC) 40 MG capsule Take 1 capsule by mouth Daily. 90 capsule 3    propranolol (INDERAL) 10 MG tablet Take 1 tablet by mouth 2 (Two) Times a Day As Needed (panic/anxiety). 60 tablet 2    sodium-potassium-magnesium sulfates (Suprep Bowel Prep Kit) 17.5-3.13-1.6 GM/177ML solution oral solution Use as directed by provider for colonoscopy prep 354 mL 0    Topiramate ER (Trokendi XR) 200 MG capsule sustained-release 24 hr Daily.       No current facility-administered medications for this visit.       Physical Exam  Nursing note reviewed.   Constitutional:       Appearance: Normal appearance.   Neurological:      Mental Status: She is alert.   Psychiatric:         Attention and Perception: Attention and perception normal.         Mood and Affect: Affect normal. Mood is anxious. Mood is not depressed.         Speech: Speech normal.         Behavior: Behavior normal. Behavior is cooperative.         Thought Content: Thought content normal.         Cognition and Memory: Cognition and memory normal.         Judgment: Judgment normal.      Result Review :     The following data was reviewed by: ONESIMO Alexander on 02/01/2021:  Common labs          10/3/2022    10:25 10/3/2022    11:04 3/8/2023    17:23 6/21/2023    16:24   Common Labs   Glucose  96      BUN  14      Creatinine  0.79      Sodium  137      Potassium  4.1      Chloride  100      Calcium  9.4      Albumin  4.20      Total Bilirubin  0.4      Alkaline Phosphatase  83      AST (SGOT)  14      ALT (SGPT)  26      WBC  10.64      Hemoglobin  14.5      Hematocrit  42.3      Platelets  318      Total Cholesterol  194      Triglycerides  298      HDL Cholesterol  35      LDL Cholesterol   108      Hemoglobin A1C 6.4   8.0  7.7    Data reviewed: PCP notes         Assessment and Plan    Problem List Items Addressed This Visit           Mental Health    Recurrent major depressive disorder    Relevant Medications    hydrOXYzine (ATARAX) 25 MG tablet    DULoxetine (CYMBALTA) 60 MG capsule    ARIPiprazole (ABILIFY) 5 MG tablet     Other Visit Diagnoses       Generalized anxiety disorder    -  Primary    Relevant Medications    hydrOXYzine (ATARAX) 25 MG tablet    DULoxetine (CYMBALTA) 60 MG capsule    ARIPiprazole (ABILIFY) 5 MG tablet    PMDD (premenstrual dysphoric disorder)        Relevant Medications    hydrOXYzine (ATARAX) 25 MG tablet    DULoxetine (CYMBALTA) 60 MG capsule    ARIPiprazole (ABILIFY) 5 MG tablet    Sleep difficulties        Relevant Medications    hydrOXYzine (ATARAX) 25 MG tablet          TREATMENT PLAN/GOALS: Continue supportive psychotherapy efforts and medications as indicated. Treatment and medication options discussed during today's visit. Patient ackowledged and verbally consented to continue with current treatment plan and was educated on the importance of compliance with treatment and follow-up appointments.    MEDICATION ISSUES:  We discussed risks, benefits, and side effects of the above medications and the patient was agreeable with the plan. Patient was educated on the importance of compliance with treatment and follow-up appointments.  Patient is agreeable to call the office with any worsening of symptoms or onset of side effects. Patient is agreeable to call 911 or go to the nearest ER should he/she begin having SI/HI.        -Continue Cymbalta 60 mg daily for depression and anxiety as patient has benefited greatly as well for her pain.  -Continue fluoxetine 20 mg daily for depression and PMDD.   -Patient educated extensively regarding the risk of serotonin syndrome as she is currently on duloxetine due to her pain as we have previously tried to taper and it is not effective so she is on fluoxetine 20 mg daily to help with her overall depression and mood.  Patient verbalized understanding was  agreeable.  -Continue aripiprazole 5 mg as adjunct for depression as patient has failed numerous SSRIs and SNRIs and has benefited the patient tremendously and is medically necessary for her depression to avoid hospitalization.   -Continue propranolol 10 mg twice daily as needed for anxiety and panic.  Encouraged patient if it decreased her blood pressure or heart rate to contact the clinic and discontinue.  -Begin hydroxyzine 25 to 50 mg at night as needed for sleep and anxiousness.  Highly encouraged patient if she had any worsening symptoms or concerns to contact clinic for sooner appointment she verbalized understanding.      Counseled patient regarding multimodal approach with healthy nutrition, healthy sleep, regular physical activity, social activities, counseling, and medications.      Coping skills reviewed and encouraged positive framing of thoughts     Assisted patient in processing above session content; acknowledged and normalized patient's thoughts, feelings, and concerns.  Applied  positive coping skills and behavior management in session.  Allowed patient to freely discuss issues without interruption or judgment. Provided safe, confidential environment to facilitate the development of positive therapeutic relationship and encourage open, honest communication. Assisted patient in identifying risk factors which would indicate the need for higher level of care including thoughts to harm self or others and/or self-harming behavior and encouraged patient to contact this office, call 911, or present to the nearest emergency room should any of these events occur. Discussed crisis intervention services and means to access.     MEDS ORDERED DURING VISIT:  New Medications Ordered This Visit   Medications    hydrOXYzine (ATARAX) 25 MG tablet     Sig: Take 1-2 tablets by mouth At Night As Needed for Anxiety (sleep).     Dispense:  60 tablet     Refill:  1    DULoxetine (CYMBALTA) 60 MG capsule     Sig: Take 1  capsule by mouth Daily.     Dispense:  90 capsule     Refill:  0    ARIPiprazole (ABILIFY) 5 MG tablet     Sig: Take 1 tablet by mouth Daily.     Dispense:  90 tablet     Refill:  0         Follow Up   Return in about 4 weeks (around 9/21/2023), or if symptoms worsen or fail to improve, for Recheck.    Patient was given instructions and counseling regarding her condition or for health maintenance advice. Please see specific information pulled into the AVS if appropriate.     Some of the data in this electronic note has been brought forward from a previous encounter, any necessary changes have been made, it has been reviewed by this APRN, and it is accurate.      This document has been electronically signed by ONESIMO Alexander  August 24, 2023 09:03 EDT    Part of this note may be an electronic transcription/translation of spoken language to printed text using the Dragon Dictation System.

## 2023-08-24 NOTE — LETTER
August 24, 2023       No Recipients    Patient: Yelena Morris   YOB: 1965   Date of Visit: 8/24/2023     Dear Fiona Corona MD:       Thank you for referring Yelena Morris to me for evaluation. Below are the relevant portions of my assessment and plan of care.    If you have questions, please do not hesitate to call me. I look forward to following Yelena along with you.         Sincerely,        Darin Calle MD        CC:   No Recipients    Darin Calle MD  08/24/23 1710  Sign when Signing Visit  PCP:  Fiona Corona MD     No referring provider defined for this encounter.    Chief Complaint   Patient presents with    Follow-up     Follow up on colonoscopy         HPI   The patient is a 57-year-old known to me.  She had a colonoscopy in January 2018.  She had an adenoma removed.  She is here for follow-up evaluation.  She denies any family history of colon polyps or cancers.  Colonoscopy on 8/18/2023 did show a rectal polyp about 7 mm in size removed.  She also had an ulcerated nonobstructing crescent-shaped lesion at 20 centimeters from the anus.  It was thought to be in the sigmoid.  It was not circumferential.  It was about 2 cm in size.  The area was tattooed and biopsied.  Biopsies came back consistent with moderately differentiated adenocarcinoma arising in a tubular adenoma.    Allergies   Allergen Reactions    Dihydroergotamine GI Intolerance     VOMITING    Methergine [Methylergonovine Maleate] Nausea And Vomiting    Amoxicillin Rash          Current Outpatient Medications:     ARIPiprazole (ABILIFY) 5 MG tablet, Take 1 tablet by mouth Daily., Disp: 90 tablet, Rfl: 0    atorvastatin (LIPITOR) 40 MG tablet, TAKE 1 TABLET BY MOUTH EVERY EVENING, Disp: 90 tablet, Rfl: 3    BOTOX 200 units reconstituted solution, INJECT 200 UNITS INTO THE HEAD OR NECK AREA BY MD EVERY 90 DAYS, Disp: , Rfl: 3    cyclobenzaprine (FLEXERIL) 10 MG tablet, Take 1 tablet by  mouth At Night As Needed for Muscle Spasms., Disp: 30 tablet, Rfl: 1    Dulaglutide (Trulicity) 1.5 MG/0.5ML solution pen-injector, Inject 1.5 mg under the skin into the appropriate area as directed 1 (One) Time Per Week., Disp: 6 mL, Rfl: 1    DULoxetine (CYMBALTA) 60 MG capsule, Take 1 capsule by mouth Daily., Disp: 90 capsule, Rfl: 0    eletriptan (RELPAX) 40 MG tablet, TAKE 1 TABLET BY MOUTH AT THE ONSET OF MIGRAINE. MAY REPEAT IN 2 HRS.MAX 2 DOSE/24HR,3 DAYS/WEEK, Disp: , Rfl: 11    FLUoxetine (PROzac) 20 MG capsule, Take 1 capsule by mouth Daily for 90 days., Disp: 90 capsule, Rfl: 0    glucose blood test strip, 1 each by Other route Daily. Use as instructed, Disp: 100 each, Rfl: 2    glucose monitor monitoring kit, 1 each Daily., Disp: 1 each, Rfl: 1    hydrOXYzine (ATARAX) 25 MG tablet, Take 1-2 tablets by mouth At Night As Needed for Anxiety (sleep)., Disp: 60 tablet, Rfl: 1    Lancets 28G misc, 1 application Daily., Disp: 100 each, Rfl: 3    metFORMIN ER (GLUCOPHAGE-XR) 500 MG 24 hr tablet, TAKE 4 TABLETS BY MOUTH EVERY DAY WITH DINNER, Disp: 360 tablet, Rfl: 3    nebivolol (Bystolic) 10 MG tablet, Take 1 tablet by mouth Daily., Disp: 90 tablet, Rfl: 3    nortriptyline (PAMELOR) 10 MG capsule, nortriptyline 10 mg capsule  TAKE 4 CAPSULES AT BEDTIME, Disp: , Rfl:     omeprazole (priLOSEC) 40 MG capsule, Take 1 capsule by mouth Daily., Disp: 90 capsule, Rfl: 3    propranolol (INDERAL) 10 MG tablet, Take 1 tablet by mouth 2 (Two) Times a Day As Needed (panic/anxiety)., Disp: 60 tablet, Rfl: 2    Topiramate ER (Trokendi XR) 200 MG capsule sustained-release 24 hr, Daily., Disp: , Rfl:     Cholecalciferol (VITAMIN D) 2000 units capsule, Take 1 capsule by mouth Daily., Disp: , Rfl:     glimepiride (AMARYL) 2 MG tablet, Take 1 tablet by mouth 2 (Two) Times a Day., Disp: 180 tablet, Rfl: 3    hydroCHLOROthiazide (HYDRODIURIL) 12.5 MG tablet, TAKE ONE TABLET BY MOUTH DAILY (Patient not taking:  Reported on 8/24/2023), Disp: 90 tablet, Rfl: 3    NON FORMULARY, Compounded cream with testosterone and dhea, Disp: , Rfl:     Nutritional Supplements (DHEA PO), Take  by mouth. (Patient not taking: Reported on 8/24/2023), Disp: , Rfl:     sodium-potassium-magnesium sulfates (Suprep Bowel Prep Kit) 17.5-3.13-1.6 GM/177ML solution oral solution, Use as directed by provider for colonoscopy prep, Disp: 354 mL, Rfl: 0     Past Medical History:   Diagnosis Date    Anxiety     Arthritis     Cant recall the date of onset    Chest pain 11/06/2019    Colon polyp     Depression     Diabetes mellitus     Dysphagia 11/06/2019    Endometriosis     Fibromyalgia, primary     GERD (gastroesophageal reflux disease)     Headache     HL (hearing loss)     Hyperlipidemia     Hypertension     Low vitamin D level     Migraines     Ovarian cyst     PMDD (premenstrual dysphoric disorder)     Scoliosis     Wears glasses        Past Surgical History:   Procedure Laterality Date    BLADDER SURGERY      Bladder tuck    COLONOSCOPY      every 5 years    ENDOMETRIAL ABLATION      LUMBAR DISCECTOMY  2010    L4-S1 Rt- discectomy Dr. Arellano    SPINAL CORD STIMULATOR IMPLANT N/A 08/19/2019    Procedure: SPINAL CORD STIMULATOR INSERTION;  Surgeon: Ethan Peters MD;  [facility];  Service: Neurosurgery    SPINE SURGERY  2010    TONSILLECTOMY AND ADENOIDECTOMY          Social History     Socioeconomic History    Marital status:    Tobacco Use    Smoking status: Never    Smokeless tobacco: Never   Vaping Use    Vaping Use: Never used   Substance and Sexual Activity    Alcohol use: No    Drug use: Never    Sexual activity: Yes     Partners: Male     Birth control/protection: Post-menopausal, Vasectomy        Family History   Adopted: Yes        Review of Systems     Vitals:    08/24/23 1358   BP: 119/76   Pulse: 86   Resp: 20   Temp: 96.9 øF (36.1 øC)   SpO2: 95%        Physical  Exam  Constitutional:       General: She is not in acute distress.     Appearance: Normal appearance. She is not ill-appearing.   Neurological:      Mental Status: She is alert.        Diagnoses and all orders for this visit:    1. Malignant neoplasm of sigmoid colon (Primary)  -     CBC & Differential  -     Comprehensive Metabolic Panel; Future  -     CEA  -     CT Chest With Contrast Diagnostic; Future  -     CT Abdomen Pelvis With Contrast; Future  -     Ambulatory Referral to Oncology Nurse Navigator  -     MRI Pelvis Without Contrast; Future    2. Adenomatous polyp of sigmoid colon    Impressions and plan #1 moderately differentiated adenocarcinoma arising within a tubular adenoma with high-grade dysplasia at 20 cm.  We are going to get a CAT scan of the abdomen and pelvis.  I think this is in the sigmoid but we will check an MRI of the pelvis as well to be sure.  We will check a CEA as well as a CBC and CMP.  I have talked to Dr. Chaparro.  I have talked to the nurse navigator.  They would like to see Dr. Michel at  for surgery.  I will make a follow-up with me as well.  Hopefully this is localized disease.  They are familiar as Mr. Morris had a rectal cancer and is undergoing continued treatment.    Darin Calle MD

## 2023-08-24 NOTE — PROGRESS NOTES
PCP:  Fiona Corona MD     No referring provider defined for this encounter.    Chief Complaint   Patient presents with    Follow-up     Follow up on colonoscopy         HPI   The patient is a 57-year-old known to me.  She had a colonoscopy in January 2018.  She had an adenoma removed.  She is here for follow-up evaluation.  She denies any family history of colon polyps or cancers.  Colonoscopy on 8/18/2023 did show a rectal polyp about 7 mm in size removed.  She also had an ulcerated nonobstructing crescent-shaped lesion at 20 centimeters from the anus.  It was thought to be in the sigmoid.  It was not circumferential.  It was about 2 cm in size.  The area was tattooed and biopsied.  Biopsies came back consistent with moderately differentiated adenocarcinoma arising in a tubular adenoma.    Allergies   Allergen Reactions    Dihydroergotamine GI Intolerance     VOMITING    Methergine [Methylergonovine Maleate] Nausea And Vomiting    Amoxicillin Rash          Current Outpatient Medications:     ARIPiprazole (ABILIFY) 5 MG tablet, Take 1 tablet by mouth Daily., Disp: 90 tablet, Rfl: 0    atorvastatin (LIPITOR) 40 MG tablet, TAKE 1 TABLET BY MOUTH EVERY EVENING, Disp: 90 tablet, Rfl: 3    BOTOX 200 units reconstituted solution, INJECT 200 UNITS INTO THE HEAD OR NECK AREA BY MD EVERY 90 DAYS, Disp: , Rfl: 3    cyclobenzaprine (FLEXERIL) 10 MG tablet, Take 1 tablet by mouth At Night As Needed for Muscle Spasms., Disp: 30 tablet, Rfl: 1    Dulaglutide (Trulicity) 1.5 MG/0.5ML solution pen-injector, Inject 1.5 mg under the skin into the appropriate area as directed 1 (One) Time Per Week., Disp: 6 mL, Rfl: 1    DULoxetine (CYMBALTA) 60 MG capsule, Take 1 capsule by mouth Daily., Disp: 90 capsule, Rfl: 0    eletriptan (RELPAX) 40 MG tablet, TAKE 1 TABLET BY MOUTH AT THE ONSET OF MIGRAINE. MAY REPEAT IN 2 HRS.MAX 2 DOSE/24HR,3 DAYS/WEEK, Disp: , Rfl: 11    FLUoxetine (PROzac) 20 MG capsule, Take 1 capsule by mouth  Daily for 90 days., Disp: 90 capsule, Rfl: 0    glucose blood test strip, 1 each by Other route Daily. Use as instructed, Disp: 100 each, Rfl: 2    glucose monitor monitoring kit, 1 each Daily., Disp: 1 each, Rfl: 1    hydrOXYzine (ATARAX) 25 MG tablet, Take 1-2 tablets by mouth At Night As Needed for Anxiety (sleep)., Disp: 60 tablet, Rfl: 1    Lancets 28G misc, 1 application Daily., Disp: 100 each, Rfl: 3    metFORMIN ER (GLUCOPHAGE-XR) 500 MG 24 hr tablet, TAKE 4 TABLETS BY MOUTH EVERY DAY WITH DINNER, Disp: 360 tablet, Rfl: 3    nebivolol (Bystolic) 10 MG tablet, Take 1 tablet by mouth Daily., Disp: 90 tablet, Rfl: 3    nortriptyline (PAMELOR) 10 MG capsule, nortriptyline 10 mg capsule  TAKE 4 CAPSULES AT BEDTIME, Disp: , Rfl:     omeprazole (priLOSEC) 40 MG capsule, Take 1 capsule by mouth Daily., Disp: 90 capsule, Rfl: 3    propranolol (INDERAL) 10 MG tablet, Take 1 tablet by mouth 2 (Two) Times a Day As Needed (panic/anxiety)., Disp: 60 tablet, Rfl: 2    Topiramate ER (Trokendi XR) 200 MG capsule sustained-release 24 hr, Daily., Disp: , Rfl:     Cholecalciferol (VITAMIN D) 2000 units capsule, Take 1 capsule by mouth Daily., Disp: , Rfl:     glimepiride (AMARYL) 2 MG tablet, Take 1 tablet by mouth 2 (Two) Times a Day., Disp: 180 tablet, Rfl: 3    hydroCHLOROthiazide (HYDRODIURIL) 12.5 MG tablet, TAKE ONE TABLET BY MOUTH DAILY (Patient not taking: Reported on 8/24/2023), Disp: 90 tablet, Rfl: 3    NON FORMULARY, Compounded cream with testosterone and dhea, Disp: , Rfl:     Nutritional Supplements (DHEA PO), Take  by mouth. (Patient not taking: Reported on 8/24/2023), Disp: , Rfl:     sodium-potassium-magnesium sulfates (Suprep Bowel Prep Kit) 17.5-3.13-1.6 GM/177ML solution oral solution, Use as directed by provider for colonoscopy prep, Disp: 354 mL, Rfl: 0     Past Medical History:   Diagnosis Date    Anxiety     Arthritis     Cant recall the date of onset    Chest pain 11/06/2019    Colon polyp      Depression     Diabetes mellitus     Dysphagia 11/06/2019    Endometriosis     Fibromyalgia, primary     GERD (gastroesophageal reflux disease)     Headache     HL (hearing loss)     Hyperlipidemia     Hypertension     Low vitamin D level     Migraines     Ovarian cyst     PMDD (premenstrual dysphoric disorder)     Scoliosis     Wears glasses        Past Surgical History:   Procedure Laterality Date    BLADDER SURGERY      Bladder tuck    COLONOSCOPY      every 5 years    ENDOMETRIAL ABLATION      LUMBAR DISCECTOMY  2010    L4-S1 Rt- discectomy Dr. Arellano    SPINAL CORD STIMULATOR IMPLANT N/A 08/19/2019    Procedure: SPINAL CORD STIMULATOR INSERTION;  Surgeon: Ethan Peters MD;  Location: [facility];  Service: Neurosurgery    SPINE SURGERY  2010    TONSILLECTOMY AND ADENOIDECTOMY          Social History     Socioeconomic History    Marital status:    Tobacco Use    Smoking status: Never    Smokeless tobacco: Never   Vaping Use    Vaping Use: Never used   Substance and Sexual Activity    Alcohol use: No    Drug use: Never    Sexual activity: Yes     Partners: Male     Birth control/protection: Post-menopausal, Vasectomy        Family History   Adopted: Yes        Review of Systems     Vitals:    08/24/23 1358   BP: 119/76   Pulse: 86   Resp: 20   Temp: 96.9 øF (36.1 øC)   SpO2: 95%        Physical Exam  Constitutional:       General: She is not in acute distress.     Appearance: Normal appearance. She is not ill-appearing.   Neurological:      Mental Status: She is alert.        Diagnoses and all orders for this visit:    1. Malignant neoplasm of sigmoid colon (Primary)  -     CBC & Differential  -     Comprehensive Metabolic Panel; Future  -     CEA  -     CT Chest With Contrast Diagnostic; Future  -     CT Abdomen Pelvis With Contrast; Future  -     Ambulatory Referral to Oncology Nurse Navigator  -     MRI Pelvis Without Contrast; Future    2. Adenomatous polyp of sigmoid colon    Impressions and plan  #1 moderately differentiated adenocarcinoma arising within a tubular adenoma with high-grade dysplasia at 20 cm.  We are going to get a CAT scan of the abdomen and pelvis.  I think this is in the sigmoid but we will check an MRI of the pelvis as well to be sure.  We will check a CEA as well as a CBC and CMP.  I have talked to Dr. Chaparro.  I have talked to the nurse navigator.  They would like to see Dr. Michel at  for surgery.  I will make a follow-up with me as well.  Hopefully this is localized disease.  They are familiar as Mr. Morris had a rectal cancer and is undergoing continued treatment.    Darin Calle MD

## 2023-08-25 ENCOUNTER — PATIENT OUTREACH (OUTPATIENT)
Dept: ONCOLOGY | Facility: CLINIC | Age: 58
End: 2023-08-25
Payer: COMMERCIAL

## 2023-08-25 NOTE — SIGNIFICANT NOTE
Called pt, but had to leave her a voicemail. I also sent her an email letting her know about her scans on 8/27/23. I asked that she call me if she read the email. I did let her know to please charge her stimulator and bring her remote.

## 2023-08-27 ENCOUNTER — HOSPITAL ENCOUNTER (OUTPATIENT)
Dept: CT IMAGING | Facility: HOSPITAL | Age: 58
Discharge: HOME | End: 2023-08-27
Payer: COMMERCIAL

## 2023-08-27 ENCOUNTER — HOSPITAL ENCOUNTER (OUTPATIENT)
Dept: MRI IMAGING | Facility: HOSPITAL | Age: 58
Discharge: HOME | End: 2023-08-27
Payer: COMMERCIAL

## 2023-08-27 DIAGNOSIS — C18.7 MALIGNANT NEOPLASM OF SIGMOID COLON: ICD-10-CM

## 2023-08-27 PROCEDURE — 74177 CT ABD & PELVIS W/CONTRAST: CPT

## 2023-08-27 PROCEDURE — 25510000001 IOPAMIDOL 61 % SOLUTION: Performed by: INTERNAL MEDICINE

## 2023-08-27 PROCEDURE — 72195 MRI PELVIS W/O DYE: CPT

## 2023-08-27 RX ADMIN — IOPAMIDOL 80 ML: 612 INJECTION, SOLUTION INTRAVENOUS at 12:33

## 2023-08-28 ENCOUNTER — PATIENT OUTREACH (OUTPATIENT)
Dept: ONCOLOGY | Facility: CLINIC | Age: 58
End: 2023-08-28
Payer: COMMERCIAL

## 2023-08-28 ENCOUNTER — HOSPITAL ENCOUNTER (OUTPATIENT)
Dept: CT IMAGING | Facility: HOSPITAL | Age: 58
Discharge: HOME | End: 2023-08-28
Admitting: INTERNAL MEDICINE
Payer: COMMERCIAL

## 2023-08-28 DIAGNOSIS — C18.7 MALIGNANT NEOPLASM OF SIGMOID COLON: ICD-10-CM

## 2023-08-28 LAB — CREAT BLDA-MCNC: 0.9 MG/DL (ref 0.6–1.3)

## 2023-08-28 PROCEDURE — 71260 CT THORAX DX C+: CPT

## 2023-08-28 PROCEDURE — 82565 ASSAY OF CREATININE: CPT

## 2023-08-28 PROCEDURE — 25510000001 IOPAMIDOL 61 % SOLUTION: Performed by: INTERNAL MEDICINE

## 2023-08-28 RX ADMIN — IOPAMIDOL 55 ML: 612 INJECTION, SOLUTION INTRAVENOUS at 15:31

## 2023-08-28 NOTE — SIGNIFICANT NOTE
Called pt to explain to her that her CT Chest wasn't done yesterday while she did get her CT A/P and MRI pelvis with RP. Notified Dr. Chaparro and Eb, CT Supervisor. Art rescheduled pt for today at 3PM at [facility]. Pt notified. Reached out to Dr. López, Radiologist, and asked that he read pt's MRI pelvis with RP and CT Chest before her appointment with Dr. Chaparro tomorrow at 1 PM. Dr. López said that both would get read by then. Notified Dr. Chaparro/Lucia with Cancer [facility]. Phoebe is putting pt on for Multidisciplinary TB on this Friday. Sent pt address of [facility] office where Dr. Chaparro is seeing pt. Pt also asked to be at the FF at 12:30 PM tomorrow to fill out paperwork.

## 2023-08-29 ENCOUNTER — CONSULT (OUTPATIENT)
Dept: ONCOLOGY | Facility: CLINIC | Age: 58
End: 2023-08-29
Payer: COMMERCIAL

## 2023-08-29 VITALS
DIASTOLIC BLOOD PRESSURE: 81 MMHG | TEMPERATURE: 98 F | BODY MASS INDEX: 28.51 KG/M2 | RESPIRATION RATE: 16 BRPM | WEIGHT: 167 LBS | HEART RATE: 93 BPM | OXYGEN SATURATION: 97 % | HEIGHT: 64 IN | SYSTOLIC BLOOD PRESSURE: 117 MMHG

## 2023-08-29 DIAGNOSIS — C18.7 MALIGNANT NEOPLASM OF SIGMOID COLON: Primary | Chronic | ICD-10-CM

## 2023-08-29 NOTE — PROGRESS NOTES
CHIEF COMPLAINT: No somatic complaints    REASON FOR REFERRAL: Colon cancer      RECORDS OBTAINED  Records of the patients history including those obtained from Dr. Calle were reviewed and summarized in detail.    Oncology/Hematology History Overview Note   1.  Colon cancer found in a tubular adenoma 20 cm from the anal verge with negative staging CT chest abdomen and pelvis normal baseline CEA 1.26 with no visible lesion on MRI rectal protocol.  2.  Goiter with 1.8 cm left thyroid nodule found on colon cancer staging for which ultrasound follow-up recommended  3.  Myomectomy with bladder tack July 2015  4.  Diabetes  5.  Hypertension  6.  Hyperlipidemia  7.  Migraines  8.  Depression  9.  Reflux  10.  History of uterine ablation  11.  History of tonsillectomy  12.  History of tympanostomy tubes  13.  History of L4-5 disc surgery and L5-S1 discectomy and fusion  14.  History of solid and liquid phase dysphagia with history of mild gastritis and some esophageal ranging on EGD 2019  15.  Compound melanocytic nevus with dermatofibroma removed by Dr. Ray 07/12/2023    Oncology history timeline:  -12/20/2019 EGD showed small hiatal hernia with some ringing of the esophagus biopsy.  Mild gastritis.  No intestinal metaplasia or dysplasia or malignancy.  Reflux suggestive but not diagnostic.  -8/18/2023 colonoscopy Dr. Calle.  Diverticulosis in the sigmoid colon.  7 mm polyp in the rectum.  Diverticulosis in the sigmoid colon.  Polypoid area with ulceration crescent-shaped 1.8 cm 20 cm proximal to the anus likely sigmoid.  Tattooed and biopsied.  Rectal polyp showed tubular adenoma with no high-grade dysplasia.  Colon biopsy at 20 cm showed invasive moderately differentiated colon adenocarcinoma arising within a tubular adenoma with high-grade dysplasia.  Microsatellite stable    -8/24/2023 CEA normal 1.26.  ALT 34 bicarb 20.2 otherwise unremarkable CMP.  CBC normal with hemoglobin 14.4 and normal MCV.  -8/27/2023 CT  abdomen pelvis with contrast did not show any colonic mass.  Mild distal esophageal thickening, hepatic steatosis without hepatic metastasis.  No lytic bone disease.  MRI rectal protocol without contrast reviewed with Dr. López.  Perhaps a small pucker at the site of biopsy but no discernible mass per se and no adenopathy.  -8/28/2023 CT chest shows no metastasis.  1.8 cm left thyroid for which follow-up is recommended    -8/29/2023 [facility] medical oncology consult: On screening colonoscopy patient found to have ulcerated polyp at 20 cm from the anal verge with moderately differentiated adenocarcinoma microsatellite stable.  Normal baseline CEA with staging CT chest abdomen pelvis and MRI rectal protocol showing no evidence of metastasis and no visible primary on MRI.  Based on distance from anal verge on colonoscopy and given the absence of visible abnormality on MRI rectal protocol to change that estimated position, would call this colon cancer.  Given microsatellite stability, would not contemplate calling this rectal cancer and treating with immunotherapy but regardless of whether rectal or colon primary this would at least be no more than T1 tumor clinically with no visible tumor on MRI despite colonoscopy findings.  The patient has experience with Dr. Michel with whom she requests a consultation for definitive surgery and I will see back postoperatively to decide on any adjuvant therapy as indicated based on pathologic staging.  There is some esophageal thickening on CT for which I would request Dr. Calle repeat EGD last done 2019 just for completeness sake but first would deal with the colon cancer at hand.She will also need to get left thyroid ultrasound eventually and would likely do this through whoever has managed her goiter previously.         Malignant neoplasm of sigmoid colon   8/29/2023 Initial Diagnosis    Malignant neoplasm of sigmoid colon         HISTORY OF PRESENT ILLNESS:  The patient is a 57  y.o.  female, referred for coincidental colon cancer found on screening colonoscopy.    REVIEW OF SYSTEMS:  No somatic complaints    Past Medical History:   Diagnosis Date    Anxiety     Arthritis     Cant recall the date of onset    Chest pain 11/06/2019    Colon polyp     Depression     Diabetes mellitus     Dysphagia 11/06/2019    Endometriosis     Fibromyalgia, primary     GERD (gastroesophageal reflux disease)     Headache     HL (hearing loss)     Hyperlipidemia     Hypertension     Low vitamin D level     Migraines     Ovarian cyst     PMDD (premenstrual dysphoric disorder)     Scoliosis     Wears glasses      Past Surgical History:   Procedure Laterality Date    BLADDER SURGERY      Bladder tuck    COLONOSCOPY      every 5 years    ENDOMETRIAL ABLATION      LUMBAR DISCECTOMY  2010    L4-S1 Rt- discectomy Dr. Arellano    SPINAL CORD STIMULATOR IMPLANT N/A 08/19/2019    Procedure: SPINAL CORD STIMULATOR INSERTION;  Surgeon: Ethan Peters MD;  Location[facility];  Service: Neurosurgery    SPINE SURGERY  2010    TONSILLECTOMY AND ADENOIDECTOMY         Current Outpatient Medications on File Prior to Visit   Medication Sig Dispense Refill    ARIPiprazole (ABILIFY) 5 MG tablet Take 1 tablet by mouth Daily. 90 tablet 0    atorvastatin (LIPITOR) 40 MG tablet TAKE 1 TABLET BY MOUTH EVERY EVENING 90 tablet 3    BOTOX 200 units reconstituted solution INJECT 200 UNITS INTO THE HEAD OR NECK AREA BY MD EVERY 90 DAYS  3    cyclobenzaprine (FLEXERIL) 10 MG tablet Take 1 tablet by mouth At Night As Needed for Muscle Spasms. 30 tablet 1    Dulaglutide (Trulicity) 1.5 MG/0.5ML solution pen-injector Inject 1.5 mg under the skin into the appropriate area as directed 1 (One) Time Per Week. 6 mL 1    DULoxetine (CYMBALTA) 60 MG capsule Take 1 capsule by mouth Daily. 90 capsule 0    eletriptan (RELPAX) 40 MG tablet TAKE 1 TABLET BY MOUTH AT THE ONSET OF MIGRAINE. MAY REPEAT IN 2 HRS.MAX 2 DOSE/24HR,3 DAYS/WEEK  11     FLUoxetine (PROzac) 20 MG capsule Take 1 capsule by mouth Daily for 90 days. 90 capsule 0    glimepiride (AMARYL) 2 MG tablet Take 1 tablet by mouth 2 (Two) Times a Day. 180 tablet 3    glucose blood test strip 1 each by Other route Daily. Use as instructed 100 each 2    glucose monitor monitoring kit 1 each Daily. 1 each 1    hydroCHLOROthiazide (HYDRODIURIL) 12.5 MG tablet TAKE ONE TABLET BY MOUTH DAILY (Patient not taking: Reported on 8/24/2023) 90 tablet 3    hydrOXYzine (ATARAX) 25 MG tablet Take 1-2 tablets by mouth At Night As Needed for Anxiety (sleep). 60 tablet 1    Lancets 28G misc 1 application Daily. 100 each 3    metFORMIN ER (GLUCOPHAGE-XR) 500 MG 24 hr tablet TAKE 4 TABLETS BY MOUTH EVERY DAY WITH DINNER 360 tablet 3    nebivolol (Bystolic) 10 MG tablet Take 1 tablet by mouth Daily. 90 tablet 3    nortriptyline (PAMELOR) 10 MG capsule nortriptyline 10 mg capsule   TAKE 4 CAPSULES AT BEDTIME      Nutritional Supplements (DHEA PO) Take  by mouth. (Patient not taking: Reported on 8/24/2023)      omeprazole (priLOSEC) 40 MG capsule Take 1 capsule by mouth Daily. 90 capsule 3    propranolol (INDERAL) 10 MG tablet Take 1 tablet by mouth 2 (Two) Times a Day As Needed (panic/anxiety). 60 tablet 2    Topiramate ER (Trokendi XR) 200 MG capsule sustained-release 24 hr Daily.      [DISCONTINUED] NON FORMULARY Compounded cream with testosterone and dhea       Current Facility-Administered Medications on File Prior to Visit   Medication Dose Route Frequency Provider Last Rate Last Admin    [COMPLETED] iopamidol (ISOVUE-300) 61 % injection 100 mL  100 mL Intravenous Once in imaging Darin Calle MD   55 mL at 08/28/23 1531       Allergies   Allergen Reactions    Dihydroergotamine GI Intolerance     VOMITING    Methergine [Methylergonovine Maleate] Nausea And Vomiting    Amoxicillin Rash       Social History     Socioeconomic History    Marital status:    Tobacco Use    Smoking status: Never     "Smokeless tobacco: Never   Vaping Use    Vaping Use: Never used   Substance and Sexual Activity    Alcohol use: No    Drug use: Never    Sexual activity: Yes     Partners: Male     Birth control/protection: Post-menopausal, Vasectomy       Family History   Adopted: Yes       PHYSICAL EXAM:  No jaundice or icterus or pallor.  No respiratory distress.  No abdominal tenderness.    Resp 16   Ht 162.6 cm (64\")   BMI 28.67 kg/mý     ECOG score: 0           ECOG: (0) Fully Active - Able to Carry On All Pre-disease Performance Without Restriction    Lab Results   Component Value Date    HGB 14.4 08/24/2023    HCT 42.0 08/24/2023    MCV 86.2 08/24/2023     08/24/2023    WBC 8.30 08/24/2023    NEUTROABS 5.03 08/24/2023    LYMPHSABS 2.46 08/24/2023    MONOSABS 0.59 08/24/2023    EOSABS 0.15 08/24/2023    BASOSABS 0.04 08/24/2023     Lab Results   Component Value Date    GLUCOSE 95 08/24/2023    BUN 13 08/24/2023    CREATININE 0.90 08/28/2023     08/24/2023    K 4.1 08/24/2023     08/24/2023    CO2 20.2 (L) 08/24/2023    CALCIUM 9.5 08/24/2023    PROTEINTOT 7.2 08/24/2023    ALBUMIN 4.6 08/24/2023    BILITOT 0.4 08/24/2023    ALKPHOS 82 08/24/2023    AST 13 08/24/2023    ALT 34 (H) 08/24/2023         Assessment & Plan   1.  Colon cancer found in a tubular adenoma 20 cm from the anal verge with negative staging CT chest abdomen and pelvis normal baseline CEA 1.26 with no visible lesion on MRI rectal protocol.  2.  Goiter with 1.8 cm left thyroid nodule found on colon cancer staging for which ultrasound follow-up recommended  3.  Myomectomy with bladder tack July 2015  4.  Diabetes  5.  Hypertension  6.  Hyperlipidemia  7.  Migraines  8.  Depression  9.  Reflux  10.  History of uterine ablation  11.  History of tonsillectomy  12.  History of tympanostomy tubes  13.  History of L4-5 disc surgery and L5-S1 discectomy and fusion  14.  History of solid and liquid phase dysphagia with history of mild gastritis and " some esophageal ranging on EGD 2019  15.  Compound melanocytic nevus with dermatofibroma removed by Dr. Ray 07/12/2023    Oncology history timeline:  -12/20/2019 EGD showed small hiatal hernia with some ringing of the esophagus biopsy.  Mild gastritis.  No intestinal metaplasia or dysplasia or malignancy.  Reflux suggestive but not diagnostic.  -8/18/2023 colonoscopy Dr. Calle.  Diverticulosis in the sigmoid colon.  7 mm polyp in the rectum.  Diverticulosis in the sigmoid colon.  Polypoid area with ulceration crescent-shaped 1.8 cm 20 cm proximal to the anus likely sigmoid.  Tattooed and biopsied.  Rectal polyp showed tubular adenoma with no high-grade dysplasia.  Colon biopsy at 20 cm showed invasive moderately differentiated colon adenocarcinoma arising within a tubular adenoma with high-grade dysplasia. MSI stable.    -8/24/2023 CEA normal 1.26.  ALT 34 bicarb 20.2 otherwise unremarkable CMP.  CBC normal with hemoglobin 14.4 and normal MCV.  -8/27/2023 CT abdomen pelvis with contrast did not show any colonic mass.  Mild distal esophageal thickening, hepatic steatosis without hepatic metastasis.  No lytic bone disease.  MRI rectal protocol without contrast reviewed with Dr. López.  Perhaps a small pucker at the site of biopsy but no discernible mass per se and no adenopathy.  -8/28/2023 CT chest shows no metastasis.  1.8 cm left thyroid for which follow-up is recommended    -8/29/2023 [facility] medical oncology consult: On screening colonoscopy patient found to have ulcerated polyp at 20 cm from the anal verge with moderately differentiated adenocarcinoma microsatellite stable.  Normal baseline CEA with staging CT chest abdomen pelvis and MRI rectal protocol showing no evidence of metastasis and no visible primary on MRI.  Based on distance from anal verge on colonoscopy and given the absence of visible abnormality on MRI rectal protocol to change that estimated position, would call this colon cancer.  Given  microsatellite stability, would not contemplate calling this rectal cancer and treating with immunotherapy but regardless of whether rectal or colon primary this would at least be no more than T1 tumor clinically with no visible tumor on MRI despite colonoscopy findings.  The patient has experience with Dr. Michel with whom she requests a consultation for definitive surgery and I will see back postoperatively to decide on any adjuvant therapy as indicated based on pathologic staging.  There is some esophageal thickening on CT for which I would request Dr. Calle repeat EGD last done 2019 just for completeness sake but first would deal with the colon cancer at hand.  She will also need to get left thyroid ultrasound eventually and would likely do this through whoever has managed her goiter previously.      Total time of care today inclusive time spent today prior to patient's arrival reviewing communications I have had with Dr. Calle communicating with Dr. Michel during visit reviewing images and reports thereof of CTs and labs endoscopy images and after visit instituting plan as outlined above took 1 hour patient care time throughout the day today.  Bernardino Chaparro MD    8/29/2023

## 2023-08-29 NOTE — LETTER
August 29, 2023       No Recipients    Patient: Yelena Morris   YOB: 1965   Date of Visit: 8/29/2023     Dear Fiona Corona MD:       Thank you for referring Yelena Morris to me for evaluation. Below are the relevant portions of my assessment and plan of care.    If you have questions, please do not hesitate to call me. I look forward to following Yelena along with you.         Sincerely,        Bernardino Chaparro MD        CC:   No Recipients    Bernardino Chaparro MD  08/29/23 1336  Sign when Signing Visit  CHIEF COMPLAINT: No somatic complaints    REASON FOR REFERRAL: Colon cancer      RECORDS OBTAINED  Records of the patients history including those obtained from Dr. Calle were reviewed and summarized in detail.    Oncology/Hematology History Overview Note   1.  Colon cancer found in a tubular adenoma 20 cm from the anal verge with negative staging CT chest abdomen and pelvis normal baseline CEA 1.26 with no visible lesion on MRI rectal protocol.  2.  Goiter with 1.8 cm left thyroid nodule found on colon cancer staging for which ultrasound follow-up recommended  3.  Myomectomy with bladder tack July 2015  4.  Diabetes  5.  Hypertension  6.  Hyperlipidemia  7.  Migraines  8.  Depression  9.  Reflux  10.  History of uterine ablation  11.  History of tonsillectomy  12.  History of tympanostomy tubes  13.  History of L4-5 disc surgery and L5-S1 discectomy and fusion  14.  History of solid and liquid phase dysphagia with history of mild gastritis and some esophageal ranging on EGD 2019  15.  Compound melanocytic nevus with dermatofibroma removed by Dr. Ray 07/12/2023    Oncology history timeline:  -12/20/2019 EGD showed small hiatal hernia with some ringing of the esophagus biopsy.  Mild gastritis.  No intestinal metaplasia or dysplasia or malignancy.  Reflux suggestive but not diagnostic.  -8/18/2023 colonoscopy Dr. Calle.  Diverticulosis in the sigmoid colon.  7 mm polyp in the rectum.   Diverticulosis in the sigmoid colon.  Polypoid area with ulceration crescent-shaped 1.8 cm 20 cm proximal to the anus likely sigmoid.  Tattooed and biopsied.  Rectal polyp showed tubular adenoma with no high-grade dysplasia.  Colon biopsy at 20 cm showed invasive moderately differentiated colon adenocarcinoma arising within a tubular adenoma with high-grade dysplasia.  Microsatellite stable    -8/24/2023 CEA normal 1.26.  ALT 34 bicarb 20.2 otherwise unremarkable CMP.  CBC normal with hemoglobin 14.4 and normal MCV.  -8/27/2023 CT abdomen pelvis with contrast did not show any colonic mass.  Mild distal esophageal thickening, hepatic steatosis without hepatic metastasis.  No lytic bone disease.  MRI rectal protocol without contrast reviewed with Dr. López.  Perhaps a small pucker at the site of biopsy but no discernible mass per se and no adenopathy.  -8/28/2023 CT chest shows no metastasis.  1.8 cm left thyroid for which follow-up is recommended    -8/29/2023 [facility] medical oncology consult: On screening colonoscopy patient found to have ulcerated polyp at 20 cm from the anal verge with moderately differentiated adenocarcinoma microsatellite stable.  Normal baseline CEA with staging CT chest abdomen pelvis and MRI rectal protocol showing no evidence of metastasis and no visible primary on MRI.  Based on distance from anal verge on colonoscopy and given the absence of visible abnormality on MRI rectal protocol to change that estimated position, would call this colon cancer.  Given microsatellite stability, would not contemplate calling this rectal cancer and treating with immunotherapy but regardless of whether rectal or colon primary this would at least be no more than T1 tumor clinically with no visible tumor on MRI despite colonoscopy findings.  The patient has experience with Dr. Michel with whom she requests a consultation for definitive surgery and I will see back postoperatively to decide on any adjuvant  therapy as indicated based on pathologic staging.  There is some esophageal thickening on CT for which I would request Dr. Calle repeat EGD last done 2019 just for completeness sake but first would deal with the colon cancer at hand.She will also need to get left thyroid ultrasound eventually and would likely do this through whoever has managed her goiter previously.         Malignant neoplasm of sigmoid colon   8/29/2023 Initial Diagnosis    Malignant neoplasm of sigmoid colon         HISTORY OF PRESENT ILLNESS:  The patient is a 57 y.o.  female, referred for coincidental colon cancer found on screening colonoscopy.    REVIEW OF SYSTEMS:  No somatic complaints    Past Medical History:   Diagnosis Date    Anxiety     Arthritis     Cant recall the date of onset    Chest pain 11/06/2019    Colon polyp     Depression     Diabetes mellitus     Dysphagia 11/06/2019    Endometriosis     Fibromyalgia, primary     GERD (gastroesophageal reflux disease)     Headache     HL (hearing loss)     Hyperlipidemia     Hypertension     Low vitamin D level     Migraines     Ovarian cyst     PMDD (premenstrual dysphoric disorder)     Scoliosis     Wears glasses      Past Surgical History:   Procedure Laterality Date    BLADDER SURGERY      Bladder tuck    COLONOSCOPY      every 5 years    ENDOMETRIAL ABLATION      LUMBAR DISCECTOMY  2010    L4-S1 Rt- discectomy Dr. Arellano    SPINAL CORD STIMULATOR IMPLANT N/A 08/19/2019    Procedure: SPINAL CORD STIMULATOR INSERTION;  Surgeon: Ethan Peters MD;  Location: [facility];  Service: Neurosurgery    SPINE SURGERY  2010    TONSILLECTOMY AND ADENOIDECTOMY         Current Outpatient Medications on File Prior to Visit   Medication Sig Dispense Refill    ARIPiprazole (ABILIFY) 5 MG tablet Take 1 tablet by mouth Daily. 90 tablet 0    atorvastatin (LIPITOR) 40 MG tablet TAKE 1 TABLET BY MOUTH EVERY EVENING 90 tablet 3    BOTOX 200 units reconstituted solution  INJECT 200 UNITS INTO THE HEAD OR NECK AREA BY MD EVERY 90 DAYS  3    cyclobenzaprine (FLEXERIL) 10 MG tablet Take 1 tablet by mouth At Night As Needed for Muscle Spasms. 30 tablet 1    Dulaglutide (Trulicity) 1.5 MG/0.5ML solution pen-injector Inject 1.5 mg under the skin into the appropriate area as directed 1 (One) Time Per Week. 6 mL 1    DULoxetine (CYMBALTA) 60 MG capsule Take 1 capsule by mouth Daily. 90 capsule 0    eletriptan (RELPAX) 40 MG tablet TAKE 1 TABLET BY MOUTH AT THE ONSET OF MIGRAINE. MAY REPEAT IN 2 HRS.MAX 2 DOSE/24HR,3 DAYS/WEEK  11    FLUoxetine (PROzac) 20 MG capsule Take 1 capsule by mouth Daily for 90 days. 90 capsule 0    glimepiride (AMARYL) 2 MG tablet Take 1 tablet by mouth 2 (Two) Times a Day. 180 tablet 3    glucose blood test strip 1 each by Other route Daily. Use as instructed 100 each 2    glucose monitor monitoring kit 1 each Daily. 1 each 1    hydroCHLOROthiazide (HYDRODIURIL) 12.5 MG tablet TAKE ONE TABLET BY MOUTH DAILY (Patient not taking: Reported on 8/24/2023) 90 tablet 3    hydrOXYzine (ATARAX) 25 MG tablet Take 1-2 tablets by mouth At Night As Needed for Anxiety (sleep). 60 tablet 1    Lancets 28G misc 1 application Daily. 100 each 3    metFORMIN ER (GLUCOPHAGE-XR) 500 MG 24 hr tablet TAKE 4 TABLETS BY MOUTH EVERY DAY WITH DINNER 360 tablet 3    nebivolol (Bystolic) 10 MG tablet Take 1 tablet by mouth Daily. 90 tablet 3    nortriptyline (PAMELOR) 10 MG capsule nortriptyline 10 mg capsule   TAKE 4 CAPSULES AT BEDTIME      Nutritional Supplements (DHEA PO) Take  by mouth. (Patient not taking: Reported on 8/24/2023)      omeprazole (priLOSEC) 40 MG capsule Take 1 capsule by mouth Daily. 90 capsule 3    propranolol (INDERAL) 10 MG tablet Take 1 tablet by mouth 2 (Two) Times a Day As Needed (panic/anxiety). 60 tablet 2    Topiramate ER (Trokendi XR) 200 MG capsule sustained-release 24 hr Daily.      [DISCONTINUED] NON FORMULARY Compounded cream with  "testosterone and dhea       Current Facility-Administered Medications on File Prior to Visit   Medication Dose Route Frequency Provider Last Rate Last Admin    [COMPLETED] iopamidol (ISOVUE-300) 61 % injection 100 mL  100 mL Intravenous Once in imaging Darin Calle MD   55 mL at 08/28/23 1531       Allergies   Allergen Reactions    Dihydroergotamine GI Intolerance     VOMITING    Methergine [Methylergonovine Maleate] Nausea And Vomiting    Amoxicillin Rash       Social History     Socioeconomic History    Marital status:    Tobacco Use    Smoking status: Never    Smokeless tobacco: Never   Vaping Use    Vaping Use: Never used   Substance and Sexual Activity    Alcohol use: No    Drug use: Never    Sexual activity: Yes     Partners: Male     Birth control/protection: Post-menopausal, Vasectomy       Family History   Adopted: Yes       PHYSICAL EXAM:  No jaundice or icterus or pallor.  No respiratory distress.  No abdominal tenderness.    Resp 16   Ht 162.6 cm (64\")   BMI 28.67 kg/mý     ECOG score: 0           ECOG: (0) Fully Active - Able to Carry On All Pre-disease Performance Without Restriction    Lab Results   Component Value Date    HGB 14.4 08/24/2023    HCT 42.0 08/24/2023    MCV 86.2 08/24/2023     08/24/2023    WBC 8.30 08/24/2023    NEUTROABS 5.03 08/24/2023    LYMPHSABS 2.46 08/24/2023    MONOSABS 0.59 08/24/2023    EOSABS 0.15 08/24/2023    BASOSABS 0.04 08/24/2023     Lab Results   Component Value Date    GLUCOSE 95 08/24/2023    BUN 13 08/24/2023    CREATININE 0.90 08/28/2023     08/24/2023    K 4.1 08/24/2023     08/24/2023    CO2 20.2 (L) 08/24/2023    CALCIUM 9.5 08/24/2023    PROTEINTOT 7.2 08/24/2023    ALBUMIN 4.6 08/24/2023    BILITOT 0.4 08/24/2023    ALKPHOS 82 08/24/2023    AST 13 08/24/2023    ALT 34 (H) 08/24/2023         Assessment & Plan   1.  Colon cancer found in a tubular adenoma 20 cm from the anal verge with negative staging CT chest " abdomen and pelvis normal baseline CEA 1.26 with no visible lesion on MRI rectal protocol.  2.  Goiter with 1.8 cm left thyroid nodule found on colon cancer staging for which ultrasound follow-up recommended  3.  Myomectomy with bladder tack July 2015  4.  Diabetes  5.  Hypertension  6.  Hyperlipidemia  7.  Migraines  8.  Depression  9.  Reflux  10.  History of uterine ablation  11.  History of tonsillectomy  12.  History of tympanostomy tubes  13.  History of L4-5 disc surgery and L5-S1 discectomy and fusion  14.  History of solid and liquid phase dysphagia with history of mild gastritis and some esophageal ranging on EGD 2019  15.  Compound melanocytic nevus with dermatofibroma removed by Dr. Ray 07/12/2023    Oncology history timeline:  -12/20/2019 EGD showed small hiatal hernia with some ringing of the esophagus biopsy.  Mild gastritis.  No intestinal metaplasia or dysplasia or malignancy.  Reflux suggestive but not diagnostic.  -8/18/2023 colonoscopy Dr. Calle.  Diverticulosis in the sigmoid colon.  7 mm polyp in the rectum.  Diverticulosis in the sigmoid colon.  Polypoid area with ulceration crescent-shaped 1.8 cm 20 cm proximal to the anus likely sigmoid.  Tattooed and biopsied.  Rectal polyp showed tubular adenoma with no high-grade dysplasia.  Colon biopsy at 20 cm showed invasive moderately differentiated colon adenocarcinoma arising within a tubular adenoma with high-grade dysplasia. MSI stable.    -8/24/2023 CEA normal 1.26.  ALT 34 bicarb 20.2 otherwise unremarkable CMP.  CBC normal with hemoglobin 14.4 and normal MCV.  -8/27/2023 CT abdomen pelvis with contrast did not show any colonic mass.  Mild distal esophageal thickening, hepatic steatosis without hepatic metastasis.  No lytic bone disease.  MRI rectal protocol without contrast reviewed with Dr. López.  Perhaps a small pucker at the site of biopsy but no discernible mass per se and no adenopathy.  -8/28/2023 CT chest shows no metastasis.  1.8  cm left thyroid for which follow-up is recommended    -8/29/2023 [facility] medical oncology consult: On screening colonoscopy patient found to have ulcerated polyp at 20 cm from the anal verge with moderately differentiated adenocarcinoma microsatellite stable.  Normal baseline CEA with staging CT chest abdomen pelvis and MRI rectal protocol showing no evidence of metastasis and no visible primary on MRI.  Based on distance from anal verge on colonoscopy and given the absence of visible abnormality on MRI rectal protocol to change that estimated position, would call this colon cancer.  Given microsatellite stability, would not contemplate calling this rectal cancer and treating with immunotherapy but regardless of whether rectal or colon primary this would at least be no more than T1 tumor clinically with no visible tumor on MRI despite colonoscopy findings.  The patient has experience with Dr. Michel with whom she requests a consultation for definitive surgery and I will see back postoperatively to decide on any adjuvant therapy as indicated based on pathologic staging.  There is some esophageal thickening on CT for which I would request Dr. Calle repeat EGD last done 2019 just for completeness sake but first would deal with the colon cancer at hand.  She will also need to get left thyroid ultrasound eventually and would likely do this through whoever has managed her goiter previously.      Total time of care today inclusive time spent today prior to patient's arrival reviewing communications I have had with Dr. Calle communicating with Dr. Michel during visit reviewing images and reports thereof of CTs and labs endoscopy images and after visit instituting plan as outlined above took 1 hour patient care time throughout the day today.  Bernardino Chaparro MD    8/29/2023

## 2023-09-15 DIAGNOSIS — F41.1 GENERALIZED ANXIETY DISORDER: ICD-10-CM

## 2023-09-15 DIAGNOSIS — G47.9 SLEEP DIFFICULTIES: ICD-10-CM

## 2023-09-16 RX ORDER — HYDROXYZINE HYDROCHLORIDE 25 MG/1
25-50 TABLET, FILM COATED ORAL NIGHTLY PRN
Qty: 60 TABLET | Refills: 1 | Status: SHIPPED | OUTPATIENT
Start: 2023-09-16

## 2023-09-21 ENCOUNTER — TELEMEDICINE (OUTPATIENT)
Dept: PSYCHIATRY | Facility: CLINIC | Age: 58
End: 2023-09-21
Payer: COMMERCIAL

## 2023-09-21 DIAGNOSIS — F41.1 GENERALIZED ANXIETY DISORDER: Primary | ICD-10-CM

## 2023-09-21 DIAGNOSIS — F33.41 RECURRENT MAJOR DEPRESSIVE DISORDER, IN PARTIAL REMISSION: ICD-10-CM

## 2023-09-21 DIAGNOSIS — G47.9 SLEEP DIFFICULTIES: ICD-10-CM

## 2023-09-21 DIAGNOSIS — F32.81 PMDD (PREMENSTRUAL DYSPHORIC DISORDER): ICD-10-CM

## 2023-09-21 PROCEDURE — 99214 OFFICE O/P EST MOD 30 MIN: CPT | Performed by: NURSE PRACTITIONER

## 2023-09-21 RX ORDER — FLUOXETINE HYDROCHLORIDE 20 MG/1
20 CAPSULE ORAL DAILY
Qty: 90 CAPSULE | Refills: 0 | Status: SHIPPED | OUTPATIENT
Start: 2023-09-21 | End: 2023-12-20

## 2023-09-21 NOTE — PROGRESS NOTES
This provider is located at [facility], [address] [place] [address].The Patient is seen remotely at home, [address] [place] [address] via [employer]. Patient is being seen via telehealth and confirm that they are in a secure environment for this session. The patient's condition being diagnosed/treated is appropriate for telemedicine. The provider identified himself/herself: herself as well as her credentials.   The patient gave consent to be seen remotely, and when consent is given they understand that the consent allows for patient identifiable information to be sent to a third party as needed.   They may refuse to be seen remotely at any time. The electronic data is encrypted and password protected, and the patient has been advised of the potential risks to privacy not withstanding such measures.    You have chosen to receive care through a telehealth visit.  Do you consent to use a video/audio connection for your medical care today? Yes      Chief Complaint  Follow-up for depression and anxiety     Subjective    Yelena Radha Morris presents to [facility] for medication management.       History of Present Illness   Patient presents today reporting things are going well.  She states that she has only needed propranolol a couple of times.  Reports her anxiety has been manageable with no major issues and denied any somatic symptoms or physical symptoms of stomach pain or heart racing.  Denies any major depression symptoms.  Reports that the hydroxyzine is helping with her sleep as she is getting 6 to 7 hours at night and feeling more rested.  Appetite is good.  Denies any side effects to medications.  States that her 's PET scan came back clean and she goes for her surgery on Monday to remove part of the colon and then will find out more answers in regards to treatment or cancer.  Denies any SI/HI/AH/VH.       Objective   Vital  Signs:   There were no vitals taken for this visit.  Due to the remote nature of this encounter (virtual encounter), vitals were unable to be obtained.  Height stated at 64 inches.  Weight stated at 171 pounds.      PHQ-9 Depression Screening  Little interest or pleasure in doing things?     Feeling down, depressed, or hopeless?     Trouble falling or staying asleep, or sleeping too much?     Feeling tired or having little energy?     Poor appetite or overeating?     Feeling bad about yourself - or that you are a failure or have let yourself or your family down?     Trouble concentrating on things, such as reading the newspaper or watching television?     Moving or speaking so slowly that other people could have noticed? Or the opposite - being so fidgety or restless that you have been moving around a lot more than usual?     Thoughts that you would be better off dead, or of hurting yourself in some way?     PHQ-9 Total Score     If you checked off any problems, how difficult have these problems made it for you to do your work, take care of things at home, or get along with other people?         PHQ-9 Total Score:            Mental Status Exam:   Hygiene:   good  Cooperation:  Cooperative  Eye Contact:  Good  Psychomotor Behavior:  Appropriate  Affect:  Appropriate  Mood: normal  Speech:  Normal  Thought Process:  Goal directed and Linear  Thought Content:  Normal  Suicidal:  None  Homicidal:  None  Hallucinations:  None  Delusion:  None  Memory:  Intact  Orientation:  Person, Place, Time and Situation  Reliability:  good  Insight:  Good  Judgement:  Good  Impulse Control:  Good  Physical/Medical Issues:  Yes HTN, DM, fibromyalgia, carpal tunnel syndrome      Current Medications:   Current Outpatient Medications   Medication Sig Dispense Refill    FLUoxetine (PROzac) 20 MG capsule Take 1 capsule by mouth Daily for 90 days. 90 capsule 0    ARIPiprazole (ABILIFY) 5 MG tablet Take 1 tablet by mouth Daily. 90 tablet 0     atorvastatin (LIPITOR) 40 MG tablet TAKE 1 TABLET BY MOUTH EVERY EVENING 90 tablet 3    BOTOX 200 units reconstituted solution INJECT 200 UNITS INTO THE HEAD OR NECK AREA BY MD EVERY 90 DAYS  3    cyclobenzaprine (FLEXERIL) 10 MG tablet Take 1 tablet by mouth At Night As Needed for Muscle Spasms. 30 tablet 1    Dulaglutide (Trulicity) 1.5 MG/0.5ML solution pen-injector Inject 1.5 mg under the skin into the appropriate area as directed 1 (One) Time Per Week. 6 mL 1    DULoxetine (CYMBALTA) 60 MG capsule Take 1 capsule by mouth Daily. 90 capsule 0    eletriptan (RELPAX) 40 MG tablet TAKE 1 TABLET BY MOUTH AT THE ONSET OF MIGRAINE. MAY REPEAT IN 2 HRS.MAX 2 DOSE/24HR,3 DAYS/WEEK  11    glimepiride (AMARYL) 2 MG tablet Take 1 tablet by mouth 2 (Two) Times a Day. 180 tablet 3    glucose blood test strip 1 each by Other route Daily. Use as instructed 100 each 2    glucose monitor monitoring kit 1 each Daily. 1 each 1    hydroCHLOROthiazide (HYDRODIURIL) 12.5 MG tablet TAKE ONE TABLET BY MOUTH DAILY (Patient not taking: Reported on 8/24/2023) 90 tablet 3    hydrOXYzine (ATARAX) 25 MG tablet TAKE 1-2 TABLETS BY MOUTH AT NIGHT AS NEEDED FOR ANXIETY (SLEEP). 60 tablet 1    Lancets 28G misc 1 application Daily. 100 each 3    metFORMIN ER (GLUCOPHAGE-XR) 500 MG 24 hr tablet TAKE 4 TABLETS BY MOUTH EVERY DAY WITH DINNER 360 tablet 3    nebivolol (Bystolic) 10 MG tablet Take 1 tablet by mouth Daily. 90 tablet 3    nortriptyline (PAMELOR) 10 MG capsule nortriptyline 10 mg capsule   TAKE 4 CAPSULES AT BEDTIME      Nutritional Supplements (DHEA PO) Take  by mouth. (Patient not taking: Reported on 8/24/2023)      omeprazole (priLOSEC) 40 MG capsule Take 1 capsule by mouth Daily. 90 capsule 3    propranolol (INDERAL) 10 MG tablet Take 1 tablet by mouth 2 (Two) Times a Day As Needed (panic/anxiety). 60 tablet 2    Topiramate ER (Trokendi XR) 200 MG capsule sustained-release 24 hr Daily.       No current facility-administered  medications for this visit.       Physical Exam  Nursing note reviewed.   Constitutional:       Appearance: Normal appearance.   Neurological:      Mental Status: She is alert.   Psychiatric:         Attention and Perception: Attention and perception normal.         Mood and Affect: Mood and affect normal. Mood is not anxious or depressed.         Speech: Speech normal.         Behavior: Behavior normal. Behavior is cooperative.         Thought Content: Thought content normal.         Cognition and Memory: Cognition and memory normal.         Judgment: Judgment normal.      Result Review :     The following data was reviewed by: ONESIMO Alexander on 02/01/2021:  Common labs          6/21/2023    16:24 8/24/2023    15:23 8/28/2023    15:23   Common Labs   Glucose  95     BUN  13     Creatinine  0.83  0.90    Sodium  137     Potassium  4.1     Chloride  106     Calcium  9.5     Albumin  4.6     Total Bilirubin  0.4     Alkaline Phosphatase  82     AST (SGOT)  13     ALT (SGPT)  34     WBC  8.30     Hemoglobin  14.4     Hematocrit  42.0     Platelets  327     Hemoglobin A1C 7.7      Data reviewed: PCP notes         Assessment and Plan    Problem List Items Addressed This Visit          Mental Health    Recurrent major depressive disorder    Relevant Medications    FLUoxetine (PROzac) 20 MG capsule     Other Visit Diagnoses       Generalized anxiety disorder    -  Primary    Relevant Medications    FLUoxetine (PROzac) 20 MG capsule    PMDD (premenstrual dysphoric disorder)        Relevant Medications    FLUoxetine (PROzac) 20 MG capsule    Sleep difficulties            TREATMENT PLAN/GOALS: Continue supportive psychotherapy efforts and medications as indicated. Treatment and medication options discussed during today's visit. Patient ackowledged and verbally consented to continue with current treatment plan and was educated on the importance of compliance with treatment and follow-up appointments.    MEDICATION  ISSUES:  We discussed risks, benefits, and side effects of the above medications and the patient was agreeable with the plan. Patient was educated on the importance of compliance with treatment and follow-up appointments.  Patient is agreeable to call the office with any worsening of symptoms or onset of side effects. Patient is agreeable to call 911 or go to the nearest ER should he/she begin having SI/HI.        -Continue Cymbalta 60 mg daily for depression and anxiety as patient has benefited greatly as well for her pain.  -Continue fluoxetine 20 mg daily for depression and PMDD.   -Patient educated extensively regarding the risk of serotonin syndrome as she is currently on duloxetine due to her pain as we have previously tried to taper and it is not effective so she is on fluoxetine 20 mg daily to help with her overall depression and mood.  Patient verbalized understanding was agreeable.  -Continue aripiprazole 5 mg as adjunct for depression as patient has failed numerous SSRIs and SNRIs and has benefited the patient tremendously and is medically necessary for her depression to avoid hospitalization.   -Continue propranolol 10 mg twice daily as needed for anxiety and panic.  Encouraged patient if it decreased her blood pressure or heart rate to contact the clinic and discontinue.  -Continue hydroxyzine 25 to 50 mg at night as needed for sleep.      Counseled patient regarding multimodal approach with healthy nutrition, healthy sleep, regular physical activity, social activities, counseling, and medications.      Coping skills reviewed and encouraged positive framing of thoughts     Assisted patient in processing above session content; acknowledged and normalized patient’s thoughts, feelings, and concerns.  Applied  positive coping skills and behavior management in session.  Allowed patient to freely discuss issues without interruption or judgment. Provided safe, confidential environment to facilitate the  development of positive therapeutic relationship and encourage open, honest communication. Assisted patient in identifying risk factors which would indicate the need for higher level of care including thoughts to harm self or others and/or self-harming behavior and encouraged patient to contact this office, call 911, or present to the nearest emergency room should any of these events occur. Discussed crisis intervention services and means to access.     MEDS ORDERED DURING VISIT:  New Medications Ordered This Visit   Medications    FLUoxetine (PROzac) 20 MG capsule     Sig: Take 1 capsule by mouth Daily for 90 days.     Dispense:  90 capsule     Refill:  0         Follow Up   Return in about 4 weeks (around 10/19/2023), or if symptoms worsen or fail to improve, for Recheck.    Patient was given instructions and counseling regarding her condition or for health maintenance advice. Please see specific information pulled into the AVS if appropriate.     Some of the data in this electronic note has been brought forward from a previous encounter, any necessary changes have been made, it has been reviewed by this APRN, and it is accurate.      This document has been electronically signed by ONESIMO Alexander  September 21, 2023 08:50 EDT    Part of this note may be an electronic transcription/translation of spoken language to printed text using the Dragon Dictation System.

## 2023-09-22 ENCOUNTER — OFFICE VISIT (OUTPATIENT)
Dept: INTERNAL MEDICINE | Facility: CLINIC | Age: 58
End: 2023-09-22
Payer: COMMERCIAL

## 2023-09-22 VITALS
TEMPERATURE: 97.5 F | BODY MASS INDEX: 29.37 KG/M2 | HEART RATE: 88 BPM | OXYGEN SATURATION: 97 % | WEIGHT: 172 LBS | SYSTOLIC BLOOD PRESSURE: 110 MMHG | DIASTOLIC BLOOD PRESSURE: 80 MMHG | HEIGHT: 64 IN

## 2023-09-22 DIAGNOSIS — I10 ESSENTIAL HYPERTENSION: ICD-10-CM

## 2023-09-22 DIAGNOSIS — E04.2 MULTINODULAR THYROID: ICD-10-CM

## 2023-09-22 DIAGNOSIS — Z23 NEED FOR INFLUENZA VACCINATION: ICD-10-CM

## 2023-09-22 DIAGNOSIS — E11.9 TYPE 2 DIABETES MELLITUS WITHOUT COMPLICATION, WITHOUT LONG-TERM CURRENT USE OF INSULIN: Primary | ICD-10-CM

## 2023-09-22 DIAGNOSIS — C18.7 MALIGNANT NEOPLASM OF SIGMOID COLON: Chronic | ICD-10-CM

## 2023-09-22 LAB
EXPIRATION DATE: NORMAL
HBA1C MFR BLD: 7 %
Lab: NORMAL

## 2023-09-22 RX ORDER — HYDROCHLOROTHIAZIDE 12.5 MG/1
12.5 TABLET ORAL DAILY
Qty: 90 TABLET | Refills: 3 | Status: SHIPPED | OUTPATIENT
Start: 2023-09-22

## 2023-09-22 RX ORDER — CLINDAMYCIN HYDROCHLORIDE 150 MG/1
150 CAPSULE ORAL 4 TIMES DAILY
Qty: 28 CAPSULE | Refills: 0 | COMMUNITY
Start: 2023-09-19 | End: 2023-09-26

## 2023-09-22 NOTE — PROGRESS NOTES
Internal Medicine Follow Up    Chief Complaint  Yelena Morris is a 58 y.o. female who presents today for follow up of chronic medical conditions outlined below.    Chief Complaint   Patient presents with    Diabetes        HPI  Ms. Morris comes in today for follow up. She has had colonoscopy and was found to have colon cancer. She is established with Dr. Chaparro and Dr. Michel at . Plan for partial colon resection on Monday and may need hysterectomy due to location of mass on her uterus. She had CT scans without evidence of metastases. She did have 1.8cm L thyroid nodule which is similar to findings on US dating back to 2020. She is doing well on trulicity and A1c has improved to 7. She is holding trulicity for a week prior to surgery. She has started propranolol PRN and hydroxyzine at night to help with anxiety.    Diabetes  Hypoglycemia symptoms include nervousness/anxiousness.      Review of Systems  Review of Systems   Constitutional: Negative.    Respiratory: Negative.     Cardiovascular:  Positive for leg swelling (intermittent).   Gastrointestinal:  Negative for constipation and diarrhea.   Psychiatric/Behavioral:  Positive for stress. The patient is nervous/anxious.       Current Medications  Current Outpatient Medications on File Prior to Visit   Medication Sig Dispense Refill    ARIPiprazole (ABILIFY) 5 MG tablet Take 1 tablet by mouth Daily. 90 tablet 0    atorvastatin (LIPITOR) 40 MG tablet TAKE 1 TABLET BY MOUTH EVERY EVENING 90 tablet 3    BOTOX 200 units reconstituted solution INJECT 200 UNITS INTO THE HEAD OR NECK AREA BY MD EVERY 90 DAYS  3    clindamycin (CLEOCIN) 150 MG capsule Take 1 capsule by mouth 4 (Four) Times a Day. 28 capsule 0    cyclobenzaprine (FLEXERIL) 10 MG tablet Take 1 tablet by mouth At Night As Needed for Muscle Spasms. 30 tablet 1    Dulaglutide (Trulicity) 1.5 MG/0.5ML solution pen-injector Inject 1.5 mg under the skin into the appropriate area as directed 1 (One) Time Per  "Week. 6 mL 1    DULoxetine (CYMBALTA) 60 MG capsule Take 1 capsule by mouth Daily. 90 capsule 0    eletriptan (RELPAX) 40 MG tablet TAKE 1 TABLET BY MOUTH AT THE ONSET OF MIGRAINE. MAY REPEAT IN 2 HRS.MAX 2 DOSE/24HR,3 DAYS/WEEK  11    FLUoxetine (PROzac) 20 MG capsule Take 1 capsule by mouth Daily for 90 days. 90 capsule 0    glimepiride (AMARYL) 2 MG tablet Take 1 tablet by mouth 2 (Two) Times a Day. 180 tablet 3    glucose blood test strip 1 each by Other route Daily. Use as instructed 100 each 2    glucose monitor monitoring kit 1 each Daily. 1 each 1    hydrOXYzine (ATARAX) 25 MG tablet TAKE 1-2 TABLETS BY MOUTH AT NIGHT AS NEEDED FOR ANXIETY (SLEEP). 60 tablet 1    Lancets 28G misc 1 application Daily. 100 each 3    metFORMIN ER (GLUCOPHAGE-XR) 500 MG 24 hr tablet TAKE 4 TABLETS BY MOUTH EVERY DAY WITH DINNER 360 tablet 3    nebivolol (Bystolic) 10 MG tablet Take 1 tablet by mouth Daily. 90 tablet 3    nortriptyline (PAMELOR) 10 MG capsule nortriptyline 10 mg capsule   TAKE 4 CAPSULES AT BEDTIME      Nutritional Supplements (DHEA PO) Take  by mouth.      omeprazole (priLOSEC) 40 MG capsule Take 1 capsule by mouth Daily. 90 capsule 3    propranolol (INDERAL) 10 MG tablet Take 1 tablet by mouth 2 (Two) Times a Day As Needed (panic/anxiety). 60 tablet 2    Topiramate ER (Trokendi XR) 200 MG capsule sustained-release 24 hr Daily.      [DISCONTINUED] hydroCHLOROthiazide (HYDRODIURIL) 12.5 MG tablet TAKE ONE TABLET BY MOUTH DAILY 90 tablet 3     No current facility-administered medications on file prior to visit.       Allergies  Allergies   Allergen Reactions    Dihydroergotamine GI Intolerance     VOMITING    Methergine [Methylergonovine Maleate] Nausea And Vomiting    Amoxicillin Rash       Objective  Visit Vitals  /80   Pulse 88   Temp 97.5 °F (36.4 °C)   Ht 162.6 cm (64\")   Wt 78 kg (172 lb)   SpO2 97%   BMI 29.52 kg/m²        Physical Exam  Physical Exam  Vitals and nursing note reviewed. "   Constitutional:       General: She is not in acute distress.     Appearance: She is well-developed. She is not ill-appearing or toxic-appearing.   HENT:      Head: Normocephalic and atraumatic.   Eyes:      Conjunctiva/sclera: Conjunctivae normal.   Cardiovascular:      Rate and Rhythm: Normal rate and regular rhythm.      Heart sounds: Normal heart sounds. No murmur heard.  Pulmonary:      Effort: Pulmonary effort is normal. No respiratory distress.      Breath sounds: Normal breath sounds.   Musculoskeletal:      Right lower leg: No edema.      Left lower leg: No edema.   Skin:     General: Skin is warm and dry.   Neurological:      Mental Status: She is alert and oriented to person, place, and time. Mental status is at baseline.      Gait: Gait normal.   Psychiatric:         Mood and Affect: Mood normal.         Behavior: Behavior normal.       Results  Results for orders placed or performed in visit on 09/22/23   POC Glycosylated Hemoglobin (Hb A1C)    Specimen: Blood   Result Value Ref Range    Hemoglobin A1C 7.0 %    Lot Number [id number]     Expiration Date 5/10/2025         Assessment and Plan  Diagnoses and all orders for this visit:    Type 2 diabetes mellitus without complication, without long-term current use of insulin  - A1c improved to 7.0  - Continue metformin XR 2000mg daily, glimepiride 2mg BID (or 4mg daily if this will boost compliance), trulicity 1.5mg weekly.  - discussed delaying trulicity restart after surgery to avoid constipation, dehydration.  - Had also been briefly on jardiance but discontinued due to candidal infections and januvia which was discontinued due to GI intolerance.  - Urine microalbumin nl 3/2023  - Has upcoming eye exam    Essential hypertension  - BP controlled. Continue nebivolol 10mg daily and HCTZ 12.5mg daily     Multinodular thyroid  - Thyroid US 10/2022 with stable multinodular thyroid. Thyroid studies historically normal.  - CT scan recently with 1.8cm L thyroid  nodule which is similar in size to nodule on last US  - US ordered. Will also refer to endocrinology per patient preference.     Malignant neoplasm of sigmoid colon   - plan for resection next week at   - following also with Dr. Chaparro.  - there has been no evidence of metastases on CT scans    Health Maintenance  - Pap smear: GYN Dr. Mcguire, unable to get records  - Mammogram: 7/2023 BIRADS 1  - Colonoscopy: 8/2023  - HCV: negative  - Immunizations: Flu today. COVID UTD. Shingrix complete. PCV20 UTD. Tdap 10/2019.  - Depression screening: negative 5/2023    Return in about 3 months (around 12/22/2023) for Follow up.

## 2023-10-02 ENCOUNTER — TELEPHONE (OUTPATIENT)
Dept: ONCOLOGY | Facility: CLINIC | Age: 58
End: 2023-10-02

## 2023-10-02 ENCOUNTER — OFFICE VISIT (OUTPATIENT)
Dept: ONCOLOGY | Facility: CLINIC | Age: 58
End: 2023-10-02
Payer: COMMERCIAL

## 2023-10-02 VITALS
TEMPERATURE: 97.9 F | BODY MASS INDEX: 28.34 KG/M2 | RESPIRATION RATE: 16 BRPM | SYSTOLIC BLOOD PRESSURE: 123 MMHG | HEART RATE: 76 BPM | OXYGEN SATURATION: 96 % | WEIGHT: 166 LBS | HEIGHT: 64 IN | DIASTOLIC BLOOD PRESSURE: 69 MMHG

## 2023-10-02 DIAGNOSIS — C18.7 MALIGNANT NEOPLASM OF SIGMOID COLON: Primary | Chronic | ICD-10-CM

## 2023-10-02 NOTE — TELEPHONE ENCOUNTER
Caller: Yelena Morris    Relationship: Self    Best call back number: [phone]    What was the call regarding: PT CALLED WANTED TO SPEAK TO REJI OR LV REGARDING TODAYS APPOINTMENT

## 2023-10-02 NOTE — TELEPHONE ENCOUNTER
Called patient back she was asking the reason for appt today. Informed her Dr. Chaparro has pathology back to review with her. She verbalized understanding.

## 2023-10-02 NOTE — LETTER
October 2, 2023       No Recipients    Patient: Yelena Morris   YOB: 1965   Date of Visit: 10/2/2023     Dear Fiona Corona MD:       Thank you for referring Yelena Morris to me for evaluation. Below are the relevant portions of my assessment and plan of care.    If you have questions, please do not hesitate to call me. I look forward to following Yelena along with you.         Sincerely,        Bernardino Chaparro MD        CC:   No Recipients    Bernardino Chaparro MD  10/02/23 1449  Sign when Signing Visit  CHIEF COMPLAINT: Typical postoperative discomforts    Problem List:  Oncology/Hematology History Overview Note   1.  Colon cancer found in a tubular adenoma 20 cm from the anal verge with negative staging CT chest abdomen and pelvis normal baseline CEA 1.26 with no visible lesion on MRI rectal protocol.  2.  Goiter with 1.8 cm left thyroid nodule found on colon cancer staging for which ultrasound follow-up recommended  3.  Myomectomy with bladder tack July 2015  4.  Diabetes  5.  Hypertension  6.  Hyperlipidemia  7.  Migraines  8.  Depression  9.  Reflux  10.  History of uterine ablation  11.  History of tonsillectomy  12.  History of tympanostomy tubes  13.  History of L4-5 disc surgery and L5-S1 discectomy and fusion  14.  History of solid and liquid phase dysphagia with history of mild gastritis and some esophageal ranging on EGD 2019  15.  Compound melanocytic nevus with dermatofibroma removed by Dr. Ray 07/12/2023    Oncology history timeline:  -12/20/2019 EGD showed small hiatal hernia with some ringing of the esophagus biopsy.  Mild gastritis.  No intestinal metaplasia or dysplasia or malignancy.  Reflux suggestive but not diagnostic.  -8/18/2023 colonoscopy Dr. Calle.  Diverticulosis in the sigmoid colon.  7 mm polyp in the rectum.  Diverticulosis in the sigmoid colon.  Polypoid area with ulceration crescent-shaped 1.8 cm 20 cm proximal to the anus likely sigmoid.  Tattooed and  biopsied.  Rectal polyp showed tubular adenoma with no high-grade dysplasia.  Colon biopsy at 20 cm showed invasive moderately differentiated colon adenocarcinoma arising within a tubular adenoma with high-grade dysplasia.  Microsatellite stable    -8/24/2023 CEA normal 1.26.  ALT 34 bicarb 20.2 otherwise unremarkable CMP.  CBC normal with hemoglobin 14.4 and normal MCV.  -8/27/2023 CT abdomen pelvis with contrast did not show any colonic mass.  Mild distal esophageal thickening, hepatic steatosis without hepatic metastasis.  No lytic bone disease.  MRI rectal protocol without contrast reviewed with Dr. López.  Perhaps a small pucker at the site of biopsy but no discernible mass per se and no adenopathy.  -8/28/2023 CT chest shows no metastasis.  1.8 cm left thyroid for which follow-up is recommended    -8/29/2023 [facility] medical oncology consult: On screening colonoscopy patient found to have ulcerated polyp at 20 cm from the anal verge with moderately differentiated adenocarcinoma microsatellite stable.  Normal baseline CEA with staging CT chest abdomen pelvis and MRI rectal protocol showing no evidence of metastasis and no visible primary on MRI.  Based on distance from anal verge on colonoscopy and given the absence of visible abnormality on MRI rectal protocol to change that estimated position, would call this colon cancer.  Given microsatellite stability, would not contemplate calling this rectal cancer and treating with immunotherapy but regardless of whether rectal or colon primary this would at least be no more than T1 tumor clinically with no visible tumor on MRI despite colonoscopy findings.  The patient has experience with Dr. Michel with whom she requests a consultation for definitive surgery and I will see back postoperatively to decide on any adjuvant therapy as indicated based on pathologic staging.  There is some esophageal thickening on CT for which I would request Dr. Calle repeat EGD last done 2019  just for completeness sake but first would deal with the colon cancer at hand.She will also need to get left thyroid ultrasound eventually and would likely do this through whoever has managed her goiter previously.         Malignant neoplasm of sigmoid colon   8/29/2023 Initial Diagnosis    Malignant neoplasm of sigmoid colon     10/2/2023 Cancer Staged    Staging form: Colon And Rectum, AJCC 8th Edition  - Clinical: Stage IIIB (cT3, cN1, cM0) - Signed by Bernardino Chaparro MD on 10/2/2023         HISTORY OF PRESENT ILLNESS:  The patient is a 58 y.o. female, here for follow up on management of colon cancer with typical postoperative discomforts    Past Medical History:   Diagnosis Date   • Anxiety    • Arthritis     Cant recall the date of onset   • Chest pain 11/06/2019   • Colon polyp    • Depression    • Diabetes mellitus    • Dysphagia 11/06/2019   • Endometriosis    • Fibromyalgia, primary    • GERD (gastroesophageal reflux disease)    • Headache    • HL (hearing loss)    • Hyperlipidemia    • Hypertension    • Low vitamin D level    • Migraines    • Ovarian cyst    • PMDD (premenstrual dysphoric disorder)    • Scoliosis    • Wears glasses      Past Surgical History:   Procedure Laterality Date   • BLADDER SURGERY      Bladder tuck   • COLONOSCOPY      every 5 years   • ENDOMETRIAL ABLATION     • LUMBAR DISCECTOMY  2010    L4-S1 Rt- discectomy Dr. Arellano   • SPINAL CORD STIMULATOR IMPLANT N/A 08/19/2019    Procedure: SPINAL CORD STIMULATOR INSERTION;  Surgeon: Ethan Peters MD;  Location: [facility];  Service: Neurosurgery   • SPINE SURGERY  2010   • TONSILLECTOMY AND ADENOIDECTOMY         Allergies   Allergen Reactions   • Dihydroergotamine GI Intolerance     VOMITING   • Methergine [Methylergonovine Maleate] Nausea And Vomiting   • Amoxicillin Rash       Family History and Social History reviewed and changed as necessary    REVIEW OF SYSTEM:   Typical postoperative discomforts.  Bowels working.    PHYSICAL  "EXAM:  Puncture sites from laparoscopy healing well.    Vitals:    10/02/23 1315   BP: 123/69   Pulse: 76   Resp: 16   Temp: 97.9 °F (36.6 °C)   SpO2: 96%   Weight: 75.3 kg (166 lb)   Height: 162.6 cm (64\")     Vitals:    10/02/23 1315   PainSc: 0-No pain  Comment: No new pain. Still sore from sx          ECOG score: 0           Vitals reviewed.    ECOG: (0) Fully Active - Able to Carry On All Pre-disease Performance Without Restriction    Lab Results   Component Value Date    HGB 11.2 09/28/2023    HCT 34.0 09/28/2023    MCV 91 09/28/2023     09/28/2023    WBC 7.93 09/28/2023    NEUTROABS 8.11 (H) 09/25/2023    LYMPHSABS 2.26 09/25/2023    MONOSABS 0.64 09/25/2023    EOSABS 0.12 09/25/2023    BASOSABS 0.06 09/25/2023       Lab Results   Component Value Date    GLUCOSE 95 08/24/2023    BUN 13 08/24/2023    CREATININE 0.90 08/28/2023     08/24/2023    K 4.1 08/24/2023     08/24/2023    CO2 20.2 (L) 08/24/2023    CALCIUM 9.5 08/24/2023    PROTEINTOT 7.2 08/24/2023    ALBUMIN 4.6 08/24/2023    BILITOT 0.4 08/24/2023    ALKPHOS 82 08/24/2023    AST 13 08/24/2023    ALT 34 (H) 08/24/2023             ASSESSMENT & PLAN:  1.  Colon cancer found in a tubular adenoma 20 cm from the anal verge with negative staging CT chest abdomen and pelvis normal baseline CEA 1.26 with no visible lesion on MRI rectal protocol.  9/25/2023 laparoscopic sigmoid colectomy Dr. Michel verbal pathology report from pathologist at  states 2 out of 13 nodes positive probably T3  2.  Goiter with 1.8 cm left thyroid nodule found on colon cancer staging for which ultrasound follow-up recommended  3.  Myomectomy with bladder tack July 2015  4.  Diabetes  5.  Hypertension  6.  Hyperlipidemia  7.  Migraines  8.  Depression  9.  Reflux  10.  History of uterine ablation  11.  History of tonsillectomy  12.  History of tympanostomy tubes  13.  History of L4-5 disc surgery and L5-S1 discectomy and fusion  14.  History of solid and liquid " phase dysphagia with history of mild gastritis and some esophageal ranging on EGD 2019  15.  Compound melanocytic nevus with dermatofibroma removed by Dr. Ray 07/12/2023    Oncology history timeline:  -12/20/2019 EGD showed small hiatal hernia with some ringing of the esophagus biopsy.  Mild gastritis.  No intestinal metaplasia or dysplasia or malignancy.  Reflux suggestive but not diagnostic.  -8/18/2023 colonoscopy Dr. Calle.  Diverticulosis in the sigmoid colon.  7 mm polyp in the rectum.  Diverticulosis in the sigmoid colon.  Polypoid area with ulceration crescent-shaped 1.8 cm 20 cm proximal to the anus likely sigmoid.  Tattooed and biopsied.  Rectal polyp showed tubular adenoma with no high-grade dysplasia.  Colon biopsy at 20 cm showed invasive moderately differentiated colon adenocarcinoma arising within a tubular adenoma with high-grade dysplasia.  Microsatellite stable    -8/24/2023 CEA normal 1.26.  ALT 34 bicarb 20.2 otherwise unremarkable CMP.  CBC normal with hemoglobin 14.4 and normal MCV.  -8/27/2023 CT abdomen pelvis with contrast did not show any colonic mass.  Mild distal esophageal thickening, hepatic steatosis without hepatic metastasis.  No lytic bone disease.  MRI rectal protocol without contrast reviewed with Dr. López.  Perhaps a small pucker at the site of biopsy but no discernible mass per se and no adenopathy.  -8/28/2023 CT chest shows no metastasis.  1.8 cm left thyroid for which follow-up is recommended    -8/29/2023 [facility] consult: On screening colonoscopy patient found to have ulcerated polyp at 20 cm from the anal verge with moderately differentiated adenocarcinoma microsatellite stable.  Normal baseline CEA with staging CT chest abdomen pelvis and MRI rectal protocol showing no evidence of metastasis and no visible primary on MRI.  Based on distance from anal verge on colonoscopy and given the absence of visible abnormality on MRI rectal protocol to change  that estimated position, would call this colon cancer.  Given microsatellite stability, would not contemplate calling this rectal cancer and treating with immunotherapy but regardless of whether rectal or colon primary this would at least be no more than T1 tumor clinically with no visible tumor on MRI despite colonoscopy findings.  The patient has experience with Dr. Michel with whom she requests a consultation for definitive surgery and I will see back postoperatively to decide on any adjuvant therapy as indicated based on pathologic staging.  There is some esophageal thickening on CT for which I would request Dr. Clale repeat EGD last done 2019 just for completeness sake but first would deal with the colon cancer at hand.She will also need to get left thyroid ultrasound eventually and would likely do this through whoever has managed her goiter previously.      -- 9/25/2023 laparoscopic sigmoid colectomy Dr. Michel.    -10/2/2023 [facility] medical oncology follow-up: Typed final pathology from surgery done a week ago is not back.  She according to verbal report to me from the pathologist has either T2 or T3 disease but he thinks that this is tracking along the arteries and will be T3 and she had 2 out of 13 nodes involved.  Hence I would call this stage IIIb.  Her baseline CEA was normal preoperatively so that will not be extremely helpful postoperatively.  Outside of clinical trial the standard of care would be 3 months of CapeOx or 6 months of FOLFOX.  I would be interested in putting her on NRG- where upon CT DNA negative patients are randomized to either serial screening and CT DNA or 3 months CapeOx/6 months FOLFOX providers choice.  CT DNA positive patients are randomized to either 6 months of CapeOx or FOLFOX or 6 months of FOLFIRINOX.  If we can go ahead and get the [place] CT DNA cooking outside of clinical trial without significant financial toxicity we may do that.  Ideally chemotherapy would start less  than 8 weeks out from surgery and we should have plenty of time to have her heal and to make these decisions collectively.    Total time of care today inclusive of time spent today prior to her arrival reviewing interval data from Dr. Michel and his operative note and during visit reviewing the preliminary pathology as outlined above for the stage IIIb disease and the standard adjuvant therapy approach  Bernardino Chaparro MD    10/02/2023

## 2023-10-12 ENCOUNTER — RESEARCH ENCOUNTER (OUTPATIENT)
Dept: OTHER | Facility: OTHER | Age: 58
End: 2023-10-12
Payer: COMMERCIAL

## 2023-10-12 DIAGNOSIS — C18.7 MALIGNANT NEOPLASM OF SIGMOID COLON: Primary | Chronic | ICD-10-CM

## 2023-10-12 NOTE — RESEARCH
Patient Name:  Yelena Morris  YOB: 1965  Patient Age:  58 y.o.  Patient's Sex:  female    Date of Service:  10/12/2023    Provider:  Dr. Chaparro                     RESEARCH NOTE                  I met with patient to review the NRG- study and consent. Specifically, I reviewed all 8 elements of the ICF including the purpose, risks and benefits. Patient asked questions, all of which were answered appropriately at this time. Patient signed ICF. Patient made selections re: study related communication and optional research. A copy of signed ICFs were provided to the patient. Told patient to contact me with any further questions regarding the study. Patient verbalized understanding.     Soraya Brenner RN

## 2023-10-16 ENCOUNTER — HOSPITAL ENCOUNTER (OUTPATIENT)
Dept: CT IMAGING | Facility: HOSPITAL | Age: 58
Discharge: HOME | End: 2023-10-16
Admitting: INTERNAL MEDICINE
Payer: COMMERCIAL

## 2023-10-16 DIAGNOSIS — C18.7 MALIGNANT NEOPLASM OF SIGMOID COLON: Chronic | ICD-10-CM

## 2023-10-16 PROCEDURE — 25510000001 IOPAMIDOL 61 % SOLUTION: Performed by: INTERNAL MEDICINE

## 2023-10-16 PROCEDURE — 74177 CT ABD & PELVIS W/CONTRAST: CPT

## 2023-10-16 PROCEDURE — 71260 CT THORAX DX C+: CPT

## 2023-10-16 RX ADMIN — IOPAMIDOL 85 ML: 612 INJECTION, SOLUTION INTRAVENOUS at 17:48

## 2023-10-19 ENCOUNTER — OFFICE VISIT (OUTPATIENT)
Dept: ONCOLOGY | Facility: CLINIC | Age: 58
End: 2023-10-19
Payer: COMMERCIAL

## 2023-10-19 ENCOUNTER — RESEARCH ENCOUNTER (OUTPATIENT)
Dept: OTHER | Facility: OTHER | Age: 58
End: 2023-10-19
Payer: COMMERCIAL

## 2023-10-19 ENCOUNTER — LAB (OUTPATIENT)
Dept: LAB | Facility: HOSPITAL | Age: 58
End: 2023-10-19
Payer: COMMERCIAL

## 2023-10-19 VITALS
RESPIRATION RATE: 18 BRPM | BODY MASS INDEX: 28.17 KG/M2 | WEIGHT: 165 LBS | SYSTOLIC BLOOD PRESSURE: 108 MMHG | DIASTOLIC BLOOD PRESSURE: 60 MMHG | HEIGHT: 64 IN | HEART RATE: 86 BPM | TEMPERATURE: 97 F | OXYGEN SATURATION: 97 %

## 2023-10-19 DIAGNOSIS — C18.7 MALIGNANT NEOPLASM OF SIGMOID COLON: Primary | Chronic | ICD-10-CM

## 2023-10-19 DIAGNOSIS — Z00.6 RESEARCH STUDY PATIENT: ICD-10-CM

## 2023-10-19 DIAGNOSIS — Z00.6 RESEARCH STUDY PATIENT: Primary | ICD-10-CM

## 2023-10-19 DIAGNOSIS — N94.9 ADNEXAL CYST: ICD-10-CM

## 2023-10-19 LAB
ALBUMIN SERPL-MCNC: 4.6 G/DL (ref 3.5–5.2)
ALBUMIN/GLOB SERPL: 1.8 G/DL
ALP SERPL-CCNC: 89 U/L (ref 39–117)
ALT SERPL W P-5'-P-CCNC: 25 U/L (ref 1–33)
ANION GAP SERPL CALCULATED.3IONS-SCNC: 11 MMOL/L (ref 5–15)
AST SERPL-CCNC: 16 U/L (ref 1–32)
B-HCG UR QL: NEGATIVE
BASOPHILS # BLD AUTO: 0.05 10*3/MM3 (ref 0–0.2)
BASOPHILS NFR BLD AUTO: 0.6 % (ref 0–1.5)
BILIRUB SERPL-MCNC: 0.2 MG/DL (ref 0–1.2)
BUN SERPL-MCNC: 14 MG/DL (ref 6–20)
BUN/CREAT SERPL: 15.2 (ref 7–25)
CALCIUM SPEC-SCNC: 9.7 MG/DL (ref 8.6–10.5)
CEA SERPL-MCNC: 0.69 NG/ML
CHLORIDE SERPL-SCNC: 102 MMOL/L (ref 98–107)
CO2 SERPL-SCNC: 26 MMOL/L (ref 22–29)
CREAT SERPL-MCNC: 0.92 MG/DL (ref 0.57–1)
DEPRECATED RDW RBC AUTO: 41.9 FL (ref 37–54)
EGFRCR SERPLBLD CKD-EPI 2021: 73.2 ML/MIN/1.73
EOSINOPHIL # BLD AUTO: 0.22 10*3/MM3 (ref 0–0.4)
EOSINOPHIL NFR BLD AUTO: 2.7 % (ref 0.3–6.2)
ERYTHROCYTE [DISTWIDTH] IN BLOOD BY AUTOMATED COUNT: 12.9 % (ref 12.3–15.4)
GLOBULIN UR ELPH-MCNC: 2.6 GM/DL
GLUCOSE SERPL-MCNC: 154 MG/DL (ref 65–99)
HCT VFR BLD AUTO: 41.2 % (ref 34–46.6)
HGB BLD-MCNC: 13.6 G/DL (ref 12–15.9)
IMM GRANULOCYTES # BLD AUTO: 0.04 10*3/MM3 (ref 0–0.05)
IMM GRANULOCYTES NFR BLD AUTO: 0.5 % (ref 0–0.5)
LYMPHOCYTES # BLD AUTO: 2.09 10*3/MM3 (ref 0.7–3.1)
LYMPHOCYTES NFR BLD AUTO: 25.9 % (ref 19.6–45.3)
MCH RBC QN AUTO: 29.2 PG (ref 26.6–33)
MCHC RBC AUTO-ENTMCNC: 33 G/DL (ref 31.5–35.7)
MCV RBC AUTO: 88.6 FL (ref 79–97)
MONOCYTES # BLD AUTO: 0.59 10*3/MM3 (ref 0.1–0.9)
MONOCYTES NFR BLD AUTO: 7.3 % (ref 5–12)
NEUTROPHILS NFR BLD AUTO: 5.09 10*3/MM3 (ref 1.7–7)
NEUTROPHILS NFR BLD AUTO: 63 % (ref 42.7–76)
PLATELET # BLD AUTO: 302 10*3/MM3 (ref 140–450)
PMV BLD AUTO: 9.4 FL (ref 6–12)
POTASSIUM SERPL-SCNC: 3.9 MMOL/L (ref 3.5–5.2)
PROT SERPL-MCNC: 7.2 G/DL (ref 6–8.5)
RBC # BLD AUTO: 4.65 10*6/MM3 (ref 3.77–5.28)
SODIUM SERPL-SCNC: 139 MMOL/L (ref 136–145)
WBC NRBC COR # BLD: 8.08 10*3/MM3 (ref 3.4–10.8)

## 2023-10-19 PROCEDURE — 80053 COMPREHEN METABOLIC PANEL: CPT

## 2023-10-19 PROCEDURE — 82378 CARCINOEMBRYONIC ANTIGEN: CPT

## 2023-10-19 PROCEDURE — 81025 URINE PREGNANCY TEST: CPT

## 2023-10-19 PROCEDURE — 85025 COMPLETE CBC W/AUTO DIFF WBC: CPT

## 2023-10-19 PROCEDURE — 86304 IMMUNOASSAY TUMOR CA 125: CPT

## 2023-10-19 PROCEDURE — 36415 COLL VENOUS BLD VENIPUNCTURE: CPT

## 2023-10-19 NOTE — PROGRESS NOTES
CHIEF COMPLAINT: No new somatic complaints    Problem List:  Oncology/Hematology History Overview Note   1.  Colon cancer found in a tubular adenoma 20 cm from the anal verge with negative staging CT chest abdomen and pelvis normal baseline CEA 1.26 with no visible lesion on MRI rectal protocol.  9/25/2023 laparoscopic sigmoid colectomy Dr. Michel pathologic T3 extending through muscularis propria into pericolonic fat, N1b 3/14 node positive  2.  Goiter with 1.8 cm left thyroid nodule found on colon cancer staging for which ultrasound follow-up recommended  3.  Myomectomy with bladder tack July 2015  4.  Diabetes  5.  Hypertension  6.  Hyperlipidemia  7.  Migraines  8.  Depression  9.  Reflux  10.  History of uterine ablation  11.  History of tonsillectomy  12.  History of tympanostomy tubes  13.  History of L4-5 disc surgery and L5-S1 discectomy and fusion  14.  History of solid and liquid phase dysphagia with history of mild gastritis and some esophageal ranging on EGD 2019  15.  Compound melanocytic nevus with dermatofibroma removed by Dr. Ray 07/12/2023    Oncology history timeline:  -12/20/2019 EGD showed small hiatal hernia with some ringing of the esophagus biopsy.  Mild gastritis.  No intestinal metaplasia or dysplasia or malignancy.  Reflux suggestive but not diagnostic.  -8/18/2023 colonoscopy Dr. Calle.  Diverticulosis in the sigmoid colon.  7 mm polyp in the rectum.  Diverticulosis in the sigmoid colon.  Polypoid area with ulceration crescent-shaped 1.8 cm 20 cm proximal to the anus likely sigmoid.  Tattooed and biopsied.  Rectal polyp showed tubular adenoma with no high-grade dysplasia.  Colon biopsy at 20 cm showed invasive moderately differentiated colon adenocarcinoma arising within a tubular adenoma with high-grade dysplasia.  Microsatellite stable    -8/24/2023 CEA normal 1.26.  ALT 34 bicarb 20.2 otherwise unremarkable CMP.  CBC normal with hemoglobin 14.4 and normal MCV.  -8/27/2023 CT  abdomen pelvis with contrast did not show any colonic mass.  Mild distal esophageal thickening, hepatic steatosis without hepatic metastasis.  No lytic bone disease.  MRI rectal protocol without contrast reviewed with Dr. López.  Perhaps a small pucker at the site of biopsy but no discernible mass per se and no adenopathy.  -8/28/2023 CT chest shows no metastasis.  1.8 cm left thyroid for which follow-up is recommended    -8/29/2023 [facility] medical oncology consult: On screening colonoscopy patient found to have ulcerated polyp at 20 cm from the anal verge with moderately differentiated adenocarcinoma microsatellite stable.  Normal baseline CEA with staging CT chest abdomen pelvis and MRI rectal protocol showing no evidence of metastasis and no visible primary on MRI.  Based on distance from anal verge on colonoscopy and given the absence of visible abnormality on MRI rectal protocol to change that estimated position, would call this colon cancer.  Given microsatellite stability, would not contemplate calling this rectal cancer and treating with immunotherapy but regardless of whether rectal or colon primary this would at least be no more than T1 tumor clinically with no visible tumor on MRI despite colonoscopy findings.  The patient has experience with Dr. Michel with whom she requests a consultation for definitive surgery and I will see back postoperatively to decide on any adjuvant therapy as indicated based on pathologic staging.  There is some esophageal thickening on CT for which I would request Dr. Calle repeat EGD last done 2019 just for completeness sake but first would deal with the colon cancer at hand.She will also need to get left thyroid ultrasound eventually and would likely do this through whoever has managed her goiter previously.      -- 9/25/2023 laparoscopic sigmoid colectomy Dr. Michel.    -10/2/2023 [facility] medical oncology follow-up: Final pathology from surgery done a week ago is not back.   According to verbal report to me from the pathologist has either T2 or T3 disease but he thinks that this is tracking along the arteries and will be T3 and she had 3 out of 13 nodes involved.  Hence I would call this stage IIIb.  Her baseline CEA was normal preoperatively so that will not be extremely helpful postoperatively.  Outside of clinical trial the standard of care would be 3 months of CapeOx or 6 months of FOLFOX.  I would be interested in putting her on NRG- where upon CT DNA negative patients are randomized to either serial screening and CT DNA or 3 months CapeOx/6 months FOLFOX providers choice.  CT DNA positive patients are randomized to either 6 months of CapeOx or FOLFOX or 6 months of FOLFIRINOX.  If we can go ahead and get the [facility] CT DNA cooking outside of clinical trial without significant financial toxicity we may do that.  Ideally chemotherapy would start less than 8 weeks out from surgery and we should have plenty of time to have her heal and to make these decisions collectively.  Addendum: Final pathology report… Moderately differentiated adenocarcinoma pathologic T3N1 B, negative margins margins 3 out of 14 nodes positive with negative new anastomotic excisional margin.  Tumor extent ends through muscularis propria into pericolonic or perirectal tissue.  No lymph-vascular or perineural invasion.     -10/16/2023 CT chest Abdomen pelvis compared to CT chest 8/28/2023 and CT abdomen pelvis 8/27/2023 shows no metastasis in the chest abdomen or pelvis and interval sigmoid colectomy.  New right adnexal cystic lesion 6 cm recommending pelvic ultrasound in 3 months    -10/19/2023 [facility] medical oncology follow-up: I went over her scans with her.  No clear-cut metastatic disease.  I will ask her to get to Dr. Mcguire for the right adnexal cystic lesion as quickly as possible and would not wait 3 months on the ultrasound in light of clinical trial.  I suspect this is benign but we will get this  worked up.  On [facility] nomogram, putting in her pathologic information she has a 5-year survival rate of 69% give or take 7%.  With adjuvant therapy her 5-year survival rate is 85%.  This is based on statistics not informed by molecular data.  We are aware of prognostic data from prior CT DNA trials whereby patients with negative CT DNA have a considerably higher 5-year survival rate and people with CT DNA positivity have a considerably lower 5-year survival rate and the above 5-year survival rate is probably a conglomerate of statistics between these people that averages out.  I certainly would be fine with either 6 months of FOLFOX or 3 months of Xeloda oxaliplatin outside of a clinical trial and that would be my standard recommendation.   is now open and we are enrolling her on that per her request.  If she has negative CT DNA then she is randomized to watchful waiting versus CapeOx x4 or FOLFOX x12.  If CT DNA is positive then she is randomized to CapeOx x6 or FOLFOX x12 (investigators choice) versus treatment intensification with FOLFIRINOX.  I will see her back in a few weeks for the CT DNA results.     Malignant neoplasm of sigmoid colon   8/29/2023 Initial Diagnosis    Malignant neoplasm of sigmoid colon     10/2/2023 Cancer Staged    Staging form: Colon And Rectum, AJCC 8th Edition  - Clinical: Stage IIIB (cT3, cN1, cM0) - Signed by Bernardino Chaparro MD on 10/2/2023     10/19/2023 Cancer Staged    Staging form: Colon And Rectum, AJCC 8th Edition  - Pathologic: Stage IIIB (pT3, pN1b, cM0) - Signed by Bernardino Chaparro MD on 10/19/2023         HISTORY OF PRESENT ILLNESS:  The patient is a 58 y.o. female, here for follow up on management of stage III colon cancer.  Saw Dr. Michel.  He will see her back in 6 months.  He is satisfied with wound healing.    Past Medical History:   Diagnosis Date    Anxiety     Arthritis     Cant recall the date of onset    Chest pain 11/06/2019    Colon polyp     Depression   "   Diabetes mellitus     Dysphagia 11/06/2019    Endometriosis     Fibromyalgia, primary     GERD (gastroesophageal reflux disease)     Headache     HL (hearing loss)     Hyperlipidemia     Hypertension     Low vitamin D level     Migraines     Ovarian cyst     PMDD (premenstrual dysphoric disorder)     Scoliosis     Wears glasses      Past Surgical History:   Procedure Laterality Date    BLADDER SURGERY      Bladder tuck    COLONOSCOPY      every 5 years    ENDOMETRIAL ABLATION      LUMBAR DISCECTOMY  2010    L4-S1 Rt- discectomy Dr. Arellano    SPINAL CORD STIMULATOR IMPLANT N/A 08/19/2019    Procedure: SPINAL CORD STIMULATOR INSERTION;  Surgeon: Ethan Peters MD;  Location: [facility];  Service: Neurosurgery    SPINE SURGERY  2010    TONSILLECTOMY AND ADENOIDECTOMY         Allergies   Allergen Reactions    Dihydroergotamine GI Intolerance     VOMITING    Methergine [Methylergonovine Maleate] Nausea And Vomiting    Amoxicillin Rash       Family History and Social History reviewed and changed as necessary    REVIEW OF SYSTEM:   No new somatic complaints    PHYSICAL EXAM:  Abdominal wounds well-healed.  No respiratory distress.    Vitals:    10/19/23 0906   BP: 108/60   Pulse: 86   Resp: 18   Temp: 97 °F (36.1 °C)   SpO2: 97%   Weight: 74.8 kg (165 lb)   Height: 162.6 cm (64\")     Vitals:    10/19/23 0906   PainSc: 0-No pain          ECOG score: 0           Vitals reviewed.    ECOG: (0) Fully Active - Able to Carry On All Pre-disease Performance Without Restriction    Lab Results   Component Value Date    HGB 13.5 10/17/2023    HCT 39.6 10/17/2023    MCV 85 10/17/2023     10/17/2023    WBC 8.08 10/17/2023    NEUTROABS 4.97 10/17/2023    LYMPHSABS 2.28 10/17/2023    MONOSABS 0.53 10/17/2023    EOSABS 0.19 10/17/2023    BASOSABS 0.07 10/17/2023       Lab Results   Component Value Date    GLUCOSE 95 08/24/2023    BUN 13 08/24/2023    CREATININE 0.90 08/28/2023     08/24/2023    K 4.1 08/24/2023    CL " 106 08/24/2023    CO2 20.2 (L) 08/24/2023    CALCIUM 9.5 08/24/2023    PROTEINTOT 7.2 08/24/2023    ALBUMIN 4.6 08/24/2023    BILITOT 0.4 08/24/2023    ALKPHOS 82 08/24/2023    AST 13 08/24/2023    ALT 34 (H) 08/24/2023             ASSESSMENT & PLAN:  1.  Colon cancer found in a tubular adenoma 20 cm from the anal verge with negative staging CT chest abdomen and pelvis normal baseline CEA 1.26 with no visible lesion on MRI rectal protocol.  9/25/2023 laparoscopic sigmoid colectomy Dr. Michel pathologic T3 extending through muscularis propria into pericolonic fat, N1b 3/14 node positive  2.  Goiter with 1.8 cm left thyroid nodule found on colon cancer staging for which ultrasound follow-up recommended  3.  Myomectomy with bladder tack July 2015  4.  Diabetes  5.  Hypertension  6.  Hyperlipidemia  7.  Migraines  8.  Depression  9.  Reflux  10.  History of uterine ablation  11.  History of tonsillectomy  12.  History of tympanostomy tubes  13.  History of L4-5 disc surgery and L5-S1 discectomy and fusion  14.  History of solid and liquid phase dysphagia with history of mild gastritis and some esophageal ranging on EGD 2019  15.  Compound melanocytic nevus with dermatofibroma removed by Dr. Ray 07/12/2023    Oncology history timeline:  -12/20/2019 EGD showed small hiatal hernia with some ringing of the esophagus biopsy.  Mild gastritis.  No intestinal metaplasia or dysplasia or malignancy.  Reflux suggestive but not diagnostic.  -8/18/2023 colonoscopy Dr. Calle.  Diverticulosis in the sigmoid colon.  7 mm polyp in the rectum.  Diverticulosis in the sigmoid colon.  Polypoid area with ulceration crescent-shaped 1.8 cm 20 cm proximal to the anus likely sigmoid.  Tattooed and biopsied.  Rectal polyp showed tubular adenoma with no high-grade dysplasia.  Colon biopsy at 20 cm showed invasive moderately differentiated colon adenocarcinoma arising within a tubular adenoma with high-grade dysplasia.  Microsatellite  stable    -8/24/2023 CEA normal 1.26.  ALT 34 bicarb 20.2 otherwise unremarkable CMP.  CBC normal with hemoglobin 14.4 and normal MCV.  -8/27/2023 CT abdomen pelvis with contrast did not show any colonic mass.  Mild distal esophageal thickening, hepatic steatosis without hepatic metastasis.  No lytic bone disease.  MRI rectal protocol without contrast reviewed with Dr. López.  Perhaps a small pucker at the site of biopsy but no discernible mass per se and no adenopathy.  -8/28/2023 CT chest shows no metastasis.  1.8 cm left thyroid for which follow-up is recommended    -8/29/2023 [facility] medical oncology consult: On screening colonoscopy patient found to have ulcerated polyp at 20 cm from the anal verge with moderately differentiated adenocarcinoma microsatellite stable.  Normal baseline CEA with staging CT chest abdomen pelvis and MRI rectal protocol showing no evidence of metastasis and no visible primary on MRI.  Based on distance from anal verge on colonoscopy and given the absence of visible abnormality on MRI rectal protocol to change that estimated position, would call this colon cancer.  Given microsatellite stability, would not contemplate calling this rectal cancer and treating with immunotherapy but regardless of whether rectal or colon primary this would at least be no more than T1 tumor clinically with no visible tumor on MRI despite colonoscopy findings.  The patient has experience with Dr. Michel with whom she requests a consultation for definitive surgery and I will see back postoperatively to decide on any adjuvant therapy as indicated based on pathologic staging.  There is some esophageal thickening on CT for which I would request Dr. Calle repeat EGD last done 2019 just for completeness sake but first would deal with the colon cancer at hand.She will also need to get left thyroid ultrasound eventually and would likely do this through whoever has managed her goiter previously.      -- 9/25/2023  laparoscopic sigmoid colectomy Dr. Michel.    -10/2/2023 [facility] medical oncology follow-up: Final pathology from surgery done a week ago is not back.  According to verbal report to me from the pathologist has either T2 or T3 disease but he thinks that this is tracking along the arteries and will be T3 and she had 3 out of 13 nodes involved.  Hence I would call this stage IIIb.  Her baseline CEA was normal preoperatively so that will not be extremely helpful postoperatively.  Outside of clinical trial the standard of care would be 3 months of CapeOx or 6 months of FOLFOX.  I would be interested in putting her on NRG- where upon CT DNA negative patients are randomized to either serial screening and CT DNA or 3 months CapeOx/6 months FOLFOX providers choice.  CT DNA positive patients are randomized to either 6 months of CapeOx or FOLFOX or 6 months of FOLFIRINOX.  If we can go ahead and get the [facility] CT DNA cooking outside of clinical trial without significant financial toxicity we may do that.  Ideally chemotherapy would start less than 8 weeks out from surgery and we should have plenty of time to have her heal and to make these decisions collectively.  Addendum: Final pathology report… Moderately differentiated adenocarcinoma pathologic T3N1 B- margins 3 out of 14 nodes positive with negative new anastomotic excisional margin.  Tumor extent ends through muscularis propria into pericolonic or perirectal tissue.  No lymph-vascular or perineural invasion.     -10/16/2023 CT chest Abdomen pelvis compared to CT chest 8/28/2023 and CT abdomen pelvis 8/27/2023 shows no metastasis in the chest abdomen or pelvis and interval sigmoid colectomy.  New right adnexal cystic lesion 6 cm recommending pelvic ultrasound in 3 months    -10/19/2023 [facility] medical oncology follow-up: I went over her scans with her.  No clear-cut metastatic disease.  I will ask her to get to Dr. Mcguire for the right adnexal cystic lesion as quickly  as possible and would not wait 3 months on the ultrasound in light of clinical trial.  I suspect this is benign but we will get this worked up.  On [facility] nomogram, putting in her pathologic information she has a 5-year survival rate of 69% give or take 7%.  With adjuvant therapy her 5-year survival rate is 85%.  This is based on statistics not informed by molecular data.  We are aware of prognostic data from prior CT DNA trials whereby patients with negative CT DNA have a considerably higher 5-year survival rate and people with CT DNA positivity have a considerably lower 5-year survival rate and the above 5-year survival rate is probably a conglomerate of statistics between these people that averages out.  I certainly would be fine with either 6 months of FOLFOX or 3 months of Xeloda oxaliplatin outside of a clinical trial and that would be my standard recommendation.   is now open and we are enrolling her on that per her request.  If she has negative CT DNA then she is randomized to watchful waiting versus CapeOx x4 or FOLFOX x12.  If CT DNA is positive then she is randomized to CapeOx x6 or FOLFOX x12 (investigators choice) versus treatment intensification with FOLFIRINOX.  I will see her back in a few weeks for the CT DNA results.    Total time of care today inclusive of time spent today prior to her arrival reviewing interval images and reports thereof and interval data and entering that data into nomograms and educating her on the above statistics and image results and options for adjuvant therapy and clinical trials and coming to a decision with her to go with the clinical trial and after visit instituting this plan as outlined took 1 hour patient care time throughout the day today.  Bernardino Chaparro MD    10/19/2023

## 2023-10-19 NOTE — LETTER
October 19, 2023       No Recipients    Patient: Yelena Morris   YOB: 1965   Date of Visit: 10/19/2023     Dear Fiona Corona MD:       Thank you for referring Yelena Morris to me for evaluation. Below are the relevant portions of my assessment and plan of care.    If you have questions, please do not hesitate to call me. I look forward to following Yelena along with you.         Sincerely,        Bernardino Chaparro MD        CC:   No Recipients    Bernardino Chaparro MD  10/19/23 1007  Sign when Signing Visit  CHIEF COMPLAINT: No new somatic complaints    Problem List:  Oncology/Hematology History Overview Note   1.  Colon cancer found in a tubular adenoma 20 cm from the anal verge with negative staging CT chest abdomen and pelvis normal baseline CEA 1.26 with no visible lesion on MRI rectal protocol.  9/25/2023 laparoscopic sigmoid colectomy Dr. Michel pathologic T3 extending through muscularis propria into pericolonic fat, N1b 3/14 node positive  2.  Goiter with 1.8 cm left thyroid nodule found on colon cancer staging for which ultrasound follow-up recommended  3.  Myomectomy with bladder tack July 2015  4.  Diabetes  5.  Hypertension  6.  Hyperlipidemia  7.  Migraines  8.  Depression  9.  Reflux  10.  History of uterine ablation  11.  History of tonsillectomy  12.  History of tympanostomy tubes  13.  History of L4-5 disc surgery and L5-S1 discectomy and fusion  14.  History of solid and liquid phase dysphagia with history of mild gastritis and some esophageal ranging on EGD 2019  15.  Compound melanocytic nevus with dermatofibroma removed by Dr. Ray 07/12/2023    Oncology history timeline:  -12/20/2019 EGD showed small hiatal hernia with some ringing of the esophagus biopsy.  Mild gastritis.  No intestinal metaplasia or dysplasia or malignancy.  Reflux suggestive but not diagnostic.  -8/18/2023 colonoscopy Dr. Calle.  Diverticulosis in the sigmoid colon.  7 mm polyp in the rectum.   Diverticulosis in the sigmoid colon.  Polypoid area with ulceration crescent-shaped 1.8 cm 20 cm proximal to the anus likely sigmoid.  Tattooed and biopsied.  Rectal polyp showed tubular adenoma with no high-grade dysplasia.  Colon biopsy at 20 cm showed invasive moderately differentiated colon adenocarcinoma arising within a tubular adenoma with high-grade dysplasia.  Microsatellite stable    -8/24/2023 CEA normal 1.26.  ALT 34 bicarb 20.2 otherwise unremarkable CMP.  CBC normal with hemoglobin 14.4 and normal MCV.  -8/27/2023 CT abdomen pelvis with contrast did not show any colonic mass.  Mild distal esophageal thickening, hepatic steatosis without hepatic metastasis.  No lytic bone disease.  MRI rectal protocol without contrast reviewed with Dr. López.  Perhaps a small pucker at the site of biopsy but no discernible mass per se and no adenopathy.  -8/28/2023 CT chest shows no metastasis.  1.8 cm left thyroid for which follow-up is recommended    -8/29/2023 [facility] medical oncology consult: On screening colonoscopy patient found to have ulcerated polyp at 20 cm from the anal verge with moderately differentiated adenocarcinoma microsatellite stable.  Normal baseline CEA with staging CT chest abdomen pelvis and MRI rectal protocol showing no evidence of metastasis and no visible primary on MRI.  Based on distance from anal verge on colonoscopy and given the absence of visible abnormality on MRI rectal protocol to change that estimated position, would call this colon cancer.  Given microsatellite stability, would not contemplate calling this rectal cancer and treating with immunotherapy but regardless of whether rectal or colon primary this would at least be no more than T1 tumor clinically with no visible tumor on MRI despite colonoscopy findings.  The patient has experience with Dr. Michel with whom she requests a consultation for definitive surgery and I will see back postoperatively to decide on any adjuvant  therapy as indicated based on pathologic staging.  There is some esophageal thickening on CT for which I would request Dr. Calle repeat EGD last done 2019 just for completeness sake but first would deal with the colon cancer at hand.She will also need to get left thyroid ultrasound eventually and would likely do this through whoever has managed her goiter previously.      -- 9/25/2023 laparoscopic sigmoid colectomy Dr. Michel.    -10/2/2023 [facility] medical oncology follow-up: Final pathology from surgery done a week ago is not back.  According to verbal report to me from the pathologist has either T2 or T3 disease but he thinks that this is tracking along the arteries and will be T3 and she had 3 out of 13 nodes involved.  Hence I would call this stage IIIb.  Her baseline CEA was normal preoperatively so that will not be extremely helpful postoperatively.  Outside of clinical trial the standard of care would be 3 months of CapeOx or 6 months of FOLFOX.  I would be interested in putting her on NRG- where upon CT DNA negative patients are randomized to either serial screening and CT DNA or 3 months CapeOx/6 months FOLFOX providers choice.  CT DNA positive patients are randomized to either 6 months of CapeOx or FOLFOX or 6 months of FOLFIRINOX.  If we can go ahead and get the [facility] CT DNA cooking outside of clinical trial without significant financial toxicity we may do that.  Ideally chemotherapy would start less than 8 weeks out from surgery and we should have plenty of time to have her heal and to make these decisions collectively.  Addendum: Final pathology report… Moderately differentiated adenocarcinoma pathologic T3N1 B, negative margins margins 3 out of 14 nodes positive with negative new anastomotic excisional margin.  Tumor extent ends through muscularis propria into pericolonic or perirectal tissue.  No lymph-vascular or perineural invasion.     -10/16/2023 CT chest Abdomen pelvis compared to CT chest  8/28/2023 and CT abdomen pelvis 8/27/2023 shows no metastasis in the chest abdomen or pelvis and interval sigmoid colectomy.  New right adnexal cystic lesion 6 cm recommending pelvic ultrasound in 3 months    -10/19/2023 [facility] [place] medical oncology follow-up: I went over her scans with her.  No clear-cut metastatic disease.  I will ask her to get to Dr. Mcguire for the right adnexal cystic lesion as quickly as possible and would not wait 3 months on the ultrasound in light of clinical trial.  I suspect this is benign but we will get this worked up.  On [facility] nomogram, putting in her pathologic information she has a 5-year survival rate of 69% give or take 7%.  With adjuvant therapy her 5-year survival rate is 85%.  This is based on statistics not informed by molecular data.  We are aware of prognostic data from prior CT DNA trials whereby patients with negative CT DNA have a considerably higher 5-year survival rate and people with CT DNA positivity have a considerably lower 5-year survival rate and the above 5-year survival rate is probably a conglomerate of statistics between these people that averages out.  I certainly would be fine with either 6 months of FOLFOX or 3 months of Xeloda oxaliplatin outside of a clinical trial and that would be my standard recommendation.   is now open and we are enrolling her on that per her request.  If she has negative CT DNA then she is randomized to watchful waiting versus CapeOx x4 or FOLFOX x12.  If CT DNA is positive then she is randomized to CapeOx x6 or FOLFOX x12 (investigators choice) versus treatment intensification with FOLFIRINOX.  I will see her back in a few weeks for the CT DNA results.     Malignant neoplasm of sigmoid colon   8/29/2023 Initial Diagnosis    Malignant neoplasm of sigmoid colon     10/2/2023 Cancer Staged    Staging form: Colon And Rectum, AJCC 8th Edition  - Clinical: Stage IIIB (cT3, cN1, cM0) - Signed by Bernardino Chaparro MD on 10/2/2023    "  10/19/2023 Cancer Staged    Staging form: Colon And Rectum, AJCC 8th Edition  - Pathologic: Stage IIIB (pT3, pN1b, cM0) - Signed by Bernardino Chaparro MD on 10/19/2023         HISTORY OF PRESENT ILLNESS:  The patient is a 58 y.o. female, here for follow up on management of stage III colon cancer.  Saw Dr. Michel.  He will see her back in 6 months.  He is satisfied with wound healing.    Past Medical History:   Diagnosis Date   • Anxiety    • Arthritis     Cant recall the date of onset   • Chest pain 11/06/2019   • Colon polyp    • Depression    • Diabetes mellitus    • Dysphagia 11/06/2019   • Endometriosis    • Fibromyalgia, primary    • GERD (gastroesophageal reflux disease)    • Headache    • HL (hearing loss)    • Hyperlipidemia    • Hypertension    • Low vitamin D level    • Migraines    • Ovarian cyst    • PMDD (premenstrual dysphoric disorder)    • Scoliosis    • Wears glasses      Past Surgical History:   Procedure Laterality Date   • BLADDER SURGERY      Bladder tuck   • COLONOSCOPY      every 5 years   • ENDOMETRIAL ABLATION     • LUMBAR DISCECTOMY  2010    L4-S1 Rt- discectomy Dr. Arellano   • SPINAL CORD STIMULATOR IMPLANT N/A 08/19/2019    Procedure: SPINAL CORD STIMULATOR INSERTION;  Surgeon: Ethan Peters MD;  Location: [facility];  Service: Neurosurgery   • SPINE SURGERY  2010   • TONSILLECTOMY AND ADENOIDECTOMY         Allergies   Allergen Reactions   • Dihydroergotamine GI Intolerance     VOMITING   • Methergine [Methylergonovine Maleate] Nausea And Vomiting   • Amoxicillin Rash       Family History and Social History reviewed and changed as necessary    REVIEW OF SYSTEM:   No new somatic complaints    PHYSICAL EXAM:  Abdominal wounds well-healed.  No respiratory distress.    Vitals:    10/19/23 0906   BP: 108/60   Pulse: 86   Resp: 18   Temp: 97 °F (36.1 °C)   SpO2: 97%   Weight: 74.8 kg (165 lb)   Height: 162.6 cm (64\")     Vitals:    10/19/23 0906   PainSc: 0-No pain          ECOG score: 0       "     Vitals reviewed.    ECOG: (0) Fully Active - Able to Carry On All Pre-disease Performance Without Restriction    Lab Results   Component Value Date    HGB 13.5 10/17/2023    HCT 39.6 10/17/2023    MCV 85 10/17/2023     10/17/2023    WBC 8.08 10/17/2023    NEUTROABS 4.97 10/17/2023    LYMPHSABS 2.28 10/17/2023    MONOSABS 0.53 10/17/2023    EOSABS 0.19 10/17/2023    BASOSABS 0.07 10/17/2023       Lab Results   Component Value Date    GLUCOSE 95 08/24/2023    BUN 13 08/24/2023    CREATININE 0.90 08/28/2023     08/24/2023    K 4.1 08/24/2023     08/24/2023    CO2 20.2 (L) 08/24/2023    CALCIUM 9.5 08/24/2023    PROTEINTOT 7.2 08/24/2023    ALBUMIN 4.6 08/24/2023    BILITOT 0.4 08/24/2023    ALKPHOS 82 08/24/2023    AST 13 08/24/2023    ALT 34 (H) 08/24/2023             ASSESSMENT & PLAN:  1.  Colon cancer found in a tubular adenoma 20 cm from the anal verge with negative staging CT chest abdomen and pelvis normal baseline CEA 1.26 with no visible lesion on MRI rectal protocol.  9/25/2023 laparoscopic sigmoid colectomy Dr. Michel pathologic T3 extending through muscularis propria into pericolonic fat, N1b 3/14 node positive  2.  Goiter with 1.8 cm left thyroid nodule found on colon cancer staging for which ultrasound follow-up recommended  3.  Myomectomy with bladder tack July 2015  4.  Diabetes  5.  Hypertension  6.  Hyperlipidemia  7.  Migraines  8.  Depression  9.  Reflux  10.  History of uterine ablation  11.  History of tonsillectomy  12.  History of tympanostomy tubes  13.  History of L4-5 disc surgery and L5-S1 discectomy and fusion  14.  History of solid and liquid phase dysphagia with history of mild gastritis and some esophageal ranging on EGD 2019  15.  Compound melanocytic nevus with dermatofibroma removed by Dr. Ray 07/12/2023    Oncology history timeline:  -12/20/2019 EGD showed small hiatal hernia with some ringing of the esophagus biopsy.  Mild gastritis.  No intestinal  metaplasia or dysplasia or malignancy.  Reflux suggestive but not diagnostic.  -8/18/2023 colonoscopy Dr. Calle.  Diverticulosis in the sigmoid colon.  7 mm polyp in the rectum.  Diverticulosis in the sigmoid colon.  Polypoid area with ulceration crescent-shaped 1.8 cm 20 cm proximal to the anus likely sigmoid.  Tattooed and biopsied.  Rectal polyp showed tubular adenoma with no high-grade dysplasia.  Colon biopsy at 20 cm showed invasive moderately differentiated colon adenocarcinoma arising within a tubular adenoma with high-grade dysplasia.  Microsatellite stable    -8/24/2023 CEA normal 1.26.  ALT 34 bicarb 20.2 otherwise unremarkable CMP.  CBC normal with hemoglobin 14.4 and normal MCV.  -8/27/2023 CT abdomen pelvis with contrast did not show any colonic mass.  Mild distal esophageal thickening, hepatic steatosis without hepatic metastasis.  No lytic bone disease.  MRI rectal protocol without contrast reviewed with Dr. López.  Perhaps a small pucker at the site of biopsy but no discernible mass per se and no adenopathy.  -8/28/2023 CT chest shows no metastasis.  1.8 cm left thyroid for which follow-up is recommended    -8/29/2023 [facility] medical oncology consult: On screening colonoscopy patient found to have ulcerated polyp at 20 cm from the anal verge with moderately differentiated adenocarcinoma microsatellite stable.  Normal baseline CEA with staging CT chest abdomen pelvis and MRI rectal protocol showing no evidence of metastasis and no visible primary on MRI.  Based on distance from anal verge on colonoscopy and given the absence of visible abnormality on MRI rectal protocol to change that estimated position, would call this colon cancer.  Given microsatellite stability, would not contemplate calling this rectal cancer and treating with immunotherapy but regardless of whether rectal or colon primary this would at least be no more than T1 tumor clinically with no visible tumor on MRI despite colonoscopy  findings.  The patient has experience with Dr. Michel with whom she requests a consultation for definitive surgery and I will see back postoperatively to decide on any adjuvant therapy as indicated based on pathologic staging.  There is some esophageal thickening on CT for which I would request Dr. Calle repeat EGD last done 2019 just for completeness sake but first would deal with the colon cancer at hand.She will also need to get left thyroid ultrasound eventually and would likely do this through whoever has managed her goiter previously.      -- 9/25/2023 laparoscopic sigmoid colectomy Dr. Michel.    -10/2/2023 [facility] medical oncology follow-up: Final pathology from surgery done a week ago is not back.  According to verbal report to me from the pathologist has either T2 or T3 disease but he thinks that this is tracking along the arteries and will be T3 and she had 3 out of 13 nodes involved.  Hence I would call this stage IIIb.  Her baseline CEA was normal preoperatively so that will not be extremely helpful postoperatively.  Outside of clinical trial the standard of care would be 3 months of CapeOx or 6 months of FOLFOX.  I would be interested in putting her on NRG- where upon CT DNA negative patients are randomized to either serial screening and CT DNA or 3 months CapeOx/6 months FOLFOX providers choice.  CT DNA positive patients are randomized to either 6 months of CapeOx or FOLFOX or 6 months of FOLFIRINOX.  If we can go ahead and get the [facility] CT DNA cooking outside of clinical trial without significant financial toxicity we may do that.  Ideally chemotherapy would start less than 8 weeks out from surgery and we should have plenty of time to have her heal and to make these decisions collectively.  Addendum: Final pathology report… Moderately differentiated adenocarcinoma pathologic T3N1 B- margins 3 out of 14 nodes positive with negative new anastomotic excisional margin.  Tumor extent ends through  muscularis propria into pericolonic or perirectal tissue.  No lymph-vascular or perineural invasion.     -10/16/2023 CT chest Abdomen pelvis compared to CT chest 8/28/2023 and CT abdomen pelvis 8/27/2023 shows no metastasis in the chest abdomen or pelvis and interval sigmoid colectomy.  New right adnexal cystic lesion 6 cm recommending pelvic ultrasound in 3 months    -10/19/2023 [facility] oncology follow-up: I went over her scans with her.  No clear-cut metastatic disease.  I will ask her to get to Dr. Mcguire for the right adnexal cystic lesion as quickly as possible and would not wait 3 months on the ultrasound in light of clinical trial.  I suspect this is benign but we will get this worked up.  On [facility] nomogram, putting in her pathologic information she has a 5-year survival rate of 69% give or take 7%.  With adjuvant therapy her 5-year survival rate is 85%.  This is based on statistics not informed by molecular data.  We are aware of prognostic data from prior CT DNA trials whereby patients with negative CT DNA have a considerably higher 5-year survival rate and people with CT DNA positivity have a considerably lower 5-year survival rate and the above 5-year survival rate is probably a conglomerate of statistics between these people that averages out.  I certainly would be fine with either 6 months of FOLFOX or 3 months of Xeloda oxaliplatin outside of a clinical trial and that would be my standard recommendation.   is now open and we are enrolling her on that per her request.  If she has negative CT DNA then she is randomized to watchful waiting versus CapeOx x4 or FOLFOX x12.  If CT DNA is positive then she is randomized to CapeOx x6 or FOLFOX x12 (investigators choice) versus treatment intensification with FOLFIRINOX.  I will see her back in a few weeks for the CT DNA results.    Total time of care today inclusive of time spent today prior to her arrival reviewing interval images and  reports thereof and interval data and entering that data into nomograms and educating her on the above statistics and image results and options for adjuvant therapy and clinical trials and coming to a decision with her to go with the clinical trial and after visit instituting this plan as outlined took 1 hour patient care time throughout the day today.  Bernardino Chaparro MD    10/19/2023

## 2023-10-19 NOTE — RESEARCH
Patient Name:  Yelena Morris  YOB: 1965  Patient Age:  58 y.o.  Patient's Sex:  female    Date of Service:  10/19/2023    Provider:  Dr. Chaparro                     RESEARCH NOTE                  I met with patient to review the NRG- study and reconsent after an error was noted in previous consent. Specifically, I reviewed all 8 elements of the ICF including the purpose, risks and benefits. Patient asked questions, all of which were answered appropriately at this time. Patient signed ICF. Patient made selections re: study related communication and optional research. A copy of signed ICFs were provided to the patient. Told patient to contact me with any further questions regarding the study. Patient verbalized understanding.     Soraya Brenner RN

## 2023-10-20 ENCOUNTER — TELEPHONE (OUTPATIENT)
Dept: ONCOLOGY | Facility: CLINIC | Age: 58
End: 2023-10-20
Payer: COMMERCIAL

## 2023-10-20 DIAGNOSIS — N94.9 ADNEXAL CYST: Primary | ICD-10-CM

## 2023-10-20 LAB — CANCER AG125 SERPL QL: 18 U/ML (ref 0–38.1)

## 2023-10-20 NOTE — TELEPHONE ENCOUNTER
Dr. Chaparro and Dr. Uribe discussed patients CT result with adnexal cyst and recommendation is for transvaginal US and  . Discussed with lab and  will be added on to blood drawn yesterday. Called patient and she verbalized understanding of plan. In basket message sent to Bryanna PIZARRO, referral coordinator to contact patient with US appt.

## 2023-10-21 DIAGNOSIS — F41.1 GENERALIZED ANXIETY DISORDER: ICD-10-CM

## 2023-10-21 RX ORDER — PROPRANOLOL HYDROCHLORIDE 10 MG/1
10 TABLET ORAL 2 TIMES DAILY PRN
Qty: 180 TABLET | Refills: 0 | Status: SHIPPED | OUTPATIENT
Start: 2023-10-21

## 2023-10-23 ENCOUNTER — TELEMEDICINE (OUTPATIENT)
Dept: PSYCHIATRY | Facility: CLINIC | Age: 58
End: 2023-10-23
Payer: COMMERCIAL

## 2023-10-23 ENCOUNTER — HOSPITAL ENCOUNTER (OUTPATIENT)
Dept: ULTRASOUND IMAGING | Facility: HOSPITAL | Age: 58
Discharge: HOME | End: 2023-10-23
Payer: COMMERCIAL

## 2023-10-23 DIAGNOSIS — F32.81 PMDD (PREMENSTRUAL DYSPHORIC DISORDER): ICD-10-CM

## 2023-10-23 DIAGNOSIS — G47.9 SLEEP DIFFICULTIES: ICD-10-CM

## 2023-10-23 DIAGNOSIS — E04.2 MULTINODULAR THYROID: ICD-10-CM

## 2023-10-23 DIAGNOSIS — F41.1 GENERALIZED ANXIETY DISORDER: Primary | ICD-10-CM

## 2023-10-23 DIAGNOSIS — F33.41 RECURRENT MAJOR DEPRESSIVE DISORDER, IN PARTIAL REMISSION: ICD-10-CM

## 2023-10-23 DIAGNOSIS — N94.9 ADNEXAL CYST: ICD-10-CM

## 2023-10-23 PROCEDURE — 99214 OFFICE O/P EST MOD 30 MIN: CPT | Performed by: NURSE PRACTITIONER

## 2023-10-23 PROCEDURE — 76830 TRANSVAGINAL US NON-OB: CPT

## 2023-10-23 PROCEDURE — 76536 US EXAM OF HEAD AND NECK: CPT

## 2023-10-23 NOTE — PROGRESS NOTES
"This provider is located at [facility], [address] [place] [address].The Patient is seen remotely at home, [address] [facility] [place] [address] via [email or web address]. Patient is being seen via telehealth and confirm that they are in a secure environment for this session. The patient's condition being diagnosed/treated is appropriate for telemedicine. The provider identified himself/herself: herself as well as her credentials.   The patient gave consent to be seen remotely, and when consent is given they understand that the consent allows for patient identifiable information to be sent to a third party as needed.   They may refuse to be seen remotely at any time. The electronic data is encrypted and password protected, and the patient has been advised of the potential risks to privacy not withstanding such measures.    You have chosen to receive care through a telehealth visit.  Do you consent to use a video/audio connection for your medical care today? Yes      Chief Complaint  Follow-up for depression and anxiety     Subjective    Yelena Radha Morris presents to [facility] for medication management.       History of Present Illness   Patient presents today reporting that she is doing very good.  She states that she is on the propranolol for her anxiety as things have been going well and her anxiety has been well managed.  She states that she is taking the hydroxyzine at night to help with her sleep which has been helpful and she has felt rested.  She denies any depressive symptoms or feeling hopeless or helpless.  Reports her appetite has been \"okay\".  She states that she starts back work on November 6 but is going to see her son this weekend before she starts back.  Patient states that her CT scan was clear with no issues but she has to get an ultrasound as there is a cyst in her pelvis.  Patient states that she is currently in a research trial and " awaiting lab work to determine if she needs to start chemo or continue to monitor.  Patient denies any side effects to medications.  Feels overall things have been going good.  Denies any SI/HI/AH/VH.      Objective   Vital Signs:   There were no vitals taken for this visit.  Due to the remote nature of this encounter (virtual encounter), vitals were unable to be obtained.  Height stated at 64 inches.  Weight stated at 165 pounds.      PHQ-9 Depression Screening  Little interest or pleasure in doing things?     Feeling down, depressed, or hopeless?     Trouble falling or staying asleep, or sleeping too much?     Feeling tired or having little energy?     Poor appetite or overeating?     Feeling bad about yourself - or that you are a failure or have let yourself or your family down?     Trouble concentrating on things, such as reading the newspaper or watching television?     Moving or speaking so slowly that other people could have noticed? Or the opposite - being so fidgety or restless that you have been moving around a lot more than usual?     Thoughts that you would be better off dead, or of hurting yourself in some way?     PHQ-9 Total Score     If you checked off any problems, how difficult have these problems made it for you to do your work, take care of things at home, or get along with other people?         PHQ-9 Total Score:            Mental Status Exam:   Hygiene:   good  Cooperation:  Cooperative  Eye Contact:  Good  Psychomotor Behavior:  Appropriate  Affect:  Appropriate  Mood: normal  Speech:  Normal  Thought Process:  Goal directed and Linear  Thought Content:  Normal  Suicidal:  None  Homicidal:  None  Hallucinations:  None  Delusion:  None  Memory:  Intact  Orientation:  Person, Place, Time and Situation  Reliability:  good  Insight:  Good  Judgement:  Good  Impulse Control:  Good  Physical/Medical Issues:  Yes HTN, DM, fibromyalgia, carpal tunnel syndrome      Current Medications:   Current  Outpatient Medications   Medication Sig Dispense Refill    ARIPiprazole (ABILIFY) 5 MG tablet Take 1 tablet by mouth Daily. 90 tablet 0    atorvastatin (LIPITOR) 40 MG tablet TAKE 1 TABLET BY MOUTH EVERY EVENING 90 tablet 3    BOTOX 200 units reconstituted solution INJECT 200 UNITS INTO THE HEAD OR NECK AREA BY MD EVERY 90 DAYS  3    cyclobenzaprine (FLEXERIL) 10 MG tablet Take 1 tablet by mouth At Night As Needed for Muscle Spasms. 30 tablet 1    Dulaglutide (Trulicity) 1.5 MG/0.5ML solution pen-injector Inject 1.5 mg under the skin into the appropriate area as directed 1 (One) Time Per Week. 6 mL 1    DULoxetine (CYMBALTA) 60 MG capsule Take 1 capsule by mouth Daily. 90 capsule 0    eletriptan (RELPAX) 40 MG tablet TAKE 1 TABLET BY MOUTH AT THE ONSET OF MIGRAINE. MAY REPEAT IN 2 HRS.MAX 2 DOSE/24HR,3 DAYS/WEEK  11    FLUoxetine (PROzac) 20 MG capsule Take 1 capsule by mouth Daily for 90 days. 90 capsule 0    glimepiride (AMARYL) 2 MG tablet Take 1 tablet by mouth 2 (Two) Times a Day. 180 tablet 3    glucose blood test strip 1 each by Other route Daily. Use as instructed 100 each 2    glucose monitor monitoring kit 1 each Daily. 1 each 1    hydroCHLOROthiazide (HYDRODIURIL) 12.5 MG tablet Take 1 tablet by mouth Daily. 90 tablet 3    hydrOXYzine (ATARAX) 25 MG tablet TAKE 1-2 TABLETS BY MOUTH AT NIGHT AS NEEDED FOR ANXIETY (SLEEP). 60 tablet 1    Lancets 28G misc 1 application Daily. 100 each 3    metFORMIN ER (GLUCOPHAGE-XR) 500 MG 24 hr tablet TAKE 4 TABLETS BY MOUTH EVERY DAY WITH DINNER 360 tablet 3    nebivolol (Bystolic) 10 MG tablet Take 1 tablet by mouth Daily. 90 tablet 3    nortriptyline (PAMELOR) 10 MG capsule nortriptyline 10 mg capsule   TAKE 4 CAPSULES AT BEDTIME      Nutritional Supplements (DHEA PO) Take  by mouth.      omeprazole (priLOSEC) 40 MG capsule Take 1 capsule by mouth Daily. 90 capsule 3    propranolol (INDERAL) 10 MG tablet TAKE 1 TABLET BY MOUTH 2 (TWO) TIMES A DAY AS NEEDED  (PANIC/ANXIETY). 180 tablet 0    Topiramate ER (Trokendi XR) 200 MG capsule sustained-release 24 hr Daily.       No current facility-administered medications for this visit.       Physical Exam  Nursing note reviewed.   Constitutional:       Appearance: Normal appearance.   Neurological:      Mental Status: She is alert.   Psychiatric:         Attention and Perception: Attention and perception normal.         Mood and Affect: Mood and affect normal. Mood is not anxious or depressed.         Speech: Speech normal.         Behavior: Behavior normal. Behavior is cooperative.         Thought Content: Thought content normal.         Cognition and Memory: Cognition and memory normal.         Judgment: Judgment normal.        Result Review :     The following data was reviewed by: ONESIMO Alexander on 02/01/2021:  Common labs          9/28/2023    03:03 10/17/2023    16:05 10/19/2023    10:17   Common Labs   Glucose   154    BUN   14    Creatinine   0.92    Sodium   139    Potassium   3.9    Chloride   102    Calcium   9.7    Albumin   4.6    Total Bilirubin   0.2    Alkaline Phosphatase   89    AST (SGOT)   16    ALT (SGPT)   25    WBC 7.93     8.08     8.08    Hemoglobin 11.2     13.5     13.6    Hematocrit 34.0     39.6     41.2    Platelets 271     300     302       Details          This result is from an external source.           Data reviewed: PCP notes         Assessment and Plan    Problem List Items Addressed This Visit          Mental Health    Recurrent major depressive disorder     Other Visit Diagnoses       Generalized anxiety disorder    -  Primary    PMDD (premenstrual dysphoric disorder)        Sleep difficulties              TREATMENT PLAN/GOALS: Continue supportive psychotherapy efforts and medications as indicated. Treatment and medication options discussed during today's visit. Patient ackowledged and verbally consented to continue with current treatment plan and was educated on the importance of  compliance with treatment and follow-up appointments.    MEDICATION ISSUES:  We discussed risks, benefits, and side effects of the above medications and the patient was agreeable with the plan. Patient was educated on the importance of compliance with treatment and follow-up appointments.  Patient is agreeable to call the office with any worsening of symptoms or onset of side effects. Patient is agreeable to call 911 or go to the nearest ER should he/she begin having SI/HI.        -Continue Cymbalta 60 mg daily for depression and anxiety as patient has benefited greatly as well for her pain.  -Continue fluoxetine 20 mg daily for depression and PMDD.   -Patient educated extensively regarding the risk of serotonin syndrome as she is currently on duloxetine due to her pain as we have previously tried to taper and it is not effective so she is on fluoxetine 20 mg daily to help with her overall depression and mood.  Patient verbalized understanding was agreeable.  -Continue aripiprazole 5 mg as adjunct for depression as patient has failed numerous SSRIs and SNRIs and has benefited the patient tremendously and is medically necessary for her depression to avoid hospitalization.   -Continue propranolol 10 mg twice daily as needed for anxiety and panic.  Encouraged patient if it decreased her blood pressure or heart rate to contact the clinic and discontinue.  -Continue hydroxyzine 25 to 50 mg at night as needed for sleep.      Counseled patient regarding multimodal approach with healthy nutrition, healthy sleep, regular physical activity, social activities, counseling, and medications.      Coping skills reviewed and encouraged positive framing of thoughts     Assisted patient in processing above session content; acknowledged and normalized patient’s thoughts, feelings, and concerns.  Applied  positive coping skills and behavior management in session.  Allowed patient to freely discuss issues without interruption or  judgment. Provided safe, confidential environment to facilitate the development of positive therapeutic relationship and encourage open, honest communication. Assisted patient in identifying risk factors which would indicate the need for higher level of care including thoughts to harm self or others and/or self-harming behavior and encouraged patient to contact this office, call 911, or present to the nearest emergency room should any of these events occur. Discussed crisis intervention services and means to access.     MEDS ORDERED DURING VISIT:  No orders of the defined types were placed in this encounter.        Follow Up   Return in about 4 weeks (around 11/20/2023), or if symptoms worsen or fail to improve, for Recheck.    Patient was given instructions and counseling regarding her condition or for health maintenance advice. Please see specific information pulled into the AVS if appropriate.     Some of the data in this electronic note has been brought forward from a previous encounter, any necessary changes have been made, it has been reviewed by this APRN, and it is accurate.      This document has been electronically signed by ONESIMO Alexander  October 23, 2023 13:50 EDT    Part of this note may be an electronic transcription/translation of spoken language to printed text using the Dragon Dictation System.

## 2023-10-24 ENCOUNTER — RESEARCH ENCOUNTER (OUTPATIENT)
Dept: ONCOLOGY | Facility: CLINIC | Age: 58
End: 2023-10-24
Payer: COMMERCIAL

## 2023-10-24 DIAGNOSIS — N94.9 ADNEXAL CYST: Primary | ICD-10-CM

## 2023-10-24 DIAGNOSIS — R93.89 ENDOMETRIAL THICKENING ON ULTRASOUND: ICD-10-CM

## 2023-10-24 LAB — CANCER AG125 SERPL QL: 18 U/ML (ref 0–38.1)

## 2023-10-24 NOTE — RESEARCH
Patient Name:  Yelena Morris  YOB: 1965  Patient Age:  58 y.o.  Patient's Sex:  female    Date of Service:  10/24/2023    Provider:  Dr. Chaparro                     RESEARCH NOTE                  Patient here today for research only visit. Patient had research blood draw for baseline ctDNA testing through [facility]- study. Told patient I would notify her once we have her results, which could take a couple weeks. Told patient to contact me if she has any questions. Patient verbalized understanding.    Soraya Brenner RN

## 2023-11-06 NOTE — PROGRESS NOTES
Yelena Morris  [id number]  1965      Reason for visit:  R adnexal mass    Consultation:  Patient is being seen at the request of Dr Chaparro      History of present illness:  The patient is a 58 y.o. year old female who presents today for treatment and evaluation of the above issues. Patient presents for complex 6cm adnexal mass. She complains of RLQ near her incision from her colorectal surgery at end of 2023 . On imaging she had endometrial stripe of 7mm as well.  was 20.     For new patients, [facility] intake form from  was reviewed and confirmed.    OBGYN History:  She is a .  She not use HRT. She does  have a history of abnormal pap smears. She had an abnormal one after the birth of her first child and had LEEP. All other paps have been normal. She still has periods monthly after her ablation in . She said that her bleeding became heavier at 51yo. She has had a work up with primary OBGYN who said she is not menopausal via labwork     Oncologic History:  Patient has colon cancer, chart reviewed      Past Medical History:   Diagnosis Date    Anxiety     Arthritis     Cant recall the date of onset    Chest pain 2019    Colon polyp     Depression     Diabetes mellitus     Dysphagia 2019    Endometriosis     Fibromyalgia, primary     GERD (gastroesophageal reflux disease)     Headache     HL (hearing loss)     Hyperlipidemia     Hypertension     Low vitamin D level     Malignant neoplasm of sigmoid colon 2023    Migraines     Ovarian cyst     PMDD (premenstrual dysphoric disorder)     Scoliosis     Wears glasses        Past Surgical History:   Procedure Laterality Date    BLADDER SURGERY      Bladder tuck    COLONOSCOPY      every 5 years    ENDOMETRIAL ABLATION      LUMBAR DISCECTOMY      L4-S1 Rt- discectomy Dr. Arellano    SPINAL CORD STIMULATOR IMPLANT N/A 2019    Procedure: SPINAL CORD STIMULATOR INSERTION;  Surgeon: Ethan Peters MD;  Location: [facility] OR;   Service: Neurosurgery    SPINE SURGERY  2010    TONSILLECTOMY AND ADENOIDECTOMY         MEDICATIONS:    Current Outpatient Medications:     ARIPiprazole (ABILIFY) 5 MG tablet, Take 1 tablet by mouth Daily., Disp: 90 tablet, Rfl: 0    atorvastatin (LIPITOR) 40 MG tablet, TAKE 1 TABLET BY MOUTH EVERY EVENING, Disp: 90 tablet, Rfl: 3    BOTOX 200 units reconstituted solution, INJECT 200 UNITS INTO THE HEAD OR NECK AREA BY MD EVERY 90 DAYS, Disp: , Rfl: 3    cyclobenzaprine (FLEXERIL) 10 MG tablet, Take 1 tablet by mouth At Night As Needed for Muscle Spasms., Disp: 30 tablet, Rfl: 1    Dulaglutide (Trulicity) 1.5 MG/0.5ML solution pen-injector, Inject 1.5 mg under the skin into the appropriate area as directed 1 (One) Time Per Week., Disp: 6 mL, Rfl: 1    DULoxetine (CYMBALTA) 60 MG capsule, Take 1 capsule by mouth Daily., Disp: 90 capsule, Rfl: 0    eletriptan (RELPAX) 40 MG tablet, TAKE 1 TABLET BY MOUTH AT THE ONSET OF MIGRAINE. MAY REPEAT IN 2 HRS.MAX 2 DOSE/24HR,3 DAYS/WEEK, Disp: , Rfl: 11    FLUoxetine (PROzac) 20 MG capsule, Take 1 capsule by mouth Daily for 90 days., Disp: 90 capsule, Rfl: 0    glimepiride (AMARYL) 2 MG tablet, Take 1 tablet by mouth 2 (Two) Times a Day., Disp: 180 tablet, Rfl: 3    hydroCHLOROthiazide (HYDRODIURIL) 12.5 MG tablet, Take 1 tablet by mouth Daily., Disp: 90 tablet, Rfl: 3    hydrOXYzine (ATARAX) 25 MG tablet, TAKE 1-2 TABLETS BY MOUTH AT NIGHT AS NEEDED FOR ANXIETY (SLEEP)., Disp: 60 tablet, Rfl: 1    nebivolol (Bystolic) 10 MG tablet, Take 1 tablet by mouth Daily., Disp: 90 tablet, Rfl: 3    nortriptyline (PAMELOR) 10 MG capsule, nortriptyline 10 mg capsule  TAKE 4 CAPSULES AT BEDTIME, Disp: , Rfl:     Nutritional Supplements (DHEA PO), Take  by mouth., Disp: , Rfl:     omeprazole (priLOSEC) 40 MG capsule, Take 1 capsule by mouth Daily., Disp: 90 capsule, Rfl: 3    propranolol (INDERAL) 10 MG tablet, TAKE 1 TABLET BY MOUTH 2 (TWO) TIMES A DAY AS NEEDED (PANIC/ANXIETY)., Disp: 180  "tablet, Rfl: 0    Topiramate ER (Trokendi XR) 200 MG capsule sustained-release 24 hr, Daily., Disp: , Rfl:      Allergies:  is allergic to dihydroergotamine, methergine [methylergonovine maleate], and amoxicillin.    Social History:   Social History     Socioeconomic History    Marital status:    Tobacco Use    Smoking status: Never    Smokeless tobacco: Never   Vaping Use    Vaping Use: Never used   Substance and Sexual Activity    Alcohol use: No    Drug use: Never    Sexual activity: Yes     Partners: Male     Birth control/protection: Post-menopausal, Vasectomy       Family History:    Family History   Adopted: Yes       Health Maintenance:    Health Maintenance   Topic Date Due    DIABETIC FOOT EXAM  11/15/2019    PAP SMEAR  07/30/2021    DIABETIC EYE EXAM  04/14/2022    COVID-19 Vaccine (6 - 2023-24 season) 09/01/2023    BMI FOLLOWUP  11/21/2023    URINE MICROALBUMIN  03/08/2024    HEMOGLOBIN A1C  03/22/2024    ANNUAL PHYSICAL  06/21/2024    MAMMOGRAM  07/24/2024    LIPID PANEL  10/17/2024    COLONOSCOPY  08/21/2028    TDAP/TD VACCINES (3 - Td or Tdap) 10/30/2029    HEPATITIS C SCREENING  Completed    Pneumococcal Vaccine 0-64  Completed    INFLUENZA VACCINE  Completed    ZOSTER VACCINE  Completed    Hepatitis B  Discontinued       Review of Systems:  Please refer to history of present illness.  Review of systems otherwise negative.    Physical Exam:  Vitals:    11/08/23 1256   BP: 132/72   Pulse: 90   Resp: 20   Temp: 97.3 °F (36.3 °C)   TempSrc: Temporal   SpO2: 96%   Weight: 73.3 kg (161 lb 11.2 oz)   Height: 162.6 cm (64\")   PainSc:   2   PainLoc: Back  Comment: LOWER BACK     Body mass index is 27.76 kg/m².  Wt Readings from Last 3 Encounters:   11/08/23 73.3 kg (161 lb 11.2 oz)   10/19/23 74.8 kg (165 lb)   10/02/23 75.3 kg (166 lb)     GENERAL: Alert, well-appearing female appearing her stated age who is in no apparent distress.   HEENT: Sclera anicteric. Head normocephalic, atraumatic. Mucus " membranes moist.   NECK: Trachea midline, supple, without masses.  No thyromegaly.   BREASTS: Deferred  CARDIOVASCULAR: Normal rate, regular rhythm, no murmurs, rubs, or gallops.     RESPIRATORY: Clear to auscultation bilaterally, normal respiratory effort  BACK:  No CVA tenderness, no vertebral tenderness on palpation  GASTROINTESTINAL:  Abdomen is soft, non-tender, non-distended, no rebound or guarding,  no masses, or hernias. No HSM.  Incisions CDI  SKIN:  Warm, dry, well-perfused.  All visible areas intact.  No rashes, lesions, ulcers.  PSYCHIATRIC: AO x3, with appropriate affect, normal thought processes.  NEUROLOGIC: No focal deficits.   Moves extremities well.  MUSCULOSKELETAL: Normal gait and station.   EXTREMITIES:   No cyanosis, clubbing, symmetric.  LYMPHATICS:  No cervical or inguinal adenopathy noted.     PELVIC exam:  External genitalia are free from lesion. On speculum examination, the cervix was free from lesion. On bimanual examination no mass was appreciated.  Uterus was normal in size and shape. There is no cervical motion or uterine tenderness. No cervical mass was palpated. Parametria were smooth. Rectovaginal exam was deferred.     ECOG PS 0     PROCEDURES:       Diagnostic Data:    US Non-ob Transvaginal    Result Date: 10/24/2023  Impression: 1.There is a persistent septated 5 cm right ovarian cyst. There is borderline thickening of the endometrium for a postmenopausal female. Gynecologic consultation recommended for both of these findings. Electronically Signed: Srikanth Loo MD  10/24/2023 10:09 AM CDT  Workstation ID: [id number]    US Thyroid    Result Date: 10/23/2023  Impression: Slightly increased size of a left-sided 2.3 cm TI-RADS 3 nodule, for which annual follow-up is recommended. No significant change in the additional thyroid nodules, all of which are subcentimeter. Electronically Signed: Star Vallejo MD  10/23/2023 12:46 PM EDT  Workstation ID: [id number]    CT Chest With Contrast  Diagnostic    Result Date: 10/16/2023  Impression: No evidence of metastatic disease in the chest, abdomen, or pelvis. Interval sigmoid colectomy with fat stranding in the surgical bed that is likely postsurgical in nature, without drainable fluid collection. New right adnexal cystic lesion measuring over 6 cm, recommend pelvic ultrasound in 3 months. Electronically Signed: Star Vallejo MD  10/16/2023 11:53 PM EDT  Workstation ID: [id number]    CT Abdomen Pelvis With Contrast    Result Date: 10/16/2023  Impression: No evidence of metastatic disease in the chest, abdomen, or pelvis. Interval sigmoid colectomy with fat stranding in the surgical bed that is likely postsurgical in nature, without drainable fluid collection. New right adnexal cystic lesion measuring over 6 cm, recommend pelvic ultrasound in 3 months. Electronically Signed: Star Vallejo MD  10/16/2023 11:53 PM EDT  Workstation ID: [id number]    MRI Pelvis Without Contrast    Result Date: 8/29/2023  Impression: Masslike wall thickening near the level of the sigmoid takeoff measuring 2.1 cm, located 19.2 cm from the anal verge and positioned above the anterior peritoneal reflection, potentially corresponding to findings on colonoscopy. No abnormality of the surrounding fat. No evidence of metastatic disease in the pelvis. Electronically Signed: Star Vallejo MD  8/29/2023 10:29 AM EDT  Workstation ID: [id number]    CT Chest With Contrast Diagnostic    Result Date: 8/28/2023  Impression: 1.No acute process nor evidence of metastatic disease. 2.There is a 1.8 cm left thyroid nodule. Nonemergent thyroid ultrasound recommended. Electronically Signed: Srikanth Loo MD  8/28/2023 2:42 PM CDT  Workstation ID: [id number]    CT Abdomen Pelvis With Contrast    Result Date: 8/27/2023  Impression: 1.No acute abnormality or metastatic disease identified within the abdomen or pelvis. 2.Colonic mass lesion is not apparent on this study. 3.Mild distal esophageal wall thickening.  Please correlate clinically for esophagitis. 4.Hepatic steatosis. 5.Additional findings as detailed above. Electronically Signed: Lalo Bland MD  8/27/2023 4:34 PM EDT  Workstation ID: [id number]    Mammo Screening Digital Tomosynthesis Bilateral With CAD    Result Date: 7/31/2023  No findings suspicious for malignancy.  ACR BI-RADS CATEGORY:  1, NEGATIVE  RECOMMENDATION: Yearly mammogram, yearly clinical breast exam, and encourage self breast awareness.  CAD was used.  The standard false negative rate of mammography is between 10% and 25%. Complex patterns or increased breast density will markedly elevate the false negative rate of mammography.  A letter, in lay terminology, with the results of this exam will be mailed to the patient.  At our facility, a triangular marker is positioned over a palpable area of concern indicated by the patient. A "[employer] marker is placed over a visible skin lesion. A linear marker indicates a scar.   If there is a palpable area of concern, biopsy should be considered regardless of imaging findings.    This report was finalized on 7/31/2023 10:15 AM by Dr. Lesley Hogan MD.      DXA bone density spine and hip    Result Date: 7/10/2023  FINAL IMPRESSION:  Normal bone mineral density in the lumbar spine, left hip, and left forearm RECOMMENDATIONS: Follow up examination in 2 years      Lab Results   Component Value Date    WBC 8.08 10/19/2023    HGB 13.6 10/19/2023    HCT 41.2 10/19/2023    MCV 88.6 10/19/2023     10/19/2023    NEUTROABS 5.09 10/19/2023    GLUCOSE 154 (H) 10/19/2023    BUN 14 10/19/2023    CREATININE 0.92 10/19/2023    EGFRIFNONA 88 09/15/2021     10/19/2023    K 3.9 10/19/2023     10/19/2023    CO2 26.0 10/19/2023    MG 1.9 10/17/2023    CALCIUM 9.7 10/19/2023    ALBUMIN 4.6 10/19/2023    AST 16 10/19/2023    ALT 25 10/19/2023    BILITOT 0.2 10/19/2023     Lab Results   Component Value Date     18.0 10/19/2023         Assessment & Plan   This is  a 58 y.o. woman with right adnexal mass presenting for management    RT adnexal mass, minimal complexity; not consistent with Krukenberg tumor or other malignancy  Thickened endometrial stripe  Perimenopausal state  -pt is asymptomatic and this is the first time it has been noted  -monthly periods but has had hormonal blood work via primary OBGYN Dr Mcguire per patient's report.  These labs are not available via care everywhere.  -she was referred by Dr Chaparro due to being enrolled in a clinical trial  -imaging reviewed with Dr Uribe and no concerns at this time for malignancy. Endometrial stripe appears appropriate for history of ablation  - was wnl last month  -discussed that if this patient did not have a hx of colon cancer, would normally order repeat TVUS in 6 months to evaluate. Pt will have repeat CT imaging with Dr Chaparro for colon cancer, so will not order further imaging.  Dr. Chaparro can always reach out if there is concern regarding adnexal mass in the future and Dr. Uribe can review the imaging.  -due to low risk of this being malignant, will plan for regular follow up with OBGYN and follow up if changes in imaging    Encounter Diagnosis   Name Primary?    Right ovarian cyst Yes     Pain assessment was performed today as a part of patient’s care.  For patients with pain related to surgery, gynecologic malignancy or cancer treatment, the plan is as noted in the assessment/plan.  For patients with pain not related to these issues, they are to seek any further needed care from a more appropriate provider, such as PCP.      No orders of the defined types were placed in this encounter.      FOLLOW UP: with primary OBGYN. No surgical intervention needed at this time     Monik Isaac MD  OBGYN PGY-3    I spent 45 minutes caring for Yelena on this date of service. This time includes time spent by me in the following activities: preparing for the visit, reviewing tests, performing a medically  appropriate examination and/or evaluation, counseling and educating the patient/family/caregiver, ordering medications, tests, or procedures, referring and communicating with other health care professionals, documenting information in the medical record, independently interpreting results and communicating that information with the patient/family/caregiver, and care coordination    Patient was seen and examined with Dr. Isaac,  resident, who performed portions of the examination and documentation for this patient's care under my direct supervision.  I agree with the above documentation and plan.    Eloisa Uribe MD  11/08/23  17:15 EST

## 2023-11-07 PROBLEM — N83.201 RIGHT OVARIAN CYST: Status: ACTIVE | Noted: 2023-11-07

## 2023-11-08 ENCOUNTER — OFFICE VISIT (OUTPATIENT)
Dept: GYNECOLOGIC ONCOLOGY | Facility: CLINIC | Age: 58
End: 2023-11-08
Payer: COMMERCIAL

## 2023-11-08 VITALS
DIASTOLIC BLOOD PRESSURE: 72 MMHG | HEIGHT: 64 IN | SYSTOLIC BLOOD PRESSURE: 132 MMHG | BODY MASS INDEX: 27.61 KG/M2 | RESPIRATION RATE: 20 BRPM | TEMPERATURE: 97.3 F | HEART RATE: 90 BPM | OXYGEN SATURATION: 96 % | WEIGHT: 161.7 LBS

## 2023-11-08 DIAGNOSIS — N83.201 RIGHT OVARIAN CYST: Primary | ICD-10-CM

## 2023-11-09 ENCOUNTER — PATIENT ROUNDING (BHMG ONLY) (OUTPATIENT)
Dept: GYNECOLOGIC ONCOLOGY | Facility: CLINIC | Age: 58
End: 2023-11-09
Payer: COMMERCIAL

## 2023-11-09 ENCOUNTER — TELEPHONE (OUTPATIENT)
Dept: INTERNAL MEDICINE | Facility: CLINIC | Age: 58
End: 2023-11-09
Payer: COMMERCIAL

## 2023-11-09 ENCOUNTER — OFFICE VISIT (OUTPATIENT)
Dept: ONCOLOGY | Facility: CLINIC | Age: 58
End: 2023-11-09
Payer: COMMERCIAL

## 2023-11-09 VITALS
RESPIRATION RATE: 16 BRPM | WEIGHT: 162 LBS | TEMPERATURE: 97.6 F | HEIGHT: 64 IN | BODY MASS INDEX: 27.66 KG/M2 | HEART RATE: 92 BPM | SYSTOLIC BLOOD PRESSURE: 125 MMHG | OXYGEN SATURATION: 97 % | DIASTOLIC BLOOD PRESSURE: 75 MMHG

## 2023-11-09 DIAGNOSIS — C18.7 MALIGNANT NEOPLASM OF SIGMOID COLON: Primary | ICD-10-CM

## 2023-11-09 DIAGNOSIS — E11.9 TYPE 2 DIABETES MELLITUS WITHOUT COMPLICATION, WITHOUT LONG-TERM CURRENT USE OF INSULIN: Primary | ICD-10-CM

## 2023-11-09 NOTE — PROGRESS NOTES
A My-Chart message has been sent to the patient for PATIENT ROUNDING with [facility].

## 2023-11-09 NOTE — PROGRESS NOTES
CHIEF COMPLAINT: No new somatic complaints    Problem List:  Oncology/Hematology History Overview Note   1.  Colon cancer found in a tubular adenoma 20 cm from the anal verge with negative staging CT chest abdomen and pelvis normal baseline CEA 1.26 with no visible lesion on MRI rectal protocol.  9/25/2023 laparoscopic sigmoid colectomy Dr. Michel pathologic T3 extending through muscularis propria into pericolonic fat, N1b 3/14 node positive  2.  Goiter with 1.8 cm left thyroid nodule found on colon cancer staging for which ultrasound follow-up recommended  3.  Myomectomy with bladder tack July 2015  4.  Diabetes  5.  Hypertension  6.  Hyperlipidemia  7.  Migraines  8.  Depression  9.  Reflux  10.  History of uterine ablation with ovarian cyst on post-op staging imaging of colon cancer  11.  History of tonsillectomy  12.  History of tympanostomy tubes  13.  History of L4-5 disc surgery and L5-S1 discectomy and fusion  14.  History of solid and liquid phase dysphagia with history of mild gastritis and some esophageal ranging on EGD 2019  15.  Compound melanocytic nevus with dermatofibroma removed by Dr. Ray 07/12/2023    Oncology history timeline:  -12/20/2019 EGD showed small hiatal hernia with some ringing of the esophagus biopsy.  Mild gastritis.  No intestinal metaplasia or dysplasia or malignancy.  Reflux suggestive but not diagnostic.  -8/18/2023 colonoscopy Dr. Calle.  Diverticulosis in the sigmoid colon.  7 mm polyp in the rectum.  Diverticulosis in the sigmoid colon.  Polypoid area with ulceration crescent-shaped 1.8 cm 20 cm proximal to the anus likely sigmoid.  Tattooed and biopsied.  Rectal polyp showed tubular adenoma with no high-grade dysplasia.  Colon biopsy at 20 cm showed invasive moderately differentiated colon adenocarcinoma arising within a tubular adenoma with high-grade dysplasia.  Microsatellite stable    -8/24/2023 CEA normal 1.26.  ALT 34 bicarb 20.2 otherwise unremarkable CMP.  CBC  normal with hemoglobin 14.4 and normal MCV.  -8/27/2023 CT abdomen pelvis with contrast did not show any colonic mass.  Mild distal esophageal thickening, hepatic steatosis without hepatic metastasis.  No lytic bone disease.  MRI rectal protocol without contrast reviewed with Dr. López.  Perhaps a small pucker at the site of biopsy but no discernible mass per se and no adenopathy.  -8/28/2023 CT chest shows no metastasis.  1.8 cm left thyroid for which follow-up is recommended    -8/29/2023 [facility] medical oncology consult: On screening colonoscopy patient found to have ulcerated polyp at 20 cm from the anal verge with moderately differentiated adenocarcinoma microsatellite stable.  Normal baseline CEA with staging CT chest abdomen pelvis and MRI rectal protocol showing no evidence of metastasis and no visible primary on MRI.  Based on distance from anal verge on colonoscopy and given the absence of visible abnormality on MRI rectal protocol to change that estimated position, would call this colon cancer.  Given microsatellite stability, would not contemplate calling this rectal cancer and treating with immunotherapy but regardless of whether rectal or colon primary this would at least be no more than T1 tumor clinically with no visible tumor on MRI despite colonoscopy findings.  The patient has experience with Dr. Michel with whom she requests a consultation for definitive surgery and I will see back postoperatively to decide on any adjuvant therapy as indicated based on pathologic staging.  There is some esophageal thickening on CT for which I would request Dr. Calle repeat EGD last done 2019 just for completeness sake but first would deal with the colon cancer at hand.She will also need to get left thyroid ultrasound eventually and would likely do this through whoever has managed her goiter previously.      -- 9/25/2023 laparoscopic sigmoid colectomy Dr. Michel.    -10/2/2023 [facility] medical oncology follow-up:  Final pathology from surgery done a week ago is not back.  According to verbal report to me from the pathologist has either T2 or T3 disease but he thinks that this is tracking along the arteries and will be T3 and she had 3 out of 13 nodes involved.  Hence I would call this stage IIIb.  Her baseline CEA was normal preoperatively so that will not be extremely helpful postoperatively.  Outside of clinical trial the standard of care would be 3 months of CapeOx or 6 months of FOLFOX.  I would be interested in putting her on NRG- where upon CT DNA negative patients are randomized to either serial screening and CT DNA or 3 months CapeOx/6 months FOLFOX providers choice.  CT DNA positive patients are randomized to either 6 months of CapeOx or FOLFOX or 6 months of FOLFIRINOX.  If we can go ahead and get the [facility] CT DNA cooking outside of clinical trial without significant financial toxicity we may do that.  Ideally chemotherapy would start less than 8 weeks out from surgery and we should have plenty of time to have her heal and to make these decisions collectively.  Addendum: Final pathology report… Moderately differentiated adenocarcinoma pathologic T3N1B, negative margins margins 3 out of 14 nodes positive with negative new anastomotic excisional margin.  Tumor extent ends through muscularis propria into pericolonic or perirectal tissue.  No lymph-vascular or perineural invasion.     -10/16/2023 CT chest Abdomen pelvis compared to CT chest 8/28/2023 and CT abdomen pelvis 8/27/2023 shows no metastasis in the chest abdomen or pelvis and interval sigmoid colectomy.  New right adnexal cystic lesion 6 cm recommending pelvic ultrasound in 3 months    -10/19/2023 [facility] medical oncology follow-up: I went over her scans with her.  No clear-cut metastatic disease.  I will ask her to get to Dr. Mcguire for the right adnexal cystic lesion as quickly as possible and would not wait 3 months on the ultrasound in light of  clinical trial.  I suspect this is benign but we will get this worked up.  On [facility] nomogram, putting in her pathologic information she has a 5-year survival rate of 69% give or take 7%.  With adjuvant therapy her 5-year survival rate is 85%.  This is based on statistics not informed by molecular data.  We are aware of prognostic data from prior CT DNA trials whereby patients with negative CT DNA have a considerably higher 5-year survival rate and people with CT DNA positivity have a considerably lower 5-year survival rate and the above 5-year survival rate is probably a conglomerate of statistics between these people that averages out.  I certainly would be fine with either 6 months of FOLFOX or 3 months of Xeloda oxaliplatin outside of a clinical trial and that would be my standard recommendation.   is now open and we are enrolling her on that per her request.  If she has negative CT DNA then she is randomized to watchful waiting versus CapeOx x4 or FOLFOX x12.  If CT DNA is positive then she is randomized to CapeOx x6 or FOLFOX x12 (investigators choice) versus treatment intensification with FOLFIRINOX.  I will see her back in a few weeks for the CT DNA results.    - 11/8/2023 evaluation by Dr. Eloisa Uribe, GYN oncology of right adnexal mass with minimal complexity is not consistent with Krukenberg tumor or other malignancy.  There is a thickened endometrial stripe.  Perimenopausal state.  Still having monthly periods hormonal blood work from primary OB/GYN not available.  Imaging reviewed by Dr. Uribe with no concerns for this being malignancy.   normal within the last month.  If it were not for serial CTs already planned, patient would have transvaginal ultrasound in 6 months but with plans for serial CTs, GYN oncology plans no further ultrasounds.  Due to low risk of malignancy, GYN oncology recommends regular follow-up with primary OB/GYN and follow-up if images change in a worrisome  angela.    -11/9/2023 [facility] oncology follow-up: The CT DNA is still pending.  We should have the results by November 30 or sooner.  8 weeks from surgery would be November 20.  There is good data that disease-free survival and overall outcomes are not compromised when adjuvant chemotherapy occurs 8 weeks out from surgery compared to 4 weeks or less.  The data beyond that does show some falloff of survival when there are significant delays but I do not think her overall risk is going to substantially change whether we treated her the week of the 20th or the week of 27 November.  There is the option to give her 1 cycle of chemotherapy while waiting on this but that would not accrue to her overall number of cycles and I do not want to add oxaliplatin cycles overall given the potential additive toxicity of oxaliplatin.  She understands the uncertainties here and wishes to proceed with the clinical trial.  She has seen gynecologic oncology for evaluation and this is new ovarian cyst will be watched and is felt to be benign according to them and can be just watched by serial CTs and primary GYN exam.  I will see her back November 27 hopefully to have results by then and hopefully to then later that week proceed with chemotherapy as indicated based on the randomizations.     Malignant neoplasm of sigmoid colon   8/29/2023 Initial Diagnosis    Malignant neoplasm of sigmoid colon     10/2/2023 Cancer Staged    Staging form: Colon And Rectum, AJCC 8th Edition  - Clinical: Stage IIIB (cT3, cN1, cM0) - Signed by Bernardino Chaparro MD on 10/2/2023     10/19/2023 Cancer Staged    Staging form: Colon And Rectum, AJCC 8th Edition  - Pathologic: Stage IIIB (pT3, pN1b, cM0) - Signed by Bernardino Chaparro MD on 10/19/2023     11/30/2023 -  Chemotherapy    OP COLON CapeOX Capecitabine / OXALIplatin         HISTORY OF PRESENT ILLNESS:  The patient is a 58 y.o. female, here for follow up on management of colon cancer.  No new somatic  "complaints.  Dr. Uribe has reviewed her data and her imaging and thinks the ovarian cyst that is new is benign and can just be watched by her primary gynecologist and should not keep her from clinical trial    Past Medical History:   Diagnosis Date    Anxiety     Arthritis     Cant recall the date of onset    Chest pain 11/06/2019    Colon polyp     Depression     Diabetes mellitus     Dysphagia 11/06/2019    Endometriosis     Fibromyalgia, primary     GERD (gastroesophageal reflux disease)     Headache     HL (hearing loss)     Hyperlipidemia     Hypertension     Low vitamin D level     Malignant neoplasm of sigmoid colon 08/29/2023    Migraines     Ovarian cyst     PMDD (premenstrual dysphoric disorder)     Scoliosis     Wears glasses      Past Surgical History:   Procedure Laterality Date    BLADDER SURGERY      Bladder tuck    COLONOSCOPY      every 5 years    ENDOMETRIAL ABLATION      LUMBAR DISCECTOMY  2010    L4-S1 Rt- discectomy Dr. Arellano    SPINAL CORD STIMULATOR IMPLANT N/A 08/19/2019    Procedure: SPINAL CORD STIMULATOR INSERTION;  Surgeon: Ethan Peters MD;  Location: [facility];  Service: Neurosurgery    SPINE SURGERY  2010    TONSILLECTOMY AND ADENOIDECTOMY         Allergies   Allergen Reactions    Dihydroergotamine GI Intolerance     VOMITING    Methergine [Methylergonovine Maleate] Nausea And Vomiting    Amoxicillin Rash       Family History and Social History reviewed and changed as necessary    REVIEW OF SYSTEM:   No new somatic complaints    PHYSICAL EXAM:  No jaundice icterus or pallor.    Vitals:    11/09/23 0858   BP: 125/75   Pulse: 92   Resp: 16   Temp: 97.6 °F (36.4 °C)   SpO2: 97%   Weight: 73.5 kg (162 lb)   Height: 162.6 cm (64\")     Vitals:    11/09/23 0858   PainSc: 0-No pain          ECOG score: 0           Vitals reviewed.      Lab Results   Component Value Date    HGB 13.6 10/19/2023    HCT 41.2 10/19/2023    MCV 88.6 10/19/2023     10/19/2023    WBC 8.08 10/19/2023 "    NEUTROABS 5.09 10/19/2023    LYMPHSABS 2.09 10/19/2023    MONOSABS 0.59 10/19/2023    EOSABS 0.22 10/19/2023    BASOSABS 0.05 10/19/2023       Lab Results   Component Value Date    GLUCOSE 154 (H) 10/19/2023    BUN 14 10/19/2023    CREATININE 0.92 10/19/2023     10/19/2023    K 3.9 10/19/2023     10/19/2023    CO2 26.0 10/19/2023    CALCIUM 9.7 10/19/2023    PROTEINTOT 7.2 10/19/2023    ALBUMIN 4.6 10/19/2023    BILITOT 0.2 10/19/2023    ALKPHOS 89 10/19/2023    AST 16 10/19/2023    ALT 25 10/19/2023             ASSESSMENT & PLAN:  1.  Colon cancer found in a tubular adenoma 20 cm from the anal verge with negative staging CT chest abdomen and pelvis normal baseline CEA 1.26 with no visible lesion on MRI rectal protocol.  9/25/2023 laparoscopic sigmoid colectomy Dr. Michel pathologic T3 extending through muscularis propria into pericolonic fat, N1b 3/14 node positive  2.  Goiter with 1.8 cm left thyroid nodule found on colon cancer staging for which ultrasound follow-up recommended  3.  Myomectomy with bladder tack July 2015  4.  Diabetes  5.  Hypertension  6.  Hyperlipidemia  7.  Migraines  8.  Depression  9.  Reflux  10.  History of uterine ablation with ovarian cyst on post-op staging imaging of colon cancer  11.  History of tonsillectomy  12.  History of tympanostomy tubes  13.  History of L4-5 disc surgery and L5-S1 discectomy and fusion  14.  History of solid and liquid phase dysphagia with history of mild gastritis and some esophageal ranging on EGD 2019  15.  Compound melanocytic nevus with dermatofibroma removed by Dr. Ray 07/12/2023    Oncology history timeline:  -12/20/2019 EGD showed small hiatal hernia with some ringing of the esophagus biopsy.  Mild gastritis.  No intestinal metaplasia or dysplasia or malignancy.  Reflux suggestive but not diagnostic.  -8/18/2023 colonoscopy Dr. Calle.  Diverticulosis in the sigmoid colon.  7 mm polyp in the rectum.  Diverticulosis in the sigmoid  colon.  Polypoid area with ulceration crescent-shaped 1.8 cm 20 cm proximal to the anus likely sigmoid.  Tattooed and biopsied.  Rectal polyp showed tubular adenoma with no high-grade dysplasia.  Colon biopsy at 20 cm showed invasive moderately differentiated colon adenocarcinoma arising within a tubular adenoma with high-grade dysplasia.  Microsatellite stable    -8/24/2023 CEA normal 1.26.  ALT 34 bicarb 20.2 otherwise unremarkable CMP.  CBC normal with hemoglobin 14.4 and normal MCV.  -8/27/2023 CT abdomen pelvis with contrast did not show any colonic mass.  Mild distal esophageal thickening, hepatic steatosis without hepatic metastasis.  No lytic bone disease.  MRI rectal protocol without contrast reviewed with Dr. López.  Perhaps a small pucker at the site of biopsy but no discernible mass per se and no adenopathy.  -8/28/2023 CT chest shows no metastasis.  1.8 cm left thyroid for which follow-up is recommended    -8/29/2023 [facility] medical oncology consult: On screening colonoscopy patient found to have ulcerated polyp at 20 cm from the anal verge with moderately differentiated adenocarcinoma microsatellite stable.  Normal baseline CEA with staging CT chest abdomen pelvis and MRI rectal protocol showing no evidence of metastasis and no visible primary on MRI.  Based on distance from anal verge on colonoscopy and given the absence of visible abnormality on MRI rectal protocol to change that estimated position, would call this colon cancer.  Given microsatellite stability, would not contemplate calling this rectal cancer and treating with immunotherapy but regardless of whether rectal or colon primary this would at least be no more than T1 tumor clinically with no visible tumor on MRI despite colonoscopy findings.  The patient has experience with Dr. Michel with whom she requests a consultation for definitive surgery and I will see back postoperatively to decide on any adjuvant therapy as indicated based on  pathologic staging.  There is some esophageal thickening on CT for which I would request Dr. Calle repeat EGD last done 2019 just for completeness sake but first would deal with the colon cancer at hand.She will also need to get left thyroid ultrasound eventually and would likely do this through whoever has managed her goiter previously.      -- 9/25/2023 laparoscopic sigmoid colectomy Dr. Michel.    -10/2/2023 [facility] medical oncology follow-up: Final pathology from surgery done a week ago is not back.  According to verbal report to me from the pathologist has either T2 or T3 disease but he thinks that this is tracking along the arteries and will be T3 and she had 3 out of 13 nodes involved.  Hence I would call this stage IIIb.  Her baseline CEA was normal preoperatively so that will not be extremely helpful postoperatively.  Outside of clinical trial the standard of care would be 3 months of CapeOx or 6 months of FOLFOX.  I would be interested in putting her on NRG- where upon CT DNA negative patients are randomized to either serial screening and CT DNA or 3 months CapeOx/6 months FOLFOX providers choice.  CT DNA positive patients are randomized to either 6 months of CapeOx or FOLFOX or 6 months of FOLFIRINOX.  If we can go ahead and get the [facility] CT DNA cooking outside of clinical trial without significant financial toxicity we may do that.  Ideally chemotherapy would start less than 8 weeks out from surgery and we should have plenty of time to have her heal and to make these decisions collectively.  Addendum: Final pathology report… Moderately differentiated adenocarcinoma pathologic T3N1B, negative margins margins 3 out of 14 nodes positive with negative new anastomotic excisional margin.  Tumor extent ends through muscularis propria into pericolonic or perirectal tissue.  No lymph-vascular or perineural invasion.     -10/16/2023 CT chest Abdomen pelvis compared to CT chest 8/28/2023 and CT abdomen pelvis  8/27/2023 shows no metastasis in the chest abdomen or pelvis and interval sigmoid colectomy.  New right adnexal cystic lesion 6 cm recommending pelvic ultrasound in 3 months    -10/19/2023 [facility] medical oncology follow-up: I went over her scans with her.  No clear-cut metastatic disease.  I will ask her to get to Dr. Mcugire for the right adnexal cystic lesion as quickly as possible and would not wait 3 months on the ultrasound in light of clinical trial.  I suspect this is benign but we will get this worked up.  On [facility] nomogram, putting in her pathologic information she has a 5-year survival rate of 69% give or take 7%.  With adjuvant therapy her 5-year survival rate is 85%.  This is based on statistics not informed by molecular data.  We are aware of prognostic data from prior CT DNA trials whereby patients with negative CT DNA have a considerably higher 5-year survival rate and people with CT DNA positivity have a considerably lower 5-year survival rate and the above 5-year survival rate is probably a conglomerate of statistics between these people that averages out.  I certainly would be fine with either 6 months of FOLFOX or 3 months of Xeloda oxaliplatin outside of a clinical trial and that would be my standard recommendation.   is now open and we are enrolling her on that per her request.  If she has negative CT DNA then she is randomized to watchful waiting versus CapeOx x4 or FOLFOX x12.  If CT DNA is positive then she is randomized to CapeOx x6 or FOLFOX x12 (investigators choice) versus treatment intensification with FOLFIRINOX.  I will see her back in a few weeks for the CT DNA results.    - 11/8/2023 evaluation by Dr. Eloisa Uribe, GYN oncology of right adnexal mass with minimal complexity is not consistent with Krukenberg tumor or other malignancy.  There is a thickened endometrial stripe.  Perimenopausal state.  Still having monthly periods hormonal blood work from primary OB/GYN  not available.  Imaging reviewed by Dr. Uribe with no concerns for this being malignancy.   normal within the last month.  If it were not for serial CTs already planned, patient would have transvaginal ultrasound in 6 months but with plans for serial CTs, GYN oncology plans no further ultrasounds.  Due to low risk of malignancy, GYN oncology recommends regular follow-up with primary OB/GYN and follow-up if images change in a worrisome manner.    -11/9/2023 [facility] oncology follow-up: The CT DNA is still pending.  We should have the results by November 30 or sooner.  8 weeks from surgery would be November 20.  There is good data that disease-free survival and overall outcomes are not compromised when adjuvant chemotherapy occurs 8 weeks out from surgery compared to 4 weeks or less.  The data beyond that does show some falloff of survival when there are significant delays but I do not think her overall risk is going to substantially change whether we treated her the week of the 20th or the week of 27 November.  There is the option to give her 1 cycle of chemotherapy while waiting on this but that would not accrue to her overall number of cycles and I do not want to add oxaliplatin cycles overall given the potential additive toxicity of oxaliplatin.  She understands the uncertainties here and wishes to proceed with the clinical trial.  She has seen gynecologic oncology for evaluation and this is new ovarian cyst will be watched and is felt to be benign according to them and can be just watched by serial CTs and primary GYN exam.  I will see her back November 27 hopefully to have results by then and hopefully to then later that week proceed with chemotherapy as indicated based on the randomizations.    Total time of care today inclusive of time spent today prior to patient's arrival reviewing interval data with the patient and the notes from Dr. Uribe and interviewing patient as to signs or symptoms  of her diagnosis and management plans as outlined above and after visit instituting this plan took 50 minutes of patient care time throughout the day today.  Bernardino Chaparro MD    11/09/2023

## 2023-11-09 NOTE — LETTER
November 9, 2023       No Recipients    Patient: Yelena Morris   YOB: 1965   Date of Visit: 11/9/2023     Dear Fiona Corona MD:       Thank you for referring Yelena Morris to me for evaluation. Below are the relevant portions of my assessment and plan of care.    If you have questions, please do not hesitate to call me. I look forward to following Yelena along with you.         Sincerely,        Bernardino Chaparro MD        CC:   No Recipients    Bernardino Chaparro MD  11/09/23 0943  Sign when Signing Visit  CHIEF COMPLAINT: No new somatic complaints    Problem List:  Oncology/Hematology History Overview Note   1.  Colon cancer found in a tubular adenoma 20 cm from the anal verge with negative staging CT chest abdomen and pelvis normal baseline CEA 1.26 with no visible lesion on MRI rectal protocol.  9/25/2023 laparoscopic sigmoid colectomy Dr. Michel pathologic T3 extending through muscularis propria into pericolonic fat, N1b 3/14 node positive  2.  Goiter with 1.8 cm left thyroid nodule found on colon cancer staging for which ultrasound follow-up recommended  3.  Myomectomy with bladder tack July 2015  4.  Diabetes  5.  Hypertension  6.  Hyperlipidemia  7.  Migraines  8.  Depression  9.  Reflux  10.  History of uterine ablation with ovarian cyst on post-op staging imaging of colon cancer  11.  History of tonsillectomy  12.  History of tympanostomy tubes  13.  History of L4-5 disc surgery and L5-S1 discectomy and fusion  14.  History of solid and liquid phase dysphagia with history of mild gastritis and some esophageal ranging on EGD 2019  15.  Compound melanocytic nevus with dermatofibroma removed by Dr. Ray 07/12/2023    Oncology history timeline:  -12/20/2019 EGD showed small hiatal hernia with some ringing of the esophagus biopsy.  Mild gastritis.  No intestinal metaplasia or dysplasia or malignancy.  Reflux suggestive but not diagnostic.  -8/18/2023 colonoscopy Dr. Calle.  Diverticulosis  in the sigmoid colon.  7 mm polyp in the rectum.  Diverticulosis in the sigmoid colon.  Polypoid area with ulceration crescent-shaped 1.8 cm 20 cm proximal to the anus likely sigmoid.  Tattooed and biopsied.  Rectal polyp showed tubular adenoma with no high-grade dysplasia.  Colon biopsy at 20 cm showed invasive moderately differentiated colon adenocarcinoma arising within a tubular adenoma with high-grade dysplasia.  Microsatellite stable    -8/24/2023 CEA normal 1.26.  ALT 34 bicarb 20.2 otherwise unremarkable CMP.  CBC normal with hemoglobin 14.4 and normal MCV.  -8/27/2023 CT abdomen pelvis with contrast did not show any colonic mass.  Mild distal esophageal thickening, hepatic steatosis without hepatic metastasis.  No lytic bone disease.  MRI rectal protocol without contrast reviewed with Dr. López.  Perhaps a small pucker at the site of biopsy but no discernible mass per se and no adenopathy.  -8/28/2023 CT chest shows no metastasis.  1.8 cm left thyroid for which follow-up is recommended    -8/29/2023 [facility] medical oncology consult: On screening colonoscopy patient found to have ulcerated polyp at 20 cm from the anal verge with moderately differentiated adenocarcinoma microsatellite stable.  Normal baseline CEA with staging CT chest abdomen pelvis and MRI rectal protocol showing no evidence of metastasis and no visible primary on MRI.  Based on distance from anal verge on colonoscopy and given the absence of visible abnormality on MRI rectal protocol to change that estimated position, would call this colon cancer.  Given microsatellite stability, would not contemplate calling this rectal cancer and treating with immunotherapy but regardless of whether rectal or colon primary this would at least be no more than T1 tumor clinically with no visible tumor on MRI despite colonoscopy findings.  The patient has experience with Dr. Michel with whom she requests a consultation for definitive surgery and I will see  back postoperatively to decide on any adjuvant therapy as indicated based on pathologic staging.  There is some esophageal thickening on CT for which I would request Dr. Calle repeat EGD last done 2019 just for completeness sake but first would deal with the colon cancer at hand.She will also need to get left thyroid ultrasound eventually and would likely do this through whoever has managed her goiter previously.      -- 9/25/2023 laparoscopic sigmoid colectomy Dr. Michel.    -10/2/2023 [facility] oncology follow-up: Final pathology from surgery done a week ago is not back.  According to verbal report to me from the pathologist has either T2 or T3 disease but he thinks that this is tracking along the arteries and will be T3 and she had 3 out of 13 nodes involved.  Hence I would call this stage IIIb.  Her baseline CEA was normal preoperatively so that will not be extremely helpful postoperatively.  Outside of clinical trial the standard of care would be 3 months of CapeOx or 6 months of FOLFOX.  I would be interested in putting her on NRG- where upon CT DNA negative patients are randomized to either serial screening and CT DNA or 3 months CapeOx/6 months FOLFOX providers choice.  CT DNA positive patients are randomized to either 6 months of CapeOx or FOLFOX or 6 months of FOLFIRINOX.  If we can go ahead and get the [facility] CT DNA cooking outside of clinical trial without significant financial toxicity we may do that.  Ideally chemotherapy would start less than 8 weeks out from surgery and we should have plenty of time to have her heal and to make these decisions collectively.  Addendum: Final pathology report… Moderately differentiated adenocarcinoma pathologic T3N1B, negative margins margins 3 out of 14 nodes positive with negative new anastomotic excisional margin.  Tumor extent ends through muscularis propria into pericolonic or perirectal tissue.  No lymph-vascular or perineural invasion.     -10/16/2023  CT chest Abdomen pelvis compared to CT chest 8/28/2023 and CT abdomen pelvis 8/27/2023 shows no metastasis in the chest abdomen or pelvis and interval sigmoid colectomy.  New right adnexal cystic lesion 6 cm recommending pelvic ultrasound in 3 months    -10/19/2023 [facility] medical oncology follow-up: I went over her scans with her.  No clear-cut metastatic disease.  I will ask her to get to Dr. Mcguire for the right adnexal cystic lesion as quickly as possible and would not wait 3 months on the ultrasound in light of clinical trial.  I suspect this is benign but we will get this worked up.  On [facility] nomogram, putting in her pathologic information she has a 5-year survival rate of 69% give or take 7%.  With adjuvant therapy her 5-year survival rate is 85%.  This is based on statistics not informed by molecular data.  We are aware of prognostic data from prior CT DNA trials whereby patients with negative CT DNA have a considerably higher 5-year survival rate and people with CT DNA positivity have a considerably lower 5-year survival rate and the above 5-year survival rate is probably a conglomerate of statistics between these people that averages out.  I certainly would be fine with either 6 months of FOLFOX or 3 months of Xeloda oxaliplatin outside of a clinical trial and that would be my standard recommendation.   is now open and we are enrolling her on that per her request.  If she has negative CT DNA then she is randomized to watchful waiting versus CapeOx x4 or FOLFOX x12.  If CT DNA is positive then she is randomized to CapeOx x6 or FOLFOX x12 (investigators choice) versus treatment intensification with FOLFIRINOX.  I will see her back in a few weeks for the CT DNA results.    - 11/8/2023 evaluation by Dr. Eloisa Uribe, GYN oncology of right adnexal mass with minimal complexity is not consistent with Krukenberg tumor or other malignancy.  There is a thickened endometrial stripe.  Perimenopausal  state.  Still having monthly periods hormonal blood work from primary OB/GYN not available.  Imaging reviewed by Dr. Uribe with no concerns for this being malignancy.   normal within the last month.  If it were not for serial CTs already planned, patient would have transvaginal ultrasound in 6 months but with plans for serial CTs, GYN oncology plans no further ultrasounds.  Due to low risk of malignancy, GYN oncology recommends regular follow-up with primary OB/GYN and follow-up if images change in a worrisome manner.    -11/9/2023 [facility] oncology follow-up: The CT DNA is still pending.  We should have the results by November 30 or sooner.  8 weeks from surgery would be November 20.  There is good data that disease-free survival and overall outcomes are not compromised when adjuvant chemotherapy occurs 8 weeks out from surgery compared to 4 weeks or less.  The data beyond that does show some falloff of survival when there are significant delays but I do not think her overall risk is going to substantially change whether we treated her the week of the 20th or the week of 27 November.  There is the option to give her 1 cycle of chemotherapy while waiting on this but that would not accrue to her overall number of cycles and I do not want to add oxaliplatin cycles overall given the potential additive toxicity of oxaliplatin.  She understands the uncertainties here and wishes to proceed with the clinical trial.  She has seen gynecologic oncology for evaluation and this is new ovarian cyst will be watched and is felt to be benign according to them and can be just watched by serial CTs and primary GYN exam.  I will see her back November 27 hopefully to have results by then and hopefully to then later that week proceed with chemotherapy as indicated based on the randomizations.     Malignant neoplasm of sigmoid colon   8/29/2023 Initial Diagnosis    Malignant neoplasm of sigmoid colon     10/2/2023 Cancer  Staged    Staging form: Colon And Rectum, AJCC 8th Edition  - Clinical: Stage IIIB (cT3, cN1, cM0) - Signed by Bernardino Chaparro MD on 10/2/2023     10/19/2023 Cancer Staged    Staging form: Colon And Rectum, AJCC 8th Edition  - Pathologic: Stage IIIB (pT3, pN1b, cM0) - Signed by Bernardino Chaparro MD on 10/19/2023     11/30/2023 -  Chemotherapy    OP COLON CapeOX Capecitabine / OXALIplatin         HISTORY OF PRESENT ILLNESS:  The patient is a 58 y.o. female, here for follow up on management of colon cancer.  No new somatic complaints.  Dr. Uribe has reviewed her data and her imaging and thinks the ovarian cyst that is new is benign and can just be watched by her primary gynecologist and should not keep her from clinical trial    Past Medical History:   Diagnosis Date   • Anxiety    • Arthritis     Cant recall the date of onset   • Chest pain 11/06/2019   • Colon polyp    • Depression    • Diabetes mellitus    • Dysphagia 11/06/2019   • Endometriosis    • Fibromyalgia, primary    • GERD (gastroesophageal reflux disease)    • Headache    • HL (hearing loss)    • Hyperlipidemia    • Hypertension    • Low vitamin D level    • Malignant neoplasm of sigmoid colon 08/29/2023   • Migraines    • Ovarian cyst    • PMDD (premenstrual dysphoric disorder)    • Scoliosis    • Wears glasses      Past Surgical History:   Procedure Laterality Date   • BLADDER SURGERY      Bladder tuck   • COLONOSCOPY      every 5 years   • ENDOMETRIAL ABLATION     • LUMBAR DISCECTOMY  2010    L4-S1 Rt- discectomy Dr. Arellano   • SPINAL CORD STIMULATOR IMPLANT N/A 08/19/2019    Procedure: SPINAL CORD STIMULATOR INSERTION;  Surgeon: Ethan Peters MD;  Location: [facility];  Service: Neurosurgery   • SPINE SURGERY  2010   • TONSILLECTOMY AND ADENOIDECTOMY         Allergies   Allergen Reactions   • Dihydroergotamine GI Intolerance     VOMITING   • Methergine [Methylergonovine Maleate] Nausea And Vomiting   • Amoxicillin Rash       Family History and  "Social History reviewed and changed as necessary    REVIEW OF SYSTEM:   No new somatic complaints    PHYSICAL EXAM:  No jaundice icterus or pallor.    Vitals:    11/09/23 0858   BP: 125/75   Pulse: 92   Resp: 16   Temp: 97.6 °F (36.4 °C)   SpO2: 97%   Weight: 73.5 kg (162 lb)   Height: 162.6 cm (64\")     Vitals:    11/09/23 0858   PainSc: 0-No pain          ECOG score: 0           Vitals reviewed.      Lab Results   Component Value Date    HGB 13.6 10/19/2023    HCT 41.2 10/19/2023    MCV 88.6 10/19/2023     10/19/2023    WBC 8.08 10/19/2023    NEUTROABS 5.09 10/19/2023    LYMPHSABS 2.09 10/19/2023    MONOSABS 0.59 10/19/2023    EOSABS 0.22 10/19/2023    BASOSABS 0.05 10/19/2023       Lab Results   Component Value Date    GLUCOSE 154 (H) 10/19/2023    BUN 14 10/19/2023    CREATININE 0.92 10/19/2023     10/19/2023    K 3.9 10/19/2023     10/19/2023    CO2 26.0 10/19/2023    CALCIUM 9.7 10/19/2023    PROTEINTOT 7.2 10/19/2023    ALBUMIN 4.6 10/19/2023    BILITOT 0.2 10/19/2023    ALKPHOS 89 10/19/2023    AST 16 10/19/2023    ALT 25 10/19/2023             ASSESSMENT & PLAN:  1.  Colon cancer found in a tubular adenoma 20 cm from the anal verge with negative staging CT chest abdomen and pelvis normal baseline CEA 1.26 with no visible lesion on MRI rectal protocol.  9/25/2023 laparoscopic sigmoid colectomy Dr. Michel pathologic T3 extending through muscularis propria into pericolonic fat, N1b 3/14 node positive  2.  Goiter with 1.8 cm left thyroid nodule found on colon cancer staging for which ultrasound follow-up recommended  3.  Myomectomy with bladder tack July 2015  4.  Diabetes  5.  Hypertension  6.  Hyperlipidemia  7.  Migraines  8.  Depression  9.  Reflux  10.  History of uterine ablation with ovarian cyst on post-op staging imaging of colon cancer  11.  History of tonsillectomy  12.  History of tympanostomy tubes  13.  History of L4-5 disc surgery and L5-S1 discectomy and fusion  14.  History of " solid and liquid phase dysphagia with history of mild gastritis and some esophageal ranging on EGD 2019  15.  Compound melanocytic nevus with dermatofibroma removed by Dr. Ray 07/12/2023    Oncology history timeline:  -12/20/2019 EGD showed small hiatal hernia with some ringing of the esophagus biopsy.  Mild gastritis.  No intestinal metaplasia or dysplasia or malignancy.  Reflux suggestive but not diagnostic.  -8/18/2023 colonoscopy Dr. Calle.  Diverticulosis in the sigmoid colon.  7 mm polyp in the rectum.  Diverticulosis in the sigmoid colon.  Polypoid area with ulceration crescent-shaped 1.8 cm 20 cm proximal to the anus likely sigmoid.  Tattooed and biopsied.  Rectal polyp showed tubular adenoma with no high-grade dysplasia.  Colon biopsy at 20 cm showed invasive moderately differentiated colon adenocarcinoma arising within a tubular adenoma with high-grade dysplasia.  Microsatellite stable    -8/24/2023 CEA normal 1.26.  ALT 34 bicarb 20.2 otherwise unremarkable CMP.  CBC normal with hemoglobin 14.4 and normal MCV.  -8/27/2023 CT abdomen pelvis with contrast did not show any colonic mass.  Mild distal esophageal thickening, hepatic steatosis without hepatic metastasis.  No lytic bone disease.  MRI rectal protocol without contrast reviewed with Dr. López.  Perhaps a small pucker at the site of biopsy but no discernible mass per se and no adenopathy.  -8/28/2023 CT chest shows no metastasis.  1.8 cm left thyroid for which follow-up is recommended    -8/29/2023 [facility] medical oncology consult: On screening colonoscopy patient found to have ulcerated polyp at 20 cm from the anal verge with moderately differentiated adenocarcinoma microsatellite stable.  Normal baseline CEA with staging CT chest abdomen pelvis and MRI rectal protocol showing no evidence of metastasis and no visible primary on MRI.  Based on distance from anal verge on colonoscopy and given the absence of visible abnormality on MRI rectal  protocol to change that estimated position, would call this colon cancer.  Given microsatellite stability, would not contemplate calling this rectal cancer and treating with immunotherapy but regardless of whether rectal or colon primary this would at least be no more than T1 tumor clinically with no visible tumor on MRI despite colonoscopy findings.  The patient has experience with Dr. Michel with whom she requests a consultation for definitive surgery and I will see back postoperatively to decide on any adjuvant therapy as indicated based on pathologic staging.  There is some esophageal thickening on CT for which I would request Dr. Calle repeat EGD last done 2019 just for completeness sake but first would deal with the colon cancer at hand.She will also need to get left thyroid ultrasound eventually and would likely do this through whoever has managed her goiter previously.      -- 9/25/2023 laparoscopic sigmoid colectomy Dr. Michel.    -10/2/2023 [facility] medical oncology follow-up: Final pathology from surgery done a week ago is not back.  According to verbal report to me from the pathologist has either T2 or T3 disease but he thinks that this is tracking along the arteries and will be T3 and she had 3 out of 13 nodes involved.  Hence I would call this stage IIIb.  Her baseline CEA was normal preoperatively so that will not be extremely helpful postoperatively.  Outside of clinical trial the standard of care would be 3 months of CapeOx or 6 months of FOLFOX.  I would be interested in putting her on NRG- where upon CT DNA negative patients are randomized to either serial screening and CT DNA or 3 months CapeOx/6 months FOLFOX providers choice.  CT DNA positive patients are randomized to either 6 months of CapeOx or FOLFOX or 6 months of FOLFIRINOX.  If we can go ahead and get the [facility] CT DNA cooking outside of clinical trial without significant financial toxicity we may do that.  Ideally chemotherapy would  start less than 8 weeks out from surgery and we should have plenty of time to have her heal and to make these decisions collectively.  Addendum: Final pathology report… Moderately differentiated adenocarcinoma pathologic T3N1B, negative margins margins 3 out of 14 nodes positive with negative new anastomotic excisional margin.  Tumor extent ends through muscularis propria into pericolonic or perirectal tissue.  No lymph-vascular or perineural invasion.     -10/16/2023 CT chest Abdomen pelvis compared to CT chest 8/28/2023 and CT abdomen pelvis 8/27/2023 shows no metastasis in the chest abdomen or pelvis and interval sigmoid colectomy.  New right adnexal cystic lesion 6 cm recommending pelvic ultrasound in 3 months    -10/19/2023 [facility] medical oncology follow-up: I went over her scans with her.  No clear-cut metastatic disease.  I will ask her to get to Dr. Mcguire for the right adnexal cystic lesion as quickly as possible and would not wait 3 months on the ultrasound in light of clinical trial.  I suspect this is benign but we will get this worked up.  [facility] nom[facility], putting in her pathologic information she has a 5-year survival rate of 69% give or take 7%.  With adjuvant therapy her 5-year survival rate is 85%.  This is based on statistics not informed by molecular data.  We are aware of prognostic data from prior CT DNA trials whereby patients with negative CT DNA have a considerably higher 5-year survival rate and people with CT DNA positivity have a considerably lower 5-year survival rate and the above 5-year survival rate is probably a conglomerate of statistics between these people that averages out.  I certainly would be fine with either 6 months of FOLFOX or 3 months of Xeloda oxaliplatin outside of a clinical trial and that would be my standard recommendation.   is now open and we are enrolling her on that per her request.  If she has negative CT DNA then she is randomized to watchful  waiting versus CapeOx x4 or FOLFOX x12.  If CT DNA is positive then she is randomized to CapeOx x6 or FOLFOX x12 (investigators choice) versus treatment intensification with FOLFIRINOX.  I will see her back in a few weeks for the CT DNA results.    - 11/8/2023 evaluation by Dr. Eloisa Uribe, GYN oncology of right adnexal mass with minimal complexity is not consistent with Krukenberg tumor or other malignancy.  There is a thickened endometrial stripe.  Perimenopausal state.  Still having monthly periods hormonal blood work from primary OB/GYN not available.  Imaging reviewed by Dr. Uribe with no concerns for this being malignancy.   normal within the last month.  If it were not for serial CTs already planned, patient would have transvaginal ultrasound in 6 months but with plans for serial CTs, GYN oncology plans no further ultrasounds.  Due to low risk of malignancy, GYN oncology recommends regular follow-up with primary OB/GYN and follow-up if images change in a worrisome manner.    -11/9/2023 [facility] oncology follow-up: The CT DNA is still pending.  We should have the results by November 30 or sooner.  8 weeks from surgery would be November 20.  There is good data that disease-free survival and overall outcomes are not compromised when adjuvant chemotherapy occurs 8 weeks out from surgery compared to 4 weeks or less.  The data beyond that does show some falloff of survival when there are significant delays but I do not think her overall risk is going to substantially change whether we treated her the week of the 20th or the week of 27 November.  There is the option to give her 1 cycle of chemotherapy while waiting on this but that would not accrue to her overall number of cycles and I do not want to add oxaliplatin cycles overall given the potential additive toxicity of oxaliplatin.  She understands the uncertainties here and wishes to proceed with the clinical trial.  She has seen gynecologic  oncology for evaluation and this is new ovarian cyst will be watched and is felt to be benign according to them and can be just watched by serial CTs and primary GYN exam.  I will see her back November 27 hopefully to have results by then and hopefully to then later that week proceed with chemotherapy as indicated based on the randomizations.    Total time of care today inclusive of time spent today prior to patient's arrival reviewing interval data with the patient and the notes from Dr. Uribe and interviewing patient as to signs or symptoms of her diagnosis and management plans as outlined above and after visit instituting this plan took 50 minutes of patient care time throughout the day today.  Bernardino Chaparro MD    11/09/2023

## 2023-11-09 NOTE — TELEPHONE ENCOUNTER
Caller: Yelena Morris    Relationship: Self    Best call back number: [phone]    Which medication are you concerned about: METFORMIN    Who prescribed you this medication: DOCTOR JON    What are your concerns: THE PATIENT STATES THAT SHE HAD PART OF HER COLON REMOVED IN SEPTEMBER AND SINCE THEN SHE HAS NOTICED THAT THE METFORMIN IS MAKING HER HAVE DIARRHEA THE PATIENT WOULD LIKE TO KNOW IF SHE SHOULD STOP THE METFORMIN OR IF THE DOCTOR WANTS TO CHANGE TO SOMETHING ELSE

## 2023-11-10 RX ORDER — PIOGLITAZONEHYDROCHLORIDE 15 MG/1
15 TABLET ORAL DAILY
Qty: 90 TABLET | Refills: 1 | Status: SHIPPED | OUTPATIENT
Start: 2023-11-10

## 2023-11-10 NOTE — TELEPHONE ENCOUNTER
HUB to relay message, please give message below.    Per Cj:  She can stop metformin and instead try actos. Watch for swelling in the ankles which can be a side effect.

## 2023-11-10 NOTE — TELEPHONE ENCOUNTER
Name: Alexandra Yelena Ruth    Relationship: Self    Best Callback Number: [phone]     HUB PROVIDED THE RELAY MESSAGE FROM THE OFFICE   PATIENT VOICED UNDERSTANDING AND HAS NO FURTHER QUESTIONS AT THIS TIME    ADDITIONAL INFORMATION:

## 2023-11-15 DIAGNOSIS — F41.1 GENERALIZED ANXIETY DISORDER: ICD-10-CM

## 2023-11-15 DIAGNOSIS — G47.9 SLEEP DIFFICULTIES: ICD-10-CM

## 2023-11-15 LAB — CREAT BLDA-MCNC: 0.9 MG/DL (ref 0.6–1.3)

## 2023-11-15 RX ORDER — HYDROXYZINE HYDROCHLORIDE 25 MG/1
25-50 TABLET, FILM COATED ORAL NIGHTLY PRN
Qty: 60 TABLET | Refills: 1 | Status: SHIPPED | OUTPATIENT
Start: 2023-11-15

## 2023-11-19 DIAGNOSIS — F33.41 RECURRENT MAJOR DEPRESSIVE DISORDER, IN PARTIAL REMISSION: ICD-10-CM

## 2023-11-19 RX ORDER — DULOXETIN HYDROCHLORIDE 60 MG/1
60 CAPSULE, DELAYED RELEASE ORAL DAILY
Qty: 30 CAPSULE | Refills: 0 | Status: SHIPPED | OUTPATIENT
Start: 2023-11-19

## 2023-11-19 RX ORDER — ARIPIPRAZOLE 5 MG/1
5 TABLET ORAL DAILY
Qty: 30 TABLET | Refills: 0 | Status: SHIPPED | OUTPATIENT
Start: 2023-11-19

## 2023-11-20 ENCOUNTER — TELEPHONE (OUTPATIENT)
Dept: OTHER | Facility: OTHER | Age: 58
End: 2023-11-20
Payer: COMMERCIAL

## 2023-11-20 DIAGNOSIS — C18.7 MALIGNANT NEOPLASM OF SIGMOID COLON: Primary | ICD-10-CM

## 2023-11-20 RX ORDER — ONDANSETRON HYDROCHLORIDE 8 MG/1
8 TABLET, FILM COATED ORAL 3 TIMES DAILY PRN
Qty: 30 TABLET | Refills: 5 | Status: SHIPPED | OUTPATIENT
Start: 2023-11-20

## 2023-11-20 NOTE — RESEARCH
Patient Name:  Yelena Morris  YOB: 1965  Patient Age:  58 y.o.  Patient's Sex:  female    Date of Service:  11/20/2023    Provider:  Dr. Chaparro                     RESEARCH NOTE                  Notified patient that ctDNA results are back and were negative. Patient was randomized to chemotherapy arm for patients who are ctDNA negative. Told patient Dr. Chaparro plans to initiate treatment with oxaliplatin and capecitabine per study protocol. Reviewed appointment dates with patient and told her these can also be found in MyChart if she needs to look back at them. Told patient to contact me with any questions or concerns. Patient verbalized understanding.    Soraya Brenner RN

## 2023-11-21 ENCOUNTER — SPECIALTY PHARMACY (OUTPATIENT)
Dept: ONCOLOGY | Facility: HOSPITAL | Age: 58
End: 2023-11-21
Payer: COMMERCIAL

## 2023-11-21 DIAGNOSIS — C18.7 MALIGNANT NEOPLASM OF SIGMOID COLON: Primary | ICD-10-CM

## 2023-11-21 RX ORDER — CAPECITABINE 500 MG/1
850 TABLET, FILM COATED ORAL 2 TIMES DAILY
Qty: 84 TABLET | Refills: 3 | Status: SHIPPED | OUTPATIENT
Start: 2023-11-28 | End: 2023-11-22 | Stop reason: SDUPTHER

## 2023-11-21 NOTE — PROGRESS NOTES
Oral Chemotherapy - New Referral    Received a referral from Dr. Chaparro    Treatment Plan: Xeloda (capecitabine) + oxaliplatin  Start date of treatment planned for:  11/29/23  Indication: stage 3B colon cancer, MSI, adenocarcinoma  Relevant past treatments: first-line treatment  Is the therapy appropriate based on treatment guidelines and FDA labeling?: yes  Therapeutic Goals:  4 cycles  Patient can self-administer oral medications: Yes    Drug-Drug Interactions: The current medication list was reviewed and there are some relevant drug-drug interactions with the oral specialty medication that will be discussed during education, including:  Prilosec may decrease concentration of capecitabine - discuss changing to H2 blocker with patient  QT prolongation: capecitabine, hydroxyzine, nortriptyline, Prozac  Medication Allergies: The patient has no relevant allergies as it relates to their oral specialty medication  Review of Labs/Dose Adjustments: The patient's most recent labs were reviewed and all are WNL to start treatment at this dose.   Capecitabine 1000mg/m2 x 1.79 = 1790mg (rounded down to nearest 500mg tablet)    A prescription was released to [facility] for   Drug: Xeloda (capecitabine)  Strength: 500 mg  Directions: Take 3 tablets by mouth twice a day on days 1-14 then off for 7 days  Quantity: 84  Refills: 3    Pharmacy education is scheduled for 11/28., CCA and consent will be signed at that time.    Fiona Cohen PharmD, [facility]  Clinical Oncology Pharmacy Specialist  Phone: [phone]    11/21/2023  10:12 EST

## 2023-11-22 RX ORDER — CAPECITABINE 500 MG/1
850 TABLET, FILM COATED ORAL 2 TIMES DAILY
Qty: 84 TABLET | Refills: 3 | Status: SHIPPED | OUTPATIENT
Start: 2023-11-28

## 2023-11-22 NOTE — PROGRESS NOTES
Addendum    Insurance requires that patient fill with [facility]. Rx re-sent accordingly.    Ann Cowden Mayer, PharmD, [facility]  Oncology Clinical Pharmacist  Phone [phone]    11/22/2023  15:24 EST

## 2023-11-22 NOTE — PROGRESS NOTES
Oral Chemotherapy - Double Check    Drug: Xeloda (capecitabine)  Strength: 500mg  Directions: Take 1500mg (3 tablets) by mouth twice daily on days 1-14, then off for 7 days in a 21-day cycle. Take within 30 minutes of a meal.  QTY: 84  RF: 3  Released to pharmacy: [facility]    I completed an independent review of the medication order and prescription, and I agree with the assessment and note.     Ann Cowden Mayer, PharmD, BCPS  Clinical Oncology Pharmacy Specialist  Phone [phone]    11/22/2023  09:10 EST

## 2023-11-27 ENCOUNTER — TELEPHONE (OUTPATIENT)
Dept: ONCOLOGY | Facility: CLINIC | Age: 58
End: 2023-11-27

## 2023-11-27 ENCOUNTER — TELEMEDICINE (OUTPATIENT)
Dept: PSYCHIATRY | Facility: CLINIC | Age: 58
End: 2023-11-27
Payer: COMMERCIAL

## 2023-11-27 DIAGNOSIS — F41.1 GENERALIZED ANXIETY DISORDER: ICD-10-CM

## 2023-11-27 DIAGNOSIS — G47.9 SLEEP DIFFICULTIES: ICD-10-CM

## 2023-11-27 DIAGNOSIS — F32.81 PMDD (PREMENSTRUAL DYSPHORIC DISORDER): ICD-10-CM

## 2023-11-27 DIAGNOSIS — F33.41 RECURRENT MAJOR DEPRESSIVE DISORDER, IN PARTIAL REMISSION: ICD-10-CM

## 2023-11-27 PROCEDURE — 99214 OFFICE O/P EST MOD 30 MIN: CPT | Performed by: NURSE PRACTITIONER

## 2023-11-27 RX ORDER — HYDROXYZINE HYDROCHLORIDE 25 MG/1
25-50 TABLET, FILM COATED ORAL NIGHTLY PRN
Qty: 180 TABLET | Refills: 1 | Status: SHIPPED | OUTPATIENT
Start: 2023-11-27

## 2023-11-27 RX ORDER — FLUOXETINE HYDROCHLORIDE 20 MG/1
20 CAPSULE ORAL DAILY
Qty: 90 CAPSULE | Refills: 0 | Status: SHIPPED | OUTPATIENT
Start: 2023-11-27 | End: 2024-02-25

## 2023-11-27 RX ORDER — DULOXETIN HYDROCHLORIDE 60 MG/1
60 CAPSULE, DELAYED RELEASE ORAL DAILY
Qty: 90 CAPSULE | Refills: 0 | Status: SHIPPED | OUTPATIENT
Start: 2023-11-27

## 2023-11-27 RX ORDER — ARIPIPRAZOLE 5 MG/1
5 TABLET ORAL DAILY
Qty: 90 TABLET | Refills: 0 | Status: SHIPPED | OUTPATIENT
Start: 2023-11-27

## 2023-11-27 NOTE — TELEPHONE ENCOUNTER
Caller: Yelena Morris    Relationship to patient: Self    Best call back number: [phone]    Chief complaint: PT NEEDS TO RESCHEDULE, TESTED POSITIVE FOR COVID A WEEK AGO AND TESTED POSITIVE TODAY AS WELL. PT WASN'T SURE IF SHE SHOULD RESCHEDULE     Type of visit: FOLLOW UP    If rescheduling, when is the original appointment: 11-27-23

## 2023-11-27 NOTE — TELEPHONE ENCOUNTER
Discussed with patient, symptoms started on 11/19/23. Discussed with Dr. Chaparro and he would like to move education/needs assessment to 11/30/23 with Pharmacy and ONESIMO Tang and then he will see her on 12/1/23 at 11:45 with infusion to follow. She does not need to repeat COVID test prior to appts as long as her symptoms are improving and she is afebrile for 24 hours.

## 2023-11-27 NOTE — PROGRESS NOTES
This provider is located at [facility], [address] [place] [address].The Patient is seen remotely at home, [address] [place] [address] via [employer]. Patient is being seen via telehealth and confirm that they are in a secure environment for this session. The patient's condition being diagnosed/treated is appropriate for telemedicine. The provider identified himself/herself: herself as well as her credentials.   The patient gave consent to be seen remotely, and when consent is given they understand that the consent allows for patient identifiable information to be sent to a third party as needed.   They may refuse to be seen remotely at any time. The electronic data is encrypted and password protected, and the patient has been advised of the potential risks to privacy not withstanding such measures.    You have chosen to receive care through a telehealth visit.  Do you consent to use a video/audio connection for your medical care today? Yes      Chief Complaint  Follow-up for depression and anxiety     Subjective    Yelena Radha Morris presents to [facility] for medication management.       History of Present Illness   Patient presents today reporting that she is doing well.  She denies any depression symptoms or feeling hopeless or helpless.  Denies any anxiety symptoms or the need for propranolol.  Patient states she is using the hydroxyzine at night and sleeping well.  Reports appetite is good.  Denies any side effects to the medications.  States she had her chemo pill but starts with chemotherapy for 3 weeks that for 4 cycles on the 29th which she is nervous about but is hoping that she will not have to do anymore.  Patient states otherwise things have been going good.  Denies any SI/HI/AH/VH.       Objective   Vital Signs:   There were no vitals taken for this visit.  Due to the remote nature of this encounter (virtual encounter),  vitals were unable to be obtained.  Height stated at 64 inches.  Weight stated at 165 pounds.      PHQ-9 Depression Screening  Little interest or pleasure in doing things? (P) 0-->not at all   Feeling down, depressed, or hopeless? (P) 0-->not at all   Trouble falling or staying asleep, or sleeping too much? (P) 0-->not at all   Feeling tired or having little energy? (P) 0-->not at all   Poor appetite or overeating? (P) 0-->not at all   Feeling bad about yourself - or that you are a failure or have let yourself or your family down? (P) 0-->not at all   Trouble concentrating on things, such as reading the newspaper or watching television? (P) 0-->not at all   Moving or speaking so slowly that other people could have noticed? Or the opposite - being so fidgety or restless that you have been moving around a lot more than usual? (P) 0-->not at all   Thoughts that you would be better off dead, or of hurting yourself in some way? (P) 0-->not at all   PHQ-9 Total Score (P) 0   If you checked off any problems, how difficult have these problems made it for you to do your work, take care of things at home, or get along with other people? (P) not difficult at all       PHQ-9 Total Score: (P) 0          Mental Status Exam:   Hygiene:   good  Cooperation:  Cooperative  Eye Contact:  Good  Psychomotor Behavior:  Appropriate  Affect:  Appropriate  Mood: normal  Speech:  Normal  Thought Process:  Goal directed and Linear  Thought Content:  Normal  Suicidal:  None  Homicidal:  None  Hallucinations:  None  Delusion:  None  Memory:  Intact  Orientation:  Person, Place, Time and Situation  Reliability:  good  Insight:  Good  Judgement:  Good  Impulse Control:  Good  Physical/Medical Issues:  Yes HTN, DM, fibromyalgia, carpal tunnel syndrome      Current Medications:   Current Outpatient Medications   Medication Sig Dispense Refill    ARIPiprazole (ABILIFY) 5 MG tablet Take 1 tablet by mouth Daily. 90 tablet 0    DULoxetine (CYMBALTA) 60  MG capsule Take 1 capsule by mouth Daily. 90 capsule 0    FLUoxetine (PROzac) 20 MG capsule Take 1 capsule by mouth Daily for 90 days. 90 capsule 0    hydrOXYzine (ATARAX) 25 MG tablet Take 1-2 tablets by mouth At Night As Needed for Anxiety (sleep). 180 tablet 1    atorvastatin (LIPITOR) 40 MG tablet TAKE 1 TABLET BY MOUTH EVERY EVENING 90 tablet 3    BOTOX 200 units reconstituted solution INJECT 200 UNITS INTO THE HEAD OR NECK AREA BY MD EVERY 90 DAYS  3    [START ON 11/28/2023] capecitabine (XELODA) 500 MG chemo tablet Take 3 tablets by mouth 2 (Two) Times a Day. on days 1-14, then off for 7 days in each 21-day cycle. Take within 30 minutes of a meal. 84 tablet 3    cyclobenzaprine (FLEXERIL) 10 MG tablet Take 1 tablet by mouth At Night As Needed for Muscle Spasms. 30 tablet 1    Dulaglutide (Trulicity) 1.5 MG/0.5ML solution pen-injector Inject 1.5 mg under the skin into the appropriate area as directed 1 (One) Time Per Week. 6 mL 1    eletriptan (RELPAX) 40 MG tablet TAKE 1 TABLET BY MOUTH AT THE ONSET OF MIGRAINE. MAY REPEAT IN 2 HRS.MAX 2 DOSE/24HR,3 DAYS/WEEK  11    glimepiride (AMARYL) 2 MG tablet Take 1 tablet by mouth 2 (Two) Times a Day. 180 tablet 3    hydroCHLOROthiazide (HYDRODIURIL) 12.5 MG tablet Take 1 tablet by mouth Daily. 90 tablet 3    nebivolol (Bystolic) 10 MG tablet Take 1 tablet by mouth Daily. 90 tablet 3    nortriptyline (PAMELOR) 10 MG capsule nortriptyline 10 mg capsule   TAKE 4 CAPSULES AT BEDTIME      Nutritional Supplements (DHEA PO) Take  by mouth.      omeprazole (priLOSEC) 40 MG capsule Take 1 capsule by mouth Daily. 90 capsule 3    ondansetron (ZOFRAN) 8 MG tablet Take 1 tablet by mouth 3 (Three) Times a Day As Needed for Nausea or Vomiting. 30 tablet 5    pioglitazone (Actos) 15 MG tablet Take 1 tablet by mouth Daily. 90 tablet 1    propranolol (INDERAL) 10 MG tablet TAKE 1 TABLET BY MOUTH 2 (TWO) TIMES A DAY AS NEEDED (PANIC/ANXIETY). 180 tablet 0    Topiramate ER (Trokendi XR)  200 MG capsule sustained-release 24 hr Daily.       No current facility-administered medications for this visit.       Physical Exam  Nursing note reviewed.   Constitutional:       Appearance: Normal appearance.   Neurological:      Mental Status: She is alert.   Psychiatric:         Attention and Perception: Attention and perception normal.         Mood and Affect: Mood and affect normal. Mood is not anxious or depressed.         Speech: Speech normal.         Behavior: Behavior normal. Behavior is cooperative.         Thought Content: Thought content normal.         Cognition and Memory: Cognition and memory normal.         Judgment: Judgment normal.        Result Review :     The following data was reviewed by: ONESIMO Alexander on 02/01/2021:  Common labs          10/16/2023    17:37 10/17/2023    16:05 10/19/2023    10:17   Common Labs   Glucose   154    BUN   14    Creatinine 0.90   0.92    Sodium   139    Potassium   3.9    Chloride   102    Calcium   9.7    Albumin   4.6    Total Bilirubin   0.2    Alkaline Phosphatase   89    AST (SGOT)   16    ALT (SGPT)   25    WBC  8.08     8.08    Hemoglobin  13.5     13.6    Hematocrit  39.6     41.2    Platelets  300     302       Details          This result is from an external source.           Data reviewed: PCP notes         Assessment and Plan    Problem List Items Addressed This Visit          Mental Health    Recurrent major depressive disorder    Relevant Medications    ARIPiprazole (ABILIFY) 5 MG tablet    DULoxetine (CYMBALTA) 60 MG capsule    FLUoxetine (PROzac) 20 MG capsule    hydrOXYzine (ATARAX) 25 MG tablet     Other Visit Diagnoses       PMDD (premenstrual dysphoric disorder)        Relevant Medications    ARIPiprazole (ABILIFY) 5 MG tablet    DULoxetine (CYMBALTA) 60 MG capsule    FLUoxetine (PROzac) 20 MG capsule    hydrOXYzine (ATARAX) 25 MG tablet    Generalized anxiety disorder        Relevant Medications    ARIPiprazole (ABILIFY) 5 MG  tablet    DULoxetine (CYMBALTA) 60 MG capsule    FLUoxetine (PROzac) 20 MG capsule    hydrOXYzine (ATARAX) 25 MG tablet    Sleep difficulties        Relevant Medications    hydrOXYzine (ATARAX) 25 MG tablet            TREATMENT PLAN/GOALS: Continue supportive psychotherapy efforts and medications as indicated. Treatment and medication options discussed during today's visit. Patient ackowledged and verbally consented to continue with current treatment plan and was educated on the importance of compliance with treatment and follow-up appointments.    MEDICATION ISSUES:  We discussed risks, benefits, and side effects of the above medications and the patient was agreeable with the plan. Patient was educated on the importance of compliance with treatment and follow-up appointments.  Patient is agreeable to call the office with any worsening of symptoms or onset of side effects. Patient is agreeable to call 911 or go to the nearest ER should he/she begin having SI/HI.        -Continue Cymbalta 60 mg daily for depression and anxiety as patient has benefited greatly as well for her pain.  -Continue fluoxetine 20 mg daily for depression and PMDD.   -Patient educated extensively regarding the risk of serotonin syndrome as she is currently on duloxetine due to her pain as we have previously tried to taper and it is not effective so she is on fluoxetine 20 mg daily to help with her overall depression and mood.  Patient verbalized understanding was agreeable.  -Continue aripiprazole 5 mg as adjunct for depression as patient has failed numerous SSRIs and SNRIs and has benefited the patient tremendously and is medically necessary for her depression to avoid hospitalization.   -Continue propranolol 10 mg twice daily as needed for anxiety and panic.  Encouraged patient if it decreased her blood pressure or heart rate to contact the clinic and discontinue.  -Continue hydroxyzine 25 to 50 mg at night as needed for  sleep.      Counseled patient regarding multimodal approach with healthy nutrition, healthy sleep, regular physical activity, social activities, counseling, and medications.      Coping skills reviewed and encouraged positive framing of thoughts     Assisted patient in processing above session content; acknowledged and normalized patient’s thoughts, feelings, and concerns.  Applied  positive coping skills and behavior management in session.  Allowed patient to freely discuss issues without interruption or judgment. Provided safe, confidential environment to facilitate the development of positive therapeutic relationship and encourage open, honest communication. Assisted patient in identifying risk factors which would indicate the need for higher level of care including thoughts to harm self or others and/or self-harming behavior and encouraged patient to contact this office, call 911, or present to the nearest emergency room should any of these events occur. Discussed crisis intervention services and means to access.     MEDS ORDERED DURING VISIT:  New Medications Ordered This Visit   Medications    ARIPiprazole (ABILIFY) 5 MG tablet     Sig: Take 1 tablet by mouth Daily.     Dispense:  90 tablet     Refill:  0    DULoxetine (CYMBALTA) 60 MG capsule     Sig: Take 1 capsule by mouth Daily.     Dispense:  90 capsule     Refill:  0    FLUoxetine (PROzac) 20 MG capsule     Sig: Take 1 capsule by mouth Daily for 90 days.     Dispense:  90 capsule     Refill:  0    hydrOXYzine (ATARAX) 25 MG tablet     Sig: Take 1-2 tablets by mouth At Night As Needed for Anxiety (sleep).     Dispense:  180 tablet     Refill:  1         Follow Up   Return in about 8 weeks (around 1/22/2024), or if symptoms worsen or fail to improve, for Recheck.    Patient was given instructions and counseling regarding her condition or for health maintenance advice. Please see specific information pulled into the AVS if appropriate.     Some of the data  in this electronic note has been brought forward from a previous encounter, any necessary changes have been made, it has been reviewed by this APRN, and it is accurate.      This document has been electronically signed by ONESIMO Alexander  November 27, 2023 08:50 EST    Part of this note may be an electronic transcription/translation of spoken language to printed text using the Dragon Dictation System.

## 2023-11-30 ENCOUNTER — DOCUMENTATION (OUTPATIENT)
Dept: NUTRITION | Facility: HOSPITAL | Age: 58
End: 2023-11-30
Payer: COMMERCIAL

## 2023-11-30 ENCOUNTER — HOSPITAL ENCOUNTER (OUTPATIENT)
Dept: ONCOLOGY | Facility: HOSPITAL | Age: 58
Discharge: HOME | End: 2023-11-30
Payer: COMMERCIAL

## 2023-11-30 ENCOUNTER — SPECIALTY PHARMACY (OUTPATIENT)
Dept: ONCOLOGY | Facility: HOSPITAL | Age: 58
End: 2023-11-30
Payer: COMMERCIAL

## 2023-11-30 ENCOUNTER — HOSPITAL ENCOUNTER (OUTPATIENT)
Dept: ONCOLOGY | Facility: HOSPITAL | Age: 58
Discharge: HOME | End: 2023-11-30
Admitting: INTERNAL MEDICINE
Payer: COMMERCIAL

## 2023-11-30 ENCOUNTER — OFFICE VISIT (OUTPATIENT)
Dept: ONCOLOGY | Facility: CLINIC | Age: 58
End: 2023-11-30
Payer: COMMERCIAL

## 2023-11-30 VITALS
HEART RATE: 95 BPM | SYSTOLIC BLOOD PRESSURE: 121 MMHG | DIASTOLIC BLOOD PRESSURE: 71 MMHG | OXYGEN SATURATION: 96 % | WEIGHT: 155 LBS | BODY MASS INDEX: 26.46 KG/M2 | RESPIRATION RATE: 16 BRPM | HEIGHT: 64 IN | TEMPERATURE: 97.6 F

## 2023-11-30 DIAGNOSIS — C18.7 MALIGNANT NEOPLASM OF SIGMOID COLON: Primary | Chronic | ICD-10-CM

## 2023-11-30 DIAGNOSIS — C18.7 MALIGNANT NEOPLASM OF SIGMOID COLON: ICD-10-CM

## 2023-11-30 LAB
ALBUMIN SERPL-MCNC: 4.4 G/DL (ref 3.5–5.2)
ALBUMIN/GLOB SERPL: 1.4 G/DL
ALP SERPL-CCNC: 102 U/L (ref 39–117)
ALT SERPL W P-5'-P-CCNC: 21 U/L (ref 1–33)
ANION GAP SERPL CALCULATED.3IONS-SCNC: 12 MMOL/L (ref 5–15)
AST SERPL-CCNC: 13 U/L (ref 1–32)
BASOPHILS # BLD AUTO: 0.04 10*3/MM3 (ref 0–0.2)
BASOPHILS NFR BLD AUTO: 0.4 % (ref 0–1.5)
BILIRUB SERPL-MCNC: 0.5 MG/DL (ref 0–1.2)
BUN SERPL-MCNC: 13 MG/DL (ref 6–20)
BUN/CREAT SERPL: 13.1 (ref 7–25)
CALCIUM SPEC-SCNC: 9.6 MG/DL (ref 8.6–10.5)
CHLORIDE SERPL-SCNC: 102 MMOL/L (ref 98–107)
CO2 SERPL-SCNC: 25 MMOL/L (ref 22–29)
CREAT SERPL-MCNC: 0.99 MG/DL (ref 0.57–1)
DEPRECATED RDW RBC AUTO: 40.8 FL (ref 37–54)
EGFRCR SERPLBLD CKD-EPI 2021: 67.1 ML/MIN/1.73
EOSINOPHIL # BLD AUTO: 0.07 10*3/MM3 (ref 0–0.4)
EOSINOPHIL NFR BLD AUTO: 0.7 % (ref 0.3–6.2)
ERYTHROCYTE [DISTWIDTH] IN BLOOD BY AUTOMATED COUNT: 12.5 % (ref 12.3–15.4)
GLOBULIN UR ELPH-MCNC: 3.2 GM/DL
GLUCOSE SERPL-MCNC: 83 MG/DL (ref 65–99)
HCT VFR BLD AUTO: 45.1 % (ref 34–46.6)
HGB BLD-MCNC: 14.5 G/DL (ref 12–15.9)
IMM GRANULOCYTES # BLD AUTO: 0.04 10*3/MM3 (ref 0–0.05)
IMM GRANULOCYTES NFR BLD AUTO: 0.4 % (ref 0–0.5)
LYMPHOCYTES # BLD AUTO: 2.39 10*3/MM3 (ref 0.7–3.1)
LYMPHOCYTES NFR BLD AUTO: 24 % (ref 19.6–45.3)
MAGNESIUM SERPL-MCNC: 2 MG/DL (ref 1.6–2.6)
MCH RBC QN AUTO: 28.4 PG (ref 26.6–33)
MCHC RBC AUTO-ENTMCNC: 32.2 G/DL (ref 31.5–35.7)
MCV RBC AUTO: 88.3 FL (ref 79–97)
MONOCYTES # BLD AUTO: 0.54 10*3/MM3 (ref 0.1–0.9)
MONOCYTES NFR BLD AUTO: 5.4 % (ref 5–12)
NEUTROPHILS NFR BLD AUTO: 6.89 10*3/MM3 (ref 1.7–7)
NEUTROPHILS NFR BLD AUTO: 69.1 % (ref 42.7–76)
PLATELET # BLD AUTO: 374 10*3/MM3 (ref 140–450)
PMV BLD AUTO: 9 FL (ref 6–12)
POTASSIUM SERPL-SCNC: 3.7 MMOL/L (ref 3.5–5.2)
PROT SERPL-MCNC: 7.6 G/DL (ref 6–8.5)
RBC # BLD AUTO: 5.11 10*6/MM3 (ref 3.77–5.28)
SODIUM SERPL-SCNC: 139 MMOL/L (ref 136–145)
WBC NRBC COR # BLD AUTO: 9.97 10*3/MM3 (ref 3.4–10.8)

## 2023-11-30 PROCEDURE — 83735 ASSAY OF MAGNESIUM: CPT | Performed by: INTERNAL MEDICINE

## 2023-11-30 PROCEDURE — 80053 COMPREHEN METABOLIC PANEL: CPT | Performed by: INTERNAL MEDICINE

## 2023-11-30 PROCEDURE — 36415 COLL VENOUS BLD VENIPUNCTURE: CPT

## 2023-11-30 PROCEDURE — 85025 COMPLETE CBC W/AUTO DIFF WBC: CPT | Performed by: INTERNAL MEDICINE

## 2023-11-30 NOTE — PROGRESS NOTES
Specialty Pharmacy Patient Management Program  Oncology Initial Assessment       Yelena Morris is a 58 y.o. female with colon cancer seen by an Oncology provider and enrolled in the Oncology Patient Management program offered by [facility].  An initial outreach was conducted, including assessment of therapy appropriateness and specialty medication education for Xeloda (capecitabine), Eloxatin (oxaliplatin). The patient was introduced to services offered by [facility], including: regular assessments, refill coordination, curbside pick-up or mail order delivery options, prior authorization maintenance, and financial assistance programs as applicable. The patient was also provided with contact information for the pharmacy team.     Regimen: Cycle = 21 days   - Xeloda (capecitabine) 500mg tablets -- take 1500 mg (3 tablets) by mouth twice daily on days 1-14, then off for 7 days. Take within 30 minutes of a meal.   - Eloxatin (oxaliplati)  IV over two hours on day 1 of each cycle.     Start date of oral specialty medication: As soon as oral specialty medication is available. Cycle 1, Day 1 is tomorrow, 12/1/23 (IV oxaliplatin at the infusion center). Advised patient to reach out to [facility] to arrange delivery of Xeloda as soon as possible.    Relevant Past Medical History, Comorbidities, and Vaccines  Relevant medical history and concomitant health conditions were discussed with the patient. The patient's chart has been reviewed for relevant past medical history and comorbid health conditions and updated as necessary.  Vaccines are coordinated by the patient's oncologist and primary care provider.  Past Medical History:   Diagnosis Date    Anxiety     Arthritis     Cant recall the date of onset    Chest pain 11/06/2019    Colon polyp     Depression     Diabetes mellitus     Dysphagia 11/06/2019    Endometriosis     Fibromyalgia, primary     GERD  (gastroesophageal reflux disease)     Headache     HL (hearing loss)     Hyperlipidemia     Hypertension     Low vitamin D level     Malignant neoplasm of sigmoid colon 08/29/2023    Migraines     Ovarian cyst     PMDD (premenstrual dysphoric disorder)     Scoliosis     Wears glasses      Social History     Socioeconomic History    Marital status:    Tobacco Use    Smoking status: Never    Smokeless tobacco: Never   Vaping Use    Vaping Use: Never used   Substance and Sexual Activity    Alcohol use: No    Drug use: Never    Sexual activity: Yes     Partners: Male     Birth control/protection: Post-menopausal, Vasectomy     Allergies  Known allergies and reactions were discussed with the patient. The patient's chart has been reviewed for allergy information and updated as necessary.   Allergies   Allergen Reactions    Dihydroergotamine GI Intolerance     VOMITING    Methergine [Methylergonovine Maleate] Nausea And Vomiting    Amoxicillin Rash      Current Medication List  This medication list has been reviewed with the patient and evaluated for any interactions or necessary modifications/recommendations, and updated to include all prescription medications, OTC medications, and supplements the patient is currently taking.  This list reflects what is contained in the patient's profile, which has also been marked as reviewed to communicate to other providers it is the most up to date version of the patient's current medication therapy.   Prior to Admission medications    Medication Sig Start Date End Date Taking? Authorizing Provider   ARIPiprazole (ABILIFY) 5 MG tablet Take 1 tablet by mouth Daily. 11/27/23   Lesley Underwood APRN   atorvastatin (LIPITOR) 40 MG tablet TAKE 1 TABLET BY MOUTH EVERY EVENING 7/7/23   Fiona Corona MD   BOTOX 200 units reconstituted solution INJECT 200 UNITS INTO THE HEAD OR NECK AREA BY MD EVERY 90 DAYS 10/25/18   ProviderSpring MD   capecitabine (XELODA) 500 MG  chemo tablet Take 3 tablets by mouth 2 (Two) Times a Day. on days 1-14, then off for 7 days in each 21-day cycle. Take within 30 minutes of a meal. 11/28/23   Bernardino Chaparro MD   cyclobenzaprine (FLEXERIL) 10 MG tablet Take 1 tablet by mouth At Night As Needed for Muscle Spasms. 9/7/18   Sunshine Calvin APRN   Dulaglutide (Trulicity) 1.5 MG/0.5ML solution pen-injector Inject 1.5 mg under the skin into the appropriate area as directed 1 (One) Time Per Week. 6/21/23   Fiona Corona MD   DULoxetine (CYMBALTA) 60 MG capsule Take 1 capsule by mouth Daily. 11/27/23   Lesley Underwood APRN   eletriptan (RELPAX) 40 MG tablet TAKE 1 TABLET BY MOUTH AT THE ONSET OF MIGRAINE. MAY REPEAT IN 2 HRS.MAX 2 DOSE/24HR,3 DAYS/WEEK 8/27/18   Spring Riddle MD   FLUoxetine (PROzac) 20 MG capsule Take 1 capsule by mouth Daily for 90 days. 11/27/23 2/25/24  Lesley Underwood APRN   glimepiride (AMARYL) 2 MG tablet Take 1 tablet by mouth 2 (Two) Times a Day. 7/7/23   Fiona Corona MD   hydroCHLOROthiazide (HYDRODIURIL) 12.5 MG tablet Take 1 tablet by mouth Daily. 9/22/23   Foina Corona MD   hydrOXYzine (ATARAX) 25 MG tablet Take 1-2 tablets by mouth At Night As Needed for Anxiety (sleep). 11/27/23   Lesley Underwood APRN   nebivolol (Bystolic) 10 MG tablet Take 1 tablet by mouth Daily. 5/13/21   Fiona Corona MD   nortriptyline (PAMELOR) 10 MG capsule nortriptyline 10 mg capsule   TAKE 4 CAPSULES AT BEDTIME    Spring Riddle MD   Nutritional Supplements (DHEA PO) Take  by mouth.    Spring Riddle MD   omeprazole (priLOSEC) 40 MG capsule Take 1 capsule by mouth Daily. 5/19/23   Fiona Corona MD   ondansetron (ZOFRAN) 8 MG tablet Take 1 tablet by mouth 3 (Three) Times a Day As Needed for Nausea or Vomiting. 11/20/23   Bernardino Chaparro MD   pioglitazone (Actos) 15 MG tablet Take 1 tablet by mouth Daily. 11/10/23   Fiona Corona MD   propranolol (INDERAL) 10 MG  tablet TAKE 1 TABLET BY MOUTH 2 (TWO) TIMES A DAY AS NEEDED (PANIC/ANXIETY). 10/21/23   Fiona Corona MD   Topiramate ER (Trokendi XR) 200 MG capsule sustained-release 24 hr Daily.    Provider, MD Spring     Drug Interactions  Reviewed concomitant medications, allergies, labs, comorbidities/medical history, and immunization history.   Drug-drug interactions noted and discussed during education:  Omeprazole can lower the serum concentration of Xeloda. Patient did not want to switch to famotidine as she felt that her symptoms have been well-controlled with omeprazole . Reminded the patient to let us know before making any changes or starting any new prescription or OTC medications so we can first assess drug interactions.    Recommended Medications Assessment  Bone Health (such as calcium/vitamin D, bisphosphonate, RANKL inhibitor) - Not Indicated   VTE prophylaxis - Not Indicated   Prophylactic antimicrobials - Not Indicated     Relevant Laboratory Values  Lab Results   Component Value Date    GLUCOSE 154 (H) 10/19/2023    CALCIUM 9.7 10/19/2023     10/19/2023    K 3.9 10/19/2023    CO2 26.0 10/19/2023     10/19/2023    BUN 14 10/19/2023    CREATININE 0.92 10/19/2023    EGFRIFNONA 88 09/15/2021    BCR 15.2 10/19/2023    ANIONGAP 11.0 10/19/2023     Lab Results   Component Value Date    WBC 9.97 11/30/2023    RBC 5.11 11/30/2023    HGB 14.5 11/30/2023    HCT 45.1 11/30/2023    MCV 88.3 11/30/2023    MCH 28.4 11/30/2023    MCHC 32.2 11/30/2023    RDW 12.5 11/30/2023    RDWSD 40.8 11/30/2023    MPV 9.0 11/30/2023     11/30/2023    NEUTRORELPCT 69.1 11/30/2023    LYMPHORELPCT 24.0 11/30/2023    MONORELPCT 5.4 11/30/2023    EOSRELPCT 0.7 11/30/2023    BASORELPCT 0.4 11/30/2023    AUTOIGPER 0.4 11/30/2023    NEUTROABS 6.89 11/30/2023    LYMPHSABS 2.39 11/30/2023    MONOSABS 0.54 11/30/2023    EOSABS 0.07 11/30/2023    BASOSABS 0.04 11/30/2023    AUTOIGNUM 0.04 11/30/2023    NRBC 0.0  10/17/2023     The above labs have been reviewed. No dose adjustments are needed for the oral specialty medication(s) based on the labs.    Initial Education Provided for Specialty Medication  The patient has been provided with the following education. All questions and concerns have been addressed prior to the patient receiving the medication, and the patient has verbalized understanding of the education and any materials provided.  Additional patient education shall be provided and documented upon request by the patient, provider or payer.      Provided patient with:   Chemo calendar to help improve adherence, education sheets about the medication, 24-hour clinic phone number and my contact information and instructions to call should additional questions arise.     Medication Education Sheets Provided: (select all that apply)  Oral Specialty Medication: Xeloda (capecitabine)  IV: Oxaliplatin    Other Education Sheets Provided: (select all that apply)  Adherence, CINV, Diarrhea, Hand-Foot Syndrome, and Symptom Tracker Sheet and YANELY Information + Disposal Information    TOPICS COMMENTS   Storage and Handling of Oral Specialty Medication Store in the original container, in a dry location out of direct sunlight, and out of reach of children or pets. Store at room temperature.  Discussed safe handling and what to do with any unused medication.   Administration of Oral Specialty Medication Take with food at the same times each day. Do not crush or chew tablets.   Adherence to Oral Specialty Regimen and Handling Missed Doses Patient is likely to have good treatment adherence; reinforced the importance of adherence. Reviewed how to address missed doses and encouraged the patient to let us know of any missed doses.   Anemia: role of RBC, cause, s/s, ways to manage, role of transfusion Reviewed the role of RBC and the use of transfusions if hemoglobin decreases too much.  Patient to notify us if she experiences shortness of  breath, dizziness, or palpitations.  Also let patient know that she could feel more tired than usual and to try to stay active, but rest if she needs to.    Thrombocytopenia: role of platelet, cause, s/s, ways to prevent bleeding, things to avoid, when to seek help Reviewed the role of platelets in blood clotting and when to call clinic (bloody nose that bleeds for 5 minutes despite pressure, a cut that won't stop bleeding despite pressure, gums that bleed excessively with brushing or flossing). Recommended using a soft bristle toothbrush, and blowing your nose gently.    Neutropenia: role of WBC, cause, infection precautions, s/s of infection, when to call MD Reviewed the role of WBC, good infection prevention practices, and when to call the clinic (temperature 100.4F, sore throat, burning urination, etc).     Nutrition and Appetite Changes:  importance of maintaining healthy diet & weight, ways to manage to improve intake, dietary consult, exercise regimen, electrolyte and/or blood glucose abnormalities Metallic Taste: Informed patient of the potential for developing metallic taste and smell. Recommended flavoring water if it tastes metallic, using plastic utensils instead of metal utensils, and avoiding drinking from a metal can or cup to help mitigate this adverse effect.   Diarrhea: causes, s/s of dehydration, ways to manage, dietary changes, when to call MD Chemotherapy : Discussed the risk of diarrhea. Instructed patient on use OTC loperamide with diarrhea onset, but emphasized the importance of calling the clinic if 4-6 episodes in 24 hours not relieved by OTC loperamide.   Nausea/Vomiting: cause, use of antiemetics, dietary changes, when to call MD Emetic risk: Moderate  Premeds: Dexamethasone and Palonosetron  PRN home meds: Ondansetron 8mg PO TID PRN N/V  Medications sent to: [facility] in [place]    Instructed the patient to take a dose of the PRN medication at the first onset of nausea and if it's not  working to call us for additional medications.  Also provided non-drug measures to mitigate nausea.   Mouth Sores: causes, oral care, ways to manage Mouth sores can be prevented by making a mouth wash mixture of salt, baking soda, and water. The patient was instructed to swish and spit four times daily after meals and before bedtime.  Use of a soft bristle toothbrush was recommended.  The patient was instructed to avoid alcohol-containing OTC mouthwashes.    Nervous System Changes: causes, s/s, neuropathies, cognitive changes, ways to manage Discussed the adverse effect of peripheral neuropathy and signs/symptoms associated with this adverse effect. Instructed patient to call if symptoms become more frequent or worsen.   Oxaliplatin Cold-Sensitivity: Discussed signs, symptoms, and typical onset and duration of the unique cold sensitivity after receiving oxaliplatin.  Recommended avoiding drinking cold beverages, breathing cold air, or touching cold things (use a glove/towel if reaching into fridge or freezer).  Instructed patient to warm themselves up if this occurs and symptoms should subside.   Skin/Nail Changes: cause, s/s, ways to manage Hand-foot syndrome was discussed, including the s/s, prevention measures, and treatment measures. A supplementary handout with this information was also given to the patient. Informed patient of sun sensitivity and recommended use of SPF of at least 30 when out in the sun.    Organ Toxicities: cause, s/s, need for diagnostic tests, labs, when to notify MD Discussed potential effects on organ systems, monitoring, diagnostic tests, labs, and when to notify the MD. Discussed the signs/symptoms of the following: cardiotoxicity, lung changes, and nephrotoxicity.   Miscellaneous Lab Draws: On or before day 1 of each cycle, no sooner than 3 days early.   Infertility and Sexuality:  causes, fertility preservation options, sexuality changes, ways to manage, importance of birth control IV  Oncology Therapy: Reviewed safe sex practices and the importance of minimizing exposure to body fluids for 48 hours after each dose of IV oncology therapy. The patient is not of childbearing potential.   Home Care: how to manage bodily fluids Counseled on management of soiled linens and proper flush technique.  Discussed how to manage all the side effects at home and advised when to contact the MD office.     Adherence and Self-Administration  Barriers to Patient Adherence and/or Self-Administration: N/A  Methods for Supporting Patient Adherence and/or Self-Administration: dosing calendar  Expected duration of therapy:  4 planned cycles    Goals of Therapy  Patient Goals of Therapy:   Consistently take medications as prescribed  Patient will adhere to medication regimen  Patient will report any medication side effects to healthcare provider  Clinical Goals:    Goals Addressed Today        Specialty Pharmacy General Goal      Clinical goal/therapeutic target: stable or decreasing CEA and disease control, per the recent oncology clinic notes and labs.  CEA   Date Value Ref Range Status   10/19/2023 0.69 ng/mL Final   08/24/2023 1.26 ng/mL Final           Support patient understanding of medication regimen  Ensure patient knows the pharmacy contact information    Additional Plans, Therapy Recommendations or Therapy Problems to Be Addressed: Patient will reach out to [facility] to arrange delivery of Xeloda as soon as possible. Patient Care Coordinator will contact the patient to confirm receipt and start date.      Attestation  I attest the patient was actively involved in and has agreed to the above plan of care. If the prescribed therapy is at any point deemed not appropriate based on the current or future assessments, a consultation will be initiated with the patient's specialty care provider to determine the best course of action. The revised plan of therapy will be documented along with any required assessments and/or  additional patient education provided.     Autumn Hodgson, PharmD Candidate 2024     Ann Cowden Mayer, PharmD, BCPS  Oncology Clinical Pharmacist  Phone [phone]    Date and Time: 11/30/2023  15:56 EST

## 2023-11-30 NOTE — PROGRESS NOTES
"Outpatient Oncology Nutrition     Reason for Visit:     Oncology Nutrition Screening and Patient Education    Patient Name:  Yelena Morris    :  1965    MRN:  [id number]    Date of Encounter: 2023    Nutrition Assessment     Diagnosis:   On screening colonoscopy patient found to have ulcerated polyp at 20 cm from the anal verge - Moderately differentiated adenocarcinoma pathologic T3N1B, negative margins margins 3 out of 14 nodes positive with negative new anastomotic excisional margin. Tumor extent ends through muscularis propria into pericolonic or perirectal tissue.  No lymph-vascular or perineural invasion     Surgery:  2023 laparoscopic sigmoid colectomy - Dr. Michel / pathologic T3 extending through muscularis propria into pericolonic fat, N1b 3/14 node positive     Chemotherapy:  RESEARCH COLON  ARM 1B CAPOX (OXALIplatin / Capecitabine) - Start date 23    Patient Active Problem List   Diagnosis    Recurrent major depressive disorder    Migraine without status migrainosus, not intractable    Primary hypertension    Fibromyalgia    Hyperlipidemia    Endometriosis    Type 2 diabetes mellitus    Carpal tunnel syndrome    Cervical spondylosis without myelopathy    Lumbar post-laminectomy syndrome    Vitamin D deficiency    Gastroesophageal reflux disease with esophagitis    Bilateral hearing loss    Chronic left shoulder pain    Thyroid nodule    Abnormal finding on MRI of brain    Multinodular thyroid    Generalized headache    Primary osteoarthritis of right foot    Overweight    Malignant neoplasm of sigmoid colon    Right ovarian cyst       Food / Nutrition Related History       Hydration Status     Goal: 80 ounces    Enteral Feeding     NA  Anthropometric Measurements     Height:    Ht Readings from Last 1 Encounters:   23 162.6 cm (64\")       Weight:    Wt Readings from Last 1 Encounters:   23 73.5 kg (162 lb)       BMI:  27.81 / overweight    Weight Change:  11 " lbs weight loss past 8 months (6% weight loss)    Review of Lab Data (Time Frame - 1 month / 2 month)          Component  Ref Range & Units 1 mo ago 3 mo ago   CEA  ng/mL 0.69 1.26   [facility]                Lab draw scheduled for 11/30/23    Medication Review   Reviewed noting Actos, Amaryl, Trulicity, Prilosec    Nutrition Focused Physical Findings       Nutrition Impact Symptoms     Recent weight loss  Reflux     Physical Activity      Not my normal self, but able to be up and about with fairly normal activities    Current Nutritional Intake     Oral diet:  Regular    Oral nutritional supplements:    Intake:    Malnutrition Risk Assessment     Recent weight loss over the past 6 months:  Yes    How much weight loss:  1 = 2-13 lbs    Eating poorly because of a decreased appetite:  0 = No    Malnutrition Screening Score:     MST = 0 or 1 Patient not at risk for malnutrition    Nutrition Diagnosis     Problem Patient education to increase nutrition knowledge and surveillance for surgical and treatment side effects   Etiology    Signs / Symptoms      Nutrition Intervention   Patient has been rescheduled for treatment start 12/4/23;  will see during infusion appointment.    Goal   1. Achieve nutrient and hydration goals via oral intake  2. Provide patient education and management of nutritionally related side effects of treatment.    Monitoring / Evaluation

## 2023-11-30 NOTE — PROGRESS NOTES
CHEMOTHERAPY PREPARATION    Yelena Morris  [id number]  1965    Chief Complaint: Treatment preparation and needs assessment    History of present illness:  Yelena Morris is a 58 y.o. year old female who is here today for treatment preparation and needs assessment.  The patient has been diagnosed with colon cancer and is scheduled to begin treatment with OXALIplatin / Capecitabine.     Oncology History:    Oncology/Hematology History Overview Note   1.  Colon cancer found in a tubular adenoma 20 cm from the anal verge with negative staging CT chest abdomen and pelvis normal baseline CEA 1.26 with no visible lesion on MRI rectal protocol.  9/25/2023 laparoscopic sigmoid colectomy Dr. Michel pathologic T3 extending through muscularis propria into pericolonic fat, N1b 3/14 node positive  2.  Goiter with 1.8 cm left thyroid nodule found on colon cancer staging for which ultrasound follow-up recommended  3.  Myomectomy with bladder tack July 2015  4.  Diabetes  5.  Hypertension  6.  Hyperlipidemia  7.  Migraines  8.  Depression  9.  Reflux  10.  History of uterine ablation with ovarian cyst on post-op staging imaging of colon cancer  11.  History of tonsillectomy  12.  History of tympanostomy tubes  13.  History of L4-5 disc surgery and L5-S1 discectomy and fusion  14.  History of solid and liquid phase dysphagia with history of mild gastritis and some esophageal ranging on EGD 2019  15.  Compound melanocytic nevus with dermatofibroma removed by Dr. Ray 07/12/2023    Oncology history timeline:  -12/20/2019 EGD showed small hiatal hernia with some ringing of the esophagus biopsy.  Mild gastritis.  No intestinal metaplasia or dysplasia or malignancy.  Reflux suggestive but not diagnostic.  -8/18/2023 colonoscopy Dr. Calle.  Diverticulosis in the sigmoid colon.  7 mm polyp in the rectum.  Diverticulosis in the sigmoid colon.  Polypoid area with ulceration crescent-shaped 1.8 cm 20 cm proximal to the anus  likely sigmoid.  Tattooed and biopsied.  Rectal polyp showed tubular adenoma with no high-grade dysplasia.  Colon biopsy at 20 cm showed invasive moderately differentiated colon adenocarcinoma arising within a tubular adenoma with high-grade dysplasia.  Microsatellite stable    -8/24/2023 CEA normal 1.26.  ALT 34 bicarb 20.2 otherwise unremarkable CMP.  CBC normal with hemoglobin 14.4 and normal MCV.  -8/27/2023 CT abdomen pelvis with contrast did not show any colonic mass.  Mild distal esophageal thickening, hepatic steatosis without hepatic metastasis.  No lytic bone disease.  MRI rectal protocol without contrast reviewed with Dr. López.  Perhaps a small pucker at the site of biopsy but no discernible mass per se and no adenopathy.  -8/28/2023 CT chest shows no metastasis.  1.8 cm left thyroid for which follow-up is recommended    -8/29/2023 [facility] medical oncology consult: On screening colonoscopy patient found to have ulcerated polyp at 20 cm from the anal verge with moderately differentiated adenocarcinoma microsatellite stable.  Normal baseline CEA with staging CT chest abdomen pelvis and MRI rectal protocol showing no evidence of metastasis and no visible primary on MRI.  Based on distance from anal verge on colonoscopy and given the absence of visible abnormality on MRI rectal protocol to change that estimated position, would call this colon cancer.  Given microsatellite stability, would not contemplate calling this rectal cancer and treating with immunotherapy but regardless of whether rectal or colon primary this would at least be no more than T1 tumor clinically with no visible tumor on MRI despite colonoscopy findings.  The patient has experience with Dr. Michel with whom she requests a consultation for definitive surgery and I will see back postoperatively to decide on any adjuvant therapy as indicated based on pathologic staging.  There is some esophageal thickening on CT for which I would request   Alva repeat EGD last done 2019 just for completeness sake but first would deal with the colon cancer at hand.She will also need to get left thyroid ultrasound eventually and would likely do this through whoever has managed her goiter previously.      -- 9/25/2023 laparoscopic sigmoid colectomy Dr. Michel.    -10/2/2023 [facility] medical oncology follow-up: Final pathology from surgery done a week ago is not back.  According to verbal report to me from the pathologist has either T2 or T3 disease but he thinks that this is tracking along the arteries and will be T3 and she had 3 out of 13 nodes involved.  Hence I would call this stage IIIb.  Her baseline CEA was normal preoperatively so that will not be extremely helpful postoperatively.  Outside of clinical trial the standard of care would be 3 months of CapeOx or 6 months of FOLFOX.  I would be interested in putting her on NRG- where upon CT DNA negative patients are randomized to either serial screening and CT DNA or 3 months CapeOx/6 months FOLFOX providers choice.  CT DNA positive patients are randomized to either 6 months of CapeOx or FOLFOX or 6 months of FOLFIRINOX.  If we can go ahead and get the [place] CT DNA cooking outside of clinical trial without significant financial toxicity we may do that.  Ideally chemotherapy would start less than 8 weeks out from surgery and we should have plenty of time to have her heal and to make these decisions collectively.  Addendum: Final pathology report… Moderately differentiated adenocarcinoma pathologic T3N1B, negative margins margins 3 out of 14 nodes positive with negative new anastomotic excisional margin.  Tumor extent ends through muscularis propria into pericolonic or perirectal tissue.  No lymph-vascular or perineural invasion.     -10/16/2023 CT chest Abdomen pelvis compared to CT chest 8/28/2023 and CT abdomen pelvis 8/27/2023 shows no metastasis in the chest abdomen or pelvis and interval sigmoid colectomy.   New right adnexal cystic lesion 6 cm recommending pelvic ultrasound in 3 months    -10/19/2023 [facility] medical oncology follow-up: I went over her scans with her.  No clear-cut metastatic disease.  I will ask her to get to Dr. Mcguire for the right adnexal cystic lesion as quickly as possible and would not wait 3 months on the ultrasound in light of clinical trial.  I suspect this is benign but we will get this worked up.  On [facility] nomogram, putting in her pathologic information she has a 5-year survival rate of 69% give or take 7%.  With adjuvant therapy her 5-year survival rate is 85%.  This is based on statistics not informed by molecular data.  We are aware of prognostic data from prior CT DNA trials whereby patients with negative CT DNA have a considerably higher 5-year survival rate and people with CT DNA positivity have a considerably lower 5-year survival rate and the above 5-year survival rate is probably a conglomerate of statistics between these people that averages out.  I certainly would be fine with either 6 months of FOLFOX or 3 months of Xeloda oxaliplatin outside of a clinical trial and that would be my standard recommendation.   is now open and we are enrolling her on that per her request.  If she has negative CT DNA then she is randomized to watchful waiting versus CapeOx x4 or FOLFOX x12.  If CT DNA is positive then she is randomized to CapeOx x6 or FOLFOX x12 (investigators choice) versus treatment intensification with FOLFIRINOX.  I will see her back in a few weeks for the CT DNA results.    - 11/8/2023 evaluation by Dr. Eloisa Uribe, GYN oncology of right adnexal mass with minimal complexity is not consistent with Krukenberg tumor or other malignancy.  There is a thickened endometrial stripe.  Perimenopausal state.  Still having monthly periods hormonal blood work from primary OB/GYN not available.  Imaging reviewed by Dr. Uribe with no concerns for this being malignancy.    normal within the last month.  If it were not for serial CTs already planned, patient would have transvaginal ultrasound in 6 months but with plans for serial CTs, GYN oncology plans no further ultrasounds.  Due to low risk of malignancy, GYN oncology recommends regular follow-up with primary OB/GYN and follow-up if images change in a worrisome manner.    -11/9/2023 [facility] oncology follow-up: The CT DNA is still pending.  We should have the results by November 30 or sooner.  8 weeks from surgery would be November 20.  There is good data that disease-free survival and overall outcomes are not compromised when adjuvant chemotherapy occurs 8 weeks out from surgery compared to 4 weeks or less.  The data beyond that does show some falloff of survival when there are significant delays but I do not think her overall risk is going to substantially change whether we treated her the week of the 20th or the week of 27 November.  There is the option to give her 1 cycle of chemotherapy while waiting on this but that would not accrue to her overall number of cycles and I do not want to add oxaliplatin cycles overall given the potential additive toxicity of oxaliplatin.  She understands the uncertainties here and wishes to proceed with the clinical trial.  She has seen gynecologic oncology for evaluation and this is new ovarian cyst will be watched and is felt to be benign according to them and can be just watched by serial CTs and primary GYN exam.  I will see her back November 27 hopefully to have results by then and hopefully to then later that week proceed with chemotherapy as indicated based on the randomizations.     Malignant neoplasm of sigmoid colon   8/29/2023 Initial Diagnosis    Malignant neoplasm of sigmoid colon     10/2/2023 Cancer Staged    Staging form: Colon And Rectum, AJCC 8th Edition  - Clinical: Stage IIIB (cT3, cN1, cM0) - Signed by Bernardino Chaparro MD on 10/2/2023     10/19/2023 Cancer Staged     Staging form: Colon And Rectum, AJCC 8th Edition  - Pathologic: Stage IIIB (pT3, pN1b, cM0) - Signed by Bernardino Chaparro MD on 10/19/2023     11/30/2023 - 11/30/2023 Chemotherapy    OP COLON CapeOX Capecitabine / OXALIplatin     12/1/2023 -  Chemotherapy    RESEARCH COLON  ARM 1B CAPOX (OXALIplatin / Capecitabine)         The current medication list and allergy list were reviewed and reconciled.     Past Medical History, Past Surgical History, Social History, Family History have been reviewed and are without significant changes except as mentioned.    Review of Systems:    Review of Systems   Constitutional:  Negative for appetite change, fatigue, fever and unexpected weight change.   HENT:  Negative for mouth sores, sore throat and trouble swallowing.    Respiratory:  Negative for cough, shortness of breath and wheezing.    Cardiovascular:  Negative for chest pain, palpitations and leg swelling.   Gastrointestinal:  Negative for abdominal distention, abdominal pain, constipation, diarrhea, nausea and vomiting.   Genitourinary:  Negative for difficulty urinating, dysuria and frequency.   Musculoskeletal:  Negative for arthralgias.   Skin:  Negative for pallor, rash and wound.   Neurological:  Negative for dizziness and weakness.   Hematological:  Does not bruise/bleed easily.   Psychiatric/Behavioral:  Negative for confusion and sleep disturbance. The patient is nervous/anxious.      Physical Exam:    Vitals:    11/30/23 1449   BP: 121/71   Pulse: 95   Resp: 16   Temp: 97.6 °F (36.4 °C)   SpO2: 96%     Vitals:    11/30/23 1449   PainSc: 0-No pain          ECOG: (0) Fully Active - Able to Carry On All Pre-disease Performance Without Restriction    General: well appearing, in no acute distress    Psych: Mood is stable            NEEDS ASSESSMENTS    Genetics  The patient's new diagnosis and family history have been reviewed for genetic counseling needs. A genetic referral is not recommended.     Psychosocial  The  "patient has completed a PHQ-9 Depression Screening and the Distress Thermometer (DT) today.   PHQ-9 results show 0 (No Depression). The patient scored their distress today as 4 on a scale of 0-10 with 0 being no distress and 10 being extreme distress.   Problems marked by the patient as being an issue for them within the last week include practical problems, emotional problems, and physical problems.   Results were reviewed along with psychosocial resources offered by our cancer center.  Our oncology social worker will be flagged for a DT score of 4 or above, and a same day call will be made for a score of 9 or 10.  A mental health referral is offered at this time. The patient is not interested in a referral to [facility].   Copies of patient's questionnaires will be scanned into EMR for details and further reference.    Barriers to care  A barriers form was also completed by the patient today. We discussed services offered by our facility to help her have adequate access to care. The patient was given the name and contact information for our Oncology Social Worker, Kamilla Suggs.  Based upon barriers assessment today, the patient will not require a follow-up call from the  to further discuss needs.   A copy of the barriers form will also be scanned into EMR for details and further reference.     VAD Assessment  The patient and I discussed planned intervenous chemotherapy as well as other IV treatments that are often needed throughout the course of treatment. These may include, but are not limited to blood transfusions, antibiotics, and IV hydration. The vasculature does appear to be adequate for multiple peripheral IVs throughout their treatment course.     Advanced Care Planning  The patient and I discussed advanced care planning, \"Conversations that Matter\".   This service was offered, free of charge, for development of advance directives with a certified ACP facilitator.  The patient does not " have an up-to-date advanced directive. This document is not on file with our office. The patient is not interested in an appointment with one of our facilitators to create or update their advanced directives.      Palliative Care  The patient and I discussed palliative care services. Palliative care is not the same as Hospice care. This is specialized medical care for people living with serious illness with the goal of improving quality of life for the patient and their family. [facility] offers our patients outpatient palliative care early along with their treatment to assist in coordination of care, symptom management, pain management, and medical decision making.  Oncology criteria for palliative care referral is not met at this time. The patient is not interested in a palliative care consultation.     Additional Referral needs  none      CHEMOTHERAPY EDUCATION    Chemotherapy education completed and consent obtained per oncology pharmacist.  See their documentation for further details.    Booklets Given: Chemotherapy and You []  Nutrition for the Patient with Cancer During Treatment []    Sexuality/Fertility Books []     Chemotherapy Regimen:   Treatment Plans       Name Type Plan Dates Plan Provider         Active    RESEARCH COLON  ARM 1B CAPOX (OXALIplatin / Capecitabine) ONCOLOGY RESEARCH  11/22/2023 - Present Bernardino Chaparro MD                      Chemotherapy education comprehension reviewed.       Assessment and Plan:    Diagnoses and all orders for this visit:    1. Malignant neoplasm of sigmoid colon (Primary)  -     Ambulatory Referral to ONC Social Work  -     Diclofenac Sodium (VOLTAREN) 1 % gel gel; Apply 4 g topically to the appropriate area as directed 2 (Two) Times a Day. Apply finger tip amount to palms of hands and soles of feet  Dispense: 350 g; Refill: 3    The patient and I have reviewed their cancer diagnosis and scheduled treatment plan. Needs assessment was completed including genetics,  psychosocial needs, barriers to care, VAD evaluation, advanced care planning, and palliative care services. Referrals have been ordered as appropriate based upon our evaluation and patient desires.     Chemotherapy teaching was also completed today per pharmacy.  Adequate time was given to answer questions.  Patient and family are aware of their care team members and contact information if they have questions or problems throughout the treatment course. The patient is adequately prepared to begin treatment as scheduled on 12/1/2023.     Reviewed with patient education regarding Zofran and diclofenac gel prescriptions sent to pharmacy.       Patient will have pretreatment labs drawn today.    I spent 30 minutes caring for Yelena on this date of service. This time includes time spent by me in the following activities: preparing for the visit, reviewing tests, performing a medically appropriate examination and/or evaluation, counseling and educating the patient/family/caregiver, ordering medications, tests, or procedures, documenting information in the medical record, and care coordination.     Kitty Astorga, APRN  11/30/23

## 2023-12-01 ENCOUNTER — SPECIALTY PHARMACY (OUTPATIENT)
Dept: ONCOLOGY | Facility: HOSPITAL | Age: 58
End: 2023-12-01
Payer: COMMERCIAL

## 2023-12-01 ENCOUNTER — OFFICE VISIT (OUTPATIENT)
Dept: ONCOLOGY | Facility: CLINIC | Age: 58
End: 2023-12-01
Payer: COMMERCIAL

## 2023-12-01 VITALS
HEART RATE: 100 BPM | HEIGHT: 64 IN | WEIGHT: 155 LBS | BODY MASS INDEX: 26.46 KG/M2 | SYSTOLIC BLOOD PRESSURE: 140 MMHG | OXYGEN SATURATION: 99 % | TEMPERATURE: 98.1 F | RESPIRATION RATE: 16 BRPM | DIASTOLIC BLOOD PRESSURE: 78 MMHG

## 2023-12-01 DIAGNOSIS — C18.7 MALIGNANT NEOPLASM OF SIGMOID COLON: Primary | Chronic | ICD-10-CM

## 2023-12-01 RX ORDER — FAMOTIDINE 10 MG/ML
20 INJECTION, SOLUTION INTRAVENOUS AS NEEDED
Status: CANCELLED | OUTPATIENT
Start: 2023-12-04

## 2023-12-01 RX ORDER — PALONOSETRON 0.05 MG/ML
0.25 INJECTION, SOLUTION INTRAVENOUS ONCE
Status: CANCELLED | OUTPATIENT
Start: 2023-12-04

## 2023-12-01 RX ORDER — DIPHENHYDRAMINE HYDROCHLORIDE 50 MG/ML
50 INJECTION INTRAMUSCULAR; INTRAVENOUS AS NEEDED
Status: CANCELLED | OUTPATIENT
Start: 2023-12-04

## 2023-12-01 RX ORDER — DEXTROSE MONOHYDRATE 50 MG/ML
20 INJECTION, SOLUTION INTRAVENOUS ONCE
Status: CANCELLED | OUTPATIENT
Start: 2023-12-04

## 2023-12-01 NOTE — PROGRESS NOTES
CHIEF COMPLAINT: No somatic complaints    Problem List:  Oncology/Hematology History Overview Note   1.  Colon cancer found in a tubular adenoma 20 cm from the anal verge with negative staging CT chest abdomen and pelvis normal baseline CEA 1.26 with no visible lesion on MRI rectal protocol.  9/25/2023 laparoscopic sigmoid colectomy Dr. Michel pathologic T3 extending through muscularis propria into pericolonic fat, N1b 3/14 node positive.  CT DNA negative.  On  randomized to the treatment arm.  Will give 3 months of capecitabine and oxaliplatin with NSAID cream.  2.  Goiter with 1.8 cm left thyroid nodule found on colon cancer staging for which ultrasound follow-up recommended  3.  Myomectomy with bladder tack July 2015  4.  Diabetes  5.  Hypertension  6.  Hyperlipidemia  7.  Migraines  8.  Depression  9.  Reflux  10.  History of uterine ablation with ovarian cyst on post-op staging imaging of colon cancer  11.  History of tonsillectomy  12.  History of tympanostomy tubes  13.  History of L4-5 disc surgery and L5-S1 discectomy and fusion  14.  History of solid and liquid phase dysphagia with history of mild gastritis and some esophageal ranging on EGD 2019  15.  Compound melanocytic nevus with dermatofibroma removed by Dr. Ray 07/12/2023    Oncology history timeline:  -12/20/2019 EGD showed small hiatal hernia with some ringing of the esophagus biopsy.  Mild gastritis.  No intestinal metaplasia or dysplasia or malignancy.  Reflux suggestive but not diagnostic.  -8/18/2023 colonoscopy Dr. Calle.  Diverticulosis in the sigmoid colon.  7 mm polyp in the rectum.  Diverticulosis in the sigmoid colon.  Polypoid area with ulceration crescent-shaped 1.8 cm 20 cm proximal to the anus likely sigmoid.  Tattooed and biopsied.  Rectal polyp showed tubular adenoma with no high-grade dysplasia.  Colon biopsy at 20 cm showed invasive moderately differentiated colon adenocarcinoma arising within a tubular adenoma with  high-grade dysplasia.  Microsatellite stable    -8/24/2023 CEA normal 1.26.  ALT 34 bicarb 20.2 otherwise unremarkable CMP.  CBC normal with hemoglobin 14.4 and normal MCV.  -8/27/2023 CT abdomen pelvis with contrast did not show any colonic mass.  Mild distal esophageal thickening, hepatic steatosis without hepatic metastasis.  No lytic bone disease.  MRI rectal protocol without contrast reviewed with Dr. López.  Perhaps a small pucker at the site of biopsy but no discernible mass per se and no adenopathy.  -8/28/2023 CT chest shows no metastasis.  1.8 cm left thyroid for which follow-up is recommended    -8/29/2023 [facility], [place], operated by [facility] medical oncology consult: On screening colonoscopy patient found to have ulcerated polyp at 20 cm from the anal verge with moderately differentiated adenocarcinoma microsatellite stable.  Normal baseline CEA with staging CT chest abdomen pelvis and MRI rectal protocol showing no evidence of metastasis and no visible primary on MRI.  Based on distance from anal verge on colonoscopy and given the absence of visible abnormality on MRI rectal protocol to change that estimated position, would call this colon cancer.  Given microsatellite stability, would not contemplate calling this rectal cancer and treating with immunotherapy but regardless of whether rectal or colon primary this would at least be no more than T1 tumor clinically with no visible tumor on MRI despite colonoscopy findings.  The patient has experience with Dr. Michel with whom she requests a consultation for definitive surgery and I will see back postoperatively to decide on any adjuvant therapy as indicated based on pathologic staging.  There is some esophageal thickening on CT for which I would request Dr. Calle repeat EGD last done 2019 just for completeness sake but first would deal with the colon cancer at hand.She will also need to get left thyroid ultrasound eventually and would likely do this through whoever has managed her  merna previously.      -- 9/25/2023 laparoscopic sigmoid colectomy Dr. Michel.    -10/2/2023 [facility] medical oncology follow-up: Final pathology from surgery done a week ago is not back.  According to verbal report to me from the pathologist has either T2 or T3 disease but he thinks that this is tracking along the arteries and will be T3 and she had 3 out of 13 nodes involved.  Hence I would call this stage IIIb.  Her baseline CEA was normal preoperatively so that will not be extremely helpful postoperatively.  Outside of clinical trial the standard of care would be 3 months of CapeOx or 6 months of FOLFOX.  I would be interested in putting her on NRG- where upon CT DNA negative patients are randomized to either serial screening and CT DNA or 3 months CapeOx/6 months FOLFOX providers choice.  CT DNA positive patients are randomized to either 6 months of CapeOx or FOLFOX or 6 months of FOLFIRINOX.  If we can go ahead and get the [employer] CT DNA cooking outside of clinical trial without significant financial toxicity we may do that.  Ideally chemotherapy would start less than 8 weeks out from surgery and we should have plenty of time to have her heal and to make these decisions collectively.  Addendum: Final pathology report… Moderately differentiated adenocarcinoma pathologic T3N1B, negative margins margins 3 out of 14 nodes positive with negative new anastomotic excisional margin.  Tumor extent ends through muscularis propria into pericolonic or perirectal tissue.  No lymph-vascular or perineural invasion.     -10/16/2023 CT chest Abdomen pelvis compared to CT chest 8/28/2023 and CT abdomen pelvis 8/27/2023 shows no metastasis in the chest abdomen or pelvis and interval sigmoid colectomy.  New right adnexal cystic lesion 6 cm recommending pelvic ultrasound in 3 months    -10/19/2023 [facility] medical oncology follow-up: I went over her scans with her.  No clear-cut metastatic disease.  I will ask her to get to   [facility] for the right adnexal cystic lesion as quickly as possible and would not wait 3 months on the ultrasound in light of clinical trial.  I suspect this is benign but we will get this worked up.  On [facility] nomogram, putting in her pathologic information she has a 5-year survival rate of 69% give or take 7%.  With adjuvant therapy her 5-year survival rate is 85%.  This is based on statistics not informed by molecular data.  We are aware of prognostic data from prior CT DNA trials whereby patients with negative CT DNA have a considerably higher 5-year survival rate and people with CT DNA positivity have a considerably lower 5-year survival rate and the above 5-year survival rate is probably a conglomerate of statistics between these people that averages out.  I certainly would be fine with either 6 months of FOLFOX or 3 months of Xeloda oxaliplatin outside of a clinical trial and that would be my standard recommendation.   is now open and we are enrolling her on that per her request.  If she has negative CT DNA then she is randomized to watchful waiting versus CapeOx x4 or FOLFOX x12.  If CT DNA is positive then she is randomized to CapeOx x6 or FOLFOX x12 (investigators choice) versus treatment intensification with FOLFIRINOX.  I will see her back in a few weeks for the CT DNA results.    - 11/8/2023 evaluation by Dr. Eloisa Uribe, GYN oncology of right adnexal mass with minimal complexity is not consistent with Krukenberg tumor or other malignancy.  There is a thickened endometrial stripe.  Perimenopausal state.  Still having monthly periods hormonal blood work from primary OB/GYN not available.  Imaging reviewed by Dr. Uribe with no concerns for this being malignancy.   normal within the last month.  If it were not for serial CTs already planned, patient would have transvaginal ultrasound in 6 months but with plans for serial CTs, GYN oncology plans no further ultrasounds.  Due to low risk  of malignancy, GYN oncology recommends regular follow-up with primary OB/GYN and follow-up if images change in a worrisome manner.    -11/9/2023 [facility], [place] medical oncology follow-up: The CT DNA is still pending.  We should have the results by November 30 or sooner.  8 weeks from surgery would be November 20.  There is good data that disease-free survival and overall outcomes are not compromised when adjuvant chemotherapy occurs 8 weeks out from surgery compared to 4 weeks or less.  The data beyond that does show some falloff of survival when there are significant delays but I do not think her overall risk is going to substantially change whether we treated her the week of the 20th or the week of 27 November.  There is the option to give her 1 cycle of chemotherapy while waiting on this but that would not accrue to her overall number of cycles and I do not want to add oxaliplatin cycles overall given the potential additive toxicity of oxaliplatin.  She understands the uncertainties here and wishes to proceed with the clinical trial.  She has seen gynecologic oncology for evaluation and this is new ovarian cyst will be watched and is felt to be benign according to them and can be just watched by serial CTs and primary GYN exam.  I will see her back November 27 hopefully to have results by then and hopefully to then later that week proceed with chemotherapy as indicated based on the randomizations.    -12/1/2023 [facility], [place] medical oncology follow-up: CT DNA reportedly 0 per  .  She randomized to the treatment arm for which we have seen her and educated her for Xeloda oxaliplatin x 3 months with NSAID cream to mitigate palmar and plantar skin issues.  Will ask scan routinely for routine surveillance and with that we will also keep an eye on the right adnexal mass felt to be benign per GYN oncology.  Treatment 1 cycle 1 orders signed and we will see her for cycle 2 day 1 the day after [place] with my  nurse practitioner in [place].  She has had her cancer treatment preparation visit and education and consenting.  She has been vasculature and approval has been granted to treat her by peripheral vein from the nursing staff.     Malignant neoplasm of sigmoid colon   8/29/2023 Initial Diagnosis    Malignant neoplasm of sigmoid colon     10/2/2023 Cancer Staged    Staging form: Colon And Rectum, AJCC 8th Edition  - Clinical: Stage IIIB (cT3, cN1, cM0) - Signed by Bernardino Chaparro MD on 10/2/2023     10/19/2023 Cancer Staged    Staging form: Colon And Rectum, AJCC 8th Edition  - Pathologic: Stage IIIB (pT3, pN1b, cM0) - Signed by Bernardino Chaparro MD on 10/19/2023     11/30/2023 - 11/30/2023 Chemotherapy    OP COLON CapeOX Capecitabine / OXALIplatin     12/1/2023 -  Chemotherapy    RESEARCH COLON  ARM 1B CAPOX (OXALIplatin / Capecitabine)         HISTORY OF PRESENT ILLNESS:  The patient is a 58 y.o. female, here for follow up on management of adjuvant therapy colon cancer.  No somatic complaints.  Recently had COVID about 2 weeks ago and symptoms have abated.  Now testing negative.    Past Medical History:   Diagnosis Date    Anxiety     Arthritis     Cant recall the date of onset    Chest pain 11/06/2019    Colon polyp     Depression     Diabetes mellitus     Dysphagia 11/06/2019    Endometriosis     Fibromyalgia, primary     GERD (gastroesophageal reflux disease)     Headache     HL (hearing loss)     Hyperlipidemia     Hypertension     Low vitamin D level     Malignant neoplasm of sigmoid colon 08/29/2023    Migraines     Ovarian cyst     PMDD (premenstrual dysphoric disorder)     Scoliosis     Wears glasses      Past Surgical History:   Procedure Laterality Date    BLADDER SURGERY      Bladder tuck    COLONOSCOPY      every 5 years    ENDOMETRIAL ABLATION      LUMBAR DISCECTOMY  2010    L4-S1 Rt- discectomy Dr. Arellano    SPINAL CORD STIMULATOR IMPLANT N/A 08/19/2019    Procedure: SPINAL CORD STIMULATOR  "INSERTION;  Surgeon: Ethan Peters MD;  Location: [facility];  Service: Neurosurgery    SPINE SURGERY  2010    TONSILLECTOMY AND ADENOIDECTOMY         Allergies   Allergen Reactions    Dihydroergotamine GI Intolerance     VOMITING    Methergine [Methylergonovine Maleate] Nausea And Vomiting    Amoxicillin Rash       Family History and Social History reviewed and changed as necessary    REVIEW OF SYSTEM:   No somatic complaints    PHYSICAL EXAM:  No  jaundice icterus or pallor.  No respiratory distress.    Vitals:    12/01/23 1141   BP: 140/78   Pulse: 100   Resp: 16   Temp: 98.1 °F (36.7 °C)   TempSrc: Temporal   SpO2: 99%   Weight: 70.3 kg (155 lb)   Height: 162.6 cm (64\")     Vitals:    12/01/23 1141   PainSc: 0-No pain          ECOG score: 0           Vitals reviewed.    ECOG: (0) Fully Active - Able to Carry On All Pre-disease Performance Without Restriction    Lab Results   Component Value Date    HGB 14.5 11/30/2023    HCT 45.1 11/30/2023    MCV 88.3 11/30/2023     11/30/2023    WBC 9.97 11/30/2023    NEUTROABS 6.89 11/30/2023    LYMPHSABS 2.39 11/30/2023    MONOSABS 0.54 11/30/2023    EOSABS 0.07 11/30/2023    BASOSABS 0.04 11/30/2023       Lab Results   Component Value Date    GLUCOSE 83 11/30/2023    BUN 13 11/30/2023    CREATININE 0.99 11/30/2023     11/30/2023    K 3.7 11/30/2023     11/30/2023    CO2 25.0 11/30/2023    CALCIUM 9.6 11/30/2023    PROTEINTOT 7.6 11/30/2023    ALBUMIN 4.4 11/30/2023    BILITOT 0.5 11/30/2023    ALKPHOS 102 11/30/2023    AST 13 11/30/2023    ALT 21 11/30/2023             ASSESSMENT & PLAN:  1.  Colon cancer found in a tubular adenoma 20 cm from the anal verge with negative staging CT chest abdomen and pelvis normal baseline CEA 1.26 with no visible lesion on MRI rectal protocol.  9/25/2023 laparoscopic sigmoid colectomy Dr. Michel pathologic T3 extending through muscularis propria into pericolonic fat, N1b 3/14 node positive.  CT DNA negative.  On  " randomized to the treatment arm.  Will give 3 months of capecitabine and oxaliplatin with NSAID cream.  2.  Goiter with 1.8 cm left thyroid nodule found on colon cancer staging for which ultrasound follow-up recommended  3.  Myomectomy with bladder tack July 2015  4.  Diabetes  5.  Hypertension  6.  Hyperlipidemia  7.  Migraines  8.  Depression  9.  Reflux  10.  History of uterine ablation with ovarian cyst on post-op staging imaging of colon cancer  11.  History of tonsillectomy  12.  History of tympanostomy tubes  13.  History of L4-5 disc surgery and L5-S1 discectomy and fusion  14.  History of solid and liquid phase dysphagia with history of mild gastritis and some esophageal ranging on EGD 2019  15.  Compound melanocytic nevus with dermatofibroma removed by Dr. Ray 07/12/2023    Oncology history timeline:  -12/20/2019 EGD showed small hiatal hernia with some ringing of the esophagus biopsy.  Mild gastritis.  No intestinal metaplasia or dysplasia or malignancy.  Reflux suggestive but not diagnostic.  -8/18/2023 colonoscopy Dr. Calle.  Diverticulosis in the sigmoid colon.  7 mm polyp in the rectum.  Diverticulosis in the sigmoid colon.  Polypoid area with ulceration crescent-shaped 1.8 cm 20 cm proximal to the anus likely sigmoid.  Tattooed and biopsied.  Rectal polyp showed tubular adenoma with no high-grade dysplasia.  Colon biopsy at 20 cm showed invasive moderately differentiated colon adenocarcinoma arising within a tubular adenoma with high-grade dysplasia.  Microsatellite stable    -8/24/2023 CEA normal 1.26.  ALT 34 bicarb 20.2 otherwise unremarkable CMP.  CBC normal with hemoglobin 14.4 and normal MCV.  -8/27/2023 CT abdomen pelvis with contrast did not show any colonic mass.  Mild distal esophageal thickening, hepatic steatosis without hepatic metastasis.  No lytic bone disease.  MRI rectal protocol without contrast reviewed with Dr. López.  Perhaps a small pucker at the site of biopsy but no  discernible mass per se and no adenopathy.  -8/28/2023 CT chest shows no metastasis.  1.8 cm left thyroid for which follow-up is recommended    -8/29/2023 [facility] medical oncology consult: On screening colonoscopy patient found to have ulcerated polyp at 20 cm from the anal verge with moderately differentiated adenocarcinoma microsatellite stable.  Normal baseline CEA with staging CT chest abdomen pelvis and MRI rectal protocol showing no evidence of metastasis and no visible primary on MRI.  Based on distance from anal verge on colonoscopy and given the absence of visible abnormality on MRI rectal protocol to change that estimated position, would call this colon cancer.  Given microsatellite stability, would not contemplate calling this rectal cancer and treating with immunotherapy but regardless of whether rectal or colon primary this would at least be no more than T1 tumor clinically with no visible tumor on MRI despite colonoscopy findings.  The patient has experience with Dr. Michel with whom she requests a consultation for definitive surgery and I will see back postoperatively to decide on any adjuvant therapy as indicated based on pathologic staging.  There is some esophageal thickening on CT for which I would request Dr. Calle repeat EGD last done 2019 just for completeness sake but first would deal with the colon cancer at hand.She will also need to get left thyroid ultrasound eventually and would likely do this through whoever has managed her goiter previously.      -- 9/25/2023 laparoscopic sigmoid colectomy Dr. Michel.    -10/2/2023 [facility] medical oncology follow-up: Final pathology from surgery done a week ago is not back.  According to verbal report to me from the pathologist has either T2 or T3 disease but he thinks that this is tracking along the arteries and will be T3 and she had 3 out of 13 nodes involved.  Hence I would call this stage IIIb.  Her baseline CEA was normal preoperatively so that will  not be extremely helpful postoperatively.  Outside of clinical trial the standard of care would be 3 months of CapeOx or 6 months of FOLFOX.  I would be interested in putting her on NRG- where upon CT DNA negative patients are randomized to either serial screening and CT DNA or 3 months CapeOx/6 months FOLFOX providers choice.  CT DNA positive patients are randomized to either 6 months of CapeOx or FOLFOX or 6 months of FOLFIRINOX.  If we can go ahead and get the [place] CT DNA cooking outside of clinical trial without significant financial toxicity we may do that.  Ideally chemotherapy would start less than 8 weeks out from surgery and we should have plenty of time to have her heal and to make these decisions collectively.  Addendum: Final pathology report… Moderately differentiated adenocarcinoma pathologic T3N1B, negative margins margins 3 out of 14 nodes positive with negative new anastomotic excisional margin.  Tumor extent ends through muscularis propria into pericolonic or perirectal tissue.  No lymph-vascular or perineural invasion.     -10/16/2023 CT chest Abdomen pelvis compared to CT chest 8/28/2023 and CT abdomen pelvis 8/27/2023 shows no metastasis in the chest abdomen or pelvis and interval sigmoid colectomy.  New right adnexal cystic lesion 6 cm recommending pelvic ultrasound in 3 months    -10/19/2023 [facility] medical oncology follow-up: I went over her scans with her.  No clear-cut metastatic disease.  I will ask her to get to Dr. Mcguire for the right adnexal cystic lesion as quickly as possible and would not wait 3 months on the ultrasound in light of clinical trial.  I suspect this is benign but we will get this worked up.  On [place] nomogram, putting in her pathologic information she has a 5-year survival rate of 69% give or take 7%.  With adjuvant therapy her 5-year survival rate is 85%.  This is based on statistics not informed by molecular data.  We are aware of prognostic data from  prior CT DNA trials whereby patients with negative CT DNA have a considerably higher 5-year survival rate and people with CT DNA positivity have a considerably lower 5-year survival rate and the above 5-year survival rate is probably a conglomerate of statistics between these people that averages out.  I certainly would be fine with either 6 months of FOLFOX or 3 months of Xeloda oxaliplatin outside of a clinical trial and that would be my standard recommendation.   is now open and we are enrolling her on that per her request.  If she has negative CT DNA then she is randomized to watchful waiting versus CapeOx x4 or FOLFOX x12.  If CT DNA is positive then she is randomized to CapeOx x6 or FOLFOX x12 (investigators choice) versus treatment intensification with FOLFIRINOX.  I will see her back in a few weeks for the CT DNA results.    - 11/8/2023 evaluation by Dr. Eloisa Uribe, GYN oncology of right adnexal mass with minimal complexity is not consistent with Krukenberg tumor or other malignancy.  There is a thickened endometrial stripe.  Perimenopausal state.  Still having monthly periods hormonal blood work from primary OB/GYN not available.  Imaging reviewed by Dr. Uribe with no concerns for this being malignancy.   normal within the last month.  If it were not for serial CTs already planned, patient would have transvaginal ultrasound in 6 months but with plans for serial CTs, GYN oncology plans no further ultrasounds.  Due to low risk of malignancy, GYN oncology recommends regular follow-up with primary OB/GYN and follow-up if images change in a worrisome manner.    -11/9/2023 [facility] medical oncology follow-up: The CT DNA is still pending.  We should have the results by November 30 or sooner.  8 weeks from surgery would be November 20.  There is good data that disease-free survival and overall outcomes are not compromised when adjuvant chemotherapy occurs 8 weeks out from surgery compared to 4 weeks  or less.  The data beyond that does show some falloff of survival when there are significant delays but I do not think her overall risk is going to substantially change whether we treated her the week of the 20th or the week of 27 November.  There is the option to give her 1 cycle of chemotherapy while waiting on this but that would not accrue to her overall number of cycles and I do not want to add oxaliplatin cycles overall given the potential additive toxicity of oxaliplatin.  She understands the uncertainties here and wishes to proceed with the clinical trial.  She has seen gynecologic oncology for evaluation and this is new ovarian cyst will be watched and is felt to be benign according to them and can be just watched by serial CTs and primary GYN exam.  I will see her back November 27 hopefully to have results by then and hopefully to then later that week proceed with chemotherapy as indicated based on the randomizations.    -12/1/2023 [facility] medical oncology follow-up: CT DNA reportedly 0 per  .  She randomized to the treatment arm for which we have seen her and educated her for Xeloda oxaliplatin x 3 months with NSAID cream to mitigate palmar and plantar skin issues.  Will ask scan routinely for routine surveillance and with that we will also keep an eye on the right adnexal mass felt to be benign per GYN oncology.  Treatment 1 cycle 1 orders signed and we will see her for cycle 2 day 1 the day after [place] with my nurse practitioner in [place].  She has had her cancer treatment preparation visit and education and consenting.  She has been vasculature and approval has been granted to treat her by peripheral vein from the nursing staff.    Total time of care today inclusive of time spent today prior to patient's arrival reviewing interval data and CT DNA results and during visit translating that to the patient and interviewed her for any signs or symptoms of disease and further  questions and after visit instituting this plan took 30 minutes of patient care time throughout the day today.  Bernardino Chaparro MD    12/01/2023

## 2023-12-01 NOTE — LETTER
December 1, 2023       No Recipients    Patient: Yelena Morris   YOB: 1965   Date of Visit: 12/1/2023     Dear Fiona Corona MD:       Thank you for referring Yelena Morris to me for evaluation. Below are the relevant portions of my assessment and plan of care.    If you have questions, please do not hesitate to call me. I look forward to following Yelena along with you.         Sincerely,        Bernardino Chaparro MD        CC:   No Recipients    Bernardino Chaparro MD  12/01/23 1159  Sign when Signing Visit  CHIEF COMPLAINT: No somatic complaints    Problem List:  Oncology/Hematology History Overview Note   1.  Colon cancer found in a tubular adenoma 20 cm from the anal verge with negative staging CT chest abdomen and pelvis normal baseline CEA 1.26 with no visible lesion on MRI rectal protocol.  9/25/2023 laparoscopic sigmoid colectomy Dr. Michel pathologic T3 extending through muscularis propria into pericolonic fat, N1b 3/14 node positive.  CT DNA negative.  On  randomized to the treatment arm.  Will give 3 months of capecitabine and oxaliplatin with NSAID cream.  2.  Goiter with 1.8 cm left thyroid nodule found on colon cancer staging for which ultrasound follow-up recommended  3.  Myomectomy with bladder tack July 2015  4.  Diabetes  5.  Hypertension  6.  Hyperlipidemia  7.  Migraines  8.  Depression  9.  Reflux  10.  History of uterine ablation with ovarian cyst on post-op staging imaging of colon cancer  11.  History of tonsillectomy  12.  History of tympanostomy tubes  13.  History of L4-5 disc surgery and L5-S1 discectomy and fusion  14.  History of solid and liquid phase dysphagia with history of mild gastritis and some esophageal ranging on EGD 2019  15.  Compound melanocytic nevus with dermatofibroma removed by Dr. Ray 07/12/2023    Oncology history timeline:  -12/20/2019 EGD showed small hiatal hernia with some ringing of the esophagus biopsy.  Mild gastritis.  No intestinal  metaplasia or dysplasia or malignancy.  Reflux suggestive but not diagnostic.  -8/18/2023 colonoscopy Dr. Calle.  Diverticulosis in the sigmoid colon.  7 mm polyp in the rectum.  Diverticulosis in the sigmoid colon.  Polypoid area with ulceration crescent-shaped 1.8 cm 20 cm proximal to the anus likely sigmoid.  Tattooed and biopsied.  Rectal polyp showed tubular adenoma with no high-grade dysplasia.  Colon biopsy at 20 cm showed invasive moderately differentiated colon adenocarcinoma arising within a tubular adenoma with high-grade dysplasia.  Microsatellite stable    -8/24/2023 CEA normal 1.26.  ALT 34 bicarb 20.2 otherwise unremarkable CMP.  CBC normal with hemoglobin 14.4 and normal MCV.  -8/27/2023 CT abdomen pelvis with contrast did not show any colonic mass.  Mild distal esophageal thickening, hepatic steatosis without hepatic metastasis.  No lytic bone disease.  MRI rectal protocol without contrast reviewed with Dr. López.  Perhaps a small pucker at the site of biopsy but no discernible mass per se and no adenopathy.  -8/28/2023 CT chest shows no metastasis.  1.8 cm left thyroid for which follow-up is recommended    -8/29/2023 [facility], [place], operated by [facility] medical oncology consult: On screening colonoscopy patient found to have ulcerated polyp at 20 cm from the anal verge with moderately differentiated adenocarcinoma microsatellite stable.  Normal baseline CEA with staging CT chest abdomen pelvis and MRI rectal protocol showing no evidence of metastasis and no visible primary on MRI.  Based on distance from anal verge on colonoscopy and given the absence of visible abnormality on MRI rectal protocol to change that estimated position, would call this colon cancer.  Given microsatellite stability, would not contemplate calling this rectal cancer and treating with immunotherapy but regardless of whether rectal or colon primary this would at least be no more than T1 tumor clinically with no visible tumor on MRI despite colonoscopy  findings.  The patient has experience with Dr. Michel with whom she requests a consultation for definitive surgery and I will see back postoperatively to decide on any adjuvant therapy as indicated based on pathologic staging.  There is some esophageal thickening on CT for which I would request Dr. Calle repeat EGD last done 2019 just for completeness sake but first would deal with the colon cancer at hand.She will also need to get left thyroid ultrasound eventually and would likely do this through whoever has managed her goiter previously.      -- 9/25/2023 laparoscopic sigmoid colectomy Dr. Michel.    -10/2/2023 [facility], [place] medical oncology follow-up: Final pathology from surgery done a week ago is not back.  According to verbal report to me from the pathologist has either T2 or T3 disease but he thinks that this is tracking along the arteries and will be T3 and she had 3 out of 13 nodes involved.  Hence I would call this stage IIIb.  Her baseline CEA was normal preoperatively so that will not be extremely helpful postoperatively.  Outside of clinical trial the standard of care would be 3 months of CapeOx or 6 months of FOLFOX.  I would be interested in putting her on NRG- where upon CT DNA negative patients are randomized to either serial screening and CT DNA or 3 months CapeOx/6 months FOLFOX providers choice.  CT DNA positive patients are randomized to either 6 months of CapeOx or FOLFOX or 6 months of FOLFIRINOX.  If we can go ahead and get the [facility] CT DNA cooking outside of clinical trial without significant financial toxicity we may do that.  Ideally chemotherapy would start less than 8 weeks out from surgery and we should have plenty of time to have her heal and to make these decisions collectively.  Addendum: Final pathology report… Moderately differentiated adenocarcinoma pathologic T3N1B, negative margins margins 3 out of 14 nodes positive with negative new anastomotic excisional margin.  Tumor extent  ends through muscularis propria into pericolonic or perirectal tissue.  No lymph-vascular or perineural invasion.     -10/16/2023 CT chest Abdomen pelvis compared to CT chest 8/28/2023 and CT abdomen pelvis 8/27/2023 shows no metastasis in the chest abdomen or pelvis and interval sigmoid colectomy.  New right adnexal cystic lesion 6 cm recommending pelvic ultrasound in 3 months    -10/19/2023 [facility] medical oncology follow-up: I went over her scans with her.  No clear-cut metastatic disease.  I will ask her to get to Dr. Mcguire for the right adnexal cystic lesion as quickly as possible and would not wait 3 months on the ultrasound in light of clinical trial.  I suspect this is benign but we will get this worked up.  On [facility] nomogram, putting in her pathologic information she has a 5-year survival rate of 69% give or take 7%.  With adjuvant therapy her 5-year survival rate is 85%.  This is based on statistics not informed by molecular data.  We are aware of prognostic data from prior CT DNA trials whereby patients with negative CT DNA have a considerably higher 5-year survival rate and people with CT DNA positivity have a considerably lower 5-year survival rate and the above 5-year survival rate is probably a conglomerate of statistics between these people that averages out.  I certainly would be fine with either 6 months of FOLFOX or 3 months of Xeloda oxaliplatin outside of a clinical trial and that would be my standard recommendation.   is now open and we are enrolling her on that per her request.  If she has negative CT DNA then she is randomized to watchful waiting versus CapeOx x4 or FOLFOX x12.  If CT DNA is positive then she is randomized to CapeOx x6 or FOLFOX x12 (investigators choice) versus treatment intensification with FOLFIRINOX.  I will see her back in a few weeks for the CT DNA results.    - 11/8/2023 evaluation by Dr. Eloisa Uribe, GYN oncology of right adnexal mass with minimal  complexity is not consistent with Krukenberg tumor or other malignancy.  There is a thickened endometrial stripe.  Perimenopausal state.  Still having monthly periods hormonal blood work from primary OB/GYN not available.  Imaging reviewed by Dr. Uribe with no concerns for this being malignancy.   normal within the last month.  If it were not for serial CTs already planned, patient would have transvaginal ultrasound in 6 months but with plans for serial CTs, GYN oncology plans no further ultrasounds.  Due to low risk of malignancy, GYN oncology recommends regular follow-up with primary OB/GYN and follow-up if images change in a worrisome manner.    -11/9/2023 [facility] oncology follow-up: The CT DNA is still pending.  We should have the results by November 30 or sooner.  8 weeks from surgery would be November 20.  There is good data that disease-free survival and overall outcomes are not compromised when adjuvant chemotherapy occurs 8 weeks out from surgery compared to 4 weeks or less.  The data beyond that does show some falloff of survival when there are significant delays but I do not think her overall risk is going to substantially change whether we treated her the week of the 20th or the week of 27 November.  There is the option to give her 1 cycle of chemotherapy while waiting on this but that would not accrue to her overall number of cycles and I do not want to add oxaliplatin cycles overall given the potential additive toxicity of oxaliplatin.  She understands the uncertainties here and wishes to proceed with the clinical trial.  She has seen gynecologic oncology for evaluation and this is new ovarian cyst will be watched and is felt to be benign according to them and can be just watched by serial CTs and primary GYN exam.  I will see her back November 27 hopefully to have results by then and hopefully to then later that week proceed with chemotherapy as indicated based on the  randomizations.    -12/1/2023 [facility] medical oncology follow-up: CT DNA reportedly 0 per  .  She randomized to the treatment arm for which we have seen her and educated her for Xeloda oxaliplatin x 3 months with NSAID cream to mitigate palmar and plantar skin issues.  Will ask scan routinely for routine surveillance and with that we will also keep an eye on the right adnexal mass felt to be benign per GYN oncology.  Treatment 1 cycle 1 orders signed and we will see her for cycle 2 day 1 the day after Dennis with my nurse practitioner in [place].  She has had her cancer treatment preparation visit and education and consenting.  She has been vasculature and approval has been granted to treat her by peripheral vein from the nursing staff.     Malignant neoplasm of sigmoid colon   8/29/2023 Initial Diagnosis    Malignant neoplasm of sigmoid colon     10/2/2023 Cancer Staged    Staging form: Colon And Rectum, AJCC 8th Edition  - Clinical: Stage IIIB (cT3, cN1, cM0) - Signed by Bernardino Chaparro MD on 10/2/2023     10/19/2023 Cancer Staged    Staging form: Colon And Rectum, AJCC 8th Edition  - Pathologic: Stage IIIB (pT3, pN1b, cM0) - Signed by Bernardino Chaparro MD on 10/19/2023     11/30/2023 - 11/30/2023 Chemotherapy    OP COLON CapeOX Capecitabine / OXALIplatin     12/1/2023 -  Chemotherapy    RESEARCH COLON  ARM 1B CAPOX (OXALIplatin / Capecitabine)         HISTORY OF PRESENT ILLNESS:  The patient is a 58 y.o. female, here for follow up on management of adjuvant therapy colon cancer.  No somatic complaints.  Recently had COVID about 2 weeks ago and symptoms have abated.  Now testing negative.    Past Medical History:   Diagnosis Date   • Anxiety    • Arthritis     Cant recall the date of onset   • Chest pain 11/06/2019   • Colon polyp    • Depression    • Diabetes mellitus    • Dysphagia 11/06/2019   • Endometriosis    • Fibromyalgia, primary    • GERD (gastroesophageal reflux disease)    •  "Headache    • HL (hearing loss)    • Hyperlipidemia    • Hypertension    • Low vitamin D level    • Malignant neoplasm of sigmoid colon 08/29/2023   • Migraines    • Ovarian cyst    • PMDD (premenstrual dysphoric disorder)    • Scoliosis    • Wears glasses      Past Surgical History:   Procedure Laterality Date   • BLADDER SURGERY      Bladder tuck   • COLONOSCOPY      every 5 years   • ENDOMETRIAL ABLATION     • LUMBAR DISCECTOMY  2010    L4-S1 Rt- discectomy Dr. Arellano   • SPINAL CORD STIMULATOR IMPLANT N/A 08/19/2019    Procedure: SPINAL CORD STIMULATOR INSERTION;  Surgeon: Ethan Peters MD;  Location: [facility];  Service: Neurosurgery   • SPINE SURGERY  2010   • TONSILLECTOMY AND ADENOIDECTOMY         Allergies   Allergen Reactions   • Dihydroergotamine GI Intolerance     VOMITING   • Methergine [Methylergonovine Maleate] Nausea And Vomiting   • Amoxicillin Rash       Family History and Social History reviewed and changed as necessary    REVIEW OF SYSTEM:   No somatic complaints    PHYSICAL EXAM:  No  jaundice icterus or pallor.  No respiratory distress.    Vitals:    12/01/23 1141   BP: 140/78   Pulse: 100   Resp: 16   Temp: 98.1 °F (36.7 °C)   TempSrc: Temporal   SpO2: 99%   Weight: 70.3 kg (155 lb)   Height: 162.6 cm (64\")     Vitals:    12/01/23 1141   PainSc: 0-No pain          ECOG score: 0           Vitals reviewed.    ECOG: (0) Fully Active - Able to Carry On All Pre-disease Performance Without Restriction    Lab Results   Component Value Date    HGB 14.5 11/30/2023    HCT 45.1 11/30/2023    MCV 88.3 11/30/2023     11/30/2023    WBC 9.97 11/30/2023    NEUTROABS 6.89 11/30/2023    LYMPHSABS 2.39 11/30/2023    MONOSABS 0.54 11/30/2023    EOSABS 0.07 11/30/2023    BASOSABS 0.04 11/30/2023       Lab Results   Component Value Date    GLUCOSE 83 11/30/2023    BUN 13 11/30/2023    CREATININE 0.99 11/30/2023     11/30/2023    K 3.7 11/30/2023     11/30/2023    CO2 25.0 11/30/2023    " CALCIUM 9.6 11/30/2023    PROTEINTOT 7.6 11/30/2023    ALBUMIN 4.4 11/30/2023    BILITOT 0.5 11/30/2023    ALKPHOS 102 11/30/2023    AST 13 11/30/2023    ALT 21 11/30/2023             ASSESSMENT & PLAN:  1.  Colon cancer found in a tubular adenoma 20 cm from the anal verge with negative staging CT chest abdomen and pelvis normal baseline CEA 1.26 with no visible lesion on MRI rectal protocol.  9/25/2023 laparoscopic sigmoid colectomy Dr. Michel pathologic T3 extending through muscularis propria into pericolonic fat, N1b 3/14 node positive.  CT DNA negative.  On  randomized to the treatment arm.  Will give 3 months of capecitabine and oxaliplatin with NSAID cream.  2.  Goiter with 1.8 cm left thyroid nodule found on colon cancer staging for which ultrasound follow-up recommended  3.  Myomectomy with bladder tack July 2015  4.  Diabetes  5.  Hypertension  6.  Hyperlipidemia  7.  Migraines  8.  Depression  9.  Reflux  10.  History of uterine ablation with ovarian cyst on post-op staging imaging of colon cancer  11.  History of tonsillectomy  12.  History of tympanostomy tubes  13.  History of L4-5 disc surgery and L5-S1 discectomy and fusion  14.  History of solid and liquid phase dysphagia with history of mild gastritis and some esophageal ranging on EGD 2019  15.  Compound melanocytic nevus with dermatofibroma removed by Dr. Ray 07/12/2023    Oncology history timeline:  -12/20/2019 EGD showed small hiatal hernia with some ringing of the esophagus biopsy.  Mild gastritis.  No intestinal metaplasia or dysplasia or malignancy.  Reflux suggestive but not diagnostic.  -8/18/2023 colonoscopy Dr. Calle.  Diverticulosis in the sigmoid colon.  7 mm polyp in the rectum.  Diverticulosis in the sigmoid colon.  Polypoid area with ulceration crescent-shaped 1.8 cm 20 cm proximal to the anus likely sigmoid.  Tattooed and biopsied.  Rectal polyp showed tubular adenoma with no high-grade dysplasia.  Colon biopsy at 20 cm  showed invasive moderately differentiated colon adenocarcinoma arising within a tubular adenoma with high-grade dysplasia.  Microsatellite stable    -8/24/2023 CEA normal 1.26.  ALT 34 bicarb 20.2 otherwise unremarkable CMP.  CBC normal with hemoglobin 14.4 and normal MCV.  -8/27/2023 CT abdomen pelvis with contrast did not show any colonic mass.  Mild distal esophageal thickening, hepatic steatosis without hepatic metastasis.  No lytic bone disease.  MRI rectal protocol without contrast reviewed with Dr. López.  Perhaps a small pucker at the site of biopsy but no discernible mass per se and no adenopathy.  -8/28/2023 CT chest shows no metastasis.  1.8 cm left thyroid for which follow-up is recommended    -8/29/2023 [facility] medical oncology consult: On screening colonoscopy patient found to have ulcerated polyp at 20 cm from the anal verge with moderately differentiated adenocarcinoma microsatellite stable.  Normal baseline CEA with staging CT chest abdomen pelvis and MRI rectal protocol showing no evidence of metastasis and no visible primary on MRI.  Based on distance from anal verge on colonoscopy and given the absence of visible abnormality on MRI rectal protocol to change that estimated position, would call this colon cancer.  Given microsatellite stability, would not contemplate calling this rectal cancer and treating with immunotherapy but regardless of whether rectal or colon primary this would at least be no more than T1 tumor clinically with no visible tumor on MRI despite colonoscopy findings.  The patient has experience with Dr. Michel with whom she requests a consultation for definitive surgery and I will see back postoperatively to decide on any adjuvant therapy as indicated based on pathologic staging.  There is some esophageal thickening on CT for which I would request Dr. Calle repeat EGD last done 2019 just for completeness sake but first would deal with the colon cancer at hand.She will also need to  get left thyroid ultrasound eventually and would likely do this through whoever has managed her goiter previously.      -- 9/25/2023 laparoscopic sigmoid colectomy Dr. Michel.    -10/2/2023 [facility] medical oncology follow-up: Final pathology from surgery done a week ago is not back.  According to verbal report to me from the pathologist has either T2 or T3 disease but he thinks that this is tracking along the arteries and will be T3 and she had 3 out of 13 nodes involved.  Hence I would call this stage IIIb.  Her baseline CEA was normal preoperatively so that will not be extremely helpful postoperatively.  Outside of clinical trial the standard of care would be 3 months of CapeOx or 6 months of FOLFOX.  I would be interested in putting her on NRG- where upon CT DNA negative patients are randomized to either serial screening and CT DNA or 3 months CapeOx/6 months FOLFOX providers choice.  CT DNA positive patients are randomized to either 6 months of CapeOx or FOLFOX or 6 months of FOLFIRINOX.  If we can go ahead and get the [facility] CT DNA cooking outside of clinical trial without significant financial toxicity we may do that.  Ideally chemotherapy would start less than 8 weeks out from surgery and we should have plenty of time to have her heal and to make these decisions collectively.  Addendum: Final pathology report… Moderately differentiated adenocarcinoma pathologic T3N1B, negative margins margins 3 out of 14 nodes positive with negative new anastomotic excisional margin.  Tumor extent ends through muscularis propria into pericolonic or perirectal tissue.  No lymph-vascular or perineural invasion.     -10/16/2023 CT chest Abdomen pelvis compared to CT chest 8/28/2023 and CT abdomen pelvis 8/27/2023 shows no metastasis in the chest abdomen or pelvis and interval sigmoid colectomy.  New right adnexal cystic lesion 6 cm recommending pelvic ultrasound in 3 months    -10/19/2023 [facility] medical oncology follow-up:  I went over her scans with her.  No clear-cut metastatic disease.  I will ask her to get to Dr. Mcguire for the right adnexal cystic lesion as quickly as possible and would not wait 3 months on the ultrasound in light of clinical trial.  I suspect this is benign but we will get this worked up.  On [facility] nomogram, putting in her pathologic information she has a 5-year survival rate of 69% give or take 7%.  With adjuvant therapy her 5-year survival rate is 85%.  This is based on statistics not informed by molecular data.  We are aware of prognostic data from prior CT DNA trials whereby patients with negative CT DNA have a considerably higher 5-year survival rate and people with CT DNA positivity have a considerably lower 5-year survival rate and the above 5-year survival rate is probably a conglomerate of statistics between these people that averages out.  I certainly would be fine with either 6 months of FOLFOX or 3 months of Xeloda oxaliplatin outside of a clinical trial and that would be my standard recommendation.   is now open and we are enrolling her on that per her request.  If she has negative CT DNA then she is randomized to watchful waiting versus CapeOx x4 or FOLFOX x12.  If CT DNA is positive then she is randomized to CapeOx x6 or FOLFOX x12 (investigators choice) versus treatment intensification with FOLFIRINOX.  I will see her back in a few weeks for the CT DNA results.    - 11/8/2023 evaluation by Dr. Eloisa Uribe, GYN oncology of right adnexal mass with minimal complexity is not consistent with Krukenberg tumor or other malignancy.  There is a thickened endometrial stripe.  Perimenopausal state.  Still having monthly periods hormonal blood work from primary OB/GYN not available.  Imaging reviewed by Dr. Uribe with no concerns for this being malignancy.   normal within the last month.  If it were not for serial CTs already planned, patient would have transvaginal ultrasound in 6  months but with plans for serial CTs, GYN oncology plans no further ultrasounds.  Due to low risk of malignancy, GYN oncology recommends regular follow-up with primary OB/GYN and follow-up if images change in a worrisome manner.    -11/9/2023 [facility], [place], operated by [facility] medical oncology follow-up: The CT DNA is still pending.  We should have the results by November 30 or sooner.  8 weeks from surgery would be November 20.  There is good data that disease-free survival and overall outcomes are not compromised when adjuvant chemotherapy occurs 8 weeks out from surgery compared to 4 weeks or less.  The data beyond that does show some falloff of survival when there are significant delays but I do not think her overall risk is going to substantially change whether we treated her the week of the 20th or the week of 27 November.  There is the option to give her 1 cycle of chemotherapy while waiting on this but that would not accrue to her overall number of cycles and I do not want to add oxaliplatin cycles overall given the potential additive toxicity of oxaliplatin.  She understands the uncertainties here and wishes to proceed with the clinical trial.  She has seen gynecologic oncology for evaluation and this is new ovarian cyst will be watched and is felt to be benign according to them and can be just watched by serial CTs and primary GYN exam.  I will see her back November 27 hopefully to have results by then and hopefully to then later that week proceed with chemotherapy as indicated based on the randomizations.    -12/1/2023 [facility] [place], operated by [facility] medical oncology follow-up: CT DNA reportedly 0 per  .  She randomized to the treatment arm for which we have seen her and educated her for Xeloda oxaliplatin x 3 months with NSAID cream to mitigate palmar and plantar skin issues.  Will ask scan routinely for routine surveillance and with that we will also keep an eye on the right adnexal mass felt to be benign per GYN oncology.   Treatment 1 cycle 1 orders signed and we will see her for cycle 2 day 1 the day after Dennis with my nurse practitioner in [place].  She has had her cancer treatment preparation visit and education and consenting.  She has been vasculature and approval has been granted to treat her by peripheral vein from the nursing staff.    Total time of care today inclusive of time spent today prior to patient's arrival reviewing interval data and CT DNA results and during visit translating that to the patient and interviewed her for any signs or symptoms of disease and further questions and after visit instituting this plan took 30 minutes of patient care time throughout the day today.  Beranrdino Chaparro MD    12/01/2023

## 2023-12-04 ENCOUNTER — DOCUMENTATION (OUTPATIENT)
Dept: ONCOLOGY | Facility: CLINIC | Age: 58
End: 2023-12-04
Payer: COMMERCIAL

## 2023-12-04 ENCOUNTER — DOCUMENTATION (OUTPATIENT)
Dept: NUTRITION | Facility: HOSPITAL | Age: 58
End: 2023-12-04
Payer: COMMERCIAL

## 2023-12-04 ENCOUNTER — HOSPITAL ENCOUNTER (OUTPATIENT)
Dept: ONCOLOGY | Facility: HOSPITAL | Age: 58
Discharge: HOME | End: 2023-12-04
Admitting: INTERNAL MEDICINE
Payer: COMMERCIAL

## 2023-12-04 VITALS
BODY MASS INDEX: 27.14 KG/M2 | DIASTOLIC BLOOD PRESSURE: 69 MMHG | HEIGHT: 64 IN | RESPIRATION RATE: 18 BRPM | TEMPERATURE: 98.4 F | SYSTOLIC BLOOD PRESSURE: 130 MMHG | WEIGHT: 159 LBS | HEART RATE: 95 BPM

## 2023-12-04 DIAGNOSIS — C18.7 MALIGNANT NEOPLASM OF SIGMOID COLON: Primary | ICD-10-CM

## 2023-12-04 PROCEDURE — 25010000002 OXALIPLATIN PER 0.5 MG: Performed by: INTERNAL MEDICINE

## 2023-12-04 PROCEDURE — 0 DEXTROSE 5 % SOLUTION: Performed by: INTERNAL MEDICINE

## 2023-12-04 PROCEDURE — 25010000002 DEXAMETHASONE SODIUM PHOSPHATE 100 MG/10ML SOLUTION: Performed by: INTERNAL MEDICINE

## 2023-12-04 PROCEDURE — 25010000002 PALONOSETRON PER 25 MCG: Performed by: INTERNAL MEDICINE

## 2023-12-04 PROCEDURE — 96413 CHEMO IV INFUSION 1 HR: CPT

## 2023-12-04 PROCEDURE — 96375 TX/PRO/DX INJ NEW DRUG ADDON: CPT

## 2023-12-04 PROCEDURE — 0 DEXTROSE 5 % SOLUTION 250 ML FLEX CONT: Performed by: INTERNAL MEDICINE

## 2023-12-04 PROCEDURE — 96415 CHEMO IV INFUSION ADDL HR: CPT

## 2023-12-04 RX ORDER — PALONOSETRON 0.05 MG/ML
0.25 INJECTION, SOLUTION INTRAVENOUS ONCE
Status: COMPLETED | OUTPATIENT
Start: 2023-12-04 | End: 2023-12-04

## 2023-12-04 RX ORDER — DEXTROSE MONOHYDRATE 50 MG/ML
20 INJECTION, SOLUTION INTRAVENOUS ONCE
Status: COMPLETED | OUTPATIENT
Start: 2023-12-04 | End: 2023-12-04

## 2023-12-04 RX ADMIN — OXALIPLATIN 235 MG: 5 INJECTION, SOLUTION INTRAVENOUS at 11:53

## 2023-12-04 RX ADMIN — DEXAMETHASONE SODIUM PHOSPHATE 12 MG: 10 INJECTION, SOLUTION INTRAMUSCULAR; INTRAVENOUS at 11:10

## 2023-12-04 RX ADMIN — PALONOSETRON HYDROCHLORIDE 0.25 MG: 0.25 INJECTION, SOLUTION INTRAVENOUS at 11:10

## 2023-12-04 RX ADMIN — DEXTROSE MONOHYDRATE 20 ML/HR: 50 INJECTION, SOLUTION INTRAVENOUS at 11:00

## 2023-12-04 NOTE — PROGRESS NOTES
Onc Nutrition    Patient: Yelena Morris  YOB: 1965    Diagnosis:  Colon cancer   Surgery:  laparoscopic sigmoid colectomy - 9/25/23  Chemotherapy: RESEARCH COLON  ARM 1B CAPOX (OXALIplatin / Capecitabine) - every 21 days x 4 cycles     Weight - 159#  Weight Change - ~17# (9.9%) weight loss x ~10 weeks post op    Patient meets criteria for severe malnutrition in the context of acute / chronic illness as evidenced by significant weight loss as above and insufficient energy intake.     Met with patient during her initial chemotherapy infusion appointment.  She reports having a decreased appetite / lack of desire to eat, early satiety symptoms, and taste changes since surgery, which has resulted in weight loss as above.  Briefly discussed her history of diabetes and she states her blood sugars have been under good control with her last A1c being 7.0.     Discussed the importance of good nutrition during her treatment course focusing on adequate calorie, protein, nutrient and fluid intake.  Advised her to be consuming smaller more frequent meals/snacks throughout the day to aid with oral intake, early satiety symptoms, and potential nausea and / or diarrhea symptom management.  Emphasized the importance of protein and its role in the diet; reviewed high protein foods; and recommended she have a protein source at each meal/snack.  Offered several high protein snack ideas she may find more appealing at this time.  Discussed ONS and their role in the diet.  She is familiar with ONS and states she has drank Glucerna in the past.  She denies the need for ONS supplements at this time.  Advised her to drink ONS as needed to aid with calorie / protein intake and weight maintenance.  Also emphasized the importance of hydration; reviewed good hydrating fluid options; and recommended she drink at least 64 ounces daily.  Offered tips to aid with taste changes including using the baking soda / salt water  "mouth rinse and avoiding metals utensils and drinkware.  Also offered tips to avoid cold sensitivities associated with Oxaliplatin.  Provided and reviewed written diet materials \"Soft and Moist High Protein Menu Ideas\", \"Taste and Smell Changes\", and \"Oxaliplatin\" to reinforce information discussed.     Answered her questions and she voiced understanding of information discussed.  RD's contact information provided and encouraged to call with questions.  Will monitor as needed during her treatment course.    Simi Kaur RD  12/04/23        "

## 2023-12-04 NOTE — PROGRESS NOTES
Distress Screening Follow-up    Name: Yelena Morris    : 1965    Diagnosis: Malignant neoplasm of sigmoid colon     Location of Distress Screening[facility]    Distress Level: 4 (2023  2:00 PM)      Physical Concerns:  Sleep: N  Substance abuse: N  Fatigue: N  Tobacco use: N  Memory or concentration: N  Sexual health: N  Changes in eating: N  Loss or change of physical abilities: N  Pain: Y        Emotional Concerns:  Worry or anxiety: Y  Sadness or depression: N  Loss of interest or enjoyment: N  Grief or loss: N  Fear: N  Loneliness: N  Anger: N  Changes in appearance: N  Feelings of worthlessness or being a burden: N      Social Concerns:  Relationship with spouse or partner: N  Relationship with children: N  Relationship with family members: N  Relationship with friends or coworkers: N  Communication with health care team: N  Ability to have children: N      Practical Concerns:  Taking care of myself: N  Taking care of others: N  Work: Y  School: N  Housing: N  Finances: N  Insurance: N  Transportation: N  : N  Having enough food: N  Access to medicine: N  Treatment decisions: N      Spiritual Concerns:  Sense of meaning or purpose: N  Changes in geronimo or beliefs: N  Death, dying or afterlife: N  Conflict between beliefs and cancer treatments: N  Relationship with the sacred: N  Ritual or dietary needs: N       Interventions:   Emotional Needs: emotional suppport/coping strategies      Comments:  SW met with pt in infusion to provide support and assist with psychosocial needs. Pt was in good spirits, and communicated her distress is due to worries about working with tx. Pt is a nurse with [employer], and communicated her work has been very understanding and generous. Pt further reported that unfortunately her  also has a cancer diagnosis, but is doing well. SW provided active listening, and emotional support. SW normalized pt's  worries, as fear of the unknown is very common with a diagnosis. Pt expressed that she has great support with her three children, friends and Jew family. SW praised pt for having good support, and educated on role and services. PT denied any needs at this time, but thanked SW for the visit. SW provided contact information and will be available as needs arise in the future.

## 2023-12-04 NOTE — PROGRESS NOTES
Oxaliplatin infusion with 20ml left. Pt reports discomfort in left forearm above PIV site. Flushed with normal saline and positive blood return. Reported to  and stated ok to discharge patient without finishing the 20ml Oxaliplatin. Pt agreed with plan.

## 2023-12-04 NOTE — PROGRESS NOTES
Received call from infusion, patient has about 20 mL left of oxaliplatin and IV has started burning, it still has good blood return. Discussed with Dr. Chaparro and he does not want to start another IV and states the last 20 mL can be omitted. LUANN Hui notified.

## 2023-12-05 ENCOUNTER — RESEARCH ENCOUNTER (OUTPATIENT)
Dept: OTHER | Facility: OTHER | Age: 58
End: 2023-12-05
Payer: COMMERCIAL

## 2023-12-05 ENCOUNTER — TELEPHONE (OUTPATIENT)
Dept: ONCOLOGY | Facility: CLINIC | Age: 58
End: 2023-12-05
Payer: COMMERCIAL

## 2023-12-05 NOTE — RESEARCH
Patient Name:  Yelena Morris  YOB: 1965  Patient Age:  58 y.o.  Patient's Sex:  female    Date of Service:  12/04/2023    Provider:  Dr. Caballero                     RESEARCH NOTE                  Met with patient in infusion for C1D1 of the  study.  I gave patient her pill diaries and I told her to call me with any questions she may have and she verbalized understanding.  Later today she left me a VM about if she should take both doses of her capecitabine.  Doctor Krysta nurse let me know she spoke with the patient and told her to take only one dose today.

## 2023-12-05 NOTE — TELEPHONE ENCOUNTER
Patient contacted office at 2007 12/4/23 stating that she had received her Xeloda when she got home from her infusion and was not sure if she should try to get both doses in since it was late. Discussed with Dr. Chaparro and he would like patient to take only then evening dose and start twice a day dosing on 12/5/23. Patient verbalized understanding.

## 2023-12-11 DIAGNOSIS — Z51.11 ENCOUNTER FOR ANTINEOPLASTIC CHEMOTHERAPY: ICD-10-CM

## 2023-12-11 DIAGNOSIS — C18.7 MALIGNANT NEOPLASM OF SIGMOID COLON: Primary | ICD-10-CM

## 2023-12-13 ENCOUNTER — HOSPITAL ENCOUNTER (OUTPATIENT)
Dept: INFUSION THERAPY | Facility: HOSPITAL | Age: 58
Discharge: HOME | End: 2023-12-13
Admitting: INTERNAL MEDICINE
Payer: COMMERCIAL

## 2023-12-13 VITALS
OXYGEN SATURATION: 96 % | HEART RATE: 90 BPM | WEIGHT: 163 LBS | RESPIRATION RATE: 16 BRPM | DIASTOLIC BLOOD PRESSURE: 76 MMHG | BODY MASS INDEX: 27.83 KG/M2 | HEIGHT: 64 IN | TEMPERATURE: 96.9 F | SYSTOLIC BLOOD PRESSURE: 119 MMHG

## 2023-12-13 DIAGNOSIS — Z51.11 ENCOUNTER FOR ANTINEOPLASTIC CHEMOTHERAPY: ICD-10-CM

## 2023-12-13 DIAGNOSIS — C18.7 MALIGNANT NEOPLASM OF SIGMOID COLON: Primary | ICD-10-CM

## 2023-12-13 PROCEDURE — C1751 CATH, INF, PER/CENT/MIDLINE: HCPCS

## 2023-12-13 PROCEDURE — C1894 INTRO/SHEATH, NON-LASER: HCPCS

## 2023-12-13 RX ORDER — SODIUM CHLORIDE 0.9 % (FLUSH) 0.9 %
10 SYRINGE (ML) INJECTION AS NEEDED
Status: DISCONTINUED | OUTPATIENT
Start: 2023-12-13 | End: 2023-12-15 | Stop reason: HOSPADM

## 2023-12-13 RX ORDER — SODIUM CHLORIDE 0.9 % (FLUSH) 0.9 %
10 SYRINGE (ML) INJECTION EVERY 12 HOURS SCHEDULED
Status: DISCONTINUED | OUTPATIENT
Start: 2023-12-13 | End: 2023-12-15 | Stop reason: HOSPADM

## 2023-12-13 NOTE — NURSING NOTE
Pt discharged from ir dept s/p picc line placement to right upper arm. Occlusive dressing clean, dry and intact. Pt tolerated procedure without complications. VSS. Education provided by [facility] nurses. Pt left unit at 1420 and will follow up with the Infusion Center next Wednesday.

## 2023-12-18 ENCOUNTER — TELEPHONE (OUTPATIENT)
Dept: ONCOLOGY | Facility: CLINIC | Age: 58
End: 2023-12-18

## 2023-12-18 ENCOUNTER — OFFICE VISIT (OUTPATIENT)
Dept: GASTROENTEROLOGY | Facility: CLINIC | Age: 58
End: 2023-12-18
Payer: COMMERCIAL

## 2023-12-18 VITALS
SYSTOLIC BLOOD PRESSURE: 115 MMHG | DIASTOLIC BLOOD PRESSURE: 85 MMHG | HEIGHT: 64 IN | WEIGHT: 163.4 LBS | TEMPERATURE: 98 F | BODY MASS INDEX: 27.9 KG/M2 | HEART RATE: 88 BPM

## 2023-12-18 DIAGNOSIS — C18.7 MALIGNANT NEOPLASM OF SIGMOID COLON: Primary | ICD-10-CM

## 2023-12-18 PROCEDURE — 99213 OFFICE O/P EST LOW 20 MIN: CPT | Performed by: INTERNAL MEDICINE

## 2023-12-18 NOTE — PROGRESS NOTES
PCP:  Fiona Corona MD     No referring provider defined for this encounter.    Chief Complaint   Patient presents with    Follow-up        HPI   Patient is known to me.  A 58-year-old with a history of colon cancer.  She is on Xeloda oxsliplatin x 3 months.  She seems to be doing well.  She is not having too much in the way of side effects.    Allergies   Allergen Reactions    Dihydroergotamine GI Intolerance     VOMITING    Methergine [Methylergonovine Maleate] Nausea And Vomiting    Amoxicillin Rash          Current Outpatient Medications:     ARIPiprazole (ABILIFY) 5 MG tablet, Take 1 tablet by mouth Daily., Disp: 90 tablet, Rfl: 0    atorvastatin (LIPITOR) 40 MG tablet, TAKE 1 TABLET BY MOUTH EVERY EVENING, Disp: 90 tablet, Rfl: 3    BOTOX 200 units reconstituted solution, INJECT 200 UNITS INTO THE HEAD OR NECK AREA BY MD EVERY 90 DAYS, Disp: , Rfl: 3    capecitabine (XELODA) 500 MG chemo tablet, Take 3 tablets by mouth 2 (Two) Times a Day. on days 1-14, then off for 7 days in each 21-day cycle. Take within 30 minutes of a meal., Disp: 84 tablet, Rfl: 3    cyclobenzaprine (FLEXERIL) 10 MG tablet, Take 1 tablet by mouth At Night As Needed for Muscle Spasms., Disp: 30 tablet, Rfl: 1    Diclofenac Sodium (VOLTAREN) 1 % gel gel, Apply 4 g topically to the appropriate area as directed 2 (Two) Times a Day. Apply finger tip amount to palms of hands and soles of feet, Disp: 350 g, Rfl: 3    Dulaglutide (Trulicity) 1.5 MG/0.5ML solution pen-injector, Inject 1.5 mg under the skin into the appropriate area as directed 1 (One) Time Per Week., Disp: 6 mL, Rfl: 1    DULoxetine (CYMBALTA) 60 MG capsule, Take 1 capsule by mouth Daily., Disp: 90 capsule, Rfl: 0    eletriptan (RELPAX) 40 MG tablet, TAKE 1 TABLET BY MOUTH AT THE ONSET OF MIGRAINE. MAY REPEAT IN 2 HRS.MAX 2 DOSE/24HR,3 DAYS/WEEK, Disp: , Rfl: 11    FLUoxetine (PROzac) 20 MG capsule, Take 1 capsule by mouth Daily for 90 days., Disp: 90 capsule, Rfl: 0     glimepiride (AMARYL) 2 MG tablet, Take 1 tablet by mouth 2 (Two) Times a Day., Disp: 180 tablet, Rfl: 3    hydroCHLOROthiazide (HYDRODIURIL) 12.5 MG tablet, Take 1 tablet by mouth Daily., Disp: 90 tablet, Rfl: 3    hydrOXYzine (ATARAX) 25 MG tablet, Take 1-2 tablets by mouth At Night As Needed for Anxiety (sleep)., Disp: 180 tablet, Rfl: 1    nebivolol (Bystolic) 10 MG tablet, Take 1 tablet by mouth Daily., Disp: 90 tablet, Rfl: 3    nortriptyline (PAMELOR) 10 MG capsule, nortriptyline 10 mg capsule  TAKE 4 CAPSULES AT BEDTIME, Disp: , Rfl:     Nutritional Supplements (DHEA PO), Take  by mouth., Disp: , Rfl:     omeprazole (priLOSEC) 40 MG capsule, Take 1 capsule by mouth Daily., Disp: 90 capsule, Rfl: 3    ondansetron (ZOFRAN) 8 MG tablet, Take 1 tablet by mouth 3 (Three) Times a Day As Needed for Nausea or Vomiting., Disp: 30 tablet, Rfl: 5    pioglitazone (Actos) 15 MG tablet, Take 1 tablet by mouth Daily., Disp: 90 tablet, Rfl: 1    propranolol (INDERAL) 10 MG tablet, TAKE 1 TABLET BY MOUTH 2 (TWO) TIMES A DAY AS NEEDED (PANIC/ANXIETY)., Disp: 180 tablet, Rfl: 0    Topiramate ER (Trokendi XR) 200 MG capsule sustained-release 24 hr, Daily., Disp: , Rfl:      Past Medical History:   Diagnosis Date    Anxiety     Arthritis     Cant recall the date of onset    Chest pain 11/06/2019    Colon polyp     Depression     Diabetes mellitus     Dysphagia 11/06/2019    Endometriosis     Fibromyalgia, primary     GERD (gastroesophageal reflux disease)     Headache     HL (hearing loss)     Hyperlipidemia     Hypertension     Low vitamin D level     Malignant neoplasm of sigmoid colon 08/29/2023    Migraines     Ovarian cyst     PMDD (premenstrual dysphoric disorder)     Scoliosis     Wears glasses        Past Surgical History:   Procedure Laterality Date    BLADDER SURGERY      Bladder tuck    COLONOSCOPY      every 5 years    ENDOMETRIAL ABLATION      LUMBAR DISCECTOMY  2010    L4-S1 Rt- discectomy Dr. Arellano    SPINAL  CORD STIMULATOR IMPLANT N/A 08/19/2019    Procedure: SPINAL CORD STIMULATOR INSERTION;  Surgeon: Ethan Peters MD;  Location: [facility];  Service: Neurosurgery    SPINE SURGERY  2010    TONSILLECTOMY AND ADENOIDECTOMY          Social History     Socioeconomic History    Marital status:    Tobacco Use    Smoking status: Never    Smokeless tobacco: Never   Vaping Use    Vaping Use: Never used   Substance and Sexual Activity    Alcohol use: No    Drug use: Never    Sexual activity: Yes     Partners: Male     Birth control/protection: Post-menopausal, Vasectomy        Family History   Adopted: Yes   Problem Relation Age of Onset    Colon cancer Neg Hx         Review of Systems     Vitals:    12/18/23 1453   BP: 115/85   Pulse: 88   Temp: 98 °F (36.7 °C)        Physical Exam  Constitutional:       General: She is not in acute distress.     Appearance: Normal appearance. She is not ill-appearing.   Neurological:      Mental Status: She is alert.          Diagnoses and all orders for this visit:    1. Malignant neoplasm of sigmoid colon (Primary)  She seems to be doing well.  She had the lesion found 8/21/2023.  She has been doing well since that time.  She is being watched closely by Dr. Chaparro.  I will see her back in 8 months and sooner if she is having troubles.    Darin Calle MD

## 2023-12-18 NOTE — LETTER
December 18, 2023       No Recipients    Patient: Yelena Morris   YOB: 1965   Date of Visit: 12/18/2023     Dear Fiona Corona MD:       Thank you for referring Yelena Morris to me for evaluation. Below are the relevant portions of my assessment and plan of care.    If you have questions, please do not hesitate to call me. I look forward to following Yelena along with you.         Sincerely,        Darin Calle MD        CC:   No Recipients    Darin Calle MD  12/18/23 1507  Sign when Signing Visit  PCP:  Fiona Corona MD     No referring provider defined for this encounter.    Chief Complaint   Patient presents with   • Follow-up        HPI   Patient is known to me.  A 58-year-old with a history of colon cancer.  She is on Xeloda oxsliplatin x 3 months.  She seems to be doing well.  She is not having too much in the way of side effects.    Allergies   Allergen Reactions   • Dihydroergotamine GI Intolerance     VOMITING   • Methergine [Methylergonovine Maleate] Nausea And Vomiting   • Amoxicillin Rash          Current Outpatient Medications:   •  ARIPiprazole (ABILIFY) 5 MG tablet, Take 1 tablet by mouth Daily., Disp: 90 tablet, Rfl: 0  •  atorvastatin (LIPITOR) 40 MG tablet, TAKE 1 TABLET BY MOUTH EVERY EVENING, Disp: 90 tablet, Rfl: 3  •  BOTOX 200 units reconstituted solution, INJECT 200 UNITS INTO THE HEAD OR NECK AREA BY MD EVERY 90 DAYS, Disp: , Rfl: 3  •  capecitabine (XELODA) 500 MG chemo tablet, Take 3 tablets by mouth 2 (Two) Times a Day. on days 1-14, then off for 7 days in each 21-day cycle. Take within 30 minutes of a meal., Disp: 84 tablet, Rfl: 3  •  cyclobenzaprine (FLEXERIL) 10 MG tablet, Take 1 tablet by mouth At Night As Needed for Muscle Spasms., Disp: 30 tablet, Rfl: 1  •  Diclofenac Sodium (VOLTAREN) 1 % gel gel, Apply 4 g topically to the appropriate area as directed 2 (Two) Times a Day. Apply finger tip amount to palms of hands and soles of  feet, Disp: 350 g, Rfl: 3  •  Dulaglutide (Trulicity) 1.5 MG/0.5ML solution pen-injector, Inject 1.5 mg under the skin into the appropriate area as directed 1 (One) Time Per Week., Disp: 6 mL, Rfl: 1  •  DULoxetine (CYMBALTA) 60 MG capsule, Take 1 capsule by mouth Daily., Disp: 90 capsule, Rfl: 0  •  eletriptan (RELPAX) 40 MG tablet, TAKE 1 TABLET BY MOUTH AT THE ONSET OF MIGRAINE. MAY REPEAT IN 2 HRS.MAX 2 DOSE/24HR,3 DAYS/WEEK, Disp: , Rfl: 11  •  FLUoxetine (PROzac) 20 MG capsule, Take 1 capsule by mouth Daily for 90 days., Disp: 90 capsule, Rfl: 0  •  glimepiride (AMARYL) 2 MG tablet, Take 1 tablet by mouth 2 (Two) Times a Day., Disp: 180 tablet, Rfl: 3  •  hydroCHLOROthiazide (HYDRODIURIL) 12.5 MG tablet, Take 1 tablet by mouth Daily., Disp: 90 tablet, Rfl: 3  •  hydrOXYzine (ATARAX) 25 MG tablet, Take 1-2 tablets by mouth At Night As Needed for Anxiety (sleep)., Disp: 180 tablet, Rfl: 1  •  nebivolol (Bystolic) 10 MG tablet, Take 1 tablet by mouth Daily., Disp: 90 tablet, Rfl: 3  •  nortriptyline (PAMELOR) 10 MG capsule, nortriptyline 10 mg capsule  TAKE 4 CAPSULES AT BEDTIME, Disp: , Rfl:   •  Nutritional Supplements (DHEA PO), Take  by mouth., Disp: , Rfl:   •  omeprazole (priLOSEC) 40 MG capsule, Take 1 capsule by mouth Daily., Disp: 90 capsule, Rfl: 3  •  ondansetron (ZOFRAN) 8 MG tablet, Take 1 tablet by mouth 3 (Three) Times a Day As Needed for Nausea or Vomiting., Disp: 30 tablet, Rfl: 5  •  pioglitazone (Actos) 15 MG tablet, Take 1 tablet by mouth Daily., Disp: 90 tablet, Rfl: 1  •  propranolol (INDERAL) 10 MG tablet, TAKE 1 TABLET BY MOUTH 2 (TWO) TIMES A DAY AS NEEDED (PANIC/ANXIETY)., Disp: 180 tablet, Rfl: 0  •  Topiramate ER (Trokendi XR) 200 MG capsule sustained-release 24 hr, Daily., Disp: , Rfl:      Past Medical History:   Diagnosis Date   • Anxiety    • Arthritis     Cant recall the date of onset   • Chest pain 11/06/2019   • Colon polyp    • Depression    • Diabetes mellitus    • Dysphagia  11/06/2019   • Endometriosis    • Fibromyalgia, primary    • GERD (gastroesophageal reflux disease)    • Headache    • HL (hearing loss)    • Hyperlipidemia    • Hypertension    • Low vitamin D level    • Malignant neoplasm of sigmoid colon 08/29/2023   • Migraines    • Ovarian cyst    • PMDD (premenstrual dysphoric disorder)    • Scoliosis    • Wears glasses        Past Surgical History:   Procedure Laterality Date   • BLADDER SURGERY      Bladder tuck   • COLONOSCOPY      every 5 years   • ENDOMETRIAL ABLATION     • LUMBAR DISCECTOMY  2010    L4-S1 Rt- discectomy Dr. Arellano   • SPINAL CORD STIMULATOR IMPLANT N/A 08/19/2019    Procedure: SPINAL CORD STIMULATOR INSERTION;  Surgeon: Ethan Peters MD;  Location: [facility];  Service: Neurosurgery   • SPINE SURGERY  2010   • TONSILLECTOMY AND ADENOIDECTOMY          Social History     Socioeconomic History   • Marital status:    Tobacco Use   • Smoking status: Never   • Smokeless tobacco: Never   Vaping Use   • Vaping Use: Never used   Substance and Sexual Activity   • Alcohol use: No   • Drug use: Never   • Sexual activity: Yes     Partners: Male     Birth control/protection: Post-menopausal, Vasectomy        Family History   Adopted: Yes   Problem Relation Age of Onset   • Colon cancer Neg Hx         Review of Systems     Vitals:    12/18/23 1453   BP: 115/85   Pulse: 88   Temp: 98 °F (36.7 °C)        Physical Exam  Constitutional:       General: She is not in acute distress.     Appearance: Normal appearance. She is not ill-appearing.   Neurological:      Mental Status: She is alert.          Diagnoses and all orders for this visit:    1. Malignant neoplasm of sigmoid colon (Primary)  She seems to be doing well.  She had the lesion found 8/21/2023.  She has been doing well since that time.  She is being watched closely by Dr. Chaparro.  I will see her back in 8 months and sooner if she is having troubles.    Darin Calle MD

## 2023-12-18 NOTE — TELEPHONE ENCOUNTER
Caller: Kevin Morris    Relationship: Self    Best call back number: [phone]    What is the best time to reach you: ANY    Who are you requesting to speak with (clinical staff, provider,  specific staff member): CLINICAL     What was the call regarding: KEVIN IS NEEDING AN APPOINTMENT FOR HER DRESSING CHANGE FOR HER PIC LINE, SHE IS NEEDING  WEDNESDAY OR THURSDAY THIS WEEK       PLEASE ADVISE

## 2023-12-20 ENCOUNTER — HOSPITAL ENCOUNTER (OUTPATIENT)
Dept: ONCOLOGY | Facility: HOSPITAL | Age: 58
Discharge: HOME | End: 2023-12-20
Admitting: INTERNAL MEDICINE
Payer: COMMERCIAL

## 2023-12-20 VITALS
SYSTOLIC BLOOD PRESSURE: 103 MMHG | HEART RATE: 95 BPM | BODY MASS INDEX: 28 KG/M2 | TEMPERATURE: 98.8 F | DIASTOLIC BLOOD PRESSURE: 59 MMHG | WEIGHT: 164 LBS | HEIGHT: 64 IN | RESPIRATION RATE: 16 BRPM

## 2023-12-20 DIAGNOSIS — Z45.2 ENCOUNTER FOR CENTRAL LINE CARE: Primary | ICD-10-CM

## 2023-12-20 PROCEDURE — G0463 HOSPITAL OUTPT CLINIC VISIT: HCPCS

## 2023-12-22 ENCOUNTER — OFFICE VISIT (OUTPATIENT)
Dept: INTERNAL MEDICINE | Facility: CLINIC | Age: 58
End: 2023-12-22
Payer: COMMERCIAL

## 2023-12-22 VITALS
TEMPERATURE: 97.8 F | DIASTOLIC BLOOD PRESSURE: 74 MMHG | HEART RATE: 110 BPM | WEIGHT: 164 LBS | BODY MASS INDEX: 28 KG/M2 | OXYGEN SATURATION: 97 % | HEIGHT: 64 IN | SYSTOLIC BLOOD PRESSURE: 126 MMHG

## 2023-12-22 DIAGNOSIS — I10 PRIMARY HYPERTENSION: ICD-10-CM

## 2023-12-22 DIAGNOSIS — C18.7 MALIGNANT NEOPLASM OF SIGMOID COLON: Chronic | ICD-10-CM

## 2023-12-22 DIAGNOSIS — E11.9 TYPE 2 DIABETES MELLITUS WITHOUT COMPLICATION, WITHOUT LONG-TERM CURRENT USE OF INSULIN: Primary | ICD-10-CM

## 2023-12-22 LAB
EXPIRATION DATE: ABNORMAL
HBA1C MFR BLD: 7.7 % (ref 4.5–5.7)
Lab: ABNORMAL

## 2023-12-22 RX ORDER — PIOGLITAZONEHYDROCHLORIDE 30 MG/1
30 TABLET ORAL DAILY
Qty: 90 TABLET | Refills: 1 | Status: SHIPPED | OUTPATIENT
Start: 2023-12-22

## 2023-12-22 NOTE — PROGRESS NOTES
Internal Medicine Follow Up    Chief Complaint  Yelena Morris is a 58 y.o. female who presents today for follow up of chronic medical conditions outlined below.    Chief Complaint   Patient presents with    Diabetes        HPI  Ms. Morris comes in today for follow up. Tolerating first round of chemotherapy well. Plan for 3 months of treatment. She did note that metformin was not absorbing well and was visible in stool so switched to actos. Tolerating this without difficulty as well. Had COVID back at [facility] and defers immunization today.    Diabetes         Review of Systems  Review of Systems   Constitutional: Negative.    Respiratory: Negative.     Cardiovascular: Negative.    Gastrointestinal:  Negative for abdominal pain, constipation and diarrhea.        Current Medications  Current Outpatient Medications on File Prior to Visit   Medication Sig Dispense Refill    ARIPiprazole (ABILIFY) 5 MG tablet Take 1 tablet by mouth Daily. 90 tablet 0    atorvastatin (LIPITOR) 40 MG tablet TAKE 1 TABLET BY MOUTH EVERY EVENING 90 tablet 3    BOTOX 200 units reconstituted solution INJECT 200 UNITS INTO THE HEAD OR NECK AREA BY MD EVERY 90 DAYS  3    capecitabine (XELODA) 500 MG chemo tablet Take 3 tablets by mouth 2 (Two) Times a Day. on days 1-14, then off for 7 days in each 21-day cycle. Take within 30 minutes of a meal. 84 tablet 3    cyclobenzaprine (FLEXERIL) 10 MG tablet Take 1 tablet by mouth At Night As Needed for Muscle Spasms. 30 tablet 1    Diclofenac Sodium (VOLTAREN) 1 % gel gel Apply 4 g topically to the appropriate area as directed 2 (Two) Times a Day. Apply finger tip amount to palms of hands and soles of feet 350 g 3    Dulaglutide (Trulicity) 1.5 MG/0.5ML solution pen-injector Inject 1.5 mg under the skin into the appropriate area as directed 1 (One) Time Per Week. 6 mL 1    DULoxetine (CYMBALTA) 60 MG capsule Take 1 capsule by mouth Daily. 90 capsule 0    eletriptan (RELPAX) 40 MG tablet TAKE 1  "TABLET BY MOUTH AT THE ONSET OF MIGRAINE. MAY REPEAT IN 2 HRS.MAX 2 DOSE/24HR,3 DAYS/WEEK  11    FLUoxetine (PROzac) 20 MG capsule Take 1 capsule by mouth Daily for 90 days. 90 capsule 0    glimepiride (AMARYL) 2 MG tablet Take 1 tablet by mouth 2 (Two) Times a Day. 180 tablet 3    hydroCHLOROthiazide (HYDRODIURIL) 12.5 MG tablet Take 1 tablet by mouth Daily. 90 tablet 3    hydrOXYzine (ATARAX) 25 MG tablet Take 1-2 tablets by mouth At Night As Needed for Anxiety (sleep). 180 tablet 1    nebivolol (Bystolic) 10 MG tablet Take 1 tablet by mouth Daily. 90 tablet 3    nortriptyline (PAMELOR) 10 MG capsule nortriptyline 10 mg capsule   TAKE 4 CAPSULES AT BEDTIME      Nutritional Supplements (DHEA PO) Take  by mouth.      omeprazole (priLOSEC) 40 MG capsule Take 1 capsule by mouth Daily. 90 capsule 3    ondansetron (ZOFRAN) 8 MG tablet Take 1 tablet by mouth 3 (Three) Times a Day As Needed for Nausea or Vomiting. 30 tablet 5    propranolol (INDERAL) 10 MG tablet TAKE 1 TABLET BY MOUTH 2 (TWO) TIMES A DAY AS NEEDED (PANIC/ANXIETY). 180 tablet 0    Topiramate ER (Trokendi XR) 200 MG capsule sustained-release 24 hr Daily.      [DISCONTINUED] pioglitazone (Actos) 15 MG tablet Take 1 tablet by mouth Daily. 90 tablet 1     No current facility-administered medications on file prior to visit.       Allergies  Allergies   Allergen Reactions    Dihydroergotamine GI Intolerance     VOMITING    Methergine [Methylergonovine Maleate] Nausea And Vomiting    Amoxicillin Rash       Objective  Visit Vitals  /74   Pulse 110   Temp 97.8 °F (36.6 °C)   Ht 162.6 cm (64\")   Wt 74.4 kg (164 lb)   SpO2 97%   BMI 28.15 kg/m²        Physical Exam  Physical Exam  Vitals and nursing note reviewed.   Constitutional:       General: She is not in acute distress.     Appearance: Normal appearance. She is well-developed. She is not ill-appearing or toxic-appearing.   HENT:      Head: Normocephalic and atraumatic.   Eyes:      Conjunctiva/sclera: " Conjunctivae normal.   Cardiovascular:      Rate and Rhythm: Normal rate and regular rhythm.      Heart sounds: Normal heart sounds. No murmur heard.  Pulmonary:      Effort: Pulmonary effort is normal. No respiratory distress.      Breath sounds: Normal breath sounds.   Musculoskeletal:      Right lower leg: No edema.      Left lower leg: No edema.   Skin:     General: Skin is warm and dry.   Neurological:      Mental Status: She is alert and oriented to person, place, and time. Mental status is at baseline.      Gait: Gait normal.   Psychiatric:         Mood and Affect: Mood normal.         Behavior: Behavior normal.         Results  Results for orders placed or performed in visit on 12/22/23   POC Glycosylated Hemoglobin (Hb A1C)    Specimen: Blood   Result Value Ref Range    Hemoglobin A1C 7.7 (A) 4.5 - 5.7 %    Lot Number [id number]     Expiration Date 7/30/2025         Assessment and Plan  Diagnoses and all orders for this visit:    Type 2 diabetes mellitus without complication, without long-term current use of insulin  - A1c 7.7  - off metformin due to absorption concerns  - Continue glimepiride 2mg BID (or 4mg daily if this will boost compliance), trulicity 1.5mg weekly. Increase actos to 30mg daily.  - Had also been briefly on jardiance but discontinued due to candidal infections and januvia which was discontinued due to GI intolerance.  - Urine microalbumin nl 3/2023    Primary hypertension  - BP controlled. Continue nebivolol 10mg daily and HCTZ 12.5mg daily      Malignant neoplasm of sigmoid colon  - s/p resection and being treated with 3 months of xeloda and oxalaplatin     Health Maintenance  - Pap smear: GYN Dr. Mcguire, unable to get records  - Mammogram: 7/2023 BIRADS 1  - Colonoscopy: 8/2023  - HCV: negative  - Immunizations: Flu UTD. COVID deferred. Shingrix complete. PCV20 UTD. Tdap 10/2019.  - Depression screening: negative 5/2023  Return in about 3 months (around 3/22/2024) for Follow up DM.

## 2023-12-26 ENCOUNTER — OFFICE VISIT (OUTPATIENT)
Dept: ONCOLOGY | Facility: CLINIC | Age: 58
End: 2023-12-26
Payer: COMMERCIAL

## 2023-12-26 ENCOUNTER — HOSPITAL ENCOUNTER (OUTPATIENT)
Dept: ONCOLOGY | Facility: HOSPITAL | Age: 58
Discharge: HOME | End: 2023-12-26
Admitting: INTERNAL MEDICINE
Payer: COMMERCIAL

## 2023-12-26 ENCOUNTER — RESEARCH ENCOUNTER (OUTPATIENT)
Dept: OTHER | Facility: OTHER | Age: 58
End: 2023-12-26
Payer: COMMERCIAL

## 2023-12-26 ENCOUNTER — SPECIALTY PHARMACY (OUTPATIENT)
Dept: ONCOLOGY | Facility: HOSPITAL | Age: 58
End: 2023-12-26
Payer: COMMERCIAL

## 2023-12-26 VITALS
DIASTOLIC BLOOD PRESSURE: 65 MMHG | RESPIRATION RATE: 16 BRPM | HEIGHT: 64 IN | OXYGEN SATURATION: 95 % | HEART RATE: 93 BPM | WEIGHT: 163 LBS | BODY MASS INDEX: 27.83 KG/M2 | SYSTOLIC BLOOD PRESSURE: 123 MMHG | TEMPERATURE: 98 F

## 2023-12-26 DIAGNOSIS — C18.7 MALIGNANT NEOPLASM OF SIGMOID COLON: Primary | ICD-10-CM

## 2023-12-26 LAB
ALBUMIN SERPL-MCNC: 4.1 G/DL (ref 3.5–5.2)
ALBUMIN/GLOB SERPL: 1.4 G/DL
ALP SERPL-CCNC: 92 U/L (ref 39–117)
ALT SERPL W P-5'-P-CCNC: 25 U/L (ref 1–33)
ANION GAP SERPL CALCULATED.3IONS-SCNC: 10 MMOL/L (ref 5–15)
AST SERPL-CCNC: 24 U/L (ref 1–32)
BASOPHILS # BLD AUTO: 0.04 10*3/MM3 (ref 0–0.2)
BASOPHILS NFR BLD AUTO: 0.5 % (ref 0–1.5)
BILIRUB SERPL-MCNC: 0.3 MG/DL (ref 0–1.2)
BUN SERPL-MCNC: 14 MG/DL (ref 6–20)
BUN/CREAT SERPL: 15.2 (ref 7–25)
CALCIUM SPEC-SCNC: 9.3 MG/DL (ref 8.6–10.5)
CHLORIDE SERPL-SCNC: 105 MMOL/L (ref 98–107)
CO2 SERPL-SCNC: 23 MMOL/L (ref 22–29)
CREAT SERPL-MCNC: 0.92 MG/DL (ref 0.57–1)
DEPRECATED RDW RBC AUTO: 44.3 FL (ref 37–54)
EGFRCR SERPLBLD CKD-EPI 2021: 73.2 ML/MIN/1.73
EOSINOPHIL # BLD AUTO: 0.17 10*3/MM3 (ref 0–0.4)
EOSINOPHIL NFR BLD AUTO: 2.1 % (ref 0.3–6.2)
ERYTHROCYTE [DISTWIDTH] IN BLOOD BY AUTOMATED COUNT: 15.4 % (ref 12.3–15.4)
GLOBULIN UR ELPH-MCNC: 3 GM/DL
GLUCOSE SERPL-MCNC: 128 MG/DL (ref 65–99)
HCT VFR BLD AUTO: 38.9 % (ref 34–46.6)
HGB BLD-MCNC: 12.9 G/DL (ref 12–15.9)
IMM GRANULOCYTES # BLD AUTO: 0.03 10*3/MM3 (ref 0–0.05)
IMM GRANULOCYTES NFR BLD AUTO: 0.4 % (ref 0–0.5)
LYMPHOCYTES # BLD AUTO: 2.85 10*3/MM3 (ref 0.7–3.1)
LYMPHOCYTES NFR BLD AUTO: 35.6 % (ref 19.6–45.3)
MAGNESIUM SERPL-MCNC: 2.2 MG/DL (ref 1.6–2.6)
MCH RBC QN AUTO: 29.9 PG (ref 26.6–33)
MCHC RBC AUTO-ENTMCNC: 33.2 G/DL (ref 31.5–35.7)
MCV RBC AUTO: 90.3 FL (ref 79–97)
MONOCYTES # BLD AUTO: 0.82 10*3/MM3 (ref 0.1–0.9)
MONOCYTES NFR BLD AUTO: 10.3 % (ref 5–12)
NEUTROPHILS NFR BLD AUTO: 4.09 10*3/MM3 (ref 1.7–7)
NEUTROPHILS NFR BLD AUTO: 51.1 % (ref 42.7–76)
PLATELET # BLD AUTO: 222 10*3/MM3 (ref 140–450)
PMV BLD AUTO: 9.1 FL (ref 6–12)
POTASSIUM SERPL-SCNC: 3.9 MMOL/L (ref 3.5–5.2)
PROT SERPL-MCNC: 7.1 G/DL (ref 6–8.5)
RBC # BLD AUTO: 4.31 10*6/MM3 (ref 3.77–5.28)
SODIUM SERPL-SCNC: 138 MMOL/L (ref 136–145)
WBC NRBC COR # BLD AUTO: 8 10*3/MM3 (ref 3.4–10.8)

## 2023-12-26 PROCEDURE — 25010000002 PALONOSETRON PER 25 MCG: Performed by: NURSE PRACTITIONER

## 2023-12-26 PROCEDURE — 83735 ASSAY OF MAGNESIUM: CPT | Performed by: INTERNAL MEDICINE

## 2023-12-26 PROCEDURE — 80053 COMPREHEN METABOLIC PANEL: CPT | Performed by: INTERNAL MEDICINE

## 2023-12-26 PROCEDURE — 96413 CHEMO IV INFUSION 1 HR: CPT

## 2023-12-26 PROCEDURE — 99214 OFFICE O/P EST MOD 30 MIN: CPT | Performed by: NURSE PRACTITIONER

## 2023-12-26 PROCEDURE — 25010000002 DEXAMETHASONE SODIUM PHOSPHATE 100 MG/10ML SOLUTION: Performed by: NURSE PRACTITIONER

## 2023-12-26 PROCEDURE — 25010000002 OXALIPLATIN PER 0.5 MG: Performed by: NURSE PRACTITIONER

## 2023-12-26 PROCEDURE — 96415 CHEMO IV INFUSION ADDL HR: CPT

## 2023-12-26 PROCEDURE — 85025 COMPLETE CBC W/AUTO DIFF WBC: CPT | Performed by: INTERNAL MEDICINE

## 2023-12-26 PROCEDURE — 96375 TX/PRO/DX INJ NEW DRUG ADDON: CPT

## 2023-12-26 PROCEDURE — 0 DEXTROSE 5 % SOLUTION: Performed by: NURSE PRACTITIONER

## 2023-12-26 PROCEDURE — 0 DEXTROSE 5 % SOLUTION 250 ML FLEX CONT: Performed by: NURSE PRACTITIONER

## 2023-12-26 RX ORDER — DIPHENHYDRAMINE HYDROCHLORIDE 50 MG/ML
50 INJECTION INTRAMUSCULAR; INTRAVENOUS AS NEEDED
Status: CANCELLED | OUTPATIENT
Start: 2023-12-26

## 2023-12-26 RX ORDER — DEXTROSE MONOHYDRATE 50 MG/ML
20 INJECTION, SOLUTION INTRAVENOUS ONCE
Status: COMPLETED | OUTPATIENT
Start: 2023-12-26 | End: 2023-12-26

## 2023-12-26 RX ORDER — DEXTROSE MONOHYDRATE 50 MG/ML
20 INJECTION, SOLUTION INTRAVENOUS ONCE
Status: CANCELLED | OUTPATIENT
Start: 2023-12-26

## 2023-12-26 RX ORDER — FAMOTIDINE 10 MG/ML
20 INJECTION, SOLUTION INTRAVENOUS AS NEEDED
Status: CANCELLED | OUTPATIENT
Start: 2023-12-26

## 2023-12-26 RX ORDER — PALONOSETRON 0.05 MG/ML
0.25 INJECTION, SOLUTION INTRAVENOUS ONCE
Status: CANCELLED | OUTPATIENT
Start: 2023-12-26

## 2023-12-26 RX ORDER — PALONOSETRON 0.05 MG/ML
0.25 INJECTION, SOLUTION INTRAVENOUS ONCE
Status: COMPLETED | OUTPATIENT
Start: 2023-12-26 | End: 2023-12-26

## 2023-12-26 RX ADMIN — DEXTROSE MONOHYDRATE 20 ML/HR: 50 INJECTION, SOLUTION INTRAVENOUS at 11:15

## 2023-12-26 RX ADMIN — DEXAMETHASONE SODIUM PHOSPHATE 12 MG: 10 INJECTION, SOLUTION INTRAMUSCULAR; INTRAVENOUS at 11:15

## 2023-12-26 RX ADMIN — OXALIPLATIN 235 MG: 5 INJECTION, SOLUTION, CONCENTRATE INTRAVENOUS at 11:56

## 2023-12-26 RX ADMIN — PALONOSETRON HYDROCHLORIDE 0.25 MG: 0.25 INJECTION INTRAVENOUS at 11:15

## 2023-12-26 NOTE — PROGRESS NOTES
CHIEF COMPLAINT: Mild fatigue    Problem List:  Oncology/Hematology History Overview Note   1.  Colon cancer found in a tubular adenoma 20 cm from the anal verge with negative staging CT chest abdomen and pelvis normal baseline CEA 1.26 with no visible lesion on MRI rectal protocol.  9/25/2023 laparoscopic sigmoid colectomy Dr. Michel pathologic T3 extending through muscularis propria into pericolonic fat, N1b 3/14 node positive.  CT DNA negative.  On  randomized to the treatment arm.  Will give 3 months of capecitabine and oxaliplatin with NSAID cream.  2.  Goiter with 1.8 cm left thyroid nodule found on colon cancer staging for which ultrasound follow-up recommended  3.  Myomectomy with bladder tack July 2015  4.  Diabetes  5.  Hypertension  6.  Hyperlipidemia  7.  Migraines  8.  Depression  9.  Reflux  10.  History of uterine ablation with ovarian cyst on post-op staging imaging of colon cancer  11.  History of tonsillectomy  12.  History of tympanostomy tubes  13.  History of L4-5 disc surgery and L5-S1 discectomy and fusion  14.  History of solid and liquid phase dysphagia with history of mild gastritis and some esophageal ranging on EGD 2019  15.  Compound melanocytic nevus with dermatofibroma removed by Dr. Ray 07/12/2023    Oncology history timeline:  -12/20/2019 EGD showed small hiatal hernia with some ringing of the esophagus biopsy.  Mild gastritis.  No intestinal metaplasia or dysplasia or malignancy.  Reflux suggestive but not diagnostic.  -8/18/2023 colonoscopy Dr. Calle.  Diverticulosis in the sigmoid colon.  7 mm polyp in the rectum.  Diverticulosis in the sigmoid colon.  Polypoid area with ulceration crescent-shaped 1.8 cm 20 cm proximal to the anus likely sigmoid.  Tattooed and biopsied.  Rectal polyp showed tubular adenoma with no high-grade dysplasia.  Colon biopsy at 20 cm showed invasive moderately differentiated colon adenocarcinoma arising within a tubular adenoma with high-grade  dysplasia.  Microsatellite stable    -8/24/2023 CEA normal 1.26.  ALT 34 bicarb 20.2 otherwise unremarkable CMP.  CBC normal with hemoglobin 14.4 and normal MCV.  -8/27/2023 CT abdomen pelvis with contrast did not show any colonic mass.  Mild distal esophageal thickening, hepatic steatosis without hepatic metastasis.  No lytic bone disease.  MRI rectal protocol without contrast reviewed with Dr. López.  Perhaps a small pucker at the site of biopsy but no discernible mass per se and no adenopathy.  -8/28/2023 CT chest shows no metastasis.  1.8 cm left thyroid for which follow-up is recommended    -8/29/2023 [facility] medical oncology consult: On screening colonoscopy patient found to have ulcerated polyp at 20 cm from the anal verge with moderately differentiated adenocarcinoma microsatellite stable.  Normal baseline CEA with staging CT chest abdomen pelvis and MRI rectal protocol showing no evidence of metastasis and no visible primary on MRI.  Based on distance from anal verge on colonoscopy and given the absence of visible abnormality on MRI rectal protocol to change that estimated position, would call this colon cancer.  Given microsatellite stability, would not contemplate calling this rectal cancer and treating with immunotherapy but regardless of whether rectal or colon primary this would at least be no more than T1 tumor clinically with no visible tumor on MRI despite colonoscopy findings.  The patient has experience with Dr. Michel with whom she requests a consultation for definitive surgery and I will see back postoperatively to decide on any adjuvant therapy as indicated based on pathologic staging.  There is some esophageal thickening on CT for which I would request Dr. Calle repeat EGD last done 2019 just for completeness sake but first would deal with the colon cancer at hand.She will also need to get left thyroid ultrasound eventually and would likely do this through whoever has managed her goiter  previously.      -- 9/25/2023 laparoscopic sigmoid colectomy Dr. Michel.    -10/2/2023 [facility] [place] medical oncology follow-up: Final pathology from surgery done a week ago is not back.  According to verbal report to me from the pathologist has either T2 or T3 disease but he thinks that this is tracking along the arteries and will be T3 and she had 3 out of 13 nodes involved.  Hence I would call this stage IIIb.  Her baseline CEA was normal preoperatively so that will not be extremely helpful postoperatively.  Outside of clinical trial the standard of care would be 3 months of CapeOx or 6 months of FOLFOX.  I would be interested in putting her on NRG- where upon CT DNA negative patients are randomized to either serial screening and CT DNA or 3 months CapeOx/6 months FOLFOX providers choice.  CT DNA positive patients are randomized to either 6 months of CapeOx or FOLFOX or 6 months of FOLFIRINOX.  If we can go ahead and get the [place] CT DNA cooking outside of clinical trial without significant financial toxicity we may do that.  Ideally chemotherapy would start less than 8 weeks out from surgery and we should have plenty of time to have her heal and to make these decisions collectively.  Addendum: Final pathology report… Moderately differentiated adenocarcinoma pathologic T3N1B, negative margins margins 3 out of 14 nodes positive with negative new anastomotic excisional margin.  Tumor extent ends through muscularis propria into pericolonic or perirectal tissue.  No lymph-vascular or perineural invasion.     -10/16/2023 CT chest Abdomen pelvis compared to CT chest 8/28/2023 and CT abdomen pelvis 8/27/2023 shows no metastasis in the chest abdomen or pelvis and interval sigmoid colectomy.  New right adnexal cystic lesion 6 cm recommending pelvic ultrasound in 3 months    -10/19/2023 [facility] [place] medical oncology follow-up: I went over her scans with her.  No clear-cut metastatic disease.  I will ask her to get to Dr. Mcguire  for the right adnexal cystic lesion as quickly as possible and would not wait 3 months on the ultrasound in light of clinical trial.  I suspect this is benign but we will get this worked up.  On [facility] nomogram, putting in her pathologic information she has a 5-year survival rate of 69% give or take 7%.  With adjuvant therapy her 5-year survival rate is 85%.  This is based on statistics not informed by molecular data.  We are aware of prognostic data from prior CT DNA trials whereby patients with negative CT DNA have a considerably higher 5-year survival rate and people with CT DNA positivity have a considerably lower 5-year survival rate and the above 5-year survival rate is probably a conglomerate of statistics between these people that averages out.  I certainly would be fine with either 6 months of FOLFOX or 3 months of Xeloda oxaliplatin outside of a clinical trial and that would be my standard recommendation.   is now open and we are enrolling her on that per her request.  If she has negative CT DNA then she is randomized to watchful waiting versus CapeOx x4 or FOLFOX x12.  If CT DNA is positive then she is randomized to CapeOx x6 or FOLFOX x12 (investigators choice) versus treatment intensification with FOLFIRINOX.  I will see her back in a few weeks for the CT DNA results.    - 11/8/2023 evaluation by Dr. Eloisa Uribe, GYN oncology of right adnexal mass with minimal complexity is not consistent with Krukenberg tumor or other malignancy.  There is a thickened endometrial stripe.  Perimenopausal state.  Still having monthly periods hormonal blood work from primary OB/GYN not available.  Imaging reviewed by Dr. Uribe with no concerns for this being malignancy.   normal within the last month.  If it were not for serial CTs already planned, patient would have transvaginal ultrasound in 6 months but with plans for serial CTs, GYN oncology plans no further ultrasounds.  Due to low risk of  malignancy, GYN oncology recommends regular follow-up with primary OB/GYN and follow-up if images change in a worrisome manner.    -11/9/2023 [facility] medical oncology follow-up: The CT DNA is still pending.  We should have the results by November 30 or sooner.  8 weeks from surgery would be November 20.  There is good data that disease-free survival and overall outcomes are not compromised when adjuvant chemotherapy occurs 8 weeks out from surgery compared to 4 weeks or less.  The data beyond that does show some falloff of survival when there are significant delays but I do not think her overall risk is going to substantially change whether we treated her the week of the 20th or the week of 27 November.  There is the option to give her 1 cycle of chemotherapy while waiting on this but that would not accrue to her overall number of cycles and I do not want to add oxaliplatin cycles overall given the potential additive toxicity of oxaliplatin.  She understands the uncertainties here and wishes to proceed with the clinical trial.  She has seen gynecologic oncology for evaluation and this is new ovarian cyst will be watched and is felt to be benign according to them and can be just watched by serial CTs and primary GYN exam.  I will see her back November 27 hopefully to have results by then and hopefully to then later that week proceed with chemotherapy as indicated based on the randomizations.    -12/1/2023 [facility] medical oncology follow-up: CT DNA reportedly 0 per  .  She randomized to the treatment arm for which we have seen her and educated her for Xeloda oxaliplatin x 3 months with NSAID cream to mitigate palmar and plantar skin issues.  Will ask scan routinely for routine surveillance and with that we will also keep an eye on the right adnexal mass felt to be benign per GYN oncology.  Treatment 1 cycle 1 orders signed and we will see her for cycle 2 day 1 the day after [place] with my  nurse practitioner in [place].  She has had her cancer treatment preparation visit and education and consenting.  She has been vasculature and approval has been granted to treat her by peripheral vein from the nursing staff.    -12/13/2023 PICC line placed due to discomfort with oxaliplatin infusion     Malignant neoplasm of sigmoid colon   8/29/2023 Initial Diagnosis    Malignant neoplasm of sigmoid colon     10/2/2023 Cancer Staged    Staging form: Colon And Rectum, AJCC 8th Edition  - Clinical: Stage IIIB (cT3, cN1, cM0) - Signed by Bernardino Chaparro MD on 10/2/2023     10/19/2023 Cancer Staged    Staging form: Colon And Rectum, AJCC 8th Edition  - Pathologic: Stage IIIB (pT3, pN1b, cM0) - Signed by Bernardino Chaparro MD on 10/19/2023     11/30/2023 - 11/30/2023 Chemotherapy    OP COLON CapeOX Capecitabine / OXALIplatin     12/4/2023 -  Chemotherapy    RESEARCH COLON  ARM 1B CAPOX (OXALIplatin / Capecitabine)         HISTORY OF PRESENT ILLNESS:  The patient is a 58 y.o. female, here for follow up on management of adjuvant therapy colon cancer with Xeloda and Oxaliplatin.  Yelena tolerated her first cycle of XELOX with no significant side effects.  She did have expected cold intolerance that lasted about 5 days or so.  She reports that she had some shortness of breath the evening of her first treatment when she was hurrying around the house doing some chores.  She denies any fevers or chills.  She had no mouth sores.  She had 1 episode of diarrhea that resolved with Imodium.  No issues with the skin on her hands or feet.  She has mild fatigue.  She continues to work.  Her first infusion of oxaliplatin was painful, she now has a PICC line in place.      Past Medical History:   Diagnosis Date    Anxiety     Arthritis     Cant recall the date of onset    Chest pain 11/06/2019    Colon polyp     Depression     Diabetes mellitus     Dysphagia 11/06/2019    Endometriosis     Fibromyalgia, primary     GERD  "(gastroesophageal reflux disease)     Headache     HL (hearing loss)     Hyperlipidemia     Hypertension     Low vitamin D level     Malignant neoplasm of sigmoid colon 08/29/2023    Migraines     Ovarian cyst     PMDD (premenstrual dysphoric disorder)     Scoliosis     Wears glasses      Past Surgical History:   Procedure Laterality Date    BLADDER SURGERY      Bladder tuck    COLONOSCOPY      every 5 years    ENDOMETRIAL ABLATION      LUMBAR DISCECTOMY  2010    L4-S1 Rt- discectomy Dr. Arellano    SPINAL CORD STIMULATOR IMPLANT N/A 08/19/2019    Procedure: SPINAL CORD STIMULATOR INSERTION;  Surgeon: Ethan Peters MD;  Location: [facility];  Service: Neurosurgery    SPINE SURGERY  2010    TONSILLECTOMY AND ADENOIDECTOMY         Allergies   Allergen Reactions    Dihydroergotamine GI Intolerance     VOMITING    Methergine [Methylergonovine Maleate] Nausea And Vomiting    Amoxicillin Rash       Family History and Social History reviewed and changed as necessary    REVIEW OF SYSTEM:   Mild fatigue    PHYSICAL EXAM:      Vitals:    12/26/23 0959   BP: 123/65   Pulse: 93   Resp: 16   Temp: 98 °F (36.7 °C)   TempSrc: Temporal   SpO2: 95%   Weight: 73.9 kg (163 lb)   Height: 162.6 cm (64.02\")     Vitals:    12/26/23 0959   PainSc: 0-No pain          ECOG score: 0           Vitals reviewed.  Labs reviewed    ECOG: (0) Fully Active - Able to Carry On All Pre-disease Performance Without Restriction    Lab Results   Component Value Date    HGB 12.9 12/26/2023    HCT 38.9 12/26/2023    MCV 90.3 12/26/2023     12/26/2023    WBC 8.00 12/26/2023    NEUTROABS 4.09 12/26/2023    LYMPHSABS 2.85 12/26/2023    MONOSABS 0.82 12/26/2023    EOSABS 0.17 12/26/2023    BASOSABS 0.04 12/26/2023       Lab Results   Component Value Date    GLUCOSE 128 (H) 12/26/2023    BUN 14 12/26/2023    CREATININE 0.92 12/26/2023     12/26/2023    K 3.9 12/26/2023     12/26/2023    CO2 23.0 12/26/2023    CALCIUM 9.3 12/26/2023    " PROTEINTOT 7.1 12/26/2023    ALBUMIN 4.1 12/26/2023    BILITOT 0.3 12/26/2023    ALKPHOS 92 12/26/2023    AST 24 12/26/2023    ALT 25 12/26/2023             ASSESSMENT & PLAN:  1.  Colon cancer found in a tubular adenoma 20 cm from the anal verge with negative staging CT chest abdomen and pelvis normal baseline CEA 1.26 with no visible lesion on MRI rectal protocol.  9/25/2023 laparoscopic sigmoid colectomy Dr. Michel pathologic T3 extending through muscularis propria into pericolonic fat, N1b 3/14 node positive.  CT DNA negative.  On  randomized to the treatment arm.  Will give 3 months of capecitabine and oxaliplatin with NSAID cream.  2.  Goiter with 1.8 cm left thyroid nodule found on colon cancer staging for which ultrasound follow-up recommended  3.  Myomectomy with bladder tack July 2015  4.  Diabetes  5.  Hypertension  6.  Hyperlipidemia  7.  Migraines  8.  Depression  9.  Reflux  10.  History of uterine ablation with ovarian cyst on post-op staging imaging of colon cancer  11.  History of tonsillectomy  12.  History of tympanostomy tubes  13.  History of L4-5 disc surgery and L5-S1 discectomy and fusion  14.  History of solid and liquid phase dysphagia with history of mild gastritis and some esophageal ranging on EGD 2019  15.  Compound melanocytic nevus with dermatofibroma removed by Dr. Ray 07/12/2023    Oncology history timeline:  -12/20/2019 EGD showed small hiatal hernia with some ringing of the esophagus biopsy.  Mild gastritis.  No intestinal metaplasia or dysplasia or malignancy.  Reflux suggestive but not diagnostic.  -8/18/2023 colonoscopy Dr. Calle.  Diverticulosis in the sigmoid colon.  7 mm polyp in the rectum.  Diverticulosis in the sigmoid colon.  Polypoid area with ulceration crescent-shaped 1.8 cm 20 cm proximal to the anus likely sigmoid.  Tattooed and biopsied.  Rectal polyp showed tubular adenoma with no high-grade dysplasia.  Colon biopsy at 20 cm showed invasive moderately  differentiated colon adenocarcinoma arising within a tubular adenoma with high-grade dysplasia.  Microsatellite stable    -8/24/2023 CEA normal 1.26.  ALT 34 bicarb 20.2 otherwise unremarkable CMP.  CBC normal with hemoglobin 14.4 and normal MCV.  -8/27/2023 CT abdomen pelvis with contrast did not show any colonic mass.  Mild distal esophageal thickening, hepatic steatosis without hepatic metastasis.  No lytic bone disease.  MRI rectal protocol without contrast reviewed with Dr. López.  Perhaps a small pucker at the site of biopsy but no discernible mass per se and no adenopathy.  -8/28/2023 CT chest shows no metastasis.  1.8 cm left thyroid for which follow-up is recommended    -8/29/2023 [facility] medical oncology consult: On screening colonoscopy patient found to have ulcerated polyp at 20 cm from the anal verge with moderately differentiated adenocarcinoma microsatellite stable.  Normal baseline CEA with staging CT chest abdomen pelvis and MRI rectal protocol showing no evidence of metastasis and no visible primary on MRI.  Based on distance from anal verge on colonoscopy and given the absence of visible abnormality on MRI rectal protocol to change that estimated position, would call this colon cancer.  Given microsatellite stability, would not contemplate calling this rectal cancer and treating with immunotherapy but regardless of whether rectal or colon primary this would at least be no more than T1 tumor clinically with no visible tumor on MRI despite colonoscopy findings.  The patient has experience with Dr. Michel with whom she requests a consultation for definitive surgery and I will see back postoperatively to decide on any adjuvant therapy as indicated based on pathologic staging.  There is some esophageal thickening on CT for which I would request Dr. Calle repeat EGD last done 2019 just for completeness sake but first would deal with the colon cancer at hand.She will also need to get left thyroid  ultrasound eventually and would likely do this through whoever has managed her goiter previously.      -- 9/25/2023 laparoscopic sigmoid colectomy Dr. Michel.    -10/2/2023 [facility] medical oncology follow-up: Final pathology from surgery done a week ago is not back.  According to verbal report to me from the pathologist has either T2 or T3 disease but he thinks that this is tracking along the arteries and will be T3 and she had 3 out of 13 nodes involved.  Hence I would call this stage IIIb.  Her baseline CEA was normal preoperatively so that will not be extremely helpful postoperatively.  Outside of clinical trial the standard of care would be 3 months of CapeOx or 6 months of FOLFOX.  I would be interested in putting her on NRG- where upon CT DNA negative patients are randomized to either serial screening and CT DNA or 3 months CapeOx/6 months FOLFOX providers choice.  CT DNA positive patients are randomized to either 6 months of CapeOx or FOLFOX or 6 months of FOLFIRINOX.  If we can go ahead and get the [employer] CT DNA cooking outside of clinical trial without significant financial toxicity we may do that.  Ideally chemotherapy would start less than 8 weeks out from surgery and we should have plenty of time to have her heal and to make these decisions collectively.  Addendum: Final pathology report… Moderately differentiated adenocarcinoma pathologic T3N1B, negative margins margins 3 out of 14 nodes positive with negative new anastomotic excisional margin.  Tumor extent ends through muscularis propria into pericolonic or perirectal tissue.  No lymph-vascular or perineural invasion.     -10/16/2023 CT chest Abdomen pelvis compared to CT chest 8/28/2023 and CT abdomen pelvis 8/27/2023 shows no metastasis in the chest abdomen or pelvis and interval sigmoid colectomy.  New right adnexal cystic lesion 6 cm recommending pelvic ultrasound in 3 months    -10/19/2023 [facility] medical oncology follow-up: I went over her  scans with her.  No clear-cut metastatic disease.  I will ask her to get to Dr. Mcguire for the right adnexal cystic lesion as quickly as possible and would not wait 3 months on the ultrasound in light of clinical trial.  I suspect this is benign but we will get this worked up.  On [facility] nomogram, putting in her pathologic information she has a 5-year survival rate of 69% give or take 7%.  With adjuvant therapy her 5-year survival rate is 85%.  This is based on statistics not informed by molecular data.  We are aware of prognostic data from prior CT DNA trials whereby patients with negative CT DNA have a considerably higher 5-year survival rate and people with CT DNA positivity have a considerably lower 5-year survival rate and the above 5-year survival rate is probably a conglomerate of statistics between these people that averages out.  I certainly would be fine with either 6 months of FOLFOX or 3 months of Xeloda oxaliplatin outside of a clinical trial and that would be my standard recommendation.   is now open and we are enrolling her on that per her request.  If she has negative CT DNA then she is randomized to watchful waiting versus CapeOx x4 or FOLFOX x12.  If CT DNA is positive then she is randomized to CapeOx x6 or FOLFOX x12 (investigators choice) versus treatment intensification with FOLFIRINOX.  I will see her back in a few weeks for the CT DNA results.    - 11/8/2023 evaluation by Dr. Eloisa Uribe, GYN oncology of right adnexal mass with minimal complexity is not consistent with Krukenberg tumor or other malignancy.  There is a thickened endometrial stripe.  Perimenopausal state.  Still having monthly periods hormonal blood work from primary OB/GYN not available.  Imaging reviewed by Dr. Uribe with no concerns for this being malignancy.   normal within the last month.  If it were not for serial CTs already planned, patient would have transvaginal ultrasound in 6 months but with  plans for serial CTs, GYN oncology plans no further ultrasounds.  Due to low risk of malignancy, GYN oncology recommends regular follow-up with primary OB/GYN and follow-up if images change in a worrisome manner.    -11/9/2023 [facility] medical oncology follow-up: The CT DNA is still pending.  We should have the results by November 30 or sooner.  8 weeks from surgery would be November 20.  There is good data that disease-free survival and overall outcomes are not compromised when adjuvant chemotherapy occurs 8 weeks out from surgery compared to 4 weeks or less.  The data beyond that does show some falloff of survival when there are significant delays but I do not think her overall risk is going to substantially change whether we treated her the week of the 20th or the week of 27 November.  There is the option to give her 1 cycle of chemotherapy while waiting on this but that would not accrue to her overall number of cycles and I do not want to add oxaliplatin cycles overall given the potential additive toxicity of oxaliplatin.  She understands the uncertainties here and wishes to proceed with the clinical trial.  She has seen gynecologic oncology for evaluation and this is new ovarian cyst will be watched and is felt to be benign according to them and can be just watched by serial CTs and primary GYN exam.  I will see her back November 27 hopefully to have results by then and hopefully to then later that week proceed with chemotherapy as indicated based on the randomizations.    -12/1/2023 [facility] medical oncology follow-up: CT DNA reportedly 0 per  .  She randomized to the treatment arm for which we have seen her and educated her for Xeloda oxaliplatin x 3 months with NSAID cream to mitigate palmar and plantar skin issues.  Will ask scan routinely for routine surveillance and with that we will also keep an eye on the right adnexal mass felt to be benign per GYN oncology.  Treatment 1 cycle 1  orders signed and we will see her for cycle 2 day 1 the day after [place] with my nurse practitioner in [place].  She has had her cancer treatment preparation visit and education and consenting.  She has been vasculature and approval has been granted to treat her by peripheral vein from the nursing staff.    -12/13/2023 PICC line placed due to discomfort with oxaliplatin infusion  -12/26/2023 [facility] Oncology clinic follow-up: Yelena tolerated her first cycle of XELOX fairly well.  She has some mild fatigue.  She does continue to work.  She had 1 episode of diarrhea that resolved with Imodium.  Labs reviewed from today, CBC and CMP are normal other than for glucose of 128.  She now has a PICC line as oxaliplatin was uncomfortable infused peripherally.  We will proceed today with cycle #2 unchanged, I will see her back for follow-up for her third cycle.    Return to clinic in 3 weeks for follow-up    This was a level 4, moderate MDM visit with management of side effects of therapy, review of labs, management of drug therapy requiring intensive monitoring for toxicity.    Dianna Boo, APRN    12/26/2023

## 2023-12-27 NOTE — RESEARCH
Patient Name:  Yelena Morris  YOB: 1965  Patient Age:  58 y.o.  Patient's Sex:  female    Date of Service:  12/26/2023    Provider:  Dr. Chaparro                      RESEARCH NOTE                  Patient presented to clinic today for C2D1 of  clinical trial. Patient assessed by APRN. AE's and con meds reviewed. CRC collected pill diary for Cycle 1 and provided patient with new diary for Cycle 2. Told patient to contact primary coordinator, Estefany, should she have any questions. Patient verbalized understanding.    Bigg SIMON

## 2024-01-02 DIAGNOSIS — E11.9 TYPE 2 DIABETES MELLITUS WITHOUT COMPLICATION, WITHOUT LONG-TERM CURRENT USE OF INSULIN: ICD-10-CM

## 2024-01-02 RX ORDER — DULAGLUTIDE 1.5 MG/.5ML
1.5 INJECTION, SOLUTION SUBCUTANEOUS
Qty: 6 ML | Refills: 0 | Status: SHIPPED | OUTPATIENT
Start: 2024-01-02

## 2024-01-03 ENCOUNTER — TELEPHONE (OUTPATIENT)
Dept: ONCOLOGY | Facility: CLINIC | Age: 59
End: 2024-01-03

## 2024-01-03 NOTE — TELEPHONE ENCOUNTER
Caller: Yelena Morris    Relationship: Self    Best call back number: [phone]    What is the best time to reach you: ANYTIME    Who are you requesting to speak with (clinical staff, provider, specific staff member): SCHEDULING    What was the call regarding: PATIENT WOULD LIKE TO SCHEDULE A PICC LINE DRESSING CHANGE TOMORROW OR FRIDAY. PLEASE CALL TO SCHEDULE.

## 2024-01-05 ENCOUNTER — HOSPITAL ENCOUNTER (OUTPATIENT)
Dept: ONCOLOGY | Facility: HOSPITAL | Age: 59
Discharge: HOME | End: 2024-01-05
Payer: COMMERCIAL

## 2024-01-05 VITALS
HEART RATE: 93 BPM | WEIGHT: 161 LBS | DIASTOLIC BLOOD PRESSURE: 73 MMHG | HEIGHT: 64 IN | SYSTOLIC BLOOD PRESSURE: 114 MMHG | TEMPERATURE: 98.2 F | RESPIRATION RATE: 16 BRPM | BODY MASS INDEX: 27.49 KG/M2

## 2024-01-05 DIAGNOSIS — C18.7 MALIGNANT NEOPLASM OF SIGMOID COLON: Primary | Chronic | ICD-10-CM

## 2024-01-05 PROCEDURE — G0463 HOSPITAL OUTPT CLINIC VISIT: HCPCS

## 2024-01-12 ENCOUNTER — HOSPITAL ENCOUNTER (OUTPATIENT)
Dept: ONCOLOGY | Facility: HOSPITAL | Age: 59
Discharge: HOME | End: 2024-01-12
Payer: COMMERCIAL

## 2024-01-12 VITALS
SYSTOLIC BLOOD PRESSURE: 105 MMHG | TEMPERATURE: 98.5 F | DIASTOLIC BLOOD PRESSURE: 64 MMHG | HEIGHT: 64 IN | HEART RATE: 102 BPM | WEIGHT: 162 LBS | RESPIRATION RATE: 18 BRPM | BODY MASS INDEX: 27.66 KG/M2

## 2024-01-12 DIAGNOSIS — R90.89 ABNORMAL FINDING ON MRI OF BRAIN: Primary | ICD-10-CM

## 2024-01-12 PROCEDURE — G0463 HOSPITAL OUTPT CLINIC VISIT: HCPCS

## 2024-01-16 ENCOUNTER — RESEARCH ENCOUNTER (OUTPATIENT)
Dept: OTHER | Facility: OTHER | Age: 59
End: 2024-01-16
Payer: COMMERCIAL

## 2024-01-16 ENCOUNTER — SPECIALTY PHARMACY (OUTPATIENT)
Dept: ONCOLOGY | Facility: HOSPITAL | Age: 59
End: 2024-01-16
Payer: COMMERCIAL

## 2024-01-16 ENCOUNTER — OFFICE VISIT (OUTPATIENT)
Dept: ONCOLOGY | Facility: CLINIC | Age: 59
End: 2024-01-16
Payer: COMMERCIAL

## 2024-01-16 ENCOUNTER — HOSPITAL ENCOUNTER (OUTPATIENT)
Dept: ONCOLOGY | Facility: HOSPITAL | Age: 59
Discharge: HOME | End: 2024-01-16
Admitting: NURSE PRACTITIONER
Payer: COMMERCIAL

## 2024-01-16 VITALS
HEART RATE: 97 BPM | WEIGHT: 165 LBS | BODY MASS INDEX: 28.17 KG/M2 | DIASTOLIC BLOOD PRESSURE: 63 MMHG | RESPIRATION RATE: 16 BRPM | HEIGHT: 64 IN | SYSTOLIC BLOOD PRESSURE: 120 MMHG | OXYGEN SATURATION: 96 % | TEMPERATURE: 97.5 F

## 2024-01-16 DIAGNOSIS — C18.7 MALIGNANT NEOPLASM OF SIGMOID COLON: Primary | Chronic | ICD-10-CM

## 2024-01-16 DIAGNOSIS — C18.7 MALIGNANT NEOPLASM OF SIGMOID COLON: Primary | ICD-10-CM

## 2024-01-16 LAB
ALBUMIN SERPL-MCNC: 4 G/DL (ref 3.5–5.2)
ALBUMIN/GLOB SERPL: 1.4 G/DL
ALP SERPL-CCNC: 101 U/L (ref 39–117)
ALT SERPL W P-5'-P-CCNC: 26 U/L (ref 1–33)
ANION GAP SERPL CALCULATED.3IONS-SCNC: 11 MMOL/L (ref 5–15)
AST SERPL-CCNC: 21 U/L (ref 1–32)
BASOPHILS # BLD AUTO: 0.05 10*3/MM3 (ref 0–0.2)
BASOPHILS NFR BLD AUTO: 0.6 % (ref 0–1.5)
BILIRUB SERPL-MCNC: 0.3 MG/DL (ref 0–1.2)
BUN SERPL-MCNC: 10 MG/DL (ref 6–20)
BUN/CREAT SERPL: 11 (ref 7–25)
CALCIUM SPEC-SCNC: 9.2 MG/DL (ref 8.6–10.5)
CHLORIDE SERPL-SCNC: 102 MMOL/L (ref 98–107)
CO2 SERPL-SCNC: 23 MMOL/L (ref 22–29)
CREAT SERPL-MCNC: 0.91 MG/DL (ref 0.57–1)
DEPRECATED RDW RBC AUTO: 57.4 FL (ref 37–54)
EGFRCR SERPLBLD CKD-EPI 2021: 74.2 ML/MIN/1.73
EOSINOPHIL # BLD AUTO: 0.09 10*3/MM3 (ref 0–0.4)
EOSINOPHIL NFR BLD AUTO: 1.1 % (ref 0.3–6.2)
ERYTHROCYTE [DISTWIDTH] IN BLOOD BY AUTOMATED COUNT: 18.4 % (ref 12.3–15.4)
GLOBULIN UR ELPH-MCNC: 2.9 GM/DL
GLUCOSE SERPL-MCNC: 279 MG/DL (ref 65–99)
HCT VFR BLD AUTO: 39.3 % (ref 34–46.6)
HGB BLD-MCNC: 12.9 G/DL (ref 12–15.9)
IMM GRANULOCYTES # BLD AUTO: 0.02 10*3/MM3 (ref 0–0.05)
IMM GRANULOCYTES NFR BLD AUTO: 0.3 % (ref 0–0.5)
LYMPHOCYTES # BLD AUTO: 2.19 10*3/MM3 (ref 0.7–3.1)
LYMPHOCYTES NFR BLD AUTO: 27.7 % (ref 19.6–45.3)
MAGNESIUM SERPL-MCNC: 1.9 MG/DL (ref 1.6–2.6)
MCH RBC QN AUTO: 30.2 PG (ref 26.6–33)
MCHC RBC AUTO-ENTMCNC: 32.8 G/DL (ref 31.5–35.7)
MCV RBC AUTO: 92 FL (ref 79–97)
MONOCYTES # BLD AUTO: 0.7 10*3/MM3 (ref 0.1–0.9)
MONOCYTES NFR BLD AUTO: 8.8 % (ref 5–12)
NEUTROPHILS NFR BLD AUTO: 4.87 10*3/MM3 (ref 1.7–7)
NEUTROPHILS NFR BLD AUTO: 61.5 % (ref 42.7–76)
PLATELET # BLD AUTO: 189 10*3/MM3 (ref 140–450)
PMV BLD AUTO: 8.9 FL (ref 6–12)
POTASSIUM SERPL-SCNC: 3.8 MMOL/L (ref 3.5–5.2)
PROT SERPL-MCNC: 6.9 G/DL (ref 6–8.5)
RBC # BLD AUTO: 4.27 10*6/MM3 (ref 3.77–5.28)
SODIUM SERPL-SCNC: 136 MMOL/L (ref 136–145)
WBC NRBC COR # BLD AUTO: 7.92 10*3/MM3 (ref 3.4–10.8)

## 2024-01-16 PROCEDURE — 85025 COMPLETE CBC W/AUTO DIFF WBC: CPT | Performed by: NURSE PRACTITIONER

## 2024-01-16 PROCEDURE — 96375 TX/PRO/DX INJ NEW DRUG ADDON: CPT

## 2024-01-16 PROCEDURE — 0 DEXTROSE 5 % SOLUTION: Performed by: NURSE PRACTITIONER

## 2024-01-16 PROCEDURE — 25010000002 DEXAMETHASONE SODIUM PHOSPHATE 100 MG/10ML SOLUTION: Performed by: NURSE PRACTITIONER

## 2024-01-16 PROCEDURE — 99214 OFFICE O/P EST MOD 30 MIN: CPT | Performed by: NURSE PRACTITIONER

## 2024-01-16 PROCEDURE — 83735 ASSAY OF MAGNESIUM: CPT | Performed by: NURSE PRACTITIONER

## 2024-01-16 PROCEDURE — 25010000002 OXALIPLATIN PER 0.5 MG: Performed by: NURSE PRACTITIONER

## 2024-01-16 PROCEDURE — 0 DEXTROSE 5 % SOLUTION 250 ML FLEX CONT: Performed by: NURSE PRACTITIONER

## 2024-01-16 PROCEDURE — 96413 CHEMO IV INFUSION 1 HR: CPT

## 2024-01-16 PROCEDURE — 96415 CHEMO IV INFUSION ADDL HR: CPT

## 2024-01-16 PROCEDURE — 80053 COMPREHEN METABOLIC PANEL: CPT | Performed by: NURSE PRACTITIONER

## 2024-01-16 PROCEDURE — 25010000002 PALONOSETRON PER 25 MCG: Performed by: NURSE PRACTITIONER

## 2024-01-16 RX ORDER — DIPHENHYDRAMINE HYDROCHLORIDE 50 MG/ML
50 INJECTION INTRAMUSCULAR; INTRAVENOUS AS NEEDED
Status: CANCELLED | OUTPATIENT
Start: 2024-01-16

## 2024-01-16 RX ORDER — FAMOTIDINE 10 MG/ML
20 INJECTION, SOLUTION INTRAVENOUS AS NEEDED
Status: CANCELLED | OUTPATIENT
Start: 2024-01-16

## 2024-01-16 RX ORDER — PALONOSETRON 0.05 MG/ML
0.25 INJECTION, SOLUTION INTRAVENOUS ONCE
Status: CANCELLED | OUTPATIENT
Start: 2024-01-16

## 2024-01-16 RX ORDER — DEXTROSE MONOHYDRATE 50 MG/ML
20 INJECTION, SOLUTION INTRAVENOUS ONCE
Status: COMPLETED | OUTPATIENT
Start: 2024-01-16 | End: 2024-01-16

## 2024-01-16 RX ORDER — PALONOSETRON 0.05 MG/ML
0.25 INJECTION, SOLUTION INTRAVENOUS ONCE
Status: COMPLETED | OUTPATIENT
Start: 2024-01-16 | End: 2024-01-16

## 2024-01-16 RX ORDER — DEXTROSE MONOHYDRATE 50 MG/ML
20 INJECTION, SOLUTION INTRAVENOUS ONCE
Status: CANCELLED | OUTPATIENT
Start: 2024-01-16

## 2024-01-16 RX ADMIN — DEXAMETHASONE SODIUM PHOSPHATE 12 MG: 10 INJECTION, SOLUTION INTRAMUSCULAR; INTRAVENOUS at 11:36

## 2024-01-16 RX ADMIN — OXALIPLATIN 235 MG: 5 INJECTION, SOLUTION INTRAVENOUS at 12:31

## 2024-01-16 RX ADMIN — DEXTROSE MONOHYDRATE 20 ML/HR: 50 INJECTION, SOLUTION INTRAVENOUS at 11:15

## 2024-01-16 RX ADMIN — PALONOSETRON HYDROCHLORIDE 0.25 MG: 0.25 INJECTION, SOLUTION INTRAVENOUS at 11:36

## 2024-01-16 NOTE — RESEARCH
Patient Name:  Yelena Morris  YOB: 1965  Patient Age:  58 y.o.  Patient's Sex:  female    Date of Service:  01/16/2024    Provider:  ONESIMO Falcon                      RESEARCH NOTE                        Patient presented to clinic today for C3D1 of  clinical trial. Patient assessed by ONESIMO. AE's and con meds reviewed. Patient did not bring pill bottle for count nor cycle 2 diaries. Patient provided pill diaries for cycle 3. I asked the patient to bring back cycle 2 diaries and pills to her next appointment. She verbalized understanding. Told patient to contact primary coordinator, Estefany, should she have any questions. Patient verbalized understanding.     Bigg SIMON

## 2024-01-16 NOTE — PROGRESS NOTES
CHIEF COMPLAINT:  1. Fatigue    2. Dry skin on hands    Problem List:  Oncology/Hematology History Overview Note   1.  Colon cancer found in a tubular adenoma 20 cm from the anal verge with negative staging CT chest abdomen and pelvis normal baseline CEA 1.26 with no visible lesion on MRI rectal protocol.  9/25/2023 laparoscopic sigmoid colectomy Dr. Michel pathologic T3 extending through muscularis propria into pericolonic fat, N1b 3/14 node positive.  CT DNA negative.  On  randomized to the treatment arm.  Will give 3 months of capecitabine and oxaliplatin with NSAID cream.  2.  Goiter with 1.8 cm left thyroid nodule found on colon cancer staging for which ultrasound follow-up recommended  3.  Myomectomy with bladder tack July 2015  4.  Diabetes  5.  Hypertension  6.  Hyperlipidemia  7.  Migraines  8.  Depression  9.  Reflux  10.  History of uterine ablation with ovarian cyst on post-op staging imaging of colon cancer  11.  History of tonsillectomy  12.  History of tympanostomy tubes  13.  History of L4-5 disc surgery and L5-S1 discectomy and fusion  14.  History of solid and liquid phase dysphagia with history of mild gastritis and some esophageal ranging on EGD 2019  15.  Compound melanocytic nevus with dermatofibroma removed by Dr. Ray 07/12/2023    Oncology history timeline:  -12/20/2019 EGD showed small hiatal hernia with some ringing of the esophagus biopsy.  Mild gastritis.  No intestinal metaplasia or dysplasia or malignancy.  Reflux suggestive but not diagnostic.  -8/18/2023 colonoscopy Dr. Calle.  Diverticulosis in the sigmoid colon.  7 mm polyp in the rectum.  Diverticulosis in the sigmoid colon.  Polypoid area with ulceration crescent-shaped 1.8 cm 20 cm proximal to the anus likely sigmoid.  Tattooed and biopsied.  Rectal polyp showed tubular adenoma with no high-grade dysplasia.  Colon biopsy at 20 cm showed invasive moderately differentiated colon adenocarcinoma arising within a tubular  adenoma with high-grade dysplasia.  Microsatellite stable    -8/24/2023 CEA normal 1.26.  ALT 34 bicarb 20.2 otherwise unremarkable CMP.  CBC normal with hemoglobin 14.4 and normal MCV.  -8/27/2023 CT abdomen pelvis with contrast did not show any colonic mass.  Mild distal esophageal thickening, hepatic steatosis without hepatic metastasis.  No lytic bone disease.  MRI rectal protocol without contrast reviewed with Dr. López.  Perhaps a small pucker at the site of biopsy but no discernible mass per se and no adenopathy.  -8/28/2023 CT chest shows no metastasis.  1.8 cm left thyroid for which follow-up is recommended    -8/29/2023 [facility] medical oncology consult: On screening colonoscopy patient found to have ulcerated polyp at 20 cm from the anal verge with moderately differentiated adenocarcinoma microsatellite stable.  Normal baseline CEA with staging CT chest abdomen pelvis and MRI rectal protocol showing no evidence of metastasis and no visible primary on MRI.  Based on distance from anal verge on colonoscopy and given the absence of visible abnormality on MRI rectal protocol to change that estimated position, would call this colon cancer.  Given microsatellite stability, would not contemplate calling this rectal cancer and treating with immunotherapy but regardless of whether rectal or colon primary this would at least be no more than T1 tumor clinically with no visible tumor on MRI despite colonoscopy findings.  The patient has experience with Dr. Michel with whom she requests a consultation for definitive surgery and I will see back postoperatively to decide on any adjuvant therapy as indicated based on pathologic staging.  There is some esophageal thickening on CT for which I would request Dr. Calle repeat EGD last done 2019 just for completeness sake but first would deal with the colon cancer at hand.She will also need to get left thyroid ultrasound eventually and would likely do this through whoever has  managed her goiter previously.      -- 9/25/2023 laparoscopic sigmoid colectomy Dr. Michel.    -10/2/2023 [facility] medical oncology follow-up: Final pathology from surgery done a week ago is not back.  According to verbal report to me from the pathologist has either T2 or T3 disease but he thinks that this is tracking along the arteries and will be T3 and she had 3 out of 13 nodes involved.  Hence I would call this stage IIIb.  Her baseline CEA was normal preoperatively so that will not be extremely helpful postoperatively.  Outside of clinical trial the standard of care would be 3 months of CapeOx or 6 months of FOLFOX.  I would be interested in putting her on NRG- where upon CT DNA negative patients are randomized to either serial screening and CT DNA or 3 months CapeOx/6 months FOLFOX providers choice.  CT DNA positive patients are randomized to either 6 months of CapeOx or FOLFOX or 6 months of FOLFIRINOX.  If we can go ahead and get the [employer] CT DNA cooking outside of clinical trial without significant financial toxicity we may do that.  Ideally chemotherapy would start less than 8 weeks out from surgery and we should have plenty of time to have her heal and to make these decisions collectively.  Addendum: Final pathology report… Moderately differentiated adenocarcinoma pathologic T3N1B, negative margins margins 3 out of 14 nodes positive with negative new anastomotic excisional margin.  Tumor extent ends through muscularis propria into pericolonic or perirectal tissue.  No lymph-vascular or perineural invasion.     -10/16/2023 CT chest Abdomen pelvis compared to CT chest 8/28/2023 and CT abdomen pelvis 8/27/2023 shows no metastasis in the chest abdomen or pelvis and interval sigmoid colectomy.  New right adnexal cystic lesion 6 cm recommending pelvic ultrasound in 3 months    -10/19/2023 [facility] medical oncology follow-up: I went over her scans with her.  No clear-cut metastatic disease.  I will ask her to  get to Dr. Mcguire for the right adnexal cystic lesion as quickly as possible and would not wait 3 months on the ultrasound in light of clinical trial.  I suspect this is benign but we will get this worked up.  On [facility] nomogram, putting in her pathologic information she has a 5-year survival rate of 69% give or take 7%.  With adjuvant therapy her 5-year survival rate is 85%.  This is based on statistics not informed by molecular data.  We are aware of prognostic data from prior CT DNA trials whereby patients with negative CT DNA have a considerably higher 5-year survival rate and people with CT DNA positivity have a considerably lower 5-year survival rate and the above 5-year survival rate is probably a conglomerate of statistics between these people that averages out.  I certainly would be fine with either 6 months of FOLFOX or 3 months of Xeloda oxaliplatin outside of a clinical trial and that would be my standard recommendation.   is now open and we are enrolling her on that per her request.  If she has negative CT DNA then she is randomized to watchful waiting versus CapeOx x4 or FOLFOX x12.  If CT DNA is positive then she is randomized to CapeOx x6 or FOLFOX x12 (investigators choice) versus treatment intensification with FOLFIRINOX.  I will see her back in a few weeks for the CT DNA results.    - 11/8/2023 evaluation by Dr. Eloisa Uribe, GYN oncology of right adnexal mass with minimal complexity is not consistent with Krukenberg tumor or other malignancy.  There is a thickened endometrial stripe.  Perimenopausal state.  Still having monthly periods hormonal blood work from primary OB/GYN not available.  Imaging reviewed by Dr. Uribe with no concerns for this being malignancy.   normal within the last month.  If it were not for serial CTs already planned, patient would have transvaginal ultrasound in 6 months but with plans for serial CTs, GYN oncology plans no further ultrasounds.  Due  to low risk of malignancy, GYN oncology recommends regular follow-up with primary OB/GYN and follow-up if images change in a worrisome manner.    -11/9/2023 [facility] medical oncology follow-up: The CT DNA is still pending.  We should have the results by November 30 or sooner.  8 weeks from surgery would be November 20.  There is good data that disease-free survival and overall outcomes are not compromised when adjuvant chemotherapy occurs 8 weeks out from surgery compared to 4 weeks or less.  The data beyond that does show some falloff of survival when there are significant delays but I do not think her overall risk is going to substantially change whether we treated her the week of the 20th or the week of 27 November.  There is the option to give her 1 cycle of chemotherapy while waiting on this but that would not accrue to her overall number of cycles and I do not want to add oxaliplatin cycles overall given the potential additive toxicity of oxaliplatin.  She understands the uncertainties here and wishes to proceed with the clinical trial.  She has seen gynecologic oncology for evaluation and this is new ovarian cyst will be watched and is felt to be benign according to them and can be just watched by serial CTs and primary GYN exam.  I will see her back November 27 hopefully to have results by then and hopefully to then later that week proceed with chemotherapy as indicated based on the randomizations.    -12/1/2023 [facility] medical oncology follow-up: CT DNA reportedly 0 per  .  She randomized to the treatment arm for which we have seen her and educated her for Xeloda oxaliplatin x 3 months with NSAID cream to mitigate palmar and plantar skin issues.  Will ask scan routinely for routine surveillance and with that we will also keep an eye on the right adnexal mass felt to be benign per GYN oncology.  Treatment 1 cycle 1 orders signed and we will see her for cycle 2 day 1 the day after  [place] with my nurse practitioner in [place].  She has had her cancer treatment preparation visit and education and consenting.  She has been vasculature and approval has been granted to treat her by peripheral vein from the nursing staff.    -12/13/2023 PICC line placed due to discomfort with oxaliplatin infusion  -12/26/2023 [facility] Oncology clinic follow-up: Yelena tolerated her first cycle of XELOX fairly well.  She has some mild fatigue.  She does continue to work.  She had 1 episode of diarrhea that resolved with Imodium.  Labs reviewed from today, CBC and CMP are normal other than for glucose of 128.  She now has a PICC line as oxaliplatin was uncomfortable infused peripherally.  We will proceed today with cycle #2 unchanged, I will see her back for follow-up for her third cycle.     Malignant neoplasm of sigmoid colon   8/29/2023 Initial Diagnosis    Malignant neoplasm of sigmoid colon     10/2/2023 Cancer Staged    Staging form: Colon And Rectum, AJCC 8th Edition  - Clinical: Stage IIIB (cT3, cN1, cM0) - Signed by Bernardino Chaparro MD on 10/2/2023     10/19/2023 Cancer Staged    Staging form: Colon And Rectum, AJCC 8th Edition  - Pathologic: Stage IIIB (pT3, pN1b, cM0) - Signed by Bernardino Chaparro MD on 10/19/2023     11/30/2023 - 11/30/2023 Chemotherapy    OP COLON CapeOX Capecitabine / OXALIplatin     12/4/2023 -  Chemotherapy    RESEARCH COLON  ARM 1B CAPOX (OXALIplatin / Capecitabine)         HISTORY OF PRESENT ILLNESS:  The patient is a 58 y.o. female, here for follow up on management of adjuvant therapy colon cancer with Xeloda and Oxaliplatin.  Yelena has had 2 cycles thus far of XELOX.  With PICC line in place she had no further pain with infusion.  She reports ongoing fatigue, she continues to work full time and states she comes homes and rests and has not been able to do much of her housework.  She has dry skin on the palmar surface of her hands and mild tenderness particularly at the  "\"bends\".  She has not been using the voltaren gel, she has used an oatmeal based moisturizer.  She states that they have improved this week off of Xeloda, her feet were also a little tender but that has gotten better.  She had no peeling of the skin on the soles of her feet.  No fevers or chills.  She had expected cold intolerance but no lingering neuropathies.  She had 1 bout of diarrhea that resolved with Imodium.      Past Medical History:   Diagnosis Date    Anxiety     Arthritis     Cant recall the date of onset    Chest pain 11/06/2019    Colon polyp     Depression     Diabetes mellitus     Dysphagia 11/06/2019    Endometriosis     Fibromyalgia, primary     GERD (gastroesophageal reflux disease)     Headache     HL (hearing loss)     Hyperlipidemia     Hypertension     Low vitamin D level     Malignant neoplasm of sigmoid colon 08/29/2023    Migraines     Ovarian cyst     PMDD (premenstrual dysphoric disorder)     Scoliosis     Wears glasses      Past Surgical History:   Procedure Laterality Date    BLADDER SURGERY      Bladder tuck    COLONOSCOPY      every 5 years    ENDOMETRIAL ABLATION      LUMBAR DISCECTOMY  2010    L4-S1 Rt- discectomy Dr. Arellano    SPINAL CORD STIMULATOR IMPLANT N/A 08/19/2019    Procedure: SPINAL CORD STIMULATOR INSERTION;  Surgeon: Ethan Peters MD;  Location: [facility];  Service: Neurosurgery    SPINE SURGERY  2010    TONSILLECTOMY AND ADENOIDECTOMY         Allergies   Allergen Reactions    Dihydroergotamine GI Intolerance     VOMITING    Methergine [Methylergonovine Maleate] Nausea And Vomiting    Amoxicillin Rash       Family History and Social History reviewed and changed as necessary    REVIEW OF SYSTEM:   Moderate fatigue  Mildly tender palmar surfaces of her hands with dry skin    PHYSICAL EXAM:  Well-developed, well-nourished appearing female in no distress  Respirations regular nonlabored, lungs clear to auscultation bilaterally  Heart regular rate and rhythm  Palmar " "surfaces of the hands is mildly erythemic, skin is dry, no open fissures.    Vitals:    24 1014   BP: 120/63   Pulse: 97   Resp: 16   Temp: 97.5 °F (36.4 °C)   SpO2: 96%   Weight: 74.8 kg (165 lb)   Height: 162.6 cm (64\")     Vitals:    24 1014   PainSc: 0-No pain            ECOG score: 1           Vitals reviewed.  Labs reviewed    ECO    Lab Results   Component Value Date    HGB 12.9 2024    HCT 39.3 2024    MCV 92.0 2024     2024    WBC 7.92 2024    NEUTROABS 4.87 2024    LYMPHSABS 2.19 2024    MONOSABS 0.70 2024    EOSABS 0.09 2024    BASOSABS 0.05 2024       Lab Results   Component Value Date    GLUCOSE 279 (H) 2024    BUN 10 2024    CREATININE 0.91 2024     2024    K 3.8 2024     2024    CO2 23.0 2024    CALCIUM 9.2 2024    PROTEINTOT 6.9 2024    ALBUMIN 4.0 2024    BILITOT 0.3 2024    ALKPHOS 101 2024    AST 21 2024    ALT 26 2024             ASSESSMENT & PLAN:  1.  Colon cancer found in a tubular adenoma 20 cm from the anal verge with negative staging CT chest abdomen and pelvis normal baseline CEA 1.26 with no visible lesion on MRI rectal protocol.  2023 laparoscopic sigmoid colectomy Dr. Michel pathologic T3 extending through muscularis propria into pericolonic fat, N1b 3/14 node positive.  CT DNA negative.  On  randomized to the treatment arm.  Will give 3 months of capecitabine and oxaliplatin with NSAID cream.  2.  Goiter with 1.8 cm left thyroid nodule found on colon cancer staging for which ultrasound follow-up recommended  3.  Myomectomy with bladder tack 2015  4.  Diabetes  5.  Hypertension  6.  Hyperlipidemia  7.  Migraines  8.  Depression  9.  Reflux  10.  History of uterine ablation with ovarian cyst on post-op staging imaging of colon cancer  11.  History of tonsillectomy  12.  History of tympanostomy " tubes  13.  History of L4-5 disc surgery and L5-S1 discectomy and fusion  14.  History of solid and liquid phase dysphagia with history of mild gastritis and some esophageal ranging on EGD 2019  15.  Compound melanocytic nevus with dermatofibroma removed by Dr. Ray 07/12/2023    Oncology history timeline:  -12/20/2019 EGD showed small hiatal hernia with some ringing of the esophagus biopsy.  Mild gastritis.  No intestinal metaplasia or dysplasia or malignancy.  Reflux suggestive but not diagnostic.  -8/18/2023 colonoscopy Dr. Calle.  Diverticulosis in the sigmoid colon.  7 mm polyp in the rectum.  Diverticulosis in the sigmoid colon.  Polypoid area with ulceration crescent-shaped 1.8 cm 20 cm proximal to the anus likely sigmoid.  Tattooed and biopsied.  Rectal polyp showed tubular adenoma with no high-grade dysplasia.  Colon biopsy at 20 cm showed invasive moderately differentiated colon adenocarcinoma arising within a tubular adenoma with high-grade dysplasia.  Microsatellite stable    -8/24/2023 CEA normal 1.26.  ALT 34 bicarb 20.2 otherwise unremarkable CMP.  CBC normal with hemoglobin 14.4 and normal MCV.  -8/27/2023 CT abdomen pelvis with contrast did not show any colonic mass.  Mild distal esophageal thickening, hepatic steatosis without hepatic metastasis.  No lytic bone disease.  MRI rectal protocol without contrast reviewed with Dr. López.  Perhaps a small pucker at the site of biopsy but no discernible mass per se and no adenopathy.  -8/28/2023 CT chest shows no metastasis.  1.8 cm left thyroid for which follow-up is recommended    -8/29/2023 [facility] medical oncology consult: On screening colonoscopy patient found to have ulcerated polyp at 20 cm from the anal verge with moderately differentiated adenocarcinoma microsatellite stable.  Normal baseline CEA with staging CT chest abdomen pelvis and MRI rectal protocol showing no evidence of metastasis and no visible primary on MRI.  Based on distance from  anal verge on colonoscopy and given the absence of visible abnormality on MRI rectal protocol to change that estimated position, would call this colon cancer.  Given microsatellite stability, would not contemplate calling this rectal cancer and treating with immunotherapy but regardless of whether rectal or colon primary this would at least be no more than T1 tumor clinically with no visible tumor on MRI despite colonoscopy findings.  The patient has experience with Dr. Michel with whom she requests a consultation for definitive surgery and I will see back postoperatively to decide on any adjuvant therapy as indicated based on pathologic staging.  There is some esophageal thickening on CT for which I would request Dr. Calle repeat EGD last done 2019 just for completeness sake but first would deal with the colon cancer at hand.She will also need to get left thyroid ultrasound eventually and would likely do this through whoever has managed her goiter previously.      -- 9/25/2023 laparoscopic sigmoid colectomy Dr. Michel.    -10/2/2023 [facility] medical oncology follow-up: Final pathology from surgery done a week ago is not back.  According to verbal report to me from the pathologist has either T2 or T3 disease but he thinks that this is tracking along the arteries and will be T3 and she had 3 out of 13 nodes involved.  Hence I would call this stage IIIb.  Her baseline CEA was normal preoperatively so that will not be extremely helpful postoperatively.  Outside of clinical trial the standard of care would be 3 months of CapeOx or 6 months of FOLFOX.  I would be interested in putting her on NRG- where upon CT DNA negative patients are randomized to either serial screening and CT DNA or 3 months CapeOx/6 months FOLFOX providers choice.  CT DNA positive patients are randomized to either 6 months of CapeOx or FOLFOX or 6 months of FOLFIRINOX.  If we can go ahead and get the [facility] CT DNA cooking outside of clinical trial  without significant financial toxicity we may do that.  Ideally chemotherapy would start less than 8 weeks out from surgery and we should have plenty of time to have her heal and to make these decisions collectively.  Addendum: Final pathology report… Moderately differentiated adenocarcinoma pathologic T3N1B, negative margins margins 3 out of 14 nodes positive with negative new anastomotic excisional margin.  Tumor extent ends through muscularis propria into pericolonic or perirectal tissue.  No lymph-vascular or perineural invasion.     -10/16/2023 CT chest Abdomen pelvis compared to CT chest 8/28/2023 and CT abdomen pelvis 8/27/2023 shows no metastasis in the chest abdomen or pelvis and interval sigmoid colectomy.  New right adnexal cystic lesion 6 cm recommending pelvic ultrasound in 3 months    -10/19/2023 [facility] medical oncology follow-up: I went over her scans with her.  No clear-cut metastatic disease.  I will ask her to get to Dr. Mcguire for the right adnexal cystic lesion as quickly as possible and would not wait 3 months on the ultrasound in light of clinical trial.  I suspect this is benign but we will get this worked up.  On [facility] nomogram, putting in her pathologic information she has a 5-year survival rate of 69% give or take 7%.  With adjuvant therapy her 5-year survival rate is 85%.  This is based on statistics not informed by molecular data.  We are aware of prognostic data from prior CT DNA trials whereby patients with negative CT DNA have a considerably higher 5-year survival rate and people with CT DNA positivity have a considerably lower 5-year survival rate and the above 5-year survival rate is probably a conglomerate of statistics between these people that averages out.  I certainly would be fine with either 6 months of FOLFOX or 3 months of Xeloda oxaliplatin outside of a clinical trial and that would be my standard recommendation.   is now open and we are enrolling her on  that per her request.  If she has negative CT DNA then she is randomized to watchful waiting versus CapeOx x4 or FOLFOX x12.  If CT DNA is positive then she is randomized to CapeOx x6 or FOLFOX x12 (investigators choice) versus treatment intensification with FOLFIRINOX.  I will see her back in a few weeks for the CT DNA results.    - 11/8/2023 evaluation by Dr. Eloisa Uribe, GYN oncology of right adnexal mass with minimal complexity is not consistent with Krukenberg tumor or other malignancy.  There is a thickened endometrial stripe.  Perimenopausal state.  Still having monthly periods hormonal blood work from primary OB/GYN not available.  Imaging reviewed by Dr. Uribe with no concerns for this being malignancy.   normal within the last month.  If it were not for serial CTs already planned, patient would have transvaginal ultrasound in 6 months but with plans for serial CTs, GYN oncology plans no further ultrasounds.  Due to low risk of malignancy, GYN oncology recommends regular follow-up with primary OB/GYN and follow-up if images change in a worrisome manner.    -11/9/2023 [facility] oncology follow-up: The CT DNA is still pending.  We should have the results by November 30 or sooner.  8 weeks from surgery would be November 20.  There is good data that disease-free survival and overall outcomes are not compromised when adjuvant chemotherapy occurs 8 weeks out from surgery compared to 4 weeks or less.  The data beyond that does show some falloff of survival when there are significant delays but I do not think her overall risk is going to substantially change whether we treated her the week of the 20th or the week of 27 November.  There is the option to give her 1 cycle of chemotherapy while waiting on this but that would not accrue to her overall number of cycles and I do not want to add oxaliplatin cycles overall given the potential additive toxicity of oxaliplatin.  She understands the  uncertainties here and wishes to proceed with the clinical trial.  She has seen gynecologic oncology for evaluation and this is new ovarian cyst will be watched and is felt to be benign according to them and can be just watched by serial CTs and primary GYN exam.  I will see her back November 27 hopefully to have results by then and hopefully to then later that week proceed with chemotherapy as indicated based on the randomizations.    -12/1/2023 [facility] oncology follow-up: CT DNA reportedly 0 per  .  She randomized to the treatment arm for which we have seen her and educated her for Xeloda oxaliplatin x 3 months with NSAID cream to mitigate palmar and plantar skin issues.  Will ask scan routinely for routine surveillance and with that we will also keep an eye on the right adnexal mass felt to be benign per GYN oncology.  Treatment 1 cycle 1 orders signed and we will see her for cycle 2 day 1 the day after Dennis with my nurse practitioner in [place].  She has had her cancer treatment preparation visit and education and consenting.  She has been vasculature and approval has been granted to treat her by peripheral vein from the nursing staff.    -12/13/2023 PICC line placed due to discomfort with oxaliplatin infusion  -12/26/2023 [facility] Oncology clinic follow-up: Yelena tolerated her first cycle of XELOX fairly well.  She has some mild fatigue.  She does continue to work.  She had 1 episode of diarrhea that resolved with Imodium.  Labs reviewed from today, CBC and CMP are normal other than for glucose of 128.  She now has a PICC line as oxaliplatin was uncomfortable infused peripherally.  We will proceed today with cycle #2 unchanged, I will see her back for follow-up for her third cycle.    -1/16/2024 [facility] Oncology clinic follow-up: Today will be cycle 3 of a planned 4 cycles of XELOX.  She has some mild palmar  erythrodysesthesia, after further discussion she has not been  using the Voltaren ointment twice daily as prescribed.  She will begin doing that along with making sure to keep her hands well moisturized throughout the day.  Right now this is a grade 1-2.  She understands to notify our office if it worsens during this cycle while she is taking her Xeloda and we may have to hold or reduce dose.  She has no lingering neuropathy.  Labs reviewed from today, counts are acceptable to continue treatment unchanged. Her CBC is normal, CMP normal other than for glucose of 279.  We will move her next treatment to a Friday at her request to work better with her work schedule and give her the weekend to recover.    Return to clinic in 3 weeks for follow-up, sooner if needed.    This was a level 4, moderate MDM visit with management of side effects of therapy, review of labs, drug therapy requiring intensive monitoring for toxicity.    Dianna Boo, APRN    01/16/2024

## 2024-01-17 ENCOUNTER — DOCUMENTATION (OUTPATIENT)
Dept: ONCOLOGY | Facility: CLINIC | Age: 59
End: 2024-01-17
Payer: COMMERCIAL

## 2024-01-17 DIAGNOSIS — G62.9 NEUROPATHY: Primary | ICD-10-CM

## 2024-01-17 RX ORDER — GABAPENTIN 100 MG/1
100 CAPSULE ORAL EVERY 8 HOURS
Qty: 30 CAPSULE | Refills: 0 | Status: SHIPPED | OUTPATIENT
Start: 2024-01-17

## 2024-01-17 NOTE — SIGNIFICANT NOTE
SUNI contacted pt per request of APRN to assist with cleaning services. SW contacted pt and educated pt on Cleaning for a reason. SW offered to apply on behalf of pt, to which she was agreeable. Pt stated with both her and her  receiving tx, they have not had the energy to properly clean their house. SW provided understanding and normalized her emotions. SW encouraged pt to reach out should other needs arise, and will be available ongoing. Pt thanked SUNI and was agreeable to do so.     Interventions:  Practical Needs: Other (See Comments) (Cleaning services)

## 2024-01-18 ENCOUNTER — SPECIALTY PHARMACY (OUTPATIENT)
Dept: ONCOLOGY | Facility: HOSPITAL | Age: 59
End: 2024-01-18
Payer: COMMERCIAL

## 2024-01-18 DIAGNOSIS — C18.7 MALIGNANT NEOPLASM OF SIGMOID COLON: ICD-10-CM

## 2024-01-18 RX ORDER — CAPECITABINE 500 MG/1
850 TABLET, FILM COATED ORAL 2 TIMES DAILY
Qty: 84 TABLET | Refills: 3 | Status: CANCELLED | OUTPATIENT
Start: 2024-01-18

## 2024-01-18 RX ORDER — CAPECITABINE 500 MG/1
850 TABLET, FILM COATED ORAL 2 TIMES DAILY
Qty: 84 TABLET | Refills: 0 | Status: SHIPPED | OUTPATIENT
Start: 2024-01-18

## 2024-01-18 NOTE — TELEPHONE ENCOUNTER
Caller: Yelena Morris    Relationship: Self    Best call back number: [phone]    Requested Prescriptions:   Requested Prescriptions     Pending Prescriptions Disp Refills    capecitabine (XELODA) 500 MG chemo tablet 84 tablet 3     Sig: Take 3 tablets by mouth 2 (Two) Times a Day. on days 1-14, then off for 7 days in each 21-day cycle. Take within 30 minutes of a meal.        Pharmacy where request should be sent: [address] [phone] [facility] [phone]      Last office visit with prescribing clinician: 12/1/2023   Last telemedicine visit with prescribing clinician: Visit date not found   Next office visit with prescribing clinician: Visit date not found       Does the patient have less than a 3 day supply:  [x] Yes  [] No    Would you like a call back once the refill request has been completed: [] Yes [x] No    If the office needs to give you a call back, can they leave a voicemail: [] Yes [x] No

## 2024-01-25 ENCOUNTER — TELEPHONE (OUTPATIENT)
Dept: ONCOLOGY | Facility: CLINIC | Age: 59
End: 2024-01-25
Payer: COMMERCIAL

## 2024-01-25 NOTE — TELEPHONE ENCOUNTER
Caller: Clementina Morrislay Radha    Relationship: Self    Best call back number: [phone]    What is the best time to reach you: ANY    Who are you requesting to speak with (clinical staff, provider,  specific staff member): CLINICAL      What was the call regarding: KEVIN IS CALLING STATES THAT SHE HAS A TREATMENT SCHEDULED FOR 2-9, SHE DIDN'T GET HER CHEMO PILLS UNTIL YESTERDAY AND SHE SHOULD HAVE STARTED THEM A WEEK AGO, WILL THIS AFFECT HER TREATMENT     KEVIN ALSO NEEDS TO GET AN APPOINTMENT FOR A DRESSING CHANGE     ALSO SHE HAD A FEVER YESTERDAY AND WENT TO URGENT CARE FOR A SINUS INFECTION AND THEY GAVE HER CEFDINER  SHE WAS JUST CHECKING TO SEE IF IT IS OK FOR HER TO TAKE THAT MEDICATION       PLEASE ADVISE

## 2024-01-25 NOTE — TELEPHONE ENCOUNTER
Patient called back, clarified with her that she did not start her Xeloda on 1/16/24 with her Oxaliplatin even though she had some extra tablets from a previous fill remaining. Discussed with ONESIMO Bustamante and she would like patient to take the Xeloda through 2/2/24 for this cycle and then hold for 1 week before returning on 2/9/24 with Cycle 4.  Notified pharmacy so that they can get a partial fill for her next cycle of treatment. Patient verbalized understanding of instructions. In basket sent to infusion scheduling to get patient scheduled for a PICC dressing change. Discussed with ONESIMO Bustamante the cefdinir as well and she is okay with patient taking this for her sinus infection.

## 2024-01-26 ENCOUNTER — HOSPITAL ENCOUNTER (OUTPATIENT)
Dept: ONCOLOGY | Facility: HOSPITAL | Age: 59
Discharge: HOME | End: 2024-01-26
Payer: COMMERCIAL

## 2024-01-26 VITALS
DIASTOLIC BLOOD PRESSURE: 66 MMHG | BODY MASS INDEX: 27.31 KG/M2 | WEIGHT: 160 LBS | TEMPERATURE: 97.5 F | HEIGHT: 64 IN | SYSTOLIC BLOOD PRESSURE: 120 MMHG | RESPIRATION RATE: 16 BRPM | HEART RATE: 89 BPM

## 2024-01-26 DIAGNOSIS — C18.7 MALIGNANT NEOPLASM OF SIGMOID COLON: Primary | Chronic | ICD-10-CM

## 2024-01-26 PROCEDURE — G0463 HOSPITAL OUTPT CLINIC VISIT: HCPCS

## 2024-02-01 ENCOUNTER — OFFICE VISIT (OUTPATIENT)
Dept: ENDOCRINOLOGY | Facility: CLINIC | Age: 59
End: 2024-02-01
Payer: COMMERCIAL

## 2024-02-01 VITALS
RESPIRATION RATE: 18 BRPM | HEIGHT: 64 IN | BODY MASS INDEX: 28.24 KG/M2 | SYSTOLIC BLOOD PRESSURE: 126 MMHG | OXYGEN SATURATION: 98 % | HEART RATE: 80 BPM | WEIGHT: 165.4 LBS | DIASTOLIC BLOOD PRESSURE: 72 MMHG

## 2024-02-01 DIAGNOSIS — E04.2 MULTIPLE THYROID NODULES: Primary | ICD-10-CM

## 2024-02-01 RX ORDER — CEFDINIR 300 MG/1
1 CAPSULE ORAL EVERY 12 HOURS SCHEDULED
COMMUNITY
Start: 2024-01-24

## 2024-02-01 NOTE — PROGRESS NOTES
Chief Complaint   Patient presents with    Thyroid Problem     NP, Thyroid Nodule +US        Referring Provider  Fiona Corona MD     HPI   Yelena Morris is a 58 y.o. female had concerns including Thyroid Problem (NP, Thyroid Nodule +US).     New patient here today for multiple thyroid nodules.  She has had ultrasounds over the years, dating back in the [employer] system from 2020 to most recent in October 2023.  She has a left lobe nodule that has been gradually increasing in size.  Most recently measuring 2.2 x 1.6 x 1.5 cm, mixed solid and cystic.      Thyroid function has been normal in the past.    Unsure of family medical history. Is adopted.   No history of radiation therapy to the neck or chest.      Past Medical History:   Diagnosis Date    Anxiety     Arthritis     Cant recall the date of onset    Chest pain 11/06/2019    Colon polyp     Depression     Diabetes mellitus     Dysphagia 11/06/2019    Endometriosis     Fibromyalgia, primary     GERD (gastroesophageal reflux disease)     Headache     HL (hearing loss)     Hyperlipidemia     Hypertension     Low vitamin D level     Malignant neoplasm of sigmoid colon 08/29/2023    Migraines     Ovarian cyst     PMDD (premenstrual dysphoric disorder)     Scoliosis     Wears glasses      Past Surgical History:   Procedure Laterality Date    BLADDER SURGERY      Bladder tuck    COLONOSCOPY      every 5 years    ENDOMETRIAL ABLATION      LUMBAR DISCECTOMY  2010    L4-S1 Rt- discectomy Dr. Arellano    SPINAL CORD STIMULATOR IMPLANT N/A 08/19/2019    Procedure: SPINAL CORD STIMULATOR INSERTION;  Surgeon: Ethan Peters MD;  Location: [facility];  Service: Neurosurgery    SPINE SURGERY  2010    TONSILLECTOMY AND ADENOIDECTOMY        Family History   Adopted: Yes   Problem Relation Age of Onset    Colon cancer Neg Hx       Social History     Socioeconomic History    Marital status:    Tobacco Use    Smoking status: Never    Smokeless tobacco: Never    Vaping Use    Vaping Use: Never used   Substance and Sexual Activity    Alcohol use: No    Drug use: Never    Sexual activity: Yes     Partners: Male     Birth control/protection: Post-menopausal, Vasectomy      Allergies   Allergen Reactions    Dihydroergotamine GI Intolerance     VOMITING    Methergine [Methylergonovine Maleate] Nausea And Vomiting    Amoxicillin Rash      Current Outpatient Medications on File Prior to Visit   Medication Sig Dispense Refill    ARIPiprazole (ABILIFY) 5 MG tablet Take 1 tablet by mouth Daily. 90 tablet 0    atorvastatin (LIPITOR) 40 MG tablet TAKE 1 TABLET BY MOUTH EVERY EVENING 90 tablet 3    BOTOX 200 units reconstituted solution INJECT 200 UNITS INTO THE HEAD OR NECK AREA BY MD EVERY 90 DAYS  3    capecitabine (XELODA) 500 MG chemo tablet Take 3 tablets by mouth 2 (Two) Times a Day. on days 1-14, then off for 7 days in each 21-day cycle. Take within 30 minutes of a meal. 84 tablet 0    cefdinir (OMNICEF) 300 MG capsule Take 1 capsule by mouth Every 12 (Twelve) Hours.      cyclobenzaprine (FLEXERIL) 10 MG tablet Take 1 tablet by mouth At Night As Needed for Muscle Spasms. 30 tablet 1    Diclofenac Sodium (VOLTAREN) 1 % gel gel Apply 4 g topically to the appropriate area as directed 2 (Two) Times a Day. Apply finger tip amount to palms of hands and soles of feet 350 g 3    Dulaglutide (Trulicity) 1.5 MG/0.5ML solution pen-injector INJECT 1.5 MG UNDER THE SKIN INTO THE APPROPRIATE AREA AS DIRECTED 1 TIME PER WEEK 6 mL 0    DULoxetine (CYMBALTA) 60 MG capsule Take 1 capsule by mouth Daily. 90 capsule 0    eletriptan (RELPAX) 40 MG tablet TAKE 1 TABLET BY MOUTH AT THE ONSET OF MIGRAINE. MAY REPEAT IN 2 HRS.MAX 2 DOSE/24HR,3 DAYS/WEEK  11    FLUoxetine (PROzac) 20 MG capsule Take 1 capsule by mouth Daily for 90 days. 90 capsule 0    gabapentin (NEURONTIN) 100 MG capsule Take 1 capsule by mouth Every 8 (Eight) Hours. Stop when neuropathy is better. 30 capsule 0    glimepiride  "(AMARYL) 2 MG tablet Take 1 tablet by mouth 2 (Two) Times a Day. 180 tablet 3    hydroCHLOROthiazide (HYDRODIURIL) 12.5 MG tablet Take 1 tablet by mouth Daily. 90 tablet 3    hydrOXYzine (ATARAX) 25 MG tablet Take 1-2 tablets by mouth At Night As Needed for Anxiety (sleep). 180 tablet 1    nebivolol (Bystolic) 10 MG tablet Take 1 tablet by mouth Daily. 90 tablet 3    nortriptyline (PAMELOR) 10 MG capsule nortriptyline 10 mg capsule   TAKE 4 CAPSULES AT BEDTIME      Nutritional Supplements (DHEA PO) Take  by mouth.      omeprazole (priLOSEC) 40 MG capsule Take 1 capsule by mouth Daily. 90 capsule 3    ondansetron (ZOFRAN) 8 MG tablet Take 1 tablet by mouth 3 (Three) Times a Day As Needed for Nausea or Vomiting. 30 tablet 5    pioglitazone (Actos) 30 MG tablet Take 1 tablet by mouth Daily. 90 tablet 1    propranolol (INDERAL) 10 MG tablet TAKE 1 TABLET BY MOUTH 2 (TWO) TIMES A DAY AS NEEDED (PANIC/ANXIETY). 180 tablet 0    Topiramate ER (Trokendi XR) 200 MG capsule sustained-release 24 hr Daily.       No current facility-administered medications on file prior to visit.        Review of Systems   Constitutional:  Positive for fatigue.   HENT:  Positive for dental problem and tinnitus.    Eyes: Negative.    Respiratory: Negative.     Cardiovascular: Negative.    Gastrointestinal: Negative.    Endocrine: Negative.    Genitourinary: Negative.    Musculoskeletal:  Positive for back pain.   Skin: Negative.    Allergic/Immunologic: Negative.    Neurological:  Positive for headache.   Hematological: Negative.    Psychiatric/Behavioral: Negative.          /72 (BP Location: Left arm, Patient Position: Sitting, Cuff Size: Adult)   Pulse 80   Resp 18   Ht 162.6 cm (64\")   Wt 75 kg (165 lb 6.4 oz)   SpO2 98%   BMI 28.39 kg/m²      Physical Exam    Constitutional:  well developed; well nourished  no acute distress   ENT/Thyroid: mild thyromegaly  Left palpable nodule 2.5 cm   Eyes: EOM intact  Conjunctiva: clear "   Respiratory:  breathing is unlabored  clear to auscultation bilaterally   Cardiovascular:  regular rate and rhythm, S1, S2 normal, no murmur, click, rub or gallop   Chest:  Not performed.   Abdomen: Not performed.   : Not performed.   Musculoskeletal: negative findings:  ROM of all joints is normal, no deformities present   Skin: dry and warm   Neuro: normal without focal findings and mental status, speech normal, alert and oriented x3   Psych: oriented to time, place and person, mood and affect are within normal limits     Labs/Imaging  CMP  Lab Results   Component Value Date    GLUCOSE 279 (H) 01/16/2024    BUN 10 01/16/2024    CREATININE 0.91 01/16/2024    EGFR 74.2 01/16/2024    BCR 11.0 01/16/2024    K 3.8 01/16/2024    CO2 23.0 01/16/2024    CALCIUM 9.2 01/16/2024    ALBUMIN 4.0 01/16/2024    AST 21 01/16/2024    ALT 26 01/16/2024        CBC w/DIFF   Lab Results   Component Value Date    WBC 7.92 01/16/2024    RBC 4.27 01/16/2024    HGB 12.9 01/16/2024    HCT 39.3 01/16/2024    MCV 92.0 01/16/2024    MCH 30.2 01/16/2024    MCHC 32.8 01/16/2024    RDW 18.4 (H) 01/16/2024    RDWSD 57.4 (H) 01/16/2024    MPV 8.9 01/16/2024     01/16/2024    NEUTRORELPCT 61.5 01/16/2024    LYMPHORELPCT 27.7 01/16/2024    MONORELPCT 8.8 01/16/2024    EOSRELPCT 1.1 01/16/2024    BASORELPCT 0.6 01/16/2024    AUTOIGPER 0.3 01/16/2024    NEUTROABS 4.87 01/16/2024    LYMPHSABS 2.19 01/16/2024    MONOSABS 0.70 01/16/2024    EOSABS 0.09 01/16/2024    BASOSABS 0.05 01/16/2024    AUTOIGNUM 0.02 01/16/2024    NRBC 0.0 10/17/2023     TSH  Lab Results   Component Value Date    TSH 1.370 10/03/2022    TSH 2.120 09/15/2021     EXAMINATION: US THYROID-     INDICATION: Thyroid nodule on MRI of neck; E04.1-Nontoxic single thyroid  nodule; history of thyroid nodule.     TECHNIQUE: Sonographic imaging was obtained of the thyroid in both the  sagittal and transverse planes.     COMPARISON: None.     FINDINGS: The isthmus is homogeneous in  appearance measuring 2 mm. The  right lobe of the thyroid measures 3.9 x 0.9 x 1.7 cm. Small hypoechoic  nodule identified measuring 4 mm with no increased blood flow.     The left lobe of the thyroid measures 3.9 x 1.5 x 1.7 cm. There is a  complex solid and cystic structure identified measuring 1.9 cm and a  smaller nodule measuring 5 mm. There is no increased blood flow seen  within either the nodules.      IMPRESSION:  Findings most suggestive of a multinodular thyroid. No  significant enlargement. There is no dominant mass or nodule. Continued  followup over a one year period for stability can be performed as  indicated.     D:  05/19/2020  E:  05/20/2020         This report was finalized on 5/20/2020 2:47 PM by Dr. Simi Lux MD.    EXAMINATION: US THYROID - 10/14/2021     INDICATION: ; E04.2-Nontoxic multinodular goiter      TECHNIQUE: Ultrasound thyroid and adjacent soft tissues of the neck     COMPARISON: None     FINDINGS:      Thyroid isthmus measures 1 to 2 mm in thickness without nodule or mass  Right thyroid lobe measures 4.2 x 1.1 x 1.5 cm heterogeneous with  multiple subcentimeter nodules hypoechoic to isoechoic without  calcifications associated similar to prior  Left thyroid lobe measures 4.2 x 1.5 x 1.5 cm heterogeneous with  multiple nodules including a dominant mixed solid and cystic 1.5 cm  nodule without calcifications clearly evident. Smaller hypoechoic  nodules in the left thyroid lobe.     IMPRESSION:  Grossly stable appearance of multinodular goiter with  dominant left thyroid nodule 1.5 cm demonstrating mixed cystic and solid  appearance similar to prior. TI RADS level 3 with follow-up recommended  in 12 months utilizing ultrasound.     DICTATED:   10/14/2021  EDITED/ls :   10/14/2021         This report was finalized on 10/15/2021 3:34 PM by Dr. Anselmo Voss.          DATE OF EXAM: 10/13/2022 1:29 PM     PROCEDURE: US THYROID-     INDICATIONS: multinodular goiter; E04.2-Nontoxic  multinodular goiter     COMPARISON: No comparisons available.     TECHNIQUE: Grayscale and color and Doppler ultrasound imaging of the  thyroid performed according to routine thyroid ultrasound protocol, real  time with image documentation.      FINDINGS:  The right thyroid lobe measures 3.4 x 1.0 x 1.4 cm. 8 mm isoechoic and  hypoechoic subcentimeter nodules are present, TR  3.     The left thyroid lobe measures 4.7 x 1.6 x 1.3 cm. A 19 mm cystic and  solid circumscribed hypoechoic nodule is present TR  3.     Unremarkable 2 mm isthmus     IMPRESSION:  Bilateral TR 3 nodules, measuring 19 mm on the left, stable since 2020  comparison. Recommend follow-up at 1 year and 3 years to document 5  years of total stability.     This report was finalized on 10/16/2022 7:35 AM by Joel Caballero.          US THYROID     Date of Exam: 10/23/2023 9:42 AM EDT     Indication: thyroid nodules.     Comparison: Thyroid ultrasound 10/13/2022.     Technique: Grayscale and color and Doppler ultrasound imaging of the thyroid performed according to routine thyroid ultrasound protocol, real time with image documentation.     Findings:     The right thyroid lobe measures 3.9 x 1.4 x 1.4 cm. Nodules:  - 0.9 cm solid hyperechoic nodule, not significantly changed (TI-RADS 3; no specific follow-up by size).  - 0.9 cm solid isoechoic nodule, not significantly changed (TI-RADS 3; no specific follow-up by size).  - 0.5 cm solid hypoechoic nodule, not significantly changed (TI-RADS 4; no specific follow-up by size).     The left thyroid lobe measures 4.9 x 1.6 x 1.6 cm. Nodules:  - 2.3 cm mixed cystic and solid nodule, previously 1.9 cm (TI-RADS 3; below FNA criteria by size, follow-up recommended).  - 0.7 cm solid hypoechoic nodule, not significantly changed (TI-RADS 4; no specific follow-up by size).     The isthmus measures 0.2 cm. No discrete nodules.      IMPRESSION:  Impression:     Slightly increased size of a left-sided 2.3 cm TI-RADS 3  nodule, for which annual follow-up is recommended.      No significant change in the additional thyroid nodules, all of which are subcentimeter.     Electronically Signed: Star Vallejo MD    10/23/2023 12:46 PM EDT       Thyroid Biopsy Procedure Note 02/01/24      Indications: Thyroid Nodule    Anesthesia: Ethyl chloride spray    Procedure Details   The procedure, risks and complications were discussed in detail with the patient (including but not limited to infection, bleeding), and the patient signed consent for the procedure.    The neck was prepped in sterile fashion.  Biopsy site: left 2.5 cm mixed solid and cystic nodule  With ultrasound guidance of needle localization,   3  aspirates were obtained with sterile 27 g. needles.    A single container with 20 ml of cytology fixative was used to collect needle and syringe washings. Specimen sent to pathology.   A specimen was collected for molecular analysis and will be sent in the event of indeterminate biopsy.     The patient tolerated the procedure without difficulty or complications.         Assessment and Plan    Diagnoses and all orders for this visit:    1. Multiple thyroid nodules (Primary)  I reviewed prior ultrasound reports and images from 2020, 2021, 2022, 2023.  There has been slight increase in size of the largest left lobe thyroid nodule.    Informal ultrasound today in the office confirms a left solid and cystic mixed nodule measuring now 2.5 x 1.5 x 1.5 cm.  Gradual increase since original ultrasound in 2020.  FNA recommended.  This was completed in the office today.  Discussed management of thyroid nodules depending on pathology results.  If benign, I will repeat ultrasound in 1 year to monitor size and appearance.  If malignant, refer to endocrine surgery for thyroidectomy.  In the event of a nondiagnostic specimen, repeat FNA may be needed.  In the event of atypia of undetermined significance, either molecular analysis can be sent, repeat FNA,  or surgical removal.   Check TSH with next labs.  -     US Thyroid  -     TSH Rfx On Abnormal To Free T4; Future         Return in about 1 year (around 2/1/2025) for next scheduled follow up, with thyroid ultrasound ** 30 min appt. The patient was instructed to contact the clinic with any interval questions or concerns.    2020Armida Young DO   Endocrinologist    Please note that portions of this note were completed with a voice recognition program.

## 2024-02-02 ENCOUNTER — HOSPITAL ENCOUNTER (OUTPATIENT)
Dept: ONCOLOGY | Facility: HOSPITAL | Age: 59
Discharge: HOME | End: 2024-02-02
Payer: COMMERCIAL

## 2024-02-02 VITALS
HEIGHT: 64 IN | TEMPERATURE: 98.1 F | BODY MASS INDEX: 27.83 KG/M2 | DIASTOLIC BLOOD PRESSURE: 58 MMHG | SYSTOLIC BLOOD PRESSURE: 109 MMHG | RESPIRATION RATE: 16 BRPM | HEART RATE: 92 BPM | WEIGHT: 163 LBS

## 2024-02-02 DIAGNOSIS — E04.2 MULTIPLE THYROID NODULES: ICD-10-CM

## 2024-02-02 LAB
REF LAB TEST METHOD: NORMAL
TSH SERPL DL<=0.05 MIU/L-ACNC: 1.39 UIU/ML (ref 0.27–4.2)

## 2024-02-02 PROCEDURE — 36592 COLLECT BLOOD FROM PICC: CPT

## 2024-02-02 PROCEDURE — 84443 ASSAY THYROID STIM HORMONE: CPT | Performed by: INTERNAL MEDICINE

## 2024-02-05 DIAGNOSIS — C18.7 MALIGNANT NEOPLASM OF SIGMOID COLON: ICD-10-CM

## 2024-02-05 RX ORDER — CAPECITABINE 500 MG/1
TABLET, FILM COATED ORAL
Qty: 66 TABLET | Refills: 0 | Status: SHIPPED | OUTPATIENT
Start: 2024-02-05

## 2024-02-09 ENCOUNTER — PATIENT OUTREACH (OUTPATIENT)
Dept: ONCOLOGY | Facility: CLINIC | Age: 59
End: 2024-02-09
Payer: COMMERCIAL

## 2024-02-09 ENCOUNTER — HOSPITAL ENCOUNTER (OUTPATIENT)
Dept: ONCOLOGY | Facility: HOSPITAL | Age: 59
Discharge: HOME | End: 2024-02-09
Payer: COMMERCIAL

## 2024-02-09 ENCOUNTER — OFFICE VISIT (OUTPATIENT)
Dept: ONCOLOGY | Facility: CLINIC | Age: 59
End: 2024-02-09
Payer: COMMERCIAL

## 2024-02-09 ENCOUNTER — RESEARCH ENCOUNTER (OUTPATIENT)
Dept: OTHER | Facility: OTHER | Age: 59
End: 2024-02-09

## 2024-02-09 ENCOUNTER — SPECIALTY PHARMACY (OUTPATIENT)
Dept: ONCOLOGY | Facility: HOSPITAL | Age: 59
End: 2024-02-09
Payer: COMMERCIAL

## 2024-02-09 VITALS
HEIGHT: 64 IN | HEART RATE: 104 BPM | TEMPERATURE: 97 F | DIASTOLIC BLOOD PRESSURE: 69 MMHG | SYSTOLIC BLOOD PRESSURE: 127 MMHG | RESPIRATION RATE: 18 BRPM | OXYGEN SATURATION: 96 % | BODY MASS INDEX: 28 KG/M2 | WEIGHT: 164 LBS

## 2024-02-09 DIAGNOSIS — C18.7 MALIGNANT NEOPLASM OF SIGMOID COLON: Primary | ICD-10-CM

## 2024-02-09 DIAGNOSIS — C18.7 MALIGNANT NEOPLASM OF SIGMOID COLON: Primary | Chronic | ICD-10-CM

## 2024-02-09 LAB
ALBUMIN SERPL-MCNC: 3.8 G/DL (ref 3.5–5.2)
ALBUMIN/GLOB SERPL: 1.3 G/DL
ALP SERPL-CCNC: 105 U/L (ref 39–117)
ALT SERPL W P-5'-P-CCNC: 17 U/L (ref 1–33)
ANION GAP SERPL CALCULATED.3IONS-SCNC: 11 MMOL/L (ref 5–15)
AST SERPL-CCNC: 16 U/L (ref 1–32)
BASOPHILS # BLD AUTO: 0.03 10*3/MM3 (ref 0–0.2)
BASOPHILS NFR BLD AUTO: 0.4 % (ref 0–1.5)
BILIRUB SERPL-MCNC: 0.7 MG/DL (ref 0–1.2)
BUN SERPL-MCNC: 9 MG/DL (ref 6–20)
BUN/CREAT SERPL: 10.2 (ref 7–25)
CALCIUM SPEC-SCNC: 9.1 MG/DL (ref 8.6–10.5)
CEA SERPL-MCNC: 2.45 NG/ML
CHLORIDE SERPL-SCNC: 102 MMOL/L (ref 98–107)
CO2 SERPL-SCNC: 22 MMOL/L (ref 22–29)
CREAT SERPL-MCNC: 0.88 MG/DL (ref 0.57–1)
DEPRECATED RDW RBC AUTO: 66.5 FL (ref 37–54)
EGFRCR SERPLBLD CKD-EPI 2021: 77.2 ML/MIN/1.73
EOSINOPHIL # BLD AUTO: 0.07 10*3/MM3 (ref 0–0.4)
EOSINOPHIL NFR BLD AUTO: 0.9 % (ref 0.3–6.2)
ERYTHROCYTE [DISTWIDTH] IN BLOOD BY AUTOMATED COUNT: 21.1 % (ref 12.3–15.4)
GLOBULIN UR ELPH-MCNC: 2.9 GM/DL
GLUCOSE SERPL-MCNC: 309 MG/DL (ref 65–99)
HCT VFR BLD AUTO: 36.9 % (ref 34–46.6)
HGB BLD-MCNC: 12.3 G/DL (ref 12–15.9)
IMM GRANULOCYTES # BLD AUTO: 0.02 10*3/MM3 (ref 0–0.05)
IMM GRANULOCYTES NFR BLD AUTO: 0.3 % (ref 0–0.5)
LYMPHOCYTES # BLD AUTO: 1.94 10*3/MM3 (ref 0.7–3.1)
LYMPHOCYTES NFR BLD AUTO: 26 % (ref 19.6–45.3)
MAGNESIUM SERPL-MCNC: 1.9 MG/DL (ref 1.6–2.6)
MCH RBC QN AUTO: 31.2 PG (ref 26.6–33)
MCHC RBC AUTO-ENTMCNC: 33.3 G/DL (ref 31.5–35.7)
MCV RBC AUTO: 93.7 FL (ref 79–97)
MONOCYTES # BLD AUTO: 0.56 10*3/MM3 (ref 0.1–0.9)
MONOCYTES NFR BLD AUTO: 7.5 % (ref 5–12)
NEUTROPHILS NFR BLD AUTO: 4.83 10*3/MM3 (ref 1.7–7)
NEUTROPHILS NFR BLD AUTO: 64.9 % (ref 42.7–76)
PLATELET # BLD AUTO: 232 10*3/MM3 (ref 140–450)
PMV BLD AUTO: 9.2 FL (ref 6–12)
POTASSIUM SERPL-SCNC: 3.7 MMOL/L (ref 3.5–5.2)
PROT SERPL-MCNC: 6.7 G/DL (ref 6–8.5)
RBC # BLD AUTO: 3.94 10*6/MM3 (ref 3.77–5.28)
SODIUM SERPL-SCNC: 135 MMOL/L (ref 136–145)
WBC NRBC COR # BLD AUTO: 7.45 10*3/MM3 (ref 3.4–10.8)

## 2024-02-09 PROCEDURE — 80053 COMPREHEN METABOLIC PANEL: CPT | Performed by: NURSE PRACTITIONER

## 2024-02-09 PROCEDURE — 25010000002 OXALIPLATIN PER 0.5 MG: Performed by: NURSE PRACTITIONER

## 2024-02-09 PROCEDURE — 83735 ASSAY OF MAGNESIUM: CPT | Performed by: NURSE PRACTITIONER

## 2024-02-09 PROCEDURE — 0 DEXTROSE 5 % SOLUTION 250 ML FLEX CONT: Performed by: NURSE PRACTITIONER

## 2024-02-09 PROCEDURE — 96415 CHEMO IV INFUSION ADDL HR: CPT

## 2024-02-09 PROCEDURE — 82378 CARCINOEMBRYONIC ANTIGEN: CPT | Performed by: NURSE PRACTITIONER

## 2024-02-09 PROCEDURE — 25010000002 PALONOSETRON PER 25 MCG: Performed by: NURSE PRACTITIONER

## 2024-02-09 PROCEDURE — 25010000002 DEXAMETHASONE SODIUM PHOSPHATE 100 MG/10ML SOLUTION: Performed by: NURSE PRACTITIONER

## 2024-02-09 PROCEDURE — 96413 CHEMO IV INFUSION 1 HR: CPT

## 2024-02-09 PROCEDURE — 0 DEXTROSE 5 % SOLUTION: Performed by: NURSE PRACTITIONER

## 2024-02-09 PROCEDURE — 96375 TX/PRO/DX INJ NEW DRUG ADDON: CPT

## 2024-02-09 PROCEDURE — 85025 COMPLETE CBC W/AUTO DIFF WBC: CPT | Performed by: NURSE PRACTITIONER

## 2024-02-09 RX ORDER — PALONOSETRON 0.05 MG/ML
0.25 INJECTION, SOLUTION INTRAVENOUS ONCE
Status: COMPLETED | OUTPATIENT
Start: 2024-02-09 | End: 2024-02-09

## 2024-02-09 RX ORDER — DEXTROSE MONOHYDRATE 50 MG/ML
20 INJECTION, SOLUTION INTRAVENOUS ONCE
Status: COMPLETED | OUTPATIENT
Start: 2024-02-09 | End: 2024-02-09

## 2024-02-09 RX ORDER — PALONOSETRON 0.05 MG/ML
0.25 INJECTION, SOLUTION INTRAVENOUS ONCE
Status: CANCELLED | OUTPATIENT
Start: 2024-02-09

## 2024-02-09 RX ORDER — FAMOTIDINE 10 MG/ML
20 INJECTION, SOLUTION INTRAVENOUS AS NEEDED
Status: CANCELLED | OUTPATIENT
Start: 2024-02-09

## 2024-02-09 RX ORDER — DEXTROSE MONOHYDRATE 50 MG/ML
20 INJECTION, SOLUTION INTRAVENOUS ONCE
Status: CANCELLED | OUTPATIENT
Start: 2024-02-09

## 2024-02-09 RX ORDER — DIPHENHYDRAMINE HYDROCHLORIDE 50 MG/ML
50 INJECTION INTRAMUSCULAR; INTRAVENOUS AS NEEDED
Status: CANCELLED | OUTPATIENT
Start: 2024-02-09

## 2024-02-09 RX ADMIN — OXALIPLATIN 235 MG: 5 INJECTION, SOLUTION, CONCENTRATE INTRAVENOUS at 12:05

## 2024-02-09 RX ADMIN — DEXAMETHASONE SODIUM PHOSPHATE 12 MG: 10 INJECTION, SOLUTION INTRAMUSCULAR; INTRAVENOUS at 11:33

## 2024-02-09 RX ADMIN — PALONOSETRON HYDROCHLORIDE 0.25 MG: 0.25 INJECTION, SOLUTION INTRAVENOUS at 11:31

## 2024-02-09 RX ADMIN — DEXTROSE MONOHYDRATE 20 ML/HR: 50 INJECTION, SOLUTION INTRAVENOUS at 11:35

## 2024-02-09 NOTE — PROGRESS NOTES
CHIEF COMPLAINT: 1.  Mild peripheral neuropathy     2.  Mild irritation of the skin on her hands    Problem List:  Oncology/Hematology History Overview Note   1.  Colon cancer found in a tubular adenoma 20 cm from the anal verge with negative staging CT chest abdomen and pelvis normal baseline CEA 1.26 with no visible lesion on MRI rectal protocol.  9/25/2023 laparoscopic sigmoid colectomy Dr. Michel pathologic T3 extending through muscularis propria into pericolonic fat, N1b 3/14 node positive.  CT DNA negative.  On  randomized to the treatment arm.  Will give 3 months of capecitabine and oxaliplatin with NSAID cream.  2.  Goiter with 1.8 cm left thyroid nodule found on colon cancer staging for which ultrasound follow-up recommended  3.  Myomectomy with bladder tack July 2015  4.  Diabetes  5.  Hypertension  6.  Hyperlipidemia  7.  Migraines  8.  Depression  9.  Reflux  10.  History of uterine ablation with ovarian cyst on post-op staging imaging of colon cancer  11.  History of tonsillectomy  12.  History of tympanostomy tubes  13.  History of L4-5 disc surgery and L5-S1 discectomy and fusion  14.  History of solid and liquid phase dysphagia with history of mild gastritis and some esophageal ranging on EGD 2019  15.  Compound melanocytic nevus with dermatofibroma removed by Dr. Ray 07/12/2023    Oncology history timeline:  -12/20/2019 EGD showed small hiatal hernia with some ringing of the esophagus biopsy.  Mild gastritis.  No intestinal metaplasia or dysplasia or malignancy.  Reflux suggestive but not diagnostic.  -8/18/2023 colonoscopy Dr. Calle.  Diverticulosis in the sigmoid colon.  7 mm polyp in the rectum.  Diverticulosis in the sigmoid colon.  Polypoid area with ulceration crescent-shaped 1.8 cm 20 cm proximal to the anus likely sigmoid.  Tattooed and biopsied.  Rectal polyp showed tubular adenoma with no high-grade dysplasia.  Colon biopsy at 20 cm showed invasive moderately differentiated colon  adenocarcinoma arising within a tubular adenoma with high-grade dysplasia.  Microsatellite stable    -8/24/2023 CEA normal 1.26.  ALT 34 bicarb 20.2 otherwise unremarkable CMP.  CBC normal with hemoglobin 14.4 and normal MCV.  -8/27/2023 CT abdomen pelvis with contrast did not show any colonic mass.  Mild distal esophageal thickening, hepatic steatosis without hepatic metastasis.  No lytic bone disease.  MRI rectal protocol without contrast reviewed with Dr. López.  Perhaps a small pucker at the site of biopsy but no discernible mass per se and no adenopathy.  -8/28/2023 CT chest shows no metastasis.  1.8 cm left thyroid for which follow-up is recommended    -8/29/2023 [facility] medical oncology consult: On screening colonoscopy patient found to have ulcerated polyp at 20 cm from the anal verge with moderately differentiated adenocarcinoma microsatellite stable.  Normal baseline CEA with staging CT chest abdomen pelvis and MRI rectal protocol showing no evidence of metastasis and no visible primary on MRI.  Based on distance from anal verge on colonoscopy and given the absence of visible abnormality on MRI rectal protocol to change that estimated position, would call this colon cancer.  Given microsatellite stability, would not contemplate calling this rectal cancer and treating with immunotherapy but regardless of whether rectal or colon primary this would at least be no more than T1 tumor clinically with no visible tumor on MRI despite colonoscopy findings.  The patient has experience with Dr. Michel with whom she requests a consultation for definitive surgery and I will see back postoperatively to decide on any adjuvant therapy as indicated based on pathologic staging.  There is some esophageal thickening on CT for which I would request Dr. Calle repeat EGD last done 2019 just for completeness sake but first would deal with the colon cancer at hand.She will also need to get left thyroid ultrasound eventually and  would likely do this through whoever has managed her goiter previously.      -- 9/25/2023 laparoscopic sigmoid colectomy Dr. Michel.    -10/2/2023 [facility] medical oncology follow-up: Final pathology from surgery done a week ago is not back.  According to verbal report to me from the pathologist has either T2 or T3 disease but he thinks that this is tracking along the arteries and will be T3 and she had 3 out of 13 nodes involved.  Hence I would call this stage IIIb.  Her baseline CEA was normal preoperatively so that will not be extremely helpful postoperatively.  Outside of clinical trial the standard of care would be 3 months of CapeOx or 6 months of FOLFOX.  I would be interested in putting her on NRG- where upon CT DNA negative patients are randomized to either serial screening and CT DNA or 3 months CapeOx/6 months FOLFOX providers choice.  CT DNA positive patients are randomized to either 6 months of CapeOx or FOLFOX or 6 months of FOLFIRINOX.  If we can go ahead and get the [employer] CT DNA cooking outside of clinical trial without significant financial toxicity we may do that.  Ideally chemotherapy would start less than 8 weeks out from surgery and we should have plenty of time to have her heal and to make these decisions collectively.  Addendum: Final pathology report… Moderately differentiated adenocarcinoma pathologic T3N1B, negative margins margins 3 out of 14 nodes positive with negative new anastomotic excisional margin.  Tumor extent ends through muscularis propria into pericolonic or perirectal tissue.  No lymph-vascular or perineural invasion.     -10/16/2023 CT chest Abdomen pelvis compared to CT chest 8/28/2023 and CT abdomen pelvis 8/27/2023 shows no metastasis in the chest abdomen or pelvis and interval sigmoid colectomy.  New right adnexal cystic lesion 6 cm recommending pelvic ultrasound in 3 months    -10/19/2023 [facility] medical oncology follow-up: I went over her scans with her.  No  clear-cut metastatic disease.  I will ask her to get to Dr. Mcguire for the right adnexal cystic lesion as quickly as possible and would not wait 3 months on the ultrasound in light of clinical trial.  I suspect this is benign but we will get this worked up.  On [facility] nomogram, putting in her pathologic information she has a 5-year survival rate of 69% give or take 7%.  With adjuvant therapy her 5-year survival rate is 85%.  This is based on statistics not informed by molecular data.  We are aware of prognostic data from prior CT DNA trials whereby patients with negative CT DNA have a considerably higher 5-year survival rate and people with CT DNA positivity have a considerably lower 5-year survival rate and the above 5-year survival rate is probably a conglomerate of statistics between these people that averages out.  I certainly would be fine with either 6 months of FOLFOX or 3 months of Xeloda oxaliplatin outside of a clinical trial and that would be my standard recommendation.   is now open and we are enrolling her on that per her request.  If she has negative CT DNA then she is randomized to watchful waiting versus CapeOx x4 or FOLFOX x12.  If CT DNA is positive then she is randomized to CapeOx x6 or FOLFOX x12 (investigators choice) versus treatment intensification with FOLFIRINOX.  I will see her back in a few weeks for the CT DNA results.    - 11/8/2023 evaluation by Dr. Eloisa Uribe, GYN oncology of right adnexal mass with minimal complexity is not consistent with Krukenberg tumor or other malignancy.  There is a thickened endometrial stripe.  Perimenopausal state.  Still having monthly periods hormonal blood work from primary OB/GYN not available.  Imaging reviewed by Dr. Uribe with no concerns for this being malignancy.   normal within the last month.  If it were not for serial CTs already planned, patient would have transvaginal ultrasound in 6 months but with plans for serial CTs,  GYN oncology plans no further ultrasounds.  Due to low risk of malignancy, GYN oncology recommends regular follow-up with primary OB/GYN and follow-up if images change in a worrisome manner.    -11/9/2023 [facility] medical oncology follow-up: The CT DNA is still pending.  We should have the results by November 30 or sooner.  8 weeks from surgery would be November 20.  There is good data that disease-free survival and overall outcomes are not compromised when adjuvant chemotherapy occurs 8 weeks out from surgery compared to 4 weeks or less.  The data beyond that does show some falloff of survival when there are significant delays but I do not think her overall risk is going to substantially change whether we treated her the week of the 20th or the week of 27 November.  There is the option to give her 1 cycle of chemotherapy while waiting on this but that would not accrue to her overall number of cycles and I do not want to add oxaliplatin cycles overall given the potential additive toxicity of oxaliplatin.  She understands the uncertainties here and wishes to proceed with the clinical trial.  She has seen gynecologic oncology for evaluation and this is new ovarian cyst will be watched and is felt to be benign according to them and can be just watched by serial CTs and primary GYN exam.  I will see her back November 27 hopefully to have results by then and hopefully to then later that week proceed with chemotherapy as indicated based on the randomizations.    -12/1/2023 [facility] medical oncology follow-up: CT DNA reportedly 0 per  .  She randomized to the treatment arm for which we have seen her and educated her for Xeloda oxaliplatin x 3 months with NSAID cream to mitigate palmar and plantar skin issues.  Will ask scan routinely for routine surveillance and with that we will also keep an eye on the right adnexal mass felt to be benign per GYN oncology.  Treatment 1 cycle 1 orders signed and we  will see her for cycle 2 day 1 the day after [place] with my nurse practitioner in [place].  She has had her cancer treatment preparation visit and education and consenting.  She has been vasculature and approval has been granted to treat her by peripheral vein from the nursing staff.    -12/13/2023 PICC line placed due to discomfort with oxaliplatin infusion  -12/26/2023 [facility] Oncology clinic follow-up: Yelena tolerated her first cycle of XELOX fairly well.  She has some mild fatigue.  She does continue to work.  She had 1 episode of diarrhea that resolved with Imodium.  Labs reviewed from today, CBC and CMP are normal other than for glucose of 128.  She now has a PICC line as oxaliplatin was uncomfortable infused peripherally.  We will proceed today with cycle #2 unchanged, I will see her back for follow-up for her third cycle.    -1/16/2024 [facility] Oncology clinic follow-up: Today will be cycle 3 of a planned 4 cycles of XELOX.  She has some mild palmar erythrodysesthesia, after further discussion she has not been using the Voltaren ointment twice daily as prescribed.  She will begin doing that along with making sure to keep her hands well moisturized throughout the day.  Right now this is a grade 1-2.  She understands to notify our office if it worsens during this cycle while she is taking her Xeloda and we may have to hold or reduce dose.  She has no lingering neuropathy.  Labs reviewed from today, counts are acceptable to continue treatment unchanged. Her CBC is normal, CMP normal other than for glucose of 279.  We will move her next treatment to a Friday at her request to work better with her work schedule and give her the weekend to recover.    -2/1/2024 [facility] left thyroid biopsy with Dr. Armida Young, pathology was negative for malignant cells.     Malignant neoplasm of sigmoid colon   8/29/2023 Initial Diagnosis    Malignant neoplasm of sigmoid colon     10/2/2023 Cancer Staged    Staging form: Colon  And Rectum, AJCC 8th Edition  - Clinical: Stage IIIB (cT3, cN1, cM0) - Signed by Bernardino Chaparro MD on 10/2/2023     10/19/2023 Cancer Staged    Staging form: Colon And Rectum, AJCC 8th Edition  - Pathologic: Stage IIIB (pT3, pN1b, cM0) - Signed by Bernardino Chaparro MD on 10/19/2023     11/30/2023 - 11/30/2023 Chemotherapy    OP COLON CapeOX Capecitabine / OXALIplatin     12/4/2023 -  Chemotherapy    RESEARCH COLON  ARM 1B CAPOX (OXALIplatin / Capecitabine)         HISTORY OF PRESENT ILLNESS:  The patient is a 58 y.o. female, here for follow up on management of adjuvant therapy colon cancer with Xeloda and Oxaliplatin.  Yelena has had 3 cycles thus far of XELOX.  Since we saw her last she did have a low-grade temp of 100.3 and some sinus congestion.  She followed up with her PCP and did a course of cefdinir and had resolution of her symptoms.  Somehow her Xeloda tablets shipment or quantity was messed up, she did have a delay in cycle #3 of taking her Xeloda, she just completed this cycle on Wednesday, 2/7/2024.  She has some mild erythema of her palms and mild peripheral neuropathy that is intermittent but she has been using emollient lotion and also Voltaren lotion which has helped greatly.  She has had no major issues with the skin on her feet.      Past Medical History:   Diagnosis Date    Anxiety     Arthritis     Cant recall the date of onset    Chest pain 11/06/2019    Colon polyp     Depression     Diabetes mellitus     Dysphagia 11/06/2019    Endometriosis     Fibromyalgia, primary     GERD (gastroesophageal reflux disease)     Headache     HL (hearing loss)     Hyperlipidemia     Hypertension     Low vitamin D level     Malignant neoplasm of sigmoid colon 08/29/2023    Migraines     Ovarian cyst     PMDD (premenstrual dysphoric disorder)     Scoliosis     Wears glasses      Past Surgical History:   Procedure Laterality Date    BLADDER SURGERY      Bladder tuck    COLONOSCOPY      every 5 years     "ENDOMETRIAL ABLATION      LUMBAR DISCECTOMY      L4-S1 Rt- discectomy Dr. Arellano    SPINAL CORD STIMULATOR IMPLANT N/A 2019    Procedure: SPINAL CORD STIMULATOR INSERTION;  Surgeon: Ethan Peters MD;  Location: [facility];  Service: Neurosurgery    SPINE SURGERY      TONSILLECTOMY AND ADENOIDECTOMY         Allergies   Allergen Reactions    Dihydroergotamine GI Intolerance     VOMITING    Methergine [Methylergonovine Maleate] Nausea And Vomiting    Amoxicillin Rash       Family History and Social History reviewed and changed as necessary    REVIEW OF SYSTEM:   Mild fatigue  Mildly tender palmar surfaces of her hands     PHYSICAL EXAM:  Well-developed, well-nourished appearing female in no distress  Respirations regular nonlabored, lungs clear to auscultation bilaterally  Heart regular rate and rhythm  Palmar surfaces of the hands is mildly erythemic, no open fissures.    Vitals:    24 1021   BP: 127/69   Pulse: 104   Resp: 18   Temp: 97 °F (36.1 °C)   SpO2: 96%   Weight: 74.4 kg (164 lb)   Height: 162.6 cm (64\")     Vitals:    24 1021   PainSc: 0-No pain            ECOG score: 1         Vitals reviewed.  Labs reviewed    ECO    Lab Results   Component Value Date    HGB 12.3 2024    HCT 36.9 2024    MCV 93.7 2024     2024    WBC 7.45 2024    NEUTROABS 4.83 2024    LYMPHSABS 1.94 2024    MONOSABS 0.56 2024    EOSABS 0.07 2024    BASOSABS 0.03 2024       Lab Results   Component Value Date    GLUCOSE 309 (H) 2024    BUN 9 2024    CREATININE 0.88 2024     (L) 2024    K 3.7 2024     2024    CO2 22.0 2024    CALCIUM 9.1 2024    PROTEINTOT 6.7 2024    ALBUMIN 3.8 2024    BILITOT 0.7 2024    ALKPHOS 105 2024    AST 16 2024    ALT 17 2024             ASSESSMENT & PLAN:  1.  Colon cancer found in a tubular adenoma 20 cm from the " anal verge with negative staging CT chest abdomen and pelvis normal baseline CEA 1.26 with no visible lesion on MRI rectal protocol.  9/25/2023 laparoscopic sigmoid colectomy Dr. Michel pathologic T3 extending through muscularis propria into pericolonic fat, N1b 3/14 node positive.  CT DNA negative.  On  randomized to the treatment arm.  Will give 3 months of capecitabine and oxaliplatin with NSAID cream.  2.  Goiter with 1.8 cm left thyroid nodule found on colon cancer staging   -2/1/2024 [facility] left thyroid biopsy with Dr. Armida Young, pathology was negative for malignant cells.  3.  Myomectomy with bladder tack July 2015  4.  Diabetes  5.  Hypertension  6.  Hyperlipidemia  7.  Migraines  8.  Depression  9.  Reflux  10.  History of uterine ablation with ovarian cyst on post-op staging imaging of colon cancer  11.  History of tonsillectomy  12.  History of tympanostomy tubes  13.  History of L4-5 disc surgery and L5-S1 discectomy and fusion  14.  History of solid and liquid phase dysphagia with history of mild gastritis and some esophageal ranging on EGD 2019  15.  Compound melanocytic nevus with dermatofibroma removed by Dr. Ray 07/12/2023    Oncology history timeline:  -12/20/2019 EGD showed small hiatal hernia with some ringing of the esophagus biopsy.  Mild gastritis.  No intestinal metaplasia or dysplasia or malignancy.  Reflux suggestive but not diagnostic.  -8/18/2023 colonoscopy Dr. Calle.  Diverticulosis in the sigmoid colon.  7 mm polyp in the rectum.  Diverticulosis in the sigmoid colon.  Polypoid area with ulceration crescent-shaped 1.8 cm 20 cm proximal to the anus likely sigmoid.  Tattooed and biopsied.  Rectal polyp showed tubular adenoma with no high-grade dysplasia.  Colon biopsy at 20 cm showed invasive moderately differentiated colon adenocarcinoma arising within a tubular adenoma with high-grade dysplasia.  Microsatellite stable    -8/24/2023 CEA normal 1.26.  ALT 34 bicarb 20.2  otherwise unremarkable CMP.  CBC normal with hemoglobin 14.4 and normal MCV.  -8/27/2023 CT abdomen pelvis with contrast did not show any colonic mass.  Mild distal esophageal thickening, hepatic steatosis without hepatic metastasis.  No lytic bone disease.  MRI rectal protocol without contrast reviewed with Dr. López.  Perhaps a small pucker at the site of biopsy but no discernible mass per se and no adenopathy.  -8/28/2023 CT chest shows no metastasis.  1.8 cm left thyroid for which follow-up is recommended    -8/29/2023 [facility] medical oncology consult: On screening colonoscopy patient found to have ulcerated polyp at 20 cm from the anal verge with moderately differentiated adenocarcinoma microsatellite stable.  Normal baseline CEA with staging CT chest abdomen pelvis and MRI rectal protocol showing no evidence of metastasis and no visible primary on MRI.  Based on distance from anal verge on colonoscopy and given the absence of visible abnormality on MRI rectal protocol to change that estimated position, would call this colon cancer.  Given microsatellite stability, would not contemplate calling this rectal cancer and treating with immunotherapy but regardless of whether rectal or colon primary this would at least be no more than T1 tumor clinically with no visible tumor on MRI despite colonoscopy findings.  The patient has experience with Dr. Michel with whom she requests a consultation for definitive surgery and I will see back postoperatively to decide on any adjuvant therapy as indicated based on pathologic staging.  There is some esophageal thickening on CT for which I would request Dr. Calle repeat EGD last done 2019 just for completeness sake but first would deal with the colon cancer at hand.She will also need to get left thyroid ultrasound eventually and would likely do this through whoever has managed her goiter previously.      -- 9/25/2023 laparoscopic sigmoid colectomy Dr. Michel.    -10/2/2023 [facility]  medical oncology follow-up: Final pathology from surgery done a week ago is not back.  According to verbal report to me from the pathologist has either T2 or T3 disease but he thinks that this is tracking along the arteries and will be T3 and she had 3 out of 13 nodes involved.  Hence I would call this stage IIIb.  Her baseline CEA was normal preoperatively so that will not be extremely helpful postoperatively.  Outside of clinical trial the standard of care would be 3 months of CapeOx or 6 months of FOLFOX.  I would be interested in putting her on NRG- where upon CT DNA negative patients are randomized to either serial screening and CT DNA or 3 months CapeOx/6 months FOLFOX providers choice.  CT DNA positive patients are randomized to either 6 months of CapeOx or FOLFOX or 6 months of FOLFIRINOX.  If we can go ahead and get the [place] CT DNA cooking outside of clinical trial without significant financial toxicity we may do that.  Ideally chemotherapy would start less than 8 weeks out from surgery and we should have plenty of time to have her heal and to make these decisions collectively.  Addendum: Final pathology report… Moderately differentiated adenocarcinoma pathologic T3N1B, negative margins margins 3 out of 14 nodes positive with negative new anastomotic excisional margin.  Tumor extent ends through muscularis propria into pericolonic or perirectal tissue.  No lymph-vascular or perineural invasion.     -10/16/2023 CT chest Abdomen pelvis compared to CT chest 8/28/2023 and CT abdomen pelvis 8/27/2023 shows no metastasis in the chest abdomen or pelvis and interval sigmoid colectomy.  New right adnexal cystic lesion 6 cm recommending pelvic ultrasound in 3 months    -10/19/2023 [facility] medical oncology follow-up: I went over her scans with her.  No clear-cut metastatic disease.  I will ask her to get to Dr. Mcguire for the right adnexal cystic lesion as quickly as possible and would not wait 3 months on the  ultrasound in light of clinical trial.  I suspect this is benign but we will get this worked up.  On [facility] nomogram, putting in her pathologic information she has a 5-year survival rate of 69% give or take 7%.  With adjuvant therapy her 5-year survival rate is 85%.  This is based on statistics not informed by molecular data.  We are aware of prognostic data from prior CT DNA trials whereby patients with negative CT DNA have a considerably higher 5-year survival rate and people with CT DNA positivity have a considerably lower 5-year survival rate and the above 5-year survival rate is probably a conglomerate of statistics between these people that averages out.  I certainly would be fine with either 6 months of FOLFOX or 3 months of Xeloda oxaliplatin outside of a clinical trial and that would be my standard recommendation.   is now open and we are enrolling her on that per her request.  If she has negative CT DNA then she is randomized to watchful waiting versus CapeOx x4 or FOLFOX x12.  If CT DNA is positive then she is randomized to CapeOx x6 or FOLFOX x12 (investigators choice) versus treatment intensification with FOLFIRINOX.  I will see her back in a few weeks for the CT DNA results.    - 11/8/2023 evaluation by Dr. Eloisa Uribe, GYN oncology of right adnexal mass with minimal complexity is not consistent with Krukenberg tumor or other malignancy.  There is a thickened endometrial stripe.  Perimenopausal state.  Still having monthly periods hormonal blood work from primary OB/GYN not available.  Imaging reviewed by Dr. Uribe with no concerns for this being malignancy.   normal within the last month.  If it were not for serial CTs already planned, patient would have transvaginal ultrasound in 6 months but with plans for serial CTs, GYN oncology plans no further ultrasounds.  Due to low risk of malignancy, GYN oncology recommends regular follow-up with primary OB/GYN and follow-up if  images change in a worrisome manner.    -11/9/2023 [facility] medical oncology follow-up: The CT DNA is still pending.  We should have the results by November 30 or sooner.  8 weeks from surgery would be November 20.  There is good data that disease-free survival and overall outcomes are not compromised when adjuvant chemotherapy occurs 8 weeks out from surgery compared to 4 weeks or less.  The data beyond that does show some falloff of survival when there are significant delays but I do not think her overall risk is going to substantially change whether we treated her the week of the 20th or the week of 27 November.  There is the option to give her 1 cycle of chemotherapy while waiting on this but that would not accrue to her overall number of cycles and I do not want to add oxaliplatin cycles overall given the potential additive toxicity of oxaliplatin.  She understands the uncertainties here and wishes to proceed with the clinical trial.  She has seen gynecologic oncology for evaluation and this is new ovarian cyst will be watched and is felt to be benign according to them and can be just watched by serial CTs and primary GYN exam.  I will see her back November 27 hopefully to have results by then and hopefully to then later that week proceed with chemotherapy as indicated based on the randomizations.    -12/1/2023 [facility] medical oncology follow-up: CT DNA reportedly 0 per  .  She randomized to the treatment arm for which we have seen her and educated her for Xeloda oxaliplatin x 3 months with NSAID cream to mitigate palmar and plantar skin issues.  Will ask scan routinely for routine surveillance and with that we will also keep an eye on the right adnexal mass felt to be benign per GYN oncology.  Treatment 1 cycle 1 orders signed and we will see her for cycle 2 day 1 the day after Dennis with my nurse practitioner in [place].  She has had her cancer treatment preparation visit and  education and consenting.  She has been vasculature and approval has been granted to treat her by peripheral vein from the nursing staff.    -12/13/2023 PICC line placed due to discomfort with oxaliplatin infusion  -12/26/2023 [facility] Oncology clinic follow-up: Yelena tolerated her first cycle of XELOX fairly well.  She has some mild fatigue.  She does continue to work.  She had 1 episode of diarrhea that resolved with Imodium.  Labs reviewed from today, CBC and CMP are normal other than for glucose of 128.  She now has a PICC line as oxaliplatin was uncomfortable infused peripherally.  We will proceed today with cycle #2 unchanged, I will see her back for follow-up for her third cycle.    -1/16/2024 [facility] Oncology clinic follow-up: Today will be cycle 3 of a planned 4 cycles of XELOX.  She has some mild palmar  erythrodysesthesia, after further discussion she has not been using the Voltaren ointment twice daily as prescribed.  She will begin doing that along with making sure to keep her hands well moisturized throughout the day.  Right now this is a grade 1-2.  She understands to notify our office if it worsens during this cycle while she is taking her Xeloda and we may have to hold or reduce dose.  She has no lingering neuropathy.  Labs reviewed from today, counts are acceptable to continue treatment unchanged. Her CBC is normal, CMP normal other than for glucose of 279.  We will move her next treatment to a Friday at her request to work better with her work schedule and give her the weekend to recover.    -2/9/2024 [facility] Oncology clinic follow-up: Overall she continues to do well on XELOX, today is her fourth and final cycle of planned therapy.  Somehow the shipments of her Xeloda wore off but this last cycle, she completed Xeloda on Wednesday therefore I have asked her not to start taking cycle 4 of her Xeloda until Wednesday, 2/14/2024.  I have also notified our pharmacist.  We will continue with  oxaliplatin today as planned. She has mild peripheral neuropathy that is intermittent.  Her hand-foot erythrodysesthesia has improved with more consistent use of emollient lotion and also Voltaren ointment twice a day.  She will continue the Xeloda dose at 1500 mg twice daily again and she will start this next Wednesday for 14 days on and then she will be finished.  I reviewed her labs from today, CT DNA was drawn, her CBC looks good with WBC 7.45, hemoglobin 12.3, hematocrit 36.9%, platelet count 232,000, ANC 4.83, her CMP is unremarkable other than for glucose of 309.  I did go over to see Yelena in the infusion area as I did not get the results of her CMP until she had left our visit.  She states that she did take her diabetes medications as directed, she does not monitor her glucose at home regularly, she will start doing so and will follow-up with her PCP.  Her magnesium is normal, CEA is pending.  We will plan on repeating CT chest, abdomen and pelvis for restaging prior to return in 4 weeks and I have ordered that today.  Since we saw her last she did have FNA of left thyroid nodule with Dr. Armida Young and that was negative for malignant cells. We will discontinue her PICC line after her treatment today.    This was a level 4, moderate MDM visit with management of side effects of therapy, review of labs, ordering of restaging scans and management of drug therapy requiring intensive monitoring for toxicity.    Dianna Boo, APRN    02/09/2024

## 2024-02-23 ENCOUNTER — SPECIALTY PHARMACY (OUTPATIENT)
Dept: ONCOLOGY | Facility: HOSPITAL | Age: 59
End: 2024-02-23
Payer: COMMERCIAL

## 2024-02-23 DIAGNOSIS — Z00.6 RESEARCH STUDY PATIENT: Primary | ICD-10-CM

## 2024-03-01 ENCOUNTER — HOSPITAL ENCOUNTER (OUTPATIENT)
Dept: CT IMAGING | Facility: HOSPITAL | Age: 59
Discharge: HOME | End: 2024-03-01
Admitting: NURSE PRACTITIONER
Payer: COMMERCIAL

## 2024-03-01 DIAGNOSIS — C18.7 MALIGNANT NEOPLASM OF SIGMOID COLON: Chronic | ICD-10-CM

## 2024-03-01 PROCEDURE — 71260 CT THORAX DX C+: CPT

## 2024-03-01 PROCEDURE — 25510000001 IOPAMIDOL 61 % SOLUTION: Performed by: NURSE PRACTITIONER

## 2024-03-01 PROCEDURE — 74177 CT ABD & PELVIS W/CONTRAST: CPT

## 2024-03-01 RX ADMIN — IOPAMIDOL 85 ML: 612 INJECTION, SOLUTION INTRAVENOUS at 16:04

## 2024-03-08 ENCOUNTER — DOCUMENTATION (OUTPATIENT)
Dept: GASTROENTEROLOGY | Facility: CLINIC | Age: 59
End: 2024-03-08
Payer: COMMERCIAL

## 2024-03-08 ENCOUNTER — RESEARCH ENCOUNTER (OUTPATIENT)
Dept: OTHER | Facility: OTHER | Age: 59
End: 2024-03-08

## 2024-03-08 ENCOUNTER — OFFICE VISIT (OUTPATIENT)
Dept: ONCOLOGY | Facility: CLINIC | Age: 59
End: 2024-03-08
Payer: COMMERCIAL

## 2024-03-08 VITALS
RESPIRATION RATE: 18 BRPM | HEART RATE: 101 BPM | OXYGEN SATURATION: 96 % | TEMPERATURE: 97.1 F | BODY MASS INDEX: 28 KG/M2 | WEIGHT: 164 LBS | HEIGHT: 64 IN | DIASTOLIC BLOOD PRESSURE: 82 MMHG | SYSTOLIC BLOOD PRESSURE: 148 MMHG

## 2024-03-08 DIAGNOSIS — C18.7 MALIGNANT NEOPLASM OF SIGMOID COLON: Primary | Chronic | ICD-10-CM

## 2024-03-08 NOTE — PROGRESS NOTES
CHIEF COMPLAINT: No new somatic complaints    Problem List:  Oncology/Hematology History Overview Note   1.  Colon cancer found in a tubular adenoma 20 cm from the anal verge with negative staging CT chest abdomen and pelvis normal baseline CEA 1.26 with no visible lesion on MRI rectal protocol.  9/25/2023 laparoscopic sigmoid colectomy Dr. Michel pathologic T3 extending through muscularis propria into pericolonic fat, N1b 3/14 node positive.  CT DNA negative.  On  randomized to the treatment arm.  Will give 3 months of capecitabine and oxaliplatin with NSAID cream.  Fourth cycle of Xeloda oxaliplatin started 2/9/2024.  CT DNA negative at that junction with CEA 2.45 and negative CTs for recurrence  2.  Goiter with 1.8 cm left thyroid nodule found on colon cancer staging   -2/1/2024 FNA left thyroid biopsy with Dr. Armida Young, pathology was negative for malignant cells.  3.  Myomectomy with bladder tack July 2015  4.  Diabetes  5.  Hypertension  6.  Hyperlipidemia  7.  Migraines  8.  Depression  9.  Reflux  10.  History of uterine ablation with ovarian cyst on post-op staging imaging of colon cancer  11.  History of tonsillectomy  12.  History of tympanostomy tubes  13.  History of L4-5 disc surgery and L5-S1 discectomy and fusion  14.  History of solid and liquid phase dysphagia with history of mild gastritis and some esophageal ranging on EGD 2019  15.  Compound melanocytic nevus with dermatofibroma removed by Dr. Ray 07/12/2023    Oncology history timeline:  -12/20/2019 EGD showed small hiatal hernia with some ringing of the esophagus biopsy.  Mild gastritis.  No intestinal metaplasia or dysplasia or malignancy.  Reflux suggestive but not diagnostic.  -8/18/2023 colonoscopy Dr. Calle.  Diverticulosis in the sigmoid colon.  7 mm polyp in the rectum.  Diverticulosis in the sigmoid colon.  Polypoid area with ulceration crescent-shaped 1.8 cm 20 cm proximal to the anus likely sigmoid.  Tattooed and  biopsied.  Rectal polyp showed tubular adenoma with no high-grade dysplasia.  Colon biopsy at 20 cm showed invasive moderately differentiated colon adenocarcinoma arising within a tubular adenoma with high-grade dysplasia.  Microsatellite stable    -8/24/2023 CEA normal 1.26.  ALT 34 bicarb 20.2 otherwise unremarkable CMP.  CBC normal with hemoglobin 14.4 and normal MCV.  -8/27/2023 CT abdomen pelvis with contrast did not show any colonic mass.  Mild distal esophageal thickening, hepatic steatosis without hepatic metastasis.  No lytic bone disease.  MRI rectal protocol without contrast reviewed with Dr. López.  Perhaps a small pucker at the site of biopsy but no discernible mass per se and no adenopathy.  -8/28/2023 CT chest shows no metastasis.  1.8 cm left thyroid for which follow-up is recommended    -8/29/2023 [facility] medical oncology consult: On screening colonoscopy patient found to have ulcerated polyp at 20 cm from the anal verge with moderately differentiated adenocarcinoma microsatellite stable.  Normal baseline CEA with staging CT chest abdomen pelvis and MRI rectal protocol showing no evidence of metastasis and no visible primary on MRI.  Based on distance from anal verge on colonoscopy and given the absence of visible abnormality on MRI rectal protocol to change that estimated position, would call this colon cancer.  Given microsatellite stability, would not contemplate calling this rectal cancer and treating with immunotherapy but regardless of whether rectal or colon primary this would at least be no more than T1 tumor clinically with no visible tumor on MRI despite colonoscopy findings.  The patient has experience with Dr. Michel with whom she requests a consultation for definitive surgery and I will see back postoperatively to decide on any adjuvant therapy as indicated based on pathologic staging.  There is some esophageal thickening on CT for which I would request Dr. Calle repeat EGD last done 2019  just for completeness sake but first would deal with the colon cancer at hand.She will also need to get left thyroid ultrasound eventually and would likely do this through whoever has managed her goiter previously.      -- 9/25/2023 laparoscopic sigmoid colectomy Dr. Michel.    -10/2/2023 [facility] medical oncology follow-up: Final pathology from surgery done a week ago is not back.  According to verbal report to me from the pathologist has either T2 or T3 disease but he thinks that this is tracking along the arteries and will be T3 and she had 3 out of 13 nodes involved.  Hence I would call this stage IIIb.  Her baseline CEA was normal preoperatively so that will not be extremely helpful postoperatively.  Outside of clinical trial the standard of care would be 3 months of CapeOx or 6 months of FOLFOX.  I would be interested in putting her on NRG- where upon CT DNA negative patients are randomized to either serial screening and CT DNA or 3 months CapeOx/6 months FOLFOX providers choice.  CT DNA positive patients are randomized to either 6 months of CapeOx or FOLFOX or 6 months of FOLFIRINOX.  If we can go ahead and get the [place] CT DNA cooking outside of clinical trial without significant financial toxicity we may do that.  Ideally chemotherapy would start less than 8 weeks out from surgery and we should have plenty of time to have her heal and to make these decisions collectively.  Addendum: Final pathology report… Moderately differentiated adenocarcinoma pathologic T3N1B, negative margins margins 3 out of 14 nodes positive with negative new anastomotic excisional margin.  Tumor extent ends through muscularis propria into pericolonic or perirectal tissue.  No lymph-vascular or perineural invasion.     -10/16/2023 CT chest Abdomen pelvis compared to CT chest 8/28/2023 and CT abdomen pelvis 8/27/2023 shows no metastasis in the chest abdomen or pelvis and interval sigmoid colectomy.  New right adnexal cystic lesion  6 cm recommending pelvic ultrasound in 3 months    -10/19/2023 [facility] medical oncology follow-up: I went over her scans with her.  No clear-cut metastatic disease.  I will ask her to get to Dr. Mcguire for the right adnexal cystic lesion as quickly as possible and would not wait 3 months on the ultrasound in light of clinical trial.  I suspect this is benign but we will get this worked up.  On [facility] nomogram, putting in her pathologic information she has a 5-year survival rate of 69% give or take 7%.  With adjuvant therapy her 5-year survival rate is 85%.  This is based on statistics not informed by molecular data.  We are aware of prognostic data from prior CT DNA trials whereby patients with negative CT DNA have a considerably higher 5-year survival rate and people with CT DNA positivity have a considerably lower 5-year survival rate and the above 5-year survival rate is probably a conglomerate of statistics between these people that averages out.  I certainly would be fine with either 6 months of FOLFOX or 3 months of Xeloda oxaliplatin outside of a clinical trial and that would be my standard recommendation.   is now open and we are enrolling her on that per her request.  If she has negative CT DNA then she is randomized to watchful waiting versus CapeOx x4 or FOLFOX x12.  If CT DNA is positive then she is randomized to CapeOx x6 or FOLFOX x12 (investigators choice) versus treatment intensification with FOLFIRINOX.  I will see her back in a few weeks for the CT DNA results.    - 11/8/2023 evaluation by Dr. Eloisa Uribe, GYN oncology of right adnexal mass with minimal complexity is not consistent with Krukenberg tumor or other malignancy.  There is a thickened endometrial stripe.  Perimenopausal state.  Still having monthly periods hormonal blood work from primary OB/GYN not available.  Imaging reviewed by Dr. Uribe with no concerns for this being malignancy.   normal within the last  month.  If it were not for serial CTs already planned, patient would have transvaginal ultrasound in 6 months but with plans for serial CTs, GYN oncology plans no further ultrasounds.  Due to low risk of malignancy, GYN oncology recommends regular follow-up with primary OB/GYN and follow-up if images change in a worrisome manner.    -11/9/2023 [facility] medical oncology follow-up: The CT DNA is still pending.  We should have the results by November 30 or sooner.  8 weeks from surgery would be November 20.  There is good data that disease-free survival and overall outcomes are not compromised when adjuvant chemotherapy occurs 8 weeks out from surgery compared to 4 weeks or less.  The data beyond that does show some falloff of survival when there are significant delays but I do not think her overall risk is going to substantially change whether we treated her the week of the 20th or the week of 27 November.  There is the option to give her 1 cycle of chemotherapy while waiting on this but that would not accrue to her overall number of cycles and I do not want to add oxaliplatin cycles overall given the potential additive toxicity of oxaliplatin.  She understands the uncertainties here and wishes to proceed with the clinical trial.  She has seen gynecologic oncology for evaluation and this is new ovarian cyst will be watched and is felt to be benign according to them and can be just watched by serial CTs and primary GYN exam.  I will see her back November 27 hopefully to have results by then and hopefully to then later that week proceed with chemotherapy as indicated based on the randomizations.    -12/1/2023 [facility] medical oncology follow-up: CT DNA reportedly 0 per  .  She randomized to the treatment arm for which we have seen her and educated her for Xeloda oxaliplatin x 3 months with NSAID cream to mitigate palmar and plantar skin issues.  Will ask scan routinely for routine surveillance and  with that we will also keep an eye on the right adnexal mass felt to be benign per GYN oncology.  Treatment 1 cycle 1 orders signed and we will see her for cycle 2 day 1 the day after Dennis with my nurse practitioner in [employer].  She has had her cancer treatment preparation visit and education and consenting.  She has been vasculature and approval has been granted to treat her by peripheral vein from the nursing staff.    -12/13/2023 PICC line placed due to discomfort with oxaliplatin infusion  -12/26/2023 [facility] Oncology clinic follow-up: Yelena tolerated her first cycle of XELOX fairly well.  She has some mild fatigue.  She does continue to work.  She had 1 episode of diarrhea that resolved with Imodium.  Labs reviewed from today, CBC and CMP are normal other than for glucose of 128.  She now has a PICC line as oxaliplatin was uncomfortable infused peripherally.  We will proceed today with cycle #2 unchanged, I will see her back for follow-up for her third cycle.    -1/16/2024 [facility] Oncology clinic follow-up: Today will be cycle 3 of a planned 4 cycles of XELOX.  She has some mild palmar erythrodysesthesia, after further discussion she has not been using the Voltaren ointment twice daily as prescribed.  She will begin doing that along with making sure to keep her hands well moisturized throughout the day.  Right now this is a grade 1-2.  She understands to notify our office if it worsens during this cycle while she is taking her Xeloda and we may have to hold or reduce dose.  She has no lingering neuropathy.  Labs reviewed from today, counts are acceptable to continue treatment unchanged. Her CBC is normal, CMP normal other than for glucose of 279.  We will move her next treatment to a Friday at her request to work better with her work schedule and give her the weekend to recover.    -2/1/2024 FNA left thyroid biopsy with Dr. Armida Young, pathology was negative for malignant cells.    -2/9/2024 [facility]  Oncology clinic follow-up: Overall she continues to do well on XELOX, today is her fourth and final cycle of planned therapy.  Somehow the shipments of her Xeloda wore off but this last cycle, she completed Xeloda on Wednesday therefore I have asked her not to start taking cycle 4 of her Xeloda until Wednesday, 2/14/2024.  I have also notified our pharmacist.  We will continue with oxaliplatin today as planned. She has mild peripheral neuropathy that is intermittent.  Her hand-foot erythrodysesthesia has improved with more consistent use of emollient lotion and also Voltaren ointment twice a day.  She will continue the Xeloda dose at 1500 mg twice daily again and she will start this next Wednesday for 14 days on and then she will be finished.  I reviewed her labs from today, CT DNA was drawn, her CBC looks good with WBC 7.45, hemoglobin 12.3, hematocrit 36.9%, platelet count 232,000, ANC 4.83, her CMP is unremarkable other than for glucose of 309.  I did go over to see [place] in the infusion area as I did not get the results of her CMP until she had left our visit.  She states that she did take her diabetes medications as directed, she does not monitor her glucose at home regularly, she will start doing so and will follow-up with her PCP.  Her magnesium is normal, CEA is pending.  We will plan on repeating CT chest, abdomen and pelvis for restaging prior to return in 4 weeks and I have ordered that today.  Since we saw her last she did have FNA of left thyroid nodule with Dr. Armida Young and that was negative for malignant cells.  We will discontinue her PICC line after her treatment today. Addendum: CEA 2.45 up from 0.694 months ago.  CT DNA negative.    -3/1/2024 Chest abdomen pelvis with contrast compared to October CTs shows left lobe thyroid nodule previously noted with multinodular goiter.  Scarring apical portions of both lungs.  Slight thoracic esophageal thickening inflammation versus underdistention.   Mild hepatic steatosis.  Postsurgical sigmoid changes with haziness around the colon.  Right adnexal cyst no longer identified.  Thickening of the bladder wall possible cystitis.  L4-5, L5-S1 surgical changes.  Moderate stool burden    -3/8/2024 [facility] oncology clinic follow-up: On arm 1B  has completed 4 adjuvant cycles of Xeloda oxaliplatin.  CT DNA is negative in February.  We will repeat CT DNA CEA CMP CBC mid May and CTs the end of May and I will see her back early June and in the meantime I have reached out to Dr. Calle to do follow-up colonoscopy.  Her PICC line is out.  Thyroid has been biopsied and is benign.  Souls of her feet peeled with the last cycle and that has resolved.  Cold sensitive neuropathy still comes and goes but otherwise no significant residual neuropathy.  Encouraged her to follow-up with primary care for management of her blood glucose.     Malignant neoplasm of sigmoid colon   8/29/2023 Initial Diagnosis    Malignant neoplasm of sigmoid colon     10/2/2023 Cancer Staged    Staging form: Colon And Rectum, AJCC 8th Edition  - Clinical: Stage IIIB (cT3, cN1, cM0) - Signed by Bernardino Chaparro MD on 10/2/2023     10/19/2023 Cancer Staged    Staging form: Colon And Rectum, AJCC 8th Edition  - Pathologic: Stage IIIB (pT3, pN1b, cM0) - Signed by Bernardino Chaparro MD on 10/19/2023     11/30/2023 - 11/30/2023 Chemotherapy    OP COLON CapeOX Capecitabine / OXALIplatin     12/4/2023 -  Chemotherapy    RESEARCH COLON  ARM 1B CAPOX (OXALIplatin / Capecitabine)         HISTORY OF PRESENT ILLNESS:  The patient is a 58 y.o. female, here for follow up on management of adjuvant therapy colon cancer.  Had desquamation of the soles of her feet that has resolved with last cycle of Xeloda oxaliplatin.  Still with some cold sensitive neuropathy.    Past Medical History:   Diagnosis Date    Anxiety     Arthritis     Cant recall the date of onset    Chest pain 11/06/2019    Colon polyp     Depression      "Diabetes mellitus     Dysphagia 11/06/2019    Endometriosis     Fibromyalgia, primary     GERD (gastroesophageal reflux disease)     Headache     HL (hearing loss)     Hyperlipidemia     Hypertension     Low vitamin D level     Malignant neoplasm of sigmoid colon 08/29/2023    Migraines     Ovarian cyst     PMDD (premenstrual dysphoric disorder)     Scoliosis     Wears glasses      Past Surgical History:   Procedure Laterality Date    BLADDER SURGERY      Bladder tuck    COLONOSCOPY      every 5 years    ENDOMETRIAL ABLATION      LUMBAR DISCECTOMY  2010    L4-S1 Rt- discectomy Dr. Arellano    SPINAL CORD STIMULATOR IMPLANT N/A 08/19/2019    Procedure: SPINAL CORD STIMULATOR INSERTION;  Surgeon: Ethan Peters MD;  Location: [facility];  Service: Neurosurgery    SPINE SURGERY  2010    TONSILLECTOMY AND ADENOIDECTOMY         Allergies   Allergen Reactions    Dihydroergotamine GI Intolerance     VOMITING    Methergine [Methylergonovine Maleate] Nausea And Vomiting    Amoxicillin Rash       Family History and Social History reviewed and changed as necessary    REVIEW OF SYSTEM:   Does have chronic low back pain that is worse recently.    PHYSICAL EXAM:  No jaundice icterus or pallor.  No respiratory distress.  No rashes.    Vitals:    03/08/24 0927   BP: 148/82   Pulse: 101   Resp: 18   Temp: 97.1 °F (36.2 °C)   SpO2: 96%   Weight: 74.4 kg (164 lb)   Height: 162.6 cm (64\")     Vitals:    03/08/24 0927   PainSc: 4  Comment: lower back pain x2 days          ECOG score: 1           Vitals reviewed.    ECOG: (1) Restricted in Physically Strenuous Activity, Ambulatory & Able to Do Work of Light Nature    Lab Results   Component Value Date    HGB 12.3 02/09/2024    HCT 36.9 02/09/2024    MCV 93.7 02/09/2024     02/09/2024    WBC 7.45 02/09/2024    NEUTROABS 4.83 02/09/2024    LYMPHSABS 1.94 02/09/2024    MONOSABS 0.56 02/09/2024    EOSABS 0.07 02/09/2024    BASOSABS 0.03 02/09/2024       Lab Results   Component " Value Date    GLUCOSE 309 (H) 02/09/2024    BUN 9 02/09/2024    CREATININE 0.88 02/09/2024     (L) 02/09/2024    K 3.7 02/09/2024     02/09/2024    CO2 22.0 02/09/2024    CALCIUM 9.1 02/09/2024    PROTEINTOT 6.7 02/09/2024    ALBUMIN 3.8 02/09/2024    BILITOT 0.7 02/09/2024    ALKPHOS 105 02/09/2024    AST 16 02/09/2024    ALT 17 02/09/2024             ASSESSMENT & PLAN:  1.  Colon cancer found in a tubular adenoma 20 cm from the anal verge with negative staging CT chest abdomen and pelvis normal baseline CEA 1.26 with no visible lesion on MRI rectal protocol.  9/25/2023 laparoscopic sigmoid colectomy Dr. Michel pathologic T3 extending through muscularis propria into pericolonic fat, N1b 3/14 node positive.  CT DNA negative.  On  randomized to the treatment arm.  Will give 3 months of capecitabine and oxaliplatin with NSAID cream.  Fourth cycle of Xeloda oxaliplatin started 2/9/2024.  CT DNA negative at that junction with CEA 2.45 and negative CTs for recurrence  2.  Goiter with 1.8 cm left thyroid nodule found on colon cancer staging   -2/1/2024 FNA left thyroid biopsy with Dr. Armida Young, pathology was negative for malignant cells.  3.  Myomectomy with bladder tack July 2015  4.  Diabetes  5.  Hypertension  6.  Hyperlipidemia  7.  Migraines  8.  Depression  9.  Reflux  10.  History of uterine ablation with ovarian cyst on post-op staging imaging of colon cancer  11.  History of tonsillectomy  12.  History of tympanostomy tubes  13.  History of L4-5 disc surgery and L5-S1 discectomy and fusion  14.  History of solid and liquid phase dysphagia with history of mild gastritis and some esophageal ranging on EGD 2019  15.  Compound melanocytic nevus with dermatofibroma removed by Dr. Ray 07/12/2023    Oncology history timeline:  -12/20/2019 EGD showed small hiatal hernia with some ringing of the esophagus biopsy.  Mild gastritis.  No intestinal metaplasia or dysplasia or malignancy.  Reflux  suggestive but not diagnostic.  -8/18/2023 colonoscopy Dr. Calle.  Diverticulosis in the sigmoid colon.  7 mm polyp in the rectum.  Diverticulosis in the sigmoid colon.  Polypoid area with ulceration crescent-shaped 1.8 cm 20 cm proximal to the anus likely sigmoid.  Tattooed and biopsied.  Rectal polyp showed tubular adenoma with no high-grade dysplasia.  Colon biopsy at 20 cm showed invasive moderately differentiated colon adenocarcinoma arising within a tubular adenoma with high-grade dysplasia.  Microsatellite stable    -8/24/2023 CEA normal 1.26.  ALT 34 bicarb 20.2 otherwise unremarkable CMP.  CBC normal with hemoglobin 14.4 and normal MCV.  -8/27/2023 CT abdomen pelvis with contrast did not show any colonic mass.  Mild distal esophageal thickening, hepatic steatosis without hepatic metastasis.  No lytic bone disease.  MRI rectal protocol without contrast reviewed with Dr. López.  Perhaps a small pucker at the site of biopsy but no discernible mass per se and no adenopathy.  -8/28/2023 CT chest shows no metastasis.  1.8 cm left thyroid for which follow-up is recommended    -8/29/2023 [facility] medical oncology consult: On screening colonoscopy patient found to have ulcerated polyp at 20 cm from the anal verge with moderately differentiated adenocarcinoma microsatellite stable.  Normal baseline CEA with staging CT chest abdomen pelvis and MRI rectal protocol showing no evidence of metastasis and no visible primary on MRI.  Based on distance from anal verge on colonoscopy and given the absence of visible abnormality on MRI rectal protocol to change that estimated position, would call this colon cancer.  Given microsatellite stability, would not contemplate calling this rectal cancer and treating with immunotherapy but regardless of whether rectal or colon primary this would at least be no more than T1 tumor clinically with no visible tumor on MRI despite colonoscopy findings.  The patient has experience with   Anand with whom she requests a consultation for definitive surgery and I will see back postoperatively to decide on any adjuvant therapy as indicated based on pathologic staging.  There is some esophageal thickening on CT for which I would request Dr. Calle repeat EGD last done 2019 just for completeness sake but first would deal with the colon cancer at hand.She will also need to get left thyroid ultrasound eventually and would likely do this through whoever has managed her goiter previously.      -- 9/25/2023 laparoscopic sigmoid colectomy Dr. Michel.    -10/2/2023 [facility] medical oncology follow-up: Final pathology from surgery done a week ago is not back.  According to verbal report to me from the pathologist has either T2 or T3 disease but he thinks that this is tracking along the arteries and will be T3 and she had 3 out of 13 nodes involved.  Hence I would call this stage IIIb.  Her baseline CEA was normal preoperatively so that will not be extremely helpful postoperatively.  Outside of clinical trial the standard of care would be 3 months of CapeOx or 6 months of FOLFOX.  I would be interested in putting her on NRG- where upon CT DNA negative patients are randomized to either serial screening and CT DNA or 3 months CapeOx/6 months FOLFOX providers choice.  CT DNA positive patients are randomized to either 6 months of CapeOx or FOLFOX or 6 months of FOLFIRINOX.  If we can go ahead and get the [place] CT DNA cooking outside of clinical trial without significant financial toxicity we may do that.  Ideally chemotherapy would start less than 8 weeks out from surgery and we should have plenty of time to have her heal and to make these decisions collectively.  Addendum: Final pathology report… Moderately differentiated adenocarcinoma pathologic T3N1B, negative margins margins 3 out of 14 nodes positive with negative new anastomotic excisional margin.  Tumor extent ends through muscularis propria into pericolonic  or perirectal tissue.  No lymph-vascular or perineural invasion.     -10/16/2023 CT chest Abdomen pelvis compared to CT chest 8/28/2023 and CT abdomen pelvis 8/27/2023 shows no metastasis in the chest abdomen or pelvis and interval sigmoid colectomy.  New right adnexal cystic lesion 6 cm recommending pelvic ultrasound in 3 months    -10/19/2023 [facility] medical oncology follow-up: I went over her scans with her.  No clear-cut metastatic disease.  I will ask her to get to Dr. Mcguire for the right adnexal cystic lesion as quickly as possible and would not wait 3 months on the ultrasound in light of clinical trial.  I suspect this is benign but we will get this worked up.  On [facility] nomogram, putting in her pathologic information she has a 5-year survival rate of 69% give or take 7%.  With adjuvant therapy her 5-year survival rate is 85%.  This is based on statistics not informed by molecular data.  We are aware of prognostic data from prior CT DNA trials whereby patients with negative CT DNA have a considerably higher 5-year survival rate and people with CT DNA positivity have a considerably lower 5-year survival rate and the above 5-year survival rate is probably a conglomerate of statistics between these people that averages out.  I certainly would be fine with either 6 months of FOLFOX or 3 months of Xeloda oxaliplatin outside of a clinical trial and that would be my standard recommendation.   is now open and we are enrolling her on that per her request.  If she has negative CT DNA then she is randomized to watchful waiting versus CapeOx x4 or FOLFOX x12.  If CT DNA is positive then she is randomized to CapeOx x6 or FOLFOX x12 (investigators choice) versus treatment intensification with FOLFIRINOX.  I will see her back in a few weeks for the CT DNA results.    - 11/8/2023 evaluation by Dr. Eloisa Uribe, GYN oncology of right adnexal mass with minimal complexity is not consistent with Krukenberg tumor  or other malignancy.  There is a thickened endometrial stripe.  Perimenopausal state.  Still having monthly periods hormonal blood work from primary OB/GYN not available.  Imaging reviewed by Dr. Uribe with no concerns for this being malignancy.   normal within the last month.  If it were not for serial CTs already planned, patient would have transvaginal ultrasound in 6 months but with plans for serial CTs, GYN oncology plans no further ultrasounds.  Due to low risk of malignancy, GYN oncology recommends regular follow-up with primary OB/GYN and follow-up if images change in a worrisome manner.    -11/9/2023 [facility] medical oncology follow-up: The CT DNA is still pending.  We should have the results by November 30 or sooner.  8 weeks from surgery would be November 20.  There is good data that disease-free survival and overall outcomes are not compromised when adjuvant chemotherapy occurs 8 weeks out from surgery compared to 4 weeks or less.  The data beyond that does show some falloff of survival when there are significant delays but I do not think her overall risk is going to substantially change whether we treated her the week of the 20th or the week of 27 November.  There is the option to give her 1 cycle of chemotherapy while waiting on this but that would not accrue to her overall number of cycles and I do not want to add oxaliplatin cycles overall given the potential additive toxicity of oxaliplatin.  She understands the uncertainties here and wishes to proceed with the clinical trial.  She has seen gynecologic oncology for evaluation and this is new ovarian cyst will be watched and is felt to be benign according to them and can be just watched by serial CTs and primary GYN exam.  I will see her back November 27 hopefully to have results by then and hopefully to then later that week proceed with chemotherapy as indicated based on the randomizations.    -12/1/2023 [facility] oncology follow-up: CT  DNA reportedly 0 per  .  She randomized to the treatment arm for which we have seen her and educated her for Xeloda oxaliplatin x 3 months with NSAID cream to mitigate palmar and plantar skin issues.  Will ask scan routinely for routine surveillance and with that we will also keep an eye on the right adnexal mass felt to be benign per GYN oncology.  Treatment 1 cycle 1 orders signed and we will see her for cycle 2 day 1 the day after Dennis with my nurse practitioner in [place].  She has had her cancer treatment preparation visit and education and consenting.  She has been vasculature and approval has been granted to treat her by peripheral vein from the nursing staff.    -12/13/2023 PICC line placed due to discomfort with oxaliplatin infusion  -12/26/2023 [facility] Oncology clinic follow-up: Yelena tolerated her first cycle of XELOX fairly well.  She has some mild fatigue.  She does continue to work.  She had 1 episode of diarrhea that resolved with Imodium.  Labs reviewed from today, CBC and CMP are normal other than for glucose of 128.  She now has a PICC line as oxaliplatin was uncomfortable infused peripherally.  We will proceed today with cycle #2 unchanged, I will see her back for follow-up for her third cycle.    -1/16/2024 [facility] Oncology clinic follow-up: Today will be cycle 3 of a planned 4 cycles of XELOX.  She has some mild palmar erythrodysesthesia, after further discussion she has not been using the Voltaren ointment twice daily as prescribed.  She will begin doing that along with making sure to keep her hands well moisturized throughout the day.  Right now this is a grade 1-2.  She understands to notify our office if it worsens during this cycle while she is taking her Xeloda and we may have to hold or reduce dose.  She has no lingering neuropathy.  Labs reviewed from today, counts are acceptable to continue treatment unchanged. Her CBC is normal, CMP normal other than for  glucose of 279.  We will move her next treatment to a Friday at her request to work better with her work schedule and give her the weekend to recover.    -2/1/2024 FNA left thyroid biopsy with Dr. Armida Young, pathology was negative for malignant cells.    -2/9/2024 [facility] [place] Oncology clinic follow-up: Overall she continues to do well on XELOX, today is her fourth and final cycle of planned therapy.  Somehow the shipments of her Xeloda wore off but this last cycle, she completed Xeloda on Wednesday therefore I have asked her not to start taking cycle 4 of her Xeloda until Wednesday, 2/14/2024.  I have also notified our pharmacist.  We will continue with oxaliplatin today as planned. She has mild peripheral neuropathy that is intermittent.  Her hand-foot erythrodysesthesia has improved with more consistent use of emollient lotion and also Voltaren ointment twice a day.  She will continue the Xeloda dose at 1500 mg twice daily again and she will start this next Wednesday for 14 days on and then she will be finished.  I reviewed her labs from today, CT DNA was drawn, her CBC looks good with WBC 7.45, hemoglobin 12.3, hematocrit 36.9%, platelet count 232,000, ANC 4.83, her CMP is unremarkable other than for glucose of 309.  I did go over to see Yelena in the infusion area as I did not get the results of her CMP until she had left our visit.  She states that she did take her diabetes medications as directed, she does not monitor her glucose at home regularly, she will start doing so and will follow-up with her PCP.  Her magnesium is normal, CEA is pending.  We will plan on repeating CT chest, abdomen and pelvis for restaging prior to return in 4 weeks and I have ordered that today.  Since we saw her last she did have FNA of left thyroid nodule with Dr. Armida Young and that was negative for malignant cells.  We will discontinue her PICC line after her treatment today. Addendum: CEA 2.45 up from 0.694 months ago.  CT  DNA negative.    -3/1/2024 Chest abdomen pelvis with contrast compared to October CTs shows left lobe thyroid nodule previously noted with multinodular goiter.  Scarring apical portions of both lungs.  Slight thoracic esophageal thickening inflammation versus underdistention.  Mild hepatic steatosis.  Postsurgical sigmoid changes with haziness around the colon.  Right adnexal cyst no longer identified.  Thickening of the bladder wall possible cystitis.  L4-5, L5-S1 surgical changes.  Moderate stool burden    -3/8/2024 [facility] oncology clinic follow-up: On arm 1B  has completed 4 adjuvant cycles of Xeloda oxaliplatin.  CT DNA is negative in February.  We will repeat CT DNA CEA CMP CBC mid May and CTs the end of May and I will see her back early June and in the meantime I have reached out to Dr. Calle to do follow-up colonoscopy.  Her PICC line is out.  Thyroid has been biopsied and is benign.  Souls of her feet peeled with the last cycle and that has resolved.  Cold sensitive neuropathy still comes and goes but otherwise no significant residual neuropathy.  Encouraged her to follow-up with primary care for management of her blood glucose.    Total time of care today inclusive of time spent today prior to patient's arrival reviewing interval images and reports thereof and interval data as outlined above and during visit interviewing her as to signs or symptoms of her disease and management thereof and after visit instituting the plan as outlined took 50 minutes of patient care time throughout the day today.  Bernardino Chaparro MD    03/08/2024

## 2024-03-08 NOTE — LETTER
March 8, 2024       No Recipients    Patient: Yelena Morris   YOB: 1965   Date of Visit: 3/8/2024     Dear Fiona Corona MD:       Thank you for referring Yelena Morris to me for evaluation. Below are the relevant portions of my assessment and plan of care.    If you have questions, please do not hesitate to call me. I look forward to following Yelena along with you.         Sincerely,        Bernardino Chaparro MD        CC:   No Recipients    Bernardino Chaparro MD  03/08/24 0950  Sign when Signing Visit  CHIEF COMPLAINT: No new somatic complaints    Problem List:  Oncology/Hematology History Overview Note   1.  Colon cancer found in a tubular adenoma 20 cm from the anal verge with negative staging CT chest abdomen and pelvis normal baseline CEA 1.26 with no visible lesion on MRI rectal protocol.  9/25/2023 laparoscopic sigmoid colectomy Dr. Michel pathologic T3 extending through muscularis propria into pericolonic fat, N1b 3/14 node positive.  CT DNA negative.  On  randomized to the treatment arm.  Will give 3 months of capecitabine and oxaliplatin with NSAID cream.  Fourth cycle of Xeloda oxaliplatin started 2/9/2024.  CT DNA negative at that junction with CEA 2.45 and negative CTs for recurrence  2.  Goiter with 1.8 cm left thyroid nodule found on colon cancer staging   -2/1/2024 FNA left thyroid biopsy with Dr. Armida Young, pathology was negative for malignant cells.  3.  Myomectomy with bladder tack July 2015  4.  Diabetes  5.  Hypertension  6.  Hyperlipidemia  7.  Migraines  8.  Depression  9.  Reflux  10.  History of uterine ablation with ovarian cyst on post-op staging imaging of colon cancer  11.  History of tonsillectomy  12.  History of tympanostomy tubes  13.  History of L4-5 disc surgery and L5-S1 discectomy and fusion  14.  History of solid and liquid phase dysphagia with history of mild gastritis and some esophageal ranging on EGD 2019  15.  Compound melanocytic nevus with  dermatofibroma removed by Dr. Ray 07/12/2023    Oncology history timeline:  -12/20/2019 EGD showed small hiatal hernia with some ringing of the esophagus biopsy.  Mild gastritis.  No intestinal metaplasia or dysplasia or malignancy.  Reflux suggestive but not diagnostic.  -8/18/2023 colonoscopy Dr. Calle.  Diverticulosis in the sigmoid colon.  7 mm polyp in the rectum.  Diverticulosis in the sigmoid colon.  Polypoid area with ulceration crescent-shaped 1.8 cm 20 cm proximal to the anus likely sigmoid.  Tattooed and biopsied.  Rectal polyp showed tubular adenoma with no high-grade dysplasia.  Colon biopsy at 20 cm showed invasive moderately differentiated colon adenocarcinoma arising within a tubular adenoma with high-grade dysplasia.  Microsatellite stable    -8/24/2023 CEA normal 1.26.  ALT 34 bicarb 20.2 otherwise unremarkable CMP.  CBC normal with hemoglobin 14.4 and normal MCV.  -8/27/2023 CT abdomen pelvis with contrast did not show any colonic mass.  Mild distal esophageal thickening, hepatic steatosis without hepatic metastasis.  No lytic bone disease.  MRI rectal protocol without contrast reviewed with Dr. López.  Perhaps a small pucker at the site of biopsy but no discernible mass per se and no adenopathy.  -8/28/2023 CT chest shows no metastasis.  1.8 cm left thyroid for which follow-up is recommended    -8/29/2023 [facility] consult: On screening colonoscopy patient found to have ulcerated polyp at 20 cm from the anal verge with moderately differentiated adenocarcinoma microsatellite stable.  Normal baseline CEA with staging CT chest abdomen pelvis and MRI rectal protocol showing no evidence of metastasis and no visible primary on MRI.  Based on distance from anal verge on colonoscopy and given the absence of visible abnormality on MRI rectal protocol to change that estimated position, would call this colon cancer.  Given microsatellite stability, would not contemplate calling this  rectal cancer and treating with immunotherapy but regardless of whether rectal or colon primary this would at least be no more than T1 tumor clinically with no visible tumor on MRI despite colonoscopy findings.  The patient has experience with Dr. Michel with whom she requests a consultation for definitive surgery and I will see back postoperatively to decide on any adjuvant therapy as indicated based on pathologic staging.  There is some esophageal thickening on CT for which I would request Dr. Calle repeat EGD last done 2019 just for completeness sake but first would deal with the colon cancer at hand.She will also need to get left thyroid ultrasound eventually and would likely do this through whoever has managed her goiter previously.      -- 9/25/2023 laparoscopic sigmoid colectomy Dr. Michel.    -10/2/2023 [facility] oncology follow-up: Final pathology from surgery done a week ago is not back.  According to verbal report to me from the pathologist has either T2 or T3 disease but he thinks that this is tracking along the arteries and will be T3 and she had 3 out of 13 nodes involved.  Hence I would call this stage IIIb.  Her baseline CEA was normal preoperatively so that will not be extremely helpful postoperatively.  Outside of clinical trial the standard of care would be 3 months of CapeOx or 6 months of FOLFOX.  I would be interested in putting her on NRG- where upon CT DNA negative patients are randomized to either serial screening and CT DNA or 3 months CapeOx/6 months FOLFOX providers choice.  CT DNA positive patients are randomized to either 6 months of CapeOx or FOLFOX or 6 months of FOLFIRINOX.  If we can go ahead and get the [facility] CT DNA cooking outside of clinical trial without significant financial toxicity we may do that.  Ideally chemotherapy would start less than 8 weeks out from surgery and we should have plenty of time to have her heal and to make these decisions collectively.   Addendum: Final pathology report… Moderately differentiated adenocarcinoma pathologic T3N1B, negative margins margins 3 out of 14 nodes positive with negative new anastomotic excisional margin.  Tumor extent ends through muscularis propria into pericolonic or perirectal tissue.  No lymph-vascular or perineural invasion.     -10/16/2023 CT chest Abdomen pelvis compared to CT chest 8/28/2023 and CT abdomen pelvis 8/27/2023 shows no metastasis in the chest abdomen or pelvis and interval sigmoid colectomy.  New right adnexal cystic lesion 6 cm recommending pelvic ultrasound in 3 months    -10/19/2023 [facility] medical oncology follow-up: I went over her scans with her.  No clear-cut metastatic disease.  I will ask her to get to Dr. Mcguire for the right adnexal cystic lesion as quickly as possible and would not wait 3 months on the ultrasound in light of clinical trial.  I suspect this is benign but we will get this worked up.  On [facility] nomogram, putting in her pathologic information she has a 5-year survival rate of 69% give or take 7%.  With adjuvant therapy her 5-year survival rate is 85%.  This is based on statistics not informed by molecular data.  We are aware of prognostic data from prior CT DNA trials whereby patients with negative CT DNA have a considerably higher 5-year survival rate and people with CT DNA positivity have a considerably lower 5-year survival rate and the above 5-year survival rate is probably a conglomerate of statistics between these people that averages out.  I certainly would be fine with either 6 months of FOLFOX or 3 months of Xeloda oxaliplatin outside of a clinical trial and that would be my standard recommendation.   is now open and we are enrolling her on that per her request.  If she has negative CT DNA then she is randomized to watchful waiting versus CapeOx x4 or FOLFOX x12.  If CT DNA is positive then she is randomized to CapeOx x6 or FOLFOX x12 (investigators choice)  versus treatment intensification with FOLFIRINOX.  I will see her back in a few weeks for the CT DNA results.    - 11/8/2023 evaluation by Dr. Eloisa Uribe, GYN oncology of right adnexal mass with minimal complexity is not consistent with Krukenberg tumor or other malignancy.  There is a thickened endometrial stripe.  Perimenopausal state.  Still having monthly periods hormonal blood work from primary OB/GYN not available.  Imaging reviewed by Dr. Uribe with no concerns for this being malignancy.   normal within the last month.  If it were not for serial CTs already planned, patient would have transvaginal ultrasound in 6 months but with plans for serial CTs, GYN oncology plans no further ultrasounds.  Due to low risk of malignancy, GYN oncology recommends regular follow-up with primary OB/GYN and follow-up if images change in a worrisome manner.    -11/9/2023 [facility] oncology follow-up: The CT DNA is still pending.  We should have the results by November 30 or sooner.  8 weeks from surgery would be November 20.  There is good data that disease-free survival and overall outcomes are not compromised when adjuvant chemotherapy occurs 8 weeks out from surgery compared to 4 weeks or less.  The data beyond that does show some falloff of survival when there are significant delays but I do not think her overall risk is going to substantially change whether we treated her the week of the 20th or the week of 27 November.  There is the option to give her 1 cycle of chemotherapy while waiting on this but that would not accrue to her overall number of cycles and I do not want to add oxaliplatin cycles overall given the potential additive toxicity of oxaliplatin.  She understands the uncertainties here and wishes to proceed with the clinical trial.  She has seen gynecologic oncology for evaluation and this is new ovarian cyst will be watched and is felt to be benign according to them and can be just watched by  serial CTs and primary GYN exam.  I will see her back November 27 hopefully to have results by then and hopefully to then later that week proceed with chemotherapy as indicated based on the randomizations.    -12/1/2023 [facility] follow-up: CT DNA reportedly 0 per  .  She randomized to the treatment arm for which we have seen her and educated her for Xeloda oxaliplatin x 3 months with NSAID cream to mitigate palmar and plantar skin issues.  Will ask scan routinely for routine surveillance and with that we will also keep an eye on the right adnexal mass felt to be benign per GYN oncology.  Treatment 1 cycle 1 orders signed and we will see her for cycle 2 day 1 the day after Dennis with my nurse practitioner in [place].  She has had her cancer treatment preparation visit and education and consenting.  She has been vasculature and approval has been granted to treat her by peripheral vein from the nursing staff.    -12/13/2023 PICC line placed due to discomfort with oxaliplatin infusion  -12/26/2023 [facility] follow-up: Yelena tolerated her first cycle of XELOX fairly well.  She has some mild fatigue.  She does continue to work.  She had 1 episode of diarrhea that resolved with Imodium.  Labs reviewed from today, CBC and CMP are normal other than for glucose of 128.  She now has a PICC line as oxaliplatin was uncomfortable infused peripherally.  We will proceed today with cycle #2 unchanged, I will see her back for follow-up for her third cycle.    -1/16/2024 [facility] follow-up: Today will be cycle 3 of a planned 4 cycles of XELOX.  She has some mild palmar erythrodysesthesia, after further discussion she has not been using the Voltaren ointment twice daily as prescribed.  She will begin doing that along with making sure to keep her hands well moisturized throughout the day.  Right now this is a grade 1-2.  She understands to notify our office if  it worsens during this cycle while she is taking her Xeloda and we may have to hold or reduce dose.  She has no lingering neuropathy.  Labs reviewed from today, counts are acceptable to continue treatment unchanged. Her CBC is normal, CMP normal other than for glucose of 279.  We will move her next treatment to a Friday at her request to work better with her work schedule and give her the weekend to recover.    -2/1/2024 FNA left thyroid biopsy with Dr. Armida Young, pathology was negative for malignant cells.    -2/9/2024 [facility] clinic follow-up: Overall she continues to do well on XELOX, today is her fourth and final cycle of planned therapy.  Somehow the shipments of her Xeloda wore off but this last cycle, she completed Xeloda on Wednesday therefore I have asked her not to start taking cycle 4 of her Xeloda until Wednesday, 2/14/2024.  I have also notified our pharmacist.  We will continue with oxaliplatin today as planned. She has mild peripheral neuropathy that is intermittent.  Her hand-foot erythrodysesthesia has improved with more consistent use of emollient lotion and also Voltaren ointment twice a day.  She will continue the Xeloda dose at 1500 mg twice daily again and she will start this next Wednesday for 14 days on and then she will be finished.  I reviewed her labs from today, CT DNA was drawn, her CBC looks good with WBC 7.45, hemoglobin 12.3, hematocrit 36.9%, platelet count 232,000, ANC 4.83, her CMP is unremarkable other than for glucose of 309.  I did go over to see Yelena in the infusion area as I did not get the results of her CMP until she had left our visit.  She states that she did take her diabetes medications as directed, she does not monitor her glucose at home regularly, she will start doing so and will follow-up with her PCP.  Her magnesium is normal, CEA is pending.  We will plan on repeating CT chest, abdomen and pelvis for restaging prior to return in 4 weeks and I have  ordered that today.  Since we saw her last she did have FNA of left thyroid nodule with Dr. Armida Young and that was negative for malignant cells.  We will discontinue her PICC line after her treatment today. Addendum: CEA 2.45 up from 0.694 months ago.  CT DNA negative.    -3/1/2024 Chest abdomen pelvis with contrast compared to October CTs shows left lobe thyroid nodule previously noted with multinodular goiter.  Scarring apical portions of both lungs.  Slight thoracic esophageal thickening inflammation versus underdistention.  Mild hepatic steatosis.  Postsurgical sigmoid changes with haziness around the colon.  Right adnexal cyst no longer identified.  Thickening of the bladder wall possible cystitis.  L4-5, L5-S1 surgical changes.  Moderate stool burden    -3/8/2024 [facility] follow-up: On arm 1B  has completed 4 adjuvant cycles of Xeloda oxaliplatin.  CT DNA is negative in February.  We will repeat CT DNA CEA CMP CBC mid May and CTs the end of May and I will see her back early June and in the meantime I have reached out to Dr. Calle to do follow-up colonoscopy.  Her PICC line is out.  Thyroid has been biopsied and is benign.  Souls of her feet peeled with the last cycle and that has resolved.  Cold sensitive neuropathy still comes and goes but otherwise no significant residual neuropathy.  Encouraged her to follow-up with primary care for management of her blood glucose.     Malignant neoplasm of sigmoid colon   8/29/2023 Initial Diagnosis    Malignant neoplasm of sigmoid colon     10/2/2023 Cancer Staged    Staging form: Colon And Rectum, AJCC 8th Edition  - Clinical: Stage IIIB (cT3, cN1, cM0) - Signed by Bernardino Chaparro MD on 10/2/2023     10/19/2023 Cancer Staged    Staging form: Colon And Rectum, AJCC 8th Edition  - Pathologic: Stage IIIB (pT3, pN1b, cM0) - Signed by Bernardino Chaparro MD on 10/19/2023     11/30/2023 - 11/30/2023 Chemotherapy    OP COLON CapeOX Capecitabine / OXALIplatin      12/4/2023 -  Chemotherapy    RESEARCH COLON  ARM 1B CAPOX (OXALIplatin / Capecitabine)         HISTORY OF PRESENT ILLNESS:  The patient is a 58 y.o. female, here for follow up on management of adjuvant therapy colon cancer.  Had desquamation of the soles of her feet that has resolved with last cycle of Xeloda oxaliplatin.  Still with some cold sensitive neuropathy.    Past Medical History:   Diagnosis Date   • Anxiety    • Arthritis     Cant recall the date of onset   • Chest pain 11/06/2019   • Colon polyp    • Depression    • Diabetes mellitus    • Dysphagia 11/06/2019   • Endometriosis    • Fibromyalgia, primary    • GERD (gastroesophageal reflux disease)    • Headache    • HL (hearing loss)    • Hyperlipidemia    • Hypertension    • Low vitamin D level    • Malignant neoplasm of sigmoid colon 08/29/2023   • Migraines    • Ovarian cyst    • PMDD (premenstrual dysphoric disorder)    • Scoliosis    • Wears glasses      Past Surgical History:   Procedure Laterality Date   • BLADDER SURGERY      Bladder tuck   • COLONOSCOPY      every 5 years   • ENDOMETRIAL ABLATION     • LUMBAR DISCECTOMY  2010    L4-S1 Rt- discectomy Dr. Arellano   • SPINAL CORD STIMULATOR IMPLANT N/A 08/19/2019    Procedure: SPINAL CORD STIMULATOR INSERTION;  Surgeon: Ethan Peters MD;  Location: [facility];  Service: Neurosurgery   • SPINE SURGERY  2010   • TONSILLECTOMY AND ADENOIDECTOMY         Allergies   Allergen Reactions   • Dihydroergotamine GI Intolerance     VOMITING   • Methergine [Methylergonovine Maleate] Nausea And Vomiting   • Amoxicillin Rash       Family History and Social History reviewed and changed as necessary    REVIEW OF SYSTEM:   Does have chronic low back pain that is worse recently.    PHYSICAL EXAM:  No jaundice icterus or pallor.  No respiratory distress.  No rashes.    Vitals:    03/08/24 0927   BP: 148/82   Pulse: 101   Resp: 18   Temp: 97.1 °F (36.2 °C)   SpO2: 96%   Weight: 74.4 kg (164 lb)   Height:  "162.6 cm (64\")     Vitals:    03/08/24 0927   PainSc: 4  Comment: lower back pain x2 days          ECOG score: 1           Vitals reviewed.    ECOG: (1) Restricted in Physically Strenuous Activity, Ambulatory & Able to Do Work of Light Nature    Lab Results   Component Value Date    HGB 12.3 02/09/2024    HCT 36.9 02/09/2024    MCV 93.7 02/09/2024     02/09/2024    WBC 7.45 02/09/2024    NEUTROABS 4.83 02/09/2024    LYMPHSABS 1.94 02/09/2024    MONOSABS 0.56 02/09/2024    EOSABS 0.07 02/09/2024    BASOSABS 0.03 02/09/2024       Lab Results   Component Value Date    GLUCOSE 309 (H) 02/09/2024    BUN 9 02/09/2024    CREATININE 0.88 02/09/2024     (L) 02/09/2024    K 3.7 02/09/2024     02/09/2024    CO2 22.0 02/09/2024    CALCIUM 9.1 02/09/2024    PROTEINTOT 6.7 02/09/2024    ALBUMIN 3.8 02/09/2024    BILITOT 0.7 02/09/2024    ALKPHOS 105 02/09/2024    AST 16 02/09/2024    ALT 17 02/09/2024             ASSESSMENT & PLAN:  1.  Colon cancer found in a tubular adenoma 20 cm from the anal verge with negative staging CT chest abdomen and pelvis normal baseline CEA 1.26 with no visible lesion on MRI rectal protocol.  9/25/2023 laparoscopic sigmoid colectomy Dr. Michel pathologic T3 extending through muscularis propria into pericolonic fat, N1b 3/14 node positive.  CT DNA negative.  On  randomized to the treatment arm.  Will give 3 months of capecitabine and oxaliplatin with NSAID cream.  Fourth cycle of Xeloda oxaliplatin started 2/9/2024.  CT DNA negative at that junction with CEA 2.45 and negative CTs for recurrence  2.  Goiter with 1.8 cm left thyroid nodule found on colon cancer staging   -2/1/2024 FNA left thyroid biopsy with Dr. Armida Young, pathology was negative for malignant cells.  3.  Myomectomy with bladder tack July 2015  4.  Diabetes  5.  Hypertension  6.  Hyperlipidemia  7.  Migraines  8.  Depression  9.  Reflux  10.  History of uterine ablation with ovarian cyst on post-op staging " imaging of colon cancer  11.  History of tonsillectomy  12.  History of tympanostomy tubes  13.  History of L4-5 disc surgery and L5-S1 discectomy and fusion  14.  History of solid and liquid phase dysphagia with history of mild gastritis and some esophageal ranging on EGD 2019  15.  Compound melanocytic nevus with dermatofibroma removed by Dr. Ray 07/12/2023    Oncology history timeline:  -12/20/2019 EGD showed small hiatal hernia with some ringing of the esophagus biopsy.  Mild gastritis.  No intestinal metaplasia or dysplasia or malignancy.  Reflux suggestive but not diagnostic.  -8/18/2023 colonoscopy Dr. Calle.  Diverticulosis in the sigmoid colon.  7 mm polyp in the rectum.  Diverticulosis in the sigmoid colon.  Polypoid area with ulceration crescent-shaped 1.8 cm 20 cm proximal to the anus likely sigmoid.  Tattooed and biopsied.  Rectal polyp showed tubular adenoma with no high-grade dysplasia.  Colon biopsy at 20 cm showed invasive moderately differentiated colon adenocarcinoma arising within a tubular adenoma with high-grade dysplasia.  Microsatellite stable    -8/24/2023 CEA normal 1.26.  ALT 34 bicarb 20.2 otherwise unremarkable CMP.  CBC normal with hemoglobin 14.4 and normal MCV.  -8/27/2023 CT abdomen pelvis with contrast did not show any colonic mass.  Mild distal esophageal thickening, hepatic steatosis without hepatic metastasis.  No lytic bone disease.  MRI rectal protocol without contrast reviewed with Dr. López.  Perhaps a small pucker at the site of biopsy but no discernible mass per se and no adenopathy.  -8/28/2023 CT chest shows no metastasis.  1.8 cm left thyroid for which follow-up is recommended    -8/29/2023 [facility] consult: On screening colonoscopy patient found to have ulcerated polyp at 20 cm from the anal verge with moderately differentiated adenocarcinoma microsatellite stable.  Normal baseline CEA with staging CT chest abdomen pelvis and MRI rectal protocol  showing no evidence of metastasis and no visible primary on MRI.  Based on distance from anal verge on colonoscopy and given the absence of visible abnormality on MRI rectal protocol to change that estimated position, would call this colon cancer.  Given microsatellite stability, would not contemplate calling this rectal cancer and treating with immunotherapy but regardless of whether rectal or colon primary this would at least be no more than T1 tumor clinically with no visible tumor on MRI despite colonoscopy findings.  The patient has experience with Dr. Michel with whom she requests a consultation for definitive surgery and I will see back postoperatively to decide on any adjuvant therapy as indicated based on pathologic staging.  There is some esophageal thickening on CT for which I would request Dr. Calle repeat EGD last done 2019 just for completeness sake but first would deal with the colon cancer at hand.She will also need to get left thyroid ultrasound eventually and would likely do this through whoever has managed her goiter previously.      -- 9/25/2023 laparoscopic sigmoid colectomy Dr. Michel.    -10/2/2023 [facility] medical oncology follow-up: Final pathology from surgery done a week ago is not back.  According to verbal report to me from the pathologist has either T2 or T3 disease but he thinks that this is tracking along the arteries and will be T3 and she had 3 out of 13 nodes involved.  Hence I would call this stage IIIb.  Her baseline CEA was normal preoperatively so that will not be extremely helpful postoperatively.  Outside of clinical trial the standard of care would be 3 months of CapeOx or 6 months of FOLFOX.  I would be interested in putting her on NRG- where upon CT DNA negative patients are randomized to either serial screening and CT DNA or 3 months CapeOx/6 months FOLFOX providers choice.  CT DNA positive patients are randomized to either 6 months of CapeOx or FOLFOX or 6 months of  FOLFIRINOX.  If we can go ahead and get the [facility] CT DNA cooking outside of clinical trial without significant financial toxicity we may do that.  Ideally chemotherapy would start less than 8 weeks out from surgery and we should have plenty of time to have her heal and to make these decisions collectively.  Addendum: Final pathology report… Moderately differentiated adenocarcinoma pathologic T3N1B, negative margins margins 3 out of 14 nodes positive with negative new anastomotic excisional margin.  Tumor extent ends through muscularis propria into pericolonic or perirectal tissue.  No lymph-vascular or perineural invasion.     -10/16/2023 CT chest Abdomen pelvis compared to CT chest 8/28/2023 and CT abdomen pelvis 8/27/2023 shows no metastasis in the chest abdomen or pelvis and interval sigmoid colectomy.  New right adnexal cystic lesion 6 cm recommending pelvic ultrasound in 3 months    -10/19/2023 [facility] medical oncology follow-up: I went over her scans with her.  No clear-cut metastatic disease.  I will ask her to get to Dr. Mcguire for the right adnexal cystic lesion as quickly as possible and would not wait 3 months on the ultrasound in light of clinical trial.  I suspect this is benign but we will get this worked up.  On [facility] nomogram, putting in her pathologic information she has a 5-year survival rate of 69% give or take 7%.  With adjuvant therapy her 5-year survival rate is 85%.  This is based on statistics not informed by molecular data.  We are aware of prognostic data from prior CT DNA trials whereby patients with negative CT DNA have a considerably higher 5-year survival rate and people with CT DNA positivity have a considerably lower 5-year survival rate and the above 5-year survival rate is probably a conglomerate of statistics between these people that averages out.  I certainly would be fine with either 6 months of FOLFOX or 3 months of Xeloda oxaliplatin outside of a clinical trial and  that would be my standard recommendation.   is now open and we are enrolling her on that per her request.  If she has negative CT DNA then she is randomized to watchful waiting versus CapeOx x4 or FOLFOX x12.  If CT DNA is positive then she is randomized to CapeOx x6 or FOLFOX x12 (investigators choice) versus treatment intensification with FOLFIRINOX.  I will see her back in a few weeks for the CT DNA results.    - 11/8/2023 evaluation by Dr. Eloisa Uribe, GYN oncology of right adnexal mass with minimal complexity is not consistent with Krukenberg tumor or other malignancy.  There is a thickened endometrial stripe.  Perimenopausal state.  Still having monthly periods hormonal blood work from primary OB/GYN not available.  Imaging reviewed by Dr. Uribe with no concerns for this being malignancy.   normal within the last month.  If it were not for serial CTs already planned, patient would have transvaginal ultrasound in 6 months but with plans for serial CTs, GYN oncology plans no further ultrasounds.  Due to low risk of malignancy, GYN oncology recommends regular follow-up with primary OB/GYN and follow-up if images change in a worrisome manner.    -11/9/2023 [facility] medical oncology follow-up: The CT DNA is still pending.  We should have the results by November 30 or sooner.  8 weeks from surgery would be November 20.  There is good data that disease-free survival and overall outcomes are not compromised when adjuvant chemotherapy occurs 8 weeks out from surgery compared to 4 weeks or less.  The data beyond that does show some falloff of survival when there are significant delays but I do not think her overall risk is going to substantially change whether we treated her the week of the 20th or the week of 27 November.  There is the option to give her 1 cycle of chemotherapy while waiting on this but that would not accrue to her overall number of cycles and I do not want to add oxaliplatin cycles  overall given the potential additive toxicity of oxaliplatin.  She understands the uncertainties here and wishes to proceed with the clinical trial.  She has seen gynecologic oncology for evaluation and this is new ovarian cyst will be watched and is felt to be benign according to them and can be just watched by serial CTs and primary GYN exam.  I will see her back November 27 hopefully to have results by then and hopefully to then later that week proceed with chemotherapy as indicated based on the randomizations.    -12/1/2023 [facility] oncology follow-up: CT DNA reportedly 0 per  .  She randomized to the treatment arm for which we have seen her and educated her for Xeloda oxaliplatin x 3 months with NSAID cream to mitigate palmar and plantar skin issues.  Will ask scan routinely for routine surveillance and with that we will also keep an eye on the right adnexal mass felt to be benign per GYN oncology.  Treatment 1 cycle 1 orders signed and we will see her for cycle 2 day 1 the day after Dennis with my nurse practitioner in [place].  She has had her cancer treatment preparation visit and education and consenting.  She has been vasculature and approval has been granted to treat her by peripheral vein from the nursing staff.    -12/13/2023 PICC line placed due to discomfort with oxaliplatin infusion  -12/26/2023 [facility] Oncology clinic follow-up: Yelena tolerated her first cycle of XELOX fairly well.  She has some mild fatigue.  She does continue to work.  She had 1 episode of diarrhea that resolved with Imodium.  Labs reviewed from today, CBC and CMP are normal other than for glucose of 128.  She now has a PICC line as oxaliplatin was uncomfortable infused peripherally.  We will proceed today with cycle #2 unchanged, I will see her back for follow-up for her third cycle.    -1/16/2024 [facility] Oncology clinic follow-up: Today will be cycle 3 of a planned 4 cycles of XELOX.  She has  some mild palmar erythrodysesthesia, after further discussion she has not been using the Voltaren ointment twice daily as prescribed.  She will begin doing that along with making sure to keep her hands well moisturized throughout the day.  Right now this is a grade 1-2.  She understands to notify our office if it worsens during this cycle while she is taking her Xeloda and we may have to hold or reduce dose.  She has no lingering neuropathy.  Labs reviewed from today, counts are acceptable to continue treatment unchanged. Her CBC is normal, CMP normal other than for glucose of 279.  We will move her next treatment to a Friday at her request to work better with her work schedule and give her the weekend to recover.    -2/1/2024 FNA left thyroid biopsy with Dr. Armida Young, pathology was negative for malignant cells.    -2/9/2024 [facility] Oncology clinic follow-up: Overall she continues to do well on XELOX, today is her fourth and final cycle of planned therapy.  Somehow the shipments of her Xeloda wore off but this last cycle, she completed Xeloda on Wednesday therefore I have asked her not to start taking cycle 4 of her Xeloda until Wednesday, 2/14/2024.  I have also notified our pharmacist.  We will continue with oxaliplatin today as planned. She has mild peripheral neuropathy that is intermittent.  Her hand-foot erythrodysesthesia has improved with more consistent use of emollient lotion and also Voltaren ointment twice a day.  She will continue the Xeloda dose at 1500 mg twice daily again and she will start this next Wednesday for 14 days on and then she will be finished.  I reviewed her labs from today, CT DNA was drawn, her CBC looks good with WBC 7.45, hemoglobin 12.3, hematocrit 36.9%, platelet count 232,000, ANC 4.83, her CMP is unremarkable other than for glucose of 309.  I did go over to see Yelena in the infusion area as I did not get the results of her CMP until she had left our visit.  She states that  she did take her diabetes medications as directed, she does not monitor her glucose at home regularly, she will start doing so and will follow-up with her PCP.  Her magnesium is normal, CEA is pending.  We will plan on repeating CT chest, abdomen and pelvis for restaging prior to return in 4 weeks and I have ordered that today.  Since we saw her last she did have FNA of left thyroid nodule with Dr. Armida Young and that was negative for malignant cells.  We will discontinue her PICC line after her treatment today. Addendum: CEA 2.45 up from 0.694 months ago.  CT DNA negative.    -3/1/2024 Chest abdomen pelvis with contrast compared to October CTs shows left lobe thyroid nodule previously noted with multinodular goiter.  Scarring apical portions of both lungs.  Slight thoracic esophageal thickening inflammation versus underdistention.  Mild hepatic steatosis.  Postsurgical sigmoid changes with haziness around the colon.  Right adnexal cyst no longer identified.  Thickening of the bladder wall possible cystitis.  L4-5, L5-S1 surgical changes.  Moderate stool burden    -3/8/2024 [facility] oncology clinic follow-up: On arm 1B  has completed 4 adjuvant cycles of Xeloda oxaliplatin.  CT DNA is negative in February.  We will repeat CT DNA CEA CMP CBC mid May and CTs the end of May and I will see her back early June and in the meantime I have reached out to Dr. Calle to do follow-up colonoscopy.  Her PICC line is out.  Thyroid has been biopsied and is benign.  Souls of her feet peeled with the last cycle and that has resolved.  Cold sensitive neuropathy still comes and goes but otherwise no significant residual neuropathy.  Encouraged her to follow-up with primary care for management of her blood glucose.    Total time of care today inclusive of time spent today prior to patient's arrival reviewing interval images and reports thereof and interval data as outlined above and during visit interviewing her as to signs or  symptoms of her disease and management thereof and after visit instituting the plan as outlined took 50 minutes of patient care time throughout the day today.  Bernardino Chaparro MD    03/08/2024

## 2024-03-08 NOTE — PROGRESS NOTES
I spoke to Mrs. Morris on the phone.  Dr. Chaparro would like us to reevaluate the colon.  He also would prefer we consider looking in the esophagus as well as there was some thickening noted on CAT scan.  It sounds like she is finished her treatment for now.  I wanted to call her and give her morning that my office may be calling to set that up in the next few weeks.  Her  apparently has a stress fracture in his hip and is looking at hip replacement.

## 2024-03-20 DIAGNOSIS — F33.41 RECURRENT MAJOR DEPRESSIVE DISORDER, IN PARTIAL REMISSION: ICD-10-CM

## 2024-03-20 RX ORDER — ARIPIPRAZOLE 5 MG/1
5 TABLET ORAL DAILY
Qty: 90 TABLET | Refills: 0 | Status: SHIPPED | OUTPATIENT
Start: 2024-03-20

## 2024-03-20 RX ORDER — DULOXETIN HYDROCHLORIDE 60 MG/1
60 CAPSULE, DELAYED RELEASE ORAL DAILY
Qty: 90 CAPSULE | Refills: 0 | Status: SHIPPED | OUTPATIENT
Start: 2024-03-20

## 2024-03-22 ENCOUNTER — OFFICE VISIT (OUTPATIENT)
Dept: INTERNAL MEDICINE | Facility: CLINIC | Age: 59
End: 2024-03-22
Payer: COMMERCIAL

## 2024-03-22 VITALS
SYSTOLIC BLOOD PRESSURE: 100 MMHG | HEART RATE: 105 BPM | HEIGHT: 64 IN | WEIGHT: 164 LBS | TEMPERATURE: 97.1 F | DIASTOLIC BLOOD PRESSURE: 70 MMHG | BODY MASS INDEX: 28 KG/M2 | OXYGEN SATURATION: 98 %

## 2024-03-22 DIAGNOSIS — E11.65 TYPE 2 DIABETES MELLITUS WITH HYPERGLYCEMIA, WITHOUT LONG-TERM CURRENT USE OF INSULIN: Primary | ICD-10-CM

## 2024-03-22 DIAGNOSIS — I10 PRIMARY HYPERTENSION: ICD-10-CM

## 2024-03-22 DIAGNOSIS — C18.7 MALIGNANT NEOPLASM OF SIGMOID COLON: Chronic | ICD-10-CM

## 2024-03-22 DIAGNOSIS — E04.2 MULTINODULAR THYROID: ICD-10-CM

## 2024-03-22 LAB
EXPIRATION DATE: ABNORMAL
HBA1C MFR BLD: 10.2 % (ref 4.5–5.7)
Lab: ABNORMAL

## 2024-03-22 RX ORDER — DULAGLUTIDE 1.5 MG/.5ML
3 INJECTION, SOLUTION SUBCUTANEOUS WEEKLY
COMMUNITY

## 2024-03-22 RX ORDER — CEPHALEXIN 500 MG/1
500 CAPSULE ORAL 2 TIMES DAILY
COMMUNITY
Start: 2024-03-13

## 2024-03-22 NOTE — PROGRESS NOTES
Internal Medicine Follow Up    Chief Complaint  Yelena Morris is a 58 y.o. female who presents today for follow up of chronic medical conditions outlined below.    Chief Complaint   Patient presents with    Follow-up    Diabetes        HPI  Ms. Morris comes in today for follow up on DM. She reports A1c will likely be high due to poor diet. She has been craving sweets and eating candy, ice cream. She also notes that she was receiving steroids with chemo every 3 weeks. This is now completed. She is scheduled for colonoscopy and dental implants next month. Her  is also having hip replacement. This has all lead to more stress. She is seeing psychiatry but missed appt 1/29 and has run out of fluoxetine. She had thyroid bx which was benign. Will see endocrinology annually. Currently on keflex for UTI.         Review of Systems  Review of Systems   Constitutional:  Positive for appetite change (craving sweets). Negative for unexpected weight gain.   Respiratory: Negative.     Cardiovascular: Negative.    Psychiatric/Behavioral:  Positive for stress.         Current Medications  Current Outpatient Medications on File Prior to Visit   Medication Sig Dispense Refill    ARIPiprazole (ABILIFY) 5 MG tablet TAKE 1 TABLET BY MOUTH EVERY DAY 90 tablet 0    atorvastatin (LIPITOR) 40 MG tablet TAKE 1 TABLET BY MOUTH EVERY EVENING 90 tablet 3    BOTOX 200 units reconstituted solution INJECT 200 UNITS INTO THE HEAD OR NECK AREA BY MD EVERY 90 DAYS  3    cephalexin (KEFLEX) 500 MG capsule Take 1 capsule by mouth 2 (Two) Times a Day.      cyclobenzaprine (FLEXERIL) 10 MG tablet Take 1 tablet by mouth At Night As Needed for Muscle Spasms. 30 tablet 1    Diclofenac Sodium (VOLTAREN) 1 % gel gel Apply 4 g topically to the appropriate area as directed 2 (Two) Times a Day. Apply finger tip amount to palms of hands and soles of feet 350 g 3    Dulaglutide (Trulicity) 1.5 MG/0.5ML solution pen-injector INJECT 1.5 MG UNDER THE SKIN  "INTO THE APPROPRIATE AREA AS DIRECTED 1 TIME PER WEEK 6 mL 0    DULoxetine (CYMBALTA) 60 MG capsule TAKE 1 CAPSULE BY MOUTH EVERY DAY 90 capsule 0    eletriptan (RELPAX) 40 MG tablet TAKE 1 TABLET BY MOUTH AT THE ONSET OF MIGRAINE. MAY REPEAT IN 2 HRS.MAX 2 DOSE/24HR,3 DAYS/WEEK  11    gabapentin (NEURONTIN) 100 MG capsule Take 1 capsule by mouth Every 8 (Eight) Hours. Stop when neuropathy is better. 30 capsule 0    glimepiride (AMARYL) 2 MG tablet Take 1 tablet by mouth 2 (Two) Times a Day. 180 tablet 3    hydroCHLOROthiazide (HYDRODIURIL) 12.5 MG tablet Take 1 tablet by mouth Daily. 90 tablet 3    hydrOXYzine (ATARAX) 25 MG tablet Take 1-2 tablets by mouth At Night As Needed for Anxiety (sleep). 180 tablet 1    nebivolol (Bystolic) 10 MG tablet Take 1 tablet by mouth Daily. 90 tablet 3    nortriptyline (PAMELOR) 10 MG capsule nortriptyline 10 mg capsule   TAKE 4 CAPSULES AT BEDTIME      Nutritional Supplements (DHEA PO) Take  by mouth.      omeprazole (priLOSEC) 40 MG capsule Take 1 capsule by mouth Daily. 90 capsule 3    ondansetron (ZOFRAN) 8 MG tablet Take 1 tablet by mouth 3 (Three) Times a Day As Needed for Nausea or Vomiting. 30 tablet 5    pioglitazone (Actos) 30 MG tablet Take 1 tablet by mouth Daily. 90 tablet 1    propranolol (INDERAL) 10 MG tablet TAKE 1 TABLET BY MOUTH 2 (TWO) TIMES A DAY AS NEEDED (PANIC/ANXIETY). 180 tablet 0    Topiramate ER (Trokendi XR) 200 MG capsule sustained-release 24 hr Daily.      FLUoxetine (PROzac) 20 MG capsule Take 1 capsule by mouth Daily for 90 days. 90 capsule 0     No current facility-administered medications on file prior to visit.       Allergies  Allergies   Allergen Reactions    Dihydroergotamine GI Intolerance     VOMITING    Methergine [Methylergonovine Maleate] Nausea And Vomiting    Amoxicillin Rash       Objective  Visit Vitals  /70   Pulse 105   Temp 97.1 °F (36.2 °C) (Infrared)   Ht 162.6 cm (64.02\")   Wt 74.4 kg (164 lb)   SpO2 98%   BMI 28.14 kg/m² "        Physical Exam  Physical Exam  Vitals and nursing note reviewed.   Constitutional:       General: She is not in acute distress.     Appearance: She is well-developed. She is not ill-appearing or toxic-appearing.   HENT:      Head: Normocephalic and atraumatic.   Eyes:      Conjunctiva/sclera: Conjunctivae normal.   Cardiovascular:      Rate and Rhythm: Normal rate and regular rhythm.      Heart sounds: Normal heart sounds.   Pulmonary:      Effort: Pulmonary effort is normal. No respiratory distress.   Musculoskeletal:      Right lower leg: No edema.      Left lower leg: No edema.   Skin:     General: Skin is warm and dry.   Neurological:      Mental Status: She is alert and oriented to person, place, and time. Mental status is at baseline.      Gait: Gait normal.   Psychiatric:         Mood and Affect: Mood normal.         Behavior: Behavior normal.         Results  Results for orders placed or performed during the hospital encounter of 02/09/24   Comprehensive metabolic panel    Specimen: Blood   Result Value Ref Range    Glucose 309 (H) 65 - 99 mg/dL    BUN 9 6 - 20 mg/dL    Creatinine 0.88 0.57 - 1.00 mg/dL    Sodium 135 (L) 136 - 145 mmol/L    Potassium 3.7 3.5 - 5.2 mmol/L    Chloride 102 98 - 107 mmol/L    CO2 22.0 22.0 - 29.0 mmol/L    Calcium 9.1 8.6 - 10.5 mg/dL    Total Protein 6.7 6.0 - 8.5 g/dL    Albumin 3.8 3.5 - 5.2 g/dL    ALT (SGPT) 17 1 - 33 U/L    AST (SGOT) 16 1 - 32 U/L    Alkaline Phosphatase 105 39 - 117 U/L    Total Bilirubin 0.7 0.0 - 1.2 mg/dL    Globulin 2.9 gm/dL    A/G Ratio 1.3 g/dL    BUN/Creatinine Ratio 10.2 7.0 - 25.0    Anion Gap 11.0 5.0 - 15.0 mmol/L    eGFR 77.2 >60.0 mL/min/1.73   Magnesium    Specimen: Blood   Result Value Ref Range    Magnesium 1.9 1.6 - 2.6 mg/dL   CEA    Specimen: Blood   Result Value Ref Range    CEA 2.45 ng/mL   CBC Auto Differential    Specimen: Blood   Result Value Ref Range    WBC 7.45 3.40 - 10.80 10*3/mm3    RBC 3.94 3.77 - 5.28 10*6/mm3     Hemoglobin 12.3 12.0 - 15.9 g/dL    Hematocrit 36.9 34.0 - 46.6 %    MCV 93.7 79.0 - 97.0 fL    MCH 31.2 26.6 - 33.0 pg    MCHC 33.3 31.5 - 35.7 g/dL    RDW 21.1 (H) 12.3 - 15.4 %    RDW-SD 66.5 (H) 37.0 - 54.0 fl    MPV 9.2 6.0 - 12.0 fL    Platelets 232 140 - 450 10*3/mm3    Neutrophil % 64.9 42.7 - 76.0 %    Lymphocyte % 26.0 19.6 - 45.3 %    Monocyte % 7.5 5.0 - 12.0 %    Eosinophil % 0.9 0.3 - 6.2 %    Basophil % 0.4 0.0 - 1.5 %    Immature Grans % 0.3 0.0 - 0.5 %    Neutrophils, Absolute 4.83 1.70 - 7.00 10*3/mm3    Lymphocytes, Absolute 1.94 0.70 - 3.10 10*3/mm3    Monocytes, Absolute 0.56 0.10 - 0.90 10*3/mm3    Eosinophils, Absolute 0.07 0.00 - 0.40 10*3/mm3    Basophils, Absolute 0.03 0.00 - 0.20 10*3/mm3    Immature Grans, Absolute 0.02 0.00 - 0.05 10*3/mm3        Assessment and Plan  Diagnoses and all orders for this visit:    Type 2 diabetes mellitus with hyperglycemia, without long-term current use of insulin  - A1c up to 10.2 in part due to diet and also steroid use with chemo  - off metformin due to absorption concerns  - Continue glimepiride 2mg BID and actos 30mg daily. Trulicity on backorder however she has several months supply at home of 1.5mg dose. Will have her take two injections weekly to equal 3mg weekly. Will follow up in 6 weeks to reviewed [facility] log and at that time see if trulicity in stock or need to switch to ozempic.  - Had also been briefly on jardiance but discontinued due to candidal infections and januvia which was discontinued due to GI intolerance.  - Urine microalbumin nl 3/2023, repeat next visit    Malignant neoplasm of sigmoid colon  - s/p resection and chemo with xeloda and oxalaplatin    - has upcoming colonoscopy and will have labs, CT in May    Multinodular thyroid  - had slow growing nodule which was biopsied and benign  - following annually with endocrinology    Primary hypertension  - BP borderline low but asymptomatic. Continue nebivolol 10mg daily and HCTZ 12.5mg  daily        Health Maintenance  - Pap smear: GYN Dr. Mcguire, unable to get records  - Mammogram: 7/2023 BIRADS 1  - Colonoscopy: 8/2023  - HCV: negative  - Immunizations: Flu UTD. COVID deferred. Shingrix complete. PCV20 UTD. Tdap 10/2019.  - Depression screening: negative 5/2023    Return in about 3 months (around 3/22/2024) for Follow up DM    Return in about 6 weeks (around 5/3/2024) for Follow up diabetes.

## 2024-04-02 RX ORDER — SODIUM, POTASSIUM,MAG SULFATES 17.5-3.13G
SOLUTION, RECONSTITUTED, ORAL ORAL
Qty: 354 ML | Refills: 0 | Status: SHIPPED | OUTPATIENT
Start: 2024-04-02

## 2024-04-04 DIAGNOSIS — E11.9 TYPE 2 DIABETES MELLITUS WITHOUT COMPLICATIONS: ICD-10-CM

## 2024-04-04 DIAGNOSIS — E11.9 TYPE 2 DIABETES MELLITUS WITHOUT COMPLICATION, WITHOUT LONG-TERM CURRENT USE OF INSULIN: Primary | ICD-10-CM

## 2024-04-04 RX ORDER — DULAGLUTIDE 1.5 MG/.5ML
1.5 INJECTION, SOLUTION SUBCUTANEOUS
OUTPATIENT
Start: 2024-04-04

## 2024-04-04 RX ORDER — DULAGLUTIDE 1.5 MG/.5ML
3 INJECTION, SOLUTION SUBCUTANEOUS WEEKLY
Status: CANCELLED | OUTPATIENT
Start: 2024-04-04

## 2024-04-08 ENCOUNTER — TELEPHONE (OUTPATIENT)
Dept: INTERNAL MEDICINE | Facility: CLINIC | Age: 59
End: 2024-04-08
Payer: COMMERCIAL

## 2024-04-08 DIAGNOSIS — E11.9 TYPE 2 DIABETES MELLITUS WITHOUT COMPLICATION, WITHOUT LONG-TERM CURRENT USE OF INSULIN: Primary | ICD-10-CM

## 2024-04-08 NOTE — TELEPHONE ENCOUNTER
Hub staff attempted to follow warm transfer process and was unsuccessful     Caller: Yelena Morris    Relationship to patient: Self    Best call back number [phone]    Patient is needing: DUE TO A SHORTAGE OF TRULICITY, THE PATIENT IS NEEDING AN ALTERNATIVE MEDICATION CALLED TO   [facility]/pharmacy #[id number] - [place], [place] - [address] - [phone]  - [phone] FX

## 2024-04-15 ENCOUNTER — TELEMEDICINE (OUTPATIENT)
Dept: PSYCHIATRY | Facility: CLINIC | Age: 59
End: 2024-04-15
Payer: COMMERCIAL

## 2024-04-15 DIAGNOSIS — F33.41 RECURRENT MAJOR DEPRESSIVE DISORDER, IN PARTIAL REMISSION: ICD-10-CM

## 2024-04-15 DIAGNOSIS — F41.1 GENERALIZED ANXIETY DISORDER: Primary | ICD-10-CM

## 2024-04-15 DIAGNOSIS — F32.81 PMDD (PREMENSTRUAL DYSPHORIC DISORDER): ICD-10-CM

## 2024-04-15 DIAGNOSIS — G47.9 SLEEP DIFFICULTIES: ICD-10-CM

## 2024-04-15 PROCEDURE — 99214 OFFICE O/P EST MOD 30 MIN: CPT | Performed by: NURSE PRACTITIONER

## 2024-04-15 RX ORDER — FLUOXETINE HYDROCHLORIDE 20 MG/1
20 CAPSULE ORAL DAILY
Qty: 90 CAPSULE | Refills: 0 | Status: SHIPPED | OUTPATIENT
Start: 2024-04-15 | End: 2024-07-14

## 2024-04-15 NOTE — PROGRESS NOTES
This provider is located at [facility], [address] [place] [place] [address].The Patient is seen remotely at home, [address] [place] [address] via [employer]. Patient is being seen via telehealth and confirm that they are in a secure environment for this session. The patient's condition being diagnosed/treated is appropriate for telemedicine. The provider identified himself/herself: herself as well as her credentials.   The patient gave consent to be seen remotely, and when consent is given they understand that the consent allows for patient identifiable information to be sent to a third party as needed.   They may refuse to be seen remotely at any time. The electronic data is encrypted and password protected, and the patient has been advised of the potential risks to privacy not withstanding such measures.    You have chosen to receive care through a telehealth visit.  Do you consent to use a video/audio connection for your medical care today? Yes      Chief Complaint  Follow-up for depression and anxiety     Subjective    Yelena Radha Morris presents to [facility] for medication management.       History of Present Illness   Patient presents today reporting that she has been anxious and irritable lately.  She notes work has been very busy as she enjoys it but her patient load has been overwhelming.  Patient also mentions that she feels she is still tired from the chemo even though she is done with it.  She states her CT scans have been good and she goes for a colonoscopy Wednesday.  Patient reports that her  recently had hip surgery as well so there has been several doctor's appointment.  She states her hands and quit tingling but notes she was recently placed on Ozempic because the past couple months her blood sugar have been out of control which they believe may be caused from the steroids.  Patient reports her appetite is good but  has not changed.  Patient states she is sleeping well but then more tired and going to bed earlier.  Patient denies any major depressive symptoms or feeling hopeless or helpless.  States she has felt anxious and worried and nervous and some irritability.  She also notes however she has been without her Prozac for the past couple months as she states that she thought she would be fine without any forgetting to call to get it refilled.  Patient denies any side effects with or without it.  Denies any SI/HI/AH/VH.       Objective   Vital Signs:   There were no vitals taken for this visit.  Due to the remote nature of this encounter (virtual encounter), vitals were unable to be obtained.  Height stated at 64 inches.  Weight stated at 165 pounds.      PHQ-9 Depression Screening  Little interest or pleasure in doing things? (P) 0-->not at all   Feeling down, depressed, or hopeless? (P) 0-->not at all   Trouble falling or staying asleep, or sleeping too much? (P) 0-->not at all   Feeling tired or having little energy? (P) 2-->more than half the days   Poor appetite or overeating? (P) 3-->nearly every day   Feeling bad about yourself - or that you are a failure or have let yourself or your family down? (P) 0-->not at all   Trouble concentrating on things, such as reading the newspaper or watching television? (P) 0-->not at all   Moving or speaking so slowly that other people could have noticed? Or the opposite - being so fidgety or restless that you have been moving around a lot more than usual? (P) 0-->not at all   Thoughts that you would be better off dead, or of hurting yourself in some way? (P) 0-->not at all   PHQ-9 Total Score (P) 5   If you checked off any problems, how difficult have these problems made it for you to do your work, take care of things at home, or get along with other people? (P) not difficult at all       PHQ-9 Total Score: (P) 5          Mental Status Exam:   Hygiene:   good  Cooperation:   Cooperative  Eye Contact:  Good  Psychomotor Behavior:  Appropriate  Affect:  Appropriate  Mood: normal, anxious, and irritable  Speech:  Normal  Thought Process:  Goal directed and Linear  Thought Content:  Normal  Suicidal:  None  Homicidal:  None  Hallucinations:  None  Delusion:  None  Memory:  Intact  Orientation:  Person, Place, Time and Situation  Reliability:  good  Insight:  Good  Judgement:  Good  Impulse Control:  Good  Physical/Medical Issues:  Yes HTN, DM, fibromyalgia, carpal tunnel syndrome      Current Medications:   Current Outpatient Medications   Medication Sig Dispense Refill    FLUoxetine (PROzac) 20 MG capsule Take 1 capsule by mouth Daily for 90 days. 90 capsule 0    ARIPiprazole (ABILIFY) 5 MG tablet TAKE 1 TABLET BY MOUTH EVERY DAY 90 tablet 0    atorvastatin (LIPITOR) 40 MG tablet TAKE 1 TABLET BY MOUTH EVERY EVENING 90 tablet 3    BOTOX 200 units reconstituted solution INJECT 200 UNITS INTO THE HEAD OR NECK AREA BY MD EVERY 90 DAYS  3    cyclobenzaprine (FLEXERIL) 10 MG tablet Take 1 tablet by mouth At Night As Needed for Muscle Spasms. 30 tablet 1    Diclofenac Sodium (VOLTAREN) 1 % gel gel Apply 4 g topically to the appropriate area as directed 2 (Two) Times a Day. Apply finger tip amount to palms of hands and soles of feet 350 g 3    DULoxetine (CYMBALTA) 60 MG capsule TAKE 1 CAPSULE BY MOUTH EVERY DAY 90 capsule 0    eletriptan (RELPAX) 40 MG tablet TAKE 1 TABLET BY MOUTH AT THE ONSET OF MIGRAINE. MAY REPEAT IN 2 HRS.MAX 2 DOSE/24HR,3 DAYS/WEEK  11    glimepiride (AMARYL) 2 MG tablet Take 1 tablet by mouth 2 (Two) Times a Day. 180 tablet 3    hydroCHLOROthiazide (HYDRODIURIL) 12.5 MG tablet Take 1 tablet by mouth Daily. 90 tablet 3    hydrOXYzine (ATARAX) 25 MG tablet Take 1-2 tablets by mouth At Night As Needed for Anxiety (sleep). 180 tablet 1    nebivolol (Bystolic) 10 MG tablet Take 1 tablet by mouth Daily. 90 tablet 3    nortriptyline (PAMELOR) 10 MG capsule nortriptyline 10 mg  capsule   TAKE 4 CAPSULES AT BEDTIME      Nutritional Supplements (DHEA PO) Take  by mouth.      omeprazole (priLOSEC) 40 MG capsule Take 1 capsule by mouth Daily. 90 capsule 3    pioglitazone (Actos) 30 MG tablet Take 1 tablet by mouth Daily. 90 tablet 1    propranolol (INDERAL) 10 MG tablet TAKE 1 TABLET BY MOUTH 2 (TWO) TIMES A DAY AS NEEDED (PANIC/ANXIETY). 180 tablet 0    Semaglutide, 1 MG/DOSE, (OZEMPIC) 4 MG/3ML solution pen-injector Inject 1 mg under the skin into the appropriate area as directed 1 (One) Time Per Week. 9 mL 0    sodium-potassium-magnesium sulfates (Suprep Bowel Prep Kit) 17.5-3.13-1.6 GM/177ML solution oral solution Use as directed by provider for colonoscopy prep 354 mL 0    Topiramate ER (Trokendi XR) 200 MG capsule sustained-release 24 hr Daily.       No current facility-administered medications for this visit.       Physical Exam  Nursing note reviewed.   Constitutional:       Appearance: Normal appearance.   Neurological:      Mental Status: She is alert.   Psychiatric:         Attention and Perception: Attention and perception normal.         Mood and Affect: Mood and affect normal. Mood is anxious. Mood is not depressed.         Speech: Speech normal.         Behavior: Behavior is agitated. Behavior is cooperative.         Thought Content: Thought content normal.         Cognition and Memory: Cognition and memory normal.         Judgment: Judgment normal.        Result Review :     The following data was reviewed by: ONESIMO Alexander on 02/01/2021:  Common labs          1/16/2024    10:06 2/9/2024    10:03 3/22/2024    08:32   Common Labs   Glucose 279  309     BUN 10  9     Creatinine 0.91  0.88     Sodium 136  135     Potassium 3.8  3.7     Chloride 102  102     Calcium 9.2  9.1     Albumin 4.0  3.8     Total Bilirubin 0.3  0.7     Alkaline Phosphatase 101  105     AST (SGOT) 21  16     ALT (SGPT) 26  17     WBC 7.92  7.45     Hemoglobin 12.9  12.3     Hematocrit 39.3  36.9      Platelets 189  232     Hemoglobin A1C   10.2    Data reviewed: PCP notes         Assessment and Plan    Problem List Items Addressed This Visit          Mental Health    Recurrent major depressive disorder    Relevant Medications    FLUoxetine (PROzac) 20 MG capsule     Other Visit Diagnoses       Generalized anxiety disorder    -  Primary    Relevant Medications    FLUoxetine (PROzac) 20 MG capsule    PMDD (premenstrual dysphoric disorder)        Relevant Medications    FLUoxetine (PROzac) 20 MG capsule    Sleep difficulties                  TREATMENT PLAN/GOALS: Continue supportive psychotherapy efforts and medications as indicated. Treatment and medication options discussed during today's visit. Patient ackowledged and verbally consented to continue with current treatment plan and was educated on the importance of compliance with treatment and follow-up appointments.    MEDICATION ISSUES:  We discussed risks, benefits, and side effects of the above medications and the patient was agreeable with the plan. Patient was educated on the importance of compliance with treatment and follow-up appointments.  Patient is agreeable to call the office with any worsening of symptoms or onset of side effects. Patient is agreeable to call 911 or go to the nearest ER should he/she begin having SI/HI.        -Continue Cymbalta 60 mg daily for depression and anxiety as patient has benefited greatly as well for her pain.  -Restart fluoxetine 20 mg daily for depression and PMDD, as patient has been without for several months and noticed more anxiety and irritability.  Also encouraged patient that she may need to look at work as a part-time due to her and her 's health issues.  -Patient educated extensively regarding the risk of serotonin syndrome as she is currently on duloxetine due to her pain as we have previously tried to taper and it is not effective so she is on fluoxetine 20 mg daily to help with her overall  depression and mood.  Patient verbalized understanding was agreeable.  -Continue aripiprazole 5 mg as adjunct for depression as patient has failed numerous SSRIs and SNRIs and has benefited the patient tremendously and is medically necessary for her depression to avoid hospitalization.   -Continue propranolol 10 mg twice daily as needed for anxiety and panic.  Encouraged patient if it decreased her blood pressure or heart rate to contact the clinic and discontinue.  -Continue hydroxyzine 25 to 50 mg at night as needed for sleep.      Counseled patient regarding multimodal approach with healthy nutrition, healthy sleep, regular physical activity, social activities, counseling, and medications.      Coping skills reviewed and encouraged positive framing of thoughts     Assisted patient in processing above session content; acknowledged and normalized patient’s thoughts, feelings, and concerns.  Applied  positive coping skills and behavior management in session.  Allowed patient to freely discuss issues without interruption or judgment. Provided safe, confidential environment to facilitate the development of positive therapeutic relationship and encourage open, honest communication. Assisted patient in identifying risk factors which would indicate the need for higher level of care including thoughts to harm self or others and/or self-harming behavior and encouraged patient to contact this office, call 911, or present to the nearest emergency room should any of these events occur. Discussed crisis intervention services and means to access.     MEDS ORDERED DURING VISIT:  New Medications Ordered This Visit   Medications    FLUoxetine (PROzac) 20 MG capsule     Sig: Take 1 capsule by mouth Daily for 90 days.     Dispense:  90 capsule     Refill:  0         Follow Up   Return in about 4 weeks (around 5/13/2024), or if symptoms worsen or fail to improve, for Recheck.    Patient was given instructions and counseling regarding  her condition or for health maintenance advice. Please see specific information pulled into the AVS if appropriate.     Some of the data in this electronic note has been brought forward from a previous encounter, any necessary changes have been made, it has been reviewed by this APRN, and it is accurate.      This document has been electronically signed by ONESIMO Alexander  April 15, 2024 10:52 EDT    Part of this note may be an electronic transcription/translation of spoken language to printed text using the Dragon Dictation System.

## 2024-04-17 ENCOUNTER — OUTSIDE FACILITY SERVICE (OUTPATIENT)
Dept: GASTROENTEROLOGY | Facility: CLINIC | Age: 59
End: 2024-04-17
Payer: COMMERCIAL

## 2024-04-17 PROCEDURE — 45380 COLONOSCOPY AND BIOPSY: CPT | Performed by: INTERNAL MEDICINE

## 2024-04-17 PROCEDURE — 45385 COLONOSCOPY W/LESION REMOVAL: CPT | Performed by: INTERNAL MEDICINE

## 2024-04-17 PROCEDURE — 88305 TISSUE EXAM BY PATHOLOGIST: CPT | Performed by: INTERNAL MEDICINE

## 2024-04-17 PROCEDURE — 43239 EGD BIOPSY SINGLE/MULTIPLE: CPT | Performed by: INTERNAL MEDICINE

## 2024-04-18 ENCOUNTER — LAB REQUISITION (OUTPATIENT)
Dept: LAB | Facility: HOSPITAL | Age: 59
End: 2024-04-18
Payer: COMMERCIAL

## 2024-04-18 DIAGNOSIS — Z98.0 INTESTINAL BYPASS AND ANASTOMOSIS STATUS: ICD-10-CM

## 2024-04-18 DIAGNOSIS — K63.89 OTHER SPECIFIED DISEASES OF INTESTINE: ICD-10-CM

## 2024-04-18 DIAGNOSIS — D12.5 BENIGN NEOPLASM OF SIGMOID COLON: ICD-10-CM

## 2024-04-18 DIAGNOSIS — K57.30 DIVERTICULOSIS OF LARGE INTESTINE WITHOUT PERFORATION OR ABSCESS WITHOUT BLEEDING: ICD-10-CM

## 2024-04-18 DIAGNOSIS — K21.9 GASTRO-ESOPHAGEAL REFLUX DISEASE WITHOUT ESOPHAGITIS: ICD-10-CM

## 2024-04-18 DIAGNOSIS — Z85.038 PERSONAL HISTORY OF OTHER MALIGNANT NEOPLASM OF LARGE INTESTINE: ICD-10-CM

## 2024-04-18 DIAGNOSIS — K29.70 GASTRITIS, UNSPECIFIED, WITHOUT BLEEDING: ICD-10-CM

## 2024-04-18 PROBLEM — Q45.3 PANCREATIC RESTS IN STOMACH: Status: ACTIVE | Noted: 2024-04-18

## 2024-04-19 LAB — REF LAB TEST METHOD: NORMAL

## 2024-05-02 NOTE — RESEARCH
Patient Name:  Yelena Morris  YOB: 1965  Patient Age:  58 y.o.  Patient's Sex:  female    Date of Service:  02/09/2024    Provider:                       RESEARCH NOTE                  Patient presented to clinic today for C4D1 of  clinical trial. Patient assessed in clinic.  AE's and con meds reviewed. Patient returned previous pill bottles and diaries. Patient will RTC in 30 days for FU.  3 month Ctdna collected as well. I told patient to call with any questions or concerns and they verbalized understanding.

## 2024-05-02 NOTE — RESEARCH
Patient Name:  Yelena Morris  YOB: 1965  Patient Age:  58 y.o.  Patient's Sex:  female    Date of Service:  03/08/2024    Provider:  Krysta                     RESEARCH NOTE                  Patient presented to clinic today for 30 day Follow-up of  clinical trial. Patient assessed in clinic. AE's and con meds reviewed. Patient brought back pill bottle with 3 pills left and returned pill diaries.   Patient had CT on 3/1/24 and next will be due 9/1/24. I told patient to call with any questions or concerns, she verbalized understanding.

## 2024-05-03 ENCOUNTER — LAB (OUTPATIENT)
Dept: LAB | Facility: HOSPITAL | Age: 59
End: 2024-05-03
Payer: COMMERCIAL

## 2024-05-03 ENCOUNTER — OFFICE VISIT (OUTPATIENT)
Dept: INTERNAL MEDICINE | Facility: CLINIC | Age: 59
End: 2024-05-03
Payer: COMMERCIAL

## 2024-05-03 VITALS
DIASTOLIC BLOOD PRESSURE: 72 MMHG | OXYGEN SATURATION: 97 % | TEMPERATURE: 98.4 F | HEIGHT: 64 IN | WEIGHT: 159.6 LBS | HEART RATE: 103 BPM | BODY MASS INDEX: 27.25 KG/M2 | SYSTOLIC BLOOD PRESSURE: 114 MMHG

## 2024-05-03 DIAGNOSIS — I10 PRIMARY HYPERTENSION: ICD-10-CM

## 2024-05-03 DIAGNOSIS — E11.9 TYPE 2 DIABETES MELLITUS WITHOUT COMPLICATION, WITHOUT LONG-TERM CURRENT USE OF INSULIN: ICD-10-CM

## 2024-05-03 DIAGNOSIS — E11.9 TYPE 2 DIABETES MELLITUS WITHOUT COMPLICATION, WITHOUT LONG-TERM CURRENT USE OF INSULIN: Primary | ICD-10-CM

## 2024-05-03 DIAGNOSIS — C18.7 MALIGNANT NEOPLASM OF SIGMOID COLON: Chronic | ICD-10-CM

## 2024-05-03 LAB
ALBUMIN UR-MCNC: 1.4 MG/DL
CREAT UR-MCNC: 226.5 MG/DL
EXPIRATION DATE: ABNORMAL
HBA1C MFR BLD: 8.6 % (ref 4.5–5.7)
Lab: ABNORMAL
MICROALBUMIN/CREAT UR: 6.2 MG/G (ref 0–29)

## 2024-05-03 PROCEDURE — 82570 ASSAY OF URINE CREATININE: CPT

## 2024-05-03 PROCEDURE — 82043 UR ALBUMIN QUANTITATIVE: CPT

## 2024-05-03 RX ORDER — PIOGLITAZONEHYDROCHLORIDE 30 MG/1
30 TABLET ORAL DAILY
Qty: 90 TABLET | Refills: 1 | Status: SHIPPED | OUTPATIENT
Start: 2024-05-03

## 2024-05-03 NOTE — PROGRESS NOTES
Internal Medicine Follow Up    Chief Complaint  Yelena Morris is a 58 y.o. female who presents today for follow up of chronic medical conditions outlined below.    Chief Complaint   Patient presents with    Diabetes        HPI  Ms. Morris comes in today for follow up on diabetes. She is now on ozempic 1mg weekly for about 3-4 weeks. She reports initial nausea that resolved by the second week. She has been monitoring her fasting fingerstick blood glucose and brings a log. Initially running 180-200 and then 130s and for the last 3 days ~100-110. She reports efforts to improve diet as well as to be more compliant with her amaryl and actos. She denies acute concerns.    Diabetes         Review of Systems  Review of Systems   Constitutional: Negative.    Respiratory: Negative.     Cardiovascular: Negative.    Gastrointestinal: Negative.         Current Medications  Current Outpatient Medications on File Prior to Visit   Medication Sig Dispense Refill    ARIPiprazole (ABILIFY) 5 MG tablet TAKE 1 TABLET BY MOUTH EVERY DAY 90 tablet 0    atorvastatin (LIPITOR) 40 MG tablet TAKE 1 TABLET BY MOUTH EVERY EVENING 90 tablet 3    BOTOX 200 units reconstituted solution INJECT 200 UNITS INTO THE HEAD OR NECK AREA BY MD EVERY 90 DAYS  3    cyclobenzaprine (FLEXERIL) 10 MG tablet Take 1 tablet by mouth At Night As Needed for Muscle Spasms. 30 tablet 1    Diclofenac Sodium (VOLTAREN) 1 % gel gel Apply 4 g topically to the appropriate area as directed 2 (Two) Times a Day. Apply finger tip amount to palms of hands and soles of feet 350 g 3    DULoxetine (CYMBALTA) 60 MG capsule TAKE 1 CAPSULE BY MOUTH EVERY DAY 90 capsule 0    eletriptan (RELPAX) 40 MG tablet TAKE 1 TABLET BY MOUTH AT THE ONSET OF MIGRAINE. MAY REPEAT IN 2 HRS.MAX 2 DOSE/24HR,3 DAYS/WEEK  11    FLUoxetine (PROzac) 20 MG capsule Take 1 capsule by mouth Daily for 90 days. 90 capsule 0    glimepiride (AMARYL) 2 MG tablet Take 1 tablet by mouth 2 (Two) Times a Day. 180  "tablet 3    hydroCHLOROthiazide (HYDRODIURIL) 12.5 MG tablet Take 1 tablet by mouth Daily. 90 tablet 3    hydrOXYzine (ATARAX) 25 MG tablet Take 1-2 tablets by mouth At Night As Needed for Anxiety (sleep). 180 tablet 1    nebivolol (Bystolic) 10 MG tablet Take 1 tablet by mouth Daily. 90 tablet 3    nortriptyline (PAMELOR) 10 MG capsule nortriptyline 10 mg capsule   TAKE 4 CAPSULES AT BEDTIME      Nutritional Supplements (DHEA PO) Take  by mouth.      omeprazole (priLOSEC) 40 MG capsule Take 1 capsule by mouth Daily. 90 capsule 3    propranolol (INDERAL) 10 MG tablet TAKE 1 TABLET BY MOUTH 2 (TWO) TIMES A DAY AS NEEDED (PANIC/ANXIETY). 180 tablet 0    Topiramate ER (Trokendi XR) 200 MG capsule sustained-release 24 hr Daily.      [DISCONTINUED] pioglitazone (Actos) 30 MG tablet Take 1 tablet by mouth Daily. 90 tablet 1    [DISCONTINUED] Semaglutide, 1 MG/DOSE, (OZEMPIC) 4 MG/3ML solution pen-injector Inject 1 mg under the skin into the appropriate area as directed 1 (One) Time Per Week. 9 mL 0    [DISCONTINUED] sodium-potassium-magnesium sulfates (Suprep Bowel Prep Kit) 17.5-3.13-1.6 GM/177ML solution oral solution Use as directed by provider for colonoscopy prep 354 mL 0     No current facility-administered medications on file prior to visit.       Allergies  Allergies   Allergen Reactions    Dihydroergotamine GI Intolerance     VOMITING    Methergine [Methylergonovine Maleate] Nausea And Vomiting    Amoxicillin Rash       Objective  Visit Vitals  /72   Pulse 103   Temp 98.4 °F (36.9 °C)   Ht 162.6 cm (64.02\")   Wt 72.4 kg (159 lb 9.6 oz)   SpO2 97% Comment: ra   BMI 27.38 kg/m²        Physical Exam  Physical Exam  Vitals and nursing note reviewed.   Constitutional:       General: She is not in acute distress.     Appearance: She is well-developed. She is not ill-appearing or toxic-appearing.   HENT:      Head: Normocephalic and atraumatic.   Eyes:      Conjunctiva/sclera: Conjunctivae normal. "   Cardiovascular:      Rate and Rhythm: Normal rate and regular rhythm.      Heart sounds: Normal heart sounds. No murmur heard.  Pulmonary:      Effort: Pulmonary effort is normal. No respiratory distress.      Breath sounds: Normal breath sounds.   Skin:     General: Skin is warm and dry.   Neurological:      Mental Status: She is alert and oriented to person, place, and time. Mental status is at baseline.      Gait: Gait normal.   Psychiatric:         Mood and Affect: Mood normal.         Behavior: Behavior normal.         Thought Content: Thought content normal.         Judgment: Judgment normal.         Results  Results for orders placed or performed in visit on 05/03/24   POC Glycosylated Hemoglobin (Hb A1C)    Specimen: Blood   Result Value Ref Range    Hemoglobin A1C 8.6 (A) 4.5 - 5.7 %    Lot Number [id number]     Expiration Date 01/03/2026         Assessment and Plan  Diagnoses and all orders for this visit:    Type 2 diabetes mellitus without complication, without long-term current use of insulin  - A1c was up to 10.2 about 6 weeks ago in part due to diet and also steroid use with chemo. Today improved to 8.6. FSBG lately in the 100-110s indicating A1c likely 7 or less.  - off metformin due to absorption concerns and trulicity due to backorder  - Had also been briefly on jardiance but discontinued due to candidal infections and januvia which was discontinued due to GI intolerance.  - Continue ozempic 1mg weekly, glimepiride 2mg BID and actos 30mg daily.   - Urine microalbumin nl 3/2023, repeat today    Primary hypertension  - BP controlled  - Continue nebivolol 10mg daily and HCTZ 12.5mg daily        Malignant neoplasm of sigmoid colon  - s/p resection and chemo with xeloda and oxalaplatin    - recently had colonoscopy which did not show recurrence and will have labs, CT in May    Health Maintenance  - Pap smear: GYN Dr. Mcguire, unable to get records  - Mammogram: 7/2023 BIRADS 1  - Colonoscopy: 8/2023  -  HCV: negative  - Immunizations: COVID deferred. Shingrix complete. PCV20 UTD. Tdap 10/2019.  - Depression screening: negative 4/2024    Return for Next scheduled follow up, Labs today.

## 2024-05-13 ENCOUNTER — TELEMEDICINE (OUTPATIENT)
Dept: PSYCHIATRY | Facility: CLINIC | Age: 59
End: 2024-05-13
Payer: COMMERCIAL

## 2024-05-13 DIAGNOSIS — F33.41 RECURRENT MAJOR DEPRESSIVE DISORDER, IN PARTIAL REMISSION: ICD-10-CM

## 2024-05-13 DIAGNOSIS — F41.1 GENERALIZED ANXIETY DISORDER: ICD-10-CM

## 2024-05-13 DIAGNOSIS — F32.81 PMDD (PREMENSTRUAL DYSPHORIC DISORDER): Primary | ICD-10-CM

## 2024-05-13 DIAGNOSIS — G47.9 SLEEP DIFFICULTIES: ICD-10-CM

## 2024-05-13 PROCEDURE — 99214 OFFICE O/P EST MOD 30 MIN: CPT | Performed by: NURSE PRACTITIONER

## 2024-05-13 RX ORDER — DULOXETIN HYDROCHLORIDE 60 MG/1
60 CAPSULE, DELAYED RELEASE ORAL DAILY
Qty: 90 CAPSULE | Refills: 0 | Status: SHIPPED | OUTPATIENT
Start: 2024-05-13

## 2024-05-13 RX ORDER — ARIPIPRAZOLE 5 MG/1
5 TABLET ORAL DAILY
Qty: 90 TABLET | Refills: 0 | Status: SHIPPED | OUTPATIENT
Start: 2024-05-13

## 2024-05-13 NOTE — PROGRESS NOTES
This provider is located at [facility], [address] [place] [address].The Patient is seen remotely at home, [address] [facility] [place] [address] via [employer]. Patient is being seen via telehealth and confirm that they are in a secure environment for this session. The patient's condition being diagnosed/treated is appropriate for telemedicine. The provider identified himself/herself: herself as well as her credentials.   The patient gave consent to be seen remotely, and when consent is given they understand that the consent allows for patient identifiable information to be sent to a third party as needed.   They may refuse to be seen remotely at any time. The electronic data is encrypted and password protected, and the patient has been advised of the potential risks to privacy not withstanding such measures.    You have chosen to receive care through a telehealth visit.  Do you consent to use a video/audio connection for your medical care today? Yes      Chief Complaint  Follow-up for depression and anxiety     Subjective    Yelena Radha Morris presents to [facility] for medication management.       History of Present Illness   Patient presents today reporting that things have been going better since being back on the Prozac.  She denies feeling hopeless or helpless or down or depressed mood.  Reports that her health has been doing good as the cancer is still not detectable but she goes for a CT scan in June to follow-up to ensure that everything is still going well.  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  Patient feels it is very overwhelming and she has been very tired and not being able to interact with her family much because of the tiredness.  She states that she tripped over  some landscaping and fell Friday so she has been sore.  Encouraged the patient to look into part-time due to her and her 's health conditions.  Patient denies any side effects except for dry mouth.  Denies SI/HI/AH/VH.      Objective   Vital Signs:   There were no vitals taken for this visit.  Due to the remote nature of this encounter (virtual encounter), vitals were unable to be obtained.  Height stated at 64 inches.  Weight stated at 165 pounds.      PHQ-9 Depression Screening  Little interest or pleasure in doing things? (P) 0-->not at all   Feeling down, depressed, or hopeless? (P) 0-->not at all   Trouble falling or staying asleep, or sleeping too much? (P) 0-->not at all   Feeling tired or having little energy?     Poor appetite or overeating? (P) 0-->not at all   Feeling bad about yourself - or that you are a failure or have let yourself or your family down? (P) 0-->not at all   Trouble concentrating on things, such as reading the newspaper or watching television? (P) 0-->not at all   Moving or speaking so slowly that other people could have noticed? Or the opposite - being so fidgety or restless that you have been moving around a lot more than usual?     Thoughts that you would be better off dead, or of hurting yourself in some way? (P) 0-->not at all   PHQ-9 Total Score (P) 0   If you checked off any problems, how difficult have these problems made it for you to do your work, take care of things at home, or get along with other people? (P) not difficult at all       PHQ-9 Total Score: (P) 0          Mental Status Exam:   Hygiene:   good  Cooperation:  Cooperative  Eye Contact:  Good  Psychomotor Behavior:  Appropriate  Affect:  Appropriate  Mood: normal  Speech:  Normal  Thought Process:  Goal directed and Linear  Thought Content:  Normal  Suicidal:  None  Homicidal:  None  Hallucinations:  None  Delusion:  None  Memory:  Intact  Orientation:  Person, Place, Time and Situation  Reliability:   good  Insight:  Good  Judgement:  Good  Impulse Control:  Good  Physical/Medical Issues:  Yes HTN, DM, fibromyalgia, carpal tunnel syndrome      Current Medications:   Current Outpatient Medications   Medication Sig Dispense Refill    ARIPiprazole (ABILIFY) 5 MG tablet Take 1 tablet by mouth Daily. 90 tablet 0    DULoxetine (CYMBALTA) 60 MG capsule Take 1 capsule by mouth Daily. 90 capsule 0    atorvastatin (LIPITOR) 40 MG tablet TAKE 1 TABLET BY MOUTH EVERY EVENING 90 tablet 3    BOTOX 200 units reconstituted solution INJECT 200 UNITS INTO THE HEAD OR NECK AREA BY MD EVERY 90 DAYS  3    cyclobenzaprine (FLEXERIL) 10 MG tablet Take 1 tablet by mouth At Night As Needed for Muscle Spasms. 30 tablet 1    Diclofenac Sodium (VOLTAREN) 1 % gel gel Apply 4 g topically to the appropriate area as directed 2 (Two) Times a Day. Apply finger tip amount to palms of hands and soles of feet 350 g 3    eletriptan (RELPAX) 40 MG tablet TAKE 1 TABLET BY MOUTH AT THE ONSET OF MIGRAINE. MAY REPEAT IN 2 HRS.MAX 2 DOSE/24HR,3 DAYS/WEEK  11    FLUoxetine (PROzac) 20 MG capsule Take 1 capsule by mouth Daily for 90 days. 90 capsule 0    glimepiride (AMARYL) 2 MG tablet Take 1 tablet by mouth 2 (Two) Times a Day. 180 tablet 3    hydroCHLOROthiazide (HYDRODIURIL) 12.5 MG tablet Take 1 tablet by mouth Daily. 90 tablet 3    hydrOXYzine (ATARAX) 25 MG tablet Take 1-2 tablets by mouth At Night As Needed for Anxiety (sleep). 180 tablet 1    nebivolol (Bystolic) 10 MG tablet Take 1 tablet by mouth Daily. 90 tablet 3    nortriptyline (PAMELOR) 10 MG capsule nortriptyline 10 mg capsule   TAKE 4 CAPSULES AT BEDTIME      Nutritional Supplements (DHEA PO) Take  by mouth.      omeprazole (priLOSEC) 40 MG capsule Take 1 capsule by mouth Daily. 90 capsule 3    pioglitazone (Actos) 30 MG tablet Take 1 tablet by mouth Daily. 90 tablet 1    propranolol (INDERAL) 10 MG tablet TAKE 1 TABLET BY MOUTH 2 (TWO) TIMES A DAY AS NEEDED (PANIC/ANXIETY). 180 tablet 0     Semaglutide, 1 MG/DOSE, (OZEMPIC) 4 MG/3ML solution pen-injector Inject 1 mg under the skin into the appropriate area as directed 1 (One) Time Per Week. 9 mL 1    Topiramate ER (Trokendi XR) 200 MG capsule sustained-release 24 hr Daily.       No current facility-administered medications for this visit.       Physical Exam  Nursing note reviewed.   Constitutional:       Appearance: Normal appearance.   Neurological:      Mental Status: She is alert.   Psychiatric:         Attention and Perception: Attention and perception normal.         Mood and Affect: Mood and affect normal. Mood is not anxious or depressed.         Speech: Speech normal.         Behavior: Behavior normal. Behavior is not agitated. Behavior is cooperative.         Thought Content: Thought content normal.         Cognition and Memory: Cognition and memory normal.         Judgment: Judgment normal.        Result Review :     The following data was reviewed by: ONESIMO Alexander on 02/01/2021:  Common labs          2/9/2024    10:03 3/22/2024    08:32 5/3/2024    10:39 5/3/2024    10:48   Common Labs   Glucose 309       BUN 9       Creatinine 0.88       Sodium 135       Potassium 3.7       Chloride 102       Calcium 9.1       Albumin 3.8       Total Bilirubin 0.7       Alkaline Phosphatase 105       AST (SGOT) 16       ALT (SGPT) 17       WBC 7.45       Hemoglobin 12.3       Hematocrit 36.9       Platelets 232       Hemoglobin A1C  10.2  8.6     Microalbumin, Urine    1.4    Data reviewed: PCP notes         Assessment and Plan    Problem List Items Addressed This Visit          Mental Health    Recurrent major depressive disorder    Relevant Medications    DULoxetine (CYMBALTA) 60 MG capsule    ARIPiprazole (ABILIFY) 5 MG tablet     Other Visit Diagnoses       PMDD (premenstrual dysphoric disorder)    -  Primary    Relevant Medications    DULoxetine (CYMBALTA) 60 MG capsule    ARIPiprazole (ABILIFY) 5 MG tablet    Generalized anxiety disorder         Relevant Medications    DULoxetine (CYMBALTA) 60 MG capsule    ARIPiprazole (ABILIFY) 5 MG tablet    Sleep difficulties                    TREATMENT PLAN/GOALS: Continue supportive psychotherapy efforts and medications as indicated. Treatment and medication options discussed during today's visit. Patient ackowledged and verbally consented to continue with current treatment plan and was educated on the importance of compliance with treatment and follow-up appointments.    MEDICATION ISSUES:  We discussed risks, benefits, and side effects of the above medications and the patient was agreeable with the plan. Patient was educated on the importance of compliance with treatment and follow-up appointments.  Patient is agreeable to call the office with any worsening of symptoms or onset of side effects. Patient is agreeable to call 911 or go to the nearest ER should he/she begin having SI/HI.        -Continue Cymbalta 60 mg daily for depression and anxiety as patient has benefited greatly as well for her pain.  -Continue fluoxetine 20 mg daily for depression and PMDD, as patient has been without for several months and noticed more anxiety and irritability.  Also encouraged patient that she may need to look at work as a part-time due to her and her 's health issues.  -Patient educated extensively regarding the risk of serotonin syndrome as she is currently on duloxetine due to her pain as we have previously tried to taper and it is not effective so she is on fluoxetine 20 mg daily to help with her overall depression and mood.  Patient verbalized understanding was agreeable.  -Continue aripiprazole 5 mg as adjunct for depression as patient has failed numerous SSRIs and SNRIs and has benefited the patient tremendously and is medically necessary for her depression to avoid hospitalization.   -Continue propranolol 10 mg twice daily as needed for anxiety and panic.  Encouraged patient if it decreased her blood pressure  or heart rate to contact the clinic and discontinue.  -Continue hydroxyzine 25 to 50 mg at night as needed for sleep.      Counseled patient regarding multimodal approach with healthy nutrition, healthy sleep, regular physical activity, social activities, counseling, and medications.      Coping skills reviewed and encouraged positive framing of thoughts     Assisted patient in processing above session content; acknowledged and normalized patient’s thoughts, feelings, and concerns.  Applied  positive coping skills and behavior management in session.  Allowed patient to freely discuss issues without interruption or judgment. Provided safe, confidential environment to facilitate the development of positive therapeutic relationship and encourage open, honest communication. Assisted patient in identifying risk factors which would indicate the need for higher level of care including thoughts to harm self or others and/or self-harming behavior and encouraged patient to contact this office, call 911, or present to the nearest emergency room should any of these events occur. Discussed crisis intervention services and means to access.     MEDS ORDERED DURING VISIT:  New Medications Ordered This Visit   Medications    DULoxetine (CYMBALTA) 60 MG capsule     Sig: Take 1 capsule by mouth Daily.     Dispense:  90 capsule     Refill:  0    ARIPiprazole (ABILIFY) 5 MG tablet     Sig: Take 1 tablet by mouth Daily.     Dispense:  90 tablet     Refill:  0         Follow Up   Return in about 8 weeks (around 7/8/2024), or if symptoms worsen or fail to improve, for Recheck.    Patient was given instructions and counseling regarding her condition or for health maintenance advice. Please see specific information pulled into the AVS if appropriate.     Some of the data in this electronic note has been brought forward from a previous encounter, any necessary changes have been made, it has been reviewed by this APRN, and it is  accurate.      This document has been electronically signed by ONESIMO Alexander  May 13, 2024 08:50 EDT    Part of this note may be an electronic transcription/translation of spoken language to printed text using the Dragon Dictation System.

## 2024-05-29 ENCOUNTER — HOSPITAL ENCOUNTER (OUTPATIENT)
Dept: CT IMAGING | Facility: HOSPITAL | Age: 59
Discharge: HOME | End: 2024-05-29
Admitting: INTERNAL MEDICINE
Payer: COMMERCIAL

## 2024-05-29 DIAGNOSIS — C18.7 MALIGNANT NEOPLASM OF SIGMOID COLON: Chronic | ICD-10-CM

## 2024-05-29 LAB
BASOPHILS # BLD AUTO: 0.06 10*3/MM3 (ref 0–0.2)
BASOPHILS NFR BLD AUTO: 0.5 % (ref 0–1.5)
DEPRECATED RDW RBC AUTO: 44.1 FL (ref 37–54)
EOSINOPHIL # BLD AUTO: 0.29 10*3/MM3 (ref 0–0.4)
EOSINOPHIL NFR BLD AUTO: 2.3 % (ref 0.3–6.2)
ERYTHROCYTE [DISTWIDTH] IN BLOOD BY AUTOMATED COUNT: 13.4 % (ref 12.3–15.4)
HCT VFR BLD AUTO: 41.9 % (ref 34–46.6)
HGB BLD-MCNC: 14 G/DL (ref 12–15.9)
IMM GRANULOCYTES # BLD AUTO: 0.17 10*3/MM3 (ref 0–0.05)
IMM GRANULOCYTES NFR BLD AUTO: 1.4 % (ref 0–0.5)
LYMPHOCYTES # BLD AUTO: 2.98 10*3/MM3 (ref 0.7–3.1)
LYMPHOCYTES NFR BLD AUTO: 23.9 % (ref 19.6–45.3)
MCH RBC QN AUTO: 30.1 PG (ref 26.6–33)
MCHC RBC AUTO-ENTMCNC: 33.4 G/DL (ref 31.5–35.7)
MCV RBC AUTO: 90.1 FL (ref 79–97)
MONOCYTES # BLD AUTO: 0.88 10*3/MM3 (ref 0.1–0.9)
MONOCYTES NFR BLD AUTO: 7.1 % (ref 5–12)
NEUTROPHILS NFR BLD AUTO: 64.8 % (ref 42.7–76)
NEUTROPHILS NFR BLD AUTO: 8.09 10*3/MM3 (ref 1.7–7)
NRBC BLD AUTO-RTO: 0 /100 WBC (ref 0–0.2)
PLATELET # BLD AUTO: 243 10*3/MM3 (ref 140–450)
PMV BLD AUTO: 9.8 FL (ref 6–12)
RBC # BLD AUTO: 4.65 10*6/MM3 (ref 3.77–5.28)
WBC NRBC COR # BLD AUTO: 12.47 10*3/MM3 (ref 3.4–10.8)

## 2024-05-29 PROCEDURE — 74177 CT ABD & PELVIS W/CONTRAST: CPT

## 2024-05-29 PROCEDURE — 25510000001 IOPAMIDOL 61 % SOLUTION: Performed by: INTERNAL MEDICINE

## 2024-05-29 PROCEDURE — 85025 COMPLETE CBC W/AUTO DIFF WBC: CPT

## 2024-05-29 PROCEDURE — 71260 CT THORAX DX C+: CPT

## 2024-05-29 PROCEDURE — 80053 COMPREHEN METABOLIC PANEL: CPT

## 2024-05-29 PROCEDURE — 83735 ASSAY OF MAGNESIUM: CPT

## 2024-05-29 PROCEDURE — 36415 COLL VENOUS BLD VENIPUNCTURE: CPT

## 2024-05-29 PROCEDURE — 82378 CARCINOEMBRYONIC ANTIGEN: CPT

## 2024-05-29 RX ADMIN — IOPAMIDOL 85 ML: 612 INJECTION, SOLUTION INTRAVENOUS at 16:49

## 2024-05-30 LAB
ALBUMIN SERPL-MCNC: 4.1 G/DL (ref 3.5–5.2)
ALBUMIN/GLOB SERPL: 1.5 G/DL
ALP SERPL-CCNC: 73 U/L (ref 39–117)
ALT SERPL W P-5'-P-CCNC: 24 U/L (ref 1–33)
ANION GAP SERPL CALCULATED.3IONS-SCNC: 11.7 MMOL/L (ref 5–15)
AST SERPL-CCNC: 17 U/L (ref 1–32)
BILIRUB SERPL-MCNC: 0.6 MG/DL (ref 0–1.2)
BUN SERPL-MCNC: 15 MG/DL (ref 6–20)
BUN/CREAT SERPL: 20.5 (ref 7–25)
CALCIUM SPEC-SCNC: 9.4 MG/DL (ref 8.6–10.5)
CEA SERPL-MCNC: 0.94 NG/ML
CHLORIDE SERPL-SCNC: 100 MMOL/L (ref 98–107)
CO2 SERPL-SCNC: 26.3 MMOL/L (ref 22–29)
CREAT SERPL-MCNC: 0.73 MG/DL (ref 0.57–1)
EGFRCR SERPLBLD CKD-EPI 2021: 96.7 ML/MIN/1.73
GLOBULIN UR ELPH-MCNC: 2.8 GM/DL
GLUCOSE SERPL-MCNC: 71 MG/DL (ref 65–99)
MAGNESIUM SERPL-MCNC: 2.1 MG/DL (ref 1.6–2.6)
POTASSIUM SERPL-SCNC: 3.4 MMOL/L (ref 3.5–5.2)
PROT SERPL-MCNC: 6.9 G/DL (ref 6–8.5)
SODIUM SERPL-SCNC: 138 MMOL/L (ref 136–145)

## 2024-06-03 ENCOUNTER — OFFICE VISIT (OUTPATIENT)
Dept: ONCOLOGY | Facility: CLINIC | Age: 59
End: 2024-06-03
Payer: COMMERCIAL

## 2024-06-03 VITALS
HEART RATE: 86 BPM | DIASTOLIC BLOOD PRESSURE: 74 MMHG | HEIGHT: 64 IN | OXYGEN SATURATION: 96 % | BODY MASS INDEX: 29.02 KG/M2 | WEIGHT: 170 LBS | TEMPERATURE: 97.3 F | SYSTOLIC BLOOD PRESSURE: 110 MMHG | RESPIRATION RATE: 18 BRPM

## 2024-06-03 DIAGNOSIS — C18.7 MALIGNANT NEOPLASM OF SIGMOID COLON: Primary | Chronic | ICD-10-CM

## 2024-06-03 PROBLEM — N83.202 CYSTS OF BOTH OVARIES: Status: ACTIVE | Noted: 2023-11-07

## 2024-06-03 PROCEDURE — 99215 OFFICE O/P EST HI 40 MIN: CPT | Performed by: INTERNAL MEDICINE

## 2024-06-03 NOTE — LETTER
Vanesa 3, 2024       No Recipients    Patient: Yelena Morris   YOB: 1965   Date of Visit: 6/3/2024     Dear Fiona Corona MD:       Thank you for referring Yelena Morris to me for evaluation. Below are the relevant portions of my assessment and plan of care.    If you have questions, please do not hesitate to call me. I look forward to following Yelena along with you.         Sincerely,        Bernardino Chaparro MD        CC:   No Recipients    Bernardino Chaparro MD  06/03/24 1005  Sign when Signing Visit  CHIEF COMPLAINT: Left lower quadrant discomfort    Problem List:  Oncology/Hematology History Overview Note   1.  Colon cancer found in a tubular adenoma 20 cm from the anal verge with negative staging CT chest abdomen and pelvis normal baseline CEA 1.26 with no visible lesion on MRI rectal protocol.  9/25/2023 laparoscopic sigmoid colectomy Dr. Michel pathologic T3 extending through muscularis propria into pericolonic fat, N1b 3/14 node positive.  CT DNA negative.  On  randomized to the treatment arm.  Will give 3 months of capecitabine and oxaliplatin with NSAID cream.  Fourth cycle of Xeloda oxaliplatin started 2/9/2024.  CT DNA negative at that junction with CEA 2.45 and negative CTs for recurrence  2.  Goiter with 1.8 cm left thyroid nodule found on colon cancer staging   -2/1/2024 FNA left thyroid biopsy with Dr. Armida Young, pathology was negative for malignant cells.  3.  Myomectomy with bladder tack July 2015  4.  Diabetes  5.  Hypertension  6.  Hyperlipidemia  7.  Migraines  8.  Depression  9.  Reflux  10.  History of uterine ablation with ovarian cyst on post-op staging imaging of colon cancer  11.  History of tonsillectomy  12.  History of tympanostomy tubes  13.  History of L4-5 disc surgery and L5-S1 discectomy and fusion  14.  History of solid and liquid phase dysphagia with history of mild gastritis and some esophageal ranging on EGD 2019  15.  Compound melanocytic nevus with  dermatofibroma removed by Dr. Ray 07/12/2023    Oncology history timeline:  -12/20/2019 EGD showed small hiatal hernia with some ringing of the esophagus biopsy.  Mild gastritis.  No intestinal metaplasia or dysplasia or malignancy.  Reflux suggestive but not diagnostic.  -8/18/2023 colonoscopy Dr. Calle.  Diverticulosis in the sigmoid colon.  7 mm polyp in the rectum.  Diverticulosis in the sigmoid colon.  Polypoid area with ulceration crescent-shaped 1.8 cm 20 cm proximal to the anus likely sigmoid.  Tattooed and biopsied.  Rectal polyp showed tubular adenoma with no high-grade dysplasia.  Colon biopsy at 20 cm showed invasive moderately differentiated colon adenocarcinoma arising within a tubular adenoma with high-grade dysplasia.  Microsatellite stable    -8/24/2023 CEA normal 1.26.  ALT 34 bicarb 20.2 otherwise unremarkable CMP.  CBC normal with hemoglobin 14.4 and normal MCV.  -8/27/2023 CT abdomen pelvis with contrast did not show any colonic mass.  Mild distal esophageal thickening, hepatic steatosis without hepatic metastasis.  No lytic bone disease.  MRI rectal protocol without contrast reviewed with Dr. López.  Perhaps a small pucker at the site of biopsy but no discernible mass per se and no adenopathy.  -8/28/2023 CT chest shows no metastasis.  1.8 cm left thyroid for which follow-up is recommended    -8/29/2023 [facility] consult: On screening colonoscopy patient found to have ulcerated polyp at 20 cm from the anal verge with moderately differentiated adenocarcinoma microsatellite stable.  Normal baseline CEA with staging CT chest abdomen pelvis and MRI rectal protocol showing no evidence of metastasis and no visible primary on MRI.  Based on distance from anal verge on colonoscopy and given the absence of visible abnormality on MRI rectal protocol to change that estimated position, would call this colon cancer.  Given microsatellite stability, would not contemplate calling this  rectal cancer and treating with immunotherapy but regardless of whether rectal or colon primary this would at least be no more than T1 tumor clinically with no visible tumor on MRI despite colonoscopy findings.  The patient has experience with Dr. Michel with whom she requests a consultation for definitive surgery and I will see back postoperatively to decide on any adjuvant therapy as indicated based on pathologic staging.  There is some esophageal thickening on CT for which I would request Dr. Calle repeat EGD last done 2019 just for completeness sake but first would deal with the colon cancer at hand.She will also need to get left thyroid ultrasound eventually and would likely do this through whoever has managed her goiter previously.      -- 9/25/2023 laparoscopic sigmoid colectomy Dr. Michel.    -10/2/2023 [facility] oncology follow-up: Final pathology from surgery done a week ago is not back.  According to verbal report to me from the pathologist has either T2 or T3 disease but he thinks that this is tracking along the arteries and will be T3 and she had 3 out of 13 nodes involved.  Hence I would call this stage IIIb.  Her baseline CEA was normal preoperatively so that will not be extremely helpful postoperatively.  Outside of clinical trial the standard of care would be 3 months of CapeOx or 6 months of FOLFOX.  I would be interested in putting her on NRG- where upon CT DNA negative patients are randomized to either serial screening and CT DNA or 3 months CapeOx/6 months FOLFOX providers choice.  CT DNA positive patients are randomized to either 6 months of CapeOx or FOLFOX or 6 months of FOLFIRINOX.  If we can go ahead and get the [place] CT DNA cooking outside of clinical trial without significant financial toxicity we may do that.  Ideally chemotherapy would start less than 8 weeks out from surgery and we should have plenty of time to have her heal and to make these decisions collectively.   Addendum: Final pathology report… Moderately differentiated adenocarcinoma pathologic T3N1B, negative margins margins 3 out of 14 nodes positive with negative new anastomotic excisional margin.  Tumor extent ends through muscularis propria into pericolonic or perirectal tissue.  No lymph-vascular or perineural invasion.     -10/16/2023 CT chest Abdomen pelvis compared to CT chest 8/28/2023 and CT abdomen pelvis 8/27/2023 shows no metastasis in the chest abdomen or pelvis and interval sigmoid colectomy.  New right adnexal cystic lesion 6 cm recommending pelvic ultrasound in 3 months    -10/19/2023 [facility] medical oncology follow-up: I went over her scans with her.  No clear-cut metastatic disease.  I will ask her to get to Dr. Mcguire for the right adnexal cystic lesion as quickly as possible and would not wait 3 months on the ultrasound in light of clinical trial.  I suspect this is benign but we will get this worked up.  On [facility] nomogram, putting in her pathologic information she has a 5-year survival rate of 69% give or take 7%.  With adjuvant therapy her 5-year survival rate is 85%.  This is based on statistics not informed by molecular data.  We are aware of prognostic data from prior CT DNA trials whereby patients with negative CT DNA have a considerably higher 5-year survival rate and people with CT DNA positivity have a considerably lower 5-year survival rate and the above 5-year survival rate is probably a conglomerate of statistics between these people that averages out.  I certainly would be fine with either 6 months of FOLFOX or 3 months of Xeloda oxaliplatin outside of a clinical trial and that would be my standard recommendation.   is now open and we are enrolling her on that per her request.  If she has negative CT DNA then she is randomized to watchful waiting versus CapeOx x4 or FOLFOX x12.  If CT DNA is positive then she is randomized to CapeOx x6 or FOLFOX x12 (investigators choice)  versus treatment intensification with FOLFIRINOX.  I will see her back in a few weeks for the CT DNA results.    - 11/8/2023 evaluation by Dr. Eloisa Uribe, GYN oncology of right adnexal mass with minimal complexity is not consistent with Krukenberg tumor or other malignancy.  There is a thickened endometrial stripe.  Perimenopausal state.  Still having monthly periods hormonal blood work from primary OB/GYN not available.  Imaging reviewed by Dr. Uribe with no concerns for this being malignancy.   normal within the last month.  If it were not for serial CTs already planned, patient would have transvaginal ultrasound in 6 months but with plans for serial CTs, GYN oncology plans no further ultrasounds.  Due to low risk of malignancy, GYN oncology recommends regular follow-up with primary OB/GYN and follow-up if images change in a worrisome manner.    -11/9/2023 [facility] oncology follow-up: The CT DNA is still pending.  We should have the results by November 30 or sooner.  8 weeks from surgery would be November 20.  There is good data that disease-free survival and overall outcomes are not compromised when adjuvant chemotherapy occurs 8 weeks out from surgery compared to 4 weeks or less.  The data beyond that does show some falloff of survival when there are significant delays but I do not think her overall risk is going to substantially change whether we treated her the week of the 20th or the week of 27 November.  There is the option to give her 1 cycle of chemotherapy while waiting on this but that would not accrue to her overall number of cycles and I do not want to add oxaliplatin cycles overall given the potential additive toxicity of oxaliplatin.  She understands the uncertainties here and wishes to proceed with the clinical trial.  She has seen gynecologic oncology for evaluation and this is new ovarian cyst will be watched and is felt to be benign according to them and can be just watched by  serial CTs and primary GYN exam.  I will see her back November 27 hopefully to have results by then and hopefully to then later that week proceed with chemotherapy as indicated based on the randomizations.    -12/1/2023 [facility] follow[facility]: CT DNA reportedly 0 per  .  She randomized to the treatment arm for which we have seen her and educated her for Xeloda oxaliplatin x 3 months with NSAID cream to mitigate palmar and plantar skin issues.  Will ask scan routinely for routine surveillance and with that we will also keep an eye on the right adnexal mass felt to be benign per GYN oncology.  Treatment 1 cycle 1 orders signed and we will see her for cycle 2 day 1 the day after Dennis with my nurse practitioner in [place].  She has had her cancer treatment preparation visit and education and consenting.  She has been vasculature and approval has been granted to treat her by peripheral vein from the nursing staff.    -12/13/2023 PICC line placed due to discomfort with oxaliplatin infusion  -12/26/2023 [facility] follow-[facility]: Yelena tolerated her first cycle of XELOX fairly well.  She has some mild fatigue.  She does continue to work.  She had 1 episode of diarrhea that resolved with Imodium.  Labs reviewed from today, CBC and CMP are normal other than for glucose of 128.  She now has a PICC line as oxaliplatin was uncomfortable infused peripherally.  We will proceed today with cycle #2 unchanged, I will see her back for follow-up for her third cycle.    -1/16/2024 [facility] follow-[facility]: Today will be cycle 3 of a planned 4 cycles of XELOX.  She has some mild palmar erythrodysesthesia, after further discussion she has not been using the Voltaren ointment twice daily as prescribed.  She will begin doing that along with making sure to keep her hands well moisturized throughout the day.  Right now this is a grade 1-2.  She understands to notify our office if  it worsens during this cycle while she is taking her Xeloda and we may have to hold or reduce dose.  She has no lingering neuropathy.  Labs reviewed from today, counts are acceptable to continue treatment unchanged. Her CBC is normal, CMP normal other than for glucose of 279.  We will move her next treatment to a Friday at her request to work better with her work schedule and give her the weekend to recover.    -2/1/2024 FNA left thyroid biopsy with Dr. Armida Young, pathology was negative for malignant cells.    -2/9/2024 [facility] Oncology clinic follow-up: Overall she continues to do well on XELOX, today is her fourth and final cycle of planned therapy.  Somehow the shipments of her Xeloda wore off but this last cycle, she completed Xeloda on Wednesday therefore I have asked her not to start taking cycle 4 of her Xeloda until Wednesday, 2/14/2024.  I have also notified our pharmacist.  We will continue with oxaliplatin today as planned. She has mild peripheral neuropathy that is intermittent.  Her hand-foot erythrodysesthesia has improved with more consistent use of emollient lotion and also Voltaren ointment twice a day.  She will continue the Xeloda dose at 1500 mg twice daily again and she will start this next Wednesday for 14 days on and then she will be finished.  I reviewed her labs from today, CT DNA was drawn, her CBC looks good with WBC 7.45, hemoglobin 12.3, hematocrit 36.9%, platelet count 232,000, ANC 4.83, her CMP is unremarkable other than for glucose of 309.  I did go over to see Yelena in the infusion area as I did not get the results of her CMP until she had left our visit.  She states that she did take her diabetes medications as directed, she does not monitor her glucose at home regularly, she will start doing so and will follow-up with her PCP.  Her magnesium is normal, CEA is pending.  We will plan on repeating CT chest, abdomen and pelvis for restaging prior to return in 4 weeks and I have  ordered that today.  Since we saw her last she did have FNA of left thyroid nodule with Dr. Armida Young and that was negative for malignant cells.  We will discontinue her PICC line after her treatment today. Addendum: CEA 2.45 up from 0.694 months ago.  CT DNA negative.    -3/1/2024 Chest abdomen pelvis with contrast compared to October CTs shows left lobe thyroid nodule previously noted with multinodular goiter.  Scarring apical portions of both lungs.  Slight thoracic esophageal thickening inflammation versus underdistention.  Mild hepatic steatosis.  Postsurgical sigmoid changes with haziness around the colon.  Right adnexal cyst no longer identified.  Thickening of the bladder wall possible cystitis.  L4-5, L5-S1 surgical changes.  Moderate stool burden    -3/8/2024 [facility] oncology clinic follow-up: On arm 1B  has completed 4 adjuvant cycles of Xeloda oxaliplatin.  CT DNA is negative in February.  We will repeat CT DNA CEA CMP CBC mid May and CTs the end of May and I will see her back early June and in the meantime I have reached out to Dr. Calle to do follow-up colonoscopy.  Her PICC line is out.  Thyroid has been biopsied and is benign.  Souls of her feet peeled with the last cycle and that has resolved.  Cold sensitive neuropathy still comes and goes but otherwise no significant residual neuropathy.  Encouraged her to follow-up with primary care for management of her blood glucose.    -5/24/2024 CT DNA negative.  -5/29/2024 CT chest abdomen pelvis with contrast shows no evidence of recurrence in the chest abdomen or pelvis.  Multiple bilateral ovarian follicles/cysts versus multilocular cystic lesion in the left ovary measuring up to 4.6 cm both new from prior comparison.  CEA 0.94.  White count 12,470 otherwise unremarkable CBC and differential.  Potassium 3.4 otherwise unremarkable CMP.  Magnesium normal 2.1.       Malignant neoplasm of sigmoid colon   8/29/2023 Initial Diagnosis    Malignant  neoplasm of sigmoid colon     10/2/2023 Cancer Staged    Staging form: Colon And Rectum, AJCC 8th Edition  - Clinical: Stage IIIB (cT3, cN1, cM0) - Signed by Bernardino Chaparro MD on 10/2/2023     10/19/2023 Cancer Staged    Staging form: Colon And Rectum, AJCC 8th Edition  - Pathologic: Stage IIIB (pT3, pN1b, cM0) - Signed by Bernardino Chaparro MD on 10/19/2023     11/30/2023 - 11/30/2023 Chemotherapy    OP COLON CapeOX Capecitabine / OXALIplatin     12/4/2023 -  Chemotherapy    RESEARCH COLON  ARM 1B CAPOX (OXALIplatin / Capecitabine)         HISTORY OF PRESENT ILLNESS:  The patient is a 58 y.o. female, here for follow up on management of history of colon cancer.  Left lower quadrant discomfort.    Past Medical History:   Diagnosis Date   • Anxiety    • Arthritis     Cant recall the date of onset   • Chest pain 11/06/2019   • Colon polyp    • Depression    • Diabetes mellitus    • Dysphagia 11/06/2019   • Endometriosis    • Fibromyalgia, primary    • GERD (gastroesophageal reflux disease)    • Headache    • HL (hearing loss)    • Hyperlipidemia    • Hypertension    • Low vitamin D level    • Malignant neoplasm of sigmoid colon 08/29/2023   • Migraines    • Ovarian cyst    • PMDD (premenstrual dysphoric disorder)    • Scoliosis    • Wears glasses      Past Surgical History:   Procedure Laterality Date   • BLADDER SURGERY      Bladder tuck   • COLONOSCOPY      every 5 years   • ENDOMETRIAL ABLATION     • LUMBAR DISCECTOMY  2010    L4-S1 Rt- discectomy Dr. Arellano   • SPINAL CORD STIMULATOR IMPLANT N/A 08/19/2019    Procedure: SPINAL CORD STIMULATOR INSERTION;  Surgeon: Ethan Peters MD;  Location: [facility];  Service: Neurosurgery   • SPINE SURGERY  2010   • TONSILLECTOMY AND ADENOIDECTOMY         Allergies   Allergen Reactions   • Dihydroergotamine GI Intolerance     VOMITING   • Methergine [Methylergonovine Maleate] Nausea And Vomiting   • Amoxicillin Rash       Family History and Social History reviewed and  "changed as necessary    REVIEW OF SYSTEM:   Left lower quadrant discomfort    PHYSICAL EXAM:  No jaundice icterus or pallor.  No respiratory distress.  No rashes.  Left lower quadrant suprapubic slight tenderness without rebound or guarding  Vitals:    06/03/24 0917   BP: 110/74   Pulse: 86   Resp: 18   Temp: 97.3 °F (36.3 °C)   SpO2: 96%   Weight: 77.1 kg (170 lb)   Height: 162.6 cm (64\")     Vitals:    06/03/24 0917   PainSc: 0-No pain          ECOG score: 0           Vitals reviewed.    ECOG: (0) Fully Active - Able to Carry On All Pre-disease Performance Without Restriction    Lab Results   Component Value Date    HGB 14.0 05/29/2024    HCT 41.9 05/29/2024    MCV 90.1 05/29/2024     05/29/2024    WBC 12.47 (H) 05/29/2024    NEUTROABS 8.09 (H) 05/29/2024    LYMPHSABS 2.98 05/29/2024    MONOSABS 0.88 05/29/2024    EOSABS 0.29 05/29/2024    BASOSABS 0.06 05/29/2024       Lab Results   Component Value Date    GLUCOSE 71 05/29/2024    BUN 15 05/29/2024    CREATININE 0.73 05/29/2024     05/29/2024    K 3.4 (L) 05/29/2024     05/29/2024    CO2 26.3 05/29/2024    CALCIUM 9.4 05/29/2024    PROTEINTOT 6.9 05/29/2024    ALBUMIN 4.1 05/29/2024    BILITOT 0.6 05/29/2024    ALKPHOS 73 05/29/2024    AST 17 05/29/2024    ALT 24 05/29/2024             ASSESSMENT & PLAN:  1.  Colon cancer found in a tubular adenoma 20 cm from the anal verge with negative staging CT chest abdomen and pelvis normal baseline CEA 1.26 with no visible lesion on MRI rectal protocol.  9/25/2023 laparoscopic sigmoid colectomy Dr. Michel pathologic T3 extending through muscularis propria into pericolonic fat, N1b 3/14 node positive.  CT DNA negative.  On  randomized to the treatment arm.  Will give 3 months of capecitabine and oxaliplatin with NSAID cream.  Fourth cycle of Xeloda oxaliplatin started 2/9/2024.  CT DNA negative at that junction with CEA 2.45 and negative CTs for recurrence  2.  Goiter with 1.8 cm left thyroid nodule " found on colon cancer staging   -2/1/2024 FNA left thyroid biopsy with Dr. Armida Young, pathology was negative for malignant cells.  3.  Myomectomy with bladder tack July 2015  4.  Diabetes  5.  Hypertension  6.  Hyperlipidemia  7.  Migraines  8.  Depression  9.  Reflux  10.  History of uterine ablation with ovarian cyst on post-op staging imaging of colon cancer  11.  History of tonsillectomy  12.  History of tympanostomy tubes  13.  History of L4-5 disc surgery and L5-S1 discectomy and fusion  14.  History of solid and liquid phase dysphagia with history of mild gastritis and some esophageal ranging on EGD 2019  15.  Compound melanocytic nevus with dermatofibroma removed by Dr. Ray 07/12/2023    Oncology history timeline:  -12/20/2019 EGD showed small hiatal hernia with some ringing of the esophagus biopsy.  Mild gastritis.  No intestinal metaplasia or dysplasia or malignancy.  Reflux suggestive but not diagnostic.  -8/18/2023 colonoscopy Dr. Calle.  Diverticulosis in the sigmoid colon.  7 mm polyp in the rectum.  Diverticulosis in the sigmoid colon.  Polypoid area with ulceration crescent-shaped 1.8 cm 20 cm proximal to the anus likely sigmoid.  Tattooed and biopsied.  Rectal polyp showed tubular adenoma with no high-grade dysplasia.  Colon biopsy at 20 cm showed invasive moderately differentiated colon adenocarcinoma arising within a tubular adenoma with high-grade dysplasia.  Microsatellite stable    -8/24/2023 CEA normal 1.26.  ALT 34 bicarb 20.2 otherwise unremarkable CMP.  CBC normal with hemoglobin 14.4 and normal MCV.  -8/27/2023 CT abdomen pelvis with contrast did not show any colonic mass.  Mild distal esophageal thickening, hepatic steatosis without hepatic metastasis.  No lytic bone disease.  MRI rectal protocol without contrast reviewed with Dr. López.  Perhaps a small pucker at the site of biopsy but no discernible mass per se and no adenopathy.  -8/28/2023 CT chest shows no metastasis.  1.8 cm  left thyroid for which follow-up is recommended    -8/29/2023 [facility] medical oncology consult: On screening colonoscopy patient found to have ulcerated polyp at 20 cm from the anal verge with moderately differentiated adenocarcinoma microsatellite stable.  Normal baseline CEA with staging CT chest abdomen pelvis and MRI rectal protocol showing no evidence of metastasis and no visible primary on MRI.  Based on distance from anal verge on colonoscopy and given the absence of visible abnormality on MRI rectal protocol to change that estimated position, would call this colon cancer.  Given microsatellite stability, would not contemplate calling this rectal cancer and treating with immunotherapy but regardless of whether rectal or colon primary this would at least be no more than T1 tumor clinically with no visible tumor on MRI despite colonoscopy findings.  The patient has experience with Dr. Michel with whom she requests a consultation for definitive surgery and I will see back postoperatively to decide on any adjuvant therapy as indicated based on pathologic staging.  There is some esophageal thickening on CT for which I would request Dr. Calle repeat EGD last done 2019 just for completeness sake but first would deal with the colon cancer at hand.She will also need to get left thyroid ultrasound eventually and would likely do this through whoever has managed her goiter previously.      -- 9/25/2023 laparoscopic sigmoid colectomy Dr. Michel.    -10/2/2023 [facility] medical oncology follow-up: Final pathology from surgery done a week ago is not back.  According to verbal report to me from the pathologist has either T2 or T3 disease but he thinks that this is tracking along the arteries and will be T3 and she had 3 out of 13 nodes involved.  Hence I would call this stage IIIb.  Her baseline CEA was normal preoperatively so that will not be extremely helpful postoperatively.  Outside of clinical trial the standard of care  would be 3 months of CapeOx or 6 months of FOLFOX.  I would be interested in putting her on NRG- where upon CT DNA negative patients are randomized to either serial screening and CT DNA or 3 months CapeOx/6 months FOLFOX providers choice.  CT DNA positive patients are randomized to either 6 months of CapeOx or FOLFOX or 6 months of FOLFIRINOX.  If we can go ahead and get the [place] CT DNA cooking outside of clinical trial without significant financial toxicity we may do that.  Ideally chemotherapy would start less than 8 weeks out from surgery and we should have plenty of time to have her heal and to make these decisions collectively.  Addendum: Final pathology report… Moderately differentiated adenocarcinoma pathologic T3N1B, negative margins margins 3 out of 14 nodes positive with negative new anastomotic excisional margin.  Tumor extent ends through muscularis propria into pericolonic or perirectal tissue.  No lymph-vascular or perineural invasion.     -10/16/2023 CT chest Abdomen pelvis compared to CT chest 8/28/2023 and CT abdomen pelvis 8/27/2023 shows no metastasis in the chest abdomen or pelvis and interval sigmoid colectomy.  New right adnexal cystic lesion 6 cm recommending pelvic ultrasound in 3 months    -10/19/2023 [facility] medical oncology follow-up: I went over her scans with her.  No clear-cut metastatic disease.  I will ask her to get to Dr. Mcguire for the right adnexal cystic lesion as quickly as possible and would not wait 3 months on the ultrasound in light of clinical trial.  I suspect this is benign but we will get this worked up.  [facility], putting in her pathologic information she has a 5-year survival rate of 69% give or take 7%.  With adjuvant therapy her 5-year survival rate is 85%.  This is based on statistics not informed by molecular data.  We are aware of prognostic data from prior CT DNA trials whereby patients with negative CT DNA have a considerably higher  5-year survival rate and people with CT DNA positivity have a considerably lower 5-year survival rate and the above 5-year survival rate is probably a conglomerate of statistics between these people that averages out.  I certainly would be fine with either 6 months of FOLFOX or 3 months of Xeloda oxaliplatin outside of a clinical trial and that would be my standard recommendation.   is now open and we are enrolling her on that per her request.  If she has negative CT DNA then she is randomized to watchful waiting versus CapeOx x4 or FOLFOX x12.  If CT DNA is positive then she is randomized to CapeOx x6 or FOLFOX x12 (investigators choice) versus treatment intensification with FOLFIRINOX.  I will see her back in a few weeks for the CT DNA results.    - 11/8/2023 evaluation by Dr. Eloisa Uribe, GYN oncology of right adnexal mass with minimal complexity is not consistent with Krukenberg tumor or other malignancy.  There is a thickened endometrial stripe.  Perimenopausal state.  Still having monthly periods hormonal blood work from primary OB/GYN not available.  Imaging reviewed by Dr. Uribe with no concerns for this being malignancy.   normal within the last month.  If it were not for serial CTs already planned, patient would have transvaginal ultrasound in 6 months but with plans for serial CTs, GYN oncology plans no further ultrasounds.  Due to low risk of malignancy, GYN oncology recommends regular follow-up with primary OB/GYN and follow-up if images change in a worrisome manner.    -11/9/2023 [facility] medical oncology follow-up: The CT DNA is still pending.  We should have the results by November 30 or sooner.  8 weeks from surgery would be November 20.  There is good data that disease-free survival and overall outcomes are not compromised when adjuvant chemotherapy occurs 8 weeks out from surgery compared to 4 weeks or less.  The data beyond that does show some falloff of survival when there are  significant delays but I do not think her overall risk is going to substantially change whether we treated her the week of the 20th or the week of 27 November.  There is the option to give her 1 cycle of chemotherapy while waiting on this but that would not accrue to her overall number of cycles and I do not want to add oxaliplatin cycles overall given the potential additive toxicity of oxaliplatin.  She understands the uncertainties here and wishes to proceed with the clinical trial.  She has seen gynecologic oncology for evaluation and this is new ovarian cyst will be watched and is felt to be benign according to them and can be just watched by serial CTs and primary GYN exam.  I will see her back November 27 hopefully to have results by then and hopefully to then later that week proceed with chemotherapy as indicated based on the randomizations.    -12/1/2023 [facility] medical oncology follow-up: CT DNA reportedly 0 per  .  She randomized to the treatment arm for which we have seen her and educated her for Xeloda oxaliplatin x 3 months with NSAID cream to mitigate palmar and plantar skin issues.  Will ask scan routinely for routine surveillance and with that we will also keep an eye on the right adnexal mass felt to be benign per GYN oncology.  Treatment 1 cycle 1 orders signed and we will see her for cycle 2 day 1 the day after [place] with my nurse practitioner in [place].  She has had her cancer treatment preparation visit and education and consenting.  She has been vasculature and approval has been granted to treat her by peripheral vein from the nursing staff.    -12/13/2023 PICC line placed due to discomfort with oxaliplatin infusion  -12/26/2023 [facility] follow-up: Yelena tolerated her first cycle of XELOX fairly well.  She has some mild fatigue.  She does continue to work.  She had 1 episode of diarrhea that resolved with Imodium.  Labs reviewed from today, CBC and  CMP are normal other than for glucose of 128.  She now has a PICC line as oxaliplatin was uncomfortable infused peripherally.  We will proceed today with cycle #2 unchanged, I will see her back for follow-up for her third cycle.    -1/16/2024 [facility] clinic follow-up: Today will be cycle 3 of a planned 4 cycles of XELOX.  She has some mild palmar erythrodysesthesia, after further discussion she has not been using the Voltaren ointment twice daily as prescribed.  She will begin doing that along with making sure to keep her hands well moisturized throughout the day.  Right now this is a grade 1-2.  She understands to notify our office if it worsens during this cycle while she is taking her Xeloda and we may have to hold or reduce dose.  She has no lingering neuropathy.  Labs reviewed from today, counts are acceptable to continue treatment unchanged. Her CBC is normal, CMP normal other than for glucose of 279.  We will move her next treatment to a Friday at her request to work better with her work schedule and give her the weekend to recover.    -2/1/2024 FNA left thyroid biopsy with Dr. Armida Young, pathology was negative for malignant cells.    -2/9/2024 [facility]cology clinic follow-up: Overall she continues to do well on XELOX, today is her fourth and final cycle of planned therapy.  Somehow the shipments of her Xeloda wore off but this last cycle, she completed Xeloda on Wednesday therefore I have asked her not to start taking cycle 4 of her Xeloda until Wednesday, 2/14/2024.  I have also notified our pharmacist.  We will continue with oxaliplatin today as planned. She has mild peripheral neuropathy that is intermittent.  Her hand-foot erythrodysesthesia has improved with more consistent use of emollient lotion and also Voltaren ointment twice a day.  She will continue the Xeloda dose at 1500 mg twice daily again and she will start this next Wednesday for 14 days on and then she will be finished.  I  reviewed her labs from today, CT DNA was drawn, her CBC looks good with WBC 7.45, hemoglobin 12.3, hematocrit 36.9%, platelet count 232,000, ANC 4.83, her CMP is unremarkable other than for glucose of 309.  I did go over to see Yelena in the infusion area as I did not get the results of her CMP until she had left our visit.  She states that she did take her diabetes medications as directed, she does not monitor her glucose at home regularly, she will start doing so and will follow-up with her PCP.  Her magnesium is normal, CEA is pending.  We will plan on repeating CT chest, abdomen and pelvis for restaging prior to return in 4 weeks and I have ordered that today.  Since we saw her last she did have FNA of left thyroid nodule with Dr. Armida Young and that was negative for malignant cells.  We will discontinue her PICC line after her treatment today. Addendum: CEA 2.45 up from 0.694 months ago.  CT DNA negative.    -3/1/2024 Chest abdomen pelvis with contrast compared to October CTs shows left lobe thyroid nodule previously noted with multinodular goiter.  Scarring apical portions of both lungs.  Slight thoracic esophageal thickening inflammation versus underdistention.  Mild hepatic steatosis.  Postsurgical sigmoid changes with haziness around the colon.  Right adnexal cyst no longer identified.  Thickening of the bladder wall possible cystitis.  L4-5, L5-S1 surgical changes.  Moderate stool burden    -3/8/2024 [facility] oncology clinic follow-up: On arm 1B  has completed 4 adjuvant cycles of Xeloda oxaliplatin.  CT DNA is negative in February.  We will repeat CT DNA CEA CMP CBC mid May and CTs the end of May and I will see her back early June and in the meantime I have reached out to Dr. Calle to do follow-up colonoscopy.  Her PICC line is out.  Thyroid has been biopsied and is benign.  Souls of her feet peeled with the last cycle and that has resolved.  Cold sensitive neuropathy still comes and goes but  otherwise no significant residual neuropathy.  Encouraged her to follow-up with primary care for management of her blood glucose.    - 4/17/2024 EGD showed normal esophagus with gastritis and normal duodenum.  Colonoscopy showed normal terminal ileum.  4 mm sigmoid colon polyp at 20 cm removed with cold snare.  Congested ascending colon biopsy.  Patent end-2-and colorectal anastomosis.Duodenal biopsy pathology unremarkable.  Gastric antrum biopsy showed reactive gastropathy H. pylori negative.  Unremarkable esophageal biopsy.  Right colon biopsy unremarkable.  20 cm colon polyp just showed colonic mucosa without polyp.    -5/24/2024 CT DNA negative.  -5/29/2024 CT chest abdomen pelvis with contrast shows no evidence of recurrence in the chest abdomen or pelvis.  Multiple bilateral ovarian follicles/cysts versus multilocular cystic lesion in the left ovary measuring up to 4.6 cm both new from prior comparison.  CEA 0.94.  White count 12,470 otherwise unremarkable CBC and differential.  Potassium 3.4 otherwise unremarkable CMP.  Magnesium normal 2.1.      -6/3/2024 [facility] oncology clinic follow-up: No new somatic complaints status post Xeloda oxaliplatin for adjuvant cycles on arm 1B of .  CT DNA remains negative and the CEA remains low and the CT shows no evidence of recurrent colon cancer.  She does however have multiple bilateral ovarian follicles/cysts versus cystic lesion in the left ovary measuring up to 4.6 cm.  I have asked her to get with Dr. Mcguire for evaluation and management of this.  This is an ongoing issue and she is having pain in that area and it may ultimately be best for her to just have this ovary removed.  He had an EGD and colonoscopy in April with Dr. Calle.  She will get repeat CT DNA, CBC, CMP, CEA, and CTs per trial and NCCN guidelines mid August.  Next colonoscopy in 1 year.    Total time of care today inclusive of time spent today prior to patient's arrival reviewing interval images  and reports thereof and during visit translating this information and data from her labs to patient and interviewing her as to signs or symptoms of her disease and management thereof and after visit instituting this plan took 50 minutes of patient care time throughout the day today including electronic communication to Dr. Mcguire regarding the ovarian cyst that is uncomfortable  Bernardino Chaparro MD    06/03/2024

## 2024-06-03 NOTE — PROGRESS NOTES
CHIEF COMPLAINT: Left lower quadrant discomfort    Problem List:  Oncology/Hematology History Overview Note   1.  Colon cancer found in a tubular adenoma 20 cm from the anal verge with negative staging CT chest abdomen and pelvis normal baseline CEA 1.26 with no visible lesion on MRI rectal protocol.  9/25/2023 laparoscopic sigmoid colectomy Dr. Michel pathologic T3 extending through muscularis propria into pericolonic fat, N1b 3/14 node positive.  CT DNA negative.  On  randomized to the treatment arm.  Will give 3 months of capecitabine and oxaliplatin with NSAID cream.  Fourth cycle of Xeloda oxaliplatin started 2/9/2024.  CT DNA negative at that junction with CEA 2.45 and negative CTs for recurrence  2.  Goiter with 1.8 cm left thyroid nodule found on colon cancer staging   -2/1/2024 FNA left thyroid biopsy with Dr. Armida Young, pathology was negative for malignant cells.  3.  Myomectomy with bladder tack July 2015  4.  Diabetes  5.  Hypertension  6.  Hyperlipidemia  7.  Migraines  8.  Depression  9.  Reflux  10.  History of uterine ablation with ovarian cyst on post-op staging imaging of colon cancer  11.  History of tonsillectomy  12.  History of tympanostomy tubes  13.  History of L4-5 disc surgery and L5-S1 discectomy and fusion  14.  History of solid and liquid phase dysphagia with history of mild gastritis and some esophageal ranging on EGD 2019  15.  Compound melanocytic nevus with dermatofibroma removed by Dr. Ray 07/12/2023    Oncology history timeline:  -12/20/2019 EGD showed small hiatal hernia with some ringing of the esophagus biopsy.  Mild gastritis.  No intestinal metaplasia or dysplasia or malignancy.  Reflux suggestive but not diagnostic.  -8/18/2023 colonoscopy Dr. Calle.  Diverticulosis in the sigmoid colon.  7 mm polyp in the rectum.  Diverticulosis in the sigmoid colon.  Polypoid area with ulceration crescent-shaped 1.8 cm 20 cm proximal to the anus likely sigmoid.  Tattooed and  biopsied.  Rectal polyp showed tubular adenoma with no high-grade dysplasia.  Colon biopsy at 20 cm showed invasive moderately differentiated colon adenocarcinoma arising within a tubular adenoma with high-grade dysplasia.  Microsatellite stable    -8/24/2023 CEA normal 1.26.  ALT 34 bicarb 20.2 otherwise unremarkable CMP.  CBC normal with hemoglobin 14.4 and normal MCV.  -8/27/2023 CT abdomen pelvis with contrast did not show any colonic mass.  Mild distal esophageal thickening, hepatic steatosis without hepatic metastasis.  No lytic bone disease.  MRI rectal protocol without contrast reviewed with Dr. López.  Perhaps a small pucker at the site of biopsy but no discernible mass per se and no adenopathy.  -8/28/2023 CT chest shows no metastasis.  1.8 cm left thyroid for which follow-up is recommended    -8/29/2023 [facility] medical oncology consult: On screening colonoscopy patient found to have ulcerated polyp at 20 cm from the anal verge with moderately differentiated adenocarcinoma microsatellite stable.  Normal baseline CEA with staging CT chest abdomen pelvis and MRI rectal protocol showing no evidence of metastasis and no visible primary on MRI.  Based on distance from anal verge on colonoscopy and given the absence of visible abnormality on MRI rectal protocol to change that estimated position, would call this colon cancer.  Given microsatellite stability, would not contemplate calling this rectal cancer and treating with immunotherapy but regardless of whether rectal or colon primary this would at least be no more than T1 tumor clinically with no visible tumor on MRI despite colonoscopy findings.  The patient has experience with Dr. Michel with whom she requests a consultation for definitive surgery and I will see back postoperatively to decide on any adjuvant therapy as indicated based on pathologic staging.  There is some esophageal thickening on CT for which I would request Dr. Calle repeat EGD last done 2019  just for completeness sake but first would deal with the colon cancer at hand.She will also need to get left thyroid ultrasound eventually and would likely do this through whoever has managed her goiter previously.      -- 9/25/2023 laparoscopic sigmoid colectomy Dr. Michel.    -10/2/2023 [facility] medical oncology follow-up: Final pathology from surgery done a week ago is not back.  According to verbal report to me from the pathologist has either T2 or T3 disease but he thinks that this is tracking along the arteries and will be T3 and she had 3 out of 13 nodes involved.  Hence I would call this stage IIIb.  Her baseline CEA was normal preoperatively so that will not be extremely helpful postoperatively.  Outside of clinical trial the standard of care would be 3 months of CapeOx or 6 months of FOLFOX.  I would be interested in putting her on NRG- where upon CT DNA negative patients are randomized to either serial screening and CT DNA or 3 months CapeOx/6 months FOLFOX providers choice.  CT DNA positive patients are randomized to either 6 months of CapeOx or FOLFOX or 6 months of FOLFIRINOX.  If we can go ahead and get the [place] CT DNA cooking outside of clinical trial without significant financial toxicity we may do that.  Ideally chemotherapy would start less than 8 weeks out from surgery and we should have plenty of time to have her heal and to make these decisions collectively.  Addendum: Final pathology report… Moderately differentiated adenocarcinoma pathologic T3N1B, negative margins margins 3 out of 14 nodes positive with negative new anastomotic excisional margin.  Tumor extent ends through muscularis propria into pericolonic or perirectal tissue.  No lymph-vascular or perineural invasion.     -10/16/2023 CT chest Abdomen pelvis compared to CT chest 8/28/2023 and CT abdomen pelvis 8/27/2023 shows no metastasis in the chest abdomen or pelvis and interval sigmoid colectomy.  New right adnexal cystic lesion  6 cm recommending pelvic ultrasound in 3 months    -10/19/2023 [facility] medical oncology follow-up: I went over her scans with her.  No clear-cut metastatic disease.  I will ask her to get to Dr. Mcguire for the right adnexal cystic lesion as quickly as possible and would not wait 3 months on the ultrasound in light of clinical trial.  I suspect this is benign but we will get this worked up.  On [facility] nomogram, putting in her pathologic information she has a 5-year survival rate of 69% give or take 7%.  With adjuvant therapy her 5-year survival rate is 85%.  This is based on statistics not informed by molecular data.  We are aware of prognostic data from prior CT DNA trials whereby patients with negative CT DNA have a considerably higher 5-year survival rate and people with CT DNA positivity have a considerably lower 5-year survival rate and the above 5-year survival rate is probably a conglomerate of statistics between these people that averages out.  I certainly would be fine with either 6 months of FOLFOX or 3 months of Xeloda oxaliplatin outside of a clinical trial and that would be my standard recommendation.   is now open and we are enrolling her on that per her request.  If she has negative CT DNA then she is randomized to watchful waiting versus CapeOx x4 or FOLFOX x12.  If CT DNA is positive then she is randomized to CapeOx x6 or FOLFOX x12 (investigators choice) versus treatment intensification with FOLFIRINOX.  I will see her back in a few weeks for the CT DNA results.    - 11/8/2023 evaluation by Dr. Eloisa Uribe, GYN oncology of right adnexal mass with minimal complexity is not consistent with Krukenberg tumor or other malignancy.  There is a thickened endometrial stripe.  Perimenopausal state.  Still having monthly periods hormonal blood work from primary OB/GYN not available.  Imaging reviewed by Dr. Uribe with no concerns for this being malignancy.   normal within the last  month.  If it were not for serial CTs already planned, patient would have transvaginal ultrasound in 6 months but with plans for serial CTs, GYN oncology plans no further ultrasounds.  Due to low risk of malignancy, GYN oncology recommends regular follow-up with primary OB/GYN and follow-up if images change in a worrisome manner.    -11/9/2023 [facility] medical oncology follow-up: The CT DNA is still pending.  We should have the results by November 30 or sooner.  8 weeks from surgery would be November 20.  There is good data that disease-free survival and overall outcomes are not compromised when adjuvant chemotherapy occurs 8 weeks out from surgery compared to 4 weeks or less.  The data beyond that does show some falloff of survival when there are significant delays but I do not think her overall risk is going to substantially change whether we treated her the week of the 20th or the week of 27 November.  There is the option to give her 1 cycle of chemotherapy while waiting on this but that would not accrue to her overall number of cycles and I do not want to add oxaliplatin cycles overall given the potential additive toxicity of oxaliplatin.  She understands the uncertainties here and wishes to proceed with the clinical trial.  She has seen gynecologic oncology for evaluation and this is new ovarian cyst will be watched and is felt to be benign according to them and can be just watched by serial CTs and primary GYN exam.  I will see her back November 27 hopefully to have results by then and hopefully to then later that week proceed with chemotherapy as indicated based on the randomizations.    -12/1/2023 [facility] medical oncology follow-up: CT DNA reportedly 0 per  .  She randomized to the treatment arm for which we have seen her and educated her for Xeloda oxaliplatin x 3 months with NSAID cream to mitigate palmar and plantar skin issues.  Will ask scan routinely for routine surveillance and  with that we will also keep an eye on the right adnexal mass felt to be benign per GYN oncology.  Treatment 1 cycle 1 orders signed and we will see her for cycle 2 day 1 the day after Dennis with my nurse practitioner in [place].  She has had her cancer treatment preparation visit and education and consenting.  She has been vasculature and approval has been granted to treat her by peripheral vein from the nursing staff.    -12/13/2023 PICC line placed due to discomfort with oxaliplatin infusion  -12/26/2023 [facility] Oncology clinic follow-up: Yelena tolerated her first cycle of XELOX fairly well.  She has some mild fatigue.  She does continue to work.  She had 1 episode of diarrhea that resolved with Imodium.  Labs reviewed from today, CBC and CMP are normal other than for glucose of 128.  She now has a PICC line as oxaliplatin was uncomfortable infused peripherally.  We will proceed today with cycle #2 unchanged, I will see her back for follow-up for her third cycle.    -1/16/2024 [facility] Oncology clinic follow-up: Today will be cycle 3 of a planned 4 cycles of XELOX.  She has some mild palmar erythrodysesthesia, after further discussion she has not been using the Voltaren ointment twice daily as prescribed.  She will begin doing that along with making sure to keep her hands well moisturized throughout the day.  Right now this is a grade 1-2.  She understands to notify our office if it worsens during this cycle while she is taking her Xeloda and we may have to hold or reduce dose.  She has no lingering neuropathy.  Labs reviewed from today, counts are acceptable to continue treatment unchanged. Her CBC is normal, CMP normal other than for glucose of 279.  We will move her next treatment to a Friday at her request to work better with her work schedule and give her the weekend to recover.    -2/1/2024 FNA left thyroid biopsy with Dr. Armida Young, pathology was negative for malignant cells.    -2/9/2024 [facility]  Oncology clinic follow-up: Overall she continues to do well on XELOX, today is her fourth and final cycle of planned therapy.  Somehow the shipments of her Xeloda wore off but this last cycle, she completed Xeloda on Wednesday therefore I have asked her not to start taking cycle 4 of her Xeloda until Wednesday, 2/14/2024.  I have also notified our pharmacist.  We will continue with oxaliplatin today as planned. She has mild peripheral neuropathy that is intermittent.  Her hand-foot erythrodysesthesia has improved with more consistent use of emollient lotion and also Voltaren ointment twice a day.  She will continue the Xeloda dose at 1500 mg twice daily again and she will start this next Wednesday for 14 days on and then she will be finished.  I reviewed her labs from today, CT DNA was drawn, her CBC looks good with WBC 7.45, hemoglobin 12.3, hematocrit 36.9%, platelet count 232,000, ANC 4.83, her CMP is unremarkable other than for glucose of 309.  I did go over to see Dilliner in the infusion area as I did not get the results of her CMP until she had left our visit.  She states that she did take her diabetes medications as directed, she does not monitor her glucose at home regularly, she will start doing so and will follow-up with her PCP.  Her magnesium is normal, CEA is pending.  We will plan on repeating CT chest, abdomen and pelvis for restaging prior to return in 4 weeks and I have ordered that today.  Since we saw her last she did have FNA of left thyroid nodule with Dr. Armida Young and that was negative for malignant cells.  We will discontinue her PICC line after her treatment today. Addendum: CEA 2.45 up from 0.694 months ago.  CT DNA negative.    -3/1/2024 Chest abdomen pelvis with contrast compared to October CTs shows left lobe thyroid nodule previously noted with multinodular goiter.  Scarring apical portions of both lungs.  Slight thoracic esophageal thickening inflammation versus underdistention.   Mild hepatic steatosis.  Postsurgical sigmoid changes with haziness around the colon.  Right adnexal cyst no longer identified.  Thickening of the bladder wall possible cystitis.  L4-5, L5-S1 surgical changes.  Moderate stool burden    -3/8/2024 [facility] oncology clinic follow-up: On arm 1B  has completed 4 adjuvant cycles of Xeloda oxaliplatin.  CT DNA is negative in February.  We will repeat CT DNA CEA CMP CBC mid May and CTs the end of May and I will see her back early June and in the meantime I have reached out to Dr. Calle to do follow-up colonoscopy.  Her PICC line is out.  Thyroid has been biopsied and is benign.  Souls of her feet peeled with the last cycle and that has resolved.  Cold sensitive neuropathy still comes and goes but otherwise no significant residual neuropathy.  Encouraged her to follow-up with primary care for management of her blood glucose.    -5/24/2024 CT DNA negative.  -5/29/2024 CT chest abdomen pelvis with contrast shows no evidence of recurrence in the chest abdomen or pelvis.  Multiple bilateral ovarian follicles/cysts versus multilocular cystic lesion in the left ovary measuring up to 4.6 cm both new from prior comparison.  CEA 0.94.  White count 12,470 otherwise unremarkable CBC and differential.  Potassium 3.4 otherwise unremarkable CMP.  Magnesium normal 2.1.       Malignant neoplasm of sigmoid colon   8/29/2023 Initial Diagnosis    Malignant neoplasm of sigmoid colon     10/2/2023 Cancer Staged    Staging form: Colon And Rectum, AJCC 8th Edition  - Clinical: Stage IIIB (cT3, cN1, cM0) - Signed by Bernardino Chaparro MD on 10/2/2023     10/19/2023 Cancer Staged    Staging form: Colon And Rectum, AJCC 8th Edition  - Pathologic: Stage IIIB (pT3, pN1b, cM0) - Signed by Bernardino Chaparro MD on 10/19/2023     11/30/2023 - 11/30/2023 Chemotherapy    OP COLON CapeOX Capecitabine / OXALIplatin     12/4/2023 -  Chemotherapy    RESEARCH COLON  ARM 1B CAPOX (OXALIplatin / Capecitabine)    "      HISTORY OF PRESENT ILLNESS:  The patient is a 58 y.o. female, here for follow up on management of history of colon cancer.  Left lower quadrant discomfort.    Past Medical History:   Diagnosis Date    Anxiety     Arthritis     Cant recall the date of onset    Chest pain 11/06/2019    Colon polyp     Depression     Diabetes mellitus     Dysphagia 11/06/2019    Endometriosis     Fibromyalgia, primary     GERD (gastroesophageal reflux disease)     Headache     HL (hearing loss)     Hyperlipidemia     Hypertension     Low vitamin D level     Malignant neoplasm of sigmoid colon 08/29/2023    Migraines     Ovarian cyst     PMDD (premenstrual dysphoric disorder)     Scoliosis     Wears glasses      Past Surgical History:   Procedure Laterality Date    BLADDER SURGERY      Bladder tuck    COLONOSCOPY      every 5 years    ENDOMETRIAL ABLATION      LUMBAR DISCECTOMY  2010    L4-S1 Rt- discectomy Dr. Arellano    SPINAL CORD STIMULATOR IMPLANT N/A 08/19/2019    Procedure: SPINAL CORD STIMULATOR INSERTION;  Surgeon: Ethan Peters MD;  Location: [facility];  Service: Neurosurgery    SPINE SURGERY  2010    TONSILLECTOMY AND ADENOIDECTOMY         Allergies   Allergen Reactions    Dihydroergotamine GI Intolerance     VOMITING    Methergine [Methylergonovine Maleate] Nausea And Vomiting    Amoxicillin Rash       Family History and Social History reviewed and changed as necessary    REVIEW OF SYSTEM:   Left lower quadrant discomfort    PHYSICAL EXAM:  No jaundice icterus or pallor.  No respiratory distress.  No rashes.  Left lower quadrant suprapubic slight tenderness without rebound or guarding  Vitals:    06/03/24 0917   BP: 110/74   Pulse: 86   Resp: 18   Temp: 97.3 °F (36.3 °C)   SpO2: 96%   Weight: 77.1 kg (170 lb)   Height: 162.6 cm (64\")     Vitals:    06/03/24 0917   PainSc: 0-No pain          ECOG score: 0           Vitals reviewed.    ECOG: (0) Fully Active - Able to Carry On All Pre-disease Performance Without " Restriction    Lab Results   Component Value Date    HGB 14.0 05/29/2024    HCT 41.9 05/29/2024    MCV 90.1 05/29/2024     05/29/2024    WBC 12.47 (H) 05/29/2024    NEUTROABS 8.09 (H) 05/29/2024    LYMPHSABS 2.98 05/29/2024    MONOSABS 0.88 05/29/2024    EOSABS 0.29 05/29/2024    BASOSABS 0.06 05/29/2024       Lab Results   Component Value Date    GLUCOSE 71 05/29/2024    BUN 15 05/29/2024    CREATININE 0.73 05/29/2024     05/29/2024    K 3.4 (L) 05/29/2024     05/29/2024    CO2 26.3 05/29/2024    CALCIUM 9.4 05/29/2024    PROTEINTOT 6.9 05/29/2024    ALBUMIN 4.1 05/29/2024    BILITOT 0.6 05/29/2024    ALKPHOS 73 05/29/2024    AST 17 05/29/2024    ALT 24 05/29/2024             ASSESSMENT & PLAN:  1.  Colon cancer found in a tubular adenoma 20 cm from the anal verge with negative staging CT chest abdomen and pelvis normal baseline CEA 1.26 with no visible lesion on MRI rectal protocol.  9/25/2023 laparoscopic sigmoid colectomy Dr. Michel pathologic T3 extending through muscularis propria into pericolonic fat, N1b 3/14 node positive.  CT DNA negative.  On  randomized to the treatment arm.  Will give 3 months of capecitabine and oxaliplatin with NSAID cream.  Fourth cycle of Xeloda oxaliplatin started 2/9/2024.  CT DNA negative at that junction with CEA 2.45 and negative CTs for recurrence  2.  Goiter with 1.8 cm left thyroid nodule found on colon cancer staging   -2/1/2024 FNA left thyroid biopsy with Dr. Armida Young, pathology was negative for malignant cells.  3.  Myomectomy with bladder tack July 2015  4.  Diabetes  5.  Hypertension  6.  Hyperlipidemia  7.  Migraines  8.  Depression  9.  Reflux  10.  History of uterine ablation with ovarian cyst on post-op staging imaging of colon cancer  11.  History of tonsillectomy  12.  History of tympanostomy tubes  13.  History of L4-5 disc surgery and L5-S1 discectomy and fusion  14.  History of solid and liquid phase dysphagia with history of mild  gastritis and some esophageal ranging on EGD 2019  15.  Compound melanocytic nevus with dermatofibroma removed by Dr. Ray 07/12/2023    Oncology history timeline:  -12/20/2019 EGD showed small hiatal hernia with some ringing of the esophagus biopsy.  Mild gastritis.  No intestinal metaplasia or dysplasia or malignancy.  Reflux suggestive but not diagnostic.  -8/18/2023 colonoscopy Dr. Calle.  Diverticulosis in the sigmoid colon.  7 mm polyp in the rectum.  Diverticulosis in the sigmoid colon.  Polypoid area with ulceration crescent-shaped 1.8 cm 20 cm proximal to the anus likely sigmoid.  Tattooed and biopsied.  Rectal polyp showed tubular adenoma with no high-grade dysplasia.  Colon biopsy at 20 cm showed invasive moderately differentiated colon adenocarcinoma arising within a tubular adenoma with high-grade dysplasia.  Microsatellite stable    -8/24/2023 CEA normal 1.26.  ALT 34 bicarb 20.2 otherwise unremarkable CMP.  CBC normal with hemoglobin 14.4 and normal MCV.  -8/27/2023 CT abdomen pelvis with contrast did not show any colonic mass.  Mild distal esophageal thickening, hepatic steatosis without hepatic metastasis.  No lytic bone disease.  MRI rectal protocol without contrast reviewed with Dr. López.  Perhaps a small pucker at the site of biopsy but no discernible mass per se and no adenopathy.  -8/28/2023 CT chest shows no metastasis.  1.8 cm left thyroid for which follow-up is recommended    -8/29/2023 [facility] medical oncology consult: On screening colonoscopy patient found to have ulcerated polyp at 20 cm from the anal verge with moderately differentiated adenocarcinoma microsatellite stable.  Normal baseline CEA with staging CT chest abdomen pelvis and MRI rectal protocol showing no evidence of metastasis and no visible primary on MRI.  Based on distance from anal verge on colonoscopy and given the absence of visible abnormality on MRI rectal protocol to change that estimated position, would call this  colon cancer.  Given microsatellite stability, would not contemplate calling this rectal cancer and treating with immunotherapy but regardless of whether rectal or colon primary this would at least be no more than T1 tumor clinically with no visible tumor on MRI despite colonoscopy findings.  The patient has experience with Dr. Michel with whom she requests a consultation for definitive surgery and I will see back postoperatively to decide on any adjuvant therapy as indicated based on pathologic staging.  There is some esophageal thickening on CT for which I would request Dr. Calle repeat EGD last done 2019 just for completeness sake but first would deal with the colon cancer at hand.She will also need to get left thyroid ultrasound eventually and would likely do this through whoever has managed her goiter previously.      -- 9/25/2023 laparoscopic sigmoid colectomy Dr. Michel.    -10/2/2023 [facility] oncology follow-up: Final pathology from surgery done a week ago is not back.  According to verbal report to me from the pathologist has either T2 or T3 disease but he thinks that this is tracking along the arteries and will be T3 and she had 3 out of 13 nodes involved.  Hence I would call this stage IIIb.  Her baseline CEA was normal preoperatively so that will not be extremely helpful postoperatively.  Outside of clinical trial the standard of care would be 3 months of CapeOx or 6 months of FOLFOX.  I would be interested in putting her on NRG- where upon CT DNA negative patients are randomized to either serial screening and CT DNA or 3 months CapeOx/6 months FOLFOX providers choice.  CT DNA positive patients are randomized to either 6 months of CapeOx or FOLFOX or 6 months of FOLFIRINOX.  If we can go ahead and get the [place] CT DNA cooking outside of clinical trial without significant financial toxicity we may do that.  Ideally chemotherapy would start less than 8 weeks out from surgery and we should have  plenty of time to have her heal and to make these decisions collectively.  Addendum: Final pathology report… Moderately differentiated adenocarcinoma pathologic T3N1B, negative margins margins 3 out of 14 nodes positive with negative new anastomotic excisional margin.  Tumor extent ends through muscularis propria into pericolonic or perirectal tissue.  No lymph-vascular or perineural invasion.     -10/16/2023 CT chest Abdomen pelvis compared to CT chest 8/28/2023 and CT abdomen pelvis 8/27/2023 shows no metastasis in the chest abdomen or pelvis and interval sigmoid colectomy.  New right adnexal cystic lesion 6 cm recommending pelvic ultrasound in 3 months    -10/19/2023 [facility] medical oncology follow-up: I went over her scans with her.  No clear-cut metastatic disease.  I will ask her to get to Dr. Mcguire for the right adnexal cystic lesion as quickly as possible and would not wait 3 months on the ultrasound in light of clinical trial.  I suspect this is benign but we will get this worked up.  On [facility] nomogram, putting in her pathologic information she has a 5-year survival rate of 69% give or take 7%.  With adjuvant therapy her 5-year survival rate is 85%.  This is based on statistics not informed by molecular data.  We are aware of prognostic data from prior CT DNA trials whereby patients with negative CT DNA have a considerably higher 5-year survival rate and people with CT DNA positivity have a considerably lower 5-year survival rate and the above 5-year survival rate is probably a conglomerate of statistics between these people that averages out.  I certainly would be fine with either 6 months of FOLFOX or 3 months of Xeloda oxaliplatin outside of a clinical trial and that would be my standard recommendation.   is now open and we are enrolling her on that per her request.  If she has negative CT DNA then she is randomized to watchful waiting versus CapeOx x4 or FOLFOX x12.  If CT DNA is  positive then she is randomized to CapeOx x6 or FOLFOX x12 (investigators choice) versus treatment intensification with FOLFIRINOX.  I will see her back in a few weeks for the CT DNA results.    - 11/8/2023 evaluation by Dr. Eloisa Uribe, GYN oncology of right adnexal mass with minimal complexity is not consistent with Krukenberg tumor or other malignancy.  There is a thickened endometrial stripe.  Perimenopausal state.  Still having monthly periods hormonal blood work from primary OB/GYN not available.  Imaging reviewed by Dr. Uribe with no concerns for this being malignancy.   normal within the last month.  If it were not for serial CTs already planned, patient would have transvaginal ultrasound in 6 months but with plans for serial CTs, GYN oncology plans no further ultrasounds.  Due to low risk of malignancy, GYN oncology recommends regular follow-up with primary OB/GYN and follow-up if images change in a worrisome manner.    -11/9/2023 [facility] oncology follow-up: The CT DNA is still pending.  We should have the results by November 30 or sooner.  8 weeks from surgery would be November 20.  There is good data that disease-free survival and overall outcomes are not compromised when adjuvant chemotherapy occurs 8 weeks out from surgery compared to 4 weeks or less.  The data beyond that does show some falloff of survival when there are significant delays but I do not think her overall risk is going to substantially change whether we treated her the week of the 20th or the week of 27 November.  There is the option to give her 1 cycle of chemotherapy while waiting on this but that would not accrue to her overall number of cycles and I do not want to add oxaliplatin cycles overall given the potential additive toxicity of oxaliplatin.  She understands the uncertainties here and wishes to proceed with the clinical trial.  She has seen gynecologic oncology for evaluation and this is new ovarian cyst will  be watched and is felt to be benign according to them and can be just watched by serial CTs and primary GYN exam.  I will see her back November 27 hopefully to have results by then and hopefully to then later that week proceed with chemotherapy as indicated based on the randomizations.    -12/1/2023 [facility] follow-up: CT DNA reportedly 0 per  .  She randomized to the treatment arm for which we have seen her and educated her for Xeloda oxaliplatin x 3 months with NSAID cream to mitigate palmar and plantar skin issues.  Will ask scan routinely for routine surveillance and with that we will also keep an eye on the right adnexal mass felt to be benign per GYN oncology.  Treatment 1 cycle 1 orders signed and we will see her for cycle 2 day 1 the day after Dennis with my nurse practitioner in [place].  She has had her cancer treatment preparation visit and education and consenting.  She has been vasculature and approval has been granted to treat her by peripheral vein from the nursing staff.    -12/13/2023 PICC line placed due to discomfort with oxaliplatin infusion  -12/26/2023 [facility] follow-up: Yelena tolerated her first cycle of XELOX fairly well.  She has some mild fatigue.  She does continue to work.  She had 1 episode of diarrhea that resolved with Imodium.  Labs reviewed from today, CBC and CMP are normal other than for glucose of 128.  She now has a PICC line as oxaliplatin was uncomfortable infused peripherally.  We will proceed today with cycle #2 unchanged, I will see her back for follow-up for her third cycle.    -1/16/2024 [facility] follow-up: Today will be cycle 3 of a planned 4 cycles of XELOX.  She has some mild palmar erythrodysesthesia, after further discussion she has not been using the Voltaren ointment twice daily as prescribed.  She will begin doing that along with making sure to keep her hands well moisturized throughout  the day.  Right now this is a grade 1-2.  She understands to notify our office if it worsens during this cycle while she is taking her Xeloda and we may have to hold or reduce dose.  She has no lingering neuropathy.  Labs reviewed from today, counts are acceptable to continue treatment unchanged. Her CBC is normal, CMP normal other than for glucose of 279.  We will move her next treatment to a Friday at her request to work better with her work schedule and give her the weekend to recover.    -2/1/2024 FNA left thyroid biopsy with Dr. Armida Young, pathology was negative for malignant cells.    -2/9/2024 [facility] On[facility] clinic follow-up: Overall she continues to do well on XELOX, today is her fourth and final cycle of planned therapy.  Somehow the shipments of her Xeloda wore off but this last cycle, she completed Xeloda on Wednesday therefore I have asked her not to start taking cycle 4 of her Xeloda until Wednesday, 2/14/2024.  I have also notified our pharmacist.  We will continue with oxaliplatin today as planned. She has mild peripheral neuropathy that is intermittent.  Her hand-foot erythrodysesthesia has improved with more consistent use of emollient lotion and also Voltaren ointment twice a day.  She will continue the Xeloda dose at 1500 mg twice daily again and she will start this next Wednesday for 14 days on and then she will be finished.  I reviewed her labs from today, CT DNA was drawn, her CBC looks good with WBC 7.45, hemoglobin 12.3, hematocrit 36.9%, platelet count 232,000, ANC 4.83, her CMP is unremarkable other than for glucose of 309.  I did go over to see Yelena in the infusion area as I did not get the results of her CMP until she had left our visit.  She states that she did take her diabetes medications as directed, she does not monitor her glucose at home regularly, she will start doing so and will follow-up with her PCP.  Her magnesium is normal, CEA is pending.  We will plan on repeating  CT chest, abdomen and pelvis for restaging prior to return in 4 weeks and I have ordered that today.  Since we saw her last she did have FNA of left thyroid nodule with Dr. Armida Young and that was negative for malignant cells.  We will discontinue her PICC line after her treatment today. Addendum: CEA 2.45 up from 0.694 months ago.  CT DNA negative.    -3/1/2024 Chest abdomen pelvis with contrast compared to October CTs shows left lobe thyroid nodule previously noted with multinodular goiter.  Scarring apical portions of both lungs.  Slight thoracic esophageal thickening inflammation versus underdistention.  Mild hepatic steatosis.  Postsurgical sigmoid changes with haziness around the colon.  Right adnexal cyst no longer identified.  Thickening of the bladder wall possible cystitis.  L4-5, L5-S1 surgical changes.  Moderate stool burden    -3/8/2024 [facility] follow-up: On arm 1B  has completed 4 adjuvant cycles of Xeloda oxaliplatin.  CT DNA is negative in February.  We will repeat CT DNA CEA CMP CBC mid May and CTs the end of May and I will see her back early June and in the meantime I have reached out to Dr. Calle to do follow-up colonoscopy.  Her PICC line is out.  Thyroid has been biopsied and is benign.  Souls of her feet peeled with the last cycle and that has resolved.  Cold sensitive neuropathy still comes and goes but otherwise no significant residual neuropathy.  Encouraged her to follow-up with primary care for management of her blood glucose.    - 4/17/2024 EGD showed normal esophagus with gastritis and normal duodenum.  Colonoscopy showed normal terminal ileum.  4 mm sigmoid colon polyp at 20 cm removed with cold snare.  Congested ascending colon biopsy.  Patent end-2-and colorectal anastomosis.Duodenal biopsy pathology unremarkable.  Gastric antrum biopsy showed reactive gastropathy H. pylori negative.  Unremarkable esophageal biopsy.  Right colon biopsy unremarkable.  20 cm  colon polyp just showed colonic mucosa without polyp.    -5/24/2024 CT DNA negative.  -5/29/2024 CT chest abdomen pelvis with contrast shows no evidence of recurrence in the chest abdomen or pelvis.  Multiple bilateral ovarian follicles/cysts versus multilocular cystic lesion in the left ovary measuring up to 4.6 cm both new from prior comparison.  CEA 0.94.  White count 12,470 otherwise unremarkable CBC and differential.  Potassium 3.4 otherwise unremarkable CMP.  Magnesium normal 2.1.      -6/3/2024 [facility] oncology clinic follow-up: No new somatic complaints status post Xeloda oxaliplatin for adjuvant cycles on arm 1B of .  CT DNA remains negative and the CEA remains low and the CT shows no evidence of recurrent colon cancer.  She does however have multiple bilateral ovarian follicles/cysts versus cystic lesion in the left ovary measuring up to 4.6 cm.  I have asked her to get with Dr. Mcguire for evaluation and management of this.  This is an ongoing issue and she is having pain in that area and it may ultimately be best for her to just have this ovary removed.  He had an EGD and colonoscopy in April with Dr. Calle.  She will get repeat CT DNA, CBC, CMP, CEA, and CTs per trial and NCCN guidelines mid August.  Next colonoscopy in 1 year.    Total time of care today inclusive of time spent today prior to patient's arrival reviewing interval images and reports thereof and during visit translating this information and data from her labs to patient and interviewing her as to signs or symptoms of her disease and management thereof and after visit instituting this plan took 50 minutes of patient care time throughout the day today including electronic communication to Dr. Mcguire regarding the ovarian cyst that is uncomfortable  Bernardino Chaparro MD    06/03/2024

## 2024-06-27 DIAGNOSIS — E11.9 TYPE 2 DIABETES MELLITUS WITHOUT COMPLICATION, WITHOUT LONG-TERM CURRENT USE OF INSULIN: ICD-10-CM

## 2024-06-27 RX ORDER — PIOGLITAZONEHYDROCHLORIDE 15 MG/1
15 TABLET ORAL DAILY
Qty: 90 TABLET | Refills: 1 | OUTPATIENT
Start: 2024-06-27

## 2024-07-04 DIAGNOSIS — K21.00 GASTRO-ESOPHAGEAL REFLUX DISEASE WITH ESOPHAGITIS: ICD-10-CM

## 2024-07-05 RX ORDER — OMEPRAZOLE 40 MG/1
40 CAPSULE, DELAYED RELEASE ORAL DAILY
Qty: 90 CAPSULE | Refills: 0 | Status: SHIPPED | OUTPATIENT
Start: 2024-07-05

## 2024-07-05 NOTE — TELEPHONE ENCOUNTER
Last filled 5/19/23  omeprazole (priLOSEC) 40 MG 90 w/ 3 refills    LOV 5/3/24  NOV none    Sent in omeprazole (priLOSEC) 40 MG 90   pm

## 2024-07-30 ENCOUNTER — TELEPHONE (OUTPATIENT)
Dept: INTERNAL MEDICINE | Facility: CLINIC | Age: 59
End: 2024-07-30

## 2024-07-30 NOTE — TELEPHONE ENCOUNTER
Provider:     DR WEBB    Caller:     PATIENT    Phone Number:       [phone] (Mobile)     Reason for Call:     PATIENT MADE CONTACT TO RESCHEDULE PHYSICAL  APPOINTMENT WITH CAM FOURNIER    HUB CHECKED SCHEDULE FOR DR WEBB, WHO WAS NOT AVAILABLE FOR PHYSICAL TYPE APPOINTMENT    PATIENT WAS SCHEDULED AGAIN WITH CAM FOURNIER FOR 8/13 AT 10:00

## 2024-08-13 ENCOUNTER — OFFICE VISIT (OUTPATIENT)
Dept: INTERNAL MEDICINE | Facility: CLINIC | Age: 59
End: 2024-08-13
Payer: COMMERCIAL

## 2024-08-13 ENCOUNTER — LAB (OUTPATIENT)
Dept: LAB | Facility: HOSPITAL | Age: 59
End: 2024-08-13
Payer: COMMERCIAL

## 2024-08-13 VITALS
RESPIRATION RATE: 18 BRPM | HEART RATE: 95 BPM | OXYGEN SATURATION: 98 % | BODY MASS INDEX: 29.09 KG/M2 | HEIGHT: 64 IN | TEMPERATURE: 97.2 F | WEIGHT: 170.4 LBS | DIASTOLIC BLOOD PRESSURE: 78 MMHG | SYSTOLIC BLOOD PRESSURE: 116 MMHG

## 2024-08-13 DIAGNOSIS — E78.2 MIXED HYPERLIPIDEMIA: ICD-10-CM

## 2024-08-13 DIAGNOSIS — M72.2 PLANTAR FASCIITIS OF RIGHT FOOT: ICD-10-CM

## 2024-08-13 DIAGNOSIS — Z00.6 RESEARCH STUDY PATIENT: ICD-10-CM

## 2024-08-13 DIAGNOSIS — Z00.00 ANNUAL PHYSICAL EXAM: Primary | ICD-10-CM

## 2024-08-13 DIAGNOSIS — C18.7 MALIGNANT NEOPLASM OF SIGMOID COLON: ICD-10-CM

## 2024-08-13 DIAGNOSIS — K21.00 GASTRO-ESOPHAGEAL REFLUX DISEASE WITH ESOPHAGITIS: ICD-10-CM

## 2024-08-13 DIAGNOSIS — E11.9 TYPE 2 DIABETES MELLITUS WITHOUT COMPLICATION, WITHOUT LONG-TERM CURRENT USE OF INSULIN: ICD-10-CM

## 2024-08-13 DIAGNOSIS — I10 PRIMARY HYPERTENSION: ICD-10-CM

## 2024-08-13 DIAGNOSIS — C18.7 MALIGNANT NEOPLASM OF SIGMOID COLON: Chronic | ICD-10-CM

## 2024-08-13 LAB — HBA1C MFR BLD: 6.3 % (ref 4.8–5.6)

## 2024-08-13 PROCEDURE — 85025 COMPLETE CBC W/AUTO DIFF WBC: CPT

## 2024-08-13 PROCEDURE — 83735 ASSAY OF MAGNESIUM: CPT

## 2024-08-13 PROCEDURE — 36415 COLL VENOUS BLD VENIPUNCTURE: CPT

## 2024-08-13 PROCEDURE — 83036 HEMOGLOBIN GLYCOSYLATED A1C: CPT

## 2024-08-13 PROCEDURE — 80053 COMPREHEN METABOLIC PANEL: CPT

## 2024-08-13 PROCEDURE — 82378 CARCINOEMBRYONIC ANTIGEN: CPT

## 2024-08-13 RX ORDER — ATORVASTATIN CALCIUM 40 MG/1
40 TABLET, FILM COATED ORAL EVERY EVENING
Qty: 90 TABLET | Refills: 3 | Status: SHIPPED | OUTPATIENT
Start: 2024-08-13

## 2024-08-13 RX ORDER — OMEPRAZOLE 40 MG/1
40 CAPSULE, DELAYED RELEASE ORAL DAILY
Qty: 90 CAPSULE | Refills: 0 | Status: SHIPPED | OUTPATIENT
Start: 2024-08-13

## 2024-08-13 NOTE — PROGRESS NOTES
Female Physical Note      Date: 2024   Patient Name: Yelena Morris  : 1965   MRN: [id number]     Chief Complaint:    Chief Complaint   Patient presents with    Annual Exam     Pt is experiencing foot pain when walking as well as chronic back pain  Needs refills            History of Present Illness: Yelena Morris is a 58 y.o. female who is here today for their annual health maintenance and physical.  Patiently routinely sees Dr. Corona.  Since last visit, patient reports she has been doing well overall.  She does believe she has a plantar fasciitis flare, patient does have a podiatrist.  She reports an overall balanced diet however she is rather sedentary.  She is not up-to-date on mammogram or ophthalmology screening.  Compliant with dental screenings every 6 months.  She has no additional complaints today.  Anxiety controlled on current therapy.      Subjective      Review of Systems:   Review of Systems   Constitutional:  Negative for chills and fever.   HENT:  Negative for congestion and rhinorrhea.    Respiratory:  Negative for shortness of breath.    Cardiovascular:  Negative for chest pain.   Gastrointestinal:  Negative for abdominal pain, constipation and diarrhea.   Endocrine: Negative for polydipsia and polyuria.   Genitourinary:  Negative for dysuria.   Musculoskeletal:  Positive for arthralgias. Negative for myalgias.   Skin:  Negative for skin lesions.   Allergic/Immunologic: Negative for environmental allergies.   Neurological:  Negative for dizziness and headache.   Psychiatric/Behavioral:  Negative for suicidal ideas.        Past Medical History, Social History, Family History and Care Team were all reviewed with patient and updated as appropriate.     Medications:     Current Outpatient Medications:     ARIPiprazole (ABILIFY) 5 MG tablet, Take 1 tablet by mouth Daily., Disp: 90 tablet, Rfl: 0    atorvastatin (LIPITOR) 40 MG tablet, Take 1 tablet by mouth Every Evening.,  Disp: 90 tablet, Rfl: 3    BOTOX 200 units reconstituted solution, INJECT 200 UNITS INTO THE HEAD OR NECK AREA BY MD EVERY 90 DAYS, Disp: , Rfl: 3    cyclobenzaprine (FLEXERIL) 10 MG tablet, Take 1 tablet by mouth At Night As Needed for Muscle Spasms., Disp: 30 tablet, Rfl: 1    DULoxetine (CYMBALTA) 60 MG capsule, Take 1 capsule by mouth Daily., Disp: 90 capsule, Rfl: 0    eletriptan (RELPAX) 40 MG tablet, TAKE 1 TABLET BY MOUTH AT THE ONSET OF MIGRAINE. MAY REPEAT IN 2 HRS.MAX 2 DOSE/24HR,3 DAYS/WEEK, Disp: , Rfl: 11    glimepiride (AMARYL) 2 MG tablet, Take 1 tablet by mouth 2 (Two) Times a Day., Disp: 180 tablet, Rfl: 3    hydroCHLOROthiazide (HYDRODIURIL) 12.5 MG tablet, Take 1 tablet by mouth Daily., Disp: 90 tablet, Rfl: 3    hydrOXYzine (ATARAX) 25 MG tablet, Take 1-2 tablets by mouth At Night As Needed for Anxiety (sleep)., Disp: 180 tablet, Rfl: 1    nebivolol (Bystolic) 10 MG tablet, Take 1 tablet by mouth Daily., Disp: 90 tablet, Rfl: 3    nortriptyline (PAMELOR) 10 MG capsule, nortriptyline 10 mg capsule  TAKE 4 CAPSULES AT BEDTIME, Disp: , Rfl:     Nutritional Supplements (DHEA PO), Take  by mouth., Disp: , Rfl:     omeprazole (priLOSEC) 40 MG capsule, Take 1 capsule by mouth Daily. Pt needs appointment, Disp: 90 capsule, Rfl: 0    pioglitazone (Actos) 30 MG tablet, Take 1 tablet by mouth Daily., Disp: 90 tablet, Rfl: 1    propranolol (INDERAL) 10 MG tablet, TAKE 1 TABLET BY MOUTH 2 (TWO) TIMES A DAY AS NEEDED (PANIC/ANXIETY)., Disp: 180 tablet, Rfl: 0    Semaglutide, 1 MG/DOSE, (OZEMPIC) 4 MG/3ML solution pen-injector, Inject 1 mg under the skin into the appropriate area as directed 1 (One) Time Per Week., Disp: 9 mL, Rfl: 1    Topiramate ER (Trokendi XR) 200 MG capsule sustained-release 24 hr, Daily., Disp: , Rfl:     FLUoxetine (PROzac) 20 MG capsule, Take 1 capsule by mouth Daily for 90 days., Disp: 90 capsule, Rfl: 0    Allergies:   Allergies   Allergen Reactions    Dihydroergotamine GI  Intolerance     VOMITING    Methergine [Methylergonovine Maleate] Nausea And Vomiting    Amoxicillin Rash       Immunizations:  Health Maintenance Summary            Overdue - DIABETIC FOOT EXAM (Yearly) Overdue since 11/15/2019      11/15/2018  Done              Overdue - DIABETIC EYE EXAM (Yearly) Overdue since 4/14/2022 04/14/2021  SCANNED - EYE EXAM    04/08/2019  Postponed until 7/1/2019 by Sunshine Calvin, APRN (Pending event - Reminded of need during office visit on the fifth 2019)              Overdue - MAMMOGRAM (Yearly) Overdue since 7/24/2024 07/24/2023  Mammo Screening Digital Tomosynthesis Bilateral With CAD    07/15/2022  Mammo Screening Digital Tomosynthesis Bilateral With CAD    07/14/2021  Mammo Screening Digital Tomosynthesis Bilateral With CAD    08/28/2018  Done - Venkat Weir              Ordered - HEMOGLOBIN A1C (Every 6 Months) Ordered on 8/13/2024 05/03/2024  Hemoglobin A1C component of POC Glycosylated Hemoglobin (Hb A1C)    03/22/2024  Hemoglobin A1C component of POC Glycosylated Hemoglobin (Hb A1C)    12/22/2023  Hemoglobin A1C component of POC Glycosylated Hemoglobin (Hb A1C)    09/22/2023  Hemoglobin A1C component of POC Glycosylated Hemoglobin (Hb A1C)    06/21/2023  Hemoglobin A1C component of POC Glycosylated Hemoglobin (Hb A1C)    Only the first 5 history entries have been loaded, but more history exists.              Postponed - COVID-19 Vaccine (6 - 2023-24 season) Postponed until 12/10/2024      03/22/2024  Postponed until 12/10/2024 by Willian Angeles MA (Patient Refused)    10/03/2022  Imm Admin: COVID-19 (PFIZER) BIVALENT BOOSTER 12+YRS    06/15/2022  Imm Admin: Covid-19 (Pfizer) Gray Cap    11/29/2021  Imm Admin: COVID-19 (PFIZER) PURPLE CAP    04/14/2021  Imm Admin: COVID-19 (PFIZER) PURPLE CAP    Only the first 5 history entries have been loaded, but more history exists.              Postponed - INFLUENZA VACCINE (Yearly - August to March) Postponed  until 3/31/2025      08/12/2024  Postponed until 3/31/2025 by Eloisa Evans MA (Product Unavailable)    09/22/2023  Imm Admin: Fluzone (or Fluarix & Flulaval for VFC) >6mos    10/03/2022  Imm Admin: FluLaval/Fluzone >6mos    10/14/2021  Imm Admin: Influenza, Unspecified    09/15/2021  Imm Admin: FluLaval/Fluzone >6mos    Only the first 5 history entries have been loaded, but more history exists.              LIPID PANEL (Yearly) Next due on 10/17/2024      10/17/2023  LIPID PANEL W/ CHOL/HDL RATIO    09/25/2023  LIPID PANEL W/ CHOL/HDL RATIO    09/12/2023  LIPID PANEL W/ CHOL/HDL RATIO    10/03/2022  Lipid Panel    09/15/2021  Lipid Panel    Only the first 5 history entries have been loaded, but more history exists.              URINE MICROALBUMIN (Yearly) Next due on 5/3/2025      05/03/2024  Multiple components of Microalbumin / Creatinine Urine Ratio - Urine, Clean Catch    03/08/2023  Microalbumin, Urine component of POC Microalbumin    12/14/2021  Multiple components of Microalbumin / Creatinine Urine Ratio - Urine, Clean Catch    09/09/2020  Multiple components of Microalbumin / Creatinine Urine Ratio -    04/19/2019  Done - negative    Only the first 5 history entries have been loaded, but more history exists.              BMI FOLLOWUP (Yearly) Next due on 5/3/2025      05/03/2024  Registry Metric: BMI Follow-up    03/22/2024  SmartData: BMI EDUCATION FOR OVERWEIGHT    11/21/2022  SmartData: WORKFLOW - QUALITY MEASUREMENT - DOCUMENTED WEIGHT FOLLOW-UP PLAN              PAP SMEAR (Every 3 Years) Next due on 8/1/2025 08/01/2022  Done    07/30/2018  Done - Dr Barrera              ANNUAL PHYSICAL (Yearly) Next due on 8/13/2025 08/13/2024  Done    06/21/2023  Registry Metric: Last Annual Physical    10/11/2018  Done              COLONOSCOPY (Every 3 Years) Next due on 4/18/2027 04/18/2024  Frequency changed to Standard by Fiona Corona MD (Medical Decision)    04/18/2024  Colonoscopy, Scan     "04/17/2024  Colonoscopy, Scan    04/17/2024  Colonoscopy, Scan    08/21/2023  SCANNED - COLONOSCOPY    Only the first 5 history entries have been loaded, but more history exists.              TDAP/TD VACCINES (3 - Td or Tdap) Next due on 10/30/2029      10/30/2019  Imm Admin: Tdap    10/11/2018  Imm Admin: Tdap              HEPATITIS C SCREENING  Completed      11/12/2020  Hepatitis C Antibody              ZOSTER VACCINE (Series Information) Completed      06/15/2022  Imm Admin: Shingrix    12/14/2021  Imm Admin: Shingrix              Pneumococcal Vaccine 0-64 (Series Information) Completed      03/08/2023  Imm Admin: Pneumococcal Conjugate 20-Valent (PCV20)    11/06/2019  Imm Admin: Pneumococcal Polysaccharide (PPSV23)              Discontinued - Hepatitis B  Discontinued      03/08/2023  Frequency changed to Never by Fiona Corona MD (completed)                     No orders of the defined types were placed in this encounter.       Colorectal Screening:    Last Completed Colonoscopy            COLONOSCOPY (Every 3 Years) Next due on 4/18/2027 04/18/2024  Colonoscopy, Scan    04/17/2024  Colonoscopy, Scan    04/17/2024  Colonoscopy, Scan    08/21/2023  SCANNED - COLONOSCOPY    08/18/2023  SCANNED - COLONOSCOPY    Only the first 5 history entries have been loaded, but more history exists.                  Pap:    Last Completed Pap Smear            PAP SMEAR (Every 3 Years) Next due on 8/1/2025 08/01/2022  Done    07/30/2018  Done - Dr Barrera                   Mammogram: Was due in July 2024   Last Completed Mammogram       This patient has no relevant Health Maintenance data.               A1c:   Hemoglobin A1C   Date Value Ref Range Status   05/03/2024 8.6 (A) 4.5 - 5.7 % Final      Lipid panel:  No results found for: \"LIPIDEXCLUSI\"    The 10-year ASCVD risk score (Ede BURNETT, et al., 2019) is: 6.7%    Values used to calculate the score:      Age: 58 years      Sex: Female      Is Non- " ": No      Diabetic: Yes      Tobacco smoker: No      Systolic Blood Pressure: 116 mmHg      Is BP treated: Yes      HDL Cholesterol: 35 mg/dL      Total Cholesterol: 194 mg/dL    Dermatology: not recent skin evaluation  Ophthalmologist: no recent exam  Dentist: PATRICIO    Tobacco Use: Low Risk  (8/13/2024)    Patient History     Smoking Tobacco Use: Never     Smokeless Tobacco Use: Never     Passive Exposure: Never       Social History     Substance and Sexual Activity   Alcohol Use No        Social History     Substance and Sexual Activity   Drug Use Never        Diet/Physical activity: rather sedentary other than work, overall balanced     Sexual Health: no complaints  Menopause: perimenopause  Menstrual Cycles: irregular, last menstrual cycle: 12/2023    Depression: PHQ-2 Depression Screening  PHQ-9 Total Score: 0     Measures:   Advanced Care Planning:   Patient does not have an advance directive, declines further assistance.      Objective     Physical Exam:  Vital Signs:   Vitals:    08/13/24 1005   BP: 116/78   Pulse: 95   Resp: 18   Temp: 97.2 °F (36.2 °C)   TempSrc: Temporal   SpO2: 98%   Weight: 77.3 kg (170 lb 6.4 oz)   Height: 162.6 cm (64.02\")   PainSc:   4   PainLoc: Foot   Repeat HR 88  Facility age limit for growth %mukesh is 20 years.  Body mass index is 29.23 kg/m².     Physical Exam  Vitals and nursing note reviewed.   Constitutional:       General: She is not in acute distress.     Appearance: Normal appearance.   HENT:      Head: Normocephalic and atraumatic.      Right Ear: Tympanic membrane, ear canal and external ear normal.      Left Ear: Tympanic membrane, ear canal and external ear normal.      Mouth/Throat:      Pharynx: Oropharynx is clear.   Eyes:      Conjunctiva/sclera: Conjunctivae normal.      Pupils: Pupils are equal, round, and reactive to light.   Neck:      Thyroid: No thyroid mass, thyromegaly or thyroid tenderness.   Cardiovascular:      Rate and Rhythm: Normal rate " and regular rhythm.      Pulses: Normal pulses.      Heart sounds: Normal heart sounds. No murmur heard.  Pulmonary:      Effort: Pulmonary effort is normal. No respiratory distress.      Breath sounds: Normal breath sounds. No wheezing.   Abdominal:      General: Bowel sounds are normal.      Palpations: Abdomen is soft.      Tenderness: There is no abdominal tenderness.   Musculoskeletal:         General: No swelling. Normal range of motion.      Cervical back: Normal range of motion and neck supple.   Skin:     General: Skin is warm and dry.   Neurological:      General: No focal deficit present.      Mental Status: She is alert and oriented to person, place, and time.   Psychiatric:         Mood and Affect: Mood normal.         Behavior: Behavior normal.         POCT Results (if applicable);   Results for orders placed or performed in visit on 05/03/24   Microalbumin / Creatinine Urine Ratio - Urine, Clean Catch    Specimen: Urine, Clean Catch   Result Value Ref Range    Microalbumin/Creatinine Ratio 6.2 0.0 - 29.0 mg/g    Creatinine, Urine 226.5 mg/dL    Microalbumin, Urine 1.4 mg/dL   CEA    Specimen: Blood   Result Value Ref Range    CEA 0.94 ng/mL   Comprehensive Metabolic Panel    Specimen: Blood   Result Value Ref Range    Glucose 71 65 - 99 mg/dL    BUN 15 6 - 20 mg/dL    Creatinine 0.73 0.57 - 1.00 mg/dL    Sodium 138 136 - 145 mmol/L    Potassium 3.4 (L) 3.5 - 5.2 mmol/L    Chloride 100 98 - 107 mmol/L    CO2 26.3 22.0 - 29.0 mmol/L    Calcium 9.4 8.6 - 10.5 mg/dL    Total Protein 6.9 6.0 - 8.5 g/dL    Albumin 4.1 3.5 - 5.2 g/dL    ALT (SGPT) 24 1 - 33 U/L    AST (SGOT) 17 1 - 32 U/L    Alkaline Phosphatase 73 39 - 117 U/L    Total Bilirubin 0.6 0.0 - 1.2 mg/dL    Globulin 2.8 gm/dL    A/G Ratio 1.5 g/dL    BUN/Creatinine Ratio 20.5 7.0 - 25.0    Anion Gap 11.7 5.0 - 15.0 mmol/L    eGFR 96.7 >60.0 mL/min/1.73   Magnesium    Specimen: Blood   Result Value Ref Range    Magnesium 2.1 1.6 - 2.6 mg/dL   CBC  Auto Differential    Specimen: Blood   Result Value Ref Range    WBC 12.47 (H) 3.40 - 10.80 10*3/mm3    RBC 4.65 3.77 - 5.28 10*6/mm3    Hemoglobin 14.0 12.0 - 15.9 g/dL    Hematocrit 41.9 34.0 - 46.6 %    MCV 90.1 79.0 - 97.0 fL    MCH 30.1 26.6 - 33.0 pg    MCHC 33.4 31.5 - 35.7 g/dL    RDW 13.4 12.3 - 15.4 %    RDW-SD 44.1 37.0 - 54.0 fl    MPV 9.8 6.0 - 12.0 fL    Platelets 243 140 - 450 10*3/mm3    Neutrophil % 64.8 42.7 - 76.0 %    Lymphocyte % 23.9 19.6 - 45.3 %    Monocyte % 7.1 5.0 - 12.0 %    Eosinophil % 2.3 0.3 - 6.2 %    Basophil % 0.5 0.0 - 1.5 %    Immature Grans % 1.4 (H) 0.0 - 0.5 %    Neutrophils, Absolute 8.09 (H) 1.70 - 7.00 10*3/mm3    Lymphocytes, Absolute 2.98 0.70 - 3.10 10*3/mm3    Monocytes, Absolute 0.88 0.10 - 0.90 10*3/mm3    Eosinophils, Absolute 0.29 0.00 - 0.40 10*3/mm3    Basophils, Absolute 0.06 0.00 - 0.20 10*3/mm3    Immature Grans, Absolute 0.17 (H) 0.00 - 0.05 10*3/mm3    nRBC 0.0 0.0 - 0.2 /100 WBC          Assessment / Plan      Assessment/Plan:   Diagnoses and all orders for this visit:    1. Annual physical exam (Primary)    2. Type 2 diabetes mellitus without complication, without long-term current use of insulin  -     Hemoglobin A1c; Future    3. Mixed hyperlipidemia  -     atorvastatin (LIPITOR) 40 MG tablet; Take 1 tablet by mouth Every Evening.  Dispense: 90 tablet; Refill: 3    4. Gastro-esophageal reflux disease with esophagitis  -     omeprazole (priLOSEC) 40 MG capsule; Take 1 capsule by mouth Daily. Pt needs appointment  Dispense: 90 capsule; Refill: 0    5. Primary hypertension    6. Plantar fasciitis of right foot    7. Malignant neoplasm of sigmoid colon       PLAN:  - Counseled patient regarding multimodal approach with healthy nutrition, healthy sleep, routine physical activity, counseling, safety measures and medications.  - Rec patient call and schedule mammogram and have ophthalmology evaluation  - BP controlled in office today; continue current  treatment  - HR elevated at 95; patient reports palpitations approx once a month; encouraged pt to monitor HR with BP at home and return the office if readings are consistently elevated >90  - Pt would like to trial PT prior to seeing her podiatrist  - Will updated hgA1c today; also informed patient she is outstanding lab work requested by her oncologist and encouraged her to have those done today as well    Healthcare Maintenance:  Counseling provided based on age appropriate USPSTF guidelines.       Yelena Morris voices understanding and acceptance of this advice and will call back with any further questions or concerns.       Follow Up:   Return in about 3 months (around 11/13/2024) for Recheck DM, Follow Up Dr. Corona.       Sally Shields PA-C  [facility] [place]

## 2024-08-14 LAB
ALBUMIN SERPL-MCNC: 4.3 G/DL (ref 3.5–5.2)
ALBUMIN/GLOB SERPL: 1.6 G/DL
ALP SERPL-CCNC: 81 U/L (ref 39–117)
ALT SERPL W P-5'-P-CCNC: 21 U/L (ref 1–33)
ANION GAP SERPL CALCULATED.3IONS-SCNC: 10 MMOL/L (ref 5–15)
AST SERPL-CCNC: 15 U/L (ref 1–32)
BASOPHILS # BLD AUTO: 0.05 10*3/MM3 (ref 0–0.2)
BASOPHILS NFR BLD AUTO: 0.7 % (ref 0–1.5)
BILIRUB SERPL-MCNC: 0.2 MG/DL (ref 0–1.2)
BUN SERPL-MCNC: 10 MG/DL (ref 6–20)
BUN/CREAT SERPL: 11.9 (ref 7–25)
CALCIUM SPEC-SCNC: 9.8 MG/DL (ref 8.6–10.5)
CEA SERPL-MCNC: 1.14 NG/ML
CHLORIDE SERPL-SCNC: 101 MMOL/L (ref 98–107)
CO2 SERPL-SCNC: 25 MMOL/L (ref 22–29)
CREAT SERPL-MCNC: 0.84 MG/DL (ref 0.57–1)
DEPRECATED RDW RBC AUTO: 43.4 FL (ref 37–54)
EGFRCR SERPLBLD CKD-EPI 2021: 81.7 ML/MIN/1.73
EOSINOPHIL # BLD AUTO: 0.11 10*3/MM3 (ref 0–0.4)
EOSINOPHIL NFR BLD AUTO: 1.4 % (ref 0.3–6.2)
ERYTHROCYTE [DISTWIDTH] IN BLOOD BY AUTOMATED COUNT: 13.1 % (ref 12.3–15.4)
GLOBULIN UR ELPH-MCNC: 2.7 GM/DL
GLUCOSE SERPL-MCNC: 119 MG/DL (ref 65–99)
HCT VFR BLD AUTO: 42.2 % (ref 34–46.6)
HGB BLD-MCNC: 13.9 G/DL (ref 12–15.9)
IMM GRANULOCYTES # BLD AUTO: 0.04 10*3/MM3 (ref 0–0.05)
IMM GRANULOCYTES NFR BLD AUTO: 0.5 % (ref 0–0.5)
LYMPHOCYTES # BLD AUTO: 1.75 10*3/MM3 (ref 0.7–3.1)
LYMPHOCYTES NFR BLD AUTO: 22.9 % (ref 19.6–45.3)
MAGNESIUM SERPL-MCNC: 2.1 MG/DL (ref 1.6–2.6)
MCH RBC QN AUTO: 30.4 PG (ref 26.6–33)
MCHC RBC AUTO-ENTMCNC: 32.9 G/DL (ref 31.5–35.7)
MCV RBC AUTO: 92.3 FL (ref 79–97)
MONOCYTES # BLD AUTO: 0.4 10*3/MM3 (ref 0.1–0.9)
MONOCYTES NFR BLD AUTO: 5.2 % (ref 5–12)
NEUTROPHILS NFR BLD AUTO: 5.28 10*3/MM3 (ref 1.7–7)
NEUTROPHILS NFR BLD AUTO: 69.3 % (ref 42.7–76)
NRBC BLD AUTO-RTO: 0 /100 WBC (ref 0–0.2)
PLATELET # BLD AUTO: 281 10*3/MM3 (ref 140–450)
PMV BLD AUTO: 9.9 FL (ref 6–12)
POTASSIUM SERPL-SCNC: 3.8 MMOL/L (ref 3.5–5.2)
PROT SERPL-MCNC: 7 G/DL (ref 6–8.5)
RBC # BLD AUTO: 4.57 10*6/MM3 (ref 3.77–5.28)
SODIUM SERPL-SCNC: 136 MMOL/L (ref 136–145)
WBC NRBC COR # BLD AUTO: 7.63 10*3/MM3 (ref 3.4–10.8)

## 2024-08-15 ENCOUNTER — TELEPHONE (OUTPATIENT)
Dept: INTERNAL MEDICINE | Facility: CLINIC | Age: 59
End: 2024-08-15
Payer: COMMERCIAL

## 2024-08-15 NOTE — TELEPHONE ENCOUNTER
Called and spoke to pt. Gave message from provider. Pt voiced understanding and appreciation.       ----- Message from Sally Shields sent at 8/15/2024 10:56 AM EDT -----  Please let patient know hemoglobin A1c is 6.3. This is much improved from previous hemoglobin A1c 3-months ago at 8.6. Will continue to monitor every 3-months. Thank you.

## 2024-08-20 ENCOUNTER — OFFICE VISIT (OUTPATIENT)
Dept: GASTROENTEROLOGY | Facility: CLINIC | Age: 59
End: 2024-08-20
Payer: COMMERCIAL

## 2024-08-20 VITALS — BODY MASS INDEX: 29.19 KG/M2 | WEIGHT: 171 LBS | HEIGHT: 64 IN

## 2024-08-20 DIAGNOSIS — C18.7 MALIGNANT NEOPLASM OF SIGMOID COLON: Primary | ICD-10-CM

## 2024-08-20 PROCEDURE — 99213 OFFICE O/P EST LOW 20 MIN: CPT | Performed by: INTERNAL MEDICINE

## 2024-08-20 NOTE — PROGRESS NOTES
PCP:  Fiona Corona MD     No referring provider defined for this encounter.    Chief Complaint   Patient presents with    Follow-up        HPI   The patient is a 58-year-old female known to me.  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  In September 2023 she had a laparoscopic sigmoid colectomy.  This was a T3 lesion and there were 3 of 14 positive nodes.  She recently had a colonoscopy which failed to reveal any significant precancerous areas.  Biopsies in the right colon showed no evidence of neoplasia and biopsies of the small polyp were unremarkable as well.  Upper endoscopy showed no evidence of celiac disease or H. pylori.  The biopsies in the esophagus were unremarkable.  She is presently working for hospice.  Her  has been giving opinion from a group in [place] to help with his metastatic colon cancer.    Allergies   Allergen Reactions    Dihydroergotamine GI Intolerance     VOMITING    Methergine [Methylergonovine Maleate] Nausea And Vomiting    Amoxicillin Rash          Current Outpatient Medications:     ARIPiprazole (ABILIFY) 5 MG tablet, Take 1 tablet by mouth Daily., Disp: 90 tablet, Rfl: 0    atorvastatin (LIPITOR) 40 MG tablet, Take 1 tablet by mouth Every Evening., Disp: 90 tablet, Rfl: 3    BOTOX 200 units reconstituted solution, INJECT 200 UNITS INTO THE HEAD OR NECK AREA BY MD EVERY 90 DAYS, Disp: , Rfl: 3    cyclobenzaprine (FLEXERIL) 10 MG tablet, Take 1 tablet by mouth At Night As Needed for Muscle Spasms., Disp: 30 tablet, Rfl: 1    DULoxetine (CYMBALTA) 60 MG capsule, Take 1 capsule by mouth Daily., Disp: 90 capsule, Rfl: 0    eletriptan (RELPAX) 40 MG tablet, TAKE 1 TABLET BY MOUTH AT THE ONSET OF MIGRAINE. MAY REPEAT IN 2 HRS.MAX 2 DOSE/24HR,3 DAYS/WEEK, Disp: , Rfl: 11    FLUoxetine (PROzac) 20 MG capsule, Take 1 capsule by mouth Daily for 90 days., Disp: 90 capsule, Rfl: 0    glimepiride (AMARYL) 2 MG tablet, Take 1 tablet  by mouth 2 (Two) Times a Day., Disp: 180 tablet, Rfl: 3    hydroCHLOROthiazide (HYDRODIURIL) 12.5 MG tablet, Take 1 tablet by mouth Daily., Disp: 90 tablet, Rfl: 3    hydrOXYzine (ATARAX) 25 MG tablet, Take 1-2 tablets by mouth At Night As Needed for Anxiety (sleep)., Disp: 180 tablet, Rfl: 1    nebivolol (Bystolic) 10 MG tablet, Take 1 tablet by mouth Daily., Disp: 90 tablet, Rfl: 3    nortriptyline (PAMELOR) 10 MG capsule, nortriptyline 10 mg capsule  TAKE 4 CAPSULES AT BEDTIME, Disp: , Rfl:     Nutritional Supplements (DHEA PO), Take  by mouth., Disp: , Rfl:     omeprazole (priLOSEC) 40 MG capsule, Take 1 capsule by mouth Daily. Pt needs appointment, Disp: 90 capsule, Rfl: 0    pioglitazone (Actos) 30 MG tablet, Take 1 tablet by mouth Daily., Disp: 90 tablet, Rfl: 1    propranolol (INDERAL) 10 MG tablet, TAKE 1 TABLET BY MOUTH 2 (TWO) TIMES A DAY AS NEEDED (PANIC/ANXIETY)., Disp: 180 tablet, Rfl: 0    Semaglutide, 1 MG/DOSE, (OZEMPIC) 4 MG/3ML solution pen-injector, Inject 1 mg under the skin into the appropriate area as directed 1 (One) Time Per Week., Disp: 9 mL, Rfl: 1    Topiramate ER (Trokendi XR) 200 MG capsule sustained-release 24 hr, Daily., Disp: , Rfl:      Past Medical History:   Diagnosis Date    Anxiety     Arthritis     Cant recall the date of onset    Chest pain 11/06/2019    Colon polyp     Depression     Diabetes mellitus     Dysphagia 11/06/2019    Endometriosis     Fibromyalgia, primary     GERD (gastroesophageal reflux disease)     Headache     HL (hearing loss)     Hyperlipidemia     Hypertension     Low vitamin D level     Malignant neoplasm of sigmoid colon 08/29/2023    Migraines     Ovarian cyst     PMDD (premenstrual dysphoric disorder)     Scoliosis     Wears glasses        Past Surgical History:   Procedure Laterality Date    BLADDER SURGERY      Bladder tuck    COLONOSCOPY      every 5 years    ENDOMETRIAL ABLATION      LUMBAR DISCECTOMY  2010    L4-S1 Rt- discectomy Dr. Arellano     SPINAL CORD STIMULATOR IMPLANT N/A 08/19/2019    Procedure: SPINAL CORD STIMULATOR INSERTION;  Surgeon: Ethan Peters MD;  Location: [facility];  Service: Neurosurgery    SPINE SURGERY  2010    TONSILLECTOMY AND ADENOIDECTOMY          Social History     Socioeconomic History    Marital status:    Tobacco Use    Smoking status: Never     Passive exposure: Never    Smokeless tobacco: Never   Vaping Use    Vaping status: Never Used   Substance and Sexual Activity    Alcohol use: No    Drug use: Never    Sexual activity: Yes     Partners: Male     Birth control/protection: Post-menopausal, Vasectomy        Family History   Adopted: Yes   Problem Relation Age of Onset    Colon cancer Neg Hx         Review of Systems     There were no vitals filed for this visit.     Physical Exam  Constitutional:       General: She is not in acute distress.     Appearance: Normal appearance. She is not ill-appearing.   Neurological:      Mental Status: She is alert.          Diagnoses and all orders for this visit:    1. Malignant neoplasm of sigmoid colon (Primary)    Impressions and plan #1 history of colon cancer: She seems to be doing well.  She is following very closely with Dr. Chaparro.  I will see her back in 6 months time although earlier if she has issues.    Darin Calle MD

## 2024-08-20 NOTE — LETTER
August 20, 2024       No Recipients    Patient: Yelena Morris   YOB: 1965   Date of Visit: 8/20/2024     Dear Fiona Corona MD:       Thank you for referring Yelena Morris to me for evaluation. Below are the relevant portions of my assessment and plan of care.    If you have questions, please do not hesitate to call me. I look forward to following Yelena along with you.         Sincerely,        Darin Calle MD        CC:   No Recipients    Darin Calle MD  08/20/24 1607  Sign when Signing Visit  PCP:  Fiona Corona MD     No referring provider defined for this encounter.    Chief Complaint   Patient presents with   • Follow-up        HPI   The patient is a 58-year-old female known to me.  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  In September 2023 she had a laparoscopic sigmoid colectomy.  This was a T3 lesion and there were 3 of 14 positive nodes.  She recently had a colonoscopy which failed to reveal any significant precancerous areas.  Biopsies in the right colon showed no evidence of neoplasia and biopsies of the small polyp were unremarkable as well.  Upper endoscopy showed no evidence of celiac disease or H. pylori.  The biopsies in the esophagus were unremarkable.  She is presently working for hospice.  Her  has been giving opinion from a group in [place] to help with his metastatic colon cancer.    Allergies   Allergen Reactions   • Dihydroergotamine GI Intolerance     VOMITING   • Methergine [Methylergonovine Maleate] Nausea And Vomiting   • Amoxicillin Rash          Current Outpatient Medications:   •  ARIPiprazole (ABILIFY) 5 MG tablet, Take 1 tablet by mouth Daily., Disp: 90 tablet, Rfl: 0  •  atorvastatin (LIPITOR) 40 MG tablet, Take 1 tablet by mouth Every Evening., Disp: 90 tablet, Rfl: 3  •  BOTOX 200 units reconstituted solution, INJECT 200 UNITS INTO THE HEAD OR NECK AREA BY MD EVERY 90  DAYS, Disp: , Rfl: 3  •  cyclobenzaprine (FLEXERIL) 10 MG tablet, Take 1 tablet by mouth At Night As Needed for Muscle Spasms., Disp: 30 tablet, Rfl: 1  •  DULoxetine (CYMBALTA) 60 MG capsule, Take 1 capsule by mouth Daily., Disp: 90 capsule, Rfl: 0  •  eletriptan (RELPAX) 40 MG tablet, TAKE 1 TABLET BY MOUTH AT THE ONSET OF MIGRAINE. MAY REPEAT IN 2 HRS.MAX 2 DOSE/24HR,3 DAYS/WEEK, Disp: , Rfl: 11  •  FLUoxetine (PROzac) 20 MG capsule, Take 1 capsule by mouth Daily for 90 days., Disp: 90 capsule, Rfl: 0  •  glimepiride (AMARYL) 2 MG tablet, Take 1 tablet by mouth 2 (Two) Times a Day., Disp: 180 tablet, Rfl: 3  •  hydroCHLOROthiazide (HYDRODIURIL) 12.5 MG tablet, Take 1 tablet by mouth Daily., Disp: 90 tablet, Rfl: 3  •  hydrOXYzine (ATARAX) 25 MG tablet, Take 1-2 tablets by mouth At Night As Needed for Anxiety (sleep)., Disp: 180 tablet, Rfl: 1  •  nebivolol (Bystolic) 10 MG tablet, Take 1 tablet by mouth Daily., Disp: 90 tablet, Rfl: 3  •  nortriptyline (PAMELOR) 10 MG capsule, nortriptyline 10 mg capsule  TAKE 4 CAPSULES AT BEDTIME, Disp: , Rfl:   •  Nutritional Supplements (DHEA PO), Take  by mouth., Disp: , Rfl:   •  omeprazole (priLOSEC) 40 MG capsule, Take 1 capsule by mouth Daily. Pt needs appointment, Disp: 90 capsule, Rfl: 0  •  pioglitazone (Actos) 30 MG tablet, Take 1 tablet by mouth Daily., Disp: 90 tablet, Rfl: 1  •  propranolol (INDERAL) 10 MG tablet, TAKE 1 TABLET BY MOUTH 2 (TWO) TIMES A DAY AS NEEDED (PANIC/ANXIETY)., Disp: 180 tablet, Rfl: 0  •  Semaglutide, 1 MG/DOSE, (OZEMPIC) 4 MG/3ML solution pen-injector, Inject 1 mg under the skin into the appropriate area as directed 1 (One) Time Per Week., Disp: 9 mL, Rfl: 1  •  Topiramate ER (Trokendi XR) 200 MG capsule sustained-release 24 hr, Daily., Disp: , Rfl:      Past Medical History:   Diagnosis Date   • Anxiety    • Arthritis     Cant recall the date of onset   • Chest pain 11/06/2019   • Colon polyp    • Depression    • Diabetes mellitus    •  Dysphagia 11/06/2019   • Endometriosis    • Fibromyalgia, primary    • GERD (gastroesophageal reflux disease)    • Headache    • HL (hearing loss)    • Hyperlipidemia    • Hypertension    • Low vitamin D level    • Malignant neoplasm of sigmoid colon 08/29/2023   • Migraines    • Ovarian cyst    • PMDD (premenstrual dysphoric disorder)    • Scoliosis    • Wears glasses        Past Surgical History:   Procedure Laterality Date   • BLADDER SURGERY      Bladder tuck   • COLONOSCOPY      every 5 years   • ENDOMETRIAL ABLATION     • LUMBAR DISCECTOMY  2010    L4-S1 Rt- discectomy Dr. Arellano   • SPINAL CORD STIMULATOR IMPLANT N/A 08/19/2019    Procedure: SPINAL CORD STIMULATOR INSERTION;  Surgeon: Ethan Peters MD;  [facility]: [facility];  Service: Neurosurgery   • SPINE SURGERY  2010   • TONSILLECTOMY AND ADENOIDECTOMY          Social History     Socioeconomic History   • Marital status:    Tobacco Use   • Smoking status: Never     Passive exposure: Never   • Smokeless tobacco: Never   Vaping Use   • Vaping status: Never Used   Substance and Sexual Activity   • Alcohol use: No   • Drug use: Never   • Sexual activity: Yes     Partners: Male     Birth control/protection: Post-menopausal, Vasectomy        Family History   Adopted: Yes   Problem Relation Age of Onset   • Colon cancer Neg Hx         Review of Systems     There were no vitals filed for this visit.     Physical Exam  Constitutional:       General: She is not in acute distress.     Appearance: Normal appearance. She is not ill-appearing.   Neurological:      Mental Status: She is alert.          Diagnoses and all orders for this visit:    1. Malignant neoplasm of sigmoid colon (Primary)    Impressions and plan #1 history of colon cancer: She seems to be doing well.  She is following very closely with Dr. Chaparro.  I will see her back in 6 months time although earlier if she has issues.    Darin Calle MD

## 2024-08-30 ENCOUNTER — HOSPITAL ENCOUNTER (OUTPATIENT)
Dept: CT IMAGING | Facility: HOSPITAL | Age: 59
Discharge: HOME | End: 2024-08-30
Admitting: INTERNAL MEDICINE
Payer: COMMERCIAL

## 2024-08-30 DIAGNOSIS — C18.7 MALIGNANT NEOPLASM OF SIGMOID COLON: Chronic | ICD-10-CM

## 2024-08-30 PROCEDURE — 74177 CT ABD & PELVIS W/CONTRAST: CPT

## 2024-08-30 PROCEDURE — 25510000001 IOPAMIDOL 61 % SOLUTION: Performed by: INTERNAL MEDICINE

## 2024-08-30 PROCEDURE — 71260 CT THORAX DX C+: CPT

## 2024-08-30 RX ORDER — IOPAMIDOL 612 MG/ML
85 INJECTION, SOLUTION INTRAVASCULAR
Status: COMPLETED | OUTPATIENT
Start: 2024-08-30 | End: 2024-08-30

## 2024-08-30 RX ADMIN — IOPAMIDOL 85 ML: 612 INJECTION, SOLUTION INTRAVENOUS at 14:10

## 2024-09-06 ENCOUNTER — RESEARCH ENCOUNTER (OUTPATIENT)
Dept: OTHER | Facility: OTHER | Age: 59
End: 2024-09-06
Payer: COMMERCIAL

## 2024-09-06 ENCOUNTER — OFFICE VISIT (OUTPATIENT)
Dept: ONCOLOGY | Facility: CLINIC | Age: 59
End: 2024-09-06
Payer: COMMERCIAL

## 2024-09-06 VITALS
SYSTOLIC BLOOD PRESSURE: 120 MMHG | DIASTOLIC BLOOD PRESSURE: 64 MMHG | TEMPERATURE: 97.6 F | RESPIRATION RATE: 16 BRPM | HEART RATE: 91 BPM | HEIGHT: 64 IN | OXYGEN SATURATION: 97 % | WEIGHT: 171 LBS | BODY MASS INDEX: 29.19 KG/M2

## 2024-09-06 DIAGNOSIS — C18.7 MALIGNANT NEOPLASM OF SIGMOID COLON: Primary | Chronic | ICD-10-CM

## 2024-09-06 PROCEDURE — 99214 OFFICE O/P EST MOD 30 MIN: CPT | Performed by: INTERNAL MEDICINE

## 2024-09-06 NOTE — PROGRESS NOTES
Patient Name:  Yelena Morris  YOB: 1965  Patient Age:  58 y.o.  Patient's Sex:  female    Date of Service:  09/06/2024    Provider:  Dr. Chaparro                     RESEARCH NOTE                  I met with Ms. Morris today in clinic for 9 mo follow up. Blood for ctDNA was collected and shipped out same day. Will expect to have these results in approximately 3 weeks.     Patient RTC in 3 months for 12 month follow up with CEA and CT scans.     Liv ARENAS

## 2024-09-06 NOTE — PROGRESS NOTES
CHIEF COMPLAINT: No new somatic complaints    Problem List:  Oncology/Hematology History Overview Note   1.  Colon cancer found in a tubular adenoma 20 cm from the anal verge with negative staging CT chest abdomen and pelvis normal baseline CEA 1.26 with no visible lesion on MRI rectal protocol.  9/25/2023 laparoscopic sigmoid colectomy Dr. Michel pathologic T3 extending through muscularis propria into pericolonic fat, N1b 3/14 node positive.  CT DNA negative.  On  randomized to the treatment arm.  Will give 3 months of capecitabine and oxaliplatin with NSAID cream.  Fourth cycle of Xeloda oxaliplatin started 2/9/2024.  CT DNA negative at that junction with CEA 2.45 and negative CTs for recurrence  2.  Goiter with 1.8 cm left thyroid nodule found on colon cancer staging   -2/1/2024 FNA left thyroid biopsy with Dr. Armida Young, pathology was negative for malignant cells.  3.  Myomectomy with bladder tack July 2015  4.  Diabetes  5.  Hypertension  6.  Hyperlipidemia  7.  Migraines  8.  Depression  9.  Reflux  10.  History of uterine ablation with ovarian cyst on post-op staging imaging of colon cancer  11.  History of tonsillectomy  12.  History of tympanostomy tubes  13.  History of L4-5 disc surgery and L5-S1 discectomy and fusion  14.  History of solid and liquid phase dysphagia with history of mild gastritis and some esophageal ranging on EGD 2019  15.  Compound melanocytic nevus with dermatofibroma removed by Dr. Ray 07/12/2023    Oncology history timeline:  -12/20/2019 EGD showed small hiatal hernia with some ringing of the esophagus biopsy.  Mild gastritis.  No intestinal metaplasia or dysplasia or malignancy.  Reflux suggestive but not diagnostic.  -8/18/2023 colonoscopy Dr. Calle.  Diverticulosis in the sigmoid colon.  7 mm polyp in the rectum.  Diverticulosis in the sigmoid colon.  Polypoid area with ulceration crescent-shaped 1.8 cm 20 cm proximal to the anus likely sigmoid.  Tattooed and  biopsied.  Rectal polyp showed tubular adenoma with no high-grade dysplasia.  Colon biopsy at 20 cm showed invasive moderately differentiated colon adenocarcinoma arising within a tubular adenoma with high-grade dysplasia.  Microsatellite stable    -8/24/2023 CEA normal 1.26.  ALT 34 bicarb 20.2 otherwise unremarkable CMP.  CBC normal with hemoglobin 14.4 and normal MCV.  -8/27/2023 CT abdomen pelvis with contrast did not show any colonic mass.  Mild distal esophageal thickening, hepatic steatosis without hepatic metastasis.  No lytic bone disease.  MRI rectal protocol without contrast reviewed with Dr. López.  Perhaps a small pucker at the site of biopsy but no discernible mass per se and no adenopathy.  -8/28/2023 CT chest shows no metastasis.  1.8 cm left thyroid for which follow-up is recommended    -8/29/2023 [facility] medical oncology consult: On screening colonoscopy patient found to have ulcerated polyp at 20 cm from the anal verge with moderately differentiated adenocarcinoma microsatellite stable.  Normal baseline CEA with staging CT chest abdomen pelvis and MRI rectal protocol showing no evidence of metastasis and no visible primary on MRI.  Based on distance from anal verge on colonoscopy and given the absence of visible abnormality on MRI rectal protocol to change that estimated position, would call this colon cancer.  Given microsatellite stability, would not contemplate calling this rectal cancer and treating with immunotherapy but regardless of whether rectal or colon primary this would at least be no more than T1 tumor clinically with no visible tumor on MRI despite colonoscopy findings.  The patient has experience with Dr. Michel with whom she requests a consultation for definitive surgery and I will see back postoperatively to decide on any adjuvant therapy as indicated based on pathologic staging.  There is some esophageal thickening on CT for which I would request Dr. Calle repeat EGD last done 2019  just for completeness sake but first would deal with the colon cancer at hand.She will also need to get left thyroid ultrasound eventually and would likely do this through whoever has managed her goiter previously.      -- 9/25/2023 laparoscopic sigmoid colectomy Dr. Michel.    -10/2/2023 [facility] medical oncology follow-up: Final pathology from surgery done a week ago is not back.  According to verbal report to me from the pathologist has either T2 or T3 disease but he thinks that this is tracking along the arteries and will be T3 and she had 3 out of 13 nodes involved.  Hence I would call this stage IIIb.  Her baseline CEA was normal preoperatively so that will not be extremely helpful postoperatively.  Outside of clinical trial the standard of care would be 3 months of CapeOx or 6 months of FOLFOX.  I would be interested in putting her on NRG- where upon CT DNA negative patients are randomized to either serial screening and CT DNA or 3 months CapeOx/6 months FOLFOX providers choice.  CT DNA positive patients are randomized to either 6 months of CapeOx or FOLFOX or 6 months of FOLFIRINOX.  If we can go ahead and get the [place] CT DNA cooking outside of clinical trial without significant financial toxicity we may do that.  Ideally chemotherapy would start less than 8 weeks out from surgery and we should have plenty of time to have her heal and to make these decisions collectively.  Addendum: Final pathology report… Moderately differentiated adenocarcinoma pathologic T3N1B, negative margins margins 3 out of 14 nodes positive with negative new anastomotic excisional margin.  Tumor extent ends through muscularis propria into pericolonic or perirectal tissue.  No lymph-vascular or perineural invasion.     -10/16/2023 CT chest Abdomen pelvis compared to CT chest 8/28/2023 and CT abdomen pelvis 8/27/2023 shows no metastasis in the chest abdomen or pelvis and interval sigmoid colectomy.  New right adnexal cystic lesion  6 cm recommending pelvic ultrasound in 3 months    -10/19/2023 [facility] medical oncology follow-up: I went over her scans with her.  No clear-cut metastatic disease.  I will ask her to get to Dr. Mcguire for the right adnexal cystic lesion as quickly as possible and would not wait 3 months on the ultrasound in light of clinical trial.  I suspect this is benign but we will get this worked up.  On [facility] nomogram, putting in her pathologic information she has a 5-year survival rate of 69% give or take 7%.  With adjuvant therapy her 5-year survival rate is 85%.  This is based on statistics not informed by molecular data.  We are aware of prognostic data from prior CT DNA trials whereby patients with negative CT DNA have a considerably higher 5-year survival rate and people with CT DNA positivity have a considerably lower 5-year survival rate and the above 5-year survival rate is probably a conglomerate of statistics between these people that averages out.  I certainly would be fine with either 6 months of FOLFOX or 3 months of Xeloda oxaliplatin outside of a clinical trial and that would be my standard recommendation.   is now open and we are enrolling her on that per her request.  If she has negative CT DNA then she is randomized to watchful waiting versus CapeOx x4 or FOLFOX x12.  If CT DNA is positive then she is randomized to CapeOx x6 or FOLFOX x12 (investigators choice) versus treatment intensification with FOLFIRINOX.  I will see her back in a few weeks for the CT DNA results.    - 11/8/2023 evaluation by Dr. Eloisa Uribe, GYN oncology of right adnexal mass with minimal complexity is not consistent with Krukenberg tumor or other malignancy.  There is a thickened endometrial stripe.  Perimenopausal state.  Still having monthly periods hormonal blood work from primary OB/GYN not available.  Imaging reviewed by Dr. Uribe with no concerns for this being malignancy.   normal within the last  month.  If it were not for serial CTs already planned, patient would have transvaginal ultrasound in 6 months but with plans for serial CTs, GYN oncology plans no further ultrasounds.  Due to low risk of malignancy, GYN oncology recommends regular follow-up with primary OB/GYN and follow-up if images change in a worrisome manner.    -11/9/2023 [facility] medical oncology follow-up: The CT DNA is still pending.  We should have the results by November 30 or sooner.  8 weeks from surgery would be November 20.  There is good data that disease-free survival and overall outcomes are not compromised when adjuvant chemotherapy occurs 8 weeks out from surgery compared to 4 weeks or less.  The data beyond that does show some falloff of survival when there are significant delays but I do not think her overall risk is going to substantially change whether we treated her the week of the 20th or the week of 27 November.  There is the option to give her 1 cycle of chemotherapy while waiting on this but that would not accrue to her overall number of cycles and I do not want to add oxaliplatin cycles overall given the potential additive toxicity of oxaliplatin.  She understands the uncertainties here and wishes to proceed with the clinical trial.  She has seen gynecologic oncology for evaluation and this is new ovarian cyst will be watched and is felt to be benign according to them and can be just watched by serial CTs and primary GYN exam.  I will see her back November 27 hopefully to have results by then and hopefully to then later that week proceed with chemotherapy as indicated based on the randomizations.    -12/1/2023 [facility] medical oncology follow-up: CT DNA reportedly 0 per  .  She randomized to the treatment arm for which we have seen her and educated her for Xeloda oxaliplatin x 3 months with NSAID cream to mitigate palmar and plantar skin issues.  Will ask scan routinely for routine surveillance and  with that we will also keep an eye on the right adnexal mass felt to be benign per GYN oncology.  Treatment 1 cycle 1 orders signed and we will see her for cycle 2 day 1 the day after Dennis with my nurse practitioner in [place].  She has had her cancer treatment preparation visit and education and consenting.  She has been vasculature and approval has been granted to treat her by peripheral vein from the nursing staff.    -12/13/2023 PICC line placed due to discomfort with oxaliplatin infusion  -12/26/2023 [facility] Oncology clinic follow-up: Yelena tolerated her first cycle of XELOX fairly well.  She has some mild fatigue.  She does continue to work.  She had 1 episode of diarrhea that resolved with Imodium.  Labs reviewed from today, CBC and CMP are normal other than for glucose of 128.  She now has a PICC line as oxaliplatin was uncomfortable infused peripherally.  We will proceed today with cycle #2 unchanged, I will see her back for follow-up for her third cycle.    -1/16/2024 [facility] Oncology clinic follow-up: Today will be cycle 3 of a planned 4 cycles of XELOX.  She has some mild palmar erythrodysesthesia, after further discussion she has not been using the Voltaren ointment twice daily as prescribed.  She will begin doing that along with making sure to keep her hands well moisturized throughout the day.  Right now this is a grade 1-2.  She understands to notify our office if it worsens during this cycle while she is taking her Xeloda and we may have to hold or reduce dose.  She has no lingering neuropathy.  Labs reviewed from today, counts are acceptable to continue treatment unchanged. Her CBC is normal, CMP normal other than for glucose of 279.  We will move her next treatment to a Friday at her request to work better with her work schedule and give her the weekend to recover.    -2/1/2024 FNA left thyroid biopsy with Dr. Armida Young, pathology was negative for malignant cells.    -2/9/2024 [facility]  Oncology clinic follow-up: Overall she continues to do well on XELOX, today is her fourth and final cycle of planned therapy.  Somehow the shipments of her Xeloda wore off but this last cycle, she completed Xeloda on Wednesday therefore I have asked her not to start taking cycle 4 of her Xeloda until Wednesday, 2/14/2024.  I have also notified our pharmacist.  We will continue with oxaliplatin today as planned. She has mild peripheral neuropathy that is intermittent.  Her hand-foot erythrodysesthesia has improved with more consistent use of emollient lotion and also Voltaren ointment twice a day.  She will continue the Xeloda dose at 1500 mg twice daily again and she will start this next Wednesday for 14 days on and then she will be finished.  I reviewed her labs from today, CT DNA was drawn, her CBC looks good with WBC 7.45, hemoglobin 12.3, hematocrit 36.9%, platelet count 232,000, ANC 4.83, her CMP is unremarkable other than for glucose of 309.  I did go over to see Sparks in the infusion area as I did not get the results of her CMP until she had left our visit.  She states that she did take her diabetes medications as directed, she does not monitor her glucose at home regularly, she will start doing so and will follow-up with her PCP.  Her magnesium is normal, CEA is pending.  We will plan on repeating CT chest, abdomen and pelvis for restaging prior to return in 4 weeks and I have ordered that today.  Since we saw her last she did have FNA of left thyroid nodule with Dr. Armida Young and that was negative for malignant cells.  We will discontinue her PICC line after her treatment today. Addendum: CEA 2.45 up from 0.694 months ago.  CT DNA negative.    -3/1/2024 Chest abdomen pelvis with contrast compared to October CTs shows left lobe thyroid nodule previously noted with multinodular goiter.  Scarring apical portions of both lungs.  Slight thoracic esophageal thickening inflammation versus underdistention.   Mild hepatic steatosis.  Postsurgical sigmoid changes with haziness around the colon.  Right adnexal cyst no longer identified.  Thickening of the bladder wall possible cystitis.  L4-5, L5-S1 surgical changes.  Moderate stool burden    -3/8/2024 [facility] follow-up: On arm 1B  has completed 4 adjuvant cycles of Xeloda oxaliplatin.  CT DNA is negative in February.  We will repeat CT DNA CEA CMP CBC mid May and CTs the end of May and I will see her back early June and in the meantime I have reached out to Dr. Calle to do follow-up colonoscopy.  Her PICC line is out.  Thyroid has been biopsied and is benign.  Souls of her feet peeled with the last cycle and that has resolved.  Cold sensitive neuropathy still comes and goes but otherwise no significant residual neuropathy.  Encouraged her to follow-up with primary care for management of her blood glucose.    -5/24/2024 CT DNA negative.  -5/29/2024 CT chest abdomen pelvis with contrast shows no evidence of recurrence in the chest abdomen or pelvis.  Multiple bilateral ovarian follicles/cysts versus multilocular cystic lesion in the left ovary measuring up to 4.6 cm both new from prior comparison.  CEA 0.94.  White count 12,470 otherwise unremarkable CBC and differential.  Potassium 3.4 otherwise unremarkable CMP.  Magnesium normal 2.1.      -6/3/2024 [facility] follow-up: No new somatic complaints status post Xeloda oxaliplatin for adjuvant cycles on arm 1B of .  CT DNA remains negative and the CEA remains low and the CT shows no evidence of recurrent colon cancer.  She does however have multiple bilateral ovarian follicles/cysts versus cystic lesion in the left ovary measuring up to 4.6 cm.  I have asked her to get with Dr. Mcguire for evaluation and management of this.  This is an ongoing issue and she is having pain in that area and it may ultimately be best for her to just have this ovary removed.  He had an EGD and colonoscopy in  April with Dr. Calle.  She will get repeat CT DNA, CBC, CMP, CEA, and CTs per trial and NCCN guidelines mid August.  Next colonoscopy in 1 year.    -8/13/2024 CEA 1.14.  CBC and CMP unremarkable save for glucose 119.  Hemoglobin A1c 6.3.  CT DNA drawn by research staff.    -8/30/2024 CTChest abdomen pelvis shows new duodenal thickening of uncertain significance and otherwise negative CT chest abdomen pelvis.    -9/6/2024 [facility] oncology clinic follow-up: May CT DNA negative.  Current CT DNA drawn but pending.  CTs and CEA and other labs unremarkable.  With regards to the duodenal thickening on CT, she had EGD and colonoscopy in April which included normal second portion of the duodenum biopsy and gastritis.  Will put her through a repeat EGD.  Colonoscopy in April also negative for malignancy.  Follow-up in 3 months with CBC, CMP, CEA, CT DNA on clinical trial, and CTs.  Next colonoscopy in April 2025.     Malignant neoplasm of sigmoid colon   8/29/2023 Initial Diagnosis    Malignant neoplasm of sigmoid colon     10/2/2023 Cancer Staged    Staging form: Colon And Rectum, AJCC 8th Edition  - Clinical: Stage IIIB (cT3, cN1, cM0) - Signed by Bernardino Chaparro MD on 10/2/2023     10/19/2023 Cancer Staged    Staging form: Colon And Rectum, AJCC 8th Edition  - Pathologic: Stage IIIB (pT3, pN1b, cM0) - Signed by Bernardino Chaparro MD on 10/19/2023     11/30/2023 - 11/30/2023 Chemotherapy    OP COLON CapeOX Capecitabine / OXALIplatin     12/4/2023 -  Chemotherapy    RESEARCH COLON  ARM 1B CAPOX (OXALIplatin / Capecitabine)         HISTORY OF PRESENT ILLNESS:  The patient is a 58 y.o. female, here for follow up on management of follow-up of rectal cancer    Past Medical History:   Diagnosis Date    Anxiety     Arthritis     Cant recall the date of onset    Chest pain 11/06/2019    Colon polyp     Depression     Diabetes mellitus     Dysphagia 11/06/2019    Endometriosis     Fibromyalgia, primary     GERD (gastroesophageal  "reflux disease)     Headache     HL (hearing loss)     Hyperlipidemia     Hypertension     Low vitamin D level     Malignant neoplasm of sigmoid colon 08/29/2023    Migraines     Ovarian cyst     PMDD (premenstrual dysphoric disorder)     Scoliosis     Wears glasses      Past Surgical History:   Procedure Laterality Date    BLADDER SURGERY      Bladder tuck    COLONOSCOPY      every 5 years    ENDOMETRIAL ABLATION      LUMBAR DISCECTOMY  2010    L4-S1 Rt- discectomy Dr. Arellano    SPINAL CORD STIMULATOR IMPLANT N/A 08/19/2019    Procedure: SPINAL CORD STIMULATOR INSERTION;  Surgeon: Ethan Peters MD;  Location: [facility];  Service: Neurosurgery    SPINE SURGERY  2010    TONSILLECTOMY AND ADENOIDECTOMY         Allergies   Allergen Reactions    Dihydroergotamine GI Intolerance     VOMITING    Methergine [Methylergonovine Maleate] Nausea And Vomiting    Amoxicillin Rash       Family History and Social History reviewed and changed as necessary    REVIEW OF SYSTEM:   No new somatic complaints    PHYSICAL EXAM:  No jaundice icterus or pallor.  No respiratory distress.    Vitals:    09/06/24 1108   BP: 120/64   Pulse: 91   Resp: 16   Temp: 97.6 °F (36.4 °C)   SpO2: 97%   Weight: 77.6 kg (171 lb)   Height: 162.6 cm (64\")     Vitals:    09/06/24 1108   PainSc: 0-No pain          ECOG score: 0           Vitals reviewed.    ECOG: (0) Fully Active - Able to Carry On All Pre-disease Performance Without Restriction    Lab Results   Component Value Date    HGB 13.9 08/13/2024    HCT 42.2 08/13/2024    MCV 92.3 08/13/2024     08/13/2024    WBC 7.63 08/13/2024    NEUTROABS 5.28 08/13/2024    LYMPHSABS 1.75 08/13/2024    MONOSABS 0.40 08/13/2024    EOSABS 0.11 08/13/2024    BASOSABS 0.05 08/13/2024       Lab Results   Component Value Date    GLUCOSE 119 (H) 08/13/2024    BUN 10 08/13/2024    CREATININE 0.84 08/13/2024     08/13/2024    K 3.8 08/13/2024     08/13/2024    CO2 25.0 08/13/2024    CALCIUM 9.8 " 08/13/2024    PROTEINTOT 7.0 08/13/2024    ALBUMIN 4.3 08/13/2024    BILITOT 0.2 08/13/2024    ALKPHOS 81 08/13/2024    AST 15 08/13/2024    ALT 21 08/13/2024             ASSESSMENT & PLAN:  1.  Colon cancer found in a tubular adenoma 20 cm from the anal verge with negative staging CT chest abdomen and pelvis normal baseline CEA 1.26 with no visible lesion on MRI rectal protocol.  9/25/2023 laparoscopic sigmoid colectomy Dr. Michel pathologic T3 extending through muscularis propria into pericolonic fat, N1b 3/14 node positive.  CT DNA negative.  On  randomized to the treatment arm.  Will give 3 months of capecitabine and oxaliplatin with NSAID cream.  Fourth cycle of Xeloda oxaliplatin started 2/9/2024.  CT DNA negative at that junction with CEA 2.45 and negative CTs for recurrence  2.  Goiter with 1.8 cm left thyroid nodule found on colon cancer staging   -2/1/2024 FNA left thyroid biopsy with Dr. Armida Young, pathology was negative for malignant cells.  3.  Myomectomy with bladder tack July 2015  4.  Diabetes  5.  Hypertension  6.  Hyperlipidemia  7.  Migraines  8.  Depression  9.  Reflux  10.  History of uterine ablation with ovarian cyst on post-op staging imaging of colon cancer  11.  History of tonsillectomy  12.  History of tympanostomy tubes  13.  History of L4-5 disc surgery and L5-S1 discectomy and fusion  14.  History of solid and liquid phase dysphagia with history of mild gastritis and some esophageal ranging on EGD 2019  15.  Compound melanocytic nevus with dermatofibroma removed by Dr. Ray 07/12/2023    Oncology history timeline:  -12/20/2019 EGD showed small hiatal hernia with some ringing of the esophagus biopsy.  Mild gastritis.  No intestinal metaplasia or dysplasia or malignancy.  Reflux suggestive but not diagnostic.  -8/18/2023 colonoscopy Dr. Calle.  Diverticulosis in the sigmoid colon.  7 mm polyp in the rectum.  Diverticulosis in the sigmoid colon.  Polypoid area with ulceration  crescent-shaped 1.8 cm 20 cm proximal to the anus likely sigmoid.  Tattooed and biopsied.  Rectal polyp showed tubular adenoma with no high-grade dysplasia.  Colon biopsy at 20 cm showed invasive moderately differentiated colon adenocarcinoma arising within a tubular adenoma with high-grade dysplasia.  Microsatellite stable    -8/24/2023 CEA normal 1.26.  ALT 34 bicarb 20.2 otherwise unremarkable CMP.  CBC normal with hemoglobin 14.4 and normal MCV.  -8/27/2023 CT abdomen pelvis with contrast did not show any colonic mass.  Mild distal esophageal thickening, hepatic steatosis without hepatic metastasis.  No lytic bone disease.  MRI rectal protocol without contrast reviewed with Dr. López.  Perhaps a small pucker at the site of biopsy but no discernible mass per se and no adenopathy.  -8/28/2023 CT chest shows no metastasis.  1.8 cm left thyroid for which follow-up is recommended    -8/29/2023 [facility] medical oncology consult: On screening colonoscopy patient found to have ulcerated polyp at 20 cm from the anal verge with moderately differentiated adenocarcinoma microsatellite stable.  Normal baseline CEA with staging CT chest abdomen pelvis and MRI rectal protocol showing no evidence of metastasis and no visible primary on MRI.  Based on distance from anal verge on colonoscopy and given the absence of visible abnormality on MRI rectal protocol to change that estimated position, would call this colon cancer.  Given microsatellite stability, would not contemplate calling this rectal cancer and treating with immunotherapy but regardless of whether rectal or colon primary this would at least be no more than T1 tumor clinically with no visible tumor on MRI despite colonoscopy findings.  The patient has experience with Dr. Michel with whom she requests a consultation for definitive surgery and I will see back postoperatively to decide on any adjuvant therapy as indicated based on pathologic staging.  There is some  esophageal thickening on CT for which I would request Dr. Calle repeat EGD last done 2019 just for completeness sake but first would deal with the colon cancer at hand.She will also need to get left thyroid ultrasound eventually and would likely do this through whoever has managed her goiter previously.      -- 9/25/2023 laparoscopic sigmoid colectomy Dr. Michel.    -10/2/2023 [facility] & [facility] oncology follow-up: Final pathology from surgery done a week ago is not back.  According to verbal report to me from the pathologist has either T2 or T3 disease but he thinks that this is tracking along the arteries and will be T3 and she had 3 out of 13 nodes involved.  Hence I would call this stage IIIb.  Her baseline CEA was normal preoperatively so that will not be extremely helpful postoperatively.  Outside of clinical trial the standard of care would be 3 months of CapeOx or 6 months of FOLFOX.  I would be interested in putting her on NRG- where upon CT DNA negative patients are randomized to either serial screening and CT DNA or 3 months CapeOx/6 months FOLFOX providers choice.  CT DNA positive patients are randomized to either 6 months of CapeOx or FOLFOX or 6 months of FOLFIRINOX.  If we can go ahead and get the [place] CT DNA cooking outside of clinical trial without significant financial toxicity we may do that.  Ideally chemotherapy would start less than 8 weeks out from surgery and we should have plenty of time to have her heal and to make these decisions collectively.  Addendum: Final pathology report… Moderately differentiated adenocarcinoma pathologic T3N1B, negative margins margins 3 out of 14 nodes positive with negative new anastomotic excisional margin.  Tumor extent ends through muscularis propria into pericolonic or perirectal tissue.  No lymph-vascular or perineural invasion.     -10/16/2023 CT chest Abdomen pelvis compared to CT chest 8/28/2023 and CT abdomen pelvis 8/27/2023 shows no metastasis in  the chest abdomen or pelvis and interval sigmoid colectomy.  New right adnexal cystic lesion 6 cm recommending pelvic ultrasound in 3 months    -10/19/2023 [facility] medical oncology follow-up: I went over her scans with her.  No clear-cut metastatic disease.  I will ask her to get to Dr. Mcguire for the right adnexal cystic lesion as quickly as possible and would not wait 3 months on the ultrasound in light of clinical trial.  I suspect this is benign but we will get this worked up.  On [facility] nomogram, putting in her pathologic information she has a 5-year survival rate of 69% give or take 7%.  With adjuvant therapy her 5-year survival rate is 85%.  This is based on statistics not informed by molecular data.  We are aware of prognostic data from prior CT DNA trials whereby patients with negative CT DNA have a considerably higher 5-year survival rate and people with CT DNA positivity have a considerably lower 5-year survival rate and the above 5-year survival rate is probably a conglomerate of statistics between these people that averages out.  I certainly would be fine with either 6 months of FOLFOX or 3 months of Xeloda oxaliplatin outside of a clinical trial and that would be my standard recommendation.   is now open and we are enrolling her on that per her request.  If she has negative CT DNA then she is randomized to watchful waiting versus CapeOx x4 or FOLFOX x12.  If CT DNA is positive then she is randomized to CapeOx x6 or FOLFOX x12 (investigators choice) versus treatment intensification with FOLFIRINOX.  I will see her back in a few weeks for the CT DNA results.    - 11/8/2023 evaluation by Dr. Eloisa Uribe, GYN oncology of right adnexal mass with minimal complexity is not consistent with Krukenberg tumor or other malignancy.  There is a thickened endometrial stripe.  Perimenopausal state.  Still having monthly periods hormonal blood work from primary OB/GYN not available.  Imaging reviewed by  Dr. Uribe with no concerns for this being malignancy.   normal within the last month.  If it were not for serial CTs already planned, patient would have transvaginal ultrasound in 6 months but with plans for serial CTs, GYN oncology plans no further ultrasounds.  Due to low risk of malignancy, GYN oncology recommends regular follow-up with primary OB/GYN and follow-up if images change in a worrisome manner.    -11/9/2023 [facility] medical oncology follow-up: The CT DNA is still pending.  We should have the results by November 30 or sooner.  8 weeks from surgery would be November 20.  There is good data that disease-free survival and overall outcomes are not compromised when adjuvant chemotherapy occurs 8 weeks out from surgery compared to 4 weeks or less.  The data beyond that does show some falloff of survival when there are significant delays but I do not think her overall risk is going to substantially change whether we treated her the week of the 20th or the week of 27 November.  There is the option to give her 1 cycle of chemotherapy while waiting on this but that would not accrue to her overall number of cycles and I do not want to add oxaliplatin cycles overall given the potential additive toxicity of oxaliplatin.  She understands the uncertainties here and wishes to proceed with the clinical trial.  She has seen gynecologic oncology for evaluation and this is new ovarian cyst will be watched and is felt to be benign according to them and can be just watched by serial CTs and primary GYN exam.  I will see her back November 27 hopefully to have results by then and hopefully to then later that week proceed with chemotherapy as indicated based on the randomizations.    -12/1/2023 [facility] medical oncology follow-up: CT DNA reportedly 0 per  .  She randomized to the treatment arm for which we have seen her and educated her for Xeloda oxaliplatin x 3 months with NSAID cream to mitigate  palmar and plantar skin issues.  Will ask scan routinely for routine surveillance and with that we will also keep an eye on the right adnexal mass felt to be benign per GYN oncology.  Treatment 1 cycle 1 orders signed and we will see her for cycle 2 day 1 the day after [place] with my nurse practitioner in [place].  She has had her cancer treatment preparation visit and education and consenting.  She has been vasculature and approval has been granted to treat her by peripheral vein from the nursing staff.    -12/13/2023 PICC line placed due to discomfort with oxaliplatin infusion  -12/26/2023 [facility] Oncology clinic follow-up: Yelena tolerated her first cycle of XELOX fairly well.  She has some mild fatigue.  She does continue to work.  She had 1 episode of diarrhea that resolved with Imodium.  Labs reviewed from today, CBC and CMP are normal other than for glucose of 128.  She now has a PICC line as oxaliplatin was uncomfortable infused peripherally.  We will proceed today with cycle #2 unchanged, I will see her back for follow-up for her third cycle.    -1/16/2024 [facility] Oncology clinic follow-up: Today will be cycle 3 of a planned 4 cycles of XELOX.  She has some mild palmar erythrodysesthesia, after further discussion she has not been using the Voltaren ointment twice daily as prescribed.  She will begin doing that along with making sure to keep her hands well moisturized throughout the day.  Right now this is a grade 1-2.  She understands to notify our office if it worsens during this cycle while she is taking her Xeloda and we may have to hold or reduce dose.  She has no lingering neuropathy.  Labs reviewed from today, counts are acceptable to continue treatment unchanged. Her CBC is normal, CMP normal other than for glucose of 279.  We will move her next treatment to a Friday at her request to work better with her work schedule and give her the weekend to recover.    -2/1/2024 FNA left thyroid biopsy  with Dr. Armida Young, pathology was negative for malignant cells.    -2/9/2024 [facility] Oncology clinic follow-up: Overall she continues to do well on XELOX, today is her fourth and final cycle of planned therapy.  Somehow the shipments of her Xeloda wore off but this last cycle, she completed Xeloda on Wednesday therefore I have asked her not to start taking cycle 4 of her Xeloda until Wednesday, 2/14/2024.  I have also notified our pharmacist.  We will continue with oxaliplatin today as planned. She has mild peripheral neuropathy that is intermittent.  Her hand-foot erythrodysesthesia has improved with more consistent use of emollient lotion and also Voltaren ointment twice a day.  She will continue the Xeloda dose at 1500 mg twice daily again and she will start this next Wednesday for 14 days on and then she will be finished.  I reviewed her labs from today, CT DNA was drawn, her CBC looks good with WBC 7.45, hemoglobin 12.3, hematocrit 36.9%, platelet count 232,000, ANC 4.83, her CMP is unremarkable other than for glucose of 309.  I did go over to see Yelena in the infusion area as I did not get the results of her CMP until she had left our visit.  She states that she did take her diabetes medications as directed, she does not monitor her glucose at home regularly, she will start doing so and will follow-up with her PCP.  Her magnesium is normal, CEA is pending.  We will plan on repeating CT chest, abdomen and pelvis for restaging prior to return in 4 weeks and I have ordered that today.  Since we saw her last she did have FNA of left thyroid nodule with Dr. Armida Young and that was negative for malignant cells.  We will discontinue her PICC line after her treatment today. Addendum: CEA 2.45 up from 0.694 months ago.  CT DNA negative.    -3/1/2024 Chest abdomen pelvis with contrast compared to October CTs shows left lobe thyroid nodule previously noted with multinodular goiter.  Scarring apical portions of  both lungs.  Slight thoracic esophageal thickening inflammation versus underdistention.  Mild hepatic steatosis.  Postsurgical sigmoid changes with haziness around the colon.  Right adnexal cyst no longer identified.  Thickening of the bladder wall possible cystitis.  L4-5, L5-S1 surgical changes.  Moderate stool burden    -3/8/2024 [facility] oncology clinic follow-up: On arm 1B  has completed 4 adjuvant cycles of Xeloda oxaliplatin.  CT DNA is negative in February.  We will repeat CT DNA CEA CMP CBC mid May and CTs the end of May and I will see her back early June and in the meantime I have reached out to Dr. Calle to do follow-up colonoscopy.  Her PICC line is out.  Thyroid has been biopsied and is benign.  Souls of her feet peeled with the last cycle and that has resolved.  Cold sensitive neuropathy still comes and goes but otherwise no significant residual neuropathy.  Encouraged her to follow-up with primary care for management of her blood glucose.    -5/24/2024 CT DNA negative.  -5/29/2024 CT chest abdomen pelvis with contrast shows no evidence of recurrence in the chest abdomen or pelvis.  Multiple bilateral ovarian follicles/cysts versus multilocular cystic lesion in the left ovary measuring up to 4.6 cm both new from prior comparison.  CEA 0.94.  White count 12,470 otherwise unremarkable CBC and differential.  Potassium 3.4 otherwise unremarkable CMP.  Magnesium normal 2.1.      -6/3/2024 [facility] oncology clinic follow-up: No new somatic complaints status post Xeloda oxaliplatin for adjuvant cycles on arm 1B of .  CT DNA remains negative and the CEA remains low and the CT shows no evidence of recurrent colon cancer.  She does however have multiple bilateral ovarian follicles/cysts versus cystic lesion in the left ovary measuring up to 4.6 cm.  I have asked her to get with Dr. Mcguire for evaluation and management of this.  This is an ongoing issue and she is having pain in that area and it may  ultimately be best for her to just have this ovary removed.  He had an EGD and colonoscopy in April with Dr. Calle.  She will get repeat CT DNA, CBC, CMP, CEA, and CTs per trial and NCCN guidelines mid August.  Next colonoscopy in 1 year.    -8/13/2024 CEA 1.14.  CBC and CMP unremarkable save for glucose 119.  Hemoglobin A1c 6.3.  CT DNA drawn by research staff.    -8/30/2024 CTChest abdomen pelvis shows new duodenal thickening of uncertain significance and otherwise negative CT chest abdomen pelvis.    -9/6/2024 [facility] oncology clinic follow-up: May CT DNA negative.  Current CT DNA drawn but pending.  CTs and CEA and other labs unremarkable.  With regards to the duodenal thickening on CT, she had EGD and colonoscopy in April which included normal second portion of the duodenum biopsy and gastritis.  Will put her through a repeat EGD.  Colonoscopy in April also negative for malignancy.  Follow-up in 3 months with CBC, CMP, CEA, CT DNA on clinical trial, and CTs.  Next colonoscopy in April 2025.    Total time of care today inclusive of time spent today prior to patient's arrival reviewing interval data and images and reports of images and during visit translating that information and discussion with my research staff and translating that to my patient and interviewing her as to signs or symptoms of her disease and management thereof and after visit instituting this plan took 35 minutes patient care time throughout the day today.  Bernardino Chaparro MD    09/06/2024

## 2024-09-30 ENCOUNTER — RESEARCH ENCOUNTER (OUTPATIENT)
Dept: OTHER | Facility: OTHER | Age: 59
End: 2024-09-30
Payer: COMMERCIAL

## 2024-09-30 DIAGNOSIS — I10 ESSENTIAL HYPERTENSION: ICD-10-CM

## 2024-09-30 RX ORDER — HYDROCHLOROTHIAZIDE 12.5 MG/1
12.5 TABLET ORAL DAILY
Qty: 90 TABLET | Refills: 0 | Status: SHIPPED | OUTPATIENT
Start: 2024-09-30

## 2024-09-30 NOTE — RESEARCH
Patient Name:  Yelena Morris  YOB: 1965  Patient Age:  59 y.o.  Patient's Sex:  female    Date of Service:  09/30/2024    Provider:  Dr. Chaparro                     RESEARCH NOTE                  I met with Mrs. Morris today for the recollection of ctDNA labs. Previous labs drawn on 9/6/24 not acceptable per sponsor. Labs shipped out today, and will be resulted in about 3 weeks.     Liv ARENAS

## 2024-10-02 ENCOUNTER — TELEMEDICINE (OUTPATIENT)
Dept: PSYCHIATRY | Facility: CLINIC | Age: 59
End: 2024-10-02
Payer: COMMERCIAL

## 2024-10-02 DIAGNOSIS — F32.81 PMDD (PREMENSTRUAL DYSPHORIC DISORDER): ICD-10-CM

## 2024-10-02 DIAGNOSIS — F41.1 GENERALIZED ANXIETY DISORDER: ICD-10-CM

## 2024-10-02 DIAGNOSIS — F33.41 RECURRENT MAJOR DEPRESSIVE DISORDER, IN PARTIAL REMISSION: ICD-10-CM

## 2024-10-02 RX ORDER — ARIPIPRAZOLE 5 MG/1
5 TABLET ORAL DAILY
Qty: 90 TABLET | Refills: 0 | Status: SHIPPED | OUTPATIENT
Start: 2024-10-02

## 2024-10-02 RX ORDER — DULOXETIN HYDROCHLORIDE 60 MG/1
60 CAPSULE, DELAYED RELEASE ORAL DAILY
Qty: 90 CAPSULE | Refills: 0 | Status: SHIPPED | OUTPATIENT
Start: 2024-10-02

## 2024-10-02 NOTE — PROGRESS NOTES
This provider is located at [facility], [address] [place] [address].The Patient is seen remotely at home, [address] [place] [address] via [employer]. Patient is being seen via telehealth and confirm that they are in a secure environment for this session. The patient's condition being diagnosed/treated is appropriate for telemedicine. The provider identified himself/herself: herself as well as her credentials.   The patient gave consent to be seen remotely, and when consent is given they understand that the consent allows for patient identifiable information to be sent to a third party as needed.   They may refuse to be seen remotely at any time. The electronic data is encrypted and password protected, and the patient has been advised of the potential risks to privacy not withstanding such measures.    You have chosen to receive care through a telehealth visit.  Do you consent to use a video/audio connection for your medical care today? Yes      Chief Complaint  Follow-up for depression and anxiety     Subjective    Yelena Radha Morris presents to [facility] for medication management.       History of Present Illness   Patient presents today for her 3 months follow-up. Patient states she is doing very well just busy with work. She states she only works 4 days a week and has help now so that has been good. She states she had a CT scan in Aug and cancer is still gone. She states  is still getting chemo. Denies any depressive symptoms or feeling hopeless or helpless. She denies any anxiety symptoms stating she hasn't needed of her prn medications. See PHQ-9 and LUCERO-7. Reports sleep and appetite are good. Denies any side effects to medications. Denies any SI/HI/AH/VH.       Objective   Vital Signs:   There were no vitals taken for this visit.  Due to the remote nature of this encounter (virtual encounter), vitals were unable to be  obtained.  Height stated at 64 inches.  Weight stated at 165 pounds.      PHQ-9 Depression Screening  Little interest or pleasure in doing things? (P) 0-->not at all   Feeling down, depressed, or hopeless? (P) 0-->not at all   Trouble falling or staying asleep, or sleeping too much? (P) 0-->not at all   Feeling tired or having little energy? (P) 0-->not at all   Poor appetite or overeating? (P) 0-->not at all   Feeling bad about yourself - or that you are a failure or have let yourself or your family down? (P) 0-->not at all   Trouble concentrating on things, such as reading the newspaper or watching television? (P) 0-->not at all   Moving or speaking so slowly that other people could have noticed? Or the opposite - being so fidgety or restless that you have been moving around a lot more than usual? (P) 0-->not at all   Thoughts that you would be better off dead, or of hurting yourself in some way?     PHQ-9 Total Score (P) 0   If you checked off any problems, how difficult have these problems made it for you to do your work, take care of things at home, or get along with other people?         PHQ-9 Total Score: (P) 0          Mental Status Exam:   Hygiene:   good  Cooperation:  Cooperative  Eye Contact:  Good  Psychomotor Behavior:  Appropriate  Affect:  Appropriate  Mood: normal  Speech:  Normal  Thought Process:  Goal directed and Linear  Thought Content:  Normal  Suicidal:  None  Homicidal:  None  Hallucinations:  None  Delusion:  None  Memory:  Intact  Orientation:  Person, Place, Time and Situation  Reliability:  good  Insight:  Good  Judgement:  Good  Impulse Control:  Good  Physical/Medical Issues:  Yes HTN, DM, fibromyalgia, carpal tunnel syndrome      Current Medications:   Current Outpatient Medications   Medication Sig Dispense Refill    ARIPiprazole (ABILIFY) 5 MG tablet Take 1 tablet by mouth Daily. 90 tablet 0    DULoxetine (CYMBALTA) 60 MG capsule Take 1 capsule by mouth Daily. 90 capsule 0     FLUoxetine (PROzac) 20 MG capsule Take 1 capsule by mouth Daily for 90 days. 90 capsule 0    atorvastatin (LIPITOR) 40 MG tablet Take 1 tablet by mouth Every Evening. 90 tablet 3    BOTOX 200 units reconstituted solution INJECT 200 UNITS INTO THE HEAD OR NECK AREA BY MD EVERY 90 DAYS  3    cyclobenzaprine (FLEXERIL) 10 MG tablet Take 1 tablet by mouth At Night As Needed for Muscle Spasms. 30 tablet 1    eletriptan (RELPAX) 40 MG tablet TAKE 1 TABLET BY MOUTH AT THE ONSET OF MIGRAINE. MAY REPEAT IN 2 HRS.MAX 2 DOSE/24HR,3 DAYS/WEEK  11    glimepiride (AMARYL) 2 MG tablet Take 1 tablet by mouth 2 (Two) Times a Day. 180 tablet 3    hydroCHLOROthiazide 12.5 MG tablet TAKE 1 TABLET BY MOUTH EVERY DAY 90 tablet 0    hydrOXYzine (ATARAX) 25 MG tablet Take 1-2 tablets by mouth At Night As Needed for Anxiety (sleep). 180 tablet 1    nebivolol (Bystolic) 10 MG tablet Take 1 tablet by mouth Daily. 90 tablet 3    nortriptyline (PAMELOR) 10 MG capsule nortriptyline 10 mg capsule   TAKE 4 CAPSULES AT BEDTIME      Nutritional Supplements (DHEA PO) Take  by mouth.      omeprazole (priLOSEC) 40 MG capsule Take 1 capsule by mouth Daily. Pt needs appointment 90 capsule 0    pioglitazone (Actos) 30 MG tablet Take 1 tablet by mouth Daily. 90 tablet 1    propranolol (INDERAL) 10 MG tablet TAKE 1 TABLET BY MOUTH 2 (TWO) TIMES A DAY AS NEEDED (PANIC/ANXIETY). 180 tablet 0    Semaglutide, 1 MG/DOSE, (OZEMPIC) 4 MG/3ML solution pen-injector Inject 1 mg under the skin into the appropriate area as directed 1 (One) Time Per Week. 9 mL 1    Topiramate ER (Trokendi XR) 200 MG capsule sustained-release 24 hr Daily.       No current facility-administered medications for this visit.       Physical Exam  Nursing note reviewed.   Constitutional:       Appearance: Normal appearance.   Neurological:      Mental Status: She is alert.   Psychiatric:         Attention and Perception: Attention and perception normal.         Mood and Affect: Mood and affect  normal. Mood is not anxious or depressed.         Speech: Speech normal.         Behavior: Behavior normal. Behavior is not agitated. Behavior is cooperative.         Thought Content: Thought content normal.         Cognition and Memory: Cognition and memory normal.         Judgment: Judgment normal.        Result Review :     The following data was reviewed by: ONESIMO Alexander on 02/01/2021:  Common labs          5/3/2024    10:39 5/3/2024    10:48 5/29/2024    16:55 8/13/2024    10:52   Common Labs   Glucose   71  119    BUN   15  10    Creatinine   0.73  0.84    Sodium   138  136    Potassium   3.4  3.8    Chloride   100  101    Calcium   9.4  9.8    Albumin   4.1  4.3    Total Bilirubin   0.6  0.2    Alkaline Phosphatase   73  81    AST (SGOT)   17  15    ALT (SGPT)   24  21    WBC   12.47  7.63    Hemoglobin   14.0  13.9    Hematocrit   41.9  42.2    Platelets   243  281    Hemoglobin A1C 8.6    6.30    Microalbumin, Urine  1.4      Data reviewed: PCP notes         Assessment and Plan    Problem List Items Addressed This Visit          Mental Health    Recurrent major depressive disorder    Relevant Medications    ARIPiprazole (ABILIFY) 5 MG tablet    DULoxetine (CYMBALTA) 60 MG capsule    FLUoxetine (PROzac) 20 MG capsule     Other Visit Diagnoses       Generalized anxiety disorder        Relevant Medications    ARIPiprazole (ABILIFY) 5 MG tablet    DULoxetine (CYMBALTA) 60 MG capsule    FLUoxetine (PROzac) 20 MG capsule    PMDD (premenstrual dysphoric disorder)        Relevant Medications    ARIPiprazole (ABILIFY) 5 MG tablet    DULoxetine (CYMBALTA) 60 MG capsule    FLUoxetine (PROzac) 20 MG capsule                  TREATMENT PLAN/GOALS: Continue supportive psychotherapy efforts and medications as indicated. Treatment and medication options discussed during today's visit. Patient ackowledged and verbally consented to continue with current treatment plan and was educated on the importance of compliance  with treatment and follow-up appointments.    MEDICATION ISSUES:  We discussed risks, benefits, and side effects of the above medications and the patient was agreeable with the plan. Patient was educated on the importance of compliance with treatment and follow-up appointments.  Patient is agreeable to call the office with any worsening of symptoms or onset of side effects. Patient is agreeable to call 911 or go to the nearest ER should he/she begin having SI/HI.        -Continue Cymbalta 60 mg daily for depression and anxiety as patient has benefited greatly as well for her pain.  -Continue fluoxetine 20 mg daily for depression and PMDD, as patient has been without for several months and noticed more anxiety and irritability.  Also encouraged patient that she may need to look at work as a part-time due to her and her 's health issues.  -Patient educated extensively regarding the risk of serotonin syndrome as she is currently on duloxetine due to her pain as we have previously tried to taper and it is not effective so she is on fluoxetine 20 mg daily to help with her overall depression and mood.  Patient verbalized understanding was agreeable.  -Continue aripiprazole 5 mg as adjunct for depression as patient has failed numerous SSRIs and SNRIs and has benefited the patient tremendously and is medically necessary for her depression to avoid hospitalization.   -Continue propranolol 10 mg twice daily as needed for anxiety and panic.  Encouraged patient if it decreased her blood pressure or heart rate to contact the clinic and discontinue.  -Continue hydroxyzine 25 to 50 mg at night as needed for sleep.      Counseled patient regarding multimodal approach with healthy nutrition, healthy sleep, regular physical activity, social activities, counseling, and medications.      Coping skills reviewed and encouraged positive framing of thoughts     Assisted patient in processing above session content; acknowledged and  normalized patient’s thoughts, feelings, and concerns.  Applied  positive coping skills and behavior management in session.  Allowed patient to freely discuss issues without interruption or judgment. Provided safe, confidential environment to facilitate the development of positive therapeutic relationship and encourage open, honest communication. Assisted patient in identifying risk factors which would indicate the need for higher level of care including thoughts to harm self or others and/or self-harming behavior and encouraged patient to contact this office, call 911, or present to the nearest emergency room should any of these events occur. Discussed crisis intervention services and means to access.     MEDS ORDERED DURING VISIT:  New Medications Ordered This Visit   Medications    ARIPiprazole (ABILIFY) 5 MG tablet     Sig: Take 1 tablet by mouth Daily.     Dispense:  90 tablet     Refill:  0    DULoxetine (CYMBALTA) 60 MG capsule     Sig: Take 1 capsule by mouth Daily.     Dispense:  90 capsule     Refill:  0    FLUoxetine (PROzac) 20 MG capsule     Sig: Take 1 capsule by mouth Daily for 90 days.     Dispense:  90 capsule     Refill:  0         Follow Up   Return in about 3 months (around 1/2/2025), or if symptoms worsen or fail to improve, for Recheck.    Patient was given instructions and counseling regarding her condition or for health maintenance advice. Please see specific information pulled into the AVS if appropriate.     Some of the data in this electronic note has been brought forward from a previous encounter, any necessary changes have been made, it has been reviewed by this APRN, and it is accurate.      This document has been electronically signed by ONESIMO Alexander  October 2, 2024 10:15 EDT    Part of this note may be an electronic transcription/translation of spoken language to printed text using the Dragon Dictation System.

## 2024-10-29 ENCOUNTER — RESEARCH ENCOUNTER (OUTPATIENT)
Dept: OTHER | Facility: OTHER | Age: 59
End: 2024-10-29
Payer: COMMERCIAL

## 2024-10-29 NOTE — RESEARCH
Patient Name:  Yelena Morris  YOB: 1965  Patient Age:  59 y.o.  Patient's Sex:  female    Date of Service:  10/29/2024    Provider: Dr. Chaparro                     RESEARCH NOTE                  I met with Mrs. Morris today for the recollection of ctDNA labs. Previous labs drawn on 09/30/24 not acceptable per sponsor due to error made at their lab. Labs shipped out today, and will be resulted in about 3 weeks.      [employer] [employer]

## 2024-12-06 ENCOUNTER — HOSPITAL ENCOUNTER (OUTPATIENT)
Dept: CT IMAGING | Facility: HOSPITAL | Age: 59
Discharge: HOME | End: 2024-12-06
Admitting: INTERNAL MEDICINE
Payer: COMMERCIAL

## 2024-12-06 DIAGNOSIS — C18.7 MALIGNANT NEOPLASM OF SIGMOID COLON: Chronic | ICD-10-CM

## 2024-12-06 PROCEDURE — 74177 CT ABD & PELVIS W/CONTRAST: CPT

## 2024-12-06 PROCEDURE — 25510000001 IOPAMIDOL 61 % SOLUTION: Performed by: INTERNAL MEDICINE

## 2024-12-06 PROCEDURE — 71260 CT THORAX DX C+: CPT

## 2024-12-06 RX ORDER — IOPAMIDOL 612 MG/ML
85 INJECTION, SOLUTION INTRAVASCULAR
Status: COMPLETED | OUTPATIENT
Start: 2024-12-06 | End: 2024-12-06

## 2024-12-06 RX ADMIN — IOPAMIDOL 85 ML: 612 INJECTION, SOLUTION INTRAVENOUS at 10:50

## 2024-12-10 ENCOUNTER — RESEARCH ENCOUNTER (OUTPATIENT)
Dept: OTHER | Facility: OTHER | Age: 59
End: 2024-12-10
Payer: COMMERCIAL

## 2024-12-10 ENCOUNTER — OFFICE VISIT (OUTPATIENT)
Dept: ONCOLOGY | Facility: CLINIC | Age: 59
End: 2024-12-10
Payer: COMMERCIAL

## 2024-12-10 ENCOUNTER — LAB (OUTPATIENT)
Dept: LAB | Facility: HOSPITAL | Age: 59
End: 2024-12-10
Payer: COMMERCIAL

## 2024-12-10 VITALS
HEIGHT: 64 IN | RESPIRATION RATE: 16 BRPM | BODY MASS INDEX: 29.19 KG/M2 | SYSTOLIC BLOOD PRESSURE: 127 MMHG | WEIGHT: 171 LBS | TEMPERATURE: 98.3 F | DIASTOLIC BLOOD PRESSURE: 73 MMHG | OXYGEN SATURATION: 97 % | HEART RATE: 90 BPM

## 2024-12-10 DIAGNOSIS — C18.7 MALIGNANT NEOPLASM OF SIGMOID COLON: Primary | Chronic | ICD-10-CM

## 2024-12-10 DIAGNOSIS — C18.7 MALIGNANT NEOPLASM OF SIGMOID COLON: ICD-10-CM

## 2024-12-10 LAB
ALBUMIN SERPL-MCNC: 4.2 G/DL (ref 3.5–5.2)
ALBUMIN/GLOB SERPL: 1.3 G/DL
ALP SERPL-CCNC: 94 U/L (ref 39–117)
ALT SERPL W P-5'-P-CCNC: 38 U/L (ref 1–33)
ANION GAP SERPL CALCULATED.3IONS-SCNC: 13 MMOL/L (ref 5–15)
AST SERPL-CCNC: 23 U/L (ref 1–32)
BASOPHILS # BLD AUTO: 0.03 10*3/MM3 (ref 0–0.2)
BASOPHILS NFR BLD AUTO: 0.4 % (ref 0–1.5)
BILIRUB SERPL-MCNC: 0.3 MG/DL (ref 0–1.2)
BUN SERPL-MCNC: 11 MG/DL (ref 6–20)
BUN/CREAT SERPL: 11.2 (ref 7–25)
CALCIUM SPEC-SCNC: 9.4 MG/DL (ref 8.6–10.5)
CEA SERPL-MCNC: 1.09 NG/ML
CHLORIDE SERPL-SCNC: 101 MMOL/L (ref 98–107)
CO2 SERPL-SCNC: 24 MMOL/L (ref 22–29)
CREAT SERPL-MCNC: 0.98 MG/DL (ref 0.57–1)
DEPRECATED RDW RBC AUTO: 42.4 FL (ref 37–54)
EGFRCR SERPLBLD CKD-EPI 2021: 67.5 ML/MIN/1.73
EOSINOPHIL # BLD AUTO: 0.11 10*3/MM3 (ref 0–0.4)
EOSINOPHIL NFR BLD AUTO: 1.3 % (ref 0.3–6.2)
ERYTHROCYTE [DISTWIDTH] IN BLOOD BY AUTOMATED COUNT: 13 % (ref 12.3–15.4)
GLOBULIN UR ELPH-MCNC: 3.2 GM/DL
GLUCOSE SERPL-MCNC: 124 MG/DL (ref 65–99)
HCT VFR BLD AUTO: 41.4 % (ref 34–46.6)
HGB BLD-MCNC: 13.9 G/DL (ref 12–15.9)
IMM GRANULOCYTES # BLD AUTO: 0.04 10*3/MM3 (ref 0–0.05)
IMM GRANULOCYTES NFR BLD AUTO: 0.5 % (ref 0–0.5)
LYMPHOCYTES # BLD AUTO: 1.93 10*3/MM3 (ref 0.7–3.1)
LYMPHOCYTES NFR BLD AUTO: 23.4 % (ref 19.6–45.3)
MCH RBC QN AUTO: 29.6 PG (ref 26.6–33)
MCHC RBC AUTO-ENTMCNC: 33.6 G/DL (ref 31.5–35.7)
MCV RBC AUTO: 88.1 FL (ref 79–97)
MONOCYTES # BLD AUTO: 0.46 10*3/MM3 (ref 0.1–0.9)
MONOCYTES NFR BLD AUTO: 5.6 % (ref 5–12)
NEUTROPHILS NFR BLD AUTO: 5.69 10*3/MM3 (ref 1.7–7)
NEUTROPHILS NFR BLD AUTO: 68.8 % (ref 42.7–76)
PLATELET # BLD AUTO: 252 10*3/MM3 (ref 140–450)
PMV BLD AUTO: 9.1 FL (ref 6–12)
POTASSIUM SERPL-SCNC: 3.6 MMOL/L (ref 3.5–5.2)
PROT SERPL-MCNC: 7.4 G/DL (ref 6–8.5)
RBC # BLD AUTO: 4.7 10*6/MM3 (ref 3.77–5.28)
SODIUM SERPL-SCNC: 138 MMOL/L (ref 136–145)
WBC NRBC COR # BLD AUTO: 8.26 10*3/MM3 (ref 3.4–10.8)

## 2024-12-10 PROCEDURE — 80053 COMPREHEN METABOLIC PANEL: CPT

## 2024-12-10 PROCEDURE — 82378 CARCINOEMBRYONIC ANTIGEN: CPT

## 2024-12-10 PROCEDURE — 85025 COMPLETE CBC W/AUTO DIFF WBC: CPT

## 2024-12-10 PROCEDURE — 99214 OFFICE O/P EST MOD 30 MIN: CPT | Performed by: NURSE PRACTITIONER

## 2024-12-10 PROCEDURE — 36415 COLL VENOUS BLD VENIPUNCTURE: CPT

## 2024-12-10 NOTE — PROGRESS NOTES
CHIEF COMPLAINT: History of colon cancer    Problem List:  Oncology/Hematology History Overview Note   1.  Colon cancer found in a tubular adenoma 20 cm from the anal verge with negative staging CT chest abdomen and pelvis normal baseline CEA 1.26 with no visible lesion on MRI rectal protocol.  9/25/2023 laparoscopic sigmoid colectomy Dr. Michel pathologic T3 extending through muscularis propria into pericolonic fat, N1b 3/14 node positive.  CT DNA negative.  On  randomized to the treatment arm.  Will give 3 months of capecitabine and oxaliplatin with NSAID cream.  Fourth cycle of Xeloda oxaliplatin started 2/9/2024.  CT DNA negative at that junction with CEA 2.45 and negative CTs for recurrence  2.  Goiter with 1.8 cm left thyroid nodule found on colon cancer staging   -2/1/2024 FNA left thyroid biopsy with Dr. Armida Young, pathology was negative for malignant cells.  3.  Myomectomy with bladder tack July 2015  4.  Diabetes  5.  Hypertension  6.  Hyperlipidemia  7.  Migraines  8.  Depression  9.  Reflux  10.  History of uterine ablation with ovarian cyst on post-op staging imaging of colon cancer  11.  History of tonsillectomy  12.  History of tympanostomy tubes  13.  History of L4-5 disc surgery and L5-S1 discectomy and fusion  14.  History of solid and liquid phase dysphagia with history of mild gastritis and some esophageal ranging on EGD 2019  15.  Compound melanocytic nevus with dermatofibroma removed by Dr. Ray 07/12/2023    Oncology history timeline:  -12/20/2019 EGD showed small hiatal hernia with some ringing of the esophagus biopsy.  Mild gastritis.  No intestinal metaplasia or dysplasia or malignancy.  Reflux suggestive but not diagnostic.  -8/18/2023 colonoscopy Dr. Calle.  Diverticulosis in the sigmoid colon.  7 mm polyp in the rectum.  Diverticulosis in the sigmoid colon.  Polypoid area with ulceration crescent-shaped 1.8 cm 20 cm proximal to the anus likely sigmoid.  Tattooed and  biopsied.  Rectal polyp showed tubular adenoma with no high-grade dysplasia.  Colon biopsy at 20 cm showed invasive moderately differentiated colon adenocarcinoma arising within a tubular adenoma with high-grade dysplasia.  Microsatellite stable    -8/24/2023 CEA normal 1.26.  ALT 34 bicarb 20.2 otherwise unremarkable CMP.  CBC normal with hemoglobin 14.4 and normal MCV.  -8/27/2023 CT abdomen pelvis with contrast did not show any colonic mass.  Mild distal esophageal thickening, hepatic steatosis without hepatic metastasis.  No lytic bone disease.  MRI rectal protocol without contrast reviewed with Dr. López.  Perhaps a small pucker at the site of biopsy but no discernible mass per se and no adenopathy.  -8/28/2023 CT chest shows no metastasis.  1.8 cm left thyroid for which follow-up is recommended    -8/29/2023 [facility] medical oncology consult: On screening colonoscopy patient found to have ulcerated polyp at 20 cm from the anal verge with moderately differentiated adenocarcinoma microsatellite stable.  Normal baseline CEA with staging CT chest abdomen pelvis and MRI rectal protocol showing no evidence of metastasis and no visible primary on MRI.  Based on distance from anal verge on colonoscopy and given the absence of visible abnormality on MRI rectal protocol to change that estimated position, would call this colon cancer.  Given microsatellite stability, would not contemplate calling this rectal cancer and treating with immunotherapy but regardless of whether rectal or colon primary this would at least be no more than T1 tumor clinically with no visible tumor on MRI despite colonoscopy findings.  The patient has experience with Dr. Michel with whom she requests a consultation for definitive surgery and I will see back postoperatively to decide on any adjuvant therapy as indicated based on pathologic staging.  There is some esophageal thickening on CT for which I would request Dr. Calle repeat EGD last done 2019  just for completeness sake but first would deal with the colon cancer at hand.She will also need to get left thyroid ultrasound eventually and would likely do this through whoever has managed her goiter previously.      -- 9/25/2023 laparoscopic sigmoid colectomy Dr. Michel.    -10/2/2023 [facility] medical oncology follow-up: Final pathology from surgery done a week ago is not back.  According to verbal report to me from the pathologist has either T2 or T3 disease but he thinks that this is tracking along the arteries and will be T3 and she had 3 out of 13 nodes involved.  Hence I would call this stage IIIb.  Her baseline CEA was normal preoperatively so that will not be extremely helpful postoperatively.  Outside of clinical trial the standard of care would be 3 months of CapeOx or 6 months of FOLFOX.  I would be interested in putting her on NRG- where upon CT DNA negative patients are randomized to either serial screening and CT DNA or 3 months CapeOx/6 months FOLFOX providers choice.  CT DNA positive patients are randomized to either 6 months of CapeOx or FOLFOX or 6 months of FOLFIRINOX.  If we can go ahead and get the [place] CT DNA cooking outside of clinical trial without significant financial toxicity we may do that.  Ideally chemotherapy would start less than 8 weeks out from surgery and we should have plenty of time to have her heal and to make these decisions collectively.  Addendum: Final pathology report… Moderately differentiated adenocarcinoma pathologic T3N1B, negative margins margins 3 out of 14 nodes positive with negative new anastomotic excisional margin.  Tumor extent ends through muscularis propria into pericolonic or perirectal tissue.  No lymph-vascular or perineural invasion.     -10/16/2023 CT chest Abdomen pelvis compared to CT chest 8/28/2023 and CT abdomen pelvis 8/27/2023 shows no metastasis in the chest abdomen or pelvis and interval sigmoid colectomy.  New right adnexal cystic lesion  6 cm recommending pelvic ultrasound in 3 months    -10/19/2023 [facility] medical oncology follow-up: I went over her scans with her.  No clear-cut metastatic disease.  I will ask her to get to Dr. Mcguire for the right adnexal cystic lesion as quickly as possible and would not wait 3 months on the ultrasound in light of clinical trial.  I suspect this is benign but we will get this worked up.  On [facility] nomogram, putting in her pathologic information she has a 5-year survival rate of 69% give or take 7%.  With adjuvant therapy her 5-year survival rate is 85%.  This is based on statistics not informed by molecular data.  We are aware of prognostic data from prior CT DNA trials whereby patients with negative CT DNA have a considerably higher 5-year survival rate and people with CT DNA positivity have a considerably lower 5-year survival rate and the above 5-year survival rate is probably a conglomerate of statistics between these people that averages out.  I certainly would be fine with either 6 months of FOLFOX or 3 months of Xeloda oxaliplatin outside of a clinical trial and that would be my standard recommendation.   is now open and we are enrolling her on that per her request.  If she has negative CT DNA then she is randomized to watchful waiting versus CapeOx x4 or FOLFOX x12.  If CT DNA is positive then she is randomized to CapeOx x6 or FOLFOX x12 (investigators choice) versus treatment intensification with FOLFIRINOX.  I will see her back in a few weeks for the CT DNA results.    - 11/8/2023 evaluation by Dr. Eloisa Uribe, GYN oncology of right adnexal mass with minimal complexity is not consistent with Krukenberg tumor or other malignancy.  There is a thickened endometrial stripe.  Perimenopausal state.  Still having monthly periods hormonal blood work from primary OB/GYN not available.  Imaging reviewed by Dr. Uribe with no concerns for this being malignancy.   normal within the last  month.  If it were not for serial CTs already planned, patient would have transvaginal ultrasound in 6 months but with plans for serial CTs, GYN oncology plans no further ultrasounds.  Due to low risk of malignancy, GYN oncology recommends regular follow-up with primary OB/GYN and follow-up if images change in a worrisome manner.    -11/9/2023 [facility] medical oncology follow-up: The CT DNA is still pending.  We should have the results by November 30 or sooner.  8 weeks from surgery would be November 20.  There is good data that disease-free survival and overall outcomes are not compromised when adjuvant chemotherapy occurs 8 weeks out from surgery compared to 4 weeks or less.  The data beyond that does show some falloff of survival when there are significant delays but I do not think her overall risk is going to substantially change whether we treated her the week of the 20th or the week of 27 November.  There is the option to give her 1 cycle of chemotherapy while waiting on this but that would not accrue to her overall number of cycles and I do not want to add oxaliplatin cycles overall given the potential additive toxicity of oxaliplatin.  She understands the uncertainties here and wishes to proceed with the clinical trial.  She has seen gynecologic oncology for evaluation and this is new ovarian cyst will be watched and is felt to be benign according to them and can be just watched by serial CTs and primary GYN exam.  I will see her back November 27 hopefully to have results by then and hopefully to then later that week proceed with chemotherapy as indicated based on the randomizations.    -12/1/2023 [facility] medical oncology follow-up: CT DNA reportedly 0 per  .  She randomized to the treatment arm for which we have seen her and educated her for Xeloda oxaliplatin x 3 months with NSAID cream to mitigate palmar and plantar skin issues.  Will ask scan routinely for routine surveillance and  with that we will also keep an eye on the right adnexal mass felt to be benign per GYN oncology.  Treatment 1 cycle 1 orders signed and we will see her for cycle 2 day 1 the day after [place] with my nurse practitioner in [place].  She has had her cancer treatment preparation visit and education and consenting.  She has been vasculature and approval has been granted to treat her by peripheral vein from the nursing staff.    -12/13/2023 PICC line placed due to discomfort with oxaliplatin infusion  -12/26/2023 [facility] Oncology clinic follow-up: Yelena tolerated her first cycle of XELOX fairly well.  She has some mild fatigue.  She does continue to work.  She had 1 episode of diarrhea that resolved with Imodium.  Labs reviewed from today, CBC and CMP are normal other than for glucose of 128.  She now has a PICC line as oxaliplatin was uncomfortable infused peripherally.  We will proceed today with cycle #2 unchanged, I will see her back for follow-up for her third cycle.    -1/16/2024 [facility] Oncology clinic follow-up: Today will be cycle 3 of a planned 4 cycles of XELOX.  She has some mild palmar erythrodysesthesia, after further discussion she has not been using the Voltaren ointment twice daily as prescribed.  She will begin doing that along with making sure to keep her hands well moisturized throughout the day.  Right now this is a grade 1-2.  She understands to notify our office if it worsens during this cycle while she is taking her Xeloda and we may have to hold or reduce dose.  She has no lingering neuropathy.  Labs reviewed from today, counts are acceptable to continue treatment unchanged. Her CBC is normal, CMP normal other than for glucose of 279.  We will move her next treatment to a Friday at her request to work better with her work schedule and give her the weekend to recover.    -2/1/2024 FNA left thyroid biopsy with Dr. Armida Young, pathology was negative for malignant cells.    -2/9/2024 [facility]  Oncology clinic follow-up: Overall she continues to do well on XELOX, today is her fourth and final cycle of planned therapy.  Somehow the shipments of her Xeloda wore off but this last cycle, she completed Xeloda on Wednesday therefore I have asked her not to start taking cycle 4 of her Xeloda until Wednesday, 2/14/2024.  I have also notified our pharmacist.  We will continue with oxaliplatin today as planned. She has mild peripheral neuropathy that is intermittent.  Her hand-foot erythrodysesthesia has improved with more consistent use of emollient lotion and also Voltaren ointment twice a day.  She will continue the Xeloda dose at 1500 mg twice daily again and she will start this next Wednesday for 14 days on and then she will be finished.  I reviewed her labs from today, CT DNA was drawn, her CBC looks good with WBC 7.45, hemoglobin 12.3, hematocrit 36.9%, platelet count 232,000, ANC 4.83, her CMP is unremarkable other than for glucose of 309.  I did go over to see [place] in the infusion area as I did not get the results of her CMP until she had left our visit.  She states that she did take her diabetes medications as directed, she does not monitor her glucose at home regularly, she will start doing so and will follow-up with her PCP.  Her magnesium is normal, CEA is pending.  We will plan on repeating CT chest, abdomen and pelvis for restaging prior to return in 4 weeks and I have ordered that today.  Since we saw her last she did have FNA of left thyroid nodule with Dr. Armida Young and that was negative for malignant cells.  We will discontinue her PICC line after her treatment today. Addendum: CEA 2.45 up from 0.694 months ago.  CT DNA negative.    -3/1/2024 Chest abdomen pelvis with contrast compared to October CTs shows left lobe thyroid nodule previously noted with multinodular goiter.  Scarring apical portions of both lungs.  Slight thoracic esophageal thickening inflammation versus underdistention.   Mild hepatic steatosis.  Postsurgical sigmoid changes with haziness around the colon.  Right adnexal cyst no longer identified.  Thickening of the bladder wall possible cystitis.  L4-5, L5-S1 surgical changes.  Moderate stool burden    -3/8/2024 [facility] clinic follow-up: On arm 1B  has completed 4 adjuvant cycles of Xeloda oxaliplatin.  CT DNA is negative in February.  We will repeat CT DNA CEA CMP CBC mid May and CTs the end of May and I will see her back early June and in the meantime I have reached out to Dr. Calle to do follow-up colonoscopy.  Her PICC line is out.  Thyroid has been biopsied and is benign.  Souls of her feet peeled with the last cycle and that has resolved.  Cold sensitive neuropathy still comes and goes but otherwise no significant residual neuropathy.  Encouraged her to follow-up with primary care for management of her blood glucose.    -5/24/2024 CT DNA negative.  -5/29/2024 CT chest abdomen pelvis with contrast shows no evidence of recurrence in the chest abdomen or pelvis.  Multiple bilateral ovarian follicles/cysts versus multilocular cystic lesion in the left ovary measuring up to 4.6 cm both new from prior comparison.  CEA 0.94.  White count 12,470 otherwise unremarkable CBC and differential.  Potassium 3.4 otherwise unremarkable CMP.  Magnesium normal 2.1.      -6/3/2024 [facility] clinic follow-up: No new somatic complaints status post Xeloda oxaliplatin for adjuvant cycles on arm 1B of .  CT DNA remains negative and the CEA remains low and the CT shows no evidence of recurrent colon cancer.  She does however have multiple bilateral ovarian follicles/cysts versus cystic lesion in the left ovary measuring up to 4.6 cm.  I have asked her to get with Dr. Mcguire for evaluation and management of this.  This is an ongoing issue and she is having pain in that area and it may ultimately be best for her to just have this ovary removed.  He had an EGD and colonoscopy in  April with Dr. Calle.  She will get repeat CT DNA, CBC, CMP, CEA, and CTs per trial and NCCN guidelines mid August.  Next colonoscopy in 1 year.    -8/13/2024 CEA 1.14.  CBC and CMP unremarkable save for glucose 119.  Hemoglobin A1c 6.3.  CT DNA drawn by research staff.    -8/30/2024 CTChest abdomen pelvis shows new duodenal thickening of uncertain significance and otherwise negative CT chest abdomen pelvis.    -9/6/2024 [facility] oncology clinic follow-up: May CT DNA negative.  Current CT DNA drawn but pending.  CTs and CEA and other labs unremarkable.  With regards to the duodenal thickening on CT, she had EGD and colonoscopy in April which included normal second portion of the duodenum biopsy and gastritis.  Will put her through a repeat EGD.  Colonoscopy in April also negative for malignancy.  Follow-up in 3 months with CBC, CMP, CEA, CT DNA on clinical trial, and CTs.  Next colonoscopy in April 2025.    -12/6/2024 CT chest, abdomen and pelvis     Malignant neoplasm of sigmoid colon   8/29/2023 Initial Diagnosis    Malignant neoplasm of sigmoid colon     10/2/2023 Cancer Staged    Staging form: Colon And Rectum, AJCC 8th Edition  - Clinical: Stage IIIB (cT3, cN1, cM0) - Signed by Bernardino Chaparro MD on 10/2/2023     10/19/2023 Cancer Staged    Staging form: Colon And Rectum, AJCC 8th Edition  - Pathologic: Stage IIIB (pT3, pN1b, cM0) - Signed by Bernardino Chaparro MD on 10/19/2023     11/30/2023 - 11/30/2023 Chemotherapy    OP COLON CapeOX Capecitabine / OXALIplatin     12/4/2023 -  Chemotherapy    RESEARCH COLON  ARM 1B CAPOX (OXALIplatin / Capecitabine)         HISTORY OF PRESENT ILLNESS:  The patient is a 59 y.o. female, here for follow up on management of history of colon cancer. Yelena has been doing well since we saw her last with no new concerns.  She reports that she did have a few days with intermittent diarrhea, she states she was not sure if it was something she ate or she might of had some type of virus.  " Bowel movements now are back to normal.  She denies any abdominal pain, no nausea.  Her appetite is normal.  She has had no fevers or chills.    Past Medical History:   Diagnosis Date    Anxiety     Arthritis     Cant recall the date of onset    Chest pain 11/06/2019    Colon polyp     Depression     Diabetes mellitus     Dysphagia 11/06/2019    Endometriosis     Fibromyalgia, primary     GERD (gastroesophageal reflux disease)     Headache     HL (hearing loss)     Hyperlipidemia     Hypertension     Low vitamin D level     Malignant neoplasm of sigmoid colon 08/29/2023    Migraines     Ovarian cyst     PMDD (premenstrual dysphoric disorder)     Scoliosis     Wears glasses      Past Surgical History:   Procedure Laterality Date    BLADDER SURGERY      Bladder tuck    COLONOSCOPY      every 5 years    ENDOMETRIAL ABLATION      LUMBAR DISCECTOMY  2010    L4-S1 Rt- discectomy Dr. Arellano    SPINAL CORD STIMULATOR IMPLANT N/A 08/19/2019    Procedure: SPINAL CORD STIMULATOR INSERTION;  Surgeon: Ethan Peters MD;  Location: [facility];  Service: Neurosurgery    SPINE SURGERY  2010    TONSILLECTOMY AND ADENOIDECTOMY         Allergies   Allergen Reactions    Dihydroergotamine GI Intolerance     VOMITING    Methergine [Methylergonovine Maleate] Nausea And Vomiting    Amoxicillin Rash       Family History and Social History reviewed and changed as necessary    REVIEW OF SYSTEM:   No new somatic concerns    PHYSICAL EXAM:  Well-developed, well-nourished healthy appearing female in no distress  Respirations regular nonlabored, lungs clear  Abdomen soft, nondistended    Vitals:    12/10/24 1138   BP: 127/73   Pulse: 90   Resp: 16   Temp: 98.3 °F (36.8 °C)   TempSrc: Temporal   SpO2: 97%   Weight: 77.6 kg (171 lb)   Height: 162.6 cm (64\")     Vitals:    12/10/24 1138   PainSc: 0-No pain          ECOG score: 0           Vitals reviewed.  Labs reviewed including negative CT DNA also reviewed findings from from 12/6/2024 that " were negative.    ECOG: (0) Fully Active - Able to Carry On All Pre-disease Performance Without Restriction    Lab Results   Component Value Date    HGB 13.9 12/10/2024    HCT 41.4 12/10/2024    MCV 88.1 12/10/2024     12/10/2024    WBC 8.26 12/10/2024    NEUTROABS 5.69 12/10/2024    LYMPHSABS 1.93 12/10/2024    MONOSABS 0.46 12/10/2024    EOSABS 0.11 12/10/2024    BASOSABS 0.03 12/10/2024       Lab Results   Component Value Date    GLUCOSE 124 (H) 12/10/2024    BUN 11 12/10/2024    CREATININE 0.98 12/10/2024     12/10/2024    K 3.6 12/10/2024     12/10/2024    CO2 24.0 12/10/2024    CALCIUM 9.4 12/10/2024    PROTEINTOT 7.4 12/10/2024    ALBUMIN 4.2 12/10/2024    BILITOT 0.3 12/10/2024    ALKPHOS 94 12/10/2024    AST 23 12/10/2024    ALT 38 (H) 12/10/2024     Lab Results   Component Value Date    CEA 1.14 08/13/2024    CEA 0.94 05/29/2024    CEA 2.45 02/09/2024    CEA 0.69 10/19/2023     CT Chest With Contrast Diagnostic    Result Date: 12/10/2024  Impression: 1.No evidence of recurrent or metastatic disease in the chest, abdomen, or pelvis. No acute findings. Electronically Signed: Tom Wang MD  12/10/2024 12:44 PM EST  Workstation ID: [id number]    CT Abdomen Pelvis With Contrast    Result Date: 12/10/2024  Impression: 1.No evidence of recurrent or metastatic disease in the chest, abdomen, or pelvis. No acute findings. Electronically Signed: Tom Wang MD  12/10/2024 12:44 PM EST  Workstation ID: [id number]           ASSESSMENT & PLAN:    1.  History of stage IIIb colon cancer:  Yelena is doing well, she has no evidence of recurrent colon cancer on clinical exam and no new worrisome symptoms.  Current CT abdomen and pelvis from 12/6/2024 were negative, CT DNA is negative.  CBC and CMP are unremarkable, CEA is pending from today.  We will continue surveillance with repeat serum testing in 3 months with CBC, CMP and CEA.  She continues on research protocol  arm 1B clinical trial and will be  due for repeat CT DNA, CEA, CBC, CMP and CT chest, abdomen and pelvis in 6 months.  She will be due for repeat colonoscopy in April 2025.      I spent 31 minutes caring for Yelena on this date of service. This time includes time spent by me in the following activities: preparing for the visit, reviewing tests, performing a medically appropriate examination and/or evaluation, ordering medications, tests, or procedures, referring and communicating with other health care professionals, documenting information in the medical record, independently interpreting results and communicating that information with the patient/family/caregiver, and discussed with research coordinator and Dr. Chaparro at time of visit .     Dianna Boo, APRN    12/10/2024

## 2024-12-10 NOTE — RESEARCH
Patient Name:  Yelena Morris  YOB: 1965  Patient Age:  59 y.o.  Patient's Sex:  female    Date of Service:  12/10/2024    Provider: Dianna Boo                     RESEARCH NOTE                  Mrs. Morris was presented in clinic today for 12 mo follow up. Blood for ctDNA was collected and shipped out same day. Will expect to have these results in approximately 3 weeks.      Patient RTC in 6 months for 18 month follow up with CEA and CT scans.      Liv ARENAS

## 2024-12-13 DIAGNOSIS — E11.9 TYPE 2 DIABETES MELLITUS WITHOUT COMPLICATION, WITHOUT LONG-TERM CURRENT USE OF INSULIN: ICD-10-CM

## 2024-12-14 RX ORDER — GLIMEPIRIDE 2 MG/1
2 TABLET ORAL 2 TIMES DAILY
Qty: 180 TABLET | Refills: 3 | Status: SHIPPED | OUTPATIENT
Start: 2024-12-14

## 2024-12-18 ENCOUNTER — OFFICE VISIT (OUTPATIENT)
Dept: INTERNAL MEDICINE | Facility: CLINIC | Age: 59
End: 2024-12-18
Payer: COMMERCIAL

## 2024-12-18 VITALS
HEART RATE: 90 BPM | DIASTOLIC BLOOD PRESSURE: 78 MMHG | BODY MASS INDEX: 29.74 KG/M2 | OXYGEN SATURATION: 96 % | WEIGHT: 174.2 LBS | SYSTOLIC BLOOD PRESSURE: 116 MMHG | HEIGHT: 64 IN

## 2024-12-18 DIAGNOSIS — I10 PRIMARY HYPERTENSION: ICD-10-CM

## 2024-12-18 DIAGNOSIS — Z12.31 ENCOUNTER FOR SCREENING MAMMOGRAM FOR MALIGNANT NEOPLASM OF BREAST: ICD-10-CM

## 2024-12-18 DIAGNOSIS — E78.2 MIXED HYPERLIPIDEMIA: ICD-10-CM

## 2024-12-18 DIAGNOSIS — Z23 NEED FOR VACCINATION: ICD-10-CM

## 2024-12-18 DIAGNOSIS — E11.65 TYPE 2 DIABETES MELLITUS WITH HYPERGLYCEMIA, WITHOUT LONG-TERM CURRENT USE OF INSULIN: Primary | ICD-10-CM

## 2024-12-18 DIAGNOSIS — K21.9 GASTROESOPHAGEAL REFLUX DISEASE WITHOUT ESOPHAGITIS: ICD-10-CM

## 2024-12-18 LAB
EXPIRATION DATE: ABNORMAL
HBA1C MFR BLD: 7.9 % (ref 4.5–5.7)
Lab: ABNORMAL

## 2024-12-18 PROCEDURE — 90480 ADMN SARSCOV2 VAC 1/ONLY CMP: CPT | Performed by: INTERNAL MEDICINE

## 2024-12-18 PROCEDURE — 91320 SARSCV2 VAC 30MCG TRS-SUC IM: CPT | Performed by: INTERNAL MEDICINE

## 2024-12-18 PROCEDURE — 99214 OFFICE O/P EST MOD 30 MIN: CPT | Performed by: INTERNAL MEDICINE

## 2024-12-18 PROCEDURE — 83036 HEMOGLOBIN GLYCOSYLATED A1C: CPT | Performed by: INTERNAL MEDICINE

## 2024-12-18 RX ORDER — PIOGLITAZONE 30 MG/1
30 TABLET ORAL DAILY
Qty: 90 TABLET | Refills: 3 | Status: SHIPPED | OUTPATIENT
Start: 2024-12-18

## 2024-12-18 RX ORDER — HYDROCHLOROTHIAZIDE 12.5 MG/1
12.5 TABLET ORAL DAILY
Qty: 90 TABLET | Refills: 3 | Status: SHIPPED | OUTPATIENT
Start: 2024-12-18

## 2024-12-18 RX ORDER — OMEPRAZOLE 40 MG/1
40 CAPSULE, DELAYED RELEASE ORAL DAILY
Qty: 90 CAPSULE | Refills: 3 | Status: SHIPPED | OUTPATIENT
Start: 2024-12-18

## 2024-12-18 NOTE — PROGRESS NOTES
Immunization  Immunization performed in left deltoid by Erica Ivan MA. Patient tolerated the procedure well without complications.  12/18/24   Erica Ivan MA

## 2024-12-18 NOTE — PROGRESS NOTES
Internal Medicine Follow Up    Chief Complaint  Yelena Morris is a 59 y.o. female who presents today for follow up of chronic medical conditions outlined below.    Chief Complaint   Patient presents with    Diabetes        HPI  Ms. Morris comes in today for follow up on diabetes. She reports missing doses of amaryl and actos for the last month. Doing well with ozempic, no side effects. BP controlled. Recently saw oncology with clear scans. She denies acute concerns.         Review of Systems  Review of Systems   Constitutional: Negative.    Respiratory: Negative.     Cardiovascular: Negative.    Gastrointestinal: Negative.         Current Medications  Current Outpatient Medications on File Prior to Visit   Medication Sig Dispense Refill    ARIPiprazole (ABILIFY) 5 MG tablet Take 1 tablet by mouth Daily. 90 tablet 0    atorvastatin (LIPITOR) 40 MG tablet Take 1 tablet by mouth Every Evening. 90 tablet 3    BOTOX 200 units reconstituted solution INJECT 200 UNITS INTO THE HEAD OR NECK AREA BY MD EVERY 90 DAYS  3    cyclobenzaprine (FLEXERIL) 10 MG tablet Take 1 tablet by mouth At Night As Needed for Muscle Spasms. 30 tablet 1    DULoxetine (CYMBALTA) 60 MG capsule Take 1 capsule by mouth Daily. 90 capsule 0    eletriptan (RELPAX) 40 MG tablet TAKE 1 TABLET BY MOUTH AT THE ONSET OF MIGRAINE. MAY REPEAT IN 2 HRS.MAX 2 DOSE/24HR,3 DAYS/WEEK  11    FLUoxetine (PROzac) 20 MG capsule Take 1 capsule by mouth Daily for 90 days. 90 capsule 0    glimepiride (AMARYL) 2 MG tablet TAKE 1 TABLET BY MOUTH TWICE A  tablet 3    hydroCHLOROthiazide 12.5 MG tablet TAKE 1 TABLET BY MOUTH EVERY DAY 90 tablet 0    hydrOXYzine (ATARAX) 25 MG tablet Take 1-2 tablets by mouth At Night As Needed for Anxiety (sleep). 180 tablet 1    nebivolol (Bystolic) 10 MG tablet Take 1 tablet by mouth Daily. 90 tablet 3    nortriptyline (PAMELOR) 10 MG capsule nortriptyline 10 mg capsule   TAKE 4 CAPSULES AT BEDTIME      Nutritional Supplements  "(DHEA PO) Take  by mouth.      omeprazole (priLOSEC) 40 MG capsule Take 1 capsule by mouth Daily. Pt needs appointment 90 capsule 0    pioglitazone (Actos) 30 MG tablet Take 1 tablet by mouth Daily. 90 tablet 1    propranolol (INDERAL) 10 MG tablet TAKE 1 TABLET BY MOUTH 2 (TWO) TIMES A DAY AS NEEDED (PANIC/ANXIETY). 180 tablet 0    Semaglutide, 1 MG/DOSE, (OZEMPIC) 4 MG/3ML solution pen-injector Inject 1 mg under the skin into the appropriate area as directed 1 (One) Time Per Week. 9 mL 1    Topiramate ER (Trokendi XR) 200 MG capsule sustained-release 24 hr Daily.       No current facility-administered medications on file prior to visit.       Allergies  Allergies   Allergen Reactions    Dihydroergotamine GI Intolerance     VOMITING    Methergine [Methylergonovine Maleate] Nausea And Vomiting    Amoxicillin Rash       Objective  Visit Vitals  /78   Pulse 90   Ht 162.6 cm (64.02\")   Wt 79 kg (174 lb 3.2 oz)   SpO2 96%   BMI 29.89 kg/m²        Physical Exam  Physical Exam  Vitals and nursing note reviewed.   Constitutional:       General: She is not in acute distress.     Appearance: She is well-developed. She is not ill-appearing or toxic-appearing.   HENT:      Head: Normocephalic and atraumatic.   Eyes:      Conjunctiva/sclera: Conjunctivae normal.   Cardiovascular:      Rate and Rhythm: Normal rate and regular rhythm.      Heart sounds: Normal heart sounds. No murmur heard.  Pulmonary:      Effort: Pulmonary effort is normal. No respiratory distress.   Skin:     General: Skin is warm and dry.   Neurological:      Mental Status: She is alert and oriented to person, place, and time. Mental status is at baseline.      Gait: Gait normal.   Psychiatric:         Mood and Affect: Mood normal.         Behavior: Behavior normal.         Results  Results for orders placed or performed in visit on 12/18/24   POC Glycosylated Hemoglobin (Hb A1C)    Collection Time: 12/18/24  9:41 AM    Specimen: Blood   Result Value " Ref Range    Hemoglobin A1C 7.9 (A) 4.5 - 5.7 %    Lot Number [id number]     Expiration Date 09/16/2026      *Note: Due to a large number of results and/or encounters for the requested time period, some results have not been displayed. A complete set of results can be found in Results Review.        Assessment and Plan  Diagnoses and all orders for this visit:    Type 2 diabetes mellitus with hyperglycemia, without long-term current use of insulin  - A1c up to 7.9 today due to medication noncompliance, when compliant with all medications was <7.  - Continue ozempic 1mg weekly, glimepiride 2mg BID and actos 30mg daily.   - notably intolerant of jardiance and januvia, off metformin due to absorption concerns   - Urine microalbumin nl 5/2024    Primary hypertension  - BP controlled  - Continue nebivolol 10mg daily and HCTZ 12.5mg daily     - recent CMP reviewed    Mixed hyperlipidemia  - continue atorvastatin 40mg daily and obtain lipids with next labs at oncology    Gastroesophageal reflux disease without esophagitis  - EGD 4/2024 normal  - stable, continue omeprazole 40mg daily    Encounter for screening mammogram for malignant neoplasm of breast  -     Mammo Screening Digital Tomosynthesis Bilateral With CAD; Future     Health Maintenance  - Pap smear: GYN Dr. Mcguire, unable to get records  - Mammogram: 7/2023 BIRADS 1, ordered  - Colonoscopy: 4/2024, next 3y  - HCV: negative  - Immunizations: COVID today. Flu UTD. Shingrix complete. PCV20 UTD. Tdap 10/2019.  - Depression screening: negative 4/2024    Return in about 3 months (around 3/18/2025) for Follow up DM, annual in 8/2025 (any open 30 minute slot).

## 2025-01-02 ENCOUNTER — TELEMEDICINE (OUTPATIENT)
Dept: PSYCHIATRY | Facility: CLINIC | Age: 60
End: 2025-01-02
Payer: COMMERCIAL

## 2025-01-02 DIAGNOSIS — F33.41 RECURRENT MAJOR DEPRESSIVE DISORDER, IN PARTIAL REMISSION: Primary | Chronic | ICD-10-CM

## 2025-01-02 DIAGNOSIS — F32.81 PMDD (PREMENSTRUAL DYSPHORIC DISORDER): Chronic | ICD-10-CM

## 2025-01-02 DIAGNOSIS — G47.9 SLEEP DIFFICULTIES: ICD-10-CM

## 2025-01-02 DIAGNOSIS — F41.1 GENERALIZED ANXIETY DISORDER: Chronic | ICD-10-CM

## 2025-01-02 RX ORDER — HYDROXYZINE HYDROCHLORIDE 25 MG/1
25-50 TABLET, FILM COATED ORAL NIGHTLY PRN
Qty: 180 TABLET | Refills: 1 | Status: SHIPPED | OUTPATIENT
Start: 2025-01-02

## 2025-01-02 RX ORDER — ARIPIPRAZOLE 5 MG/1
5 TABLET ORAL DAILY
Qty: 90 TABLET | Refills: 0 | Status: SHIPPED | OUTPATIENT
Start: 2025-01-02

## 2025-01-02 RX ORDER — DULOXETIN HYDROCHLORIDE 60 MG/1
60 CAPSULE, DELAYED RELEASE ORAL DAILY
Qty: 90 CAPSULE | Refills: 0 | Status: SHIPPED | OUTPATIENT
Start: 2025-01-02

## 2025-01-02 NOTE — PROGRESS NOTES
This provider is located at [facility], [address] [place] [address].The Patient is seen remotely at home, [address] [place] [address] via [employer]. Patient is being seen via telehealth and confirm that they are in a secure environment for this session. The patient's condition being diagnosed/treated is appropriate for telemedicine. The provider identified himself/herself: herself as well as her credentials.   The patient gave consent to be seen remotely, and when consent is given they understand that the consent allows for patient identifiable information to be sent to a third party as needed.   They may refuse to be seen remotely at any time. The electronic data is encrypted and password protected, and the patient has been advised of the potential risks to privacy not withstanding such measures.    You have chosen to receive care through a telehealth visit.  Do you consent to use a video/audio connection for your medical care today? Yes      Chief Complaint  Follow-up for depression and anxiety     Subjective    Yelena Radha Morris presents to [facility] for medication management.       History of Present Illness   Patient presents today reporting that she has been doing okay. She states work is going well just busy lately.  She denies any depressive symptoms or feeling hopeless or helpless.  Patient states however she has not been sleeping well as she states she is waking up at 3 AM and then it is hard to go back to sleep.  She notes that hydroxyzine was helpful in the past but she just needs a refill as she has forgot about it.  Patient states she has been slightly anxious lately due to her granddaughter having seizures and now mentally behind as they are trying to figure out what is wrong with her as she has 9 months old so that has been stressful for patient.  Patient notes otherwise anxiety has been manageable.  Patient reports  appetite has been fine.  Patient states other than sleep things have been going very well.  Denies any side effects except for occasional dry mouth.  Denies any SI/HI/AH/VH.      Objective   Vital Signs:   There were no vitals taken for this visit.  Due to the remote nature of this encounter (virtual encounter), vitals were unable to be obtained.  Height stated at 64 inches.  Weight stated at 165 pounds.      PHQ-9 Depression Screening  Little interest or pleasure in doing things? (Patient-Rptd) Not at all   Feeling down, depressed, or hopeless?     PHQ-2 Total Score     Trouble falling or staying asleep, or sleeping too much? (Patient-Rptd) Several days   Feeling tired or having little energy?     Poor appetite or overeating? (Patient-Rptd) Not at all   Feeling bad about yourself - or that you are a failure or have let yourself or your family down? (Patient-Rptd) Not at all   Trouble concentrating on things, such as reading the newspaper or watching television? (Patient-Rptd) Not at all   Moving or speaking so slowly that other people could have noticed? Or the opposite - being so fidgety or restless that you have been moving around a lot more than usual? (Patient-Rptd) Not at all   Thoughts that you would be better off dead, or of hurting yourself in some way? (Patient-Rptd) Not at all   PHQ-9 Total Score     If you checked off any problems, how difficult have these problems made it for you to do your work, take care of things at home, or get along with other people? (Patient-Rptd) Not difficult at all     PHQ-9 Total Score:        Mental Status Exam:   Hygiene:   good  Cooperation:  Cooperative  Eye Contact:  Good  Psychomotor Behavior:  Appropriate  Affect:  Appropriate  Mood: normal and anxious  Speech:  Normal  Thought Process:  Goal directed and Linear  Thought Content:  Normal  Suicidal:  None  Homicidal:  None  Hallucinations:  None  Delusion:  None  Memory:  Intact  Orientation:  Person, Place, Time and  Situation  Reliability:  good  Insight:  Good  Judgement:  Good  Impulse Control:  Good  Physical/Medical Issues:  Yes HTN, DM, fibromyalgia, carpal tunnel syndrome      Current Medications:   Current Outpatient Medications   Medication Sig Dispense Refill    ARIPiprazole (ABILIFY) 5 MG tablet Take 1 tablet by mouth Daily. 90 tablet 0    DULoxetine (CYMBALTA) 60 MG capsule Take 1 capsule by mouth Daily. 90 capsule 0    FLUoxetine (PROzac) 20 MG capsule Take 1 capsule by mouth Daily for 90 days. 90 capsule 0    hydrOXYzine (ATARAX) 25 MG tablet Take 1-2 tablets by mouth At Night As Needed for Anxiety (sleep). 180 tablet 1    atorvastatin (LIPITOR) 40 MG tablet Take 1 tablet by mouth Every Evening. 90 tablet 3    BOTOX 200 units reconstituted solution INJECT 200 UNITS INTO THE HEAD OR NECK AREA BY MD EVERY 90 DAYS  3    cyclobenzaprine (FLEXERIL) 10 MG tablet Take 1 tablet by mouth At Night As Needed for Muscle Spasms. 30 tablet 1    eletriptan (RELPAX) 40 MG tablet TAKE 1 TABLET BY MOUTH AT THE ONSET OF MIGRAINE. MAY REPEAT IN 2 HRS.MAX 2 DOSE/24HR,3 DAYS/WEEK  11    glimepiride (AMARYL) 2 MG tablet TAKE 1 TABLET BY MOUTH TWICE A  tablet 3    hydroCHLOROthiazide 12.5 MG tablet Take 1 tablet by mouth Daily. 90 tablet 3    nebivolol (Bystolic) 10 MG tablet Take 1 tablet by mouth Daily. 90 tablet 3    nortriptyline (PAMELOR) 10 MG capsule nortriptyline 10 mg capsule   TAKE 4 CAPSULES AT BEDTIME      Nutritional Supplements (DHEA PO) Take  by mouth.      omeprazole (priLOSEC) 40 MG capsule Take 1 capsule by mouth Daily. Pt needs appointment 90 capsule 3    pioglitazone (Actos) 30 MG tablet Take 1 tablet by mouth Daily. 90 tablet 3    propranolol (INDERAL) 10 MG tablet TAKE 1 TABLET BY MOUTH 2 (TWO) TIMES A DAY AS NEEDED (PANIC/ANXIETY). 180 tablet 0    Semaglutide, 1 MG/DOSE, (OZEMPIC) 4 MG/3ML solution pen-injector Inject 1 mg under the skin into the appropriate area as directed 1 (One) Time Per Week. 9 mL 3     Topiramate ER (Trokendi XR) 200 MG capsule sustained-release 24 hr Daily.       No current facility-administered medications for this visit.       Physical Exam  Nursing note reviewed.   Constitutional:       Appearance: Normal appearance.   Neurological:      Mental Status: She is alert.   Psychiatric:         Attention and Perception: Attention and perception normal.         Mood and Affect: Affect normal. Mood is anxious. Mood is not depressed.         Speech: Speech normal.         Behavior: Behavior normal. Behavior is not agitated. Behavior is cooperative.         Thought Content: Thought content normal.         Cognition and Memory: Cognition and memory normal.         Judgment: Judgment normal.        Result Review :     The following data was reviewed by: ONESIMO Alexander on 02/01/2021:  Common labs          8/13/2024    10:52 12/10/2024    11:33 12/18/2024    09:41   Common Labs   Glucose 119  124     BUN 10  11     Creatinine 0.84  0.98     Sodium 136  138     Potassium 3.8  3.6     Chloride 101  101     Calcium 9.8  9.4     Albumin 4.3  4.2     Total Bilirubin 0.2  0.3     Alkaline Phosphatase 81  94     AST (SGOT) 15  23     ALT (SGPT) 21  38     WBC 7.63  8.26     Hemoglobin 13.9  13.9     Hematocrit 42.2  41.4     Platelets 281  252     Hemoglobin A1C 6.30   7.9    Data reviewed: PCP notes         Assessment and Plan    Problem List Items Addressed This Visit       Recurrent major depressive disorder - Primary    Relevant Medications    hydrOXYzine (ATARAX) 25 MG tablet    FLUoxetine (PROzac) 20 MG capsule    ARIPiprazole (ABILIFY) 5 MG tablet    DULoxetine (CYMBALTA) 60 MG capsule     Other Visit Diagnoses       Generalized anxiety disorder  (Chronic)       Relevant Medications    hydrOXYzine (ATARAX) 25 MG tablet    FLUoxetine (PROzac) 20 MG capsule    ARIPiprazole (ABILIFY) 5 MG tablet    DULoxetine (CYMBALTA) 60 MG capsule    Sleep difficulties        Relevant Medications    hydrOXYzine  (ATARAX) 25 MG tablet    PMDD (premenstrual dysphoric disorder)  (Chronic)       Relevant Medications    hydrOXYzine (ATARAX) 25 MG tablet    FLUoxetine (PROzac) 20 MG capsule    ARIPiprazole (ABILIFY) 5 MG tablet    DULoxetine (CYMBALTA) 60 MG capsule                    TREATMENT PLAN/GOALS: Continue supportive psychotherapy efforts and medications as indicated. Treatment and medication options discussed during today's visit. Patient ackowledged and verbally consented to continue with current treatment plan and was educated on the importance of compliance with treatment and follow-up appointments.    MEDICATION ISSUES:  We discussed risks, benefits, and side effects of the above medications and the patient was agreeable with the plan. Patient was educated on the importance of compliance with treatment and follow-up appointments.  Patient is agreeable to call the office with any worsening of symptoms or onset of side effects. Patient is agreeable to call 911 or go to the nearest ER should he/she begin having SI/HI.        -Continue Cymbalta 60 mg daily for depression and anxiety as patient has benefited greatly as well for her pain.  -Continue fluoxetine 20 mg daily for depression and PMDD, as patient has been without for several months and noticed more anxiety and irritability.  Also encouraged patient that she may need to look at work as a part-time due to her and her 's health issues.  -Patient educated extensively regarding the risk of serotonin syndrome as she is currently on duloxetine due to her pain as we have previously tried to taper and it is not effective so she is on fluoxetine 20 mg daily to help with her overall depression and mood.  Patient verbalized understanding was agreeable.  -Continue aripiprazole 5 mg as adjunct for depression as patient has failed numerous SSRIs and SNRIs and has benefited the patient tremendously and is medically necessary for her depression to avoid hospitalization.    -Continue propranolol 10 mg twice daily as needed for anxiety and panic.  Encouraged patient if it decreased her blood pressure or heart rate to contact the clinic and discontinue.  -Continue hydroxyzine 25 to 50 mg at night as needed for sleep.  Encouraged patient to use for sleep but if ineffective can try over-the-counter magnesium glycinate.  If that is ineffective to contact clinic as we could look at doing a low dose of trazodone patient verbalized understanding.      Counseled patient regarding multimodal approach with healthy nutrition, healthy sleep, regular physical activity, social activities, counseling, and medications.      Coping skills reviewed and encouraged positive framing of thoughts     Assisted patient in processing above session content; acknowledged and normalized patient’s thoughts, feelings, and concerns.  Applied  positive coping skills and behavior management in session.  Allowed patient to freely discuss issues without interruption or judgment. Provided safe, confidential environment to facilitate the development of positive therapeutic relationship and encourage open, honest communication. Assisted patient in identifying risk factors which would indicate the need for higher level of care including thoughts to harm self or others and/or self-harming behavior and encouraged patient to contact this office, call 911, or present to the nearest emergency room should any of these events occur. Discussed crisis intervention services and means to access.     MEDS ORDERED DURING VISIT:  New Medications Ordered This Visit   Medications    hydrOXYzine (ATARAX) 25 MG tablet     Sig: Take 1-2 tablets by mouth At Night As Needed for Anxiety (sleep).     Dispense:  180 tablet     Refill:  1    FLUoxetine (PROzac) 20 MG capsule     Sig: Take 1 capsule by mouth Daily for 90 days.     Dispense:  90 capsule     Refill:  0    ARIPiprazole (ABILIFY) 5 MG tablet     Sig: Take 1 tablet by mouth Daily.      Dispense:  90 tablet     Refill:  0    DULoxetine (CYMBALTA) 60 MG capsule     Sig: Take 1 capsule by mouth Daily.     Dispense:  90 capsule     Refill:  0         Follow Up   Return in about 8 weeks (around 2/27/2025), or if symptoms worsen or fail to improve, for Recheck.    Patient was given instructions and counseling regarding her condition or for health maintenance advice. Please see specific information pulled into the AVS if appropriate.     Some of the data in this electronic note has been brought forward from a previous encounter, any necessary changes have been made, it has been reviewed by this APRN, and it is accurate.      This document has been electronically signed by ONESIMO Alexander  January 2, 2025 09:53 EST    Part of this note may be an electronic transcription/translation of spoken language to printed text using the Dragon Dictation System.

## 2025-02-06 ENCOUNTER — OFFICE VISIT (OUTPATIENT)
Dept: ENDOCRINOLOGY | Facility: CLINIC | Age: 60
End: 2025-02-06
Payer: COMMERCIAL

## 2025-02-06 VITALS
SYSTOLIC BLOOD PRESSURE: 120 MMHG | BODY MASS INDEX: 29.19 KG/M2 | WEIGHT: 171 LBS | DIASTOLIC BLOOD PRESSURE: 74 MMHG | HEIGHT: 64 IN

## 2025-02-06 DIAGNOSIS — E04.2 MULTIPLE THYROID NODULES: Primary | ICD-10-CM

## 2025-02-06 NOTE — PROGRESS NOTES
"Chief Complaint   Patient presents with    Thyroid Problem     Multiple thyroid nodules        HPI   Yelena Morris is a 59 y.o. female had concerns including Thyroid Problem (Multiple thyroid nodules).      Here for yearly thyroid ultrasound. No issues or concerns.     The following portions of the patient's history were reviewed and updated as appropriate: allergies, current medications, past family history, past medical history, past social history, past surgical history, and problem list.    Review of Systems   Constitutional: Negative.    Endocrine: Negative.         /74   Ht 162.6 cm (64\")   Wt 77.6 kg (171 lb)   BMI 29.35 kg/m²      Physical Exam  Vitals reviewed.   Constitutional:       Appearance: Normal appearance.   Cardiovascular:      Rate and Rhythm: Normal rate.   Pulmonary:      Effort: Pulmonary effort is normal.   Neurological:      General: No focal deficit present.      Mental Status: She is alert. Mental status is at baseline.   Psychiatric:         Mood and Affect: Mood normal.         Behavior: Behavior normal.              LABS AND IMAGING   CMP  Lab Results   Component Value Date    GLUCOSE 124 (H) 12/10/2024    BUN 11 12/10/2024    CREATININE 0.98 12/10/2024    EGFRIFNONA 88 09/15/2021    BCR 11.2 12/10/2024    K 3.6 12/10/2024    CO2 24.0 12/10/2024    CALCIUM 9.4 12/10/2024    ALBUMIN 4.2 12/10/2024    AST 23 12/10/2024    ALT 38 (H) 12/10/2024        CBC w/DIFF   Lab Results   Component Value Date    WBC 8.26 12/10/2024    RBC 4.70 12/10/2024    HGB 13.9 12/10/2024    HCT 41.4 12/10/2024    MCV 88.1 12/10/2024    MCH 29.6 12/10/2024    MCHC 33.6 12/10/2024    RDW 13.0 12/10/2024    RDWSD 42.4 12/10/2024    MPV 9.1 12/10/2024     12/10/2024    NEUTRORELPCT 68.8 12/10/2024    LYMPHORELPCT 23.4 12/10/2024    MONORELPCT 5.6 12/10/2024    EOSRELPCT 1.3 12/10/2024    BASORELPCT 0.4 12/10/2024    AUTOIGPER 0.5 12/10/2024    NEUTROABS 5.69 12/10/2024    LYMPHSABS 1.93 " 12/10/2024    MONOSABS 0.46 12/10/2024    EOSABS 0.11 12/10/2024    BASOSABS 0.03 12/10/2024    AUTOIGNUM 0.04 12/10/2024    NRBC 0.0 2024       TSH  Lab Results   Component Value Date    TSH 1.390 2024    TSH 1.370 10/03/2022    TSH 2.120 09/15/2021     [facility]TOLOGY, [facility] (DARREL,COR,MAD,ALLY)  Order: [id number]  Status: Final result     Visible to patient: Yes (seen)     Next appt: 2025 at 02:30 PM in Gastroenterology (Darin Calle MD)     Dx: Multiple thyroid nodules     Specimen Information: Thyroid; Tissue   1 Result Note      Component 1 yr ago   Reference [facility]   [address] [place] [address]   Phone: [phone] or [phone]   Fax: [phone]   Raza Vaz M.D., Medical Director     PATIENT NAME                                 LABORATORY NO.   KEVIN HUANG.                         [id number]   [id number]                                     AGE                SEX     SSN            CLIENT REF #   [facility] [place] [address]       1965   F       [id number]    [id number]   [address] [place] [address]                 REQUESTING M.D.       ATTENDING MNEETU.        [place]..   [place] [address]                            LYNN ANNE   DATE COLLECTED        DATE RECEIVED          DATE REPORTED   2024     DIAGNOSIS:   THYROID, FNA, LEFT:  Negative for malignant cells.     MICROSCOPIC DESCRIPTION:   Benign follicular nodule.  The specimen is scant, composed of a few groups of   bland appearing follicular cells with colloid material and variable amounts of   macrophages and Hurthle cells.  The differential diagnosis includes multinodular   goiter, an adenomatous nodule and a colloid nodule (TBS Class II).       Professional interpretation rendered by Winsome Smith D.O. at [facility], [address] [place] [address]  [id number].     CLINICAL HISTORY:   Multiple thyroid nodules   Left 2.5cm mixed solid and cystic thyroid nodule     SPECIMENS SUBMITTED:   THYROID, FNA, LEFT     GROSS SPECIMEN DESCRIPTION:   30ccs of clear pink fluid without visible sediment received in fixative   Automated paraffin embedded cell block has been examined.     REVIEWED, DIAGNOSED AND ELECTRONICALLY   SIGNED BY:     Winsome Smith D.O.   CPT CODES:  84385, 60979        US THYROID     Date of Exam: 10/23/2023 9:42 AM EDT     Indication: thyroid nodules.     Comparison: Thyroid ultrasound 10/13/2022.     Technique: Grayscale and color and Doppler ultrasound imaging of the thyroid performed according to routine thyroid ultrasound protocol, real time with image documentation.     Findings:     The right thyroid lobe measures 3.9 x 1.4 x 1.4 cm. Nodules:  - 0.9 cm solid hyperechoic nodule, not significantly changed (TI-RADS 3; no specific follow-up by size).  - 0.9 cm solid isoechoic nodule, not significantly changed (TI-RADS 3; no specific follow-up by size).  - 0.5 cm solid hypoechoic nodule, not significantly changed (TI-RADS 4; no specific follow-up by size).     The left thyroid lobe measures 4.9 x 1.6 x 1.6 cm. Nodules:  - 2.3 cm mixed cystic and solid nodule, previously 1.9 cm (TI-RADS 3; below FNA criteria by size, follow-up recommended).  - 0.7 cm solid hypoechoic nodule, not significantly changed (TI-RADS 4; no specific follow-up by size).     The isthmus measures 0.2 cm. No discrete nodules.      IMPRESSION:  Impression:     Slightly increased size of a left-sided 2.3 cm TI-RADS 3 nodule, for which annual follow-up is recommended.      No significant change in the additional thyroid nodules, all of which are subcentimeter.     Electronically Signed: Star Vallejo MD    10/23/2023 12:46 PM EDT         Thyroid Ultrasound 02/06/25    Indication: Multiple thyroid nodules  Comparison Imaging: Ultrasound 2020, 2021, 2022, 2023  Clinical History:  Bilateral thyroid nodules, normal thyroid function, left lobe 2.5 cm thyroid nodule FNA February 2024-benign    Real time high resolution imaging of the thyroid gland was performed in transverse and longitudinal planes. Previous imaging reports were reviewed if available and compared to the current to assess stability.     Lobes:  The right lobe measured 3.8 cm L x 1.5 cm AP x 1.4 cm in TV dimension.    The isthmus measured 0.2 cm in thickness.    The left thyroid lobe measured 4.8 cm L x 1.9 cm AP x 1.5 cm in TV dimension.    Thyroid gland is heterogeneous and contains multiple nodules.    Nodule 1   located in the right mid lobe and measures 1.3 x 0.9 x 1.1 cm (L X AP X TV).  This nodule is mixed solid and cystic, heterogeneous, hypoechoic with well-defined margins, and Grade I vascularity on Color Flow Doppler.  It has no artifacts.  This nodule has more cystic accumulation and is increased in size from the prior ultrasound.  No suspicious features.    Nodule 2  is located in the right mid anterior lobe and measures 0.5 x 0.4 x 0.6 cm (L X AP X TV).  This nodule is solid, homogeneous, hypoechoic with well-defined margins, and Grade I vascularity on Color Flow Doppler.  It has no artifacts.  Stable.    Nodule 3  Located in the right mid anterior lobe and measures 0.5 x 0.3 x 0.4 cm (L X AP X TV).  This nodule is complex cystic, heterogeneous, hypoechoic with posterior acoustic enhancement, well-defined margins, and Grade II vascularity on Color Flow Doppler.  It has no artifacts.  Stable.    Nodule 4  Located in the left mid lobe and measures 2.2 x 1.2 x 1.2 cm (L X AP X TV).  This nodule is complex solid/mixed solid and cystic, heterogeneous, hypoechoic with well-defined margins, and Grade I vascularity on Color Flow Doppler.  It has no artifacts.  Decreased slightly in size from the ultrasound last year when he measured 2.5 x 1.5 x 1.5 cm.  Benign FNA February 2024.    Nodule 5  Located in the left upper lobe  and measures 0.7 x 0.4 x 0.8 cm (L X AP X TV).  This nodule is solid, heterogeneous, hypoechoic with well-defined margins, and Grade II vascularity on Color Flow Doppler.  It has no artifacts.  Stable.     No pathologic lymph nodes were seen.    Impression:  Normal size thyroid.  Heterogeneous gland with multiple bilateral nodules.  Largest in the right lobe has more cystic accumulation and today measures 1.3 cm but with no suspicious features.  All of the right lobe nodules are subcentimeter.  The largest left lobe nodule is stable in appearance and slightly decreased in size, today measuring 2.2 cm.  Benign FNA February 2024.    Recommendation:  Repeat ultrasound in 1 year.      Assessment and Plan    Diagnoses and all orders for this visit:    1. Multiple thyroid nodules (Primary)  -     US Thyroid    Ultrasound was updated in the office today.  See detailed report as above.  Largest right lobe nodule has more cystic accumulation than the prior ultrasound measures 1.3 cm but with no suspicious features.  FNA is not warranted.    Left lobe nodule stable in appearance and slightly decreased in size to 2.2 cm, previously 2.5 cm.  This nodule was biopsied in February 2024 and found to be benign.  Thyroid function is normal.    Repeat ultrasound in 1 year.    Return in about 1 year (around 2/6/2026) for next scheduled follow up, with thyroid ultrasound ** 30 min appt. The patient was instructed to contact the clinic with any interval questions or concerns.    Electronically signed by: Armida Young DO   Endocrinologist    Please note that portions of this note were completed with a voice recognition program.

## 2025-02-14 ENCOUNTER — OFFICE VISIT (OUTPATIENT)
Dept: INTERNAL MEDICINE | Facility: CLINIC | Age: 60
End: 2025-02-14
Payer: COMMERCIAL

## 2025-02-14 ENCOUNTER — LAB (OUTPATIENT)
Dept: LAB | Facility: HOSPITAL | Age: 60
End: 2025-02-14
Payer: COMMERCIAL

## 2025-02-14 VITALS
WEIGHT: 173.2 LBS | SYSTOLIC BLOOD PRESSURE: 118 MMHG | BODY MASS INDEX: 29.57 KG/M2 | HEART RATE: 86 BPM | DIASTOLIC BLOOD PRESSURE: 80 MMHG | TEMPERATURE: 98.2 F | OXYGEN SATURATION: 97 % | HEIGHT: 64 IN

## 2025-02-14 DIAGNOSIS — C18.7 MALIGNANT NEOPLASM OF SIGMOID COLON: ICD-10-CM

## 2025-02-14 DIAGNOSIS — K52.9 CHRONIC DIARRHEA: ICD-10-CM

## 2025-02-14 DIAGNOSIS — K52.9 CHRONIC DIARRHEA: Primary | ICD-10-CM

## 2025-02-14 DIAGNOSIS — K30 INDIGESTION: ICD-10-CM

## 2025-02-14 DIAGNOSIS — E78.2 MIXED HYPERLIPIDEMIA: ICD-10-CM

## 2025-02-14 LAB
ALBUMIN SERPL-MCNC: 4 G/DL (ref 3.5–5.2)
ALBUMIN/GLOB SERPL: 1.4 G/DL
ALP SERPL-CCNC: 97 U/L (ref 39–117)
ALT SERPL W P-5'-P-CCNC: 35 U/L (ref 1–33)
ANION GAP SERPL CALCULATED.3IONS-SCNC: 11 MMOL/L (ref 5–15)
AST SERPL-CCNC: 23 U/L (ref 1–32)
BASOPHILS # BLD AUTO: 0.06 10*3/MM3 (ref 0–0.2)
BASOPHILS NFR BLD AUTO: 0.8 % (ref 0–1.5)
BILIRUB SERPL-MCNC: 0.3 MG/DL (ref 0–1.2)
BUN SERPL-MCNC: 15 MG/DL (ref 6–20)
BUN/CREAT SERPL: 18.8 (ref 7–25)
CALCIUM SPEC-SCNC: 9.6 MG/DL (ref 8.6–10.5)
CEA SERPL-MCNC: 0.81 NG/ML
CHLORIDE SERPL-SCNC: 106 MMOL/L (ref 98–107)
CO2 SERPL-SCNC: 23 MMOL/L (ref 22–29)
CREAT SERPL-MCNC: 0.8 MG/DL (ref 0.57–1)
DEPRECATED RDW RBC AUTO: 43.1 FL (ref 37–54)
EGFRCR SERPLBLD CKD-EPI 2021: 86.1 ML/MIN/1.73
EOSINOPHIL # BLD AUTO: 0.17 10*3/MM3 (ref 0–0.4)
EOSINOPHIL NFR BLD AUTO: 2.2 % (ref 0.3–6.2)
ERYTHROCYTE [DISTWIDTH] IN BLOOD BY AUTOMATED COUNT: 13.6 % (ref 12.3–15.4)
GLOBULIN UR ELPH-MCNC: 2.8 GM/DL
GLUCOSE SERPL-MCNC: 179 MG/DL (ref 65–99)
HCT VFR BLD AUTO: 40.4 % (ref 34–46.6)
HGB BLD-MCNC: 13.3 G/DL (ref 12–15.9)
IMM GRANULOCYTES # BLD AUTO: 0.05 10*3/MM3 (ref 0–0.05)
IMM GRANULOCYTES NFR BLD AUTO: 0.7 % (ref 0–0.5)
LYMPHOCYTES # BLD AUTO: 2.12 10*3/MM3 (ref 0.7–3.1)
LYMPHOCYTES NFR BLD AUTO: 27.7 % (ref 19.6–45.3)
MCH RBC QN AUTO: 28.7 PG (ref 26.6–33)
MCHC RBC AUTO-ENTMCNC: 32.9 G/DL (ref 31.5–35.7)
MCV RBC AUTO: 87.3 FL (ref 79–97)
MONOCYTES # BLD AUTO: 0.49 10*3/MM3 (ref 0.1–0.9)
MONOCYTES NFR BLD AUTO: 6.4 % (ref 5–12)
NEUTROPHILS NFR BLD AUTO: 4.77 10*3/MM3 (ref 1.7–7)
NEUTROPHILS NFR BLD AUTO: 62.2 % (ref 42.7–76)
NRBC BLD AUTO-RTO: 0 /100 WBC (ref 0–0.2)
PLATELET # BLD AUTO: 252 10*3/MM3 (ref 140–450)
PMV BLD AUTO: 10.2 FL (ref 6–12)
POTASSIUM SERPL-SCNC: 3.9 MMOL/L (ref 3.5–5.2)
PROT SERPL-MCNC: 6.8 G/DL (ref 6–8.5)
RBC # BLD AUTO: 4.63 10*6/MM3 (ref 3.77–5.28)
SODIUM SERPL-SCNC: 140 MMOL/L (ref 136–145)
WBC NRBC COR # BLD AUTO: 7.66 10*3/MM3 (ref 3.4–10.8)

## 2025-02-14 PROCEDURE — 36415 COLL VENOUS BLD VENIPUNCTURE: CPT

## 2025-02-14 PROCEDURE — 99214 OFFICE O/P EST MOD 30 MIN: CPT | Performed by: INTERNAL MEDICINE

## 2025-02-14 PROCEDURE — 80053 COMPREHEN METABOLIC PANEL: CPT

## 2025-02-14 PROCEDURE — 85025 COMPLETE CBC W/AUTO DIFF WBC: CPT

## 2025-02-14 PROCEDURE — 83013 H PYLORI (C-13) BREATH: CPT

## 2025-02-14 PROCEDURE — 82378 CARCINOEMBRYONIC ANTIGEN: CPT

## 2025-02-14 NOTE — PROGRESS NOTES
Internal Medicine Acute Visit    Chief Complaint   Patient presents with    Diarrhea     Sxs onset 3 weeks. Takes imodium and it stops and then it occurs again    Heartburn        HPI  Ms. Morris comes in today with nonbloody diarrhea and indigestion x3 weeks. No abdominal pain, vomiting, fever. She is on ozempic but does stable and no prior side effects. She can take imodium and will go a day or two without BM but then diarrhea returns. She otherwise feels well.    Diarrhea   Pertinent negatives include no abdominal pain, fever or vomiting.   Heartburn  She reports no abdominal pain.        Review of Systems  Review of Systems   Constitutional:  Negative for fever.   Gastrointestinal:  Positive for diarrhea and indigestion. Negative for abdominal pain, anal bleeding, blood in stool, constipation and vomiting.        Medications  Current Outpatient Medications on File Prior to Visit   Medication Sig Dispense Refill    ARIPiprazole (ABILIFY) 5 MG tablet Take 1 tablet by mouth Daily. 90 tablet 0    atorvastatin (LIPITOR) 40 MG tablet Take 1 tablet by mouth Every Evening. 90 tablet 3    benzonatate (TESSALON) 200 MG capsule Take 1 capsule by mouth 3 (Three) Times a Day As Needed for Cough. 21 capsule 0    BOTOX 200 units reconstituted solution INJECT 200 UNITS INTO THE HEAD OR NECK AREA BY MD EVERY 90 DAYS  3    cyclobenzaprine (FLEXERIL) 10 MG tablet Take 1 tablet by mouth At Night As Needed for Muscle Spasms. 30 tablet 1    DULoxetine (CYMBALTA) 60 MG capsule Take 1 capsule by mouth Daily. 90 capsule 0    eletriptan (RELPAX) 40 MG tablet TAKE 1 TABLET BY MOUTH AT THE ONSET OF MIGRAINE. MAY REPEAT IN 2 HRS.MAX 2 DOSE/24HR,3 DAYS/WEEK  11    FLUoxetine (PROzac) 20 MG capsule Take 1 capsule by mouth Daily for 90 days. 90 capsule 0    glimepiride (AMARYL) 2 MG tablet TAKE 1 TABLET BY MOUTH TWICE A  tablet 3    hydroCHLOROthiazide 12.5 MG tablet Take 1 tablet by mouth Daily. 90 tablet 3    hydrOXYzine (ATARAX) 25 MG  tablet Take 1-2 tablets by mouth At Night As Needed for Anxiety (sleep). 180 tablet 1    ipratropium (ATROVENT) 0.06 % nasal spray Administer 2 sprays into the nostril(s) as directed by provider 4 (Four) Times a Day. 15 mL 0    nebivolol (Bystolic) 10 MG tablet Take 1 tablet by mouth Daily. 90 tablet 3    nortriptyline (PAMELOR) 10 MG capsule nortriptyline 10 mg capsule   TAKE 4 CAPSULES AT BEDTIME      Nutritional Supplements (DHEA PO) Take  by mouth.      omeprazole (priLOSEC) 40 MG capsule Take 1 capsule by mouth Daily. Pt needs appointment 90 capsule 3    pioglitazone (Actos) 30 MG tablet Take 1 tablet by mouth Daily. 90 tablet 3    propranolol (INDERAL) 10 MG tablet TAKE 1 TABLET BY MOUTH 2 (TWO) TIMES A DAY AS NEEDED (PANIC/ANXIETY). 180 tablet 0    Semaglutide, 1 MG/DOSE, (OZEMPIC) 4 MG/3ML solution pen-injector Inject 1 mg under the skin into the appropriate area as directed 1 (One) Time Per Week. 9 mL 3    Topiramate ER (Trokendi XR) 200 MG capsule sustained-release 24 hr Daily.       No current facility-administered medications on file prior to visit.        Allergies  Allergies   Allergen Reactions    Dihydroergotamine GI Intolerance     VOMITING    Methergine [Methylergonovine Maleate] Nausea And Vomiting    Amoxicillin Rash       PMH  Past Medical History:   Diagnosis Date    Anxiety     Arthritis     Cant recall the date of onset    Chest pain 11/06/2019    Colon polyp     Depression     Diabetes mellitus     Dysphagia 11/06/2019    Endometriosis     Fibromyalgia, primary     GERD (gastroesophageal reflux disease)     Headache     HL (hearing loss)     Hyperlipidemia     Hypertension     Low vitamin D level     Malignant neoplasm of sigmoid colon 08/29/2023    Migraines     Ovarian cyst     PMDD (premenstrual dysphoric disorder)     Scoliosis     Thyroid nodule     Wears glasses        Objective  Visit Vitals  /80 (BP Location: Left arm, Patient Position: Sitting)   Pulse 86   Temp 98.2 °F (36.8  "°C) (Temporal)   Ht 162.6 cm (64\")   Wt 78.6 kg (173 lb 3.2 oz)   SpO2 97%   BMI 29.73 kg/m²        Physical Exam  Physical Exam  Vitals and nursing note reviewed.   Constitutional:       General: She is not in acute distress.     Appearance: She is well-developed. She is not ill-appearing or toxic-appearing.   HENT:      Head: Normocephalic and atraumatic.   Eyes:      General: No scleral icterus.     Conjunctiva/sclera: Conjunctivae normal.   Pulmonary:      Effort: Pulmonary effort is normal. No respiratory distress.   Abdominal:      General: Bowel sounds are decreased. There is no distension.      Palpations: Abdomen is soft. There is no mass.      Tenderness: There is no abdominal tenderness.   Skin:     General: Skin is warm and dry.      Coloration: Skin is not jaundiced.   Neurological:      Mental Status: She is alert and oriented to person, place, and time. Mental status is at baseline.      Gait: Gait normal.         Results  Results for orders placed or performed during the hospital encounter of 01/03/25   POC Rapid Strep A    Collection Time: 01/03/25  8:17 PM    Specimen: Swab   Result Value Ref Range    Rapid Strep A Screen Negative     Internal Control Passed     Lot Number [id number]     Expiration Date 01/03/2027    Covid-19 + Flu A&B AG, Veritor ([id number])    Collection Time: 01/03/25  8:18 PM    Specimen: Swab   Result Value Ref Range    SARS Antigen Not Detected Not Detected, Presumptive Negative    Influenza A Antigen JARETH Not Detected Not Detected    Influenza B Antigen JARETH Not Detected Not Detected    Internal Control Passed Passed    Lot Number [id number]     Expiration Date 11/27/2025         Assessment and Plan  Diagnoses and all orders for this visit:    Chronic diarrhea  Indigestion  - will check stool for cdiff and GI PCR. Works in healthcare.  - will check h pylori however on PPI so may get false negative  - CBC, CMP, TSH ordered  - hx of colorectal cancer with clear scans in December, " diarrhea began after this. She will be seeing GI later this month.  - has tolerated ozempic up until now however if workup negative this is still a consideration     Health Maintenance  - Pap smear: GYN Dr. Mcguire, unable to get records  - Mammogram: scheduled 3/2025  - Colonoscopy: 4/2024, next 3y  - HCV: negative  - Immunizations: COVID UTD. Flu UTD. Shingrix complete. PCV20 UTD. Tdap 10/2019.  - Depression screening: negative 4/2024    Return for Next scheduled follow up, Labs today.

## 2025-02-15 ENCOUNTER — LAB (OUTPATIENT)
Dept: LAB | Facility: HOSPITAL | Age: 60
End: 2025-02-15
Payer: COMMERCIAL

## 2025-02-15 DIAGNOSIS — K52.9 CHRONIC DIARRHEA: ICD-10-CM

## 2025-02-15 LAB
ADV 40+41 DNA STL QL NAA+NON-PROBE: NOT DETECTED
ASTRO TYP 1-8 RNA STL QL NAA+NON-PROBE: NOT DETECTED
C CAYETANENSIS DNA STL QL NAA+NON-PROBE: NOT DETECTED
C COLI+JEJ+UPSA DNA STL QL NAA+NON-PROBE: NOT DETECTED
C DIFF TOX GENS STL QL NAA+PROBE: NOT DETECTED
CRYPTOSP DNA STL QL NAA+NON-PROBE: NOT DETECTED
E HISTOLYT DNA STL QL NAA+NON-PROBE: NOT DETECTED
EAEC PAA PLAS AGGR+AATA ST NAA+NON-PRB: NOT DETECTED
EC STX1+STX2 GENES STL QL NAA+NON-PROBE: NOT DETECTED
EPEC EAE GENE STL QL NAA+NON-PROBE: NOT DETECTED
ETEC LTA+ST1A+ST1B TOX ST NAA+NON-PROBE: NOT DETECTED
G LAMBLIA DNA STL QL NAA+NON-PROBE: NOT DETECTED
NOROVIRUS GI+II RNA STL QL NAA+NON-PROBE: NOT DETECTED
P SHIGELLOIDES DNA STL QL NAA+NON-PROBE: NOT DETECTED
RVA RNA STL QL NAA+NON-PROBE: NOT DETECTED
S ENT+BONG DNA STL QL NAA+NON-PROBE: NOT DETECTED
SAPO I+II+IV+V RNA STL QL NAA+NON-PROBE: NOT DETECTED
SHIGELLA SP+EIEC IPAH ST NAA+NON-PROBE: NOT DETECTED
V CHOL+PARA+VUL DNA STL QL NAA+NON-PROBE: NOT DETECTED
V CHOLERAE DNA STL QL NAA+NON-PROBE: NOT DETECTED
Y ENTEROCOL DNA STL QL NAA+NON-PROBE: NOT DETECTED

## 2025-02-15 PROCEDURE — 87493 C DIFF AMPLIFIED PROBE: CPT

## 2025-02-15 PROCEDURE — 87507 IADNA-DNA/RNA PROBE TQ 12-25: CPT

## 2025-02-16 LAB — UREA BREATH TEST QL: POSITIVE

## 2025-02-17 ENCOUNTER — TELEPHONE (OUTPATIENT)
Dept: INTERNAL MEDICINE | Facility: CLINIC | Age: 60
End: 2025-02-17
Payer: COMMERCIAL

## 2025-02-17 DIAGNOSIS — E78.2 MIXED HYPERLIPIDEMIA: Primary | ICD-10-CM

## 2025-02-17 DIAGNOSIS — E04.1 THYROID NODULE: ICD-10-CM

## 2025-02-17 NOTE — TELEPHONE ENCOUNTER
----- Message from Fiona Corona sent at 2/17/2025  8:06 AM EST -----  Please contact patient. Lipid and TSH need recollected. Thanks.

## 2025-02-17 NOTE — TELEPHONE ENCOUNTER
LVM for pt to come in and repeat labs.    Informed pt that she does need to be fasting.    HUB TO RELAY.

## 2025-02-19 DIAGNOSIS — A04.8 H. PYLORI INFECTION: Primary | ICD-10-CM

## 2025-02-19 RX ORDER — METRONIDAZOLE 500 MG/1
500 TABLET ORAL 4 TIMES DAILY
Qty: 56 TABLET | Refills: 0 | Status: SHIPPED | OUTPATIENT
Start: 2025-02-19 | End: 2025-03-05

## 2025-02-19 RX ORDER — PANTOPRAZOLE SODIUM 40 MG/1
40 TABLET, DELAYED RELEASE ORAL
Qty: 28 TABLET | Refills: 0 | Status: CANCELLED | OUTPATIENT
Start: 2025-02-19 | End: 2025-03-05

## 2025-02-19 RX ORDER — OMEPRAZOLE 40 MG/1
40 CAPSULE, DELAYED RELEASE ORAL 2 TIMES DAILY
Qty: 28 CAPSULE | Refills: 0 | Status: SHIPPED | OUTPATIENT
Start: 2025-02-19 | End: 2025-03-05

## 2025-02-19 RX ORDER — TETRACYCLINE HYDROCHLORIDE 500 MG/1
500 CAPSULE ORAL 4 TIMES DAILY
Qty: 56 CAPSULE | Refills: 0 | Status: SHIPPED | OUTPATIENT
Start: 2025-02-19 | End: 2025-03-05

## 2025-02-20 ENCOUNTER — TELEPHONE (OUTPATIENT)
Dept: INTERNAL MEDICINE | Facility: CLINIC | Age: 60
End: 2025-02-20
Payer: COMMERCIAL

## 2025-02-20 NOTE — TELEPHONE ENCOUNTER
Spoke with patient and relayed results and recommendations  Patient understanding and appreciative    ----- Message from Fiona Corona sent at 2/19/2025  9:56 PM EST -----  Please call patient. Her liver and kidney function as well as blood counts are stable. Her h pylori testing is positive which likely explains her symptoms. I am prescribing 4 medications to be taken for 2 weeks. One of these medications is omeprazole which she already takes but I am sending a temporary supply so that she can start taking it twice daily for 2 weeks.

## 2025-02-24 ENCOUNTER — OFFICE VISIT (OUTPATIENT)
Dept: GASTROENTEROLOGY | Facility: CLINIC | Age: 60
End: 2025-02-24
Payer: COMMERCIAL

## 2025-02-24 VITALS — BODY MASS INDEX: 29.53 KG/M2 | HEIGHT: 64 IN | WEIGHT: 173 LBS

## 2025-02-24 DIAGNOSIS — D12.5 ADENOMATOUS POLYP OF SIGMOID COLON: ICD-10-CM

## 2025-02-24 DIAGNOSIS — C18.7 MALIGNANT NEOPLASM OF SIGMOID COLON: Primary | ICD-10-CM

## 2025-02-24 PROCEDURE — 99213 OFFICE O/P EST LOW 20 MIN: CPT | Performed by: INTERNAL MEDICINE

## 2025-02-24 NOTE — LETTER
February 24, 2025    Yelena Morris  [address] [facility] [address]                                Darin Calle MD

## 2025-02-24 NOTE — PROGRESS NOTES
PCP:  Fiona Corona MD     No referring provider defined for this encounter.    Chief Complaint   Patient presents with    Follow-up     6 month follow up        HPI   The patient is known to me.  59-year-old who has had a resection for a crescent shaped neoplasm at 20 cm which was an adenocarcinoma on August 18, 2023.  She had a laparoscopic sigmoid colectomy and it was a T3 lesion with 3 out of 14 nodes positive.  She has been followed closely and seems to be doing well.  She was having some diarrhea which has improved.  She was treated for Helicobacter pylori with what sounds like Pylera.  Her  is having difficulties.  He is on a trial for his metastatic colon cancer and he has gotten significantly dehydrated and has some acute renal failure as a result.  The patient's last colonoscopy was 4/17/2024.  She had diverticulosis as well as a polyp which was unremarkable.  Upper endoscopy failed to reveal any evidence of celiac disease.  Last CAT scan I see was on 12/10/2024 and there was no evidence of recurrent or metastatic disease in the chest abdomen or pelvis.    Allergies   Allergen Reactions    Dihydroergotamine GI Intolerance     VOMITING    Methergine [Methylergonovine Maleate] Nausea And Vomiting    Amoxicillin Rash          Current Outpatient Medications:     ARIPiprazole (ABILIFY) 5 MG tablet, Take 1 tablet by mouth Daily., Disp: 90 tablet, Rfl: 0    atorvastatin (LIPITOR) 40 MG tablet, Take 1 tablet by mouth Every Evening., Disp: 90 tablet, Rfl: 3    benzonatate (TESSALON) 200 MG capsule, Take 1 capsule by mouth 3 (Three) Times a Day As Needed for Cough., Disp: 21 capsule, Rfl: 0    Bismuth Subsalicylate 525 MG/15ML suspension, Take 15 mL by mouth 4 (Four) Times a Day for 14 days., Disp: 960 mL, Rfl: 0    BOTOX 200 units reconstituted solution, INJECT 200 UNITS INTO THE HEAD OR NECK AREA BY MD EVERY 90 DAYS, Disp: , Rfl: 3    cyclobenzaprine (FLEXERIL) 10 MG tablet, Take 1 tablet by mouth At  Night As Needed for Muscle Spasms., Disp: 30 tablet, Rfl: 1    DULoxetine (CYMBALTA) 60 MG capsule, Take 1 capsule by mouth Daily., Disp: 90 capsule, Rfl: 0    eletriptan (RELPAX) 40 MG tablet, TAKE 1 TABLET BY MOUTH AT THE ONSET OF MIGRAINE. MAY REPEAT IN 2 HRS.MAX 2 DOSE/24HR,3 DAYS/WEEK, Disp: , Rfl: 11    FLUoxetine (PROzac) 20 MG capsule, Take 1 capsule by mouth Daily for 90 days., Disp: 90 capsule, Rfl: 0    glimepiride (AMARYL) 2 MG tablet, TAKE 1 TABLET BY MOUTH TWICE A DAY, Disp: 180 tablet, Rfl: 3    hydroCHLOROthiazide 12.5 MG tablet, Take 1 tablet by mouth Daily., Disp: 90 tablet, Rfl: 3    hydrOXYzine (ATARAX) 25 MG tablet, Take 1-2 tablets by mouth At Night As Needed for Anxiety (sleep)., Disp: 180 tablet, Rfl: 1    ipratropium (ATROVENT) 0.06 % nasal spray, Administer 2 sprays into the nostril(s) as directed by provider 4 (Four) Times a Day., Disp: 15 mL, Rfl: 0    metroNIDAZOLE (FLAGYL) 500 MG tablet, Take 1 tablet by mouth 4 (Four) Times a Day for 14 days., Disp: 56 tablet, Rfl: 0    nebivolol (Bystolic) 10 MG tablet, Take 1 tablet by mouth Daily., Disp: 90 tablet, Rfl: 3    nortriptyline (PAMELOR) 10 MG capsule, nortriptyline 10 mg capsule  TAKE 4 CAPSULES AT BEDTIME, Disp: , Rfl:     Nutritional Supplements (DHEA PO), Take  by mouth., Disp: , Rfl:     omeprazole (priLOSEC) 40 MG capsule, Take 1 capsule by mouth Daily. Pt needs appointment, Disp: 90 capsule, Rfl: 3    omeprazole (priLOSEC) 40 MG capsule, Take 1 capsule by mouth 2 (Two) Times a Day for 14 days., Disp: 28 capsule, Rfl: 0    pioglitazone (Actos) 30 MG tablet, Take 1 tablet by mouth Daily., Disp: 90 tablet, Rfl: 3    propranolol (INDERAL) 10 MG tablet, TAKE 1 TABLET BY MOUTH 2 (TWO) TIMES A DAY AS NEEDED (PANIC/ANXIETY)., Disp: 180 tablet, Rfl: 0    Semaglutide, 1 MG/DOSE, (OZEMPIC) 4 MG/3ML solution pen-injector, Inject 1 mg under the skin into the appropriate area as directed 1 (One) Time Per Week., Disp: 9 mL, Rfl: 3    tetracycline  (ACHROMYCIN,SUMYCIN) 500 MG capsule, Take 1 capsule by mouth 4 (Four) Times a Day for 14 days., Disp: 56 capsule, Rfl: 0    Topiramate ER (Trokendi XR) 200 MG capsule sustained-release 24 hr, Daily., Disp: , Rfl:      Past Medical History:   Diagnosis Date    Anxiety     Arthritis     Cant recall the date of onset    Chest pain 11/06/2019    Colon polyp     Depression     Diabetes mellitus     Dysphagia 11/06/2019    Endometriosis     Fibromyalgia, primary     GERD (gastroesophageal reflux disease)     Headache     HL (hearing loss)     Hyperlipidemia     Hypertension     Low vitamin D level     Malignant neoplasm of sigmoid colon 08/29/2023    Migraines     Ovarian cyst     PMDD (premenstrual dysphoric disorder)     Scoliosis     Thyroid nodule     Wears glasses        Past Surgical History:   Procedure Laterality Date    BLADDER SURGERY      Bladder tuck    COLONOSCOPY      every 5 years    ENDOMETRIAL ABLATION      LUMBAR DISCECTOMY  2010    L4-S1 Rt- discectomy Dr. Arellano    SPINAL CORD STIMULATOR IMPLANT N/A 08/19/2019    Procedure: SPINAL CORD STIMULATOR INSERTION;  Surgeon: Ethan Peters MD;  Location: [facility];  Service: Neurosurgery    SPINE SURGERY  2010    TONSILLECTOMY AND ADENOIDECTOMY          Social History     Socioeconomic History    Marital status:    Tobacco Use    Smoking status: Never     Passive exposure: Never    Smokeless tobacco: Never   Vaping Use    Vaping status: Never Used   Substance and Sexual Activity    Alcohol use: No    Drug use: Never    Sexual activity: Yes     Partners: Male     Birth control/protection: Post-menopausal, Vasectomy        Family History   Adopted: Yes   Problem Relation Age of Onset    Colon cancer Neg Hx         Review of Systems     There were no vitals filed for this visit.     Physical Exam  Constitutional:       General: She is not in acute distress.     Appearance: Normal appearance. She is not ill-appearing.   Neurological:      Mental  Status: She is alert.          Diagnoses and all orders for this visit:    1. Malignant neoplasm of sigmoid colon (Primary)    2. Adenomatous polyp of sigmoid colon    Impressions and plan #1 history of adenocarcinoma in the sigmoid colon postresection 9/25/2023.  She seems to be doing well.  She follows with Dr. Chaparro.  We will see her back in 6 months although earlier with troubles.    Darin Calle MD

## 2025-02-27 ENCOUNTER — TELEMEDICINE (OUTPATIENT)
Dept: PSYCHIATRY | Facility: CLINIC | Age: 60
End: 2025-02-27
Payer: COMMERCIAL

## 2025-02-27 DIAGNOSIS — F41.1 GENERALIZED ANXIETY DISORDER: Chronic | ICD-10-CM

## 2025-02-27 DIAGNOSIS — G47.9 SLEEP DIFFICULTIES: ICD-10-CM

## 2025-02-27 DIAGNOSIS — F32.81 PMDD (PREMENSTRUAL DYSPHORIC DISORDER): ICD-10-CM

## 2025-02-27 DIAGNOSIS — F33.41 RECURRENT MAJOR DEPRESSIVE DISORDER, IN PARTIAL REMISSION: Primary | Chronic | ICD-10-CM

## 2025-02-27 NOTE — PROGRESS NOTES
This provider is located at [facility], [address] [place] [address].The Patient is seen remotely at home, [address]. [place] [address] via my chart.  Patient is being seen via telehealth and confirm that they are in a secure environment for this session. The patient's condition being diagnosed/treated is appropriate for telemedicine. The provider identified himself/herself: herself as well as her credentials.   The patient gave consent to be seen remotely, and when consent is given they understand that the consent allows for patient identifiable information to be sent to a third party as needed.   They may refuse to be seen remotely at any time. The electronic data is encrypted and password protected, and the patient has been advised of the potential risks to privacy not withstanding such measures.    You have chosen to receive care through a telehealth visit.  Do you consent to use a video/audio connection for your medical care today? Yes. Patient verified Name, , and address.       Chief Complaint  Depression and anxiety    Subjective          Yelena Radha Morris presents to [facility] for medication management.    History of Present Illness  Patient presents today reporting that she is doing okay.  She states that she has not really felt depressed or hopeless or helpless.  Patient notes however that her  is in the hospital for roughly a week now due to ischemic bowel.  She states they are not able to do surgery due to his previous surgeries and the chemo treatment he has received.  They are hoping giving him IV fluids that it will heal itself he is very sick at the moment.  Patient reports that she does have a lot of worry as she states she is in limbo but feels she is managing well.  Patient states her sleep has been up and down lately just from working and then having to be at the hospital.  Reports her appetite is good.   Denies any side effects to medications.  Patient states that she is just trying to stay positive for him because he is scared so she does feel slightly numb and not crying yet as she is afraid if she starts crying she will not be able to stop but is still able to feel emotions.  Patient notes she is still having dry mouth from the medication but otherwise denies any side of 6.  Notes she had diarrhea for several weeks and was positive for H. pylori bodyaches which was treated and doing well now.  Denies any other medical or medicine changes.  Patient states overall the medicines are helpful and things are going well just worried about her .  Denies any SI/HI/AH/VH.      Objective   Vital Signs:   There were no vitals taken for this visit.  Due to the remote nature of this encounter (virtual encounter), vitals were unable to be obtained.  Height stated at 64 inches.  Weight stated at 173 pounds.      PHQ-9 Score:   PHQ-9 Total Score:(Patient-Rptd) 0     PHQ-9 Depression Screening  Little interest or pleasure in doing things? (Patient-Rptd) Not at all   Feeling down, depressed, or hopeless? (Patient-Rptd) Not at all   PHQ-2 Total Score (Patient-Rptd) 0   Trouble falling or staying asleep, or sleeping too much? (Patient-Rptd) Not at all   Feeling tired or having little energy? (Patient-Rptd) Not at all   Poor appetite or overeating? (Patient-Rptd) Not at all   Feeling bad about yourself - or that you are a failure or have let yourself or your family down? (Patient-Rptd) Not at all   Trouble concentrating on things, such as reading the newspaper or watching television? (Patient-Rptd) Not at all   Moving or speaking so slowly that other people could have noticed? Or the opposite - being so fidgety or restless that you have been moving around a lot more than usual? (Patient-Rptd) Not at all   Thoughts that you would be better off dead, or of hurting yourself in some way? (Patient-Rptd) Not at all   PHQ-9 Total  Score (Patient-Rptd) 0   If you checked off any problems, how difficult have these problems made it for you to do your work, take care of things at home, or get along with other people? (Patient-Rptd) Not difficult at all         PHQ-9 Total Score: (Patient-Rptd) 0     LUCERO-7  Feeling nervous, anxious or on edge: (Patient-Rptd) Not at all  Not being able to stop or control worrying: (Patient-Rptd) Not at all  Worrying too much about different things: (Patient-Rptd) Not at all  Trouble Relaxing: (Patient-Rptd) Not at all  Being so restless that it is hard to sit still: (Patient-Rptd) Not at all  Feeling afraid as if something awful might happen: (Patient-Rptd) Not at all  Becoming easily annoyed or irritable: (Patient-Rptd) Not at all  LUCERO 7 Total Score: (Patient-Rptd) 0  If you checked any problems, how difficult have these problems made it for you to do your work, take care of things at home, or get along with other people: (Patient-Rptd) Not difficult at all      Patient screened positive for depression based on a PHQ-9 score of 0 on 2/27/2025. Follow-up recommendations include: Prescribed antidepressant medication treatment.        Mental Status Exam:   Hygiene:   good  Cooperation:  Cooperative  Eye Contact:  Good  Psychomotor Behavior:  Appropriate  Affect:  Full range  Mood: normal and sad  Speech:  Normal  Thought Process:  Goal directed  Thought Content:  Normal  Suicidal:  None  Homicidal:  None  Hallucinations:  None  Delusion:  None  Memory:  Intact  Orientation:  Person, Place, Time, and Situation  Reliability:  good  Insight:  Good  Judgement:  Good  Impulse Control:  Good  Physical/Medical Issues:  Yes see medical history      Current Medications:   Current Outpatient Medications   Medication Sig Dispense Refill    ARIPiprazole (ABILIFY) 5 MG tablet Take 1 tablet by mouth Daily. 90 tablet 0    atorvastatin (LIPITOR) 40 MG tablet Take 1 tablet by mouth Every Evening. 90 tablet 3    benzonatate (TESSALON)  200 MG capsule Take 1 capsule by mouth 3 (Three) Times a Day As Needed for Cough. 21 capsule 0    Bismuth Subsalicylate 525 MG/15ML suspension Take 15 mL by mouth 4 (Four) Times a Day for 14 days. 960 mL 0    BOTOX 200 units reconstituted solution INJECT 200 UNITS INTO THE HEAD OR NECK AREA BY MD EVERY 90 DAYS  3    cyclobenzaprine (FLEXERIL) 10 MG tablet Take 1 tablet by mouth At Night As Needed for Muscle Spasms. 30 tablet 1    DULoxetine (CYMBALTA) 60 MG capsule Take 1 capsule by mouth Daily. 90 capsule 0    eletriptan (RELPAX) 40 MG tablet TAKE 1 TABLET BY MOUTH AT THE ONSET OF MIGRAINE. MAY REPEAT IN 2 HRS.MAX 2 DOSE/24HR,3 DAYS/WEEK  11    FLUoxetine (PROzac) 20 MG capsule Take 1 capsule by mouth Daily for 90 days. 90 capsule 0    glimepiride (AMARYL) 2 MG tablet TAKE 1 TABLET BY MOUTH TWICE A  tablet 3    hydroCHLOROthiazide 12.5 MG tablet Take 1 tablet by mouth Daily. 90 tablet 3    hydrOXYzine (ATARAX) 25 MG tablet Take 1-2 tablets by mouth At Night As Needed for Anxiety (sleep). 180 tablet 1    ipratropium (ATROVENT) 0.06 % nasal spray Administer 2 sprays into the nostril(s) as directed by provider 4 (Four) Times a Day. 15 mL 0    metroNIDAZOLE (FLAGYL) 500 MG tablet Take 1 tablet by mouth 4 (Four) Times a Day for 14 days. 56 tablet 0    nebivolol (Bystolic) 10 MG tablet Take 1 tablet by mouth Daily. 90 tablet 3    nortriptyline (PAMELOR) 10 MG capsule nortriptyline 10 mg capsule   TAKE 4 CAPSULES AT BEDTIME      Nutritional Supplements (DHEA PO) Take  by mouth.      omeprazole (priLOSEC) 40 MG capsule Take 1 capsule by mouth Daily. Pt needs appointment 90 capsule 3    omeprazole (priLOSEC) 40 MG capsule Take 1 capsule by mouth 2 (Two) Times a Day for 14 days. 28 capsule 0    pioglitazone (Actos) 30 MG tablet Take 1 tablet by mouth Daily. 90 tablet 3    propranolol (INDERAL) 10 MG tablet TAKE 1 TABLET BY MOUTH 2 (TWO) TIMES A DAY AS NEEDED (PANIC/ANXIETY). 180 tablet 0    Semaglutide, 1 MG/DOSE,  (OZEMPIC) 4 MG/3ML solution pen-injector Inject 1 mg under the skin into the appropriate area as directed 1 (One) Time Per Week. 9 mL 3    tetracycline (ACHROMYCIN,SUMYCIN) 500 MG capsule Take 1 capsule by mouth 4 (Four) Times a Day for 14 days. 56 capsule 0    Topiramate ER (Trokendi XR) 200 MG capsule sustained-release 24 hr Daily.       No current facility-administered medications for this visit.       Physical Exam  Nursing note reviewed.   Constitutional:       Appearance: Normal appearance.   Neurological:      Mental Status: She is alert.   Psychiatric:         Attention and Perception: Attention and perception normal.         Mood and Affect: Mood and affect normal. Mood is anxious.         Speech: Speech normal.         Behavior: Behavior normal. Behavior is cooperative.         Thought Content: Thought content normal.         Cognition and Memory: Cognition and memory normal.         Judgment: Judgment normal.        Result Review :     The following data was reviewed by: ONESIMO Alexander on 02/27/2025:  Common labs          12/10/2024    11:33 12/18/2024    09:41 2/14/2025    13:40   Common Labs   Glucose 124   179    BUN 11   15    Creatinine 0.98   0.80    Sodium 138   140    Potassium 3.6   3.9    Chloride 101   106    Calcium 9.4   9.6    Albumin 4.2   4.0    Total Bilirubin 0.3   0.3    Alkaline Phosphatase 94   97    AST (SGOT) 23   23    ALT (SGPT) 38   35    WBC 8.26   7.66    Hemoglobin 13.9   13.3    Hematocrit 41.4   40.4    Platelets 252   252    Hemoglobin A1C  7.9       CMP          8/13/2024    10:52 12/10/2024    11:33 2/14/2025    13:40   [facility]   Glucose 119  124  179    BUN 10  11  15    Creatinine 0.84  0.98  0.80    EGFR 81.7  67.5  86.1    Sodium 136  138  140    Potassium 3.8  3.6  3.9    Chloride 101  101  106    Calcium 9.8  9.4  9.6    Total Protein 7.0  7.4  6.8    Albumin 4.3  4.2  4.0    Globulin 2.7  3.2  2.8    Total Bilirubin 0.2  0.3  0.3    Alkaline Phosphatase 81  94   97    AST (SGOT) 15  23  23    ALT (SGPT) 21  38  35    Albumin/Globulin Ratio 1.6  1.3  1.4    BUN/Creatinine Ratio 11.9  11.2  18.8    Anion Gap 10.0  13.0  11.0      CBC          8/13/2024    10:52 12/10/2024    11:33 2/14/2025    13:40   CBC   WBC 7.63  8.26  7.66    RBC 4.57  4.70  4.63    Hemoglobin 13.9  13.9  13.3    Hematocrit 42.2  41.4  40.4    MCV 92.3  88.1  87.3    MCH 30.4  29.6  28.7    MCHC 32.9  33.6  32.9    RDW 13.1  13.0  13.6    Platelets 281  252  252      CBC w/diff          8/13/2024    10:52 12/10/2024    11:33 2/14/2025    13:40   CBC w/Diff   WBC 7.63  8.26  7.66    RBC 4.57  4.70  4.63    Hemoglobin 13.9  13.9  13.3    Hematocrit 42.2  41.4  40.4    MCV 92.3  88.1  87.3    MCH 30.4  29.6  28.7    MCHC 32.9  33.6  32.9    RDW 13.1  13.0  13.6    Platelets 281  252  252    Neutrophil Rel % 69.3  68.8  62.2    Immature Granulocyte Rel % 0.5  0.5  0.7    Lymphocyte Rel % 22.9  23.4  27.7    Monocyte Rel % 5.2  5.6  6.4    Eosinophil Rel % 1.4  1.3  2.2    Basophil Rel % 0.7  0.4  0.8          Electrolytes          8/13/2024    10:52 12/10/2024    11:33 2/14/2025    13:40   Electrolytes   Sodium 136  138  140    Potassium 3.8  3.6  3.9    Chloride 101  101  106    Calcium 9.8  9.4  9.6      Renal Profile          8/13/2024    10:52 12/10/2024    11:33 2/14/2025    13:40   Renal Profile   BUN 10  11  15    Creatinine 0.84  0.98  0.80      BMP          8/13/2024    10:52 12/10/2024    11:33 2/14/2025    13:40   BMP   BUN 10  11  15    Creatinine 0.84  0.98  0.80    Sodium 136  138  140    Potassium 3.8  3.6  3.9    Chloride 101  101  106    CO2 25.0  24.0  23.0    Calcium 9.8  9.4  9.6      Most Recent A1C          12/18/2024    09:41   HGBA1C Most Recent   Hemoglobin A1C 7.9        A1C Last 3 Results          5/3/2024    10:39 8/13/2024    10:52 12/18/2024    09:41   HGBA1C Last 3 Results   Hemoglobin A1C 8.6  6.30  7.9      Microalbumin          5/3/2024    10:48   Microalbumin    Microalbumin, Urine 1.4      HgB          8/13/2024    10:52 12/10/2024    11:33 2/14/2025    13:40   HGB   Hemoglobin 13.9  13.9  13.3      HCT          8/13/2024    10:52 12/10/2024    11:33 2/14/2025    13:40   HGT   Hematocrit 42.2  41.4  40.4                  Data reviewed : PCP and specialty notes         Assessment and Plan    Problem List Items Addressed This Visit       Recurrent major depressive disorder - Primary     Other Visit Diagnoses       Generalized anxiety disorder  (Chronic)       PMDD (premenstrual dysphoric disorder)        Sleep difficulties                Encounter Diagnoses   Name Primary?    Recurrent major depressive disorder, in partial remission Yes    Generalized anxiety disorder     PMDD (premenstrual dysphoric disorder)     Sleep difficulties           TREATMENT PLAN/GOALS: Continue supportive psychotherapy efforts and medications as indicated. Treatment and medication options discussed during today's visit. Patient ackowledged and verbally consented to continue with current treatment plan and was educated on the importance of compliance with treatment and follow-up appointments.    MEDICATION ISSUES:  We discussed risks, benefits, and side effects of the above medications and the patient was agreeable with the plan. Patient was educated on the importance of compliance with treatment and follow-up appointments.  Patient is agreeable to call the office with any worsening of symptoms or onset of side effects. Patient is agreeable to call 911 or go to the nearest ER should he/she begin having SI/HI.      -Continue Cymbalta 60 mg daily for depression and anxiety as patient has benefited greatly as well for her pain.  -Continue fluoxetine 20 mg daily for depression and PMDD, as patient has been without for several months and noticed more anxiety and irritability.  Also encouraged patient that she may need to look at work as a part-time due to her and her 's health  issues.  -Patient educated extensively regarding the risk of serotonin syndrome as she is currently on duloxetine due to her pain as we have previously tried to taper and it is not effective so she is on fluoxetine 20 mg daily to help with her overall depression and mood.  Patient verbalized understanding was agreeable.  -Continue aripiprazole 5 mg as adjunct for depression as patient has failed numerous SSRIs and SNRIs and has benefited the patient tremendously and is medically necessary for her depression to avoid hospitalization.   -Continue propranolol 10 mg twice daily as needed for anxiety and panic.  Encouraged patient if it decreased her blood pressure or heart rate to contact the clinic and discontinue.  -Continue hydroxyzine 25 to 50 mg at night as needed for sleep.  Encouraged patient to use for sleep but if ineffective can try over-the-counter magnesium glycinate.  If that is ineffective to contact clinic as we could look at doing a low dose of trazodone patient verbalized understanding.      Counseled patient regarding multimodal approach with healthy nutrition, healthy sleep, regular physical activity, social activities, counseling, and medications.      Coping skills reviewed and encouraged positive framing of thoughts     Assisted patient in processing above session content; acknowledged and normalized patient’s thoughts, feelings, and concerns.  Applied  positive coping skills and behavior management in session.  Allowed patient to freely discuss issues without interruption or judgment. Provided safe, confidential environment to facilitate the development of positive therapeutic relationship and encourage open, honest communication. Assisted patient in identifying risk factors which would indicate the need for higher level of care including thoughts to harm self or others and/or self-harming behavior and encouraged patient to contact this office, call 911, or present to the nearest emergency room  should any of these events occur. Discussed crisis intervention services and means to access.     MEDS ORDERED DURING VISIT:  No orders of the defined types were placed in this encounter.    No refills needed at this time      Follow Up   Return in about 4 weeks (around 3/27/2025), or if symptoms worsen or fail to improve, for Recheck.    Patient was given instructions and counseling regarding her condition or for health maintenance advice. Please see specific information pulled into the AVS if appropriate.     Some of the data in this electronic note has been brought forward from a previous encounter, any necessary changes have been made, it has been reviewed by this APRN, and it is accurate.      This document has been electronically signed by ONESIMO Alexander  February 27, 2025 09:21 EST    Part of this note may be an electronic transcription/translation of spoken language to printed text using the Dragon Dictation System.

## 2025-03-10 ENCOUNTER — TELEPHONE (OUTPATIENT)
Dept: ONCOLOGY | Facility: CLINIC | Age: 60
End: 2025-03-10

## 2025-03-10 NOTE — TELEPHONE ENCOUNTER
Caller: Yelena Morris    Relationship to patient: Self    Best call back number: [phone]    Chief complaint: PATIENT CALLED TO RESCHEDULE     Type of visit: FOLLOW UP       If rescheduling, when is the original appointment: 3-10-25

## 2025-03-18 ENCOUNTER — OFFICE VISIT (OUTPATIENT)
Dept: INTERNAL MEDICINE | Facility: CLINIC | Age: 60
End: 2025-03-18
Payer: COMMERCIAL

## 2025-03-18 ENCOUNTER — LAB (OUTPATIENT)
Dept: LAB | Facility: HOSPITAL | Age: 60
End: 2025-03-18
Payer: COMMERCIAL

## 2025-03-18 VITALS
TEMPERATURE: 98.3 F | SYSTOLIC BLOOD PRESSURE: 118 MMHG | OXYGEN SATURATION: 96 % | WEIGHT: 173 LBS | HEIGHT: 64 IN | HEART RATE: 96 BPM | DIASTOLIC BLOOD PRESSURE: 78 MMHG | BODY MASS INDEX: 29.53 KG/M2

## 2025-03-18 DIAGNOSIS — I10 PRIMARY HYPERTENSION: ICD-10-CM

## 2025-03-18 DIAGNOSIS — A04.8 H. PYLORI INFECTION: ICD-10-CM

## 2025-03-18 DIAGNOSIS — E11.65 TYPE 2 DIABETES MELLITUS WITH HYPERGLYCEMIA, WITHOUT LONG-TERM CURRENT USE OF INSULIN: Primary | ICD-10-CM

## 2025-03-18 LAB
EXPIRATION DATE: ABNORMAL
HBA1C MFR BLD: 10.1 % (ref 4.5–5.7)
Lab: ABNORMAL

## 2025-03-18 PROCEDURE — 83013 H PYLORI (C-13) BREATH: CPT

## 2025-03-18 NOTE — PROGRESS NOTES
Internal Medicine Follow Up    Chief Complaint  Yelena Morris is a 59 y.o. female who presents today for follow up of chronic medical conditions outlined below.    Chief Complaint   Patient presents with    Diabetes        HPI  Ms. Morris comes in today for follow up on diabetes. She reports improved medication compliance. Doing well with ozempic, no side effects. BP controlled. She has been under increased stress with  on chemotherapy for colon cancer. He was hospitalized with dehydration and she is now giving IV fluids at home. She denies change in diet. She completed treatment for h pylori and diarrhea resolved. She denies acute concerns.     Diabetes         Review of Systems  Review of Systems   Constitutional:  Negative for appetite change.   Respiratory: Negative.     Cardiovascular: Negative.    Gastrointestinal:  Negative for abdominal pain, diarrhea, nausea, vomiting, GERD and indigestion.   Psychiatric/Behavioral:  Positive for stress.         Current Medications  Current Outpatient Medications on File Prior to Visit   Medication Sig Dispense Refill    ARIPiprazole (ABILIFY) 5 MG tablet Take 1 tablet by mouth Daily. 90 tablet 0    atorvastatin (LIPITOR) 40 MG tablet Take 1 tablet by mouth Every Evening. 90 tablet 3    BOTOX 200 units reconstituted solution INJECT 200 UNITS INTO THE HEAD OR NECK AREA BY MD EVERY 90 DAYS  3    cyclobenzaprine (FLEXERIL) 10 MG tablet Take 1 tablet by mouth At Night As Needed for Muscle Spasms. 30 tablet 1    DULoxetine (CYMBALTA) 60 MG capsule Take 1 capsule by mouth Daily. 90 capsule 0    eletriptan (RELPAX) 40 MG tablet TAKE 1 TABLET BY MOUTH AT THE ONSET OF MIGRAINE. MAY REPEAT IN 2 HRS.MAX 2 DOSE/24HR,3 DAYS/WEEK  11    FLUoxetine (PROzac) 20 MG capsule Take 1 capsule by mouth Daily for 90 days. 90 capsule 0    glimepiride (AMARYL) 2 MG tablet TAKE 1 TABLET BY MOUTH TWICE A  tablet 3    hydroCHLOROthiazide 12.5 MG tablet Take 1 tablet by mouth Daily. 90  "tablet 3    hydrOXYzine (ATARAX) 25 MG tablet Take 1-2 tablets by mouth At Night As Needed for Anxiety (sleep). 180 tablet 1    ipratropium (ATROVENT) 0.06 % nasal spray Administer 2 sprays into the nostril(s) as directed by provider 4 (Four) Times a Day. 15 mL 0    nebivolol (Bystolic) 10 MG tablet Take 1 tablet by mouth Daily. 90 tablet 3    nortriptyline (PAMELOR) 10 MG capsule nortriptyline 10 mg capsule   TAKE 4 CAPSULES AT BEDTIME      omeprazole (priLOSEC) 40 MG capsule Take 1 capsule by mouth Daily. Pt needs appointment 90 capsule 3    pioglitazone (Actos) 30 MG tablet Take 1 tablet by mouth Daily. 90 tablet 3    propranolol (INDERAL) 10 MG tablet TAKE 1 TABLET BY MOUTH 2 (TWO) TIMES A DAY AS NEEDED (PANIC/ANXIETY). 180 tablet 0    Topiramate ER (Trokendi XR) 200 MG capsule sustained-release 24 hr Daily.      [DISCONTINUED] benzonatate (TESSALON) 200 MG capsule Take 1 capsule by mouth 3 (Three) Times a Day As Needed for Cough. 21 capsule 0    [DISCONTINUED] Nutritional Supplements (DHEA PO) Take  by mouth.      [DISCONTINUED] Semaglutide, 1 MG/DOSE, (OZEMPIC) 4 MG/3ML solution pen-injector Inject 1 mg under the skin into the appropriate area as directed 1 (One) Time Per Week. 9 mL 3     No current facility-administered medications on file prior to visit.       Allergies  Allergies   Allergen Reactions    Dihydroergotamine GI Intolerance     VOMITING    Methergine [Methylergonovine Maleate] Nausea And Vomiting    Amoxicillin Rash       Objective  Visit Vitals  /78 (BP Location: Left arm, Patient Position: Sitting)   Pulse 96   Temp 98.3 °F (36.8 °C) (Temporal)   Ht 162.6 cm (64\")   Wt 78.5 kg (173 lb)   SpO2 96%   BMI 29.70 kg/m²        Physical Exam  Physical Exam  Vitals and nursing note reviewed.   Constitutional:       General: She is not in acute distress.     Appearance: She is well-developed. She is not ill-appearing or toxic-appearing.   HENT:      Head: Normocephalic and atraumatic.   Eyes:     "  Conjunctiva/sclera: Conjunctivae normal.   Cardiovascular:      Rate and Rhythm: Normal rate and regular rhythm.      Heart sounds: Normal heart sounds.   Pulmonary:      Effort: Pulmonary effort is normal. No respiratory distress.      Breath sounds: Normal breath sounds.   Skin:     General: Skin is warm and dry.   Neurological:      Mental Status: She is alert and oriented to person, place, and time. Mental status is at baseline.      Gait: Gait normal.   Psychiatric:         Mood and Affect: Mood normal.         Behavior: Behavior normal.         Thought Content: Thought content normal.         Judgment: Judgment normal.         Results  Results for orders placed or performed in visit on 03/18/25   POC Glycosylated Hemoglobin (Hb A1C)    Collection Time: 03/18/25  8:11 AM    Specimen: Blood   Result Value Ref Range    Hemoglobin A1C 10.1 (A) 4.5 - 5.7 %    Lot Number [id number]     Expiration Date 11-         Assessment and Plan  Diagnoses and all orders for this visit:    Type 2 diabetes mellitus with hyperglycemia, without long-term current use of insulin  - A1c up to 10.1  - Increase ozempic to 2mg weekly, continue glimepiride 2mg BID and actos 30mg daily.   - notably intolerant of jardiance and januvia, off metformin due to absorption concerns   - Urine microalbumin nl 5/2024  - call with glucose readings after 3 weeks of increasing dose    Primary hypertension  - BP controlled  - Continue nebivolol 10mg daily and HCTZ 12.5mg daily     - recent CMP reviewed    H. pylori infection  - s/p clarithromycin quadruple therapy and diarrhea resolved  - test of cure today     Health Maintenance  - Pap smear: GYN Dr. Mcguire, unable to get records  - Mammogram: scheduled 3/2025  - Colonoscopy: 4/2024, next 3y  - HCV: negative  - Immunizations: COVID UTD. Flu UTD. Shingrix complete. PCV20 UTD. Tdap 10/2019.  - Depression screening: negative 4/2024       Return in about 3 months (around 6/18/2025) for Follow up DM,  Labs today.

## 2025-03-19 LAB — UREA BREATH TEST QL: NEGATIVE

## 2025-03-24 ENCOUNTER — TELEPHONE (OUTPATIENT)
Dept: ONCOLOGY | Facility: CLINIC | Age: 60
End: 2025-03-24
Payer: COMMERCIAL

## 2025-03-24 DIAGNOSIS — Z85.038 HISTORY OF COLON CANCER, STAGE III: Primary | ICD-10-CM

## 2025-03-24 NOTE — TELEPHONE ENCOUNTER
I called Yelena to check on her as she had missed her last appointment.  She is feeling well, no new concerns.  She has been busy with work and just forgot about the appointment.  Labs reviewed from 2/14/2025 with normal CEA 0.81, normal CBC and CMP other than for glucose of 179 and ALT just barely above normal at 35 which is down from 38 3 months ago.  She is up-to-date on colonoscopy performed April 2024 with recommendation to repeat in 3 years.  We will reschedule her follow-up out to June and we will repeat CT chest, abdomen and pelvis along with serum testing at that time.  I spoke with Liv in our research department and this is appropriate follow-up based on study.

## 2025-03-25 ENCOUNTER — RESULTS FOLLOW-UP (OUTPATIENT)
Dept: INTERNAL MEDICINE | Facility: CLINIC | Age: 60
End: 2025-03-25
Payer: COMMERCIAL

## 2025-03-25 NOTE — LETTER
Yelena Morris  [address] [facility] [place] [address]    March 31, 2025     Dear Ms. Morris:    Below are the results from your recent visit:    Resulted Orders   H. Pylori Breath Test - Breath, Lung   Result Value Ref Range    H. pylori Breath Test Negative Negative    Narrative    Performed at:  [address] [place] [id number]  : Mathew Christiansen PhD, Phone:  [phone]       The test results show that your Your h pylori infection was cleared with treatment. No further treatment necessary     If you have any questions or concerns, please don't hesitate to call.         Sincerely,        Fiona Corona MD

## 2025-06-02 ENCOUNTER — HOSPITAL ENCOUNTER (OUTPATIENT)
Dept: CT IMAGING | Facility: HOSPITAL | Age: 60
Discharge: HOME | End: 2025-06-02
Admitting: NURSE PRACTITIONER
Payer: COMMERCIAL

## 2025-06-02 DIAGNOSIS — Z85.038 HISTORY OF COLON CANCER, STAGE III: ICD-10-CM

## 2025-06-02 PROCEDURE — 71260 CT THORAX DX C+: CPT

## 2025-06-02 PROCEDURE — 25510000001 IOPAMIDOL 61 % SOLUTION: Performed by: NURSE PRACTITIONER

## 2025-06-02 PROCEDURE — 25510000002 DIATRIZOATE MEGLUMINE & SODIUM PER 1 ML: Performed by: NURSE PRACTITIONER

## 2025-06-02 PROCEDURE — 74177 CT ABD & PELVIS W/CONTRAST: CPT

## 2025-06-02 RX ORDER — IOPAMIDOL 612 MG/ML
85 INJECTION, SOLUTION INTRAVASCULAR
Status: COMPLETED | OUTPATIENT
Start: 2025-06-02 | End: 2025-06-02

## 2025-06-02 RX ORDER — DIATRIZOATE MEGLUMINE AND DIATRIZOATE SODIUM 660; 100 MG/ML; MG/ML
30 SOLUTION ORAL; RECTAL ONCE
Status: COMPLETED | OUTPATIENT
Start: 2025-06-02 | End: 2025-06-02

## 2025-06-02 RX ADMIN — DIATRIZOATE MEGLUMINE AND DIATRIZOATE SODIUM 30 ML: 660; 100 LIQUID ORAL; RECTAL at 15:20

## 2025-06-02 RX ADMIN — IOPAMIDOL 85 ML: 612 INJECTION, SOLUTION INTRAVENOUS at 15:20

## 2025-06-05 DIAGNOSIS — K21.9 GASTROESOPHAGEAL REFLUX DISEASE WITHOUT ESOPHAGITIS: ICD-10-CM

## 2025-06-05 RX ORDER — OMEPRAZOLE 40 MG/1
40 CAPSULE, DELAYED RELEASE ORAL DAILY
Qty: 90 CAPSULE | Refills: 3 | OUTPATIENT
Start: 2025-06-05

## 2025-06-13 ENCOUNTER — LAB (OUTPATIENT)
Dept: LAB | Facility: HOSPITAL | Age: 60
End: 2025-06-13
Payer: COMMERCIAL

## 2025-06-13 ENCOUNTER — OFFICE VISIT (OUTPATIENT)
Dept: ONCOLOGY | Facility: CLINIC | Age: 60
End: 2025-06-13
Payer: COMMERCIAL

## 2025-06-13 ENCOUNTER — RESEARCH ENCOUNTER (OUTPATIENT)
Dept: OTHER | Facility: OTHER | Age: 60
End: 2025-06-13
Payer: COMMERCIAL

## 2025-06-13 VITALS
BODY MASS INDEX: 29.88 KG/M2 | HEART RATE: 102 BPM | DIASTOLIC BLOOD PRESSURE: 63 MMHG | OXYGEN SATURATION: 96 % | SYSTOLIC BLOOD PRESSURE: 99 MMHG | TEMPERATURE: 97.3 F | WEIGHT: 175 LBS | HEIGHT: 64 IN

## 2025-06-13 DIAGNOSIS — Z85.038 HISTORY OF COLON CANCER, STAGE III: Primary | ICD-10-CM

## 2025-06-13 DIAGNOSIS — H91.92 ACUTE HEARING LOSS OF LEFT EAR: ICD-10-CM

## 2025-06-13 DIAGNOSIS — Z85.038 HISTORY OF COLON CANCER, STAGE III: ICD-10-CM

## 2025-06-13 LAB
ALBUMIN SERPL-MCNC: 3.9 G/DL (ref 3.5–5.2)
ALBUMIN/GLOB SERPL: 1.5 G/DL
ALP SERPL-CCNC: 71 U/L (ref 39–117)
ALT SERPL W P-5'-P-CCNC: 28 U/L (ref 1–33)
ANION GAP SERPL CALCULATED.3IONS-SCNC: 11 MMOL/L (ref 5–15)
AST SERPL-CCNC: 20 U/L (ref 1–32)
BASOPHILS # BLD AUTO: 0.04 10*3/MM3 (ref 0–0.2)
BASOPHILS NFR BLD AUTO: 0.4 % (ref 0–1.5)
BILIRUB SERPL-MCNC: 0.3 MG/DL (ref 0–1.2)
BUN SERPL-MCNC: 12.8 MG/DL (ref 6–20)
BUN/CREAT SERPL: 14.4 (ref 7–25)
CALCIUM SPEC-SCNC: 9.1 MG/DL (ref 8.6–10.5)
CEA SERPL-MCNC: 1.1 NG/ML
CHLORIDE SERPL-SCNC: 104 MMOL/L (ref 98–107)
CO2 SERPL-SCNC: 22 MMOL/L (ref 22–29)
CREAT SERPL-MCNC: 0.89 MG/DL (ref 0.57–1)
DEPRECATED RDW RBC AUTO: 47.1 FL (ref 37–54)
EGFRCR SERPLBLD CKD-EPI 2021: 75.7 ML/MIN/1.73
EOSINOPHIL # BLD AUTO: 0.15 10*3/MM3 (ref 0–0.4)
EOSINOPHIL NFR BLD AUTO: 1.4 % (ref 0.3–6.2)
ERYTHROCYTE [DISTWIDTH] IN BLOOD BY AUTOMATED COUNT: 14.1 % (ref 12.3–15.4)
GLOBULIN UR ELPH-MCNC: 2.6 GM/DL
GLUCOSE SERPL-MCNC: 230 MG/DL (ref 65–99)
HCT VFR BLD AUTO: 42.8 % (ref 34–46.6)
HGB BLD-MCNC: 13.9 G/DL (ref 12–15.9)
IMM GRANULOCYTES # BLD AUTO: 0.04 10*3/MM3 (ref 0–0.05)
IMM GRANULOCYTES NFR BLD AUTO: 0.4 % (ref 0–0.5)
LYMPHOCYTES # BLD AUTO: 2.08 10*3/MM3 (ref 0.7–3.1)
LYMPHOCYTES NFR BLD AUTO: 18.8 % (ref 19.6–45.3)
MCH RBC QN AUTO: 29.1 PG (ref 26.6–33)
MCHC RBC AUTO-ENTMCNC: 32.5 G/DL (ref 31.5–35.7)
MCV RBC AUTO: 89.7 FL (ref 79–97)
MONOCYTES # BLD AUTO: 0.6 10*3/MM3 (ref 0.1–0.9)
MONOCYTES NFR BLD AUTO: 5.4 % (ref 5–12)
NEUTROPHILS NFR BLD AUTO: 73.6 % (ref 42.7–76)
NEUTROPHILS NFR BLD AUTO: 8.17 10*3/MM3 (ref 1.7–7)
PLATELET # BLD AUTO: 276 10*3/MM3 (ref 140–450)
PMV BLD AUTO: 9.5 FL (ref 6–12)
POTASSIUM SERPL-SCNC: 4.1 MMOL/L (ref 3.5–5.2)
PROT SERPL-MCNC: 6.5 G/DL (ref 6–8.5)
RBC # BLD AUTO: 4.77 10*6/MM3 (ref 3.77–5.28)
SODIUM SERPL-SCNC: 137 MMOL/L (ref 136–145)
WBC NRBC COR # BLD AUTO: 11.08 10*3/MM3 (ref 3.4–10.8)

## 2025-06-13 PROCEDURE — 80053 COMPREHEN METABOLIC PANEL: CPT

## 2025-06-13 PROCEDURE — 85025 COMPLETE CBC W/AUTO DIFF WBC: CPT

## 2025-06-13 PROCEDURE — 82378 CARCINOEMBRYONIC ANTIGEN: CPT

## 2025-06-13 PROCEDURE — 36415 COLL VENOUS BLD VENIPUNCTURE: CPT

## 2025-06-13 NOTE — RESEARCH
Patient Name:  Yelena Morris  YOB: 1965  Patient Age:  59 y.o.  Patient's Sex:  female    Date of Service:  06/13/2025    Provider: Dr. Chaparro                      RESEARCH NOTE                  Mrs. Morris was presented in clinic today for 18 mo follow up. Blood for ctDNA was collected and shipped out same day. Will expect to have these results in approximately 3 weeks.      Patient RTC in 6 months for 24 month follow up with [facility] and CT C/A/P.     [facility]

## 2025-06-13 NOTE — PROGRESS NOTES
CHIEF COMPLAINT: Decreased hearing in the left ear    Problem List:  Oncology/Hematology History Overview Note   1.  Colon cancer found in a tubular adenoma 20 cm from the anal verge with negative staging CT chest abdomen and pelvis normal baseline CEA 1.26 with no visible lesion on MRI rectal protocol.  9/25/2023 laparoscopic sigmoid colectomy Dr. Michel pathologic T3 extending through muscularis propria into pericolonic fat, N1b 3/14 node positive.  CT DNA negative.  On  randomized to the treatment arm.  Will give 3 months of capecitabine and oxaliplatin with NSAID cream.  Fourth cycle of Xeloda oxaliplatin started 2/9/2024.  CT DNA negative at that junction with CEA 2.45 and negative CTs for recurrence  2.  Goiter with 1.8 cm left thyroid nodule found on colon cancer staging   -2/1/2024 FNA left thyroid biopsy with Dr. Armida Young, pathology was negative for malignant cells.  3.  Myomectomy with bladder tack July 2015  4.  Diabetes  5.  Hypertension  6.  Hyperlipidemia  7.  Migraines  8.  Depression  9.  Reflux  10.  History of uterine ablation with ovarian cyst on post-op staging imaging of colon cancer  11.  History of tonsillectomy  12.  History of tympanostomy tubes  13.  History of L4-5 disc surgery and L5-S1 discectomy and fusion  14.  History of solid and liquid phase dysphagia with history of mild gastritis and some esophageal ranging on EGD 2019  15.  Compound melanocytic nevus with dermatofibroma removed by Dr. Ray 07/12/2023    Oncology history timeline:  -12/20/2019 EGD showed small hiatal hernia with some ringing of the esophagus biopsy.  Mild gastritis.  No intestinal metaplasia or dysplasia or malignancy.  Reflux suggestive but not diagnostic.  -8/18/2023 colonoscopy Dr. Calle.  Diverticulosis in the sigmoid colon.  7 mm polyp in the rectum.  Diverticulosis in the sigmoid colon.  Polypoid area with ulceration crescent-shaped 1.8 cm 20 cm proximal to the anus likely sigmoid.   Tattooed and biopsied.  Rectal polyp showed tubular adenoma with no high-grade dysplasia.  Colon biopsy at 20 cm showed invasive moderately differentiated colon adenocarcinoma arising within a tubular adenoma with high-grade dysplasia.  Microsatellite stable    -8/24/2023 CEA normal 1.26.  ALT 34 bicarb 20.2 otherwise unremarkable CMP.  CBC normal with hemoglobin 14.4 and normal MCV.  -8/27/2023 CT abdomen pelvis with contrast did not show any colonic mass.  Mild distal esophageal thickening, hepatic steatosis without hepatic metastasis.  No lytic bone disease.  MRI rectal protocol without contrast reviewed with Dr. López.  Perhaps a small pucker at the site of biopsy but no discernible mass per se and no adenopathy.  -8/28/2023 CT chest shows no metastasis.  1.8 cm left thyroid for which follow-up is recommended    -8/29/2023 [facility] medical oncology consult: On screening colonoscopy patient found to have ulcerated polyp at 20 cm from the anal verge with moderately differentiated adenocarcinoma microsatellite stable.  Normal baseline CEA with staging CT chest abdomen pelvis and MRI rectal protocol showing no evidence of metastasis and no visible primary on MRI.  Based on distance from anal verge on colonoscopy and given the absence of visible abnormality on MRI rectal protocol to change that estimated position, would call this colon cancer.  Given microsatellite stability, would not contemplate calling this rectal cancer and treating with immunotherapy but regardless of whether rectal or colon primary this would at least be no more than T1 tumor clinically with no visible tumor on MRI despite colonoscopy findings.  The patient has experience with Dr. Michel with whom she requests a consultation for definitive surgery and I will see back postoperatively to decide on any adjuvant therapy as indicated based on pathologic staging.  There is some esophageal thickening on CT for which I would request Dr. Calle repeat EGD  last done 2019 just for completeness sake but first would deal with the colon cancer at hand.She will also need to get left thyroid ultrasound eventually and would likely do this through whoever has managed her goiter previously.      -- 9/25/2023 laparoscopic sigmoid colectomy Dr. Michel.    -10/2/2023 [facility] medical oncology follow-up: Final pathology from surgery done a week ago is not back.  According to verbal report to me from the pathologist has either T2 or T3 disease but he thinks that this is tracking along the arteries and will be T3 and she had 3 out of 13 nodes involved.  Hence I would call this stage IIIb.  Her baseline CEA was normal preoperatively so that will not be extremely helpful postoperatively.  Outside of clinical trial the standard of care would be 3 months of CapeOx or 6 months of FOLFOX.  I would be interested in putting her on NRG- where upon CT DNA negative patients are randomized to either serial screening and CT DNA or 3 months CapeOx/6 months FOLFOX providers choice.  CT DNA positive patients are randomized to either 6 months of CapeOx or FOLFOX or 6 months of FOLFIRINOX.  If we can go ahead and get the [place] CT DNA cooking outside of clinical trial without significant financial toxicity we may do that.  Ideally chemotherapy would start less than 8 weeks out from surgery and we should have plenty of time to have her heal and to make these decisions collectively.  Addendum: Final pathology report… Moderately differentiated adenocarcinoma pathologic T3N1B, negative margins margins 3 out of 14 nodes positive with negative new anastomotic excisional margin.  Tumor extent ends through muscularis propria into pericolonic or perirectal tissue.  No lymph-vascular or perineural invasion.     -10/16/2023 CT chest Abdomen pelvis compared to CT chest 8/28/2023 and CT abdomen pelvis 8/27/2023 shows no metastasis in the chest abdomen or pelvis and interval sigmoid colectomy.  New right  adnexal cystic lesion 6 cm recommending pelvic ultrasound in 3 months    -10/19/2023 [facility] medical oncology follow-up: I went over her scans with her.  No clear-cut metastatic disease.  I will ask her to get to Dr. Mcguire for the right adnexal cystic lesion as quickly as possible and would not wait 3 months on the ultrasound in light of clinical trial.  I suspect this is benign but we will get this worked up.  On [facility] nomogram, putting in her pathologic information she has a 5-year survival rate of 69% give or take 7%.  With adjuvant therapy her 5-year survival rate is 85%.  This is based on statistics not informed by molecular data.  We are aware of prognostic data from prior CT DNA trials whereby patients with negative CT DNA have a considerably higher 5-year survival rate and people with CT DNA positivity have a considerably lower 5-year survival rate and the above 5-year survival rate is probably a conglomerate of statistics between these people that averages out.  I certainly would be fine with either 6 months of FOLFOX or 3 months of Xeloda oxaliplatin outside of a clinical trial and that would be my standard recommendation.   is now open and we are enrolling her on that per her request.  If she has negative CT DNA then she is randomized to watchful waiting versus CapeOx x4 or FOLFOX x12.  If CT DNA is positive then she is randomized to CapeOx x6 or FOLFOX x12 (investigators choice) versus treatment intensification with FOLFIRINOX.  I will see her back in a few weeks for the CT DNA results.    - 11/8/2023 evaluation by Dr. Eloisa Uribe, GYN oncology of right adnexal mass with minimal complexity is not consistent with Krukenberg tumor or other malignancy.  There is a thickened endometrial stripe.  Perimenopausal state.  Still having monthly periods hormonal blood work from primary OB/GYN not available.  Imaging reviewed by Dr. Uribe with no concerns for this being malignancy.   normal  within the last month.  If it were not for serial CTs already planned, patient would have transvaginal ultrasound in 6 months but with plans for serial CTs, GYN oncology plans no further ultrasounds.  Due to low risk of malignancy, GYN oncology recommends regular follow-up with primary OB/GYN and follow-up if images change in a worrisome manner.    -11/9/2023 [facility] medical oncology follow-up: The CT DNA is still pending.  We should have the results by November 30 or sooner.  8 weeks from surgery would be November 20.  There is good data that disease-free survival and overall outcomes are not compromised when adjuvant chemotherapy occurs 8 weeks out from surgery compared to 4 weeks or less.  The data beyond that does show some falloff of survival when there are significant delays but I do not think her overall risk is going to substantially change whether we treated her the week of the 20th or the week of 27 November.  There is the option to give her 1 cycle of chemotherapy while waiting on this but that would not accrue to her overall number of cycles and I do not want to add oxaliplatin cycles overall given the potential additive toxicity of oxaliplatin.  She understands the uncertainties here and wishes to proceed with the clinical trial.  She has seen gynecologic oncology for evaluation and this is new ovarian cyst will be watched and is felt to be benign according to them and can be just watched by serial CTs and primary GYN exam.  I will see her back November 27 hopefully to have results by then and hopefully to then later that week proceed with chemotherapy as indicated based on the randomizations.    -12/1/2023 [facility] medical oncology follow-up: CT DNA reportedly 0 per  .  She randomized to the treatment arm for which we have seen her and educated her for Xeloda oxaliplatin x 3 months with NSAID cream to mitigate palmar and plantar skin issues.  Will ask scan routinely for routine  surveillance and with that we will also keep an eye on the right adnexal mass felt to be benign per GYN oncology.  Treatment 1 cycle 1 orders signed and we will see her for cycle 2 day 1 the day after Dennis with my nurse practitioner in [place].  She has had her cancer treatment preparation visit and education and consenting.  She has been vasculature and approval has been granted to treat her by peripheral vein from the nursing staff.    -12/13/2023 PICC line placed due to discomfort with oxaliplatin infusion  -12/26/2023 [facility] Oncology clinic follow-up: Yelena tolerated her first cycle of XELOX fairly well.  She has some mild fatigue.  She does continue to work.  She had 1 episode of diarrhea that resolved with Imodium.  Labs reviewed from today, CBC and CMP are normal other than for glucose of 128.  She now has a PICC line as oxaliplatin was uncomfortable infused peripherally.  We will proceed today with cycle #2 unchanged, I will see her back for follow-up for her third cycle.    -1/16/2024 [facility] Oncology clinic follow-up: Today will be cycle 3 of a planned 4 cycles of XELOX.  She has some mild palmar erythrodysesthesia, after further discussion she has not been using the Voltaren ointment twice daily as prescribed.  She will begin doing that along with making sure to keep her hands well moisturized throughout the day.  Right now this is a grade 1-2.  She understands to notify our office if it worsens during this cycle while she is taking her Xeloda and we may have to hold or reduce dose.  She has no lingering neuropathy.  Labs reviewed from today, counts are acceptable to continue treatment unchanged. Her CBC is normal, CMP normal other than for glucose of 279.  We will move her next treatment to a Friday at her request to work better with her work schedule and give her the weekend to recover.    -2/1/2024 FNA left thyroid biopsy with Dr. Armida Young, pathology was negative for malignant  cells.    -2/9/2024 [facility] Oncology clinic follow-up: Overall she continues to do well on XELOX, today is her fourth and final cycle of planned therapy.  Somehow the shipments of her Xeloda wore off but this last cycle, she completed Xeloda on Wednesday therefore I have asked her not to start taking cycle 4 of her Xeloda until Wednesday, 2/14/2024.  I have also notified our pharmacist.  We will continue with oxaliplatin today as planned. She has mild peripheral neuropathy that is intermittent.  Her hand-foot erythrodysesthesia has improved with more consistent use of emollient lotion and also Voltaren ointment twice a day.  She will continue the Xeloda dose at 1500 mg twice daily again and she will start this next Wednesday for 14 days on and then she will be finished.  I reviewed her labs from today, CT DNA was drawn, her CBC looks good with WBC 7.45, hemoglobin 12.3, hematocrit 36.9%, platelet count 232,000, ANC 4.83, her CMP is unremarkable other than for glucose of 309.  I did go over to see Yelena in the infusion area as I did not get the results of her CMP until she had left our visit.  She states that she did take her diabetes medications as directed, she does not monitor her glucose at home regularly, she will start doing so and will follow-up with her PCP.  Her magnesium is normal, CEA is pending.  We will plan on repeating CT chest, abdomen and pelvis for restaging prior to return in 4 weeks and I have ordered that today.  Since we saw her last she did have FNA of left thyroid nodule with Dr. Armida Young and that was negative for malignant cells.  We will discontinue her PICC line after her treatment today. Addendum: CEA 2.45 up from 0.694 months ago.  CT DNA negative.    -3/1/2024 Chest abdomen pelvis with contrast compared to October CTs shows left lobe thyroid nodule previously noted with multinodular goiter.  Scarring apical portions of both lungs.  Slight thoracic esophageal thickening  inflammation versus underdistention.  Mild hepatic steatosis.  Postsurgical sigmoid changes with haziness around the colon.  Right adnexal cyst no longer identified.  Thickening of the bladder wall possible cystitis.  L4-5, L5-S1 surgical changes.  Moderate stool burden    -3/8/2024 [facility] oncology clinic follow-up: On arm 1B GI Beatriz has completed 4 adjuvant cycles of Xeloda oxaliplatin.  CT DNA is negative in February.  We will repeat CT DNA CEA CMP CBC mid May and CTs the end of May and I will see her back early June and in the meantime I have reached out to Dr. Calle to do follow-up colonoscopy.  Her PICC line is out.  Thyroid has been biopsied and is benign.  Souls of her feet peeled with the last cycle and that has resolved.  Cold sensitive neuropathy still comes and goes but otherwise no significant residual neuropathy.  Encouraged her to follow-up with primary care for management of her blood glucose.    -4/17/2024 colonoscopy with Dr. Calle: Impression showed the examined portion of the ileum was normal.  One 4 mm polyp in the sigmoid colon at 20 cm proximal to the anus removed with cold snare, congested mucosa in the ascending colon biopsied, clip was placed.  2 passes made to the cecum.  Patent end-to-end colorectal anastomosis characterized by healthy-appearing mucosa.  Distal rectum and anal verge normal on retroflexion view.  Diverticulosis in the sigmoid colon.  Recommendation to repeat colonoscopy in 3 years for surveillance.  -4/17/2024 EGD normal esophagus, biopsied.  Gastritis characterized by erythema, normal second portion of the duodenum biopsied.  Pathology was benign.    -5/24/2024 CT DNA negative.  -5/29/2024 CT chest abdomen pelvis with contrast shows no evidence of recurrence in the chest abdomen or pelvis.  Multiple bilateral ovarian follicles/cysts versus multilocular cystic lesion in the left ovary measuring up to 4.6 cm both new from prior comparison.  CEA 0.94.  White count 12,470  otherwise unremarkable CBC and differential.  Potassium 3.4 otherwise unremarkable CMP.  Magnesium normal 2.1.      -6/3/2024 [facility] follow[facility]: No new somatic complaints status post Xeloda oxaliplatin for adjuvant cycles on arm 1B of .  CT DNA remains negative and the CEA remains low and the CT shows no evidence of recurrent colon cancer.  She does however have multiple bilateral ovarian follicles/cysts versus cystic lesion in the left ovary measuring up to 4.6 cm.  I have asked her to get with Dr. Mcguire for evaluation and management of this.  This is an ongoing issue and she is having pain in that area and it may ultimately be best for her to just have this ovary removed.  He had an EGD and colonoscopy in April with Dr. Calle.  She will get repeat CT DNA, CBC, CMP, CEA, and CTs per trial and NCCN guidelines mid August.  Next colonoscopy in 1 year.    -8/13/2024 CEA 1.14.  CBC and CMP unremarkable save for glucose 119.  Hemoglobin A1c 6.3.  CT DNA drawn by research staff.    -8/30/2024 CTChest abdomen pelvis shows new duodenal thickening of uncertain significance and otherwise negative CT chest abdomen pelvis.    -9/6/2024 [facility] clinic follow-up: May CT DNA negative.  Current CT DNA drawn but pending.  CTs and CEA and other labs unremarkable.  With regards to the duodenal thickening on CT, she had EGD and colonoscopy in April which included normal second portion of the duodenum biopsy and gastritis.  Will put her through a repeat EGD.  Colonoscopy in April also negative for malignancy.  Follow-up in 3 months with CBC, CMP, CEA, CT DNA on clinical trial, and CTs.  Next colonoscopy in April 2027.    -12/6/2024 CT chest, abdomen and pelvis with no evidence of recurrent or metastatic disease.  No acute findings.  CT DNA negative.  CEA 1.09  -12/10/2024 [facility] clinic follow-up: doing well, she has no evidence of recurrent colon cancer on clinical exam and no new worrisome  symptoms.  Current CT abdomen and pelvis from 12/6/2024 were negative, CT DNA is negative.  CBC and CMP are unremarkable, CEA is pending from today.  We will continue surveillance with repeat serum testing in 3 months with CBC, CMP and CEA.  She continues on research protocol  arm 1B clinical trial and will be due for repeat CT DNA, CEA, CBC, CMP and CT chest, abdomen and pelvis in 6 months.  She will be due for repeat colonoscopy in April 2027.    -2/14/2025 CEA 0.81, CBC normal, CMP normal other than for glucose of 179 and ALT of 35.    -6/2/2025 CT chest, abdomen and pelvis with no evidence of recurrent or metastatic disease.     Malignant neoplasm of sigmoid colon   8/29/2023 Initial Diagnosis    Malignant neoplasm of sigmoid colon     10/2/2023 Cancer Staged    Staging form: Colon And Rectum, AJCC 8th Edition  - Clinical: Stage IIIB (cT3, cN1, cM0) - Signed by Bernardino Chaparro MD on 10/2/2023     10/19/2023 Cancer Staged    Staging form: Colon And Rectum, AJCC 8th Edition  - Pathologic: Stage IIIB (pT3, pN1b, cM0) - Signed by Bernardino Chaparro MD on 10/19/2023     11/30/2023 - 11/30/2023 Chemotherapy    OP COLON CapeOX Capecitabine / OXALIplatin     12/4/2023 -  Chemotherapy    RESEARCH COLON  ARM 1B CAPOX (OXALIplatin / Capecitabine)         HISTORY OF PRESENT ILLNESS:  The patient is a 59 y.o. female, here for follow up on management of history of colon cancer. Yelena has been doing well overall since we saw her last.  She has been having a few more migraine headaches, she has had migraines since childhood and states that they had been under good control however she thinks that she is in perimenopause and that has increased their occurrence.  She states over the last few weeks she has noted decreased hearing in her left ear.  She denies any fevers or chills.  She has some mild discomfort in the left ear, she has had no drainage.  She has chronic tinnitus and has seen ENT in the past.    Past Medical  History:   Diagnosis Date    Anxiety     Arthritis     Cant recall the date of onset    Chest pain 11/06/2019    Chronic pain disorder     Back    Colon polyp     Depression     Diabetes mellitus     Dysphagia 11/06/2019    Endometriosis     Fibromyalgia, primary     GERD (gastroesophageal reflux disease)     Headache     HL (hearing loss)     Hyperlipidemia     Hypertension     Low vitamin D level     Malignant neoplasm of sigmoid colon 08/29/2023    Migraines     Ovarian cyst     PMDD (premenstrual dysphoric disorder)     Scoliosis     Thyroid nodule     Wears glasses      Past Surgical History:   Procedure Laterality Date    ABDOMINAL SURGERY  9/25/23    BACK SURGERY  2010    BLADDER SURGERY      Bladder tuck    COLONOSCOPY  August 2023    every 5 years    ENDOMETRIAL ABLATION      LUMBAR DISCECTOMY  2010    L4-S1 Rt- discectomy Dr. Arellano    SKIN BIOPSY  2023    Right arm- benign    SPINAL CORD STIMULATOR IMPLANT N/A 08/19/2019    Procedure: SPINAL CORD STIMULATOR INSERTION;  Surgeon: Ethan Peters MD;  Location: [facility];  Service: Neurosurgery    SPINE SURGERY  2010    TONSILLECTOMY AND ADENOIDECTOMY         Allergies   Allergen Reactions    Dihydroergotamine GI Intolerance     VOMITING    Methergine [Methylergonovine Maleate] Nausea And Vomiting    Amoxicillin Rash       Family History and Social History reviewed and changed as necessary    REVIEW OF SYSTEM:   Decreased hearing in the left ear    PHYSICAL EXAM:  Well-developed, well-nourished appearing female in no distress  Ear canals are patent, small amount of cerumen but no blockage.  TM on the left appears slightly inflamed, TM on the right is normal  No cervical, supraclavicular or axillary nodes palpable on exam  Lungs clear to auscultation bilaterally, respirations regular and unlabored    Vitals:    06/13/25 0841   BP: 99/63   Pulse: 102   Temp: 97.3 °F (36.3 °C)   TempSrc: Infrared   SpO2: 96%   Weight: 79.4 kg (175 lb)   Height: 162.6 cm  "(64.02\")     Vitals:    06/13/25 0845   PainSc: 0-No pain          ECOG score: 0           Vitals reviewed.  Labs reviewed along with CT chest, abdomen and pelvis 6/2/2025 that showed no evidence of disease recurrence    ECOG: (0) Fully Active - Able to Carry On All Pre-disease Performance Without Restriction    Lab Results   Component Value Date    HGB 13.9 06/13/2025    HCT 42.8 06/13/2025    MCV 89.7 06/13/2025     06/13/2025    WBC 11.08 (H) 06/13/2025    NEUTROABS 8.17 (H) 06/13/2025    LYMPHSABS 2.08 06/13/2025    MONOSABS 0.60 06/13/2025    EOSABS 0.15 06/13/2025    BASOSABS 0.04 06/13/2025       Lab Results   Component Value Date    GLUCOSE 230 (H) 06/13/2025    BUN 12.8 06/13/2025    CREATININE 0.89 06/13/2025     06/13/2025    K 4.1 06/13/2025     06/13/2025    CO2 22.0 06/13/2025    CALCIUM 9.1 06/13/2025    PROTEINTOT 6.5 06/13/2025    ALBUMIN 3.9 06/13/2025    BILITOT 0.3 06/13/2025    ALKPHOS 71 06/13/2025    AST 20 06/13/2025    ALT 28 06/13/2025     Lab Results   Component Value Date    CEA 0.81 02/14/2025    CEA 1.09 12/10/2024    CEA 1.14 08/13/2024    CEA 0.94 05/29/2024     CT Chest With Contrast Diagnostic  Result Date: 6/5/2025  Impression: 1.No evidence of recurrent or metastatic disease in the chest, abdomen, or pelvis. 2.Chronic and incidental findings are described above. Electronically Signed: Tom Wang MD  6/5/2025 12:46 PM EDT  Workstation ID: [id number]    CT Abdomen Pelvis With Contrast  Result Date: 6/5/2025  Impression: 1.No evidence of recurrent or metastatic disease in the chest, abdomen, or pelvis. 2.Chronic and incidental findings are described above. Electronically Signed: Tom Wang MD  6/5/2025 12:46 PM EDT  Workstation ID: [id number]            ASSESSMENT & PLAN:    1.  History of stage IIIb colon cancer  2.  Decreased hearing of the left ear    Discussion: Yelena overall is doing well, she has no evidence of recurrent colon cancer on clinical exam and no " new worrisome symptoms.  Current CT chest, abdomen and pelvis from 6/5/2025 showed no evidence of recurrent or metastatic disease.  CEA from today is pending. Her CBC and CMP are unremarkable.  She has decreased hearing at the left ear and what appears to be some inflammation of the left TM.  I will get her in with ENT for evaluation.  I offered antibiotic but she wants to do off.  She will let us know if she develops any fever or other concerns.  She was seen by research today, she continues on research protocol  arm 1B, CT DNA was drawn today.  We will see her back in 6 months for follow-up with repeat CT chest, abdomen and pelvis, CBC, CMP and CEA, repeat CT DNA with research.  Certainly if her CEA returns abnormal or CT DNA returns abnormal we will get her back in for an evaluation but if these are negative then we will continue with 6-month surveillance as outlined.  Next colonoscopy due April 2027.    Return to clinic in 6 months for follow-up    I spent 32 minutes caring for Yelena on this date of service. This time includes time spent by me in the following activities: preparing for the visit, reviewing tests, performing a medically appropriate examination and/or evaluation, ordering medications, tests, or procedures, documenting information in the medical record, independently interpreting results and communicating that information with the patient/family/caregiver, care coordination, and referral to ENT.     Dianna Boo, APRN    06/13/2025

## 2025-06-26 ENCOUNTER — OFFICE VISIT (OUTPATIENT)
Dept: INTERNAL MEDICINE | Facility: CLINIC | Age: 60
End: 2025-06-26
Payer: COMMERCIAL

## 2025-06-26 VITALS
BODY MASS INDEX: 29.71 KG/M2 | OXYGEN SATURATION: 95 % | TEMPERATURE: 98.7 F | RESPIRATION RATE: 16 BRPM | HEIGHT: 64 IN | SYSTOLIC BLOOD PRESSURE: 120 MMHG | DIASTOLIC BLOOD PRESSURE: 78 MMHG | HEART RATE: 95 BPM | WEIGHT: 174 LBS

## 2025-06-26 DIAGNOSIS — J01.10 ACUTE NON-RECURRENT FRONTAL SINUSITIS: ICD-10-CM

## 2025-06-26 DIAGNOSIS — E11.9 TYPE 2 DIABETES MELLITUS WITHOUT COMPLICATION, WITHOUT LONG-TERM CURRENT USE OF INSULIN: ICD-10-CM

## 2025-06-26 DIAGNOSIS — J34.89 SINUS PRESSURE: Primary | ICD-10-CM

## 2025-06-26 PROCEDURE — 99214 OFFICE O/P EST MOD 30 MIN: CPT | Performed by: FAMILY MEDICINE

## 2025-06-26 RX ORDER — METHYLPREDNISOLONE 4 MG/1
TABLET ORAL
Qty: 21 TABLET | Refills: 0 | Status: SHIPPED | OUTPATIENT
Start: 2025-06-26

## 2025-06-26 RX ORDER — AZITHROMYCIN 250 MG/1
TABLET, FILM COATED ORAL
Qty: 6 TABLET | Refills: 0 | Status: SHIPPED | OUTPATIENT
Start: 2025-06-26

## 2025-06-26 NOTE — PATIENT INSTRUCTIONS
Diagnosis Discussed   Continue to monitor   Plenty of fluids  Flonase daily for symptom control   Mucinex 1200mg twice a day x 7-10 days   Please complete full course of steroids     If worsening as discussed then start antibiotic   Please complete full course of antibiotics if started     Monitor Blood Sugar as directed while on low dose steroid   If symptoms worsen or persist please seek further evaluation

## 2025-06-26 NOTE — PROGRESS NOTES
Office Note     Name: Yelena Morris    : 1965     MRN: [id number]     Chief Complaint  Earache (Pt states earache and ringing in left ear x 2 weeks)    Subjective     History of Present Illness:  Yelena Morris is a 59 y.o. female who presents today for concerns for earache- left x 2 weeks- ringing sensation   Muffled hearing   Denies drainage or discharge     Some mild discomfort   Denies fevers, chills, chest pain, sob, congestion     Has had a frontal headache on and off   Does have seasonal allergies     OTC- tylenol, advil   sinsus    Review of Systems:   Review of Systems   Constitutional:  Negative for chills and fever.   HENT:  Positive for congestion, ear pain, sinus pressure and tinnitus. Negative for ear discharge.    Respiratory:  Negative for cough, chest tightness, shortness of breath and wheezing.    Cardiovascular:  Negative for chest pain, palpitations and leg swelling.   Gastrointestinal:  Negative for abdominal pain, constipation, nausea and vomiting.   Neurological:  Negative for light-headedness and headache.       Past Medical History:   Past Medical History:   Diagnosis Date    Anxiety     Arthritis     Cant recall the date of onset    Chest pain 2019    Chronic pain disorder     Back    Colon polyp     Depression     Diabetes mellitus     Dysphagia 2019    Endometriosis     Fibromyalgia, primary     GERD (gastroesophageal reflux disease)     Headache     HL (hearing loss)     Hyperlipidemia     Hypertension     Low vitamin D level     Malignant neoplasm of sigmoid colon 2023    Migraines     Ovarian cyst     PMDD (premenstrual dysphoric disorder)     Scoliosis     Thyroid nodule     Wears glasses        Past Surgical History:   Past Surgical History:   Procedure Laterality Date    ABDOMINAL SURGERY  23    BACK SURGERY      BLADDER SURGERY      Bladder tuck    COLONOSCOPY  2023    every 5 years    ENDOMETRIAL ABLATION      LUMBAR  DISCECTOMY  2010    L4-S1 Rt- discectomy Dr. Arellano    SKIN BIOPSY  2023    Right arm- benign    SPINAL CORD STIMULATOR IMPLANT N/A 08/19/2019    Procedure: SPINAL CORD STIMULATOR INSERTION;  Surgeon: Ethan Peters MD;  Location: [facility];  Service: Neurosurgery    SPINE SURGERY  2010    TONSILLECTOMY AND ADENOIDECTOMY         Immunizations:   Immunization History   Administered Date(s) Administered    COVID-19 (PFIZER) 12YRS+ (COMIRNATY) 12/18/2024    COVID-19 (PFIZER) BIVALENT 12+YRS 10/03/2022    COVID-19 (PFIZER) Purple Cap Monovalent 03/09/2021, 04/14/2021, 11/29/2021    Covid-19 (Pfizer) Gray Cap Monovalent 06/15/2022    Flu Vaccine Intradermal Quad 18-64YR 10/11/2018    Fluzone (or Fluarix & Flulaval for VFC) >6mos 09/09/2020, 09/15/2021, 09/15/2021, 10/03/2022, 09/22/2023    Influenza recombinant 11/25/2024    Influenza, Unspecified 10/14/2021    Pneumococcal Conjugate 20-Valent (PCV20) 03/08/2023    Pneumococcal Polysaccharide (PPSV23) 11/06/2019    Shingrix 12/14/2021, 06/15/2022    Tdap 10/11/2018, 10/30/2019    flucelvax quad pfs =>4 YRS 10/11/2018        Medications:     Current Outpatient Medications:     ARIPiprazole (ABILIFY) 5 MG tablet, Take 1 tablet by mouth Daily., Disp: 90 tablet, Rfl: 0    atorvastatin (LIPITOR) 40 MG tablet, Take 1 tablet by mouth Every Evening., Disp: 90 tablet, Rfl: 3    BOTOX 200 units reconstituted solution, INJECT 200 UNITS INTO THE HEAD OR NECK AREA BY MD EVERY 90 DAYS, Disp: , Rfl: 3    cyclobenzaprine (FLEXERIL) 10 MG tablet, Take 1 tablet by mouth At Night As Needed for Muscle Spasms., Disp: 30 tablet, Rfl: 1    DULoxetine (CYMBALTA) 60 MG capsule, Take 1 capsule by mouth Daily., Disp: 90 capsule, Rfl: 0    eletriptan (RELPAX) 40 MG tablet, TAKE 1 TABLET BY MOUTH AT THE ONSET OF MIGRAINE. MAY REPEAT IN 2 HRS.MAX 2 DOSE/24HR,3 DAYS/WEEK, Disp: , Rfl: 11    glimepiride (AMARYL) 2 MG tablet, TAKE 1 TABLET BY MOUTH TWICE A DAY, Disp: 180 tablet, Rfl: 3     hydroCHLOROthiazide 12.5 MG tablet, Take 1 tablet by mouth Daily., Disp: 90 tablet, Rfl: 3    ipratropium (ATROVENT) 0.06 % nasal spray, Administer 2 sprays into the nostril(s) as directed by provider 4 (Four) Times a Day., Disp: 15 mL, Rfl: 0    nebivolol (Bystolic) 10 MG tablet, Take 1 tablet by mouth Daily., Disp: 90 tablet, Rfl: 3    nortriptyline (PAMELOR) 10 MG capsule, nortriptyline 10 mg capsule  TAKE 4 CAPSULES AT BEDTIME, Disp: , Rfl:     omeprazole (priLOSEC) 40 MG capsule, Take 1 capsule by mouth Daily. Pt needs appointment, Disp: 90 capsule, Rfl: 3    pioglitazone (Actos) 30 MG tablet, Take 1 tablet by mouth Daily., Disp: 90 tablet, Rfl: 3    propranolol (INDERAL) 10 MG tablet, TAKE 1 TABLET BY MOUTH 2 (TWO) TIMES A DAY AS NEEDED (PANIC/ANXIETY)., Disp: 180 tablet, Rfl: 0    Semaglutide, 2 MG/DOSE, (OZEMPIC) 8 MG/3ML solution pen-injector, Inject 2 mg under the skin into the appropriate area as directed 1 (One) Time Per Week., Disp: 9 mL, Rfl: 1    Topiramate ER (Trokendi XR) 200 MG capsule sustained-release 24 hr, Daily., Disp: , Rfl:     azithromycin (Zithromax Z-Vernon) 250 MG tablet, Take 2 tablets by mouth on day 1, then 1 tablet daily on days 2-5, Disp: 6 tablet, Rfl: 0    FLUoxetine (PROzac) 20 MG capsule, Take 1 capsule by mouth Daily for 90 days., Disp: 90 capsule, Rfl: 0    hydrOXYzine (ATARAX) 25 MG tablet, TAKE 1-2 TABLETS BY MOUTH AT NIGHT AS NEEDED FOR ANXIETY (SLEEP)., Disp: 180 tablet, Rfl: 1    methylPREDNISolone (MEDROL) 4 MG dose pack, Take as directed on package instructions., Disp: 21 tablet, Rfl: 0    Allergies:   Allergies   Allergen Reactions    Dihydroergotamine GI Intolerance     VOMITING    Methergine [Methylergonovine Maleate] Nausea And Vomiting    Amoxicillin Rash       Family History:   Family History   Adopted: Yes   Problem Relation Age of Onset    Colon cancer Neg Hx        Social History:   Social History     Socioeconomic History    Marital status:    Tobacco Use  "   Smoking status: Never     Passive exposure: Never    Smokeless tobacco: Never   Vaping Use    Vaping status: Never Used   Substance and Sexual Activity    Alcohol use: No    Drug use: Never    Sexual activity: Yes     Partners: Male     Birth control/protection: Post-menopausal, Vasectomy         Objective     Vital Signs  /78   Pulse 95   Temp 98.7 °F (37.1 °C)   Resp 16   Ht 162.6 cm (64.02\")   Wt 78.9 kg (174 lb)   SpO2 95%   BMI 29.85 kg/m²   Estimated body mass index is 29.85 kg/m² as calculated from the following:    Height as of this encounter: 162.6 cm (64.02\").    Weight as of this encounter: 78.9 kg (174 lb).            Physical Exam  Vitals and nursing note reviewed.   Constitutional:       General: She is not in acute distress.     Appearance: Normal appearance.   HENT:      Head: Normocephalic and atraumatic.      Right Ear: Tympanic membrane normal.      Left Ear: Tympanic membrane normal.      Nose: Congestion and rhinorrhea present.      Mouth/Throat:      Mouth: Mucous membranes are moist.      Comments: Post nasal drip   Eyes:      Extraocular Movements: Extraocular movements intact.   Cardiovascular:      Rate and Rhythm: Normal rate and regular rhythm.      Heart sounds: Normal heart sounds.   Pulmonary:      Effort: Pulmonary effort is normal. No respiratory distress.      Breath sounds: Normal breath sounds.   Skin:     General: Skin is warm and dry.   Neurological:      General: No focal deficit present.      Mental Status: She is alert and oriented to person, place, and time.          Procedures     Assessment and Plan   Diagnosis Discussed   Continue to monitor   Plenty of fluids  Flonase daily for symptom control   Mucinex 1200mg twice a day x 7-10 days   Please complete full course of steroids     If worsening as discussed then start antibiotic   Please complete full course of antibiotics if started     Monitor Blood Sugar as directed while on low dose steroid   If symptoms " worsen or persist please seek further evaluation     1. Sinus pressure  - methylPREDNISolone (MEDROL) 4 MG dose pack; Take as directed on package instructions.  Dispense: 21 tablet; Refill: 0    2. Acute non-recurrent frontal sinusitis  - methylPREDNISolone (MEDROL) 4 MG dose pack; Take as directed on package instructions.  Dispense: 21 tablet; Refill: 0  - azithromycin (Zithromax Z-Vernon) 250 MG tablet; Take 2 tablets by mouth on day 1, then 1 tablet daily on days 2-5  Dispense: 6 tablet; Refill: 0    3. Type 2 diabetes mellitus without complication, without long-term current use of insulin  Stable. Monitor blood sugar and diet while on steroid   Will continue to monitor        Follow Up  No follow-ups on file.    Amanda Whyte MD  [facility]  [address] [place] [id number] [phone]

## 2025-06-29 DIAGNOSIS — G47.9 SLEEP DIFFICULTIES: ICD-10-CM

## 2025-06-29 DIAGNOSIS — F41.1 GENERALIZED ANXIETY DISORDER: Chronic | ICD-10-CM

## 2025-06-29 DIAGNOSIS — E78.2 MIXED HYPERLIPIDEMIA: ICD-10-CM

## 2025-06-30 RX ORDER — ATORVASTATIN CALCIUM 40 MG/1
40 TABLET, FILM COATED ORAL EVERY EVENING
Qty: 90 TABLET | Refills: 3 | OUTPATIENT
Start: 2025-06-30

## 2025-06-30 RX ORDER — HYDROXYZINE HYDROCHLORIDE 25 MG/1
25-50 TABLET, FILM COATED ORAL NIGHTLY PRN
Qty: 180 TABLET | Refills: 1 | Status: SHIPPED | OUTPATIENT
Start: 2025-06-30

## 2025-07-16 ENCOUNTER — TELEPHONE (OUTPATIENT)
Dept: OTHER | Facility: OTHER | Age: 60
End: 2025-07-16
Payer: COMMERCIAL

## 2025-07-16 NOTE — RESEARCH
Patient Name:  Yelena Morris  YOB: 1965  Patient Age:  59 y.o.  Patient's Sex:  female    Date of Service:  07/16/2025    Provider: Dr. Chaparro                     RESEARCH NOTE                  Called patient to inform her of her negative ctDNA results. Patient was very happy with results. I also reminded her of her appts in December. Told Mrs. Morris to call me with anything. Patient understanding at this time.     Liv ARENAS

## 2025-07-25 ENCOUNTER — OFFICE VISIT (OUTPATIENT)
Dept: INTERNAL MEDICINE | Facility: CLINIC | Age: 60
End: 2025-07-25
Payer: COMMERCIAL

## 2025-07-25 VITALS
RESPIRATION RATE: 12 BRPM | DIASTOLIC BLOOD PRESSURE: 78 MMHG | OXYGEN SATURATION: 97 % | SYSTOLIC BLOOD PRESSURE: 112 MMHG | BODY MASS INDEX: 30.01 KG/M2 | WEIGHT: 175.8 LBS | TEMPERATURE: 97.1 F | HEIGHT: 64 IN | HEART RATE: 90 BPM

## 2025-07-25 DIAGNOSIS — H61.22 IMPACTED CERUMEN OF LEFT EAR: ICD-10-CM

## 2025-07-25 DIAGNOSIS — J30.2 SEASONAL ALLERGIC RHINITIS, UNSPECIFIED TRIGGER: ICD-10-CM

## 2025-07-25 DIAGNOSIS — R42 DIZZINESS: Primary | ICD-10-CM

## 2025-07-25 RX ORDER — IPRATROPIUM BROMIDE 42 UG/1
2 SPRAY, METERED NASAL 4 TIMES DAILY
Qty: 15 ML | Refills: 0 | Status: SHIPPED | OUTPATIENT
Start: 2025-07-25

## 2025-07-25 RX ORDER — DULOXETIN HYDROCHLORIDE 60 MG/1
60 CAPSULE, DELAYED RELEASE ORAL DAILY
COMMUNITY

## 2025-07-25 NOTE — PROGRESS NOTES
Follow Up Office Visit      Date: 2025  Patient Name: Yelena Morris  : 1965   MRN: [id number]     Chief Complaint:    Chief Complaint   Patient presents with    Dizziness    Headache       History of Present Illness: Yelena Morris is a 59 y.o. female who is here today for evaluation of dizziness described as the room spinning that increased today.  Patient reports history of chronic migraines.  Developed one this morning however has taken some Excedrin and notes her pain is currently improving.  Reports dizziness is not usually a symptom of her migraines.  She notices when she leans forward to tie her shoes or when she lays back she develops a spinning sensation.  She has no symptoms when she moves her head to the right or left or when going from sitting to standing.  Also reports left ear pain over the last several days.  Recently seen in office approximately 1 month ago on 2025.  Was prescribed a Z-Vernon and steroid pack for sinusitis.  Notes she did not take the Z-Vernon.  Was told she had some fluid behind her ears and is concerned this could be causing her symptoms.  She drinks 1 bottle of water a day.  Previously prescribed Atrovent nasal spray however has not been using this in the misplaced her prescription.    Subjective      Review of Systems:   Review of Systems   Constitutional:  Negative for chills and fever.   HENT:  Positive for ear pain and sinus pressure. Negative for congestion, rhinorrhea and sore throat.    Respiratory:  Negative for cough and shortness of breath.    Gastrointestinal:  Negative for diarrhea and vomiting.   Neurological:  Positive for dizziness and headache. Negative for light-headedness.       Medications:     Current Outpatient Medications:     ARIPiprazole (ABILIFY) 5 MG tablet, Take 1 tablet by mouth Daily., Disp: 90 tablet, Rfl: 0    atorvastatin (LIPITOR) 40 MG tablet, Take 1 tablet by mouth Every Evening., Disp: 90 tablet, Rfl: 3    BOTOX 200  units reconstituted solution, INJECT 200 UNITS INTO THE HEAD OR NECK AREA BY MD EVERY 90 DAYS, Disp: , Rfl: 3    cyclobenzaprine (FLEXERIL) 10 MG tablet, Take 1 tablet by mouth At Night As Needed for Muscle Spasms., Disp: 30 tablet, Rfl: 1    DULoxetine (CYMBALTA) 60 MG capsule, Take 1 capsule by mouth Daily., Disp: 90 capsule, Rfl: 0    eletriptan (RELPAX) 40 MG tablet, TAKE 1 TABLET BY MOUTH AT THE ONSET OF MIGRAINE. MAY REPEAT IN 2 HRS.MAX 2 DOSE/24HR,3 DAYS/WEEK, Disp: , Rfl: 11    glimepiride (AMARYL) 2 MG tablet, TAKE 1 TABLET BY MOUTH TWICE A DAY, Disp: 180 tablet, Rfl: 3    hydroCHLOROthiazide 12.5 MG tablet, Take 1 tablet by mouth Daily., Disp: 90 tablet, Rfl: 3    hydrOXYzine (ATARAX) 25 MG tablet, TAKE 1-2 TABLETS BY MOUTH AT NIGHT AS NEEDED FOR ANXIETY (SLEEP)., Disp: 180 tablet, Rfl: 1    ipratropium (ATROVENT) 0.06 % nasal spray, Administer 2 sprays into the nostril(s) as directed by provider 4 (Four) Times a Day., Disp: 15 mL, Rfl: 0    nebivolol (Bystolic) 10 MG tablet, Take 1 tablet by mouth Daily., Disp: 90 tablet, Rfl: 3    nortriptyline (PAMELOR) 10 MG capsule, nortriptyline 10 mg capsule  TAKE 4 CAPSULES AT BEDTIME, Disp: , Rfl:     omeprazole (priLOSEC) 40 MG capsule, Take 1 capsule by mouth Daily. Pt needs appointment, Disp: 90 capsule, Rfl: 3    pioglitazone (Actos) 30 MG tablet, Take 1 tablet by mouth Daily., Disp: 90 tablet, Rfl: 3    propranolol (INDERAL) 10 MG tablet, TAKE 1 TABLET BY MOUTH 2 (TWO) TIMES A DAY AS NEEDED (PANIC/ANXIETY)., Disp: 180 tablet, Rfl: 0    Semaglutide, 2 MG/DOSE, (OZEMPIC) 8 MG/3ML solution pen-injector, Inject 2 mg under the skin into the appropriate area as directed 1 (One) Time Per Week., Disp: 9 mL, Rfl: 1    Topiramate ER (Trokendi XR) 200 MG capsule sustained-release 24 hr, Daily., Disp: , Rfl:     DULoxetine (Cymbalta) 60 MG capsule, Take 1 capsule by mouth Daily., Disp: , Rfl:     FLUoxetine (PROzac) 20 MG capsule, Take 1 capsule by mouth Daily for 90  "days., Disp: 90 capsule, Rfl: 0    methylPREDNISolone (MEDROL) 4 MG dose pack, Take as directed on package instructions., Disp: 21 tablet, Rfl: 0    Allergies:   Allergies   Allergen Reactions    Dihydroergotamine GI Intolerance     VOMITING    Methergine [Methylergonovine Maleate] Nausea And Vomiting    Amoxicillin Rash       Objective     Physical Exam:  Vital Signs:   Vitals:    07/25/25 1044   BP: 112/78   Pulse: 90   Resp: 12   Temp: 97.1 °F (36.2 °C)   TempSrc: Temporal   SpO2: 97%   Weight: 79.7 kg (175 lb 12.8 oz)   Height: 162.6 cm (64.02\")   PainSc: 5    PainLoc: Head     Body mass index is 30.16 kg/m².   Facility age limit for growth %mukesh is 20 years.          Physical Exam  Vitals and nursing note reviewed.   Constitutional:       General: She is not in acute distress.     Appearance: Normal appearance.   HENT:      Right Ear: Tympanic membrane, ear canal and external ear normal.      Left Ear: There is impacted cerumen.      Ears:      Comments: Repeat exam with erythematous canal and TM     Nose: No congestion.      Right Sinus: Maxillary sinus tenderness and frontal sinus tenderness present.      Left Sinus: Maxillary sinus tenderness and frontal sinus tenderness present.   Eyes:      Extraocular Movements: Extraocular movements intact.      Conjunctiva/sclera: Conjunctivae normal.   Cardiovascular:      Rate and Rhythm: Normal rate and regular rhythm.   Pulmonary:      Effort: Pulmonary effort is normal. No respiratory distress.      Breath sounds: Normal breath sounds. No wheezing, rhonchi or rales.   Neurological:      General: No focal deficit present.      Mental Status: She is alert and oriented to person, place, and time. Mental status is at baseline.      Motor: No weakness.      Gait: Gait normal.   Psychiatric:         Mood and Affect: Mood normal.         Behavior: Behavior normal.       Ear Cerumen Removal    Date/Time: 7/25/2025 11:32 AM    Performed by: Sally Shields, " AJITH  Authorized by: Sally Shields PA-C  Consent: Verbal consent obtained  Risks and benefits: risks, benefits and alternatives were discussed  Consent given by: patient  Patient understanding: patient states understanding of the procedure being performed  Location details: left ear  Patient tolerance: patient tolerated the procedure well with no immediate complications  Procedure type: instrumentation, irrigation           Assessment / Plan      Assessment/Plan:      Dizziness  - Likely due to cerumen impaction and questionable otitis media.  Increase water intake to at least 60 ounces a day.  Patient to call the office if symptoms fail to improve with current plan.  Strict return precautions discussed.  Recommend reinitiating Atrovent nasal spray and daily Zyrtec for sinus pressure and congestion.       Seasonal allergic rhinitis, unspecified trigger    Orders:    ipratropium (ATROVENT) 0.06 % nasal spray; Administer 2 sprays into the nostril(s) as directed by provider 4 (Four) Times a Day.    Impacted cerumen of left ear  - Repeat exam with erythema to canal and TM, possibly due to to irrigation however cannot exclude AOM.  If pain persists, would recommend starting previously prescribed antibiotic  Orders:    Ear Cerumen Removal         Follow Up:   Return for Next scheduled follow up.      Sally Shields PA-C    Internal Medicine [place]

## 2025-08-18 ENCOUNTER — LAB (OUTPATIENT)
Dept: LAB | Facility: HOSPITAL | Age: 60
End: 2025-08-18
Payer: COMMERCIAL

## 2025-08-18 ENCOUNTER — OFFICE VISIT (OUTPATIENT)
Dept: INTERNAL MEDICINE | Facility: CLINIC | Age: 60
End: 2025-08-18
Payer: COMMERCIAL

## 2025-08-18 VITALS
HEART RATE: 80 BPM | SYSTOLIC BLOOD PRESSURE: 114 MMHG | DIASTOLIC BLOOD PRESSURE: 76 MMHG | BODY MASS INDEX: 29.74 KG/M2 | TEMPERATURE: 97.4 F | WEIGHT: 174.2 LBS | OXYGEN SATURATION: 96 % | HEIGHT: 64 IN

## 2025-08-18 DIAGNOSIS — E78.2 MIXED HYPERLIPIDEMIA: ICD-10-CM

## 2025-08-18 DIAGNOSIS — Z12.31 ENCOUNTER FOR SCREENING MAMMOGRAM FOR MALIGNANT NEOPLASM OF BREAST: ICD-10-CM

## 2025-08-18 DIAGNOSIS — E11.65 TYPE 2 DIABETES MELLITUS WITH HYPERGLYCEMIA, WITHOUT LONG-TERM CURRENT USE OF INSULIN: ICD-10-CM

## 2025-08-18 DIAGNOSIS — E04.2 MULTINODULAR THYROID: ICD-10-CM

## 2025-08-18 DIAGNOSIS — R42 VERTIGO: ICD-10-CM

## 2025-08-18 DIAGNOSIS — F33.41 RECURRENT MAJOR DEPRESSIVE DISORDER, IN PARTIAL REMISSION: ICD-10-CM

## 2025-08-18 DIAGNOSIS — K21.9 GASTROESOPHAGEAL REFLUX DISEASE WITHOUT ESOPHAGITIS: ICD-10-CM

## 2025-08-18 DIAGNOSIS — M79.7 FIBROMYALGIA: ICD-10-CM

## 2025-08-18 DIAGNOSIS — M96.1 LUMBAR POST-LAMINECTOMY SYNDROME: ICD-10-CM

## 2025-08-18 DIAGNOSIS — M19.071 PRIMARY OSTEOARTHRITIS OF RIGHT FOOT: ICD-10-CM

## 2025-08-18 DIAGNOSIS — Z00.00 ANNUAL PHYSICAL EXAM: Primary | ICD-10-CM

## 2025-08-18 DIAGNOSIS — R21 RASH: ICD-10-CM

## 2025-08-18 DIAGNOSIS — B37.2 CANDIDAL INTERTRIGO: ICD-10-CM

## 2025-08-18 DIAGNOSIS — M47.812 CERVICAL SPONDYLOSIS WITHOUT MYELOPATHY: ICD-10-CM

## 2025-08-18 DIAGNOSIS — G43.109 MIGRAINE WITH AURA AND WITHOUT STATUS MIGRAINOSUS, NOT INTRACTABLE: ICD-10-CM

## 2025-08-18 DIAGNOSIS — C18.7 MALIGNANT NEOPLASM OF SIGMOID COLON: Chronic | ICD-10-CM

## 2025-08-18 DIAGNOSIS — I10 PRIMARY HYPERTENSION: ICD-10-CM

## 2025-08-18 LAB
ALBUMIN UR-MCNC: <1.2 MG/DL
CHOLEST SERPL-MCNC: 211 MG/DL (ref 0–200)
CREAT UR-MCNC: 71.5 MG/DL
EXPIRATION DATE: ABNORMAL
HBA1C MFR BLD: 8 % (ref 4.5–5.7)
HDLC SERPL-MCNC: 41 MG/DL (ref 40–60)
LDLC SERPL CALC-MCNC: 131 MG/DL (ref 0–100)
LDLC/HDLC SERPL: 3.07 {RATIO}
Lab: ABNORMAL
MICROALBUMIN/CREAT UR: NORMAL MG/G{CREAT}
TRIGL SERPL-MCNC: 220 MG/DL (ref 0–150)
TSH SERPL DL<=0.05 MIU/L-ACNC: 1.33 UIU/ML (ref 0.27–4.2)
VLDLC SERPL-MCNC: 39 MG/DL (ref 5–40)

## 2025-08-18 PROCEDURE — 82043 UR ALBUMIN QUANTITATIVE: CPT

## 2025-08-18 PROCEDURE — 99396 PREV VISIT EST AGE 40-64: CPT | Performed by: INTERNAL MEDICINE

## 2025-08-18 PROCEDURE — 83036 HEMOGLOBIN GLYCOSYLATED A1C: CPT | Performed by: INTERNAL MEDICINE

## 2025-08-18 PROCEDURE — 99214 OFFICE O/P EST MOD 30 MIN: CPT | Performed by: INTERNAL MEDICINE

## 2025-08-18 PROCEDURE — 82570 ASSAY OF URINE CREATININE: CPT

## 2025-08-18 PROCEDURE — 80061 LIPID PANEL: CPT

## 2025-08-18 PROCEDURE — 84443 ASSAY THYROID STIM HORMONE: CPT

## 2025-08-18 RX ORDER — CLOBETASOL PROPIONATE 0.5 MG/G
1 CREAM TOPICAL 2 TIMES DAILY
Qty: 60 G | Refills: 0 | Status: SHIPPED | OUTPATIENT
Start: 2025-08-18 | End: 2025-09-01

## 2025-08-18 RX ORDER — ATORVASTATIN CALCIUM 40 MG/1
40 TABLET, FILM COATED ORAL EVERY EVENING
Qty: 90 TABLET | Refills: 3 | Status: SHIPPED | OUTPATIENT
Start: 2025-08-18

## 2025-08-18 RX ORDER — NYSTATIN 100000 [USP'U]/G
POWDER TOPICAL 4 TIMES DAILY
Qty: 60 G | Refills: 3 | Status: SHIPPED | OUTPATIENT
Start: 2025-08-18

## 2025-08-18 RX ORDER — GLIMEPIRIDE 4 MG/1
4 TABLET ORAL 2 TIMES DAILY
Qty: 180 TABLET | Refills: 3 | Status: SHIPPED | OUTPATIENT
Start: 2025-08-18

## 2025-08-20 DIAGNOSIS — F32.81 PMDD (PREMENSTRUAL DYSPHORIC DISORDER): Chronic | ICD-10-CM

## 2025-08-24 ENCOUNTER — RESULTS FOLLOW-UP (OUTPATIENT)
Dept: INTERNAL MEDICINE | Facility: CLINIC | Age: 60
End: 2025-08-24
Payer: COMMERCIAL

## 2025-08-25 ENCOUNTER — OFFICE VISIT (OUTPATIENT)
Dept: GASTROENTEROLOGY | Facility: CLINIC | Age: 60
End: 2025-08-25
Payer: COMMERCIAL

## 2025-08-25 VITALS — BODY MASS INDEX: 29.53 KG/M2 | HEIGHT: 64 IN | WEIGHT: 173 LBS

## 2025-08-25 DIAGNOSIS — C18.7 MALIGNANT NEOPLASM OF SIGMOID COLON: Chronic | ICD-10-CM

## 2025-08-25 DIAGNOSIS — Z85.038 HISTORY OF COLON CANCER, STAGE III: ICD-10-CM

## 2025-08-25 DIAGNOSIS — A04.8 H. PYLORI INFECTION: Primary | ICD-10-CM

## 2025-08-25 PROCEDURE — 99213 OFFICE O/P EST LOW 20 MIN: CPT | Performed by: INTERNAL MEDICINE

## (undated) DEVICE — TOOL 14MH30D LEGEND 14CM 3MM MH DIAM: Brand: MIDAS REX ™

## (undated) DEVICE — SUT VIC PLS CTD ANTIB BR 3/0 8/18IN 45CM

## (undated) DEVICE — GLV SURG PREMIERPRO MIC LTX PF SZ6.5 BRN

## (undated) DEVICE — ELECTRD BLD EXT EDGE/INSUL 1P 4IN

## (undated) DEVICE — CHRGR BATRY STIM INTELLIS NEUROSTM SYS

## (undated) DEVICE — SUT SILK 2/0 SH CR8 18IN CR8 C012D

## (undated) DEVICE — PK NEURO DISC 10

## (undated) DEVICE — GLV SURG PREMIERPRO MIC LTX PF SZ7.5 BRN

## (undated) DEVICE — PROGRAMMER CONTRL INTELLIS NEUROSTM

## (undated) DEVICE — SNAP KOVER: Brand: UNBRANDED

## (undated) DEVICE — SUT VIC 3/0 PS2 27IN J427H

## (undated) DEVICE — CVR HNDL LT SURG ACCSSRY BLU STRL

## (undated) DEVICE — GLV SURG PREMIERPRO MIC LTX PF SZ7 BRN

## (undated) DEVICE — ANTIBACTERIAL UNDYED BRAIDED (POLYGLACTIN 910), SYNTHETIC ABSORBABLE SUTURE: Brand: COATED VICRYL

## (undated) DEVICE — 3M™ STERI-DRAPE™ INSTRUMENT POUCH 1018: Brand: STERI-DRAPE™

## (undated) DEVICE — HOLDER: Brand: DEROYAL

## (undated) DEVICE — DRSNG WND BORDR/ADHS NONADHR/GZ LF 4X4IN STRL

## (undated) DEVICE — ACCY PA700 LUBRICANT DIFFUSER MR7 4 PACK: Brand: MIDAS REX

## (undated) DEVICE — CODMAN® DISPOSABLE CATHETER PASSER: Brand: CODMAN®

## (undated) DEVICE — IRRIGATOR BULB ASEPTO 60CC STRL

## (undated) DEVICE — SKIN AFFIX SURG ADHESIVE 72/CS 0.55ML: Brand: MEDLINE

## (undated) DEVICE — HDRST INTUB GENTLETOUCH SLOT 7IN RT

## (undated) DEVICE — BRAIN SPATULA, 7 7/8", (200 MM), DOUBLE ENDED, MALLEABLE, WIDTH: 10 MM, SILICONE, STERILE, DISPOSABLE, PACKAGE OF 10 PIECES: Brand: AESCULAP

## (undated) DEVICE — APPL CHLORAPREP W/TINT 26ML BLU